# Patient Record
Sex: MALE | Race: WHITE | NOT HISPANIC OR LATINO | Employment: OTHER | URBAN - METROPOLITAN AREA
[De-identification: names, ages, dates, MRNs, and addresses within clinical notes are randomized per-mention and may not be internally consistent; named-entity substitution may affect disease eponyms.]

---

## 2017-02-16 PROBLEM — J45.909 ASTHMA: Status: ACTIVE | Noted: 2017-02-16

## 2017-02-16 PROBLEM — K21.9 GERD (GASTROESOPHAGEAL REFLUX DISEASE): Status: ACTIVE | Noted: 2017-02-16

## 2017-02-16 PROBLEM — E03.9 HYPOTHYROIDISM: Status: ACTIVE | Noted: 2017-02-16

## 2017-02-16 PROBLEM — F41.9 ANXIETY DISORDER: Status: ACTIVE | Noted: 2017-02-16

## 2017-02-16 PROBLEM — M10.9 GOUT: Status: ACTIVE | Noted: 2017-02-16

## 2017-02-16 PROBLEM — C43.9 MALIGNANT MELANOMA (HCC): Status: ACTIVE | Noted: 2017-02-16

## 2017-02-16 PROBLEM — E78.5 DYSLIPIDEMIA: Status: ACTIVE | Noted: 2017-02-16

## 2017-02-16 PROBLEM — G62.9 NEUROPATHY: Status: ACTIVE | Noted: 2017-02-16

## 2017-02-16 PROBLEM — I65.29 CAROTID STENOSIS: Status: ACTIVE | Noted: 2017-02-16

## 2017-02-16 PROBLEM — I10 HYPERTENSION: Status: ACTIVE | Noted: 2017-02-16

## 2017-03-15 ENCOUNTER — HOSPITAL ENCOUNTER (INPATIENT)
Facility: HOSPITAL | Age: 77
LOS: 2 days | Discharge: HOME | End: 2017-03-17
Attending: EMERGENCY MEDICINE | Admitting: INTERNAL MEDICINE

## 2017-03-15 ENCOUNTER — APPOINTMENT (OUTPATIENT)
Dept: CT IMAGING | Facility: HOSPITAL | Age: 77
End: 2017-03-15

## 2017-03-15 DIAGNOSIS — K56.609 SMALL BOWEL OBSTRUCTION (HCC): Primary | ICD-10-CM

## 2017-03-15 PROBLEM — K57.90 DIVERTICULOSIS: Status: ACTIVE | Noted: 2017-03-15

## 2017-03-15 PROBLEM — D72.829 LEUKOCYTOSIS: Status: ACTIVE | Noted: 2017-03-15

## 2017-03-15 LAB
ALBUMIN SERPL-MCNC: 4.2 G/DL (ref 3.2–4.8)
ALBUMIN SERPL-MCNC: 4.6 G/DL (ref 3.2–4.8)
ALBUMIN/GLOB SERPL: 1.5 G/DL (ref 1.5–2.5)
ALBUMIN/GLOB SERPL: 1.6 G/DL (ref 1.5–2.5)
ALP SERPL-CCNC: 113 U/L (ref 25–100)
ALP SERPL-CCNC: 128 U/L (ref 25–100)
ALT SERPL W P-5'-P-CCNC: 21 U/L (ref 7–40)
ALT SERPL W P-5'-P-CCNC: 24 U/L (ref 7–40)
ANION GAP SERPL CALCULATED.3IONS-SCNC: 2 MMOL/L (ref 3–11)
ANION GAP SERPL CALCULATED.3IONS-SCNC: 7 MMOL/L (ref 3–11)
ARTICHOKE IGE QN: 108 MG/DL (ref 0–130)
AST SERPL-CCNC: 20 U/L (ref 0–33)
AST SERPL-CCNC: 21 U/L (ref 0–33)
BACTERIA UR QL AUTO: ABNORMAL /HPF
BASOPHILS # BLD AUTO: 0.02 10*3/MM3 (ref 0–0.2)
BASOPHILS # BLD AUTO: 0.03 10*3/MM3 (ref 0–0.2)
BASOPHILS NFR BLD AUTO: 0.2 % (ref 0–1)
BASOPHILS NFR BLD AUTO: 0.2 % (ref 0–1)
BILIRUB SERPL-MCNC: 0.5 MG/DL (ref 0.3–1.2)
BILIRUB SERPL-MCNC: 0.6 MG/DL (ref 0.3–1.2)
BILIRUB UR QL STRIP: NEGATIVE
BUN BLD-MCNC: 16 MG/DL (ref 9–23)
BUN BLD-MCNC: 17 MG/DL (ref 9–23)
BUN/CREAT SERPL: 17 (ref 7–25)
BUN/CREAT SERPL: 17.8 (ref 7–25)
CALCIUM SPEC-SCNC: 10 MG/DL (ref 8.7–10.4)
CALCIUM SPEC-SCNC: 11.1 MG/DL (ref 8.7–10.4)
CHLORIDE SERPL-SCNC: 104 MMOL/L (ref 99–109)
CHLORIDE SERPL-SCNC: 108 MMOL/L (ref 99–109)
CHOLEST SERPL-MCNC: 179 MG/DL (ref 0–200)
CLARITY UR: ABNORMAL
CO2 SERPL-SCNC: 29 MMOL/L (ref 20–31)
CO2 SERPL-SCNC: 32 MMOL/L (ref 20–31)
COLOR UR: YELLOW
CREAT BLD-MCNC: 0.9 MG/DL (ref 0.6–1.3)
CREAT BLD-MCNC: 1 MG/DL (ref 0.6–1.3)
D-LACTATE SERPL-SCNC: 1.8 MMOL/L (ref 0.5–2)
DEPRECATED RDW RBC AUTO: 46.9 FL (ref 37–54)
DEPRECATED RDW RBC AUTO: 47.3 FL (ref 37–54)
EOSINOPHIL # BLD AUTO: 0.04 10*3/MM3 (ref 0.1–0.3)
EOSINOPHIL # BLD AUTO: 0.06 10*3/MM3 (ref 0.1–0.3)
EOSINOPHIL NFR BLD AUTO: 0.3 % (ref 0–3)
EOSINOPHIL NFR BLD AUTO: 0.4 % (ref 0–3)
ERYTHROCYTE [DISTWIDTH] IN BLOOD BY AUTOMATED COUNT: 14.4 % (ref 11.3–14.5)
ERYTHROCYTE [DISTWIDTH] IN BLOOD BY AUTOMATED COUNT: 14.5 % (ref 11.3–14.5)
GFR SERPL CREATININE-BSD FRML MDRD: 72 ML/MIN/1.73
GFR SERPL CREATININE-BSD FRML MDRD: 82 ML/MIN/1.73
GLOBULIN UR ELPH-MCNC: 2.6 GM/DL
GLOBULIN UR ELPH-MCNC: 3.1 GM/DL
GLUCOSE BLD-MCNC: 122 MG/DL (ref 70–100)
GLUCOSE BLD-MCNC: 159 MG/DL (ref 70–100)
GLUCOSE UR STRIP-MCNC: NEGATIVE MG/DL
HCT VFR BLD AUTO: 45.2 % (ref 38.9–50.9)
HCT VFR BLD AUTO: 47.4 % (ref 38.9–50.9)
HDLC SERPL-MCNC: 43 MG/DL (ref 40–60)
HGB BLD-MCNC: 14.6 G/DL (ref 13.1–17.5)
HGB BLD-MCNC: 15.7 G/DL (ref 13.1–17.5)
HGB UR QL STRIP.AUTO: NEGATIVE
HOLD SPECIMEN: NORMAL
HOLD SPECIMEN: NORMAL
HYALINE CASTS UR QL AUTO: ABNORMAL /LPF
IMM GRANULOCYTES # BLD: 0.04 10*3/MM3 (ref 0–0.03)
IMM GRANULOCYTES # BLD: 0.04 10*3/MM3 (ref 0–0.03)
IMM GRANULOCYTES NFR BLD: 0.3 % (ref 0–0.6)
IMM GRANULOCYTES NFR BLD: 0.3 % (ref 0–0.6)
KETONES UR QL STRIP: NEGATIVE
LEUKOCYTE ESTERASE UR QL STRIP.AUTO: ABNORMAL
LIPASE SERPL-CCNC: 41 U/L (ref 6–51)
LIPASE SERPL-CCNC: 42 U/L (ref 6–51)
LYMPHOCYTES # BLD AUTO: 1.33 10*3/MM3 (ref 0.6–4.8)
LYMPHOCYTES # BLD AUTO: 1.34 10*3/MM3 (ref 0.6–4.8)
LYMPHOCYTES NFR BLD AUTO: 10.7 % (ref 24–44)
LYMPHOCYTES NFR BLD AUTO: 9.5 % (ref 24–44)
MCH RBC QN AUTO: 29.1 PG (ref 27–31)
MCH RBC QN AUTO: 29.7 PG (ref 27–31)
MCHC RBC AUTO-ENTMCNC: 32.3 G/DL (ref 32–36)
MCHC RBC AUTO-ENTMCNC: 33.1 G/DL (ref 32–36)
MCV RBC AUTO: 89.6 FL (ref 80–99)
MCV RBC AUTO: 90.2 FL (ref 80–99)
MONOCYTES # BLD AUTO: 1.01 10*3/MM3 (ref 0–1)
MONOCYTES # BLD AUTO: 1.03 10*3/MM3 (ref 0–1)
MONOCYTES NFR BLD AUTO: 7.2 % (ref 0–12)
MONOCYTES NFR BLD AUTO: 8.2 % (ref 0–12)
NEUTROPHILS # BLD AUTO: 10.08 10*3/MM3 (ref 1.5–8.3)
NEUTROPHILS # BLD AUTO: 11.54 10*3/MM3 (ref 1.5–8.3)
NEUTROPHILS NFR BLD AUTO: 80.3 % (ref 41–71)
NEUTROPHILS NFR BLD AUTO: 82.4 % (ref 41–71)
NITRITE UR QL STRIP: NEGATIVE
PH UR STRIP.AUTO: 7.5 [PH] (ref 5–8)
PLATELET # BLD AUTO: 266 10*3/MM3 (ref 150–450)
PLATELET # BLD AUTO: 284 10*3/MM3 (ref 150–450)
PMV BLD AUTO: 10 FL (ref 6–12)
PMV BLD AUTO: 9.7 FL (ref 6–12)
POTASSIUM BLD-SCNC: 4 MMOL/L (ref 3.5–5.5)
POTASSIUM BLD-SCNC: 5 MMOL/L (ref 3.5–5.5)
PROT SERPL-MCNC: 6.8 G/DL (ref 5.7–8.2)
PROT SERPL-MCNC: 7.7 G/DL (ref 5.7–8.2)
PROT UR QL STRIP: ABNORMAL
RBC # BLD AUTO: 5.01 10*6/MM3 (ref 4.2–5.76)
RBC # BLD AUTO: 5.29 10*6/MM3 (ref 4.2–5.76)
RBC # UR: ABNORMAL /HPF
REF LAB TEST METHOD: ABNORMAL
SODIUM BLD-SCNC: 140 MMOL/L (ref 132–146)
SODIUM BLD-SCNC: 142 MMOL/L (ref 132–146)
SP GR UR STRIP: 1.02 (ref 1–1.03)
SQUAMOUS #/AREA URNS HPF: ABNORMAL /HPF
TRIGL SERPL-MCNC: 251 MG/DL (ref 0–150)
TROPONIN I SERPL-MCNC: 0.01 NG/ML (ref 0–0.07)
TSH SERPL DL<=0.05 MIU/L-ACNC: 0.01 MIU/ML (ref 0.35–5.35)
UROBILINOGEN UR QL STRIP: ABNORMAL
WBC NRBC COR # BLD: 12.55 10*3/MM3 (ref 3.5–10.8)
WBC NRBC COR # BLD: 14.01 10*3/MM3 (ref 3.5–10.8)
WBC UR QL AUTO: ABNORMAL /HPF
WHOLE BLOOD HOLD SPECIMEN: NORMAL
WHOLE BLOOD HOLD SPECIMEN: NORMAL

## 2017-03-15 PROCEDURE — 80053 COMPREHEN METABOLIC PANEL: CPT | Performed by: NURSE PRACTITIONER

## 2017-03-15 PROCEDURE — 84484 ASSAY OF TROPONIN QUANT: CPT

## 2017-03-15 PROCEDURE — 83605 ASSAY OF LACTIC ACID: CPT | Performed by: EMERGENCY MEDICINE

## 2017-03-15 PROCEDURE — 25010000002 ONDANSETRON PER 1 MG: Performed by: NURSE PRACTITIONER

## 2017-03-15 PROCEDURE — 25010000002 HYDROMORPHONE PER 4 MG: Performed by: NURSE PRACTITIONER

## 2017-03-15 PROCEDURE — 25010000002 ENOXAPARIN PER 10 MG: Performed by: NURSE PRACTITIONER

## 2017-03-15 PROCEDURE — 81001 URINALYSIS AUTO W/SCOPE: CPT | Performed by: EMERGENCY MEDICINE

## 2017-03-15 PROCEDURE — 83690 ASSAY OF LIPASE: CPT | Performed by: EMERGENCY MEDICINE

## 2017-03-15 PROCEDURE — 74176 CT ABD & PELVIS W/O CONTRAST: CPT

## 2017-03-15 PROCEDURE — 80053 COMPREHEN METABOLIC PANEL: CPT | Performed by: EMERGENCY MEDICINE

## 2017-03-15 PROCEDURE — 25010000002 HYDROMORPHONE PER 4 MG: Performed by: EMERGENCY MEDICINE

## 2017-03-15 PROCEDURE — 80061 LIPID PANEL: CPT | Performed by: NURSE PRACTITIONER

## 2017-03-15 PROCEDURE — 84443 ASSAY THYROID STIM HORMONE: CPT | Performed by: NURSE PRACTITIONER

## 2017-03-15 PROCEDURE — 93005 ELECTROCARDIOGRAM TRACING: CPT | Performed by: EMERGENCY MEDICINE

## 2017-03-15 PROCEDURE — 85025 COMPLETE CBC W/AUTO DIFF WBC: CPT | Performed by: EMERGENCY MEDICINE

## 2017-03-15 PROCEDURE — 99223 1ST HOSP IP/OBS HIGH 75: CPT | Performed by: INTERNAL MEDICINE

## 2017-03-15 PROCEDURE — 85025 COMPLETE CBC W/AUTO DIFF WBC: CPT | Performed by: NURSE PRACTITIONER

## 2017-03-15 PROCEDURE — 99284 EMERGENCY DEPT VISIT MOD MDM: CPT

## 2017-03-15 PROCEDURE — 25010000002 ONDANSETRON PER 1 MG

## 2017-03-15 RX ORDER — ONDANSETRON 2 MG/ML
4 INJECTION INTRAMUSCULAR; INTRAVENOUS ONCE
Status: COMPLETED | OUTPATIENT
Start: 2017-03-15 | End: 2017-03-15

## 2017-03-15 RX ORDER — PYRIDOSTIGMINE BROMIDE 60 MG/1
30 TABLET ORAL EVERY 12 HOURS SCHEDULED
Status: DISCONTINUED | OUTPATIENT
Start: 2017-03-15 | End: 2017-03-16

## 2017-03-15 RX ORDER — LABETALOL HYDROCHLORIDE 5 MG/ML
10 INJECTION, SOLUTION INTRAVENOUS EVERY 6 HOURS PRN
Status: DISCONTINUED | OUTPATIENT
Start: 2017-03-15 | End: 2017-03-17 | Stop reason: HOSPADM

## 2017-03-15 RX ORDER — ACETAMINOPHEN 325 MG/1
650 TABLET ORAL EVERY 4 HOURS PRN
Status: DISCONTINUED | OUTPATIENT
Start: 2017-03-15 | End: 2017-03-17 | Stop reason: HOSPADM

## 2017-03-15 RX ORDER — HYDROMORPHONE HYDROCHLORIDE 1 MG/ML
0.5 INJECTION, SOLUTION INTRAMUSCULAR; INTRAVENOUS; SUBCUTANEOUS
Status: DISCONTINUED | OUTPATIENT
Start: 2017-03-15 | End: 2017-03-17 | Stop reason: HOSPADM

## 2017-03-15 RX ORDER — SODIUM CHLORIDE 0.9 % (FLUSH) 0.9 %
10 SYRINGE (ML) INJECTION AS NEEDED
Status: DISCONTINUED | OUTPATIENT
Start: 2017-03-15 | End: 2017-03-17 | Stop reason: HOSPADM

## 2017-03-15 RX ORDER — HYDROMORPHONE HYDROCHLORIDE 1 MG/ML
0.5 INJECTION, SOLUTION INTRAMUSCULAR; INTRAVENOUS; SUBCUTANEOUS ONCE
Status: COMPLETED | OUTPATIENT
Start: 2017-03-15 | End: 2017-03-15

## 2017-03-15 RX ORDER — NALOXONE HCL 0.4 MG/ML
0.4 VIAL (ML) INJECTION
Status: DISCONTINUED | OUTPATIENT
Start: 2017-03-15 | End: 2017-03-17 | Stop reason: HOSPADM

## 2017-03-15 RX ORDER — SODIUM CHLORIDE 9 MG/ML
100 INJECTION, SOLUTION INTRAVENOUS CONTINUOUS
Status: DISCONTINUED | OUTPATIENT
Start: 2017-03-15 | End: 2017-03-17 | Stop reason: HOSPADM

## 2017-03-15 RX ORDER — ONDANSETRON 2 MG/ML
4 INJECTION INTRAMUSCULAR; INTRAVENOUS EVERY 6 HOURS PRN
Status: DISCONTINUED | OUTPATIENT
Start: 2017-03-15 | End: 2017-03-17 | Stop reason: HOSPADM

## 2017-03-15 RX ORDER — ONDANSETRON 4 MG/1
4 TABLET, FILM COATED ORAL EVERY 6 HOURS PRN
Status: DISCONTINUED | OUTPATIENT
Start: 2017-03-15 | End: 2017-03-17 | Stop reason: HOSPADM

## 2017-03-15 RX ORDER — ONDANSETRON 2 MG/ML
4 INJECTION INTRAMUSCULAR; INTRAVENOUS ONCE
Status: DISCONTINUED | OUTPATIENT
Start: 2017-03-15 | End: 2017-03-15

## 2017-03-15 RX ORDER — SODIUM CHLORIDE 9 MG/ML
125 INJECTION, SOLUTION INTRAVENOUS CONTINUOUS
Status: DISCONTINUED | OUTPATIENT
Start: 2017-03-15 | End: 2017-03-15

## 2017-03-15 RX ADMIN — PYRIDOSTIGMINE BROMIDE 30 MG: 60 TABLET ORAL at 22:23

## 2017-03-15 RX ADMIN — HYDROMORPHONE HYDROCHLORIDE 0.5 MG: 1 INJECTION, SOLUTION INTRAMUSCULAR; INTRAVENOUS; SUBCUTANEOUS at 05:44

## 2017-03-15 RX ADMIN — HYDROMORPHONE HYDROCHLORIDE 0.5 MG: 1 INJECTION, SOLUTION INTRAMUSCULAR; INTRAVENOUS; SUBCUTANEOUS at 12:20

## 2017-03-15 RX ADMIN — SODIUM CHLORIDE 125 ML/HR: 9 INJECTION, SOLUTION INTRAVENOUS at 02:45

## 2017-03-15 RX ADMIN — ONDANSETRON 4 MG: 2 INJECTION INTRAMUSCULAR; INTRAVENOUS at 05:44

## 2017-03-15 RX ADMIN — SODIUM CHLORIDE 100 ML/HR: 9 INJECTION, SOLUTION INTRAVENOUS at 17:48

## 2017-03-15 RX ADMIN — ONDANSETRON 4 MG: 2 INJECTION INTRAMUSCULAR; INTRAVENOUS at 02:38

## 2017-03-15 RX ADMIN — ENOXAPARIN SODIUM 30 MG: 30 INJECTION SUBCUTANEOUS at 06:23

## 2017-03-15 RX ADMIN — HYDROMORPHONE HYDROCHLORIDE 0.5 MG: 1 INJECTION, SOLUTION INTRAMUSCULAR; INTRAVENOUS; SUBCUTANEOUS at 09:43

## 2017-03-15 RX ADMIN — ONDANSETRON 4 MG: 2 INJECTION INTRAMUSCULAR; INTRAVENOUS at 17:48

## 2017-03-15 RX ADMIN — SODIUM CHLORIDE 1000 ML: 9 INJECTION, SOLUTION INTRAVENOUS at 04:57

## 2017-03-15 RX ADMIN — SODIUM CHLORIDE 100 ML/HR: 9 INJECTION, SOLUTION INTRAVENOUS at 05:44

## 2017-03-15 RX ADMIN — HYDROMORPHONE HYDROCHLORIDE 0.5 MG: 1 INJECTION, SOLUTION INTRAMUSCULAR; INTRAVENOUS; SUBCUTANEOUS at 22:23

## 2017-03-15 RX ADMIN — HYDROMORPHONE HYDROCHLORIDE 0.5 MG: 1 INJECTION, SOLUTION INTRAMUSCULAR; INTRAVENOUS; SUBCUTANEOUS at 17:48

## 2017-03-15 RX ADMIN — HYDROMORPHONE HYDROCHLORIDE 0.5 MG: 1 INJECTION, SOLUTION INTRAMUSCULAR; INTRAVENOUS; SUBCUTANEOUS at 02:44

## 2017-03-15 NOTE — PROGRESS NOTES
Discharge Planning Assessment  [facility]     Patient Name: Domitila Bosch  MRN: [id number]  Today's Date: 3/15/2017    Admit Date: 3/15/2017          Discharge Needs Assessment       03/15/17 1033    Living Environment    Lives With spouse   pt resides in [facility] with wife, Blank    Living Arrangements house    Provides Primary Care For no one    Quality Of Family Relationships supportive;helpful;involved    Able to Return to Prior Living Arrangements yes    Discharge Needs Assessment    Concerns To Be Addressed no discharge needs identified;denies needs/concerns at this time    Readmission Within The Last 30 Days no previous admission in last 30 days    Anticipated Changes Related to Illness none    Equipment Currently Used at Home none    Equipment Needed After Discharge none    Transportation Available car;family or friend will provide    Discharge Contact Information if Applicable [phone] or [phone]            Discharge Plan       03/15/17 1033    Case Management/Social Work Plan    Plan home    Patient/Family In Agreement With Plan yes    Additional Comments Spoke with pt and wife at bedside, pt plans to return home with assistance from wife as needed. Pt denies discharge needs at this time.         Discharge Placement     No information found        Expected Discharge Date and Time     Expected Discharge Date Expected Discharge Time    Mar 17, 2017               Demographic Summary       03/15/17 1031    Referral Information    Referral Source admission list    Contact Information    Permission Granted to Share Information With     Primary Care Physician Information    Name andres Ferguson            Functional Status       03/15/17 1031    Functional Status Current    Current Functional Level Comment see previous charting    Functional Status Prior    Ambulation 0-->independent    Transferring 0-->independent    Toileting 0-->independent    Bathing 0-->independent    Dressing  0-->independent    Eating 0-->independent    Communication 0-->understands/communicates without difficulty    Swallowing 0-->swallows foods/liquids without difficulty    IADL    Medications independent   pt confirms he has [employer] prescription drug coverage and denies issues obtaining or affording current medications    Meal Preparation independent    Housekeeping independent    Laundry independent    Shopping independent    Oral Care independent    Activity Tolerance    Current Activity Limitations none    Usual Activity Tolerance good    Current Activity Tolerance moderate    Employment/Financial    Financial Concerns none   pt confirms he has Medicare and [employer] insurance coverage, pt denies concerns or disruption in coverage            Psychosocial     None            Abuse/Neglect     None            Legal     None            Substance Abuse     None            Patient Forms     None          Susan Monk

## 2017-03-15 NOTE — H&P
"    [facility] Medicine Services  HISTORY AND PHYSICAL    Primary Care Physician: Marcin Ferguson MD    Subjective     Chief Complaint:  Abdominal pain    History of Present Illness:   This is a pleasant 76 year old male who presents to the ED with complaints of abdominal pain, onset yesterday.  Patient states when he got out of the shower yesterday he began to have sharp 7/10,  cramping abdominal pain that radiated across his abdomen along with nausea.  Due to to his severe pain he presented to the ED.  He denies any fevers, chills, cough, shortness of breath, chest pain, diarrhea, vomiting, melena or hematochezia.  His last bowel movement was yesterday in which he states was a \"normal\" BM.  He also states he is unable to pass any gas.  He will be admitted to [facility] under the care of the Hospitalist for further evaluation and treatment.      75 YO MALE W/ PRIOR HX OF OBSTRUCTION AS WELL AS HX OF COLON RESECTION FOR DIVERTICULAR BLEED LAST SUMMER WHO PRESENTS W/ INC ABD PAIN AND NAUSEA BUT NO V.  NO F/C.  NO D/C.  LAST BM WAS YESTERDAY.    Review of Systems   Constitutional: Positive for appetite change. Negative for chills, diaphoresis, fatigue and fever.   HENT: Negative.    Eyes: Negative.    Respiratory: Negative for cough, shortness of breath and wheezing.    Cardiovascular: Negative for chest pain, palpitations and leg swelling.   Gastrointestinal: Positive for nausea. Negative for abdominal distention, abdominal pain, constipation, diarrhea and vomiting.   Endocrine: Negative.    Genitourinary: Negative for difficulty urinating, dysuria, flank pain, frequency and hematuria.   Musculoskeletal: Negative.    Skin: Negative.    Allergic/Immunologic: Negative.    Neurological: Negative for dizziness, facial asymmetry, weakness, light-headedness, numbness and headaches.   Hematological: Negative.    Psychiatric/Behavioral: Negative.       Otherwise complete ROS performed and negative except " "as mentioned in the HPI.    Past Medical History   Diagnosis Date   • Anxiety disorder 2/16/2017   • Asthma 2/16/2017   • Carotid stenosis 2/16/2017   • Diaphoresis    • Dyslipidemia 2/16/2017   • GERD (gastroesophageal reflux disease) 2/16/2017   • Gout 2/16/2017   • Herpes zoster      Herpes zoster, [id number], right lower extremity.    • Hypertension 2/16/2017   • Hypothyroidism 2/16/2017   • Malignant melanoma 2/16/2017   • Nephrolithiasis    • Neuropathy 2/16/2017   • TIA (transient ischemic attack)      TIA, June 2012, with nonobstructive carotid stenosis, dyslipidemia and hypertension       Past Surgical History   Procedure Laterality Date   • Nissen fundoplication  1984   • Heel spur surgery  1999   • Other surgical history  2010     Malignant melanoma, status post resection        History reviewed. No pertinent family history.    Social History     Social History   • Marital status:      Spouse name: N/A   • Number of children: N/A   • Years of education: N/A     Occupational History   • Not on file.     Social History Main Topics   • Smoking status: Former Smoker     Types: Cigars   • Smokeless tobacco: Never Used      Comment: quit many years ago    • Alcohol use No   • Drug use: No   • Sexual activity: Defer     Other Topics Concern   • Not on file     Social History Narrative   • No narrative on file       Medications:    (Not in a hospital admission)    Allergies:  Allergies   Allergen Reactions   • Betadine [Povidone Iodine]    • Cephalexin    • Flomax [Tamsulosin Hcl]    • Iodine    • Lisinopril    • Pseudoephedrine    • Sulfamethoxazole-Trimethoprim          Objective     Physical Exam:  Vital Signs:   Visit Vitals   • /94   • Pulse 87   • Temp 97.8 °F (36.6 °C)   • Resp 18   • Ht 74\" (188 cm)   • Wt 190 lb (86.2 kg)   • SpO2 98%   • BMI 24.39 kg/m2     Physical Exam   Constitutional: He is oriented to person, place, and time. He appears well-developed and well-nourished. No distress. "   Alert & oriented x3 pleasant cooperative.  Appears comfortable and in no acute distress.  Family at bedside.    HENT:   Head: Normocephalic and atraumatic.   Eyes: Conjunctivae and EOM are normal. Pupils are equal, round, and reactive to light. Right eye exhibits no discharge. Left eye exhibits no discharge. No scleral icterus.   Neck: Normal range of motion. Neck supple. No JVD present. No tracheal deviation present. No thyromegaly present.   Cardiovascular: Normal rate, regular rhythm, normal heart sounds and intact distal pulses.  Exam reveals no gallop and no friction rub.    No murmur heard.  Pulmonary/Chest: Effort normal and breath sounds normal. No stridor. No respiratory distress. He has no wheezes. He has no rales. He exhibits no tenderness.   Abdominal: Soft. Bowel sounds are normal. He exhibits distension. He exhibits no mass. There is tenderness. There is no rebound and no guarding. No hernia.   Increased tenderness to RUQ and LLQ   Musculoskeletal: Normal range of motion. He exhibits no edema, tenderness or deformity.   Lymphadenopathy:     He has no cervical adenopathy.   Neurological: He is alert and oriented to person, place, and time. He has normal reflexes.   Skin: Skin is warm and dry. No rash noted. He is not diaphoretic. No erythema. No pallor.   Psychiatric: He has a normal mood and affect. His behavior is normal. Judgment and thought content normal.   Vitals reviewed.    NO CHANGE IN PE      Results Reviewed:    Results from last 7 days  Lab Units 03/15/17  0233   WBC 10*3/mm3 14.01*   HEMOGLOBIN g/dL 15.7   PLATELETS 10*3/mm3 284       Results from last 7 days  Lab Units 03/15/17  0233   SODIUM mmol/L 140   POTASSIUM mmol/L 4.0   TOTAL CO2 mmol/L 29.0   CREATININE mg/dL 1.00   GLUCOSE mg/dL 159*   CALCIUM mg/dL 11.1*     Imaging Results (last 24 hours)     Procedure Component Value Units Date/Time    CT Abdomen Pelvis Without Contrast [[id number]]  (Abnormal) Collected:  03/15/17 0247      Updated:  03/15/17 0345    Narrative:         EXAM:  CT Abdomen and Pelvis Without Intravenous Contrast.    CLINICAL HISTORY:  76 years old, male; Pain; Other: See notes; Prior surgery; Additional info: Abd   pain    TECHNIQUE:  Axial computed tomography images of the abdomen and pelvis without intravenous   contrast.  This CT exam was performed using one or more of the following dose   reduction techniques:  automated exposure control, adjustment of the mA and/or   kV according to patient size, and/or use of iterative reconstruction technique.  Coronal reformatted images were created and reviewed.    COMPARISON:  CT ABD PELVIS WO CONTRAST 9/1/2014 12:07:12 AM    FINDINGS:  Lower thorax:  Bibasilar atelectasis and/or scarring.  Atherosclerotic   calcification of the coronary arteries and distal thoracic aorta.    ABDOMEN:  Liver:  Simple left hepatic lobe cyst.  Gallbladder and bile ducts:  Normal.  Pancreas:  Normal.  Spleen:  Normal.  Adrenals:  Normal.  Kidneys and ureters:  Bilateral simple renal cysts.  Bilateral nonobstructive   nephrolithiasis.  Stomach and bowel:  Multiple loops of mildly dilated proximal and mid small   bowel, with apparent transition to normal caliber small bowel within the left   abdomen.  4.5 cm gas and fluid containing duodenal diverticulum involving the   proximal and distal aspects of the duodenum, without acute complications.    Colonic diverticulosis.  Appendix:  No findings to suggest acute appendicitis.    PELVIS:  Bladder:  Normal.  Reproductive:  Prostate gland is enlarged, measuring approximately 5 cm in   anteroposterior dimension.    ABDOMEN and PELVIS:  Intraperitoneal space:  Normal.  No free air.  No significant fluid collection.  Bones/joints:  Degenerative changes of the hips and sacral iliac joints.    Multilevel thoracolumbar spine degenerative changes.  No acute fracture.  No   dislocation.  Soft tissues:  Small bilateral fat-containing inguinal hernias, without acute    complications.  Vasculature:  Atherosclerotic disease of aorta and iliac arteries.  Phleboliths   within the pelvis.  Lymph nodes:  Normal.      Impression:         1.  Multiple loops of mildly dilated proximal and mid small bowel, with   apparent transition to normal caliber small bowel within the left abdomen.    Findings most compatible with partial or early complete small bowel   obstruction. Recommend followup/further evaluation.    2.  Incidental/non-acute findings are described above.      THIS DOCUMENT HAS BEEN ELECTRONICALLY SIGNED BY HIRAM HECK MD          I have personally reviewed and interpreted available lab data, radiology studies and ECG obtained at time of admission.     Assessment / Plan     Assessment/Problem List:   Principal Problem:    Small bowel obstruction  Active Problems:    Diverticulosis    S/P left hemicolectomy    S/P hernia repair    Hypertension    Dyslipidemia    Hypothyroidism    Leukocytosis      Plan:  1) Partial vs complete small bowel obstruction W/HX OF PRIOR IN PAST; CONSULT CRS IF DOES NOT RESOLVE  -CT scan shows multiple dilated proximal and mid small bowel. Findings most compatible with partial or early vs complete small bowel obstruction.   -NPO  -IVF  -No NG for now  -lipase 42    2) Leukocytosis  -etiology unclear  -UA negative, CXR pending  -continue to monitor    3) Diverticulosis  -history of diverticular bleed, s/p left hemicolectomy  -last colonoscopy was a few months ago    4) Hypertension  -continue home medications    5) Dyslipidemia  -continue lopid    6) Hypothyroidism  -continue synthroid   -check TSH    7) VTE prophylaxis  -teds/scds, lovenox    8) code status:  -full code     Admission Status: Patient will be admitted to  PHILIPPE Mcmahan, YVAN 03/15/17 5:15 AM

## 2017-03-15 NOTE — PLAN OF CARE
Problem: Patient Care Overview (Adult)  Goal: Plan of Care Review  Outcome: Ongoing (interventions implemented as appropriate)    03/15/17 4611   Coping/Psychosocial Response Interventions   Plan Of Care Reviewed With patient   Patient Care Overview   Progress progress toward functional goals as expected   Outcome Evaluation   Outcome Summary/Follow up Plan passing gas, bowel movement 3/14/17

## 2017-03-15 NOTE — ED PROVIDER NOTES
Subjective   HPI Comments: Domitila Bosch is a 76 y.o.male who presents to the ED with c/o abd pain. Yesterday morning he was in the shower when he began having cramping across his entire abdomen to his back and nausea. He has tried taking phenergan with no relief, his family has noted abdominal distention and when he pain continued tonight, he came to the ED for evaluation. In the ED he states that he has been unable to pass gas and he denies any vomiting, diarrhea, hematochezia, fevers or other acute complaints.     Patient is a 76 y.o. male presenting with cramps.   History provided by:  Patient  Abdominal Cramping   Pain location:  Generalized  Pain quality: cramping    Pain severity:  Moderate  Onset quality:  Sudden  Duration: began yesterday morning.  Timing:  Constant  Progression:  Unchanged  Chronicity:  New  Ineffective treatments: phenergan.  Associated symptoms: fatigue and nausea    Associated symptoms: no belching, no cough, no diarrhea, no fever, no hematemesis, no hematochezia, no shortness of breath and no vomiting        Review of Systems   Constitutional: Positive for fatigue. Negative for fever.   Respiratory: Negative for cough and shortness of breath.    Gastrointestinal: Positive for nausea. Negative for diarrhea, hematemesis, hematochezia and vomiting.   All other systems reviewed and are negative.      Past Medical History   Diagnosis Date   • Anxiety disorder 2/16/2017   • Asthma 2/16/2017   • Carotid stenosis 2/16/2017   • Diaphoresis    • Dyslipidemia 2/16/2017   • GERD (gastroesophageal reflux disease) 2/16/2017   • Gout 2/16/2017   • Herpes zoster      Herpes zoster, [id number], right lower extremity.    • Hypertension 2/16/2017   • Hypothyroidism 2/16/2017   • Malignant melanoma 2/16/2017   • Nephrolithiasis    • Neuropathy 2/16/2017   • TIA (transient ischemic attack)      TIA, June 2012, with nonobstructive carotid stenosis, dyslipidemia and hypertension       Allergies   Allergen  Reactions   • Betadine [Povidone Iodine]    • Cephalexin    • Flomax [Tamsulosin Hcl]    • Iodine    • Lisinopril    • Pseudoephedrine    • Sulfamethoxazole-Trimethoprim        Past Surgical History   Procedure Laterality Date   • Nissen fundoplication  1984   • Heel spur surgery  1999   • Other surgical history  2010     Malignant melanoma, status post resection        History reviewed. No pertinent family history.    Social History     Social History   • Marital status:      Spouse name: N/A   • Number of children: N/A   • Years of education: N/A     Social History Main Topics   • Smoking status: Never Smoker   • Smokeless tobacco: None   • Alcohol use No   • Drug use: No   • Sexual activity: Not Asked     Other Topics Concern   • None     Social History Narrative   • None         Objective   Physical Exam   Constitutional: He is oriented to person, place, and time. He appears well-developed and well-nourished. No distress.   HENT:   Head: Normocephalic and atraumatic.   Eyes: Conjunctivae are normal. No scleral icterus.   Neck: Normal range of motion. Neck supple.   Cardiovascular: Normal rate, regular rhythm and normal heart sounds.    Pulmonary/Chest: Effort normal and breath sounds normal. No respiratory distress.   Abdominal: Soft. There is no tenderness.   Musculoskeletal: Normal range of motion. He exhibits no edema.   Lymphadenopathy:     He has no cervical adenopathy.   Neurological: He is alert and oriented to person, place, and time.   Skin: Skin is warm and dry. No rash noted. He is not diaphoretic.   Psychiatric: He has a normal mood and affect. His behavior is normal.   Nursing note and vitals reviewed.      Procedures         ED Course  ED Course                     MDM  Number of Diagnoses or Management Options  Small bowel obstruction: new and requires workup  Diagnosis management comments: Patients pain relieved with pain medication, resting comfortably.     CT concerning for ear partial  to complete small bowel obstruction.     Discussed with Dr. Wilson who will admit.        Amount and/or Complexity of Data Reviewed  Clinical lab tests: ordered and reviewed  Tests in the radiology section of CPT®: ordered and reviewed  Obtain history from someone other than the patient: yes  Review and summarize past medical records: yes  Independent visualization of images, tracings, or specimens: yes    Patient Progress  Patient progress: stable      Final diagnoses:   Small bowel obstruction       Documentation assistance provided by lupis Hidalgo.  Information recorded by the scribe was done at my direction and has been verified and validated by me.  jamal Hidalgo  03/15/17 0331       Yocasta Larson MD  03/15/17 0444

## 2017-03-15 NOTE — PROGRESS NOTES
"      HOSPITALIST DAILY PROGRESS NOTE    Chief Complaint: Abdominal pain    Subjective   SUBJECTIVE/OVERNIGHT EVENTS   Patient 76-year-old who continues to complain of abdominal pain which started yesterday patient is able to pass gas he is unable to have a bowel movement.  Wife and daughter at bedside.  Pain is some improved today   Review of Systems:  Gen-no fevers, no chills.   positive for appetite change   CV-no chest pain, no palpitations  Resp-no cough, no dyspnea  GI-pos N/ no V/D, positive abd pain, positive for abdominal cramping    Objective   OBJECTIVE   I have reviewed the vital signs.  Visit Vitals   • /66   • Pulse 83   • Temp 98.5 °F (36.9 °C) (Temporal Artery )   • Resp 18   • Ht 74\" (188 cm)   • Wt 190 lb (86.2 kg)   • SpO2 96%   • BMI 24.39 kg/m2       Physical Exam:  Constitutional: He is oriented to person, place, and time. He appears well-developed and well-nourished.  Uncomfortable  Family at bedside.    HENT:   Head: Normocephalic and atraumatic.   Eyes: Conjunctivae and EOM are normal. Pupils are equal, round, and reactive to light. Right eye exhibits no discharge. Left eye exhibits no discharge. No scleral icterus.   Neck: Normal range of motion. Neck supple. No JVD present.  Cardiovascular: Normal rate, regular rhythm, normal heart sounds and intact distal pulses. Exam reveals no gallop and no friction rub.   No murmur heard.  Pulmonary/Chest: Effort normal and breath sounds normal. No wheeze. No respiratory distress.   Abdominal: Soft. Bowel sounds are high-pitched. He exhibits  Mild distension. He exhibits no mass. There is  internalized tenderness. There is no rebound and no guarding.    Musculoskeletal: Normal range of motion. He exhibits no edema, tenderness or deformity.   Lymphadenopathy: He has no cervical adenopathy.   Neurological: He is alert and oriented to person, place, and time.   Skin: Skin is warm and dry. No rash noted. He is not diaphoretic. No erythema. No pallor. "   Psychiatric: He has a normal mood and affect. His behavior is normal. Judgment and thought content normal.    Results:  I have reviewed the labs, culture data, radiology results, and diagnostic studies.      Results from last 7 days  Lab Units 03/15/17  0822 03/15/17  0233   WBC 10*3/mm3 12.55* 14.01*   HEMOGLOBIN g/dL 14.6 15.7   HEMATOCRIT % 45.2 47.4   PLATELETS 10*3/mm3 266 284       Results from last 7 days  Lab Units 03/15/17  0822   SODIUM mmol/L 142   POTASSIUM mmol/L 5.0   CHLORIDE mmol/L 108   TOTAL CO2 mmol/L 32.0*   BUN mg/dL 16   CREATININE mg/dL 0.90   GLUCOSE mg/dL 122*   CALCIUM mg/dL 10.0     Lab Results   Component Value Date    TSH 0.014 (L) 03/15/2017       Culture Data:  Cultures:           Radiology Results:  Imaging Results (last 24 hours)     Procedure Component Value Units Date/Time    CT Abdomen Pelvis Without Contrast [[id number]]  (Abnormal) Collected:  03/15/17 0241     Updated:  03/15/17 0345    Narrative:         EXAM:  CT Abdomen and Pelvis Without Intravenous Contrast.    CLINICAL HISTORY:  76 years old, male; Pain; Other: See notes; Prior surgery; Additional info: Abd   pain    TECHNIQUE:  Axial computed tomography images of the abdomen and pelvis without intravenous   contrast.  This CT exam was performed using one or more of the following dose   reduction techniques:  automated exposure control, adjustment of the mA and/or   kV according to patient size, and/or use of iterative reconstruction technique.  Coronal reformatted images were created and reviewed.    COMPARISON:  CT ABD PELVIS WO CONTRAST 9/1/2014 12:07:12 AM    FINDINGS:  Lower thorax:  Bibasilar atelectasis and/or scarring.  Atherosclerotic   calcification of the coronary arteries and distal thoracic aorta.    ABDOMEN:  Liver:  Simple left hepatic lobe cyst.  Gallbladder and bile ducts:  Normal.  Pancreas:  Normal.  Spleen:  Normal.  Adrenals:  Normal.  Kidneys and ureters:  Bilateral simple renal cysts.  Bilateral  nonobstructive   nephrolithiasis.  Stomach and bowel:  Multiple loops of mildly dilated proximal and mid small   bowel, with apparent transition to normal caliber small bowel within the left   abdomen.  4.5 cm gas and fluid containing duodenal diverticulum involving the   proximal and distal aspects of the duodenum, without acute complications.    Colonic diverticulosis.  Appendix:  No findings to suggest acute appendicitis.    PELVIS:  Bladder:  Normal.  Reproductive:  Prostate gland is enlarged, measuring approximately 5 cm in   anteroposterior dimension.    ABDOMEN and PELVIS:  Intraperitoneal space:  Normal.  No free air.  No significant fluid collection.  Bones/joints:  Degenerative changes of the hips and sacral iliac joints.    Multilevel thoracolumbar spine degenerative changes.  No acute fracture.  No   dislocation.  Soft tissues:  Small bilateral fat-containing inguinal hernias, without acute   complications.  Vasculature:  Atherosclerotic disease of aorta and iliac arteries.  Phleboliths   within the pelvis.  Lymph nodes:  Normal.      Impression:         1.  Multiple loops of mildly dilated proximal and mid small bowel, with   apparent transition to normal caliber small bowel within the left abdomen.    Findings most compatible with partial or early complete small bowel   obstruction. Recommend followup/further evaluation.    2.  Incidental/non-acute findings are described above.      THIS DOCUMENT HAS BEEN ELECTRONICALLY SIGNED BY HIRAM HECK MD          I have reviewed the medications.      Assessment/Plan   ASSESSMENT/PLAN    Principal Problem:    Small bowel obstruction  Active Problems:    S/P left hemicolectomy    S/P hernia repair    Hypertension    Dyslipidemia    Hypothyroidism    Leukocytosis    Diverticulosis    Plan:  1) Partial vs complete small bowel obstruction W/HX OF PRIOR IN PAST; we have consult to Dr. Samayoa  -CT scan shows multiple dilated proximal and mid small bowel. Findings  most compatible with partial or early vs complete small bowel obstruction.   -We'll continue nothing by mouth status   -Continue IVF  - Hold NG for now     2) Leukocytosis  -Likely due to above, improved some today   -UA negative, CXR still pending  -continue to monitor     3) Diverticulosis  -history of diverticular bleed, s/p left hemicolectomy  -last colonoscopy was a few months ago     4) Hypertension  -continue home medications when tolerating by mouth, will order IV when necessary until then.     5) Dyslipidemia  -continue lopid     6) Hypothyroidism  -continue synthroid but will drop dose 125mcg and recheck in 6 weeks  -low tsh     7) VTE prophylaxis  -teds/scds, lovenox     8) code status:  -full code      Admission Status: Patient will be admitted to [facility]      I expect patient to be discharged in: 2-3 days     Tesha Lewis DO  03/15/17  4:27 PM

## 2017-03-15 NOTE — PLAN OF CARE
Problem: Patient Care Overview (Adult)  Goal: Plan of Care Review  Outcome: Ongoing (interventions implemented as appropriate)    03/15/17 0618   Coping/Psychosocial Response Interventions   Plan Of Care Reviewed With patient   Patient Care Overview   Progress no change   Outcome Evaluation   Outcome Summary/Follow up Plan new admit to floor @0533. c/o abdominal pain, nausesa.        Goal: Adult Individualization and Mutuality  Outcome: Ongoing (interventions implemented as appropriate)  Goal: Discharge Needs Assessment  Outcome: Ongoing (interventions implemented as appropriate)

## 2017-03-16 LAB
ANION GAP SERPL CALCULATED.3IONS-SCNC: 3 MMOL/L (ref 3–11)
BUN BLD-MCNC: 14 MG/DL (ref 9–23)
BUN/CREAT SERPL: 15.6 (ref 7–25)
CALCIUM SPEC-SCNC: 9.1 MG/DL (ref 8.7–10.4)
CHLORIDE SERPL-SCNC: 110 MMOL/L (ref 99–109)
CO2 SERPL-SCNC: 29 MMOL/L (ref 20–31)
CREAT BLD-MCNC: 0.9 MG/DL (ref 0.6–1.3)
D-LACTATE SERPL-SCNC: 0.8 MMOL/L (ref 0.5–2)
DEPRECATED RDW RBC AUTO: 48.4 FL (ref 37–54)
ERYTHROCYTE [DISTWIDTH] IN BLOOD BY AUTOMATED COUNT: 14.5 % (ref 11.3–14.5)
GFR SERPL CREATININE-BSD FRML MDRD: 82 ML/MIN/1.73
GLUCOSE BLD-MCNC: 88 MG/DL (ref 70–100)
HCT VFR BLD AUTO: 41 % (ref 38.9–50.9)
HGB BLD-MCNC: 13 G/DL (ref 13.1–17.5)
MCH RBC QN AUTO: 29.1 PG (ref 27–31)
MCHC RBC AUTO-ENTMCNC: 31.7 G/DL (ref 32–36)
MCV RBC AUTO: 91.7 FL (ref 80–99)
PLATELET # BLD AUTO: 230 10*3/MM3 (ref 150–450)
PMV BLD AUTO: 10.2 FL (ref 6–12)
POTASSIUM BLD-SCNC: 4 MMOL/L (ref 3.5–5.5)
RBC # BLD AUTO: 4.47 10*6/MM3 (ref 4.2–5.76)
SODIUM BLD-SCNC: 142 MMOL/L (ref 132–146)
WBC NRBC COR # BLD: 10.83 10*3/MM3 (ref 3.5–10.8)

## 2017-03-16 PROCEDURE — 80048 BASIC METABOLIC PNL TOTAL CA: CPT | Performed by: FAMILY MEDICINE

## 2017-03-16 PROCEDURE — 99232 SBSQ HOSP IP/OBS MODERATE 35: CPT | Performed by: INTERNAL MEDICINE

## 2017-03-16 PROCEDURE — 85027 COMPLETE CBC AUTOMATED: CPT | Performed by: FAMILY MEDICINE

## 2017-03-16 PROCEDURE — 83605 ASSAY OF LACTIC ACID: CPT | Performed by: FAMILY MEDICINE

## 2017-03-16 PROCEDURE — 25010000002 ENOXAPARIN PER 10 MG: Performed by: NURSE PRACTITIONER

## 2017-03-16 RX ORDER — PYRIDOSTIGMINE BROMIDE 60 MG/1
60 TABLET ORAL EVERY 8 HOURS SCHEDULED
Status: DISCONTINUED | OUTPATIENT
Start: 2017-03-17 | End: 2017-03-17 | Stop reason: HOSPADM

## 2017-03-16 RX ADMIN — SODIUM CHLORIDE 100 ML/HR: 9 INJECTION, SOLUTION INTRAVENOUS at 03:15

## 2017-03-16 RX ADMIN — PYRIDOSTIGMINE BROMIDE 30 MG: 60 TABLET ORAL at 22:09

## 2017-03-16 RX ADMIN — ENOXAPARIN SODIUM 30 MG: 30 INJECTION SUBCUTANEOUS at 05:35

## 2017-03-16 RX ADMIN — PYRIDOSTIGMINE BROMIDE 30 MG: 60 TABLET ORAL at 08:32

## 2017-03-16 NOTE — PROGRESS NOTES
[facility] Medicine Services  INPATIENT PROGRESS NOTE    Date of Admission: 3/15/2017  Length of Stay: 1  Primary Care Physician: Marcin Ferguson MD    Subjective   CC:abdominal pain  HPI:pt feels better today , denies abdominal pain,Nausea or vomiting, had a regular BM today    Pt denies cp/sob/fever,no events over night        Review of Systems   10 system review was negative      Objective      Temp:  [97.5 °F (36.4 °C)-98.5 °F (36.9 °C)] 98.5 °F (36.9 °C)  Heart Rate:  [71-83] 75  Resp:  [16-18] 18  BP: (112-158)/(65-85) 115/65  Physical Exam     Constitutional: He is oriented to person, place, and time. He appears well-developed and well-nourished.  HENT:  Normocephalic and atraumatic.   Eyes: Conjunctivae and EOM are normal. Pupils are equal, round, and reactive to light. Right eye exhibits no discharge. Left eye exhibits no discharge. No scleral icterus.   Neck: Normal range of motion. Neck supple. No JVD present.  Cardiovascular: Normal rate, regular rhythm, normal heart sounds and intact distal pulses. Exam reveals no gallop and no friction rub. No murmur heard.  Pulmonary/Chest: Effort normal and breath sounds normal. No wheeze. No respiratory distress.   Abdominal: Soft. Bowel sounds are normal There is no rebound and no guarding.   Musculoskeletal: Normal range of motion. He exhibits no edema, tenderness or deformity.   Neurological: He is alert and oriented to person, place, and time.   Skin: Skin is warm and dry. No rash noted. He is not diaphoretic. No erythema. No pallor.   Psychiatric: He has a normal mood and affect. His behavior is normal. Judgment and thought content normal.          Results Review:    I have reviewed the labs, radiology results and diagnostic studies.      Results from last 7 days  Lab Units 03/16/17  0438   WBC 10*3/mm3 10.83*   HEMOGLOBIN g/dL 13.0*   PLATELETS 10*3/mm3 230       Results from last 7 days  Lab Units 03/16/17  0436   SODIUM mmol/L  142   POTASSIUM mmol/L 4.0   CHLORIDE mmol/L 110*   TOTAL CO2 mmol/L 29.0   BUN mg/dL 14   CREATININE mg/dL 0.90   GLUCOSE mg/dL 88   CALCIUM mg/dL 9.1       Culture Data: Cultures:reviewed         Radiology Data: reviewed    I have reviewed the medications.    Assessment/Plan     Problem List  Principal Problem:    Small bowel obstruction  Active Problems:    S/P left hemicolectomy    S/P hernia repair    Hypertension    Dyslipidemia    Hypothyroidism    Leukocytosis    Diverticulosis           Assessment/Plan:    Small bowel obstruction  Active Problems:  S/P left hemicolectomy  S/P hernia repair  Hypertension  Dyslipidemia  Hypothyroidism  Leukocytosis  Diverticulosis     Plan:  1) Partial vs complete small bowel obstruction W/HX OF PRIOR IN PAST;  Dr. Samayoa has been consulted( likely Ileus)  -CT scan shows multiple dilated proximal and mid small bowel. Findings most compatible with partial or early complete small bowel obstruction.   -We'll continue nothing by mouth status   -Continue IVF  -pt had a BM this am , symptomatically improved  -Mestinon was started   -full liquid diet today      2) Leukocytosis  -Likely due to above  -UA negative.  -continue to monitor      3) Diverticulosis  -history of diverticular bleed, s/p left hemicolectomy  -last colonoscopy was a few months ago      4) Hypertension  -continue home medications when tolerating by mouth, will order IV when necessary until then.      5) Dyslipidemia  -continue lopid      6) Hypothyroidism  -continue synthroid but will drop dose 125mcg and recheck in 6 weeks        7) VTE prophylaxis  -teds/scds, lovenox      8) code status:  -full code             DVT prophylaxis:  Discharge Planning: I expect patient to be discharged to home in 1-2 days days.    Jeanmarie Moser MD   03/16/17   2:19 PM    Please note that portions of this note may have been completed with a voice recognition program. Efforts were made to edit the dictations, but occasionally words  are mistranscribed.

## 2017-03-16 NOTE — PLAN OF CARE
Problem: Patient Care Overview (Adult)  Goal: Plan of Care Review  Outcome: Ongoing (interventions implemented as appropriate)    03/15/17 1647 03/15/17 2000 03/16/17 0414   Coping/Psychosocial Response Interventions   Plan Of Care Reviewed With --  patient --    Patient Care Overview   Progress progress toward functional goals as expected --  --    Outcome Evaluation   Outcome Summary/Follow up Plan --  --  passing gas, VSS, pt rested comfortably this shift       Goal: Adult Individualization and Mutuality  Outcome: Ongoing (interventions implemented as appropriate)  Goal: Discharge Needs Assessment  Outcome: Ongoing (interventions implemented as appropriate)    Problem: Bowel Obstruction (Adult)  Goal: Signs and Symptoms of Listed Potential Problems Will be Absent or Manageable (Bowel Obstruction)  Outcome: Ongoing (interventions implemented as appropriate)    Problem: Pain, Acute (Adult)  Goal: Identify Related Risk Factors and Signs and Symptoms  Outcome: Ongoing (interventions implemented as appropriate)  Goal: Acceptable Pain Control/Comfort Level  Outcome: Ongoing (interventions implemented as appropriate)

## 2017-03-16 NOTE — PLAN OF CARE
Problem: Patient Care Overview (Adult)  Goal: Discharge Needs Assessment  Outcome: Ongoing (interventions implemented as appropriate)    03/15/17 1033   Discharge Needs Assessment   Concerns To Be Addressed no discharge needs identified;denies needs/concerns at this time   Readmission Within The Last 30 Days no previous admission in last 30 days   Equipment Needed After Discharge none   Current Health   Anticipated Changes Related to Illness none   Living Environment   Transportation Available car;family or friend will provide   Self-Care   Equipment Currently Used at Home none         Problem: Pain, Acute (Adult)  Goal: Acceptable Pain Control/Comfort Level  Outcome: Ongoing (interventions implemented as appropriate)

## 2017-03-16 NOTE — PROGRESS NOTES
Colon and Rectal [[facility]]    Patient Care Team:  Marcin Ferguson MD as PCP - General  Marcin Ferguson MD as PCP - Family Medicine    Chief complaint  Abdominal pain with nausea    Subjective     HPI: 76-year-old male status post a sigmoid colectomy April 2016 for diverticular disease.  He did well until yesterday morning and awoke with no appetite and nausea.  He has had a Nissen and is unable to vomit.  He did have a bowel movement yesterday and some flatus today.  The emergency room CT scan did show proximal small bowel dilatation but no clear cut off point.  The colon has a fair amount of stool in it but not obstipated.  No evidence of infection.    Review of Systems: Other than GI, no headaches seizures strokes shortness of breath chest pain or dysuria.     History  Past Medical History   Diagnosis Date   • Anxiety disorder 2/16/2017   • Asthma 2/16/2017   • Carotid stenosis 2/16/2017   • Diaphoresis    • Dyslipidemia 2/16/2017   • GERD (gastroesophageal reflux disease) 2/16/2017   • Gout 2/16/2017   • Herpes zoster      Herpes zoster, [id number], right lower extremity.    • Hypertension 2/16/2017   • Hypothyroidism 2/16/2017   • Malignant melanoma 2/16/2017   • Nephrolithiasis    • Neuropathy 2/16/2017   • TIA (transient ischemic attack)      TIA, June 2012, with nonobstructive carotid stenosis, dyslipidemia and hypertension     Past Surgical History   Procedure Laterality Date   • Nissen fundoplication  1984   • Heel spur surgery  1999   • Other surgical history  2010     Malignant melanoma, status post resection    • Appendectomy     • Abdominal surgery     • Colon resection left  2016   • Hernia repair       History reviewed. No pertinent family history.  Social History   Substance Use Topics   • Smoking status: Former Smoker     Types: Cigars   • Smokeless tobacco: Never Used      Comment: quit many years ago    • Alcohol use No     Prescriptions Prior to Admission   Medication Sig Dispense Refill Last  Dose   • allopurinol (ZYLOPRIM) 300 MG tablet Take  by mouth.      • amLODIPine (NORVASC) 2.5 MG tablet Take  by mouth 2 (two) times a day.      • aspirin 81 MG tablet Take 1 tablet by mouth daily.      • B Complex Vitamins (VITAMIN B COMPLEX PO) Take 1 tablet by mouth daily.      • busPIRone (BUSPAR) 10 MG tablet Take 10 mg by mouth 2 (two) times a day.      • clopidogrel (PLAVIX) 75 MG tablet Take  by mouth.      • doxazosin (CARDURA) 4 MG tablet Take 4 mg by mouth daily.      • HYDROmorphone (DILAUDID) 2 MG tablet Take  by mouth.      • levothyroxine (SYNTHROID, LEVOTHROID) 150 MCG tablet Take 150 mcg by mouth daily.      • lovastatin (MEVACOR) 40 MG tablet Take  by mouth.      • oxyCODONE-acetaminophen (PERCOCET)  MG per tablet Take  by mouth.      • triamterene-hydrochlorothiazide (DYAZIDE) 37.5-25 MG per capsule Take  by mouth.      • acyclovir (ZOVIRAX) 400 MG tablet Take 1 tablet by mouth 3 (three) times a day.      • acyclovir (ZOVIRAX) 400 MG tablet Take  by mouth 3 (three) times a day.      • allopurinol (ZYLOPRIM) 300 MG tablet Take 300 mg by mouth daily.   Taking   • amLODIPine (NORVASC) 2.5 MG tablet Take 1 tablet by mouth 2 (two) times a day.      • amoxicillin-clavulanate (AUGMENTIN) 875-125 MG per tablet Take  by mouth.      • aspirin 81 MG tablet Take  by mouth daily.      • dextromethorphan polistirex ER (DELSYM) 30 MG/5ML Suspension Extended Release oral suspension Take  by mouth.      • escitalopram (LEXAPRO) 10 MG tablet Take 10 mg by mouth daily.      • gabapentin (NEURONTIN) 300 MG capsule Take  by mouth.      • gabapentin (NEURONTIN) 300 MG capsule Take  by mouth.      • gemfibrozil (LOPID) 600 MG tablet Take 1 tablet by mouth 2 (two) times a day.      • gemfibrozil (LOPID) 600 MG tablet Take  by mouth 2 (two) times a day.      • HYDROmorphone (DILAUDID) 2 MG tablet Take  by mouth.      • indomethacin (INDOCIN) 50 MG capsule Take 1 capsule by mouth 3 (three) times a day as needed.     "  • indomethacin (INDOCIN) 50 MG capsule Take  by mouth.      • levothyroxine (SYNTHROID, LEVOTHROID) 137 MCG tablet Take  by mouth.   Taking   • levothyroxine (SYNTHROID, LEVOTHROID) 137 MCG tablet Take  by mouth.      • lovastatin (MEVACOR) 20 MG tablet Take 1 tablet by mouth daily.   Taking   • lovastatin (MEVACOR) 20 MG tablet Take  by mouth daily.      • omeprazole (PriLOSEC) 40 MG capsule Take  by mouth.   Taking   • omeprazole (PriLOSEC) 40 MG capsule Take 40 mg by mouth 2 (Two) Times a Day.      • OMEPRAZOLE PO Take  by mouth.      • oxyCODONE-acetaminophen (PERCOCET)  MG per tablet Take  by mouth.      • oxyCODONE-acetaminophen (PERCOCET) 5-325 MG per tablet Take  by mouth.      • oxyCODONE-acetaminophen (PERCOCET) 7.5-325 MG per tablet Take 1 tablet by mouth every 4 (four) hours as needed.      • oxyCODONE-acetaminophen (PERCOCET) 7.5-325 MG per tablet Take  by mouth every 4 (four) hours.      • TRIAMTERENE-HCTZ PO Take 1 tablet by mouth daily.      • triamterene-hydrochlorothiazide (MAXZIDE-25) 37.5-25 MG per tablet Take  by mouth.      • triamterene-hydrochlorothiazide (MAXZIDE-25) 37.5-25 MG per tablet Take  by mouth.        Allergies:  Betadine [povidone iodine]; Cephalexin; Flomax [tamsulosin hcl]; Iodine; Lisinopril; Pseudoephedrine; and Sulfamethoxazole-trimethoprim    Objective     Vital Signs  Blood pressure 112/66, pulse 83, temperature 98.5 °F (36.9 °C), temperature source Temporal Artery , resp. rate 18, height 74\" (188 cm), weight 190 lb (86.2 kg), SpO2 96 %.    Physical Exam: Early alert and oriented.  His memory is suffering and he relies on his family for even yesterday's events.  HEENT normal  Neck supple  Lungs clear  Heart regular  Abdomen mildly distended but very soft.  Mild tympany.  Certainly no peritoneal signs.  Rectal deferred.  Extremities warm with good pulses and no edema  Musculoskeletal neurologic grossly within normal limits     Results Review:  Lab Results (last 24 " hours)     Procedure Component Value Units Date/Time    CBC & Differential [[id number]] Collected:  03/15/17 0233    Specimen:  Blood Updated:  03/15/17 0242    Narrative:       The following orders were created for panel order CBC & Differential.  Procedure                               Abnormality         Status                     ---------                               -----------         ------                     CBC Auto Differential[[id number]]         Abnormal            Final result                 Please view results for these tests on the individual orders.    CBC Auto Differential [[id number]]  (Abnormal) Collected:  03/15/17 0233    Specimen:  Blood Updated:  03/15/17 0242     WBC 14.01 (H) 10*3/mm3      RBC 5.29 10*6/mm3      Hemoglobin 15.7 g/dL      Hematocrit 47.4 %      MCV 89.6 fL      MCH 29.7 pg      MCHC 33.1 g/dL      RDW 14.5 %      RDW-SD 46.9 fl      MPV 10.0 fL      Platelets 284 10*3/mm3      Neutrophil % 82.4 (H) %      Lymphocyte % 9.5 (L) %      Monocyte % 7.2 %      Eosinophil % 0.4 %      Basophil % 0.2 %      Immature Grans % 0.3 %      Neutrophils, Absolute 11.54 (H) 10*3/mm3      Lymphocytes, Absolute 1.33 10*3/mm3      Monocytes, Absolute 1.01 (H) 10*3/mm3      Eosinophils, Absolute 0.06 (L) 10*3/mm3      Basophils, Absolute 0.03 10*3/mm3      Immature Grans, Absolute 0.04 (H) 10*3/mm3     POC Troponin, Rapid [[id number]]  (Normal) Collected:  03/15/17 0251    Specimen:  Blood Updated:  03/15/17 0305     Troponin I 0.01 ng/mL       Serial Number: [id number]    : [id number]       Comprehensive Metabolic Panel [[id number]]  (Abnormal) Collected:  03/15/17 0233    Specimen:  Blood Updated:  03/15/17 0315     Glucose 159 (H) mg/dL      BUN 17 mg/dL      Creatinine 1.00 mg/dL      Sodium 140 mmol/L      Potassium 4.0 mmol/L      Chloride 104 mmol/L      CO2 29.0 mmol/L      Calcium 11.1 (H) mg/dL      Total Protein 7.7 g/dL      Albumin 4.60 g/dL      ALT (SGPT) 24 U/L      AST (SGOT) 21 U/L       Alkaline Phosphatase 128 (H) U/L      Total Bilirubin 0.6 mg/dL      eGFR Non African Amer 72 mL/min/1.73      Globulin 3.1 gm/dL      A/G Ratio 1.5 g/dL      BUN/Creatinine Ratio 17.0      Anion Gap 7.0 mmol/L     Narrative:       [employer] Guidelines    Stage                           Description                             GFR                      1                               Normal or High                          90+  2                               Mild decrease                            60-89  3                               Moderate decrease                   30-59  4                               Severe decrease                       15-29  5                               Kidney failure                             <15    Lipase [[id number]]  (Normal) Collected:  03/15/17 0233    Specimen:  Blood Updated:  03/15/17 0315     Lipase 41 U/L     Lactic Acid, Plasma [[id number]]  (Normal) Collected:  03/15/17 0247    Specimen:  Blood Updated:  03/15/17 0329     Lactate 1.8 mmol/L       Falsely depressed results may occur on samples drawn from patients receiving N-Acetylcysteine (NAC) or Metamizole.       Urinalysis With / Culture If Indicated [[id number]]  (Abnormal) Collected:  03/15/17 0324    Specimen:  Urine from Urine, Clean Catch Updated:  03/15/17 0336     Color, UA Yellow      Appearance, UA Cloudy (A)      pH, UA 7.5      Specific Gravity, UA 1.020      Glucose, UA Negative      Ketones, UA Negative      Bilirubin, UA Negative      Blood, UA Negative      Protein, UA 30 mg/dL (1+) (A)      Leuk Esterase, UA Trace (A)      Nitrite, UA Negative      Urobilinogen, UA 0.2 E.U./dL     Urinalysis, Microscopic Only [[id number]]  (Abnormal) Collected:  03/15/17 0324    Specimen:  Urine from Urine, Clean Catch Updated:  03/15/17 0337     RBC, UA 0-2 /HPF      WBC, UA 0-2 (A) /HPF      Bacteria, UA None Seen /HPF      Squamous Epithelial Cells, UA 0-2 /HPF      Hyaline Casts, UA 0-6 /LPF       Methodology Automated Microscopy     Lipase [[id number]]  (Normal) Collected:  03/15/17 0233    Specimen:  Blood Updated:  03/15/17 0407     Lipase 42 U/L     Standard Draw [[id number]] Collected:  03/15/17 0233    Specimen:  Blood Updated:  03/15/17 0701    Narrative:       The following orders were created for panel order Standard Draw.  Procedure                               Abnormality         Status                     ---------                               -----------         ------                     Light Blue Top[[id number]]                                    Final result               Green Top (Gel)[[id number]]                                   Final result               Lavender Top[[id number]]                                      Final result               Gold Top - SST[[id number]]                                    Final result               Green Top (No Gel)[[id number]]                                                             Please view results for these tests on the individual orders.    Light Blue Top [[id number]] Collected:  03/15/17 0233    Specimen:  Blood Updated:  03/15/17 0701     Extra Tube hold for add-on       Auto resulted       Green Top (Gel) [[id number]] Collected:  03/15/17 0233    Specimen:  Blood Updated:  03/15/17 0701     Extra Tube Hold for add-ons.       Auto resulted.       Lavender Top [[id number]] Collected:  03/15/17 0233    Specimen:  Blood Updated:  03/15/17 0701     Extra Tube hold for add-on       Auto resulted       Gold Top - SST [[id number]] Collected:  03/15/17 0233    Specimen:  Blood Updated:  03/15/17 0701     Extra Tube Hold for add-ons.       Auto resulted.       CBC Auto Differential [[id number]]  (Abnormal) Collected:  03/15/17 0822    Specimen:  Blood Updated:  03/15/17 0835     WBC 12.55 (H) 10*3/mm3      RBC 5.01 10*6/mm3      Hemoglobin 14.6 g/dL      Hematocrit 45.2 %      MCV 90.2 fL      MCH 29.1 pg      MCHC 32.3 g/dL      RDW 14.4 %      RDW-SD 47.3 fl      MPV 9.7 fL       Platelets 266 10*3/mm3      Neutrophil % 80.3 (H) %      Lymphocyte % 10.7 (L) %      Monocyte % 8.2 %      Eosinophil % 0.3 %      Basophil % 0.2 %      Immature Grans % 0.3 %      Neutrophils, Absolute 10.08 (H) 10*3/mm3      Lymphocytes, Absolute 1.34 10*3/mm3      Monocytes, Absolute 1.03 (H) 10*3/mm3      Eosinophils, Absolute 0.04 (L) 10*3/mm3      Basophils, Absolute 0.02 10*3/mm3      Immature Grans, Absolute 0.04 (H) 10*3/mm3     TSH [[id number]]  (Abnormal) Collected:  03/15/17 0822    Specimen:  Blood Updated:  03/15/17 0914     TSH 0.014 (L) mIU/mL     Narrative:       Due to abnormal TSH results, suggest ordering Free T4.    Lipid Panel [[id number]]  (Abnormal) Collected:  03/15/17 0822    Specimen:  Blood Updated:  03/15/17 0914     Total Cholesterol 179 mg/dL      Triglycerides 251 (H) mg/dL      HDL Cholesterol 43 mg/dL      LDL Cholesterol  108 mg/dL     Narrative:       Cholesterol Reference Ranges:   Desirable       < 200 mg/dL   Borderline    200-239 mg/dL   High Risk       > 239 mg/dL    Triglyceride Reference Ranges:   Normal          < 150 mg/dL   Borderline    150-199 mg/dL   High          200-499 mg/dL   Very High       > 499 mg/dL    HDL Reference Ranges:   Low              < 40 mg/dL   High             > 59 mg/dL    LDL Reference Ranges:   Optimal         < 100 mg/dL   Near Optimal  100-129 mg/dL   Borderline    130-159 mg/dL   High          160-189 mg/dL   Very High       > 189 mg/dL    Comprehensive Metabolic Panel [[id number]]  (Abnormal) Collected:  03/15/17 0822    Specimen:  Blood Updated:  03/15/17 0917     Glucose 122 (H) mg/dL      BUN 16 mg/dL      Creatinine 0.90 mg/dL      Sodium 142 mmol/L      Potassium 5.0 mmol/L      Chloride 108 mmol/L      CO2 32.0 (H) mmol/L      Calcium 10.0 mg/dL      Total Protein 6.8 g/dL      Albumin 4.20 g/dL      ALT (SGPT) 21 U/L      AST (SGOT) 20 U/L      Alkaline Phosphatase 113 (H) U/L      Total Bilirubin 0.5 mg/dL      eGFR Non  Amer  82 mL/min/1.73      Globulin 2.6 gm/dL      A/G Ratio 1.6 g/dL      BUN/Creatinine Ratio 17.8      Anion Gap 2.0 (L) mmol/L     Narrative:       National Kidney Foundation Guidelines    Stage                           Description                             GFR                      1                               Normal or High                          90+  2                               Mild decrease                            60-89  3                               Moderate decrease                   30-59  4                               Severe decrease                       15-29  5                               Kidney failure                             <15         Imaging Results (last 24 hours)     Procedure Component Value Units Date/Time    CT Abdomen Pelvis Without Contrast [[id number]]  (Abnormal) Collected:  03/15/17 0241     Updated:  03/15/17 0345    Narrative:         EXAM:  CT Abdomen and Pelvis Without Intravenous Contrast.    CLINICAL HISTORY:  76 years old, male; Pain; Other: See notes; Prior surgery; Additional info: Abd   pain    TECHNIQUE:  Axial computed tomography images of the abdomen and pelvis without intravenous   contrast.  This CT exam was performed using one or more of the following dose   reduction techniques:  automated exposure control, adjustment of the mA and/or   kV according to patient size, and/or use of iterative reconstruction technique.  Coronal reformatted images were created and reviewed.    COMPARISON:  CT ABD PELVIS WO CONTRAST 9/1/2014 12:07:12 AM    FINDINGS:  Lower thorax:  Bibasilar atelectasis and/or scarring.  Atherosclerotic   calcification of the coronary arteries and distal thoracic aorta.    ABDOMEN:  Liver:  Simple left hepatic lobe cyst.  Gallbladder and bile ducts:  Normal.  Pancreas:  Normal.  Spleen:  Normal.  Adrenals:  Normal.  Kidneys and ureters:  Bilateral simple renal cysts.  Bilateral nonobstructive   nephrolithiasis.  Stomach and bowel:  Multiple  loops of mildly dilated proximal and mid small   bowel, with apparent transition to normal caliber small bowel within the left   abdomen.  4.5 cm gas and fluid containing duodenal diverticulum involving the   proximal and distal aspects of the duodenum, without acute complications.    Colonic diverticulosis.  Appendix:  No findings to suggest acute appendicitis.    PELVIS:  Bladder:  Normal.  Reproductive:  Prostate gland is enlarged, measuring approximately 5 cm in   anteroposterior dimension.    ABDOMEN and PELVIS:  Intraperitoneal space:  Normal.  No free air.  No significant fluid collection.  Bones/joints:  Degenerative changes of the hips and sacral iliac joints.    Multilevel thoracolumbar spine degenerative changes.  No acute fracture.  No   dislocation.  Soft tissues:  Small bilateral fat-containing inguinal hernias, without acute   complications.  Vasculature:  Atherosclerotic disease of aorta and iliac arteries.  Phleboliths   within the pelvis.  Lymph nodes:  Normal.      Impression:         1.  Multiple loops of mildly dilated proximal and mid small bowel, with   apparent transition to normal caliber small bowel within the left abdomen.    Findings most compatible with partial or early complete small bowel   obstruction. Recommend followup/further evaluation.    2.  Incidental/non-acute findings are described above.      THIS DOCUMENT HAS BEEN ELECTRONICALLY SIGNED BY HIRAM HECK MD           Assessment/Plan     Principal Problem:    Small bowel obstruction  Active Problems:    S/P left hemicolectomy    S/P hernia repair    Hypertension    Dyslipidemia    Hypothyroidism    Leukocytosis    Diverticulosis   difficult to tell but early passage of gas and decompression suggests an ileus or gastroenteritis.  He does have an appetite and would like some food.  I don't think he surgical.  I'll add a little Mestinon to his program so that his stomach doesn't become distended.      I discussed the patients  findings and my recommendations with patient and family.     Shravan Samayoa MD  03/15/17  9:02 PM

## 2017-03-17 VITALS
SYSTOLIC BLOOD PRESSURE: 167 MMHG | RESPIRATION RATE: 18 BRPM | OXYGEN SATURATION: 97 % | BODY MASS INDEX: 24.38 KG/M2 | TEMPERATURE: 98.6 F | WEIGHT: 190 LBS | DIASTOLIC BLOOD PRESSURE: 85 MMHG | HEIGHT: 74 IN | HEART RATE: 65 BPM

## 2017-03-17 PROBLEM — K56.609 SMALL BOWEL OBSTRUCTION: Status: RESOLVED | Noted: 2017-03-15 | Resolved: 2017-03-17

## 2017-03-17 PROBLEM — D72.829 LEUKOCYTOSIS: Status: RESOLVED | Noted: 2017-03-15 | Resolved: 2017-03-17

## 2017-03-17 PROBLEM — I10 HYPERTENSION: Status: RESOLVED | Noted: 2017-02-16 | Resolved: 2017-03-17

## 2017-03-17 LAB
ANION GAP SERPL CALCULATED.3IONS-SCNC: 2 MMOL/L (ref 3–11)
BUN BLD-MCNC: 11 MG/DL (ref 9–23)
BUN/CREAT SERPL: 13.8 (ref 7–25)
CALCIUM SPEC-SCNC: 8.6 MG/DL (ref 8.7–10.4)
CHLORIDE SERPL-SCNC: 112 MMOL/L (ref 99–109)
CO2 SERPL-SCNC: 27 MMOL/L (ref 20–31)
CREAT BLD-MCNC: 0.8 MG/DL (ref 0.6–1.3)
DEPRECATED RDW RBC AUTO: 48.2 FL (ref 37–54)
ERYTHROCYTE [DISTWIDTH] IN BLOOD BY AUTOMATED COUNT: 14.6 % (ref 11.3–14.5)
GFR SERPL CREATININE-BSD FRML MDRD: 94 ML/MIN/1.73
GLUCOSE BLD-MCNC: 81 MG/DL (ref 70–100)
HCT VFR BLD AUTO: 39.1 % (ref 38.9–50.9)
HGB BLD-MCNC: 12.4 G/DL (ref 13.1–17.5)
MCH RBC QN AUTO: 29 PG (ref 27–31)
MCHC RBC AUTO-ENTMCNC: 31.7 G/DL (ref 32–36)
MCV RBC AUTO: 91.6 FL (ref 80–99)
PLATELET # BLD AUTO: 226 10*3/MM3 (ref 150–450)
PMV BLD AUTO: 9.9 FL (ref 6–12)
POTASSIUM BLD-SCNC: 3.7 MMOL/L (ref 3.5–5.5)
RBC # BLD AUTO: 4.27 10*6/MM3 (ref 4.2–5.76)
SODIUM BLD-SCNC: 141 MMOL/L (ref 132–146)
WBC NRBC COR # BLD: 8.96 10*3/MM3 (ref 3.5–10.8)

## 2017-03-17 PROCEDURE — 80048 BASIC METABOLIC PNL TOTAL CA: CPT | Performed by: INTERNAL MEDICINE

## 2017-03-17 PROCEDURE — 99239 HOSP IP/OBS DSCHRG MGMT >30: CPT | Performed by: INTERNAL MEDICINE

## 2017-03-17 PROCEDURE — 25010000002 ENOXAPARIN PER 10 MG

## 2017-03-17 PROCEDURE — 85027 COMPLETE CBC AUTOMATED: CPT | Performed by: INTERNAL MEDICINE

## 2017-03-17 RX ORDER — PYRIDOSTIGMINE BROMIDE 60 MG/1
60 TABLET ORAL 2 TIMES DAILY
Qty: 28 TABLET | Refills: 0 | Status: SHIPPED | OUTPATIENT
Start: 2017-03-17 | End: 2017-03-31

## 2017-03-17 RX ORDER — OXYCODONE AND ACETAMINOPHEN 7.5; 325 MG/1; MG/1
1 TABLET ORAL EVERY 4 HOURS PRN
Qty: 15 TABLET | Refills: 0 | Status: SHIPPED | OUTPATIENT
Start: 2017-03-17 | End: 2020-07-28

## 2017-03-17 RX ADMIN — ENOXAPARIN SODIUM 40 MG: 40 INJECTION SUBCUTANEOUS at 05:20

## 2017-03-17 RX ADMIN — PYRIDOSTIGMINE BROMIDE 60 MG: 60 TABLET ORAL at 05:49

## 2017-03-17 RX ADMIN — SODIUM CHLORIDE 100 ML/HR: 9 INJECTION, SOLUTION INTRAVENOUS at 00:28

## 2017-03-17 NOTE — NURSING NOTE
8am MD (Dr. Moser) makes rounds and tells pt he will be d/c  11am pt wanting to be d/c. md on floor nurse requests d/c. md d/c pt but does not sign narc script  1150 nurse pages md no answer  1230 nurse pages md no answer  1300 nurse calls Charity (APRN with hospitalist group) and asks her to text md. tells charge nurse (Mary Nguyen) what is going on. Charge nurse calls jess (Jeni).  1330 Charity comes to get script to take to md to sign  1400 Pt demands to see manager, Josseline (6th ) comes to talk to pt, Charity brings script. [facility] notified. pt leaves with transport.

## 2017-03-17 NOTE — DISCHARGE SUMMARY
[facility] Medicine Services  DISCHARGE SUMMARY       Date of Admission: 3/15/2017  Date of Discharge:  3/17/2017  Primary Care Physician: Marcin Ferguson MD    Discharge Diagnoses:  Active Hospital Problems (** Indicates Principal Problem)    Diagnosis Date Noted   • Diverticulosis [K57.90] 03/15/2017   • Hypothyroidism [E03.9] 02/16/2017   • Dyslipidemia [E78.5] 02/16/2017   • S/P hernia repair [Z98.890, Z87.19] 06/10/2016      Resolved Hospital Problems    Diagnosis Date Noted Date Resolved   • **Small bowel obstruction [K56.69] 03/15/2017 03/17/2017   • Leukocytosis [D72.829] 03/15/2017 03/17/2017   • Hypertension [I10] 02/16/2017 03/17/2017   • S/P left hemicolectomy [Z90.49] 06/10/2016 03/17/2017       Presenting Problem/History of Present Illness  Small bowel obstruction [K56.69]       Hospital Course  Patient is a 76 y.o. male presented  to the ED with complaints of abdominal pain, Due to to his severe pain he presented to the ED. He denied any fevers, chills, cough, shortness of breath, chest pain, diarrhea, vomiting, melena or hematochezia, pt was found to have ileus , surgery has been consulted , pt abdominal pain resolved , with Bowel Movements , pt tolerated po intake , he was started on Mestinon , he will be discharged and follow up with Surgery in 2 weeks    Procedures Performed     None    Consults:   Consults     No orders found from 2/14/2017 to 3/16/2017.          Pertinent Test Results:    Results for MARIA TERESA ANDUJAR (MRN [id number]) as of 3/17/2017 17:33   Ref. Range 3/17/2017 04:51   WBC Latest Ref Range: 3.50 - 10.80 10*3/mm3 8.96   RBC Latest Ref Range: 4.20 - 5.76 10*6/mm3 4.27   Hemoglobin Latest Ref Range: 13.1 - 17.5 g/dL 12.4 (L)   Hematocrit Latest Ref Range: 38.9 - 50.9 % 39.1   RDW Latest Ref Range: 11.3 - 14.5 % 14.6 (H)   MCV Latest Ref Range: 80.0 - 99.0 fL 91.6   MCH Latest Ref Range: 27.0 - 31.0 pg 29.0   MCHC Latest Ref Range: 32.0 - 36.0 g/dL 31.7  (L)   MPV Latest Ref Range: 6.0 - 12.0 fL 9.9   Platelets Latest Ref Range: 150 - 450 10*3/mm3 226   RDW-SD Latest Ref Range: 37.0 - 54.0 fl 48.2     Results for MARIA TERESA ANDUJAR (MRN [id number]) as of 3/17/2017 17:33   Ref. Range 3/17/2017 04:51   Glucose Latest Ref Range: 70 - 100 mg/dL 81   Sodium Latest Ref Range: 132 - 146 mmol/L 141   Potassium Latest Ref Range: 3.5 - 5.5 mmol/L 3.7   CO2 Latest Ref Range: 20.0 - 31.0 mmol/L 27.0   Chloride Latest Ref Range: 99 - 109 mmol/L 112 (H)   Anion Gap Latest Ref Range: 3.0 - 11.0 mmol/L 2.0 (L)   Creatinine Latest Ref Range: 0.60 - 1.30 mg/dL 0.80   BUN Latest Ref Range: 9 - 23 mg/dL 11   BUN/Creatinine Ratio Latest Ref Range: 7.0 - 25.0  13.8   Calcium Latest Ref Range: 8.7 - 10.4 mg/dL 8.6 (L)   eGFR Non African Amer Latest Ref Range: >60 mL/min/1.73 94     CLINICAL HISTORY:  76 years old, male; Pain; Other: See notes; Prior surgery; Additional info: Abd   pain     TECHNIQUE:  Axial computed tomography images of the abdomen and pelvis without intravenous   contrast. This CT exam was performed using one or more of the following dose   reduction techniques: automated exposure control, adjustment of the mA and/or   kV according to patient size, and/or use of iterative reconstruction technique.  Coronal reformatted images were created and reviewed.     COMPARISON:  CT ABD PELVIS WO CONTRAST 9/1/2014 12:07:12 AM     FINDINGS:  Lower thorax: Bibasilar atelectasis and/or scarring. Atherosclerotic   calcification of the coronary arteries and distal thoracic aorta.     ABDOMEN:  Liver: Simple left hepatic lobe cyst.  Gallbladder and bile ducts: Normal.  Pancreas: Normal.  Spleen: Normal.  Adrenals: Normal.  Kidneys and ureters: Bilateral simple renal cysts. Bilateral nonobstructive   nephrolithiasis.  Stomach and bowel: Multiple loops of mildly dilated proximal and mid small   bowel, with apparent transition to normal caliber small bowel within the left   abdomen. 4.5 cm gas  "and fluid containing duodenal diverticulum involving the   proximal and distal aspects of the duodenum, without acute complications.   Colonic diverticulosis.  Appendix: No findings to suggest acute appendicitis.     PELVIS:  Bladder: Normal.  Reproductive: Prostate gland is enlarged, measuring approximately 5 cm in   anteroposterior dimension.     ABDOMEN and PELVIS:  Intraperitoneal space: Normal. No free air. No significant fluid collection.  Bones/joints: Degenerative changes of the hips and sacral iliac joints.   Multilevel thoracolumbar spine degenerative changes. No acute fracture. No   dislocation.  Soft tissues: Small bilateral fat-containing inguinal hernias, without acute   complications.  Vasculature: Atherosclerotic disease of aorta and iliac arteries. Phleboliths   within the pelvis.  Lymph nodes: Normal.     IMPRESSION:     1. Multiple loops of mildly dilated proximal and mid small bowel, with   apparent transition to normal caliber small bowel within the left abdomen.   Findings most compatible with partial or early complete small bowel   obstruction. Recommend followup/further evaluation.     2. Incidental/non-acute findings are described above.  Condition on Discharge: stable    Physical Exam on Discharge:  Visit Vitals   • /85  Comment: told nurse   • Pulse 65   • Temp 98.6 °F (37 °C) (Temporal Artery )   • Resp 18   • Ht 74\" (188 cm)   • Wt 190 lb (86.2 kg)   • SpO2 97%   • BMI 24.39 kg/m2     Physical Exam     I have personally seen and examined the patient at time of discharge:    Constitutional: He is oriented to person, place, and time. He appears well-developed and well-nourished.  HENT: Normocephalic and atraumatic.   Neck: Normal range of motion. Neck supple. No JVD present.  Cardiovascular: Normal rate, regular rhythm, normal heart sounds and intact distal pulses. Exam reveals no gallop and no friction rub. No murmur heard.  Pulmonary/Chest: Effort normal and breath sounds normal. " No wheeze. No respiratory distress.   Abdominal: Soft. Bowel sounds are normal There is no rebound and no guarding.   Neurological: He is alert and oriented to person, place, and time.   Skin: Skin is warm and dry. No rash noted. He is not diaphoretic. No erythema. No pallor.   Psychiatric: He has a normal mood and affect. His behavior is normal. Judgment and thought content normal.      Discharge Disposition  Home or Self Care    Discharge Medications   Domitila Bosch   Home Medication Instructions RUY:[id number]    Printed on:03/17/17 9485   Medication Information                      acyclovir (ZOVIRAX) 400 MG tablet  Take 1 tablet by mouth 3 (three) times a day.             acyclovir (ZOVIRAX) 400 MG tablet  Take  by mouth 3 (three) times a day.             allopurinol (ZYLOPRIM) 300 MG tablet  Take 300 mg by mouth daily.             amLODIPine (NORVASC) 2.5 MG tablet  Take 1 tablet by mouth 2 (two) times a day.             aspirin 81 MG tablet  Take 1 tablet by mouth daily.             aspirin 81 MG tablet  Take  by mouth daily.             B Complex Vitamins (VITAMIN B COMPLEX PO)  Take 1 tablet by mouth daily.             busPIRone (BUSPAR) 10 MG tablet  Take 10 mg by mouth 2 (two) times a day.             clopidogrel (PLAVIX) 75 MG tablet  Take  by mouth.             dextromethorphan polistirex ER (DELSYM) 30 MG/5ML Suspension Extended Release oral suspension  Take  by mouth.             doxazosin (CARDURA) 4 MG tablet  Take 4 mg by mouth daily.             escitalopram (LEXAPRO) 10 MG tablet  Take 10 mg by mouth daily.             gabapentin (NEURONTIN) 300 MG capsule  Take  by mouth.             gabapentin (NEURONTIN) 300 MG capsule  Take  by mouth.             gemfibrozil (LOPID) 600 MG tablet  Take 1 tablet by mouth 2 (two) times a day.             gemfibrozil (LOPID) 600 MG tablet  Take  by mouth 2 (two) times a day.             levothyroxine (SYNTHROID, LEVOTHROID) 137 MCG tablet  Take  by mouth.              levothyroxine (SYNTHROID, LEVOTHROID) 137 MCG tablet  Take  by mouth.             lovastatin (MEVACOR) 20 MG tablet  Take 1 tablet by mouth daily.             lovastatin (MEVACOR) 20 MG tablet  Take  by mouth daily.             omeprazole (PriLOSEC) 40 MG capsule  Take  by mouth.             OMEPRAZOLE PO  Take  by mouth.             oxyCODONE-acetaminophen (PERCOCET) 7.5-325 MG per tablet  Take 1 tablet by mouth Every 4 (Four) Hours As Needed for Severe Pain (7-10).             pyridostigmine (MESTINON) 60 MG tablet  Take 1 tablet by mouth 2 (Two) Times a Day for 14 days.                 Discharge Diet:   Diet Instructions     Diet: Regular; Thin Liquids, No Restrictions       Discharge Diet:  Regular   Fluid Consistency:  Thin Liquids, No Restrictions                 Discharge Care Plan / Instructions:    Activity at Discharge:   Activity Instructions     Activity as Tolerated                     Follow-up Appointments  Future Appointments  Date Time Provider Department Center   4/28/2017 2:30 PM Donaldo Lazo MD [facility] None     Additional Instructions for the Follow-ups that You Need to Schedule     Discharge Follow-up with PCP    As directed    Follow Up Details:  one week       Discharge Follow-up with Specialty    As directed    Specialty:  [facility]   Follow Up:  2 Weeks                 Test Results Pending at Discharge       Jeanmarie Moser MD 03/17/17 5:36 PM    Time: Discharge 33 min    Please note that portions of this note may have been completed with a voice recognition program. Efforts were made to edit the dictations, but occasionally words are mistranscribed.

## 2017-03-17 NOTE — PLAN OF CARE
Problem: Patient Care Overview (Adult)  Goal: Plan of Care Review  Outcome: Ongoing (interventions implemented as appropriate)    03/15/17 1647 03/16/17 2000 03/17/17 0459   Coping/Psychosocial Response Interventions   Plan Of Care Reviewed With --  patient;spouse --    Patient Care Overview   Progress progress toward functional goals as expected --  --    Outcome Evaluation   Outcome Summary/Follow up Plan --  --  pt states 2x BM dayshift, rested quietly, no pain complaints       Goal: Adult Individualization and Mutuality  Outcome: Ongoing (interventions implemented as appropriate)  Goal: Discharge Needs Assessment  Outcome: Ongoing (interventions implemented as appropriate)    Problem: Bowel Obstruction (Adult)  Goal: Signs and Symptoms of Listed Potential Problems Will be Absent or Manageable (Bowel Obstruction)  Outcome: Ongoing (interventions implemented as appropriate)    Problem: Pain, Acute (Adult)  Goal: Identify Related Risk Factors and Signs and Symptoms  Outcome: Ongoing (interventions implemented as appropriate)  Goal: Acceptable Pain Control/Comfort Level  Outcome: Ongoing (interventions implemented as appropriate)

## 2017-03-17 NOTE — PROGRESS NOTES
"Colon and Rectal [CSGA]    HD    Visit Vitals   • /72   • Pulse 68   • Temp 98 °F (36.7 °C) (Oral)   • Resp 18   • Ht 74\" (188 cm)   • Wt 190 lb (86.2 kg)   • SpO2 95%   • BMI 24.39 kg/m2       Lab Results (last 24 hours)     Procedure Component Value Units Date/Time    Lactic Acid, Plasma [[id number]]  (Normal) Collected:  03/16/17 0436    Specimen:  Blood Updated:  03/16/17 0536     Lactate 0.8 mmol/L       Falsely depressed results may occur on samples drawn from patients receiving N-Acetylcysteine (NAC) or Metamizole.       CBC (No Diff) [[id number]]  (Abnormal) Collected:  03/16/17 0438    Specimen:  Blood Updated:  03/16/17 0552     WBC 10.83 (H) 10*3/mm3      RBC 4.47 10*6/mm3      Hemoglobin 13.0 (L) g/dL      Hematocrit 41.0 %      MCV 91.7 fL      MCH 29.1 pg      MCHC 31.7 (L) g/dL      RDW 14.5 %      RDW-SD 48.4 fl      MPV 10.2 fL      Platelets 230 10*3/mm3     Basic Metabolic Panel [[id number]]  (Abnormal) Collected:  03/16/17 0436    Specimen:  Blood Updated:  03/16/17 0558     Glucose 88 mg/dL      BUN 14 mg/dL      Creatinine 0.90 mg/dL      Sodium 142 mmol/L      Potassium 4.0 mmol/L      Chloride 110 (H) mmol/L      CO2 29.0 mmol/L      Calcium 9.1 mg/dL      eGFR Non African Amer 82 mL/min/1.73      BUN/Creatinine Ratio 15.6      Anion Gap 3.0 mmol/L     Narrative:       National [employer] Guidelines    Stage                           Description                             GFR                      1                               Normal or High                          90+  2                               Mild decrease                            60-89  3                               Moderate decrease                   30-59  4                               Severe decrease                       15-29  5                               Kidney failure                             <15           Alert and oriented.  No nausea or vomiting.  Good pain control  Good UO. Labs stable  2 bowel " movements but not much flatus.  Still some tympany so I'll increase the Mestinon.  Should be able to go home tomorrow on Mestinon 30-60 mg by mouth twice a day and follow-up with me in 1-2 weeks.      Shravan Samayoa MD  03/16/17  9:13 PM

## 2017-05-22 ENCOUNTER — HOSPITAL ENCOUNTER (EMERGENCY)
Facility: HOSPITAL | Age: 77
Discharge: HOME | End: 2017-05-22
Attending: EMERGENCY MEDICINE | Admitting: EMERGENCY MEDICINE

## 2017-05-22 ENCOUNTER — APPOINTMENT (OUTPATIENT)
Dept: CT IMAGING | Facility: HOSPITAL | Age: 77
End: 2017-05-22

## 2017-05-22 VITALS
RESPIRATION RATE: 18 BRPM | WEIGHT: 182 LBS | HEIGHT: 73 IN | HEART RATE: 97 BPM | BODY MASS INDEX: 24.12 KG/M2 | SYSTOLIC BLOOD PRESSURE: 163 MMHG | TEMPERATURE: 97.9 F | DIASTOLIC BLOOD PRESSURE: 87 MMHG | OXYGEN SATURATION: 94 %

## 2017-05-22 DIAGNOSIS — R31.0 GROSS HEMATURIA: Primary | ICD-10-CM

## 2017-05-22 DIAGNOSIS — R30.0 DYSURIA: ICD-10-CM

## 2017-05-22 LAB
ALBUMIN SERPL-MCNC: 4.7 G/DL (ref 3.2–4.8)
ALBUMIN/GLOB SERPL: 1.9 G/DL (ref 1.5–2.5)
ALP SERPL-CCNC: 118 U/L (ref 25–100)
ALT SERPL W P-5'-P-CCNC: 21 U/L (ref 7–40)
ANION GAP SERPL CALCULATED.3IONS-SCNC: 3 MMOL/L (ref 3–11)
AST SERPL-CCNC: 24 U/L (ref 0–33)
BACTERIA UR QL AUTO: ABNORMAL /HPF
BASOPHILS # BLD AUTO: 0.04 10*3/MM3 (ref 0–0.2)
BASOPHILS NFR BLD AUTO: 0.4 % (ref 0–1)
BILIRUB SERPL-MCNC: 0.3 MG/DL (ref 0.3–1.2)
BILIRUB UR QL STRIP: NEGATIVE
BUN BLD-MCNC: 16 MG/DL (ref 9–23)
BUN/CREAT SERPL: 17.8 (ref 7–25)
CALCIUM SPEC-SCNC: 10 MG/DL (ref 8.7–10.4)
CHLORIDE SERPL-SCNC: 102 MMOL/L (ref 99–109)
CLARITY UR: ABNORMAL
CO2 SERPL-SCNC: 34 MMOL/L (ref 20–31)
COLOR UR: ABNORMAL
CREAT BLD-MCNC: 0.9 MG/DL (ref 0.6–1.3)
DEPRECATED RDW RBC AUTO: 47.3 FL (ref 37–54)
EOSINOPHIL # BLD AUTO: 0.24 10*3/MM3 (ref 0.1–0.3)
EOSINOPHIL NFR BLD AUTO: 2.5 % (ref 0–3)
ERYTHROCYTE [DISTWIDTH] IN BLOOD BY AUTOMATED COUNT: 14.3 % (ref 11.3–14.5)
GFR SERPL CREATININE-BSD FRML MDRD: 82 ML/MIN/1.73
GLOBULIN UR ELPH-MCNC: 2.5 GM/DL
GLUCOSE BLD-MCNC: 106 MG/DL (ref 70–100)
GLUCOSE UR STRIP-MCNC: NEGATIVE MG/DL
HCT VFR BLD AUTO: 44 % (ref 38.9–50.9)
HGB BLD-MCNC: 14.2 G/DL (ref 13.1–17.5)
HGB UR QL STRIP.AUTO: ABNORMAL
HOLD SPECIMEN: NORMAL
HOLD SPECIMEN: NORMAL
HYALINE CASTS UR QL AUTO: ABNORMAL /LPF
IMM GRANULOCYTES # BLD: 0.02 10*3/MM3 (ref 0–0.03)
IMM GRANULOCYTES NFR BLD: 0.2 % (ref 0–0.6)
KETONES UR QL STRIP: NEGATIVE
LEUKOCYTE ESTERASE UR QL STRIP.AUTO: ABNORMAL
LIPASE SERPL-CCNC: 44 U/L (ref 6–51)
LYMPHOCYTES # BLD AUTO: 2.09 10*3/MM3 (ref 0.6–4.8)
LYMPHOCYTES NFR BLD AUTO: 21.6 % (ref 24–44)
MCH RBC QN AUTO: 29.4 PG (ref 27–31)
MCHC RBC AUTO-ENTMCNC: 32.3 G/DL (ref 32–36)
MCV RBC AUTO: 91.1 FL (ref 80–99)
MONOCYTES # BLD AUTO: 0.92 10*3/MM3 (ref 0–1)
MONOCYTES NFR BLD AUTO: 9.5 % (ref 0–12)
NEUTROPHILS # BLD AUTO: 6.38 10*3/MM3 (ref 1.5–8.3)
NEUTROPHILS NFR BLD AUTO: 65.8 % (ref 41–71)
NITRITE UR QL STRIP: NEGATIVE
PH UR STRIP.AUTO: 6.5 [PH] (ref 5–8)
PLATELET # BLD AUTO: 265 10*3/MM3 (ref 150–450)
PMV BLD AUTO: 9.7 FL (ref 6–12)
POTASSIUM BLD-SCNC: 3.5 MMOL/L (ref 3.5–5.5)
PROT SERPL-MCNC: 7.2 G/DL (ref 5.7–8.2)
PROT UR QL STRIP: ABNORMAL
RBC # BLD AUTO: 4.83 10*6/MM3 (ref 4.2–5.76)
RBC # UR: ABNORMAL /HPF
REF LAB TEST METHOD: ABNORMAL
SODIUM BLD-SCNC: 139 MMOL/L (ref 132–146)
SP GR UR STRIP: 1.02 (ref 1–1.03)
SQUAMOUS #/AREA URNS HPF: ABNORMAL /HPF
UROBILINOGEN UR QL STRIP: ABNORMAL
WBC NRBC COR # BLD: 9.69 10*3/MM3 (ref 3.5–10.8)
WBC UR QL AUTO: ABNORMAL /HPF
WHOLE BLOOD HOLD SPECIMEN: NORMAL
WHOLE BLOOD HOLD SPECIMEN: NORMAL

## 2017-05-22 PROCEDURE — 80053 COMPREHEN METABOLIC PANEL: CPT | Performed by: EMERGENCY MEDICINE

## 2017-05-22 PROCEDURE — 74176 CT ABD & PELVIS W/O CONTRAST: CPT

## 2017-05-22 PROCEDURE — 83690 ASSAY OF LIPASE: CPT | Performed by: EMERGENCY MEDICINE

## 2017-05-22 PROCEDURE — 85025 COMPLETE CBC W/AUTO DIFF WBC: CPT | Performed by: EMERGENCY MEDICINE

## 2017-05-22 PROCEDURE — 81001 URINALYSIS AUTO W/SCOPE: CPT | Performed by: EMERGENCY MEDICINE

## 2017-05-22 PROCEDURE — 99283 EMERGENCY DEPT VISIT LOW MDM: CPT

## 2017-05-22 RX ORDER — PHENAZOPYRIDINE HYDROCHLORIDE 200 MG/1
200 TABLET, FILM COATED ORAL 3 TIMES DAILY PRN
Qty: 9 TABLET | Refills: 0 | Status: SHIPPED | OUTPATIENT
Start: 2017-05-22 | End: 2017-09-01

## 2017-05-22 RX ORDER — CIPROFLOXACIN 250 MG/1
250 TABLET, FILM COATED ORAL 2 TIMES DAILY
Qty: 14 TABLET | Refills: 0 | Status: SHIPPED | OUTPATIENT
Start: 2017-05-22 | End: 2017-09-01

## 2017-05-22 RX ORDER — SODIUM CHLORIDE 0.9 % (FLUSH) 0.9 %
10 SYRINGE (ML) INJECTION AS NEEDED
Status: DISCONTINUED | OUTPATIENT
Start: 2017-05-22 | End: 2017-05-22 | Stop reason: HOSPADM

## 2017-09-01 ENCOUNTER — OFFICE VISIT (OUTPATIENT)
Dept: CARDIOLOGY | Facility: CLINIC | Age: 77
End: 2017-09-01

## 2017-09-01 VITALS
WEIGHT: 182.6 LBS | BODY MASS INDEX: 24.2 KG/M2 | HEIGHT: 73 IN | SYSTOLIC BLOOD PRESSURE: 136 MMHG | HEART RATE: 82 BPM | DIASTOLIC BLOOD PRESSURE: 74 MMHG

## 2017-09-01 DIAGNOSIS — I10 ESSENTIAL HYPERTENSION: Primary | ICD-10-CM

## 2017-09-01 DIAGNOSIS — E78.2 MIXED HYPERLIPIDEMIA: ICD-10-CM

## 2017-09-01 DIAGNOSIS — I65.23 BILATERAL CAROTID ARTERY STENOSIS: ICD-10-CM

## 2017-09-01 PROCEDURE — 99214 OFFICE O/P EST MOD 30 MIN: CPT | Performed by: INTERNAL MEDICINE

## 2017-09-01 NOTE — PROGRESS NOTES
[facility] at [facility]  Office Progress Note  Domitila Bosch  1940  [phone]      Visit Date: 09/01/2017    PCP: Marcin Ferguson MD  [address] [address]    IDENTIFICATION: A 77 y.o. male retired  from [employer], formerly from [employer]    Chief Complaint   Patient presents with   • Coronary Artery Disease     follow up       PROBLEM LIST:   1. Probable coronary  artery disease:  a. August 2012, MPS per [facility]  at [facility] with normal perfusion and EF of 68%.    2.  Diaphoresis, no prior evaluation.  3. Hypertension, presumed essential.  4. Dyslipidemia, on statin and fibrate therapy:  February 2015 - total cholesterol 227, triglycerides 124, HDL 60.4, and .  5. Carotid stenosis:  a. June 2012, carotid duplex with moderate bilateral stenosis, no significant change from 2010.  b. MRA,  January 2015, shows mild stenosis of the distal left common carotid.  6.  Gout.  7. GERD, status post GI bleed in 2011, followed by Dr. Coulter.  8. Hypothyroidism.  9. Remote Nissen fundoplication, 1984.  10. Heel spur surgery, 1999.  11. Nephrolithiasis.  12. TIA, June 2012, with nonobstructive carotid stenosis, dyslipidemia and  hypertension.  13. Neuropathy.  14. Malignant melanoma, status post resection in 2010.  15. Asthma.   16. Herpes zoster, [id number], right lower extremity.   17. Anxiety disorder.    Allergies  Allergies   Allergen Reactions   • Betadine [Povidone Iodine]    • Cephalexin    • Flomax [Tamsulosin Hcl]    • Iodine    • Lisinopril    • Pseudoephedrine    • Sulfamethoxazole-Trimethoprim        Current Medications    Current Outpatient Prescriptions:   •  allopurinol (ZYLOPRIM) 300 MG tablet, Take 300 mg by mouth daily., Disp: , Rfl:   •  amLODIPine (NORVASC) 2.5 MG tablet, Take 1 tablet by mouth 2 (two) times a day., Disp: , Rfl:   •  aspirin 81 MG tablet, Take 1 tablet by mouth daily., Disp: , Rfl:   •  aspirin 81 MG tablet,  "Take  by mouth daily., Disp: , Rfl:   •  busPIRone (BUSPAR) 10 MG tablet, Take 10 mg by mouth 2 (two) times a day., Disp: , Rfl:   •  clopidogrel (PLAVIX) 75 MG tablet, Take  by mouth., Disp: , Rfl:   •  doxazosin (CARDURA) 4 MG tablet, Take 4 mg by mouth daily., Disp: , Rfl:   •  levothyroxine (SYNTHROID, LEVOTHROID) 137 MCG tablet, Take  by mouth., Disp: , Rfl:   •  lovastatin (MEVACOR) 20 MG tablet, Take  by mouth daily., Disp: , Rfl:   •  omeprazole (PriLOSEC) 40 MG capsule, Take  by mouth., Disp: , Rfl:   •  oxyCODONE-acetaminophen (PERCOCET) 7.5-325 MG per tablet, Take 1 tablet by mouth Every 4 (Four) Hours As Needed for Severe Pain (7-10)., Disp: 15 tablet, Rfl: 0      History of Present Illness   Pt here for follow up.  Was recently seen in the emergency department in May 2017 with hematuria. He followed up with urology and had a scope, and was told that his BPH was contributing to this, but there was no mass. He is still taking plavix for carotid disease. No history of stents. The hematuria cleared up. He has no other complaints. Endorsessome fatigue but is staying very active doing yard work, work in his garage.  Pt denies any chest pain, dyspnea, dyspnea on exertion, orthopnea, PND, palpitations, lower extremity edema, or claudication.    ROS:  All systems have been reviewed and are negative with the exception of those mentioned in the HPI.    OBJECTIVE:  Vitals:    09/01/17 1558   BP: 136/74   BP Location: Left arm   Patient Position: Sitting   Pulse: 82   Weight: 182 lb 9.6 oz (82.8 kg)   Height: 73\" (185.4 cm)     Physical Exam   Constitutional: He is oriented to person, place, and time. He appears well-developed and well-nourished. No distress.   Cardiovascular: Normal rate, regular rhythm, normal heart sounds and intact distal pulses.    No murmur heard.  Pulses:       Radial pulses are 2+ on the right side, and 2+ on the left side.        Dorsalis pedis pulses are 2+ on the right side, and 2+ on the " left side.        Posterior tibial pulses are 2+ on the right side, and 2+ on the left side.   Pulmonary/Chest: Effort normal and breath sounds normal. He has no wheezes. He has no rales.   Abdominal: Soft. Bowel sounds are normal. There is no tenderness. There is no guarding.   Musculoskeletal: He exhibits no edema or tenderness.   Neurological: He is alert and oriented to person, place, and time.   Skin: Skin is warm and dry. No rash noted.   Multiple ecchymoses   Psychiatric: He has a normal mood and affect.   Nursing note and vitals reviewed.      Diagnostic Data:  Procedures      ASSESSMENT:   Diagnosis Plan   1. Essential hypertension     2. Mixed hyperlipidemia     3. Bilateral carotid artery stenosis  Duplex Carotid Ultrasound CAR       PLAN:  1. Well controlled, continue antihypertensives  2. Continue statin therapy with lovastatin.  Most recent LDL 3/17/1/08.  3. Pt may d/c plavix due to bleeding complications and take ASA and statin alone for carotid disease. We'll repeat carotid ultrasound before next office visit.  4. Pt set to continue following with urology    RTC 1 year, sooner if needed    Marcin Ferguson MD, thank you for referring Mr. Bosch for evaluation.  I have forwarded my electronically generated recommendations to you for review.  Please do not hesitate to call with any questions.    Scribed for Donaldo Lazo MD by Arlette King PA-C. 9/1/2017  4:11 PM  I, Donaldo Lazo MD, personally performed the services described in this documentation as scribed by the above named individual in my presence, and it is both accurate and complete.  9/1/2017  6:08 PM    Donaldo Lazo MD, [facility]

## 2018-06-08 DIAGNOSIS — I65.29 STENOSIS OF CAROTID ARTERY, UNSPECIFIED LATERALITY: Primary | ICD-10-CM

## 2018-09-04 DIAGNOSIS — R09.89 BRUIT: Primary | ICD-10-CM

## 2018-09-07 ENCOUNTER — APPOINTMENT (OUTPATIENT)
Dept: CARDIOLOGY | Facility: HOSPITAL | Age: 78
End: 2018-09-07
Attending: INTERNAL MEDICINE

## 2018-09-07 ENCOUNTER — OFFICE VISIT (OUTPATIENT)
Dept: CARDIOLOGY | Facility: CLINIC | Age: 78
End: 2018-09-07

## 2018-09-07 ENCOUNTER — HOSPITAL ENCOUNTER (OUTPATIENT)
Dept: CARDIOLOGY | Facility: HOSPITAL | Age: 78
Discharge: HOME | End: 2018-09-07
Attending: INTERNAL MEDICINE | Admitting: INTERNAL MEDICINE

## 2018-09-07 VITALS
HEIGHT: 73 IN | WEIGHT: 186.2 LBS | HEART RATE: 69 BPM | BODY MASS INDEX: 24.68 KG/M2 | DIASTOLIC BLOOD PRESSURE: 86 MMHG | OXYGEN SATURATION: 94 % | SYSTOLIC BLOOD PRESSURE: 152 MMHG

## 2018-09-07 VITALS — HEIGHT: 73 IN | BODY MASS INDEX: 24.12 KG/M2 | WEIGHT: 182 LBS

## 2018-09-07 DIAGNOSIS — E78.2 MIXED HYPERLIPIDEMIA: ICD-10-CM

## 2018-09-07 DIAGNOSIS — R09.89 BRUIT: ICD-10-CM

## 2018-09-07 DIAGNOSIS — I10 ESSENTIAL HYPERTENSION: Primary | ICD-10-CM

## 2018-09-07 LAB
BH CV XLRA MEAS LEFT CCA RATIO VEL: 67.3 CM/SEC
BH CV XLRA MEAS LEFT DIST CCA EDV: 14 CM/SEC
BH CV XLRA MEAS LEFT DIST CCA PSV: 62.9 CM/SEC
BH CV XLRA MEAS LEFT DIST ICA EDV: 22.7 CM/SEC
BH CV XLRA MEAS LEFT DIST ICA PSV: 68.1 CM/SEC
BH CV XLRA MEAS LEFT ICA RATIO VEL: 180 CM/SEC
BH CV XLRA MEAS LEFT ICA/CCA RATIO: 2.7
BH CV XLRA MEAS LEFT MID CCA EDV: 13.3 CM/SEC
BH CV XLRA MEAS LEFT MID CCA PSV: 67.8 CM/SEC
BH CV XLRA MEAS LEFT MID ICA EDV: 14.4 CM/SEC
BH CV XLRA MEAS LEFT MID ICA PSV: 48.9 CM/SEC
BH CV XLRA MEAS LEFT PROX CCA EDV: 18.1 CM/SEC
BH CV XLRA MEAS LEFT PROX CCA PSV: 112.4 CM/SEC
BH CV XLRA MEAS LEFT PROX ECA EDV: 14 CM/SEC
BH CV XLRA MEAS LEFT PROX ECA PSV: 228.7 CM/SEC
BH CV XLRA MEAS LEFT PROX ICA EDV: 36.7 CM/SEC
BH CV XLRA MEAS LEFT PROX ICA PSV: 158.9 CM/SEC
BH CV XLRA MEAS LEFT PROX SCLA PSV: 134.7 CM/SEC
BH CV XLRA MEAS LEFT VERTEBRAL A EDV: 31.4 CM/SEC
BH CV XLRA MEAS LEFT VERTEBRAL A PSV: 99.5 CM/SEC
BH CV XLRA MEAS RIGHT CCA RATIO VEL: 88.6 CM/SEC
BH CV XLRA MEAS RIGHT DIST CCA EDV: 16.3 CM/SEC
BH CV XLRA MEAS RIGHT DIST CCA PSV: 89.9 CM/SEC
BH CV XLRA MEAS RIGHT DIST ICA EDV: 21.5 CM/SEC
BH CV XLRA MEAS RIGHT DIST ICA PSV: 81.6 CM/SEC
BH CV XLRA MEAS RIGHT ICA RATIO VEL: 102 CM/SEC
BH CV XLRA MEAS RIGHT ICA/CCA RATIO: 1.2
BH CV XLRA MEAS RIGHT MID CCA EDV: 19.5 CM/SEC
BH CV XLRA MEAS RIGHT MID CCA PSV: 89.3 CM/SEC
BH CV XLRA MEAS RIGHT MID ICA EDV: 19.9 CM/SEC
BH CV XLRA MEAS RIGHT MID ICA PSV: 72.8 CM/SEC
BH CV XLRA MEAS RIGHT PROX CCA EDV: 9.4 CM/SEC
BH CV XLRA MEAS RIGHT PROX CCA PSV: 88 CM/SEC
BH CV XLRA MEAS RIGHT PROX ECA EDV: 12.8 CM/SEC
BH CV XLRA MEAS RIGHT PROX ECA PSV: 145.4 CM/SEC
BH CV XLRA MEAS RIGHT PROX ICA EDV: 15.7 CM/SEC
BH CV XLRA MEAS RIGHT PROX ICA PSV: 73.7 CM/SEC
BH CV XLRA MEAS RIGHT PROX SCLA PSV: 138.5 CM/SEC
BH CV XLRA MEAS RIGHT VERTEBRAL A EDV: 14.8 CM/SEC
BH CV XLRA MEAS RIGHT VERTEBRAL A PSV: 101.3 CM/SEC
LEFT ARM BP: NORMAL MMHG
RIGHT ARM BP: NORMAL MMHG

## 2018-09-07 PROCEDURE — 93880 EXTRACRANIAL BILAT STUDY: CPT | Performed by: INTERNAL MEDICINE

## 2018-09-07 PROCEDURE — 93880 EXTRACRANIAL BILAT STUDY: CPT

## 2018-09-07 PROCEDURE — 99213 OFFICE O/P EST LOW 20 MIN: CPT | Performed by: INTERNAL MEDICINE

## 2018-09-07 RX ORDER — THIAMINE HCL 100 MG
2500 TABLET ORAL DAILY
COMMUNITY

## 2018-09-07 RX ORDER — FINASTERIDE 5 MG/1
5 TABLET, FILM COATED ORAL DAILY
COMMUNITY

## 2018-09-07 RX ORDER — TRIAMTERENE AND HYDROCHLOROTHIAZIDE 37.5; 25 MG/1; MG/1
1 TABLET ORAL DAILY
COMMUNITY
End: 2021-07-05 | Stop reason: HOSPADM

## 2018-09-07 RX ORDER — DIPHENHYDRAMINE HCL 25 MG
25 CAPSULE ORAL AS NEEDED
COMMUNITY
End: 2020-07-28

## 2018-09-07 RX ORDER — LEVOTHYROXINE SODIUM 0.15 MG/1
150 TABLET ORAL DAILY
Status: ON HOLD | COMMUNITY
End: 2021-07-05 | Stop reason: SDUPTHER

## 2018-09-07 RX ORDER — LORATADINE 10 MG/1
1 TABLET ORAL DAILY PRN
COMMUNITY

## 2018-09-07 NOTE — PROGRESS NOTES
[facility] at [facility]  Office Progress Note  Domitila Bosch  1940  [phone]      Visit Date: 9/7/2018      PCP: Marcin Ferguson MD  [address] [address]    IDENTIFICATION: A 78 y.o. male retired  from [employer], formerly from [employer]    Chief Complaint   Patient presents with   • Coronary Artery Disease       PROBLEM LIST:   1. Probable coronary  artery disease:  a. August 2012, MPS per [facility]  at [facility] with normal perfusion and EF of 68%.    2.  Diaphoresis, no prior evaluation.  3. Hypertension, presumed essential.  4. Dyslipidemia, on statin and fibrate therapy:  February 2015 - total cholesterol 227, triglycerides 124, HDL 60.4, and .  5. Carotid stenosis:  a. June 2012, carotid duplex with moderate bilateral stenosis, no significant change from 2010.  b. MRA,  January 2015, shows mild stenosis of the distal left common carotid.  6.  Gout.  7. GERD, status post GI bleed in 2011, followed by Dr. Coulter.  8. Hypothyroidism.  9. Remote Nissen fundoplication, 1984.  10. Heel spur surgery, 1999.  11. Nephrolithiasis.  12. TIA, June 2012, with nonobstructive carotid stenosis, dyslipidemia and  hypertension.  13. Neuropathy.  14. Malignant melanoma, status post resection in 2010.  15. Asthma.   16. Herpes zoster, [id number], right lower extremity.   17. Anxiety disorder.    Allergies  Allergies   Allergen Reactions   • Betadine [Povidone Iodine]    • Cephalexin    • Flomax [Tamsulosin Hcl]    • Iodine    • Lisinopril    • Pseudoephedrine    • Sulfamethoxazole-Trimethoprim        Current Medications    Current Outpatient Prescriptions:   •  allopurinol (ZYLOPRIM) 300 MG tablet, Take 300 mg by mouth daily., Disp: , Rfl:   •  aspirin 81 MG tablet, Take 81 mg by mouth Daily., Disp: , Rfl:   •  busPIRone (BUSPAR) 10 MG tablet, Take 10 mg by mouth 2 (Two) Times a Day. 1/2 tab BID, Disp: , Rfl:   •  diphenhydrAMINE (BENADRYL) 25 mg  "capsule, Take 25 mg by mouth As Needed for Itching., Disp: , Rfl:   •  doxazosin (CARDURA) 4 MG tablet, Take 4 mg by mouth daily., Disp: , Rfl:   •  finasteride (PROSCAR) 5 MG tablet, Take 5 mg by mouth Daily., Disp: , Rfl:   •  levothyroxine (SYNTHROID, LEVOTHROID) 150 MCG tablet, Take 150 mcg by mouth Daily., Disp: , Rfl:   •  Loratadine (CLARITIN) 10 MG capsule, Take 1 tablet by mouth As Needed., Disp: , Rfl:   •  lovastatin (MEVACOR) 20 MG tablet, Take 20 mg by mouth Every Night., Disp: , Rfl:   •  omeprazole (PriLOSEC) 40 MG capsule, Take 40 mg by mouth Daily., Disp: , Rfl:   •  oxyCODONE-acetaminophen (PERCOCET) 7.5-325 MG per tablet, Take 1 tablet by mouth Every 4 (Four) Hours As Needed for Severe Pain (7-10)., Disp: 15 tablet, Rfl: 0  •  psyllium (METAMUCIL) 58.6 % packet, Take 1 packet by mouth Daily., Disp: , Rfl:   •  triamterene-hydrochlorothiazide (MAXZIDE-25) 37.5-25 MG per tablet, Take 1 tablet by mouth Daily., Disp: , Rfl:   •  vitamin B-12 (CYANOCOBALAMIN) 2500 MCG sublingual tablet tablet, Place 2,500 mcg under the tongue Daily., Disp: , Rfl:       History of Present Illness   Pt here for follow up. He has no  complaints. Endorsessome fatigue but is staying very active doing yard work, work in his garage.  Pt denies any chest pain, dyspnea, dyspnea on exertion, orthopnea, PND, palpitations, lower extremity edema, or claudication.    ROS:  All systems have been reviewed and are negative with the exception of those mentioned in the HPI.    OBJECTIVE:  Vitals:    09/07/18 1454   BP: 152/86   BP Location: Left arm   Patient Position: Sitting   Pulse: 69   SpO2: 94%   Weight: 84.5 kg (186 lb 3.2 oz)   Height: 185.4 cm (73\")     Physical Exam   Constitutional: He is oriented to person, place, and time. He appears well-developed and well-nourished. No distress.   Cardiovascular: Normal rate, regular rhythm, normal heart sounds and intact distal pulses.    No murmur heard.  Pulses:       Radial pulses are " 2+ on the right side, and 2+ on the left side.        Dorsalis pedis pulses are 2+ on the right side, and 2+ on the left side.        Posterior tibial pulses are 2+ on the right side, and 2+ on the left side.   Pulmonary/Chest: Effort normal and breath sounds normal. He has no wheezes. He has no rales.   Abdominal: Soft. Bowel sounds are normal. There is no tenderness. There is no guarding.   Musculoskeletal: He exhibits no edema or tenderness.   Neurological: He is alert and oriented to person, place, and time.   Skin: Skin is warm and dry. No rash noted.   Multiple ecchymoses   Psychiatric: He has a normal mood and affect.   Nursing note and vitals reviewed.      Diagnostic Data:  Procedures      ASSESSMENT:   Diagnosis Plan   1. Essential hypertension     2. Mixed hyperlipidemia         PLAN:  1. Well controlled, continue antihypertensives  2. Continue statin therapy with lovastatin.  Most recent LDL 3/17/1/08.    RTC 1 year, sooner if needed    Marcin Ferguson MD, thank you for referring Mr. Bosch for evaluation.  I have forwarded my electronically generated recommendations to you for review.  Please do not hesitate to call with any questions.    Scribed for Donaldo Lazo MD by Arlette King PA-C. 9/7/2018  4:01 PM  I, Donaldo Lazo MD, personally performed the services described in this documentation as scribed by the above named individual in my presence, and it is both accurate and complete.  9/7/2018  4:01 PM    Donaldo Lazo MD, [facility]

## 2019-09-02 ENCOUNTER — HOSPITAL ENCOUNTER (INPATIENT)
Facility: HOSPITAL | Age: 79
LOS: 2 days | Discharge: HOME | End: 2019-09-04
Attending: EMERGENCY MEDICINE | Admitting: INTERNAL MEDICINE

## 2019-09-02 ENCOUNTER — APPOINTMENT (OUTPATIENT)
Dept: CT IMAGING | Facility: HOSPITAL | Age: 79
End: 2019-09-02

## 2019-09-02 ENCOUNTER — APPOINTMENT (OUTPATIENT)
Dept: MRI IMAGING | Facility: HOSPITAL | Age: 79
End: 2019-09-02

## 2019-09-02 DIAGNOSIS — I63.9 ACUTE CVA (CEREBROVASCULAR ACCIDENT) (HCC): Primary | ICD-10-CM

## 2019-09-02 DIAGNOSIS — I45.10 RIGHT BUNDLE BRANCH BLOCK: ICD-10-CM

## 2019-09-02 DIAGNOSIS — R29.898 WEAKNESS OF RIGHT LOWER EXTREMITY: ICD-10-CM

## 2019-09-02 DIAGNOSIS — Z86.79 HISTORY OF HYPERTENSION: ICD-10-CM

## 2019-09-02 DIAGNOSIS — G62.9 NEUROPATHY: ICD-10-CM

## 2019-09-02 DIAGNOSIS — I65.22 MORE THAN 50 PERCENT STENOSIS OF LEFT INTERNAL CAROTID ARTERY: ICD-10-CM

## 2019-09-02 DIAGNOSIS — Z86.73 HISTORY OF TIA (TRANSIENT ISCHEMIC ATTACK): ICD-10-CM

## 2019-09-02 DIAGNOSIS — Z86.39 HISTORY OF HYPOTHYROIDISM: ICD-10-CM

## 2019-09-02 DIAGNOSIS — I16.0 HYPERTENSIVE URGENCY: ICD-10-CM

## 2019-09-02 PROBLEM — M79.10 MUSCLE PAIN: Status: ACTIVE | Noted: 2019-09-02

## 2019-09-02 PROBLEM — M54.16 LUMBAR RADICULOPATHY: Status: ACTIVE | Noted: 2019-09-02

## 2019-09-02 LAB
ALBUMIN SERPL-MCNC: 4.5 G/DL (ref 3.5–5.2)
ALBUMIN/GLOB SERPL: 1.7 G/DL
ALP SERPL-CCNC: 99 U/L (ref 39–117)
ALT SERPL W P-5'-P-CCNC: 14 U/L (ref 1–41)
ANION GAP SERPL CALCULATED.3IONS-SCNC: 12 MMOL/L (ref 5–15)
AST SERPL-CCNC: 17 U/L (ref 1–40)
BASOPHILS # BLD AUTO: 0.08 10*3/MM3 (ref 0–0.2)
BASOPHILS NFR BLD AUTO: 1.2 % (ref 0–1.5)
BILIRUB SERPL-MCNC: 0.4 MG/DL (ref 0.2–1.2)
BUN BLD-MCNC: 12 MG/DL (ref 8–23)
BUN/CREAT SERPL: 12.2 (ref 7–25)
CALCIUM SPEC-SCNC: 9.7 MG/DL (ref 8.6–10.5)
CHLORIDE SERPL-SCNC: 102 MMOL/L (ref 98–107)
CO2 SERPL-SCNC: 27 MMOL/L (ref 22–29)
CREAT BLD-MCNC: 0.98 MG/DL (ref 0.76–1.27)
DEPRECATED RDW RBC AUTO: 47.6 FL (ref 37–54)
EOSINOPHIL # BLD AUTO: 0.2 10*3/MM3 (ref 0–0.4)
EOSINOPHIL NFR BLD AUTO: 2.9 % (ref 0.3–6.2)
ERYTHROCYTE [DISTWIDTH] IN BLOOD BY AUTOMATED COUNT: 13.7 % (ref 12.3–15.4)
GFR SERPL CREATININE-BSD FRML MDRD: 74 ML/MIN/1.73
GLOBULIN UR ELPH-MCNC: 2.7 GM/DL
GLUCOSE BLD-MCNC: 83 MG/DL (ref 65–99)
HCT VFR BLD AUTO: 46.6 % (ref 37.5–51)
HGB BLD-MCNC: 15.6 G/DL (ref 13–17.7)
HOLD SPECIMEN: NORMAL
HOLD SPECIMEN: NORMAL
IMM GRANULOCYTES # BLD AUTO: 0.02 10*3/MM3 (ref 0–0.05)
IMM GRANULOCYTES NFR BLD AUTO: 0.3 % (ref 0–0.5)
LYMPHOCYTES # BLD AUTO: 1.79 10*3/MM3 (ref 0.7–3.1)
LYMPHOCYTES NFR BLD AUTO: 25.8 % (ref 19.6–45.3)
MCH RBC QN AUTO: 31.5 PG (ref 26.6–33)
MCHC RBC AUTO-ENTMCNC: 33.5 G/DL (ref 31.5–35.7)
MCV RBC AUTO: 94 FL (ref 79–97)
MONOCYTES # BLD AUTO: 0.77 10*3/MM3 (ref 0.1–0.9)
MONOCYTES NFR BLD AUTO: 11.1 % (ref 5–12)
NEUTROPHILS # BLD AUTO: 4.09 10*3/MM3 (ref 1.7–7)
NEUTROPHILS NFR BLD AUTO: 58.7 % (ref 42.7–76)
NRBC BLD AUTO-RTO: 0 /100 WBC (ref 0–0.2)
PLATELET # BLD AUTO: 238 10*3/MM3 (ref 140–450)
PMV BLD AUTO: 10.3 FL (ref 6–12)
POTASSIUM BLD-SCNC: 3.6 MMOL/L (ref 3.5–5.2)
PROT SERPL-MCNC: 7.2 G/DL (ref 6–8.5)
RBC # BLD AUTO: 4.96 10*6/MM3 (ref 4.14–5.8)
SODIUM BLD-SCNC: 141 MMOL/L (ref 136–145)
TROPONIN T SERPL-MCNC: <0.01 NG/ML (ref 0–0.03)
TSH SERPL DL<=0.05 MIU/L-ACNC: 0.08 UIU/ML (ref 0.27–4.2)
WBC NRBC COR # BLD: 6.95 10*3/MM3 (ref 3.4–10.8)
WHOLE BLOOD HOLD SPECIMEN: NORMAL
WHOLE BLOOD HOLD SPECIMEN: NORMAL

## 2019-09-02 PROCEDURE — 99285 EMERGENCY DEPT VISIT HI MDM: CPT

## 2019-09-02 PROCEDURE — 84443 ASSAY THYROID STIM HORMONE: CPT | Performed by: PHYSICIAN ASSISTANT

## 2019-09-02 PROCEDURE — 85025 COMPLETE CBC W/AUTO DIFF WBC: CPT | Performed by: PHYSICIAN ASSISTANT

## 2019-09-02 PROCEDURE — 93005 ELECTROCARDIOGRAM TRACING: CPT | Performed by: PHYSICIAN ASSISTANT

## 2019-09-02 PROCEDURE — 84484 ASSAY OF TROPONIN QUANT: CPT | Performed by: PHYSICIAN ASSISTANT

## 2019-09-02 PROCEDURE — 70450 CT HEAD/BRAIN W/O DYE: CPT

## 2019-09-02 PROCEDURE — 80053 COMPREHEN METABOLIC PANEL: CPT | Performed by: PHYSICIAN ASSISTANT

## 2019-09-02 PROCEDURE — 99223 1ST HOSP IP/OBS HIGH 75: CPT | Performed by: FAMILY MEDICINE

## 2019-09-02 PROCEDURE — 70551 MRI BRAIN STEM W/O DYE: CPT

## 2019-09-02 RX ORDER — BUSPIRONE HYDROCHLORIDE 10 MG/1
10 TABLET ORAL EVERY 12 HOURS SCHEDULED
Status: DISCONTINUED | OUTPATIENT
Start: 2019-09-02 | End: 2019-09-04 | Stop reason: HOSPADM

## 2019-09-02 RX ORDER — LEVOTHYROXINE SODIUM 0.15 MG/1
150 TABLET ORAL DAILY
Status: DISCONTINUED | OUTPATIENT
Start: 2019-09-03 | End: 2019-09-04 | Stop reason: HOSPADM

## 2019-09-02 RX ORDER — ATORVASTATIN CALCIUM 20 MG/1
20 TABLET, FILM COATED ORAL DAILY
Status: DISCONTINUED | OUTPATIENT
Start: 2019-09-03 | End: 2019-09-02

## 2019-09-02 RX ORDER — ASPIRIN 81 MG/1
324 TABLET, CHEWABLE ORAL ONCE
Status: COMPLETED | OUTPATIENT
Start: 2019-09-02 | End: 2019-09-02

## 2019-09-02 RX ORDER — CETIRIZINE HYDROCHLORIDE 10 MG/1
10 TABLET ORAL DAILY
Status: DISCONTINUED | OUTPATIENT
Start: 2019-09-03 | End: 2019-09-04 | Stop reason: HOSPADM

## 2019-09-02 RX ORDER — METHYLPREDNISOLONE SODIUM SUCCINATE 40 MG/ML
40 INJECTION, POWDER, LYOPHILIZED, FOR SOLUTION INTRAMUSCULAR; INTRAVENOUS EVERY 4 HOURS
Status: DISCONTINUED | OUTPATIENT
Start: 2019-09-02 | End: 2019-09-03

## 2019-09-02 RX ORDER — LABETALOL HYDROCHLORIDE 5 MG/ML
10 INJECTION, SOLUTION INTRAVENOUS ONCE
Status: COMPLETED | OUTPATIENT
Start: 2019-09-02 | End: 2019-09-02

## 2019-09-02 RX ORDER — TERAZOSIN 1 MG/1
1 CAPSULE ORAL NIGHTLY
Status: DISCONTINUED | OUTPATIENT
Start: 2019-09-02 | End: 2019-09-03

## 2019-09-02 RX ORDER — ALLOPURINOL 300 MG/1
300 TABLET ORAL DAILY
Status: DISCONTINUED | OUTPATIENT
Start: 2019-09-03 | End: 2019-09-04 | Stop reason: HOSPADM

## 2019-09-02 RX ORDER — DIPHENHYDRAMINE HYDROCHLORIDE 50 MG/ML
50 INJECTION INTRAMUSCULAR; INTRAVENOUS ONCE
Status: COMPLETED | OUTPATIENT
Start: 2019-09-02 | End: 2019-09-03

## 2019-09-02 RX ORDER — SODIUM CHLORIDE 0.9 % (FLUSH) 0.9 %
10 SYRINGE (ML) INJECTION AS NEEDED
Status: DISCONTINUED | OUTPATIENT
Start: 2019-09-02 | End: 2019-09-04 | Stop reason: HOSPADM

## 2019-09-02 RX ADMIN — ASPIRIN 81 MG 324 MG: 81 TABLET ORAL at 21:54

## 2019-09-02 RX ADMIN — LABETALOL 20 MG/4 ML (5 MG/ML) INTRAVENOUS SYRINGE 10 MG: at 22:19

## 2019-09-02 NOTE — ED PROVIDER NOTES
Subjective   Domitila Bosch is an 79 y.o.male who presents to the ED with complaints of right lower extremity transient pain and heaviness. The patient reports his pain has resolved since onset earlier this evening at around 1700. He states the pain did not last very long.  In addition to the pain, his leg also became heavy and weak, for it caused him to have difficulty ambulating.  Patient did not fall secondary to weakness.  He and his wife were simply outside in the yard when symptoms started.  He has not taken any medication for his pain. He denies any confusion, right arm symptoms, chest pain, shortness of breath, urinary incontinence, or slurred speech. Additionally, he does have a history of chronic back pain, but he has never experienced pain radiating into his leg or radicular symptoms. He denies any recent falls to cause his current discomfort. Of note, he had a carotid ultrasound in September of 2018. After examination, it was discovered that he had left ICA stenosis at 50-69% and right ICA stenosis left than 50%.  Patient has history of TIA, as well as hypertension and hypothyroidism.  There are no other complaints at this time.         History provided by:  Patient  Illness   Location:  Right leg  Quality:  Pain and heaviness  Severity:  Mild  Onset quality:  Sudden  Duration:  1 day  Timing:  Constant  Progression:  Resolved  Chronicity:  New  Context:  Right leg heaviness and pain earlier today  Relieved by:  None tried  Worsened by:  Nothing  Ineffective treatments:  None tried  Associated symptoms: no chest pain, no fever, no headaches and no shortness of breath        Review of Systems   Constitutional: Negative for appetite change, chills, diaphoresis and fever.   Eyes: Negative for visual disturbance.   Respiratory: Negative for chest tightness and shortness of breath.    Cardiovascular: Negative for chest pain, palpitations and leg swelling.   Gastrointestinal: Negative.    Genitourinary:  Negative for dysuria, flank pain, frequency and urgency.        No urinary incontinence   Musculoskeletal: Positive for back pain (chronic.  No acute back pain at this time.) and gait problem.        Right leg pain and heaviness.    Neurological: Negative for dizziness, syncope, speech difficulty, weakness, light-headedness and headaches.        Right leg heaviness with mild transient pain.  History of TIA.   Psychiatric/Behavioral: Negative for confusion.   All other systems reviewed and are negative.      Past Medical History:   Diagnosis Date   • Anxiety disorder 2/16/2017   • Asthma 2/16/2017   • Basal cell carcinoma in situ of skin 2019    right leg    • Carotid stenosis 2/16/2017   • Diaphoresis    • Diverticulitis    • Dyslipidemia 2/16/2017   • GERD (gastroesophageal reflux disease) 2/16/2017   • Gout 2/16/2017   • Herpes zoster     Herpes zoster, [id number], right lower extremity.    • Hypertension 2/16/2017   • Hypothyroidism 2/16/2017   • Malignant melanoma (CMS/HCC) 2/16/2017   • Melanoma (CMS/HCC)    • Nephrolithiasis    • Post herpetic neuralgia 02/16/2017   • TIA (transient ischemic attack)     TIA, June 2012, with nonobstructive carotid stenosis, dyslipidemia and hypertension       Allergies   Allergen Reactions   • Betadine [Povidone Iodine]    • Cephalexin    • Flomax [Tamsulosin Hcl]    • Iodine    • Lisinopril    • Pseudoephedrine    • Sulfamethoxazole-Trimethoprim        Past Surgical History:   Procedure Laterality Date   • ABDOMINAL SURGERY     • APPENDECTOMY     • COLON RESECTION LEFT  2016   • COLON RESECTION LEFT  2016   • HEEL SPUR SURGERY  1999   • HERNIA REPAIR      hiatal hernia    • NISSEN FUNDOPLICATION  1984   • OTHER SURGICAL HISTORY  2010    Malignant melanoma, status post resection        Family History   Problem Relation Age of Onset   • Hyperlipidemia Mother    • Heart disease Mother    • Stroke Mother    • Heart attack Mother        Social History     Socioeconomic History   •  Marital status:      Spouse name: Not on file   • Number of children: Not on file   • Years of education: Not on file   • Highest education level: Not on file   Tobacco Use   • Smoking status: Former Smoker     Types: Cigars, Pipe     Last attempt to quit:      Years since quittin.6   • Smokeless tobacco: Never Used   Substance and Sexual Activity   • Alcohol use: No   • Drug use: No   • Sexual activity: Defer   Social History Narrative    Retired           Objective   Physical Exam   Constitutional: He is oriented to person, place, and time. He appears well-developed and well-nourished. No distress.   HENT:   Head: Normocephalic and atraumatic.   Right Ear: Tympanic membrane and ear canal normal.   Left Ear: Tympanic membrane and ear canal normal.   Nose: Nose normal. No nasal septal hematoma.   Mouth/Throat: Mucous membranes are not dry. No oral lesions. No trismus in the jaw. No dental abscesses or uvula swelling. No posterior oropharyngeal erythema or tonsillar abscesses. No tonsillar exudate.   Eyes: Conjunctivae and EOM are normal. Pupils are equal, round, and reactive to light. Right conjunctiva is not injected. Left conjunctiva is not injected. No scleral icterus.   No nystagmus.    Neck: Normal range of motion. Neck supple. Carotid bruit is not present.   Cardiovascular: Normal rate, regular rhythm, normal heart sounds and intact distal pulses.   No murmur heard.  Pulmonary/Chest: Effort normal and breath sounds normal. No respiratory distress. He has no wheezes. He has no rales. He exhibits no tenderness.   Abdominal: Soft. Bowel sounds are normal. There is no tenderness.   Musculoskeletal: Normal range of motion. He exhibits no edema or tenderness.   No lumbar or sacral spine tenderness. Full range of motion of back without pain. Negative straight leg raise.    Neurological: He is alert and oriented to person, place, and time. He has normal strength.   Reflex Scores:       Patellar  reflexes are 2+ on the right side and 2+ on the left side.  Smile equal. Tongue midline. Speech is clear and concentrated. No expressive aphasia. Equal  to upper extremities, 5/5. Equal strength to legs, 5/5. Patient has trouble lifting his right foot during ambulation, for he states it feels heavy.    Skin: Skin is warm and dry. No rash noted.   Psychiatric: He has a normal mood and affect. His speech is normal and behavior is normal.   Nursing note and vitals reviewed.      Procedures         ED Course  ED Course as of Sep 02 2345   Mon Sep 02, 2019   1944 EKG showed normal sinus rhythm.  There is evidence of right bundle branch block.  This is new in appearance.  Compared to EKG from June, 2016 and there was no right bundle branch block noted on that EKG.  [FC]   2034 Paged Dr. Tse to discuss the case.  He recommended MRI of the brain without contrast.  There are some types of strokes that just affect right lower extremity.  We will proceed with MRI.  [FC]   2243 MRI of the brain without contrast showed a small focus of recent infarct involving the left posterior  corona radiata up to about 1.2 cm in dimension.  It has the appearance of her recent small vessel insult which is superimposed upon extensive pre-existing small vessel disease and generalized atrophy.  No appreciable mass-effect or evidence for hemorrhagic transformation.  I re-paged neurology to discuss those results.   last blood pressure was 198/100.  I ordered labetalol 10 mg IV.  Discussed the case with Dr. Tse again and MRI findings.  He recommended admission to the hospitalist and stroke work-up, including CT angiogram of the brain and neck with and without contrast and 2D echocardiogram.  Discussed admission with Dr. Conner, hospitalist, and he is agreeable to admission.  [FC]      ED Course User Index  [FC] Julisa Clay, LUIS     Recent Results (from the past 24 hour(s))   Light Blue Top    Collection Time: 09/02/19  6:42 PM   Result  Value Ref Range    Extra Tube hold for add-on    Green Top (Gel)    Collection Time: 09/02/19  6:42 PM   Result Value Ref Range    Extra Tube Hold for add-ons.    Lavender Top    Collection Time: 09/02/19  6:42 PM   Result Value Ref Range    Extra Tube hold for add-on    Gold Top - SST    Collection Time: 09/02/19  6:42 PM   Result Value Ref Range    Extra Tube Hold for add-ons.    Comprehensive Metabolic Panel    Collection Time: 09/02/19  6:42 PM   Result Value Ref Range    Glucose 83 65 - 99 mg/dL    BUN 12 8 - 23 mg/dL    Creatinine 0.98 0.76 - 1.27 mg/dL    Sodium 141 136 - 145 mmol/L    Potassium 3.6 3.5 - 5.2 mmol/L    Chloride 102 98 - 107 mmol/L    CO2 27.0 22.0 - 29.0 mmol/L    Calcium 9.7 8.6 - 10.5 mg/dL    Total Protein 7.2 6.0 - 8.5 g/dL    Albumin 4.50 3.50 - 5.20 g/dL    ALT (SGPT) 14 1 - 41 U/L    AST (SGOT) 17 1 - 40 U/L    Alkaline Phosphatase 99 39 - 117 U/L    Total Bilirubin 0.4 0.2 - 1.2 mg/dL    eGFR Non African Amer 74 >60 mL/min/1.73    Globulin 2.7 gm/dL    A/G Ratio 1.7 g/dL    BUN/Creatinine Ratio 12.2 7.0 - 25.0    Anion Gap 12.0 5.0 - 15.0 mmol/L   Troponin    Collection Time: 09/02/19  6:42 PM   Result Value Ref Range    Troponin T <0.010 0.000 - 0.030 ng/mL   CBC Auto Differential    Collection Time: 09/02/19  6:42 PM   Result Value Ref Range    WBC 6.95 3.40 - 10.80 10*3/mm3    RBC 4.96 4.14 - 5.80 10*6/mm3    Hemoglobin 15.6 13.0 - 17.7 g/dL    Hematocrit 46.6 37.5 - 51.0 %    MCV 94.0 79.0 - 97.0 fL    MCH 31.5 26.6 - 33.0 pg    MCHC 33.5 31.5 - 35.7 g/dL    RDW 13.7 12.3 - 15.4 %    RDW-SD 47.6 37.0 - 54.0 fl    MPV 10.3 6.0 - 12.0 fL    Platelets 238 140 - 450 10*3/mm3    Neutrophil % 58.7 42.7 - 76.0 %    Lymphocyte % 25.8 19.6 - 45.3 %    Monocyte % 11.1 5.0 - 12.0 %    Eosinophil % 2.9 0.3 - 6.2 %    Basophil % 1.2 0.0 - 1.5 %    Immature Grans % 0.3 0.0 - 0.5 %    Neutrophils, Absolute 4.09 1.70 - 7.00 10*3/mm3    Lymphocytes, Absolute 1.79 0.70 - 3.10 10*3/mm3    Monocytes,  Absolute 0.77 0.10 - 0.90 10*3/mm3    Eosinophils, Absolute 0.20 0.00 - 0.40 10*3/mm3    Basophils, Absolute 0.08 0.00 - 0.20 10*3/mm3    Immature Grans, Absolute 0.02 0.00 - 0.05 10*3/mm3    nRBC 0.0 0.0 - 0.2 /100 WBC   TSH    Collection Time: 09/02/19  6:42 PM   Result Value Ref Range    TSH 0.079 (L) 0.270 - 4.200 uIU/mL     Note: In addition to lab results from this visit, the labs listed above may include labs taken at another facility or during a different encounter within the last 24 hours. Please correlate lab times with ED admission and discharge times for further clarification of the services performed during this visit.    MRI Brain Without Contrast   Final Result   #1 there is a small focus of recent infarct involving the left posterior corona radiata up to about 1.2 cm in dimension. It has the appearance of a recent small vessel insult which is superimposed upon extensive pre-existing small vessel disease and   generalized atrophy.. Please correlate for risk factors. There Is no appreciable mass effect or evidence for hemorrhagic transformation.      Signer Name: Shania Tran MD    Signed: 9/2/2019 10:30 PM    [email or web address]     [facility]      CT Head Without Contrast   Final Result   No acute intracranial abnormality. Mild senescent changes.               Signer Name: Avni Gutierrez MD    Signed: 9/2/2019 8:01 PM    Workstation [facility]: [email or web address]     [facility]      CT Angiogram Head With & Without Contrast    (Results Pending)   CT Angiogram Neck With & Without Contrast    (Results Pending)     Vitals:    09/02/19 1930 09/02/19 2100 09/02/19 2219 09/02/19 2230   BP: (!) 188/104 (!) 190/94 (!) 198/100 165/85   BP Location:       Patient Position:       Pulse: 73 74 64 63   Resp: 17 18  17   Temp:       TempSrc:       SpO2: 98% 96%  97%   Weight:       Height:         Medications   sodium chloride 0.9 % flush 10 mL (not administered)    allopurinol (ZYLOPRIM) tablet 300 mg (not administered)   busPIRone (BUSPAR) tablet 10 mg (not administered)   terazosin (HYTRIN) capsule 1 mg (not administered)   levothyroxine (SYNTHROID, LEVOTHROID) tablet 150 mcg (not administered)   atorvastatin (LIPITOR) tablet 20 mg (not administered)   cetirizine (zyrTEC) tablet 10 mg (not administered)   methylPREDNISolone sodium succinate (SOLU-Medrol) injection 40 mg (not administered)   diphenhydrAMINE (BENADRYL) injection 50 mg (not administered)   aspirin chewable tablet 324 mg (324 mg Oral Given 9/2/19 2154)   labetalol (NORMODYNE,TRANDATE) injection 10 mg (10 mg Intravenous Given 9/2/19 2219)     ECG/EMG Results (last 24 hours)     ** No results found for the last 24 hours. **        ECG 12 Lead                             MDM    Final diagnoses:   Acute CVA (cerebrovascular accident) (CMS/HCC)   Hypertensive urgency   Weakness of right lower extremity   More than 50 percent stenosis of left internal carotid artery   History of hypertension   History of TIA (transient ischemic attack)   History of hypothyroidism   Right bundle branch block       Documentation assistance provided by lupis Issa.  Information recorded by the lupis was done at my direction and has been verified and validated by me.     Joao Issa  09/02/19 1933       Julisa Clay, LUIS  09/02/19 0582

## 2019-09-03 ENCOUNTER — APPOINTMENT (OUTPATIENT)
Dept: CT IMAGING | Facility: HOSPITAL | Age: 79
End: 2019-09-03

## 2019-09-03 ENCOUNTER — APPOINTMENT (OUTPATIENT)
Dept: CARDIOLOGY | Facility: HOSPITAL | Age: 79
End: 2019-09-03

## 2019-09-03 PROBLEM — R03.0 ELEVATED BP WITHOUT DIAGNOSIS OF HYPERTENSION: Status: ACTIVE | Noted: 2019-09-03

## 2019-09-03 PROBLEM — Z88.3: Status: ACTIVE | Noted: 2019-09-03

## 2019-09-03 LAB
ALBUMIN SERPL-MCNC: 3.8 G/DL (ref 3.5–5.2)
ALBUMIN/GLOB SERPL: 1.3 G/DL
ALP SERPL-CCNC: 95 U/L (ref 39–117)
ALT SERPL W P-5'-P-CCNC: 14 U/L (ref 1–41)
ANION GAP SERPL CALCULATED.3IONS-SCNC: 12 MMOL/L (ref 5–15)
ASCENDING AORTA: 3.1 CM
AST SERPL-CCNC: 18 U/L (ref 1–40)
BH CV ECHO MEAS - AO MAX PG (FULL): 5.5 MMHG
BH CV ECHO MEAS - AO MAX PG: 12.8 MMHG
BH CV ECHO MEAS - AO MEAN PG (FULL): 2.6 MMHG
BH CV ECHO MEAS - AO MEAN PG: 6.9 MMHG
BH CV ECHO MEAS - AO ROOT AREA (BSA CORRECTED): 1.6
BH CV ECHO MEAS - AO ROOT AREA: 8.8 CM^2
BH CV ECHO MEAS - AO ROOT DIAM: 3.3 CM
BH CV ECHO MEAS - AO V2 MAX: 179.2 CM/SEC
BH CV ECHO MEAS - AO V2 MEAN: 123.8 CM/SEC
BH CV ECHO MEAS - AO V2 VTI: 37.5 CM
BH CV ECHO MEAS - ASC AORTA: 3.1 CM
BH CV ECHO MEAS - AVA(I,A): 2.4 CM^2
BH CV ECHO MEAS - AVA(I,D): 2.4 CM^2
BH CV ECHO MEAS - AVA(V,A): 2.2 CM^2
BH CV ECHO MEAS - AVA(V,D): 2.2 CM^2
BH CV ECHO MEAS - BSA(HAYCOCK): 2.1 M^2
BH CV ECHO MEAS - BSA: 2.1 M^2
BH CV ECHO MEAS - BZI_BMI: 25.3 KILOGRAMS/M^2
BH CV ECHO MEAS - BZI_METRIC_HEIGHT: 185.4 CM
BH CV ECHO MEAS - BZI_METRIC_WEIGHT: 87.1 KG
BH CV ECHO MEAS - EDV(CUBED): 37.8 ML
BH CV ECHO MEAS - EDV(MOD-SP2): 49 ML
BH CV ECHO MEAS - EDV(MOD-SP4): 78 ML
BH CV ECHO MEAS - EDV(TEICH): 46 ML
BH CV ECHO MEAS - EF(CUBED): 79.7 %
BH CV ECHO MEAS - EF(MOD-BP): 71 %
BH CV ECHO MEAS - EF(MOD-SP2): 67.3 %
BH CV ECHO MEAS - EF(MOD-SP4): 76.9 %
BH CV ECHO MEAS - EF(TEICH): 73.3 %
BH CV ECHO MEAS - ESV(CUBED): 7.7 ML
BH CV ECHO MEAS - ESV(MOD-SP2): 16 ML
BH CV ECHO MEAS - ESV(MOD-SP4): 18 ML
BH CV ECHO MEAS - ESV(TEICH): 12.3 ML
BH CV ECHO MEAS - FS: 41.3 %
BH CV ECHO MEAS - IVS/LVPW: 1.1
BH CV ECHO MEAS - IVSD: 0.9 CM
BH CV ECHO MEAS - LA DIMENSION: 4.2 CM
BH CV ECHO MEAS - LA/AO: 1.3
BH CV ECHO MEAS - LAD MAJOR: 6.1 CM
BH CV ECHO MEAS - LAT PEAK E' VEL: 8 CM/SEC
BH CV ECHO MEAS - LATERAL E/E' RATIO: 8.6
BH CV ECHO MEAS - LV DIASTOLIC VOL/BSA (35-75): 36.9 ML/M^2
BH CV ECHO MEAS - LV MASS(C)D: 70.2 GRAMS
BH CV ECHO MEAS - LV MASS(C)DI: 33.2 GRAMS/M^2
BH CV ECHO MEAS - LV MAX PG: 7.3 MMHG
BH CV ECHO MEAS - LV MEAN PG: 4.2 MMHG
BH CV ECHO MEAS - LV SYSTOLIC VOL/BSA (12-30): 8.5 ML/M^2
BH CV ECHO MEAS - LV V1 MAX: 135.2 CM/SEC
BH CV ECHO MEAS - LV V1 MEAN: 97.1 CM/SEC
BH CV ECHO MEAS - LV V1 VTI: 31.4 CM
BH CV ECHO MEAS - LVIDD: 3.4 CM
BH CV ECHO MEAS - LVIDS: 2 CM
BH CV ECHO MEAS - LVLD AP2: 7.2 CM
BH CV ECHO MEAS - LVLD AP4: 7.3 CM
BH CV ECHO MEAS - LVLS AP2: 5.7 CM
BH CV ECHO MEAS - LVLS AP4: 5.1 CM
BH CV ECHO MEAS - LVOT AREA (M): 2.8 CM^2
BH CV ECHO MEAS - LVOT AREA: 2.9 CM^2
BH CV ECHO MEAS - LVOT DIAM: 1.9 CM
BH CV ECHO MEAS - LVPWD: 0.8 CM
BH CV ECHO MEAS - MED PEAK E' VEL: 4.8 CM/SEC
BH CV ECHO MEAS - MEDIAL E/E' RATIO: 14.1
BH CV ECHO MEAS - MV A MAX VEL: 115.5 CM/SEC
BH CV ECHO MEAS - MV DEC TIME: 0.29 SEC
BH CV ECHO MEAS - MV E MAX VEL: 70.1 CM/SEC
BH CV ECHO MEAS - MV E/A: 0.61
BH CV ECHO MEAS - PA ACC SLOPE: 915.3 CM/SEC^2
BH CV ECHO MEAS - PA ACC TIME: 0.12 SEC
BH CV ECHO MEAS - PA PR(ACCEL): 25.1 MMHG
BH CV ECHO MEAS - RAP SYSTOLE: 3 MMHG
BH CV ECHO MEAS - RVSP: 16.2 MMHG
BH CV ECHO MEAS - SI(AO): 155.5 ML/M^2
BH CV ECHO MEAS - SI(CUBED): 14.3 ML/M^2
BH CV ECHO MEAS - SI(LVOT): 42.7 ML/M^2
BH CV ECHO MEAS - SI(MOD-SP2): 15.6 ML/M^2
BH CV ECHO MEAS - SI(MOD-SP4): 28.4 ML/M^2
BH CV ECHO MEAS - SI(TEICH): 15.9 ML/M^2
BH CV ECHO MEAS - SV(AO): 328.9 ML
BH CV ECHO MEAS - SV(CUBED): 30.2 ML
BH CV ECHO MEAS - SV(LVOT): 90.4 ML
BH CV ECHO MEAS - SV(MOD-SP2): 33 ML
BH CV ECHO MEAS - SV(MOD-SP4): 60 ML
BH CV ECHO MEAS - SV(TEICH): 33.7 ML
BH CV ECHO MEAS - TR MAX PG: 13.2 MMHG
BH CV ECHO MEAS - TR MAX VEL: 181.9 CM/SEC
BH CV ECHO MEASUREMENTS AVERAGE E/E' RATIO: 10.95
BH CV VAS BP LEFT ARM: NORMAL MMHG
BH CV XLRA - RV BASE: 3.8 CM
BH CV XLRA - RV LENGTH: 5.5 CM
BH CV XLRA - RV MID: 3 CM
BILIRUB SERPL-MCNC: 0.5 MG/DL (ref 0.2–1.2)
BUN BLD-MCNC: 11 MG/DL (ref 8–23)
BUN/CREAT SERPL: 12.6 (ref 7–25)
CALCIUM SPEC-SCNC: 9.3 MG/DL (ref 8.6–10.5)
CHLORIDE SERPL-SCNC: 103 MMOL/L (ref 98–107)
CHOLEST SERPL-MCNC: 174 MG/DL (ref 0–200)
CO2 SERPL-SCNC: 24 MMOL/L (ref 22–29)
CREAT BLD-MCNC: 0.87 MG/DL (ref 0.76–1.27)
DEPRECATED RDW RBC AUTO: 47.8 FL (ref 37–54)
ERYTHROCYTE [DISTWIDTH] IN BLOOD BY AUTOMATED COUNT: 13.8 % (ref 12.3–15.4)
GFR SERPL CREATININE-BSD FRML MDRD: 84 ML/MIN/1.73
GLOBULIN UR ELPH-MCNC: 2.9 GM/DL
GLUCOSE BLD-MCNC: 141 MG/DL (ref 65–99)
GLUCOSE BLDC GLUCOMTR-MCNC: 115 MG/DL (ref 70–130)
GLUCOSE BLDC GLUCOMTR-MCNC: 155 MG/DL (ref 70–130)
GLUCOSE BLDC GLUCOMTR-MCNC: 183 MG/DL (ref 70–130)
GLUCOSE BLDC GLUCOMTR-MCNC: 215 MG/DL (ref 70–130)
GLUCOSE BLDC GLUCOMTR-MCNC: 93 MG/DL (ref 70–130)
HBA1C MFR BLD: 5.4 % (ref 4.8–5.6)
HCT VFR BLD AUTO: 47.4 % (ref 37.5–51)
HDLC SERPL-MCNC: 43 MG/DL (ref 40–60)
HGB BLD-MCNC: 15.4 G/DL (ref 13–17.7)
LDLC SERPL CALC-MCNC: 86 MG/DL (ref 0–100)
LDLC/HDLC SERPL: 2.01 {RATIO}
LV EF 2D ECHO EST: 72 %
MCH RBC QN AUTO: 30.8 PG (ref 26.6–33)
MCHC RBC AUTO-ENTMCNC: 32.5 G/DL (ref 31.5–35.7)
MCV RBC AUTO: 94.8 FL (ref 79–97)
PLATELET # BLD AUTO: 228 10*3/MM3 (ref 140–450)
PMV BLD AUTO: 10.2 FL (ref 6–12)
POTASSIUM BLD-SCNC: 3.8 MMOL/L (ref 3.5–5.2)
PROT SERPL-MCNC: 6.7 G/DL (ref 6–8.5)
RBC # BLD AUTO: 5 10*6/MM3 (ref 4.14–5.8)
SODIUM BLD-SCNC: 139 MMOL/L (ref 136–145)
TRIGL SERPL-MCNC: 223 MG/DL (ref 0–150)
VLDLC SERPL-MCNC: 44.6 MG/DL
WBC NRBC COR # BLD: 7.84 10*3/MM3 (ref 3.4–10.8)

## 2019-09-03 PROCEDURE — 97116 GAIT TRAINING THERAPY: CPT

## 2019-09-03 PROCEDURE — 93306 TTE W/DOPPLER COMPLETE: CPT

## 2019-09-03 PROCEDURE — 80061 LIPID PANEL: CPT | Performed by: PHYSICIAN ASSISTANT

## 2019-09-03 PROCEDURE — 70496 CT ANGIOGRAPHY HEAD: CPT

## 2019-09-03 PROCEDURE — 93306 TTE W/DOPPLER COMPLETE: CPT | Performed by: INTERNAL MEDICINE

## 2019-09-03 PROCEDURE — 25010000002 METHYLPREDNISOLONE PER 40 MG: Performed by: PHYSICIAN ASSISTANT

## 2019-09-03 PROCEDURE — 82962 GLUCOSE BLOOD TEST: CPT

## 2019-09-03 PROCEDURE — 97165 OT EVAL LOW COMPLEX 30 MIN: CPT

## 2019-09-03 PROCEDURE — 92523 SPEECH SOUND LANG COMPREHEN: CPT

## 2019-09-03 PROCEDURE — 0 IOPAMIDOL PER 1 ML: Performed by: FAMILY MEDICINE

## 2019-09-03 PROCEDURE — 80053 COMPREHEN METABOLIC PANEL: CPT | Performed by: PHYSICIAN ASSISTANT

## 2019-09-03 PROCEDURE — 83036 HEMOGLOBIN GLYCOSYLATED A1C: CPT | Performed by: PHYSICIAN ASSISTANT

## 2019-09-03 PROCEDURE — 99233 SBSQ HOSP IP/OBS HIGH 50: CPT | Performed by: INTERNAL MEDICINE

## 2019-09-03 PROCEDURE — 93880 EXTRACRANIAL BILAT STUDY: CPT

## 2019-09-03 PROCEDURE — 93880 EXTRACRANIAL BILAT STUDY: CPT | Performed by: INTERNAL MEDICINE

## 2019-09-03 PROCEDURE — 85027 COMPLETE CBC AUTOMATED: CPT | Performed by: PHYSICIAN ASSISTANT

## 2019-09-03 PROCEDURE — 99223 1ST HOSP IP/OBS HIGH 75: CPT | Performed by: PSYCHIATRY & NEUROLOGY

## 2019-09-03 PROCEDURE — 70498 CT ANGIOGRAPHY NECK: CPT

## 2019-09-03 PROCEDURE — 25010000002 DIPHENHYDRAMINE PER 50 MG: Performed by: PHYSICIAN ASSISTANT

## 2019-09-03 PROCEDURE — 97161 PT EVAL LOW COMPLEX 20 MIN: CPT

## 2019-09-03 RX ORDER — CLOPIDOGREL BISULFATE 75 MG/1
75 TABLET ORAL DAILY
Status: DISCONTINUED | OUTPATIENT
Start: 2019-09-03 | End: 2019-09-04 | Stop reason: HOSPADM

## 2019-09-03 RX ORDER — ASPIRIN 325 MG
325 TABLET ORAL DAILY
Status: DISCONTINUED | OUTPATIENT
Start: 2019-09-03 | End: 2019-09-04 | Stop reason: HOSPADM

## 2019-09-03 RX ORDER — SODIUM CHLORIDE 0.9 % (FLUSH) 0.9 %
10 SYRINGE (ML) INJECTION AS NEEDED
Status: DISCONTINUED | OUTPATIENT
Start: 2019-09-03 | End: 2019-09-04 | Stop reason: HOSPADM

## 2019-09-03 RX ORDER — ASPIRIN 300 MG/1
300 SUPPOSITORY RECTAL DAILY
Status: DISCONTINUED | OUTPATIENT
Start: 2019-09-03 | End: 2019-09-04 | Stop reason: HOSPADM

## 2019-09-03 RX ORDER — ATORVASTATIN CALCIUM 40 MG/1
80 TABLET, FILM COATED ORAL NIGHTLY
Status: DISCONTINUED | OUTPATIENT
Start: 2019-09-03 | End: 2019-09-04 | Stop reason: HOSPADM

## 2019-09-03 RX ORDER — SODIUM CHLORIDE 0.9 % (FLUSH) 0.9 %
10 SYRINGE (ML) INJECTION EVERY 12 HOURS SCHEDULED
Status: DISCONTINUED | OUTPATIENT
Start: 2019-09-03 | End: 2019-09-04 | Stop reason: HOSPADM

## 2019-09-03 RX ADMIN — ALLOPURINOL 300 MG: 300 TABLET ORAL at 08:45

## 2019-09-03 RX ADMIN — LEVOTHYROXINE SODIUM 150 MCG: 150 TABLET ORAL at 06:50

## 2019-09-03 RX ADMIN — IOPAMIDOL 75 ML: 755 INJECTION, SOLUTION INTRAVENOUS at 01:30

## 2019-09-03 RX ADMIN — BUSPIRONE HYDROCHLORIDE 10 MG: 10 TABLET ORAL at 20:42

## 2019-09-03 RX ADMIN — ASPIRIN 325 MG ORAL TABLET 325 MG: 325 PILL ORAL at 08:46

## 2019-09-03 RX ADMIN — METHYLPREDNISOLONE SODIUM SUCCINATE 40 MG: 40 INJECTION, POWDER, FOR SOLUTION INTRAMUSCULAR; INTRAVENOUS at 00:10

## 2019-09-03 RX ADMIN — BUSPIRONE HYDROCHLORIDE 10 MG: 10 TABLET ORAL at 00:10

## 2019-09-03 RX ADMIN — DIPHENHYDRAMINE HYDROCHLORIDE 50 MG: 50 INJECTION INTRAMUSCULAR; INTRAVENOUS at 00:11

## 2019-09-03 RX ADMIN — SODIUM CHLORIDE, PRESERVATIVE FREE 10 ML: 5 INJECTION INTRAVENOUS at 20:42

## 2019-09-03 RX ADMIN — ATORVASTATIN CALCIUM 80 MG: 40 TABLET, FILM COATED ORAL at 00:11

## 2019-09-03 RX ADMIN — SODIUM CHLORIDE, PRESERVATIVE FREE 10 ML: 5 INJECTION INTRAVENOUS at 08:46

## 2019-09-03 RX ADMIN — ATORVASTATIN CALCIUM 80 MG: 40 TABLET, FILM COATED ORAL at 20:41

## 2019-09-03 RX ADMIN — BUSPIRONE HYDROCHLORIDE 10 MG: 10 TABLET ORAL at 08:45

## 2019-09-03 RX ADMIN — CETIRIZINE HYDROCHLORIDE 10 MG: 10 TABLET, FILM COATED ORAL at 08:45

## 2019-09-03 RX ADMIN — METHYLPREDNISOLONE SODIUM SUCCINATE 40 MG: 40 INJECTION, POWDER, FOR SOLUTION INTRAMUSCULAR; INTRAVENOUS at 03:30

## 2019-09-03 RX ADMIN — METHYLPREDNISOLONE SODIUM SUCCINATE 40 MG: 40 INJECTION, POWDER, FOR SOLUTION INTRAMUSCULAR; INTRAVENOUS at 06:50

## 2019-09-03 RX ADMIN — CLOPIDOGREL BISULFATE 75 MG: 75 TABLET ORAL at 15:26

## 2019-09-03 RX ADMIN — SODIUM CHLORIDE, PRESERVATIVE FREE 10 ML: 5 INJECTION INTRAVENOUS at 00:11

## 2019-09-03 NOTE — CONSULTS
Patient does not meet diabetes education order criteria, therefore patient was not seen for diabetes education at this time.  Please re consult as needed. Current A1C is 5.4% Thank you for this referral.

## 2019-09-03 NOTE — PLAN OF CARE
Problem: Patient Care Overview  Goal: Plan of Care Review  Outcome: Ongoing (interventions implemented as appropriate)   09/03/19 1059   Coping/Psychosocial   Plan of Care Reviewed With patient;spouse       Problem: Stroke (Ischemic) (Adult)  Goal: Signs and Symptoms of Listed Potential Problems Will be Absent, Minimized or Managed (Stroke)  Outcome: Ongoing (interventions implemented as appropriate)   09/03/19 1059   Goal/Outcome Evaluation   Problems Assessed (Stroke (Ischemic)) cognitive impairment;communication impairment   Problems Assessed (Stroke (Ischemic)) none   SLP evaluation completed. Will sign-off as patients speech, language and cognition are at baseline and WFL. Please see note for further details and recommendations.

## 2019-09-03 NOTE — THERAPY EVALUATION
Patient Name: Domitila Bosch  : 1940    MRN: [id number]                              Today's Date: 9/3/2019       Admit Date: 2019    Visit Dx:     ICD-10-CM ICD-9-CM   1. Acute CVA (cerebrovascular accident) (CMS/HCC) I63.9 434.91   2. Hypertensive urgency I16.0 401.9   3. Weakness of right lower extremity R29.898 729.89   4. More than 50 percent stenosis of left internal carotid artery I65.22 433.10   5. History of hypertension Z86.79 V12.59   6. History of TIA (transient ischemic attack) Z86.73 V12.54   7. History of hypothyroidism Z86.39 V12.29   8. Right bundle branch block I45.10 426.4     Patient Active Problem List   Diagnosis   • Diverticulosis of large intestine with hemorrhage   • Umbilical hernia   • S/P hernia repair   • Dyslipidemia   • Carotid stenosis   • Gout   • GERD (gastroesophageal reflux disease)   • Hypothyroidism   • Neuropathy   • Malignant melanoma (CMS/HCC)   • Asthma   • Anxiety disorder   • Diverticulosis   • Muscle pain   • Lumbar radiculopathy   • Kidney stone   • Deviated nasal septum   • Chronic laryngitis   • Acute CVA (cerebrovascular accident) (CMS/HCC)   • Elevated BP without diagnosis of hypertension   • Allergy to Betadine     Past Medical History:   Diagnosis Date   • Anxiety disorder 2017   • Asthma 2017   • Basal cell carcinoma in situ of skin 2019    right leg    • Carotid stenosis 2017   • Diaphoresis    • Diastolic dysfunction    • Diverticulitis    • Dyslipidemia 2017   • GERD (gastroesophageal reflux disease) 2017   • Gout 2017   • Herpes zoster     Herpes zoster, [id number], right lower extremity.    • Hypertension 2017   • Hypothyroidism 2017   • LVH (left ventricular hypertrophy)    • Malignant melanoma (CMS/HCC) 2017   • Melanoma (CMS/HCC)    • Mitral regurgitation    • Nephrolithiasis    • Post herpetic neuralgia 2017   • TIA (transient ischemic attack)     TIA, 2012, with nonobstructive  carotid stenosis, dyslipidemia and hypertension     Past Surgical History:   Procedure Laterality Date   • ABDOMINAL SURGERY     • APPENDECTOMY     • COLON RESECTION LEFT  2016   • COLON RESECTION LEFT  2016   • HEEL SPUR SURGERY  1999   • HERNIA REPAIR      hiatal hernia    • NISSEN FUNDOPLICATION  1984   • OTHER SURGICAL HISTORY  2010    Malignant melanoma, status post resection      General Information     Row Name 09/03/19 1139          PT Evaluation Time/Intention    Document Type  evaluation  -CD     Mode of Treatment  physical therapy  -CD     Row Name 09/03/19 1139          General Information    Patient Profile Reviewed?  yes  -CD     Prior Level of Function  independent:;all household mobility;community mobility;gait;ADL's;driving  -CD     Existing Precautions/Restrictions  fall DECREASED STM AT BASELINE. R LE WEAKNESS, IMPULSIVE.   -CD     Barriers to Rehab  none identified  -CD     Row Name 09/03/19 1139          Relationship/Environment    Lives With  spouse AVAILABLE 24/7.   -CD     Row Name 09/03/19 1139          Resource/Environmental Concerns    Current Living Arrangements  home/apartment/condo TUB SHOWER.   -CD     Row Name 09/03/19 1139          Home Main Entrance    Number of Stairs, Main Entrance  none  -CD     Row Name 09/03/19 1139          Stairs Within Home, Primary    Number of Stairs, Within Home, Primary  none  -CD     Row Name 09/03/19 1139          Cognitive Assessment/Intervention- PT/OT    Orientation Status (Cognition)  oriented x 3  -CD     Row Name 09/03/19 1139          Safety Issues, Functional Mobility    Safety Issues Affecting Function (Mobility)  awareness of need for assistance;insight into deficits/self awareness;impulsivity;safety precaution awareness;safety precautions follow-through/compliance  -CD     Impairments Affecting Function (Mobility)  balance;coordination;postural/trunk control;strength  -CD       User Key  (r) = Recorded By, (t) = Taken By, (c) = Cosigned By     Initials Name Provider Type    Becca Worley, PT Physical Therapist        Mobility     Row Name 09/03/19 1142          Bed Mobility Assessment/Treatment    Bed Mobility Assessment/Treatment  supine-sit  -CD     Supine-Sit [place] (Bed Mobility)  independent  -CD     Assistive Device (Bed Mobility)  bed rails;head of bed elevated  -CD     Row Name 09/03/19 1142          Transfer Assessment/Treatment    Comment (Transfers)  SIGNIFICANT LOB POSTERIOR AND TO THE R UPON STANDING IMPULSIVELY. PT SAT BACK DOWN AND GIVEN CUES FOR SAFETY AWARENESS, HAND PLACEMENT.   -CD     Row Name 09/03/19 1142          Sit-Stand Transfer    Sit-Stand [place] (Transfers)  contact guard GAIT BELT INITIALLY. THEN R UE SUPPORT.   -CD     Row Name 09/03/19 1142          Gait/Stairs Assessment/Training    Gait/Stairs Assessment/Training  gait/ambulation independence  -CD     [place] Level (Gait)  minimum assist (75% patient effort)  -CD     Assistive Device (Gait)  walker, front-wheeled  -CD     Distance in Feet (Gait)  100 FEET WITH R UE SUPPORT/MIN ASSIST AND GAIT BELT , 100 FEET WITH R WALKER AND MIN ASSIST.   -CD     Comment (Gait/Stairs)  MAX CUES FOR SAFETY WITH R WALKER. PICKS R WALKER UP TO TURN AND SPINS, IMPULSIVELY WITH LOB TO THE R.   -CD       User Key  (r) = Recorded By, (t) = Taken By, (c) = Cosigned By    Initials Name Provider Type    Becca Worley PT Physical Therapist        Obj/Interventions     Row Name 09/03/19 1145          General ROM    GENERAL ROM COMMENTS  NO DEFICITS B LE'S   -CD     Row Name 09/03/19 1145          MMT (Manual Muscle Testing)    General MMT Comments  R LE 4/5 DF/KNEE EXT; 3/5 HIP FLEX.   -CD     Row Name 09/03/19 1145          Static Sitting Balance    Level of [place] (Unsupported Sitting, Static Balance)  independent  -CD     Sitting Position (Unsupported Sitting, Static Balance)  sitting on edge of bed  -CD     Row Name 09/03/19 1145          Dynamic Sitting  Balance    Level of [place], Reaches Outside Midline (Sitting, Dynamic Balance)  supervision  -CD     Sitting Position, Reaches Outside Midline (Sitting, Dynamic Balance)  sitting on edge of bed  -CD     Row Name 09/03/19 1145          Static Standing Balance    Level of [place] (Supported Standing, Static Balance)  contact guard assist  -CD     Assistive Device Utilized (Supported Standing, Static Balance)  walker, rolling  -CD     Row Name 09/03/19 1145          Dynamic Standing Balance    Level of [place], Reaches Outside Midline (Standing, Dynamic Balance)  minimal assist, 75% patient effort  -CD     Assistive Device Utilized (Supported Standing, Dynamic Balance)  walker, rolling  -CD     Comment, Reaches Outside Midline (Standing, Dynamic Balance)  MOD ASSIST WITHOUT R WALKER WITH SIGNIFICANT LOB POSTERIOR AND TO THE R.   -CD     Row Name 09/03/19 1145          Sensory Assessment/Intervention    Sensory General Assessment  no sensation deficits identified B LE'S. ABLE TO READ SIGNS IN [place]. IMPAIRED COORDINATION R LE.   -CD       User Key  (r) = Recorded By, (t) = Taken By, (c) = Cosigned By    Initials Name Provider Type    CD Becca Cuevas, PT Physical Therapist        Goals/Plan     Row Name 09/03/19 1152          Bed Mobility Goal 1 (PT)    Activity/Assistive Device (Bed Mobility Goal 1, PT)  bed mobility activities, all  -CD     [place] Level/Cues Needed (Bed Mobility Goal 1, PT)  independent  -CD     Time Frame (Bed Mobility Goal 1, PT)  long term goal (LTG);2 weeks  -CD     Row Name 09/03/19 1152          Transfer Goal 1 (PT)    Activity/Assistive Device (Transfer Goal 1, PT)  sit-to-stand/stand-to-sit;bed-to-chair/chair-to-bed;walker, rolling  -CD     [place] Level/Cues Needed (Transfer Goal 1, PT)  independent  -CD     Time Frame (Transfer Goal 1, PT)  long term goal (LTG);2 weeks  -CD     Row Name 09/03/19 1152          Gait Training Goal 1 (PT)    Activity/Assistive Device  (Gait Training Goal 1, PT)  gait (walking locomotion);walker, rolling  -CD     [place] Level (Gait Training Goal 1, PT)  independent  -CD     Distance (Gait Goal 1, PT)  600  -CD     Time Frame (Gait Training Goal 1, PT)  long term goal (LTG);2 weeks  -CD       User Key  (r) = Recorded By, (t) = Taken By, (c) = Cosigned By    Initials Name Provider Type    CD Becca Cuevas, PT Physical Therapist        Clinical Impression     Row Name 09/03/19 1147          Pain Scale: FACES Pre/Post-Treatment    Pain: FACES Scale, Pretreatment  0-->no hurt  -CD     Pain: FACES Scale, Post-Treatment  0-->no hurt  -CD     Row Name 09/03/19 1147          Plan of Care Review    Plan of Care Reviewed With  patient;spouse  -CD     Row Name 09/03/19 1147          Physical Therapy Clinical Impression    Patient/Family Goals Statement (PT Clinical Impression)  PT DECLINES IRF AT D/C BUT CURRENTLY AGREES TO HOME WITH FAMILY AND HHPT OR OPPT.   -CD     Criteria for Skilled Interventions Met (PT Clinical Impression)  yes  -CD     Rehab Potential (PT Clinical Summary)  good, to achieve stated therapy goals  -CD     Predicted Duration of Therapy (PT)  2 WEEKS.   -CD     Row Name 09/03/19 1147          Vital Signs    Post Systolic BP Rehab  125  -CD     Post Treatment Diastolic BP  72  -CD     Posttreatment Heart Rate (beats/min)  103  -CD     Pre SpO2 (%)  95  -CD     O2 Delivery Pre Treatment  room air  -CD     O2 Delivery Intra Treatment  room air  -CD     Post SpO2 (%)  96  -CD     O2 Delivery Post Treatment  room air  -CD     Pre Patient Position  Supine  -CD     Intra Patient Position  Standing  -CD     Post Patient Position  Sitting  -CD     Row Name 09/03/19 1147          Positioning and Restraints    Pre-Treatment Position  in bed  -CD     Post Treatment Position  chair  -CD     In Chair  reclined;call light within reach;encouraged to call for assist;exit alarm on;with family/caregiver;legs elevated NSG NOTIFIED OF CURRENT MOBILITY  STATUS AND REC'S FOR EXIT ALARM.   -CD       User Key  (r) = Recorded By, (t) = Taken By, (c) = Cosigned By    Initials Name Provider Type    Becca Worley PT Physical Therapist        Outcome Measures     Row Name 09/03/19 1156          How much help from another person do you currently need...    Turning from your back to your side while in flat bed without using bedrails?  4  -CD     Moving from lying on back to sitting on the side of a flat bed without bedrails?  4  -CD     Moving to and from a bed to a chair (including a wheelchair)?  3  -CD     Standing up from a chair using your arms (e.g., wheelchair, bedside chair)?  3  -CD     Climbing 3-5 steps with a railing?  2  -CD     To walk in hospital room?  3  -CD     AM-PAC 6 Clicks Score (PT)  19  -CD     Row Name 09/03/19 1156          Modified [place] Scale    Modified [place] Scale  4 - Moderately severe disability.  Unable to walk without assistance, and unable to attend to own bodily needs without assistance.  -CD     Row Name 09/03/19 1156          Functional Assessment    Outcome Measure Options  AM-PAC 6 Clicks Basic Mobility (PT);Modified [place]  -CD       User Key  (r) = Recorded By, (t) = Taken By, (c) = Cosigned By    Initials Name Provider Type    Becca Worley PT Physical Therapist        Physical Therapy Education     Title: PT OT SLP Therapies (Done)     Topic: Physical Therapy (Done)     Point: Mobility training (Done)     Learning Progress Summary           Patient Acceptance, E, VU,NR by CD at 9/3/2019 11:53 AM    Comment:  BENEFITS OF OOB ACTIVITY, SAFETY WITH MOBILITY, PROGRESSION OF POC, D/C REC'S /PLANNING,                   Point: Home exercise program (Done)     Learning Progress Summary           Patient Acceptance, E, VU,NR by CD at 9/3/2019 11:53 AM    Comment:  BENEFITS OF OOB ACTIVITY, SAFETY WITH MOBILITY, PROGRESSION OF POC, D/C REC'S /PLANNING,                   Point: Body mechanics (Done)     Learning Progress Summary            Patient Acceptance, E, VU,NR by CD at 9/3/2019 11:53 AM    Comment:  BENEFITS OF OOB ACTIVITY, SAFETY WITH MOBILITY, PROGRESSION OF POC, D/C REC'S /PLANNING,                   Point: Precautions (Done)     Learning Progress Summary           Patient Acceptance, E, VU,NR by CD at 9/3/2019 11:53 AM    Comment:  BENEFITS OF OOB ACTIVITY, SAFETY WITH MOBILITY, PROGRESSION OF POC, D/C REC'S /PLANNING,                               User Key     Initials Effective Dates Name Provider Type Discipline    CD 06/19/15 -  Becca Cuevas, PT Physical Therapist PT              PT Recommendation and Plan  Planned Therapy Interventions (PT Eval): balance training, bed mobility training, gait training, strengthening, transfer training, patient/family education, neuromuscular re-education  Outcome Summary/Treatment Plan (PT)  Anticipated Equipment Needs at Discharge (PT): front wheeled walker, tub bench  Anticipated Discharge Disposition (PT): (S) home with 24/7 care, home with home health(OR IRF IF PT WOULD AGREE. )  Plan of Care Reviewed With: patient, spouse, daughter  Outcome Summary: PT PRESENTS WITH EVOLVING SYMPTOMS S/P CVA TO INCLUDE R LE WEAKNESS, IMPAIRED COORDINATION, IMPAIRED BALANCE AND DECLINE IN FUNCTIONAL MOBILITY. PT IS IMPULSIVE WITH POOR SAFETY AWARENESS AND IS AT RISK OF FALLING. AMBULATED 200 FEET WITH MIN ASSIST. RECOMMEND IRF AT D/C PT PT CURRENTLY DECLINING. AGREES TO HOME WITH 24/7 ASSIST AND HHPT OR OPPT AT D/C. PER WIFE,PT HAS POOR STM AT BASELINE.      Time Calculation:   PT Charges     Row Name 09/03/19 1157             Time Calculation    Start Time  1108  -CD      PT Received On  09/03/19  -      PT Goal Re-Cert Due Date  09/13/19  -         Time Calculation- PT    Total Timed Code Minutes- PT  10 minute(s)  -CD         Timed Charges    69185 - Gait Training Minutes   10  -CD        User Key  (r) = Recorded By, (t) = Taken By, (c) = Cosigned By    Initials Name Provider Type    CD Mason  Becca HIRSCH, PT Physical Therapist        Therapy Charges for Today     Code Description Service Date Service Provider Modifiers Qty    [id number]  GAIT TRAINING EA 15 MIN 9/3/2019 Becca Cuevas, PT GP 1    [id number] HC PT EVAL LOW COMPLEXITY 3 9/3/2019 Becca Cuevas, PT GP 1          PT G-Codes  Outcome Measure Options: AM-PAC 6 Clicks Basic Mobility (PT), Modified [place]  AM-PAC 6 Clicks Score (PT): 19  Modified [place] Scale: 4 - Moderately severe disability.  Unable to walk without assistance, and unable to attend to own bodily needs without assistance.    Becca Cuevas, PT  9/3/2019

## 2019-09-03 NOTE — CONSULTS
Referring Provider: Dr. Joshi  Reason for Consultation: Stroke    Patient Care Team:  Marcin Ferguson MD as PCP - General  Marcin Ferguson MD as PCP - Family Medicine  Marcin Ferguson MD as PCP - Claims Attributed    Chief complaint right leg weakness    Subjective .     History of present illness: 79-yo RH man with PMH notable for HTN, HLD, history of carotid stenosis and reported TIA, on aspirin and lovastatin, presented to the ED yesterday from home around 6 PM due to right leg weakness that began about an hour prior to his arrival.  He was in his yard when his right leg and foot suddenly felt heavy and strange and he required assistance getting into the house.  In the ED stat CT was negative and stat MRI showed left posterior corona radiata ischemia (images reviewed and agree).  In the ED his leg weakness had improved and he ambulated independently into the hospital.  He denied any additional symptoms including speech difficulty, arm weakness or any difficulty with thinking.  Family noted he was at his baseline although he has had some baseline short-term memory change over the past few years.  He has known carotid stenosis which per cardiology records is not severe.  Systolic blood pressure on admission was around 200.  He denies headache, vision changes, nausea or vomiting, dizziness or any loss of consciousness or fall.  This morning his leg feels fine, although the physical therapist noted per family that he still had some weakness.  He is followed by Dr. Lazo but denies recent chest pain or palpitations.    Review of Systems  Pertinent items are noted in HPI, all other systems reviewed and negative     History  Past Medical History:   Diagnosis Date   • Anxiety disorder 2/16/2017   • Asthma 2/16/2017   • Basal cell carcinoma in situ of skin 2019    right leg    • Carotid stenosis 2/16/2017   • Diaphoresis    • Diastolic dysfunction 2014   • Diverticulitis    • Dyslipidemia 2/16/2017   •  GERD (gastroesophageal reflux disease) 2017   • Gout 2017   • Herpes zoster     Herpes zoster, [id number], right lower extremity.    • Hypertension 2017   • Hypothyroidism 2017   • LVH (left ventricular hypertrophy)    • Malignant melanoma (CMS/HCC) 2017   • Melanoma (CMS/HCC)    • Mitral regurgitation    • Nephrolithiasis    • Post herpetic neuralgia 2017   • TIA (transient ischemic attack)     TIA, 2012, with nonobstructive carotid stenosis, dyslipidemia and hypertension   ,   Past Surgical History:   Procedure Laterality Date   • ABDOMINAL SURGERY     • APPENDECTOMY     • COLON RESECTION LEFT  2016   • COLON RESECTION LEFT     • HEEL SPUR SURGERY     • HERNIA REPAIR      hiatal hernia    • NISSEN FUNDOPLICATION     • OTHER SURGICAL HISTORY      Malignant melanoma, status post resection    ,   Family History   Problem Relation Age of Onset   • Hyperlipidemia Mother    • Heart disease Mother    • Stroke Mother    • Heart attack Mother    ,   Social History     Tobacco Use   • Smoking status: Former Smoker     Types: Cigars, Pipe     Last attempt to quit:      Years since quittin.6   • Smokeless tobacco: Never Used   Substance Use Topics   • Alcohol use: No   • Drug use: No   ,   Medications Prior to Admission   Medication Sig Dispense Refill Last Dose   • allopurinol (ZYLOPRIM) 300 MG tablet Take 300 mg by mouth daily.   2019 at Unknown time   • aspirin 81 MG tablet Take 81 mg by mouth Daily.   2019 at Unknown time   • busPIRone (BUSPAR) 10 MG tablet Take 10 mg by mouth 2 (Two) Times a Day. 1/2 tab BID   2019 at Unknown time   • diphenhydrAMINE (BENADRYL) 25 mg capsule Take 25 mg by mouth As Needed for Itching.   2019 at Unknown time   • doxazosin (CARDURA) 4 MG tablet Take 4 mg by mouth daily.   2019 at Unknown time   • finasteride (PROSCAR) 5 MG tablet Take 5 mg by mouth Daily.   2019 at Unknown time   • levothyroxine  "(SYNTHROID, LEVOTHROID) 150 MCG tablet Take 150 mcg by mouth Daily.   9/2/2019 at Unknown time   • Loratadine (CLARITIN) 10 MG capsule Take 1 tablet by mouth As Needed.   9/2/2019 at Unknown time   • lovastatin (MEVACOR) 20 MG tablet Take 20 mg by mouth Every Night.   9/2/2019 at Unknown time   • omeprazole (PriLOSEC) 40 MG capsule Take 40 mg by mouth Daily.   9/2/2019 at Unknown time   • oxyCODONE-acetaminophen (PERCOCET) 7.5-325 MG per tablet Take 1 tablet by mouth Every 4 (Four) Hours As Needed for Severe Pain (7-10). 15 tablet 0 9/2/2019 at Unknown time   • psyllium (METAMUCIL) 58.6 % packet Take 1 packet by mouth Daily.   9/2/2019 at Unknown time   • triamterene-hydrochlorothiazide (MAXZIDE-25) 37.5-25 MG per tablet Take 1 tablet by mouth Daily.   9/2/2019 at Unknown time   • vitamin B-12 (CYANOCOBALAMIN) 2500 MCG sublingual tablet tablet Place 2,500 mcg under the tongue Daily.   9/2/2019 at Unknown time   , Scheduled Meds:    allopurinol 300 mg Oral Daily   aspirin 325 mg Oral Daily   Or      aspirin 300 mg Rectal Daily   atorvastatin 80 mg Oral Nightly   busPIRone 10 mg Oral Q12H   cetirizine 10 mg Oral Daily   levothyroxine 150 mcg Oral Daily   sodium chloride 10 mL Intravenous Q12H   , Continuous Infusions:   , PRN Meds:  sodium chloride  •  sodium chloride and Allergies:  Betadine [povidone iodine]; Cephalexin; Flomax [tamsulosin hcl]; Iodine; Lisinopril; Pseudoephedrine; and Sulfamethoxazole-trimethoprim    Objective     Vital Signs   Blood pressure 110/78, pulse 65, temperature 97.6 °F (36.4 °C), temperature source Axillary, resp. rate 18, height 185.4 cm (73\"), weight 87.2 kg (192 lb 4.8 oz), SpO2 100 %.    Physical Exam:   Well-developed elderly white man in no acute distress, alert and appropriate, oriented to hospital, with fluent and non-aphasic speech, mildly impaired attention and decreased short-term memory.  No dysarthria.  Pupils 3 mm and reactive, visual fields full to confrontation, " extraocular movements intact, normal facial sensation and movement, hearing mildly impaired to voice, palate and shoulders elevate symmetrically and tongue protrudes midline  Motor: Upper extremity strength is 5/5 with no pronator drift, no definite asymmetry on manual muscle testing of proximal or distal lower extremities, at least 5- bilaterally  Muscle tone is normal, finger-nose-finger is intact, heel-knee-shin is mildly impaired bilaterally without obvious asymmetry.  Light touch symmetric throughout  Reflexes 2+ in the upper extremities and knees, trace at the ankles, no response to plantar stim  Neck is supple, no definite carotid bruit appreciated, heart RRR no murmurs appreciated  Lungs clear to auscultation bilaterally, normal respiratory effort  Abdomen soft, NT, ND with positive bowel sounds  Extremities without cyanosis or edema  Skin warm and dry, no rashes appreciated  Psych: Normal affect and thought content    Results Review:   I reviewed the patient's new clinical results.  I reviewed the patient's new imaging results and agree with the interpretation.  I reviewed the patient's other test results and agree with the interpretation  Labs reviewed, total cholesterol 174 triglycerides 223 HDL 43 LDL 86  CBC unremarkable with normal platelets  CMP with glucose 141 otherwise normal  A1c 5.4  Troponin less than 0.01  TSH low at 0.079  Brain MRI images reviewed as above, left periventricular/corona radiata infarct  CTA head neck show marked diffuse cerebrovascular disease including ICA stenosis of at least 60% on the right and 70% on the left, with some calcification.  Echo preliminary is unremarkable but report pending    Assessment/Plan       Acute CVA (cerebrovascular accident) (CMS/HCC)    Dyslipidemia    Carotid stenosis    Hypothyroidism    Elevated BP without diagnosis of hypertension    Allergy to Betadine      The deep left hemisphere infarct is consistent with his right lower extremity weakness,  improved since initial evaluation.  He has been started on high-dose statin in addition to his aspirin.  Will add Plavix in the shorter-term given marked diffuse cerebrovascular disease.  Discussed with patient and family I would recommend these treatments for 3 months post event and then return to single antiplatelet agent and statin as indicated by cholesterol levels.  Apparently per family cardiology to see patient and recommendations may change pending that evaluation.  Given calcifications on CTA, will also get carotid duplex.    I discussed the patients findings and my recommendations with patient and family    Latonya Alfaro MD  09/03/19  1:01 PM

## 2019-09-03 NOTE — PLAN OF CARE
Problem: Patient Care Overview  Goal: Plan of Care Review  Outcome: Ongoing (interventions implemented as appropriate)   09/03/19 1135   Coping/Psychosocial   Plan of Care Reviewed With patient;spouse   OTHER   Outcome Summary Pt with current evolving symptoms that have decreased I with ADLs. Pt is CGA to min assist for dynamic standing for ADLs. Would benefit from IRF at discharge.        Problem: Stroke (Ischemic) (Adult)  Goal: Signs and Symptoms of Listed Potential Problems Will be Absent, Minimized or Managed (Stroke)  Outcome: Ongoing (interventions implemented as appropriate)   09/03/19 1135   Goal/Outcome Evaluation   Problems Assessed (Stroke (Ischemic)) motor/sensory impairment;muscle tone abnormal;acute neurologic deterioration;cognitive impairment   Problems Assessed (Stroke (Ischemic)) motor/sensory impairment

## 2019-09-03 NOTE — PROGRESS NOTES
[facility] Medicine Services  PROGRESS NOTE    Patient Name: Domitila Bosch  : 1940  MRN: [id number]    Date of Admission: 2019  Length of Stay: 1  Primary Care Physician: Marcin Ferguson MD    Subjective   Subjective     CC:  CVA    HPI:  Patient states that he is feeling well.  Worked with physical therapy and reported some right leg weakness that brought him in.  Denies any difficulty talking or facial droop.    Review of Systems  General: denies fevers or chills  CV: denies chest pain  Resp: denies shortness of breath  Abd: denies abd pain, nausea      Objective   Objective     Vital Signs:   Temp:  [97.4 °F (36.3 °C)-98.1 °F (36.7 °C)] 97.6 °F (36.4 °C)  Heart Rate:  [63-74] 65  Resp:  [16-18] 18  BP: (110-201)/() 110/78  Total (NIH Stroke Scale): 0     Physical Exam:  Constitutional: Awake, alert, no acute distress, sitting in bedside chair  Eyes: PERRLA, sclerae anicteric, no conjunctival injection  Neck: Supple, no thyromegaly, no lymphadenopathy, trachea midline  Respiratory: Clear to auscultation bilaterally, nonlabored respirations   Cardiovascular: RRR, no murmurs, rubs, or gallops, palpable pedal pulses bilaterally  Gastrointestinal: Positive bowel sounds, soft, nontender, nondistended  Neurologic: Oriented x 3, 4 out of 5 strength in right lower extremity, 5 out of 5 in the rest of extremities, Cranial Nerves grossly intact to confrontation, speech clear, no facial droop no aphasia  Skin: No rashes      Results Reviewed:    Results from last 7 days   Lab Units 19  04519  1842   WBC 10*3/mm3 7.84 6.95   HEMOGLOBIN g/dL 15.4 15.6   HEMATOCRIT % 47.4 46.6   PLATELETS 10*3/mm3 228 238     Results from last 7 days   Lab Units 19  0459 19  1842   SODIUM mmol/L 139 141   POTASSIUM mmol/L 3.8 3.6   CHLORIDE mmol/L 103 102   CO2 mmol/L 24.0 27.0   BUN mg/dL 11 12   CREATININE mg/dL 0.87 0.98   GLUCOSE mg/dL 141* 83   CALCIUM mg/dL 9.3 9.7    ALT (SGPT) U/L 14 14   AST (SGOT) U/L 18 17   TROPONIN T ng/mL  --  <0.010     Estimated Creatinine Clearance: 83.5 mL/min (by C-G formula based on SCr of 0.87 mg/dL).    Microbiology Results Abnormal     None          Imaging Results (last 24 hours)     Procedure Component Value Units Date/Time    CT Angiogram Head With & Without Contrast [[id number]] Collected:  09/03/19 0400     Updated:  09/03/19 0403    Narrative:       INDICATION:   Recent CVA with small vessel insults of the left corona radiata. Left ICA stenosis.    TECHNIQUE:   CT angiogram of the head and neck with contrast. 3-D reformatted images were acquired and reviewed. Evaluation for a significant carotid arterial stenosis is based on the NASCET criteria. Radiation dose reduction techniques included automated exposure  control or exposure modulation based on body size. Radiation audit for number of CT and nuclear cardiology exams performed in the last year:  0. Contrast dose recorded in chart. Patient received nonionic contrast media intravenously.    COMPARISON:   Earlier MR I brain.    FINDINGS:   CTA neck:  The aortic arch branch pattern is normal. There are vascular calcifications and great vessel origins including likely severe stenosis of the origin left subclavian artery. This is best further evaluated with a conventional angiographic patient  is candidate for an intervention. There is also some disease at the origin of the right subclavian artery.    There is disease at the right carotid bifurcation. There is partially calcified plaque present. The noncalcified portion of the plaque appears undermined and is a possible source for thromboembolic disease. There is 60% diameter stenosis by NASCET  criteria. There is also calcified plaque in the right carotid siphon with likely stenosis.    The left carotid system also shows partially calcified plaque at the bifurcation. Portions of the noncalcified plaque are also undermined and could serve as  a source for thromboembolic disease. Allowing for the lack of curved reformatted imaging, the  degree of stenosis by NASCET criteria is probably at least 70%.    There is disease at the origin of the left vertebral artery. There is disease at the origin the right vertebral artery. There is likely hemodynamically significant stenosis at both vertebral artery origins. The right is the dominant vessel. The left  shows multisegment stenosis throughout its course in the neck consistent with long segment disease. The left vertebral artery is either occluded or nearly completely occluded as it passes intracranially.    CTA head:  There is high-grade stenosis at the origin of the left A1 vessel over short segment. There is an anterior communicating artery present. There is fetal origin left posterior cerebral artery distribution via posterior communicator. The left P1  vessel is severely aplastic. Dural venous sinuses are grossly patent. No intracranial aneurysm is suspected. There is irregular appearance of the left posterior communicator and proximal posterior cerebral artery distribution suggesting intracranial  atherosclerotic disease. Less likely differential is vasculitis in the appropriate clinical setting. Considerable degenerative changes are noted in the cervical spine.      Impression:         1. There is severe disease of the bilateral carotid bifurcations with calcified and noncalcified plaque. The noncalcified plaque appears to be undermined bilaterally and could serve as a source for thromboembolic disease. By NASCET criteria there is at  least 60% diameter stenosis on the right and 70% diameter stenosis on the left.  2. The right vertebral artery is dominant though narrowed at its origin. Left vertebral artery is diseased throughout and appears to occlude or nearly completely occluded as it passes intracranially.  3. 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  4. Severe disease at the origin of the  left A1 vessel. There is an anterior communicating artery present.  5. No intracranial vascular cut off.  6. Disease of the bilateral carotid siphons.  7. Likely intracranial atherosclerotic disease involving the fetal origin left posterior communicator/left posterior cerebral artery distribution.    We apologize for the delay in dictation while the images were retransmitted.    Signer Name: Shania Tran MD   Signed: 9/3/2019 4:00 AM   Workstation Name: [id number]    [facility]    CT Angiogram Neck With & Without Contrast [[id number]] Collected:  09/03/19 0400     Updated:  09/03/19 0403    Narrative:       INDICATION:   Recent CVA with small vessel insults of the left corona radiata. Left ICA stenosis.    TECHNIQUE:   CT angiogram of the head and neck with contrast. 3-D reformatted images were acquired and reviewed. Evaluation for a significant carotid arterial stenosis is based on the NASCET criteria. Radiation dose reduction techniques included automated exposure  control or exposure modulation based on body size. Radiation audit for number of CT and nuclear cardiology exams performed in the last year:  0. Contrast dose recorded in chart. Patient received nonionic contrast media intravenously.    COMPARISON:   Earlier MR I brain.    FINDINGS:   CTA neck:  The aortic arch branch pattern is normal. There are vascular calcifications and great vessel origins including likely severe stenosis of the origin left subclavian artery. This is best further evaluated with a conventional angiographic patient  is candidate for an intervention. There is also some disease at the origin of the right subclavian artery.    There is disease at the right carotid bifurcation. There is partially calcified plaque present. The noncalcified portion of the plaque appears undermined and is a possible source for thromboembolic disease. There is 60% diameter stenosis by NASCET  criteria. There is also calcified plaque in  the right carotid siphon with likely stenosis.    The left carotid system also shows partially calcified plaque at the bifurcation. Portions of the noncalcified plaque are also undermined and could serve as a source for thromboembolic disease. Allowing for the lack of curved reformatted imaging, the  degree of stenosis by NASCET criteria is probably at least 70%.    There is disease at the origin of the left vertebral artery. There is disease at the origin the right vertebral artery. There is likely hemodynamically significant stenosis at both vertebral artery origins. The right is the dominant vessel. The left  shows multisegment stenosis throughout its course in the neck consistent with long segment disease. The left vertebral artery is either occluded or nearly completely occluded as it passes intracranially.    CTA head:  There is high-grade stenosis at the origin of the left A1 vessel over short segment. There is an anterior communicating artery present. There is fetal origin left posterior cerebral artery distribution via posterior communicator. The left P1  vessel is severely aplastic. Dural venous sinuses are grossly patent. No intracranial aneurysm is suspected. There is irregular appearance of the left posterior communicator and proximal posterior cerebral artery distribution suggesting intracranial  atherosclerotic disease. Less likely differential is vasculitis in the appropriate clinical setting. Considerable degenerative changes are noted in the cervical spine.      Impression:         1. There is severe disease of the bilateral carotid bifurcations with calcified and noncalcified plaque. The noncalcified plaque appears to be undermined bilaterally and could serve as a source for thromboembolic disease. By NASCET criteria there is at  least 60% diameter stenosis on the right and 70% diameter stenosis on the left.  2. The right vertebral artery is dominant though narrowed at its origin. Left vertebral  artery is diseased throughout and appears to occlude or nearly completely occluded as it passes intracranially.  3. 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  4. Severe disease at the origin of the left A1 vessel. There is an anterior communicating artery present.  5. No intracranial vascular cut off.  6. Disease of the bilateral carotid siphons.  7. Likely intracranial atherosclerotic disease involving the fetal origin left posterior communicator/left posterior cerebral artery distribution.    We apologize for the delay in dictation while the images were retransmitted.    Signer Name: Shania Tran MD   Signed: 9/3/2019 4:00 AM   Workstation Name: DA    [facility]    MRI Brain Without Contrast [[id number]] Collected:  09/02/19 2230     Updated:  09/02/19 2232    Narrative:       INDICATION:    Right leg pain. Transient right leg heaviness and weakness making it difficult to walk.    TECHNIQUE:   MRI of the brain without contrast.    COMPARISON:    Earlier head CT same day is MRI brain 12/23/2014    FINDINGS:  There is a small focus of restricted diffusion in the left posterior corona radiata periventricular white matter that measures about 1.2 cm in largest dimension. It is consistent with a recent infarct. It is not associated with mass effect and there is  nothing to suggest hemorrhagic transformation. It is superimposed on a background of extensive pre-existing small vessel disease. There is generalized atrophy. There are too numerous to count foci of signal abnormality in the white matter as well as  bilateral basal ganglia and thalami. And there are small vessel insults to the brainstem and left cerebellar hemisphere. There is no appreciable mass effect. There is no extra-axial fluid collection. The major arterial intracranial flow voids are  maintained. The mastoid air cells are clear. The visualized paranasal sinuses show mild mucosal thickening. There is no Chiari I  malformation.      Impression:       #1 there is a small focus of recent infarct involving the left posterior corona radiata up to about 1.2 cm in dimension. It has the appearance of a recent small vessel insult which is superimposed upon extensive pre-existing small vessel disease and  generalized atrophy.. Please correlate for risk factors. There Is no appreciable mass effect or evidence for hemorrhagic transformation.    Signer Name: Shania Tran MD   Signed: 2019 10:30 PM   Workstation Name: [facility]    [facility]    CT Head Without Contrast [[id number]] Collected:  19     Updated:  19    Narrative:       CT Head WO    HISTORY:   79-year-old male with altered gait and loss of balance today.    TECHNIQUE:   Routine noncontrast head CT. Radiation dose reduction techniques included automated exposure control or exposure modulation based on body size. Count of known CT and cardiac nuc med studies performed in previous 12 months: 0.     COMPARISON:   None.    FINDINGS:   No hemorrhage, acute infarction, mass lesion, or abnormal extra-axial fluid collection. No midline shift or focal mass effect. Ventricular system is normal in size and configuration. Mild generalized atrophy and chronic small vessel disease throughout  the supratentorial white matter. No acute osseous abnormality. Visualized paranasal sinuses are clear. Visualized mastoid air cells are clear.      Impression:       No acute intracranial abnormality. Mild senescent changes.          Signer Name: Avni Gutierrez MD   Signed: 2019 8:01 PM   Workstation Name: [facility]    [facility]          Results for orders placed during the hospital encounter of 14   CONVERTED (HISTORICAL) ECHO    Narrative Patient:      MARIA TERESA ANDUJAR    Med Rec#:     [id number]               :          1940            Date:         2014            Age:          74y                    Height:       185.42 cm / 73.0 in  Weight:       86.18 kg / 189.9 lbs  Sex:          M                     BSA:          2.11  Room#:        op                        Sonographer:  Muna Marte,RDCS,RVS  Referring:    RANDA  Reading:      Donaldo Lazo MD  Primary:      Marcin Ferguson  ______________________________________________________________________    Transthoracic Echocardiogram    Indication:  TIA  BP:           139/84  HR:           74    Conclusions  1. The estimated ejection fraction is 50-55%.   2. Mild concentric left ventricular hypertrophy is observed.  3. There is an E to A reversal in the mitral valve flow pattern  suggestive of diastolic dysfunction.  4. Mild aortic leaflet calcification is visualized.  5. There is mild mitral regurgitation.   6. There is mild mitral regurgitation.   7. There is mild tricuspid regurgitation.  8. The right ventricular systolic pressure is calculated at 31 mmHg.     Findings       Technical Comments:  The study quality is fair.      Left Ventricle:  The left ventricular chamber size is normal. Mild concentric left  ventricular hypertrophy is observed. Sigmoid septum noted. The estimated  ejection fraction is 50-55%.  There is an E to A reversal in the mitral  valve flow pattern suggestive of diastolic dysfunction.     Left Atrium:  The left atrial chamber size is normal.     Right Ventricle:  The right ventricular cavity size is normal. The right ventricular  global systolic function is normal.     Right Atrium:  The right atrial cavity size is normal. No atrial septal defect is  visualized.     Aortic Valve:  The aortic valve is trileaflet. Mild aortic leaflet calcification is  visualized. There is no evidence of aortic regurgitation. There is no  evidence of aortic stenosis.     Mitral Valve:  The mitral valve leaflets appear normal. There is no evidence of mitral  valve prolapse. There is mild mitral regurgitation.  There is no  evidence of  mitral stenosis.     Tricuspid Valve:  The tricuspid valve leaflets are normal.  There is mild tricuspid  regurgitation. The right ventricular systolic pressure is calculated at  31 mmHg.      Pulmonic Valve:  The pulmonic valve appears normal. There is a trace pulmonic  regurgitation.      Pericardium:  There is no evidence of pericardial effusion.     Aorta:  There is no dilatation of the aortic root.     Pulmonary Artery:  The main pulmonary artery appears normal.     Venous:  The venous system appears normal.     Measurements   Chambers  Name                    Value           RVIDd (AP) 2D           2.69 cm         IVSd (2D)               1.57 cm         LVIDd (2D)              4.58 cm         LVIDs (2D)              3.75 cm         LVPWd (2D)              1.46 cm         Ao root diameter (2D)   3.1 cm          LA dimension (AP) 2D    3.9 cm          LA:Ao ratio (2D)        1.26 ratio        Diastolic/Systolic Function  Name                    Value           MV E-wave Vmax          0.66 m/sec      MV A-wave Vmax          1.04 m/sec      MV E:A ratio            0.6 ratio       LV lateral e' Vmax      0.08 m/sec      LV E:e' lateral ratio   8.6 ratio         Aortic Valve  Name                    Value           AV Vmax                 1.34 m/sec      AV VTI                  31.3 cm         AV peak gradient        7 mmHg          AV mean gradient        5 mmHg          LVOT Vmax               1.03 m/sec      LVOT VTI                22.2 cm         LVOT peak gradient      4 mmHg          LVOT mean gradient      2 mmHg            Tricuspid Valve  Name                    Value           TR Vmax                 2.3 m/sec       TR peak gradient        21 mmHg         RAP                     10 mmHg         RVSP                    31 mmHg           Electronically signed by: Donaldo Lazo MD on 12/23/2014 16:29:23       I have reviewed the medications:  Scheduled Meds:  allopurinol 300 mg Oral Daily   aspirin 325 mg  Oral Daily   Or      aspirin 300 mg Rectal Daily   atorvastatin 80 mg Oral Nightly   busPIRone 10 mg Oral Q12H   cetirizine 10 mg Oral Daily   clopidogrel 75 mg Oral Daily   levothyroxine 150 mcg Oral Daily   sodium chloride 10 mL Intravenous Q12H     Continuous Infusions:   PRN Meds:.sodium chloride  •  sodium chloride      Assessment/Plan   Assessment / Plan     Active Hospital Problems    Diagnosis  POA   • **Acute CVA (cerebrovascular accident) (CMS/[facility]) [I63.9]  Yes   • Elevated BP without diagnosis of hypertension [R03.0]  Unknown   • Allergy to Betadine [Z88.3]  Not Applicable   • Carotid stenosis [I65.29]  Yes   • Dyslipidemia [E78.5]  Yes   • Hypothyroidism [E03.9]  Yes      Resolved Hospital Problems   No resolved problems to display.        Brief Hospital Course to date:    Acute CVA with right leg weakness   - MRI showed left corona radiata infarct  -CTA of head and neck with significant intercranial stenosis  -Neurology recommends medical management with aspirin, Plavix and statin  -Awaiting echo  - permissive hypertension for next 24-48 hours per stroke protocol  - PT/OT evaluation, recommend rehab    Right bundle branch block  -Family reports that patient with new right bundle branch block.  Reviewed old EKGs.  He was noted to have a right bundle branch block back in 2016; however EKGs from then have been normal sinus rhythm.  -Awaiting echo results.  If normal patient can follow-up with Dr. Solis as an outpatient for additional management     Hypertension   -Pt denies past hx of HTN, however past ECHO from 2014 noted mild concentric LVH and mild MR and TR and diastolic dysfunction.   - continue permissive hypertension for now, resume home medication when appropriate     Hyperlipidemia  - check am lipid panel  - begin high dose lipitor 80 mg for risk reduction     Hypothyroidism  - continue synthroid     Anxiety disorder  - continue buspar        GI prophylaxis - PPI     DVT prophylaxis:   scds    Disposition: I expect the patient to be discharged tomorrow    CODE STATUS:   Code Status and Medical Interventions:   Ordered at: 09/02/19 0325     Level Of Support Discussed With:    Patient     Code Status:    CPR     Medical Interventions (Level of Support Prior to Arrest):    Full         Electronically signed by Kerline Joshi MD, 09/03/19, 2:37 PM.

## 2019-09-03 NOTE — H&P
[facility] Medicine Services  HISTORY AND PHYSICAL    Patient Name: Domitila Bosch  : 1940  MRN: [id number]  Primary Care Physician: Marcin Ferguson MD  Date of admission: 2019      Subjective   Subjective     Chief Complaint:  Right leg weakness    HPI:  Domitila Bosch is a 79 y.o. male with a history of carotid stenosis, dyslipidemia, TIA x2, hypertension, who presented to ED from home around 6 pm today for complaints of right leg weakness that began about an hour prior to arrival.  Pt was in his yard when he felt his right leg and foot become heavy and felt strange.  He required assistance ambulating into his house.     Upon arrival in the ED, stat noncontrast CT head was negative.  Stat MRI showed an left posterior corona radiata up to about 1.2 cm in dimension, with no mass effect or hemorrhagic changes. Pt stated his leg weakness had improved somewhat after arrival to the ED, and he was able to ambulate into the hospital independently. Given concern for stroke, neurology was consulted.     Pt denied any additional stroke sequelae including slurred speech, facial droop, weakness in upper extremities.  He is able to recall the month, year, and oriented to person and place.  Family at bedside state patient is at his baseline, although he has some baseline short term memory changes over the past few years.      Pt does have a history of carotid stenosis nonsurgical per his primary care.  His BP was elevated upon admission, with a SBP around 200.  Pt denies headache, change in vision, N/V, dizziness, syncope or fall.    Hospital medicine will admit the patient for acute stroke workup and additional imaging studies at this time.      Review of Systems   HENT: Negative.    Eyes: Negative.    Respiratory: Negative.    Cardiovascular: Negative.    Gastrointestinal: Negative.    Endocrine: Negative.    Genitourinary: Negative.    Musculoskeletal: Positive for gait problem.    Skin: Negative.    Allergic/Immunologic: Negative.    Neurological: Positive for weakness and numbness. Negative for dizziness, tremors, syncope, facial asymmetry, speech difficulty, light-headedness and headaches.   Hematological: Negative.    Psychiatric/Behavioral: Negative.    All other systems reviewed and are negative.         All other systems reviewed and are negative.     Personal History     Past Medical History:   Diagnosis Date   • Anxiety disorder 2/16/2017   • Asthma 2/16/2017   • Basal cell carcinoma in situ of skin 2019    right leg    • Carotid stenosis 2/16/2017   • Diaphoresis    • Diverticulitis    • Dyslipidemia 2/16/2017   • GERD (gastroesophageal reflux disease) 2/16/2017   • Gout 2/16/2017   • Herpes zoster     Herpes zoster, [id number], right lower extremity.    • Hypertension 2/16/2017   • Hypothyroidism 2/16/2017   • Malignant melanoma (CMS/HCC) 2/16/2017   • Melanoma (CMS/HCC)    • Nephrolithiasis    • Post herpetic neuralgia 02/16/2017   • TIA (transient ischemic attack)     TIA, June 2012, with nonobstructive carotid stenosis, dyslipidemia and hypertension       Past Surgical History:   Procedure Laterality Date   • ABDOMINAL SURGERY     • APPENDECTOMY     • COLON RESECTION LEFT  2016   • COLON RESECTION LEFT  2016   • HEEL SPUR SURGERY  1999   • HERNIA REPAIR      hiatal hernia    • NISSEN FUNDOPLICATION  1984   • OTHER SURGICAL HISTORY  2010    Malignant melanoma, status post resection        Family History: family history includes Heart attack in his mother; Heart disease in his mother; Hyperlipidemia in his mother; Stroke in his mother. Otherwise pertinent FHx was reviewed and unremarkable.     Social History:  reports that he quit smoking about 21 years ago. His smoking use included cigars and pipe. He has never used smokeless tobacco. He reports that he does not drink alcohol or use drugs.  Social History     Social History Narrative    Retired          Medications:    Available home medication information reviewed.  Medications Prior to Admission   Medication Sig Dispense Refill Last Dose   • allopurinol (ZYLOPRIM) 300 MG tablet Take 300 mg by mouth daily.   9/2/2019 at Unknown time   • aspirin 81 MG tablet Take 81 mg by mouth Daily.   9/2/2019 at Unknown time   • busPIRone (BUSPAR) 10 MG tablet Take 10 mg by mouth 2 (Two) Times a Day. 1/2 tab BID   9/2/2019 at Unknown time   • diphenhydrAMINE (BENADRYL) 25 mg capsule Take 25 mg by mouth As Needed for Itching.   9/2/2019 at Unknown time   • doxazosin (CARDURA) 4 MG tablet Take 4 mg by mouth daily.   9/2/2019 at Unknown time   • finasteride (PROSCAR) 5 MG tablet Take 5 mg by mouth Daily.   9/2/2019 at Unknown time   • levothyroxine (SYNTHROID, LEVOTHROID) 150 MCG tablet Take 150 mcg by mouth Daily.   9/2/2019 at Unknown time   • Loratadine (CLARITIN) 10 MG capsule Take 1 tablet by mouth As Needed.   9/2/2019 at Unknown time   • lovastatin (MEVACOR) 20 MG tablet Take 20 mg by mouth Every Night.   9/2/2019 at Unknown time   • omeprazole (PriLOSEC) 40 MG capsule Take 40 mg by mouth Daily.   9/2/2019 at Unknown time   • oxyCODONE-acetaminophen (PERCOCET) 7.5-325 MG per tablet Take 1 tablet by mouth Every 4 (Four) Hours As Needed for Severe Pain (7-10). 15 tablet 0 9/2/2019 at Unknown time   • psyllium (METAMUCIL) 58.6 % packet Take 1 packet by mouth Daily.   9/2/2019 at Unknown time   • triamterene-hydrochlorothiazide (MAXZIDE-25) 37.5-25 MG per tablet Take 1 tablet by mouth Daily.   9/2/2019 at Unknown time   • vitamin B-12 (CYANOCOBALAMIN) 2500 MCG sublingual tablet tablet Place 2,500 mcg under the tongue Daily.   9/2/2019 at Unknown time       Allergies   Allergen Reactions   • Betadine [Povidone Iodine]    • Cephalexin    • Flomax [Tamsulosin Hcl]    • Iodine    • Lisinopril    • Pseudoephedrine    • Sulfamethoxazole-Trimethoprim        Objective   Objective     Vital Signs:   Temp:  [98.1 °F (36.7 °C)] 98.1  °F (36.7 °C)  Heart Rate:  [63-74] 63  Resp:  [16-18] 17  BP: (165-201)/() 165/85   Total (NIH Stroke Scale): 0    Physical Exam   Constitutional: Awake, alert  Eyes: PERRLA, sclerae anicteric, no conjunctival injection  HENT: NCAT, mucous membranes moist  Neck: Supple, no thyromegaly, no lymphadenopathy, trachea midline  Respiratory: Clear to auscultation bilaterally, nonlabored respirations   Cardiovascular: RRR, no murmurs, rubs, or gallops, palpable pedal pulses bilaterally  - left carotid bruit  Gastrointestinal: Positive bowel sounds, soft, nontender, nondistended  Musculoskeletal: No bilateral ankle edema, no clubbing or cyanosis to extremities  Psychiatric: Appropriate affect, cooperative  Neurologic: Oriented x 3, strength symmetric in all extremities, Cranial Nerves grossly intact to confrontation, speech clear  Skin: small hyperkeratotic lesion lateral right ankle       Results Reviewed:  I have personally reviewed current lab and radiology data.    Results from last 7 days   Lab Units 09/02/19  1842   WBC 10*3/mm3 6.95   HEMOGLOBIN g/dL 15.6   HEMATOCRIT % 46.6   PLATELETS 10*3/mm3 238     Results from last 7 days   Lab Units 09/02/19  1842   SODIUM mmol/L 141   POTASSIUM mmol/L 3.6   CHLORIDE mmol/L 102   CO2 mmol/L 27.0   BUN mg/dL 12   CREATININE mg/dL 0.98   GLUCOSE mg/dL 83   CALCIUM mg/dL 9.7   ALT (SGPT) U/L 14   AST (SGOT) U/L 17   TROPONIN T ng/mL <0.010     Estimated Creatinine Clearance: 69.4 mL/min (by C-G formula based on SCr of 0.98 mg/dL).  Brief Urine Lab Results     None        Imaging Results (last 24 hours)     Procedure Component Value Units Date/Time    MRI Brain Without Contrast [[id number]] Collected:  09/02/19 2230     Updated:  09/02/19 2232    Narrative:       INDICATION:    Right leg pain. Transient right leg heaviness and weakness making it difficult to walk.    TECHNIQUE:   MRI of the brain without contrast.    COMPARISON:    Earlier head CT same day is MRI brain  12/23/2014    FINDINGS:  There is a small focus of restricted diffusion in the left posterior corona radiata periventricular white matter that measures about 1.2 cm in largest dimension. It is consistent with a recent infarct. It is not associated with mass effect and there is  nothing to suggest hemorrhagic transformation. It is superimposed on a background of extensive pre-existing small vessel disease. There is generalized atrophy. There are too numerous to count foci of signal abnormality in the white matter as well as  bilateral basal ganglia and thalami. And there are small vessel insults to the brainstem and left cerebellar hemisphere. There is no appreciable mass effect. There is no extra-axial fluid collection. The major arterial intracranial flow voids are  maintained. The mastoid air cells are clear. The visualized paranasal sinuses show mild mucosal thickening. There is no Chiari I malformation.      Impression:       #1 there is a small focus of recent infarct involving the left posterior corona radiata up to about 1.2 cm in dimension. It has the appearance of a recent small vessel insult which is superimposed upon extensive pre-existing small vessel disease and  generalized atrophy.. Please correlate for risk factors. There Is no appreciable mass effect or evidence for hemorrhagic transformation.    Signer Name: Shania Tran MD   Signed: 9/2/2019 10:30 PM   Workstation Name: DA    [facility]    CT Head Without Contrast [[id number]] Collected:  09/02/19 2001     Updated:  09/02/19 2004    Narrative:       CT Head WO    HISTORY:   79-year-old male with altered gait and loss of balance today.    TECHNIQUE:   Routine noncontrast head CT. Radiation dose reduction techniques included automated exposure control or exposure modulation based on body size. Count of known CT and cardiac nuc med studies performed in previous 12 months: 0.     COMPARISON:   None.    FINDINGS:   No  hemorrhage, acute infarction, mass lesion, or abnormal extra-axial fluid collection. No midline shift or focal mass effect. Ventricular system is normal in size and configuration. Mild generalized atrophy and chronic small vessel disease throughout  the supratentorial white matter. No acute osseous abnormality. Visualized paranasal sinuses are clear. Visualized mastoid air cells are clear.      Impression:       No acute intracranial abnormality. Mild senescent changes.          Signer Name: Avni Gutierrez MD   Signed: 2019 8:01 PM   [facility]: [email or web address]    [facility]        Results for orders placed during the hospital encounter of 14   CONVERTED (HISTORICAL) ECHO    Narrative Patient:      MARIA TERESA ANDUJAR    Med Rec#:     [id number]               :          1940            Date:         2014            Age:          74y                   Height:       185.42 cm / 73.0 in  Weight:       86.18 kg / 189.9 lbs  Sex:          M                     BSA:          2.11  Room#:        op                        Sonographer:  Muna Marte,RDCS,RVS  Referring:    RANDA  Reading:      Donaldo Lazo MD  Primary:      Marcin Ferguson  ______________________________________________________________________    Transthoracic Echocardiogram    Indication:  TIA  BP:           139/84  HR:           74    Conclusions  1. The estimated ejection fraction is 50-55%.   2. Mild concentric left ventricular hypertrophy is observed.  3. There is an E to A reversal in the mitral valve flow pattern  suggestive of diastolic dysfunction.  4. Mild aortic leaflet calcification is visualized.  5. There is mild mitral regurgitation.   6. There is mild mitral regurgitation.   7. There is mild tricuspid regurgitation.  8. The right ventricular systolic pressure is calculated at 31 mmHg.     Findings       Technical Comments:  The study quality is fair.      Left Ventricle:  The left  ventricular chamber size is normal. Mild concentric left  ventricular hypertrophy is observed. Sigmoid septum noted. The estimated  ejection fraction is 50-55%.  There is an E to A reversal in the mitral  valve flow pattern suggestive of diastolic dysfunction.     Left Atrium:  The left atrial chamber size is normal.     Right Ventricle:  The right ventricular cavity size is normal. The right ventricular  global systolic function is normal.     Right Atrium:  The right atrial cavity size is normal. No atrial septal defect is  visualized.     Aortic Valve:  The aortic valve is trileaflet. Mild aortic leaflet calcification is  visualized. There is no evidence of aortic regurgitation. There is no  evidence of aortic stenosis.     Mitral Valve:  The mitral valve leaflets appear normal. There is no evidence of mitral  valve prolapse. There is mild mitral regurgitation.  There is no  evidence of mitral stenosis.     Tricuspid Valve:  The tricuspid valve leaflets are normal.  There is mild tricuspid  regurgitation. The right ventricular systolic pressure is calculated at  31 mmHg.      Pulmonic Valve:  The pulmonic valve appears normal. There is a trace pulmonic  regurgitation.      Pericardium:  There is no evidence of pericardial effusion.     Aorta:  There is no dilatation of the aortic root.     Pulmonary Artery:  The main pulmonary artery appears normal.     Venous:  The venous system appears normal.     Measurements   Chambers  Name                    Value           RVIDd (AP) 2D           2.69 cm         IVSd (2D)               1.57 cm         LVIDd (2D)              4.58 cm         LVIDs (2D)              3.75 cm         LVPWd (2D)              1.46 cm         Ao root diameter (2D)   3.1 cm          LA dimension (AP) 2D    3.9 cm          LA:Ao ratio (2D)        1.26 ratio        Diastolic/Systolic Function  Name                    Value           MV E-wave Vmax          0.66 m/sec      MV A-wave Vmax           1.04 m/sec      MV E:A ratio            0.6 ratio       LV lateral e' Vmax      0.08 m/sec      LV E:e' lateral ratio   8.6 ratio         Aortic Valve  Name                    Value           AV Vmax                 1.34 m/sec      AV VTI                  31.3 cm         AV peak gradient        7 mmHg          AV mean gradient        5 mmHg          LVOT Vmax               1.03 m/sec      LVOT VTI                22.2 cm         LVOT peak gradient      4 mmHg          LVOT mean gradient      2 mmHg            Tricuspid Valve  Name                    Value           TR Vmax                 2.3 m/sec       TR peak gradient        21 mmHg         RAP                     10 mmHg         RVSP                    31 mmHg           Electronically signed by: Donaldo Lazo MD on 12/23/2014 16:29:23       Assessment/Plan   Assessment / Plan     Active Hospital Problems    Diagnosis POA   • **Acute CVA (cerebrovascular accident) (CMS/[facility]) [I63.9] Yes   • Allergy to Betadine [Z88.3] Not Applicable     Priority: Low   • Elevated BP without diagnosis of hypertension [R03.0] Unknown   • Carotid stenosis [I65.29] Yes     a. June 2012, carotid duplex with moderate bilateral stenosis, no significant change from 2010.  b. MRA, January 2015, shows mild stenosis of the distal left common carotid.       • Dyslipidemia [E78.5] Yes     Dyslipidemia, on statin and fibrate therapy:  February 2015 - total cholesterol 227, triglycerides 124, HDL 60.4, and .     • Hypothyroidism [E03.9] Yes       1. Acute CVA with right leg weakness   - stat MRI showed left corona radiata infarct  - patient has known left ica stenosis 50-69% (2018)  - per neurology will order stat CTA head and neck, states no perfusion study needed at this time  - neuro consult in the AM  - will begin asa 325, appreciate neuro recs  - also check TTE with bubble study  - permissive hypertension for next 24-48 hours per stroke protocol  - PT/OT eval and treat, fall  precautions, bedside swallow,  NPO for now    2. Hypertension   -Pt denies past hx of HTN, however past ECHO from 2014 noted mild concentric LVH and mild MR and TR and diastolic dysfunction.   - continue permissive hypertension for now, resume home medication when appropriate    3. Hyperlipidemia  - check am lipid panel  - begin high dose lipitor 80 mg for risk reduction    4. Hypothyroidism  - continue synthroid    5. Anxiety disorder  - continue buspar    6. Allergy to Betadine/Iodine  - will pretreat for IV contrast due to concerns for cross reactivity.      GI prophylaxis - PPI    DVT prophylaxis:  scds    CODE STATUS:    Code Status and Medical Interventions:   Ordered at: 09/02/19 3496     Level Of Support Discussed With:    Patient     Code Status:    CPR     Medical Interventions (Level of Support Prior to Arrest):    Full       Admission Status:  I believe this patient meets INPATIENT status due to the need for care which can only be reasonably provided in an hospital setting due to acute stroke and further evaluation and testing by neurology.  As such, I feel patient’s risk for adverse outcomes and need for care warrant INPATIENT evaluation and predict the patient’s care encounter to likely last beyond 2 midnights.      Electronically signed by EDER Henry, 09/02/19, 11:47 PM.          Brief Attending Admission Attestation     I have seen and examined the patient, performing an independent face-to-face diagnostic evaluation with plan of care reviewed and developed with the advanced practice clinician (APC).      Brief Summary Statement:   Domitila Bosch is a 79 y.o. male with PMHx of carotid stenosis, hypothyroidism, gout, diverticulosis, GERD, s/p Nissen fundoplication and asthma. With no know hx of HTN according and his wife who is at the bedside. Pt developed sudden onset of left leg discomfort and weakness of the left leg. He was outside around 5 pm with his wife when his symptoms started. He was  having some gait difficulty. Pt was brought to ED and CT of head was negative. however MRI noted small focus of recent infarct involving the left posterior corona radiata up to about 1.2 cm in dimension. NIH was 0 at time of evaluation. Neurologist Dr Tse was contacted by ED and recommended pt ASA 81 mg be increased to 325 mg. Dr Tse agreed with CTA of head and neck and neurology will evaluation in the am.       Remainder of detailed HPI is as noted above and has been reviewed and/or edited by me for completeness.      Attending Physical Exam:  Constitutional: Awake, alert  Eyes: PERRLA, sclerae anicteric, no conjunctival injection  HENT: NCAT, mucous membranes moist  Neck: Supple, no thyromegaly, no lymphadenopathy, trachea midline  Respiratory: Clear to auscultation bilaterally, nonlabored respirations   Cardiovascular: RRR, no murmurs, rubs, or gallops, palpable pedal pulses bilaterally  Gastrointestinal: Positive bowel sounds, soft, nontender, nondistended  Musculoskeletal: No bilateral ankle edema, no clubbing or cyanosis to extremities  Psychiatric: Appropriate affect, cooperative  Neurologic: Oriented x 3, strength symmetric in all extremities, Cranial Nerves grossly intact to confrontation, speech clear  Skin: No rashes          Brief Assessment/Plan :  See above for further detailed assessment and plan developed with APC which I have reviewed and/or edited for completeness.      Electronically signed by Sarah Bonilla DO, 09/03/19, 12:20 AM.

## 2019-09-03 NOTE — PROGRESS NOTES
Discharge Planning Assessment  [facility]     Patient Name: Domitila Bosch  MRN: [id number]  Today's Date: 9/3/2019    Admit Date: 9/2/2019    Discharge Needs Assessment     Row Name 09/03/19 1507       Living Environment    Lives With  spouse    Name(s) of Who Lives With Patient  Blank Bosch (spouse) [phone]    Current Living Arrangements  home/apartment/condo    Primary Care Provided by  self    Provides Primary Care For  no one    Family Caregiver if Needed  spouse    Family Caregiver Names  Blank Bosch (spouse) [phone]    Quality of Family Relationships  helpful;involved;supportive    Able to Return to Prior Arrangements  yes       Resource/Environmental Concerns    Resource/Environmental Concerns  none       Transition Planning    Patient/Family Anticipates Transition to  home with help/services    Patient/Family Anticipated Services at Transition      Transportation Anticipated  family or friend will provide       Discharge Needs Assessment    Readmission Within the Last 30 Days  no previous admission in last 30 days    Concerns to be Addressed  discharge planning    Equipment Currently Used at Home  none    Anticipated Changes Related to Illness  none    Equipment Needed After Discharge  none    Outpatient/Agency/Support Group Needs  homecare agency    Discharge Facility/Level of Care Needs  home with home health    Offered/Gave Vendor List  no    Patient's Choice of Community Agency(s)  [facility]        Discharge Plan     Row Name 09/03/19 1515       Plan    Plan  Home with home health    Patient/Family in Agreement with Plan  yes    Plan Comments  Spoke with patient at bedside.  Patient lives in [place] with wife.  States that he is independent with ADLs.  He states that he does use any DME at current, but states that he has a rolling walker and cane at home if needed.  Patient is not current with home health or services, but would like [facility] at  discharge.  Order placed in [facility] for [facility] for PT and OT.  Patient states that his PCP is Marcin Ferguson MD and insurance provider is Medicare A  and B and [facility].  Patient states that he is able to afford medications.  Patient anticipating discharge home tomorrow with family and home health.  States that family will transport in private vehicle.  Case management will continue to follow patient and will follow up with [facility] at time of discharge.      Final Discharge Disposition Code  06 - home with home health care        Destination      No service coordination in this encounter.      Durable Medical Equipment      No service coordination in this encounter.      Dialysis/Infusion      No service coordination in this encounter.      Home Medical Care      Service Provider Request Status Selected Services Address Phone Number Fax Number    [facility] Pending - No Request Sent N/A [phone] [facility] [id number] [phone]      Therapy      No service coordination in this encounter.      Community Resources      No service coordination in this encounter.          Demographic Summary     Row Name 09/03/19 1509       General Information    Admission Type  inpatient    Arrived From  home    Referral Source  admission list    Reason for Consult  discharge planning    Preferred Language  English     Used During This Interaction  no    General Information Comments  PCP:  Marcin Ferguson M.D. confirmed with patient.          Functional Status     Row Name 09/03/19 1506       Functional Status    Usual Activity Tolerance  good    Current Activity Tolerance  good       Functional Status, IADL    Medications  independent    Meal Preparation  independent    Housekeeping  independent    Laundry  independent    Shopping  independent        Psychosocial    No documentation.       Abuse/Neglect    No documentation.       Legal    No documentation.       Substance  Abuse    No documentation.       Patient Forms    No documentation.           Tila Maguire RN

## 2019-09-03 NOTE — PLAN OF CARE
Problem: Patient Care Overview  Goal: Plan of Care Review  Outcome: Ongoing (interventions implemented as appropriate)   09/03/19 1153   Coping/Psychosocial   Plan of Care Reviewed With patient;spouse;daughter   OTHER   Outcome Summary PT PRESENTS WITH EVOLVING SYMPTOMS S/P CVA TO INCLUDE R LE WEAKNESS, IMPAIRED COORDINATION, IMPAIRED BALANCE AND DECLINE IN FUNCTIONAL MOBILITY. PT IS IMPULSIVE WITH POOR SAFETY AWARENESS AND IS AT RISK OF FALLING. AMBULATED 200 FEET WITH MIN ASSIST. RECOMMEND IRF AT D/C PT PT CURRENTLY DECLINING. AGREES TO HOME WITH 24/7 ASSIST AND HHPT OR OPPT AT D/C. PER WIFE,PT HAS POOR STM AT BASELINE.        Problem: Stroke (Ischemic) (Adult)  Goal: Signs and Symptoms of Listed Potential Problems Will be Absent, Minimized or Managed (Stroke)  Outcome: Ongoing (interventions implemented as appropriate)   09/03/19 1153   Goal/Outcome Evaluation   Problems Assessed (Stroke (Ischemic)) cognitive impairment;communication impairment;motor/sensory impairment  (SAFETY AWARNESS)   Problems Assessed (Stroke (Ischemic)) motor/sensory impairment  (SAFETY AWARENESS. )

## 2019-09-03 NOTE — PLAN OF CARE
Problem: Patient Care Overview  Goal: Plan of Care Review  Outcome: Ongoing (interventions implemented as appropriate)   09/03/19 0672   Coping/Psychosocial   Plan of Care Reviewed With patient   Plan of Care Review   Progress improving   OTHER   Outcome Summary NIH-0 upon arrival to floor. VSS. Pt did wake up confused throughout night. No other neuro changes noted.

## 2019-09-03 NOTE — THERAPY DISCHARGE NOTE
Acute Care - Speech Language Pathology Initial Eval/Discharge  [facility]     Patient Name: Domitila Bosch  : 1940  MRN: [id number]  Today's Date: 9/3/2019               Admit Date: 2019     Visit Dx:    ICD-10-CM ICD-9-CM   1. Acute CVA (cerebrovascular accident) (CMS/HCC) I63.9 434.91   2. Hypertensive urgency I16.0 401.9   3. Weakness of right lower extremity R29.898 729.89   4. More than 50 percent stenosis of left internal carotid artery I65.22 433.10   5. History of hypertension Z86.79 V12.59   6. History of TIA (transient ischemic attack) Z86.73 V12.54   7. History of hypothyroidism Z86.39 V12.29   8. Right bundle branch block I45.10 426.4     Patient Active Problem List   Diagnosis   • Diverticulosis of large intestine with hemorrhage   • Umbilical hernia   • S/P hernia repair   • Dyslipidemia   • Carotid stenosis   • Gout   • GERD (gastroesophageal reflux disease)   • Hypothyroidism   • Neuropathy   • Malignant melanoma (CMS/HCC)   • Asthma   • Anxiety disorder   • Diverticulosis   • Muscle pain   • Lumbar radiculopathy   • Kidney stone   • Deviated nasal septum   • Chronic laryngitis   • Acute CVA (cerebrovascular accident) (CMS/HCC)   • Elevated BP without diagnosis of hypertension   • Allergy to Betadine     Past Medical History:   Diagnosis Date   • Anxiety disorder 2017   • Asthma 2017   • Basal cell carcinoma in situ of skin 2019    right leg    • Carotid stenosis 2017   • Diaphoresis    • Diastolic dysfunction    • Diverticulitis    • Dyslipidemia 2017   • GERD (gastroesophageal reflux disease) 2017   • Gout 2017   • Herpes zoster     Herpes zoster, [id number], right lower extremity.    • Hypertension 2017   • Hypothyroidism 2017   • LVH (left ventricular hypertrophy)    • Malignant melanoma (CMS/HCC) 2017   • Melanoma (CMS/HCC)    • Mitral regurgitation    • Nephrolithiasis    • Post herpetic neuralgia 2017   • TIA (transient  ischemic attack)     TIA, June 2012, with nonobstructive carotid stenosis, dyslipidemia and hypertension     Past Surgical History:   Procedure Laterality Date   • ABDOMINAL SURGERY     • APPENDECTOMY     • COLON RESECTION LEFT  2016   • COLON RESECTION LEFT  2016   • HEEL SPUR SURGERY  1999   • HERNIA REPAIR      hiatal hernia    • NISSEN FUNDOPLICATION  1984   • OTHER SURGICAL HISTORY  2010    Malignant melanoma, status post resection           SLP EVALUATION (last 72 hours)      SLP SLC Evaluation     Row Name 09/03/19 1010                   Communication Assessment/Intervention    Document Type  evaluation  -        Subjective Information  no complaints  -        Patient Observations  alert;cooperative;agree to therapy  -        Patient/Family Observations  Spouse present and reporting that patient has baseline short term memory deficits that have been present for a few years.  -        Patient Effort  good  -           General Information    Patient Profile Reviewed  yes  -        Pertinent History Of Current Problem  79 y.o. male with a history of carotid stenosis, dyslipidemia, TIA x2, hypertension, who presented to ED from home  for complaints of right leg weakness . Patient admitted on stroke pathway. SLC evaluation completed per stroke protocol.   -        Precautions/Limitations, Vision  WFL;for purposes of eval  -CH        Precautions/Limitations, Hearing  WFL;for purposes of eval  -        Prior Level of Function-Communication  cognitive-linguistic impairment;other (see comments) Patient with STM impairment at baseline  -        Plans/Goals Discussed with  patient;spouse/S.O.;agreed upon  -        Barriers to Rehab  none identified  -        Patient's Goals for Discharge  return to home  -           Pain Assessment    Additional Documentation  Pain Scale: FACES Pre/Post-Treatment (Group)  -           Pain Scale: FACES Pre/Post-Treatment    Pain: FACES Scale, Pretreatment   0-->no hurt  -        Pain: FACES Scale, Post-Treatment  0-->no hurt  -CH           Comprehension Assessment/Intervention    Comprehension Assessment/Intervention  Auditory Comprehension;Reading Comprehension  -           Auditory Comprehension Assessment/Intervention    Auditory Comprehension (Communication)  WFL  -        Able to Identify Objects/Pictures (Communication)  WFL  -        Answers Questions (Communication)  yes/no;wh questions;personal;simple;concrete;L  -        Able to Follow Commands (Communication)  1-step;2-step;L  -        Narrative Discourse  conversational level;WFL  -           Reading Comprehension Assessment/Intervention    Reading Comprehension (Communication)  WFL  -        Functional Reading Tasks  WFL  -           Expression Assessment/Intervention    Expression Assessment/Intervention  verbal expression  -           Verbal Expression Assessment/Intervention    Verbal Expression  WNL  -        Automatic Speech (Communication)  WNL  -        Phrase Completion  WF  -        Responsive Naming  WNL  -        Confrontational Naming  WNL  -           Oral Motor Structure and Function    Oral Motor Structure and Function  WNL  -           Oral Musculature and Cranial Nerve Assessment    Oral Motor General Assessment  WFL  -           Motor Speech Assessment/Intervention    Motor Speech Function  WNL  -           Cursory Voice Assessment/Intervention    Quality and Resonance (Voice)  WNL  -           Cognitive Assessment Intervention- SLP    Cognitive Function (Cognition)  mild impairment;other (see comments) at baseline  -        Orientation Status (Cognition)  awareness of basic personal information;person;place;time;situation;WNL  -        Memory (Cognitive)  functional;simple;short-term;immediate;delayed;mild impairment;other (see comments);long-term;new learning;WFL at baseline  -        Attention (Cognitive)   selective;sustained;alternating;attention to detail;[facility]        Thought Organization (Cognitive)  concrete divergent;abstract divergent;concrete convergent;abstract convergent;drawing conclusions;[facility]        Reasoning (Cognitive)  simple;[facility]        Problem Solving (Cognitive)  simple;temporal;[facility]        Functional Math (Cognitive)  word problems;[facility]  -        Executive Function (Cognition)  realistic goal setting;deficit awareness;judgement;[facility]           SLP Clinical Impressions    SLP Diagnosis  Speech, language and cognition are WFL. Mild recall impairment at baseline  -[facility] Criteria for Skilled Therapy Interventions Met  baseline status;no problems identified which require skilled intervention  -        Functional Impact  no impact on function  -           Recommendations    Therapy Frequency (SLP SLC)  evaluation only  -        Anticipated Dischage Disposition  unknown  -           SLP Discharge Summary    Discharge Destination  unknown  -        Discharge Diagnostic Statement  Speech, language and cognition are WFL. Mild recall impairment at baseline  -        Progress Toward Achieving Short/long Term Goals  discharge on same date as initial evaluation  -        Reason for Discharge  all goals and outcomes met, no further needs identified  -          User Key  (r) = Recorded By, (t) = Taken By, (c) = Cosigned By    Initials Name Effective Dates     Mariella Jimenez, MS CCC-SLP 02/14/19 -            EDUCATION  The patient has been educated in the following areas:   Cognitive Impairment Communication Impairment.      SLP Recommendation and Plan  SLP Diagnosis: Speech, language and cognition are WFL. Mild recall impairment at baseline     [facility] Criteria for Skilled Therapy Interventions Met: baseline status, no problems identified which require skilled intervention  Anticipated Dischage Disposition: unknown                         Time Calculation:   Time  Calculation- SLP     Row Name 09/03/19 1058             Time Calculation- SLP    SLP Start Time  1010  -      SLP Received On  09/03/19  -        User Key  (r) = Recorded By, (t) = Taken By, (c) = Cosigned By    Initials Name Provider Type    Mariella Carvalho MS CCC-SLP Speech and Language Pathologist          Therapy Charges for Today     Code Description Service Date Service Provider Modifiers Qty    16524600763 HC ST EVAL SPEECH AND PROD W LANG  3 9/3/2019 Mariella Jimenez MS CCC-SLP GN 1                   SLP Discharge Summary  Anticipated Dischage Disposition: unknown  Reason for Discharge: all goals and outcomes met, no further needs identified  Progress Toward Achieving Short/long Term Goals: discharge on same date as initial evaluation  Discharge Destination: unknown    Mariella Jimenez MS CCC-SLP  9/3/2019

## 2019-09-03 NOTE — THERAPY EVALUATION
Acute Care - Occupational Therapy Initial Evaluation  [facility]     Patient Name: Domitila Bosch  : 1940  MRN: [id number]  Today's Date: 9/3/2019  Onset of Illness/Injury or Date of Surgery: 19  Date of Referral to OT: 19       Admit Date: 2019       ICD-10-CM ICD-9-CM   1. Acute CVA (cerebrovascular accident) (CMS/HCC) I63.9 434.91   2. Hypertensive urgency I16.0 401.9   3. Weakness of right lower extremity R29.898 729.89   4. More than 50 percent stenosis of left internal carotid artery I65.22 433.10   5. History of hypertension Z86.79 V12.59   6. History of TIA (transient ischemic attack) Z86.73 V12.54   7. History of hypothyroidism Z86.39 V12.29   8. Right bundle branch block I45.10 426.4     Patient Active Problem List   Diagnosis   • Diverticulosis of large intestine with hemorrhage   • Umbilical hernia   • S/P hernia repair   • Dyslipidemia   • Carotid stenosis   • Gout   • GERD (gastroesophageal reflux disease)   • Hypothyroidism   • Neuropathy   • Malignant melanoma (CMS/HCC)   • Asthma   • Anxiety disorder   • Diverticulosis   • Muscle pain   • Lumbar radiculopathy   • Kidney stone   • Deviated nasal septum   • Chronic laryngitis   • Acute CVA (cerebrovascular accident) (CMS/HCC)   • Elevated BP without diagnosis of hypertension   • Allergy to Betadine     Past Medical History:   Diagnosis Date   • Anxiety disorder 2017   • Asthma 2017   • Basal cell carcinoma in situ of skin 2019    right leg    • Carotid stenosis 2017   • Diaphoresis    • Diastolic dysfunction    • Diverticulitis    • Dyslipidemia 2017   • GERD (gastroesophageal reflux disease) 2017   • Gout 2017   • Herpes zoster     Herpes zoster, [id number], right lower extremity.    • Hypertension 2017   • Hypothyroidism 2017   • LVH (left ventricular hypertrophy)    • Malignant melanoma (CMS/HCC) 2017   • Melanoma (CMS/HCC)    • Mitral regurgitation    • Nephrolithiasis     • Post herpetic neuralgia 02/16/2017   • TIA (transient ischemic attack)     TIA, June 2012, with nonobstructive carotid stenosis, dyslipidemia and hypertension     Past Surgical History:   Procedure Laterality Date   • ABDOMINAL SURGERY     • APPENDECTOMY     • COLON RESECTION LEFT  2016   • COLON RESECTION LEFT  2016   • HEEL SPUR SURGERY  1999   • HERNIA REPAIR      hiatal hernia    • NISSEN FUNDOPLICATION  1984   • OTHER SURGICAL HISTORY  2010    Malignant melanoma, status post resection           OT ASSESSMENT FLOWSHEET (last 12 hours)      Occupational Therapy Evaluation     Row Name 09/03/19 1135                   OT Evaluation Time/Intention    Subjective Information  no complaints  -AN        Document Type  evaluation  -AN        Mode of Treatment  occupational therapy  -AN        Patient Effort  good  -AN        Symptoms Noted During/After Treatment  none  -AN        Comment  MD arrived during session   -AN           General Information    Patient Profile Reviewed?  yes  -AN        Onset of Illness/Injury or Date of Surgery  09/02/19  -AN        Patient Observations  alert;cooperative;agree to therapy  -AN        Patient/Family Observations  spouse and daughter present  -AN        General Observations of Patient  Pt reclined in chair, chair alarm  -AN        Prior Level of Function  independent:;all household mobility;community mobility;gait;transfer;ADL's;home management;driving;yard work  -AN        Equipment Currently Used at Home  -- owns RW, but does not use  -AN        Pertinent History of Current Functional Problem  Pt to Ed with R LE pain, weakness decreased mobility. MRI shows infarct L corona radiata  -AN        Existing Precautions/Restrictions  fall  -AN        Risks Reviewed  patient:;LOB;dizziness;increased discomfort;change in vital signs  -AN        Benefits Reviewed  patient:;improve function;increase independence;increase strength;increase balance  -AN           Relationship/Environment     Primary Source of Support/Comfort  spouse  -AN        Lives With  spouse  -AN        Family Caregiver if Needed  spouse;child(diana), adult  -AN           Resource/Environmental Concerns    Current Living Arrangements  home/apartment/condo  -AN           Home Main Entrance    Number of Stairs, Main Entrance  none  -AN           Stairs Within Home, Primary    Number of Stairs, Within Home, Primary  none  -AN           Cognitive Assessment/Interventions    Additional Documentation  Cognitive Assessment/Intervention (Group)  -AN           Cognitive Assessment/Intervention- PT/OT    Affect/Mental Status (Cognitive)  WFL  -AN        Orientation Status (Cognition)  oriented x 3  -AN        Follows Commands (Cognition)  WFL  -AN        Personal Safety Interventions  fall prevention program maintained  -AN           Bed Mobility Assessment/Treatment    Comment (Bed Mobility)  pt up in chair  -AN           Functional Mobility    Functional Mobility- Ind. Level  contact guard assist  -AN        Functional Mobility-Distance (Feet)  6  -AN           Transfer Assessment/Treatment    Transfer Assessment/Treatment  sit-stand transfer;stand-sit transfer  -AN           Sit-Stand Transfer    Sit-Stand [place] (Transfers)  contact guard;verbal cues  -AN           Stand-Sit Transfer    Stand-Sit [place] (Transfers)  verbal cues;contact guard  -AN           ADL Assessment/Intervention    BADL Assessment/Intervention  lower body dressing;feeding  -AN           Lower Body Dressing Assessment/Training    Comment (Lower Body Dressing)  simulated min with balance  -AN           Self-Feeding Assessment/Training    [place] Level (Feeding)  feeding skills;independent  -AN           General ROM    GENERAL ROM COMMENTS  Marcelo UE WFL  -AN           MMT (Manual Muscle Testing)    General MMT Comments  marcelo UE WFL  -AN           Motor Assessment/Interventions    Additional Documentation  Balance (Group);Gross Motor Coordination  (Group);Therapeutic Exercise (Group)  -AN           Gross Motor Coordination    Gross Motor Skill, Impairments Detail  WFL finger to nose, opposition, rapid alternating  -AN           Dynamic Sitting Balance    Level of [place], Reaches Outside Midline (Sitting, Dynamic Balance)  supervision  -AN        Sitting Position, Reaches Outside Midline (Sitting, Dynamic Balance)  sitting in chair  -AN           Static Standing Balance    Level of [place] (Supported Standing, Static Balance)  contact guard assist  -AN           Dynamic Standing Balance    Level of [place], Reaches Outside Midline (Standing, Dynamic Balance)  contact guard assist;minimal assist, 75% patient effort  -AN        Comment, Reaches Outside Midline (Standing, Dynamic Balance)  min assist with 5 backward steps  -AN           Sensory Assessment/Intervention    Sensory General Assessment  no sensation deficits identified  -AN        Additional Documentation  Vision Assessment/Intervention (Group)  -AN           Vision Assessment/Intervention    Visual Impairment/Limitations  corrective lenses full time not tested, MD arrived  -AN           Positioning and Restraints    Pre-Treatment Position  sitting in chair/recliner  -AN        Post Treatment Position  chair  -AN        In Chair  reclined;call light within reach;encouraged to call for assist;exit alarm on;with family/caregiver;with other staff  -AN           Pain Scale: FACES Pre/Post-Treatment    Pain: FACES Scale, Pretreatment  0-->no hurt  -AN        Pain: FACES Scale, Post-Treatment  0-->no hurt  -AN           Plan of Care Review    Plan of Care Reviewed With  patient;spouse  -AN           Clinical Impression (OT)    Date of Referral to OT  09/02/19  -AN        OT Diagnosis  Impaired ADL I  -AN        Patient/Family Goals Statement (OT Eval)  agreeable to OT, pt wants to go home  -AN        Criteria for Skilled Therapeutic Interventions Met (OT Eval)  yes;treatment indicated  -AN         Rehab Potential (OT Eval)  good, to achieve stated therapy goals  -AN        Therapy Frequency (OT Eval)  daily  -AN        Anticipated Discharge Disposition (OT)  inpatient rehabilitation facility  -AN           Vital Signs    Pre Systolic BP Rehab  125  -AN        Pre Treatment Diastolic BP  72  -AN        Pretreatment Heart Rate (beats/min)  98  -AN        Posttreatment Heart Rate (beats/min)  90  -AN           OT Goals    Transfer Goal Selection (OT)  transfer, OT goal 1  -AN        Bathing Goal Selection (OT)  bathing, OT goal 1  -AN        Dressing Goal Selection (OT)  dressing, OT goal 1  -AN        Balance Goal Selection (OT)  balance, OT goal 1  -AN        Additional Documentation  Balance Goal Selection (OT) (Row)  -AN           Transfer Goal 1 (OT)    Activity/Assistive Device (Transfer Goal 1, OT)  tub  -AN        [place] Level/Cues Needed (Transfer Goal 1, OT)  supervision required  -AN        Time Frame (Transfer Goal 1, OT)  10 days  -AN        Progress/Outcome (Transfer Goal 1, OT)  goal ongoing  -AN           Bathing Goal 1 (OT)    Activity/Assistive Device (Bathing Goal 1, OT)  bathing skills, all;long-handled sponge  -AN        [place] Level/Cues Needed (Bathing Goal 1, OT)  contact guard assist  -AN        Time Frame (Bathing Goal 1, OT)  10 days  -AN        Progress/Outcomes (Bathing Goal 1, OT)  goal ongoing  -AN           Dressing Goal 1 (OT)    Activity/Assistive Device (Dressing Goal 1, OT)  lower body dressing  -AN        [place]/Cues Needed (Dressing Goal 1, OT)  supervision required;set-up required  -AN        Time Frame (Dressing Goal 1, OT)  10 days  -AN        Progress/Outcome (Dressing Goal 1, OT)  goal ongoing  -AN           Balance Goal 1 (OT)    Activity/Assistive Device (Balance Goal 1, OT)  standing, dynamic;walker, rolling  -AN        [place] Level/Cues Needed (Balance Goal 1, OT)  supervision required;set-up required  -AN        Time Frame (Balance Goal  1, OT)  10 days  -AN        Progress/Outcomes (Balance Goal 1, OT)  goal ongoing  -AN          User Key  (r) = Recorded By, (t) = Taken By, (c) = Cosigned By    Initials Name Effective Dates    AN Batsheva Horton, OT 06/22/15 -          Occupational Therapy Education     Title: PT OT SLP Therapies (In Progress)     Topic: Occupational Therapy (In Progress)     Point: ADL training (In Progress)     Description: Instruct learner(s) on proper safety adaptation and remediation techniques during self care or transfers.   Instruct in proper use of assistive devices.    Learning Progress Summary           Patient Acceptance, E,D, NR by AN at 9/3/2019 11:35 AM    Comment:  Educated on current deficits, OT role and POC.                               User Key     Initials Effective Dates Name Provider Type Discipline    AN 06/22/15 -  Batsheva Horton, OT Occupational Therapist OT                  OT Recommendation and Plan  Outcome Summary/Treatment Plan (OT)  Anticipated Discharge Disposition (OT): inpatient rehabilitation facility  Therapy Frequency (OT Eval): daily  Plan of Care Review  Plan of Care Reviewed With: patient, spouse  Plan of Care Reviewed With: patient, spouse  Outcome Summary: Pt with current evolving symptoms that have decreased I with ADLs.  Pt is CGA to min assist for dynamic standing for ADLs. Would benefit from IRF at discharge.     Outcome Measures     Row Name 09/03/19 8275             How much help from another is currently needed...    Putting on and taking off regular lower body clothing?  3  -AN      Bathing (including washing, rinsing, and drying)  2  -AN      Toileting (which includes using toilet bed pan or urinal)  3  -AN      Putting on and taking off regular upper body clothing  4  -AN      Taking care of personal grooming (such as brushing teeth)  4  -AN      Eating meals  4  -AN      AM-PAC 6 Clicks Score (OT)  20  -AN         Modified [place] Scale    Modified [place] Scale  4 - Moderately  severe disability.  Unable to walk without assistance, and unable to attend to own bodily needs without assistance.  -AN         Functional Assessment    Outcome Measure Options  AM-PAC 6 Clicks Daily Activity (OT);Modified [place]  -AN        User Key  (r) = Recorded By, (t) = Taken By, (c) = Cosigned By    Initials Name Provider Type    Batsheva Hernandez OT Occupational Therapist          Time Calculation:   Time Calculation- OT     Row Name 09/03/19 1425 09/03/19 1157          Time Calculation- OT    OT Start Time  1135  -AN  --     Total Timed Code Minutes- OT  0 minute(s)  -AN  --     OT Received On  09/03/19  -BOB  --     OT Goal Re-Cert Due Date  09/13/19  -AN  --        Timed Charges    35297 - Gait Training Minutes   --  10  -CD       User Key  (r) = Recorded By, (t) = Taken By, (c) = Cosigned By    Initials Name Provider Type    Becca Worley, PT Physical Therapist    Batsheva Hernandez OT Occupational Therapist        Therapy Charges for Today     Code Description Service Date Service Provider Modifiers Qty    [id number]  OT EVAL LOW COMPLEXITY 4 9/3/2019 Batsheva Horton OT GO 1               Batsheva Horton OT  9/3/2019

## 2019-09-04 VITALS
HEART RATE: 66 BPM | OXYGEN SATURATION: 94 % | HEIGHT: 73 IN | TEMPERATURE: 98 F | DIASTOLIC BLOOD PRESSURE: 74 MMHG | SYSTOLIC BLOOD PRESSURE: 126 MMHG | RESPIRATION RATE: 16 BRPM | WEIGHT: 192.3 LBS | BODY MASS INDEX: 25.49 KG/M2

## 2019-09-04 DIAGNOSIS — R00.2 PALPITATIONS: Primary | ICD-10-CM

## 2019-09-04 LAB
BH CV ECHO MEAS - BSA(HAYCOCK): 2.1 M^2
BH CV ECHO MEAS - BSA: 2.1 M^2
BH CV ECHO MEAS - BZI_BMI: 25.3 KILOGRAMS/M^2
BH CV ECHO MEAS - BZI_METRIC_HEIGHT: 185.4 CM
BH CV ECHO MEAS - BZI_METRIC_WEIGHT: 87.1 KG
BH CV XLRA MEAS LEFT DIST CCA EDV: 11.3 CM/SEC
BH CV XLRA MEAS LEFT DIST CCA PSV: 72.2 CM/SEC
BH CV XLRA MEAS LEFT DIST ICA EDV: 15.2 CM/SEC
BH CV XLRA MEAS LEFT DIST ICA PSV: 65.3 CM/SEC
BH CV XLRA MEAS LEFT ICA/CCA RATIO: 3.3
BH CV XLRA MEAS LEFT MID CCA EDV: 8.3 CM/SEC
BH CV XLRA MEAS LEFT MID CCA PSV: 69.7 CM/SEC
BH CV XLRA MEAS LEFT MID ICA EDV: 31.4 CM/SEC
BH CV XLRA MEAS LEFT MID ICA PSV: 139.5 CM/SEC
BH CV XLRA MEAS LEFT PROX CCA EDV: 12.6 CM/SEC
BH CV XLRA MEAS LEFT PROX CCA PSV: 84.5 CM/SEC
BH CV XLRA MEAS LEFT PROX ECA EDV: 0 CM/SEC
BH CV XLRA MEAS LEFT PROX ECA PSV: 321.3 CM/SEC
BH CV XLRA MEAS LEFT PROX ICA EDV: 35.2 CM/SEC
BH CV XLRA MEAS LEFT PROX ICA PSV: 236.3 CM/SEC
BH CV XLRA MEAS LEFT PROX SCLA EDV: 0 CM/SEC
BH CV XLRA MEAS LEFT PROX SCLA PSV: 85.2 CM/SEC
BH CV XLRA MEAS RIGHT DIST CCA EDV: 12.1 CM/SEC
BH CV XLRA MEAS RIGHT DIST CCA PSV: 96.5 CM/SEC
BH CV XLRA MEAS RIGHT DIST ICA EDV: 16.3 CM/SEC
BH CV XLRA MEAS RIGHT DIST ICA PSV: 61.6 CM/SEC
BH CV XLRA MEAS RIGHT ICA/CCA RATIO: 2.2
BH CV XLRA MEAS RIGHT MID CCA EDV: 14.9 CM/SEC
BH CV XLRA MEAS RIGHT MID CCA PSV: 95.9 CM/SEC
BH CV XLRA MEAS RIGHT MID ICA EDV: 26.7 CM/SEC
BH CV XLRA MEAS RIGHT MID ICA PSV: 145.9 CM/SEC
BH CV XLRA MEAS RIGHT PROX CCA EDV: 13.8 CM/SEC
BH CV XLRA MEAS RIGHT PROX CCA PSV: 81.1 CM/SEC
BH CV XLRA MEAS RIGHT PROX ECA EDV: 0 CM/SEC
BH CV XLRA MEAS RIGHT PROX ECA PSV: 226.3 CM/SEC
BH CV XLRA MEAS RIGHT PROX ICA EDV: 0 CM/SEC
BH CV XLRA MEAS RIGHT PROX ICA PSV: 214.7 CM/SEC
BH CV XLRA MEAS RIGHT PROX SCLA EDV: 0 CM/SEC
BH CV XLRA MEAS RIGHT PROX SCLA PSV: 98.2 CM/SEC
BH CV XLRA MEAS RIGHT VERTEBRAL A EDV: 11.2 CM/SEC
BH CV XLRA MEAS RIGHT VERTEBRAL A PSV: 46.8 CM/SEC
GLUCOSE BLDC GLUCOMTR-MCNC: 101 MG/DL (ref 70–130)

## 2019-09-04 PROCEDURE — 99232 SBSQ HOSP IP/OBS MODERATE 35: CPT | Performed by: PSYCHIATRY & NEUROLOGY

## 2019-09-04 PROCEDURE — 82962 GLUCOSE BLOOD TEST: CPT

## 2019-09-04 PROCEDURE — 97116 GAIT TRAINING THERAPY: CPT

## 2019-09-04 PROCEDURE — 99239 HOSP IP/OBS DSCHRG MGMT >30: CPT | Performed by: INTERNAL MEDICINE

## 2019-09-04 PROCEDURE — 97110 THERAPEUTIC EXERCISES: CPT

## 2019-09-04 PROCEDURE — 97530 THERAPEUTIC ACTIVITIES: CPT

## 2019-09-04 RX ORDER — ATORVASTATIN CALCIUM 80 MG/1
80 TABLET, FILM COATED ORAL NIGHTLY
Qty: 30 TABLET | Refills: 0 | Status: SHIPPED | OUTPATIENT
Start: 2019-09-04 | End: 2019-10-09 | Stop reason: SDUPTHER

## 2019-09-04 RX ORDER — CLOPIDOGREL BISULFATE 75 MG/1
75 TABLET ORAL DAILY
Qty: 30 TABLET | Refills: 0 | Status: SHIPPED | OUTPATIENT
Start: 2019-09-05

## 2019-09-04 RX ADMIN — SODIUM CHLORIDE, PRESERVATIVE FREE 10 ML: 5 INJECTION INTRAVENOUS at 09:02

## 2019-09-04 RX ADMIN — CETIRIZINE HYDROCHLORIDE 10 MG: 10 TABLET, FILM COATED ORAL at 09:01

## 2019-09-04 RX ADMIN — CLOPIDOGREL BISULFATE 75 MG: 75 TABLET ORAL at 09:01

## 2019-09-04 RX ADMIN — LEVOTHYROXINE SODIUM 150 MCG: 150 TABLET ORAL at 05:04

## 2019-09-04 RX ADMIN — ALLOPURINOL 300 MG: 300 TABLET ORAL at 09:01

## 2019-09-04 RX ADMIN — BUSPIRONE HYDROCHLORIDE 10 MG: 10 TABLET ORAL at 09:01

## 2019-09-04 RX ADMIN — ASPIRIN 325 MG ORAL TABLET 325 MG: 325 PILL ORAL at 09:01

## 2019-09-04 NOTE — THERAPY TREATMENT NOTE
Acute Care - Occupational Therapy Treatment Note  [facility]     Patient Name: Domitila Bosch  : 1940  MRN: [id number]  Today's Date: 2019  Onset of Illness/Injury or Date of Surgery: 19  Date of Referral to OT: 19       Admit Date: 2019       ICD-10-CM ICD-9-CM   1. Acute CVA (cerebrovascular accident) (CMS/HCC) I63.9 434.91   2. Hypertensive urgency I16.0 401.9   3. Weakness of right lower extremity R29.898 729.89   4. More than 50 percent stenosis of left internal carotid artery I65.22 433.10   5. History of hypertension Z86.79 V12.59   6. History of TIA (transient ischemic attack) Z86.73 V12.54   7. History of hypothyroidism Z86.39 V12.29   8. Right bundle branch block I45.10 426.4   9. Neuropathy G62.9 355.9     Patient Active Problem List   Diagnosis   • Diverticulosis of large intestine with hemorrhage   • Umbilical hernia   • S/P hernia repair   • Dyslipidemia   • Carotid stenosis   • Gout   • GERD (gastroesophageal reflux disease)   • Hypothyroidism   • Neuropathy   • Malignant melanoma (CMS/HCC)   • Asthma   • Anxiety disorder   • Diverticulosis   • Muscle pain   • Lumbar radiculopathy   • Kidney stone   • Deviated nasal septum   • Chronic laryngitis   • Acute CVA (cerebrovascular accident) (CMS/HCC)   • Elevated BP without diagnosis of hypertension   • Allergy to Betadine     Past Medical History:   Diagnosis Date   • Anxiety disorder 2017   • Asthma 2017   • Basal cell carcinoma in situ of skin 2019    right leg    • Carotid stenosis 2017   • Diaphoresis    • Diastolic dysfunction    • Diverticulitis    • Dyslipidemia 2017   • GERD (gastroesophageal reflux disease) 2017   • Gout 2017   • Herpes zoster     Herpes zoster, [id number], right lower extremity.    • Hypertension 2017   • Hypothyroidism 2017   • LVH (left ventricular hypertrophy)    • Malignant melanoma (CMS/HCC) 2017   • Melanoma (CMS/HCC)    • Mitral regurgitation  2014   • Nephrolithiasis    • Post herpetic neuralgia 02/16/2017   • TIA (transient ischemic attack)     TIA, June 2012, with nonobstructive carotid stenosis, dyslipidemia and hypertension     Past Surgical History:   Procedure Laterality Date   • ABDOMINAL SURGERY     • APPENDECTOMY     • COLON RESECTION LEFT  2016   • COLON RESECTION LEFT  2016   • HEEL SPUR SURGERY  1999   • HERNIA REPAIR      hiatal hernia    • NISSEN FUNDOPLICATION  1984   • OTHER SURGICAL HISTORY  2010    Malignant melanoma, status post resection        Therapy Treatment    Rehabilitation Treatment Summary     Row Name 09/04/19 1030             Treatment Time/Intention    Discipline  occupational therapist  -AN      Document Type  therapy note (daily note)  -AN      Subjective Information  no complaints  -AN      Mode of Treatment  occupational therapy  -AN      Patient/Family Observations  spouse present  -AN      Care Plan Review  care plan/treatment goals reviewed;risks/benefits reviewed  -AN      Patient Effort  good  -AN      Existing Precautions/Restrictions  fall  -AN      Equipment Issued to Patient  long handled sponge;reacher  -AN      Recorded by [AN] Batsheva Horton OT 09/04/19 1105      Row Name 09/04/19 1030             Cognitive Assessment/Intervention    Additional Documentation  Cognitive Assessment/Intervention (Group)  -AN      Recorded by [AN] Batsheva Horton OT 09/04/19 1105      Row Name 09/04/19 1030             Cognitive Assessment/Intervention- PT/OT    Affect/Mental Status (Cognitive)  WFL  -AN      Orientation Status (Cognition)  oriented x 3  -AN      Follows Commands (Cognition)  follows one step commands;over 90% accuracy  -AN      Recorded by [AN] Batsheva Horton OT 09/04/19 1105      Row Name 09/04/19 1030             Bed Mobility Assessment/Treatment    Comment (Bed Mobility)  pt up in chair  -AN      Recorded by [AN] Batsheva Horton OT 09/04/19 1105      Row Name 09/04/19 1030             Functional Mobility     Functional Mobility- Ind. Level  contact guard assist  -AN      Functional Mobility- Device  rolling walker  -AN      Functional Mobility-Distance (Feet)  10  -AN      Recorded by [AN] Batsheva Horton, OT 09/04/19 1105      Row Name 09/04/19 1030             Transfer Assessment/Treatment    Transfer Assessment/Treatment  shower transfer  -AN      Recorded by [AN] Batsheva Horton, OT 09/04/19 1105      Row Name 09/04/19 1030             Sit-Stand Transfer    Sit-Stand Egan (Transfers)  supervision  -AN      Recorded by [AN] Batsheva Horton, OT 09/04/19 1105      Row Name 09/04/19 1030             Stand-Sit Transfer    Stand-Sit Egan (Transfers)  verbal cues;supervision  -AN      Recorded by [AN] Batsheva Horton, OT 09/04/19 1105      Row Name 09/04/19 1030             Shower Transfer    Type (Shower Transfer)  lateral  -AN      Egan Level (Shower Transfer)  contact guard simulated, recommended shower chair  -AN      Recorded by [AN] Batsheva Horton, OT 09/04/19 1105      Row Name 09/04/19 1030             ADL Assessment/Intervention    BADL Assessment/Intervention  lower body dressing  -AN      Recorded by [AN] Batsheva Horton, OT 09/04/19 1105      Row Name 09/04/19 1030             Lower Body Dressing Assessment/Training    Lower Body Dressing Egan Level  don;pants/bottoms;supervision;set up  -AN      Recorded by [AN] Batsheva Horton, OT 09/04/19 1105      Row Name 09/04/19 1030             Motor Skills Assessment/Interventions    Additional Documentation  Therapeutic Exercise (Group)  -AN      Recorded by [AN] Batsheva Horton, OT 09/04/19 1109      Row Name 09/04/19 1030             Therapeutic Exercise    48158 - OT Therapeutic Activity Minutes  24  -AN      Recorded by [AN] Batsheva Horton, OT 09/04/19 1109      Row Name 09/04/19 1030             Dynamic Standing Balance    Level of Egan, Reaches Outside Midline (Standing, Dynamic Balance)  contact guard assist  -AN       Assistive Device Utilized (Supported Standing, Dynamic Balance)  walker, rolling;other (see comments) grab bars   -AN      Recorded by [AN] Batsheva Horton, OT 09/04/19 1105      Row Name 09/04/19 1030             Positioning and Restraints    Pre-Treatment Position  sitting in chair/recliner  -AN      Post Treatment Position  chair  -AN      In Chair  reclined;call light within reach;encouraged to call for assist;exit alarm on  -AN      Recorded by [AN] Batsheva Horton OT 09/04/19 1105      Row Name 09/04/19 1030             Pain Scale: FACES Pre/Post-Treatment    Pain: FACES Scale, Pretreatment  0-->no hurt  -AN      Pain: FACES Scale, Post-Treatment  0-->no hurt  -AN      Recorded by [AN] Batsheva Horton, OT 09/04/19 1105      Row Name 09/04/19 1030             Plan of Care Review    Plan of Care Reviewed With  patient  -AN      Recorded by [AN] Batsheva Horton OT 09/04/19 1105      Row Name 09/04/19 1030             Outcome Summary/Treatment Plan (OT)    Daily Summary of Progress (OT)  progress toward functional goals is good  -AN      Anticipated Discharge Disposition (OT)  home with assist;home with OP services  -AN      Recorded by [AN] Batsheva Horton OT 09/04/19 1105      Row Name 09/04/19 1030             Outcome Summary/Treatment Plan (PT)    Anticipated Equipment Needs at Discharge (PT)  shower chair;front wheeled walker  -AN      Anticipated Discharge Disposition (PT)  home with assist;home with home health  -AN      Recorded by [AN] Batsheva Horton, OT 09/04/19 1105        User Key  (r) = Recorded By, (t) = Taken By, (c) = Cosigned By    Initials Name Effective Dates Discipline    AN Batsheva Horton, OT 06/22/15 -  OT             Occupational Therapy Education     Title: PT OT SLP Therapies (In Progress)     Topic: Occupational Therapy (In Progress)     Point: ADL training (Done)     Description: Instruct learner(s) on proper safety adaptation and remediation techniques during self care or transfers.    Instruct in proper use of assistive devices.    Learning Progress Summary           Patient Acceptance, E, VU by AN at 9/4/2019 10:30 AM    Comment:  AE use and DME for safety at home with ADLs. Educated on balance concerns and home safety.    Acceptance, E,D, NR by AN at 9/3/2019 11:35 AM    Comment:  Educated on current deficits, OT role and POC.                   Point: Body mechanics (Done)     Description: Instruct learner(s) on proper positioning and spine alignment during self-care, functional mobility activities and/or exercises.    Learning Progress Summary           Patient Acceptance, E, VU by AN at 9/4/2019 10:30 AM    Comment:  AE use and DME for safety at home with ADLs. Educated on balance concerns and home safety.                               User Key     Initials Effective Dates Name Provider Type Discipline    AN 06/22/15 -  Batsheva Horton, OT Occupational Therapist OT                OT Recommendation and Plan  Outcome Summary/Treatment Plan (OT)  Daily Summary of Progress (OT): progress toward functional goals is good  Anticipated Discharge Disposition (OT): home with assist, home with OP services  Therapy Frequency (OT Eval): daily  Daily Summary of Progress (OT): progress toward functional goals is good  Plan of Care Review  Plan of Care Reviewed With: patient  Plan of Care Reviewed With: patient  Outcome Summary: Pt CGA for txfrs, standing ADLs, mobilty this date. Pt will need HH/outpt services at discharge, home with spouse .Pt would benefit from RW and shower seat at home.  Outcome Measures     Row Name 09/04/19 1030 09/03/19 1135          How much help from another is currently needed...    Putting on and taking off regular lower body clothing?  3  -AN  3  -AN     Bathing (including washing, rinsing, and drying)  3  -AN  2  -AN     Toileting (which includes using toilet bed pan or urinal)  3  -AN  3  -AN     Putting on and taking off regular upper body clothing  4  -AN  4  -AN     Taking  care of personal grooming (such as brushing teeth)  4  -AN  4  -AN     Eating meals  4  -AN  4  -AN     AM-PAC 6 Clicks Score (OT)  21  -AN  20  -AN        Modified [place] Scale    Modified Renata Scale  3 - Moderate disability.  Requiring some help, but able to walk without assistance.  -AN  4 - Moderately severe disability.  Unable to walk without assistance, and unable to attend to own bodily needs without assistance.  -AN        Functional Assessment    Outcome Measure Options  --  AM-PAC 6 Clicks Daily Activity (OT);Modified Renata  -AN       User Key  (r) = Recorded By, (t) = Taken By, (c) = Cosigned By    Initials Name Provider Type    Batsheva Hernandez OT Occupational Therapist           Time Calculation:   Time Calculation- OT     Row Name 09/04/19 1108 09/04/19 1030          Time Calculation- OT    OT Start Time  1030  -AN  --     Total Timed Code Minutes- OT  24 minute(s)  -AN  --     OT Received On  09/04/19  -AN  --     OT Goal Re-Cert Due Date  09/13/19  -AN  --        Timed Charges    09960 - OT Therapeutic Activity Minutes  --  24  -AN       User Key  (r) = Recorded By, (t) = Taken By, (c) = Cosigned By    Initials Name Provider Type    Batsheva Hernandez OT Occupational Therapist        Therapy Charges for Today     Code Description Service Date Service Provider Modifiers Qty    11286454306  OT EVAL LOW COMPLEXITY 4 9/3/2019 Batsheva Horton OT GO 1    [id number]  OT THERAPEUTIC ACT EA 15 MIN 9/4/2019 Batsheva Horton OT GO 2    [id number]  OT THER SUPP EA 15 MIN 9/4/2019 Batsheva Horton OT GO 1               Batsheva ESCOBAR. ZACK Horton  9/4/2019

## 2019-09-04 NOTE — PLAN OF CARE
Problem: Patient Care Overview  Goal: Plan of Care Review  Outcome: Ongoing (interventions implemented as appropriate)   09/04/19 1030   Coping/Psychosocial   Plan of Care Reviewed With patient   Plan of Care Review   Progress improving   OTHER   Outcome Summary Pt CGA for txfrs, standing ADLs, mobilty this date. Pt will need HH/outpt services at discharge, home with spouse .Pt would benefit from RW and shower seat at home.       Problem: Stroke (Ischemic) (Adult)  Goal: Signs and Symptoms of Listed Potential Problems Will be Absent, Minimized or Managed (Stroke)  Outcome: Ongoing (interventions implemented as appropriate)   09/04/19 1030   Goal/Outcome Evaluation   Problems Assessed (Stroke (Ischemic)) cognitive impairment;motor/sensory impairment;muscle tone abnormal;acute neurologic deterioration   Problems Assessed (Stroke (Ischemic)) motor/sensory impairment

## 2019-09-04 NOTE — DISCHARGE SUMMARY
[facility] Medicine Services  DISCHARGE SUMMARY    Patient Name: Domitila Bosch  : 1940  MRN: [id number]    Date of Admission: 2019  Date of Discharge:  2019  Primary Care Physician: Marcin Ferguson MD    Consults     Date and Time Order Name Status Description    9/3/2019 0030 Inpatient Neurology Consult Stroke Completed           Hospital Course     Presenting Problem:   Acute CVA (cerebrovascular accident) (CMS/HCC) [I63.9]    Active Hospital Problems    Diagnosis  POA   • **Acute CVA (cerebrovascular accident) (CMS/HCC) [I63.9]  Yes   • Elevated BP without diagnosis of hypertension [R03.0]  Unknown   • Allergy to Betadine [Z88.3]  Not Applicable   • Carotid stenosis [I65.29]  Yes   • Dyslipidemia [E78.5]  Yes   • Hypothyroidism [E03.9]  Yes      Resolved Hospital Problems   No resolved problems to display.          Hospital Course:  Domitila Bosch is a 79 y.o. male with a history of carotid stenosis, dyslipidemia, TIA x2, hypertension, who presented to ED from home with complaints of right leg weakness that began about an hour prior to arrival. MRI showed left corona radiata infarction. CTA of the head and neck showed significant intercranial stenosis. His carotid duplex showed bilateral carotid artery stenosis 50-69% with some progression of NANO stenosis compared to 2018. Neurology evaluated the patient and recommended DAPT with ASA/plavix and statin. Dr. Brown recommend DAPT treatment for 3 months post event and then return to single antiplatelet agent and statin as indicated by cholesterol levels. Echo showed normal LVEF of 72% and mildly dilated left atrium. Neurology recommended outpatient cardiac event monitor to r/o any arrythmia. ZIO patch placed prior to discharge. Patient should f/u with PCP in one week and Dr. Lazo in 1 month.     Discharge Follow Up Recommendations for labs/diagnostics:   - neurology recommending Zio patch, place prior to  discharge  - 3 months DAPT, then okay to resume ASA alone  - f/u with Dr. Lazo in 1 month  - f/u with PCP in 1 month    Day of Discharge     HPI:   Patient feels well. Very excited to go home. He was up walking with therapy and doing well. Wife at bedside. No complaints. No acute events overnight.    Review of Systems  Gen- No fevers, chills  CV- No chest pain, palpitations  Resp- No cough, dyspnea  GI- No N/V/D, abd pain    All other systems reviewed and negative except any additional pertinent positives and negatives discussed in HPI.       Otherwise ROS is negative except as mentioned in the HPI.    Vital Signs:   Temp:  [97.7 °F (36.5 °C)-98.3 °F (36.8 °C)] 98 °F (36.7 °C)  Heart Rate:  [61-93] 66  Resp:  [16-18] 16  BP: (126-154)/(63-83) 126/74     Physical Exam:  Constitutional: No acute distress, awake, alert  HENT: NCAT, mucous membranes moist  Respiratory: Clear to auscultation bilaterally, respiratory effort normal   Cardiovascular: RRR, no murmurs, rubs, or gallops, palpable pedal pulses bilaterally  Gastrointestinal: Positive bowel sounds, soft, nontender, nondistended  Musculoskeletal: No bilateral ankle edema  Psychiatric: Appropriate affect, cooperative  Neurologic: Oriented x 3 but has some issues with short term memory, mild RLE weakness, Cranial Nerves grossly intact to confrontation, speech clear  Skin: No rashes      Pertinent  and/or Most Recent Results     Results from last 7 days   Lab Units 09/03/19  0459 09/02/19  1842   WBC 10*3/mm3 7.84 6.95   HEMOGLOBIN g/dL 15.4 15.6   HEMATOCRIT % 47.4 46.6   PLATELETS 10*3/mm3 228 238   SODIUM mmol/L 139 141   POTASSIUM mmol/L 3.8 3.6   CHLORIDE mmol/L 103 102   CO2 mmol/L 24.0 27.0   BUN mg/dL 11 12   CREATININE mg/dL 0.87 0.98   GLUCOSE mg/dL 141* 83   CALCIUM mg/dL 9.3 9.7     Results from last 7 days   Lab Units 09/03/19  0459 09/02/19  1842   BILIRUBIN mg/dL 0.5 0.4   ALK PHOS U/L 95 99   ALT (SGPT) U/L 14 14   AST (SGOT) U/L 18 17     Results  from last 7 days   Lab Units 09/03/19  0459   CHOLESTEROL mg/dL 174   TRIGLYCERIDES mg/dL 223*   HDL CHOL mg/dL 43     Results from last 7 days   Lab Units 09/03/19  0458 09/02/19  1842   TSH uIU/mL  --  0.079*   HEMOGLOBIN A1C % 5.40  --    TROPONIN T ng/mL  --  <0.010       Brief Urine Lab Results     None          Microbiology Results Abnormal     None          Imaging Results (all)     Procedure Component Value Units Date/Time    CT Angiogram Head With & Without Contrast [[id number]] Collected:  09/03/19 0400     Updated:  09/03/19 0403    Narrative:       INDICATION:   Recent CVA with small vessel insults of the left corona radiata. Left ICA stenosis.    TECHNIQUE:   CT angiogram of the head and neck with contrast. 3-D reformatted images were acquired and reviewed. Evaluation for a significant carotid arterial stenosis is based on the NASCET criteria. Radiation dose reduction techniques included automated exposure  control or exposure modulation based on body size. Radiation audit for number of CT and nuclear cardiology exams performed in the last year:  0. Contrast dose recorded in chart. Patient received nonionic contrast media intravenously.    COMPARISON:   Earlier MR I brain.    FINDINGS:   CTA neck:  The aortic arch branch pattern is normal. There are vascular calcifications and great vessel origins including likely severe stenosis of the origin left subclavian artery. This is best further evaluated with a conventional angiographic patient  is candidate for an intervention. There is also some disease at the origin of the right subclavian artery.    There is disease at the right carotid bifurcation. There is partially calcified plaque present. The noncalcified portion of the plaque appears undermined and is a possible source for thromboembolic disease. There is 60% diameter stenosis by NASCET  criteria. There is also calcified plaque in the right carotid siphon with likely stenosis.    The left carotid  system also shows partially calcified plaque at the bifurcation. Portions of the noncalcified plaque are also undermined and could serve as a source for thromboembolic disease. Allowing for the lack of curved reformatted imaging, the  degree of stenosis by NASCET criteria is probably at least 70%.    There is disease at the origin of the left vertebral artery. There is disease at the origin the right vertebral artery. There is likely hemodynamically significant stenosis at both vertebral artery origins. The right is the dominant vessel. The left  shows multisegment stenosis throughout its course in the neck consistent with long segment disease. The left vertebral artery is either occluded or nearly completely occluded as it passes intracranially.    CTA head:  There is high-grade stenosis at the origin of the left A1 vessel over short segment. There is an anterior communicating artery present. There is fetal origin left posterior cerebral artery distribution via posterior communicator. The left P1  vessel is severely aplastic. Dural venous sinuses are grossly patent. No intracranial aneurysm is suspected. There is irregular appearance of the left posterior communicator and proximal posterior cerebral artery distribution suggesting intracranial  atherosclerotic disease. Less likely differential is vasculitis in the appropriate clinical setting. Considerable degenerative changes are noted in the cervical spine.      Impression:         1. There is severe disease of the bilateral carotid bifurcations with calcified and noncalcified plaque. The noncalcified plaque appears to be undermined bilaterally and could serve as a source for thromboembolic disease. By NASCET criteria there is at  least 60% diameter stenosis on the right and 70% diameter stenosis on the left.  2. The right vertebral artery is dominant though narrowed at its origin. Left vertebral artery is diseased throughout and appears to occlude or nearly  completely occluded as it passes intracranially.  3. 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  4. Severe disease at the origin of the left A1 vessel. There is an anterior communicating artery present.  5. No intracranial vascular cut off.  6. Disease of the bilateral carotid siphons.  7. Likely intracranial atherosclerotic disease involving the fetal origin left posterior communicator/left posterior cerebral artery distribution.    We apologize for the delay in dictation while the images were retransmitted.    Signer Name: Shania Tran MD   Signed: 9/3/2019 4:00 AM   Workstation Name: [id number]    [facility]    CT Angiogram Neck With & Without Contrast [[id number]] Collected:  09/03/19 0400     Updated:  09/03/19 0403    Narrative:       INDICATION:   Recent CVA with small vessel insults of the left corona radiata. Left ICA stenosis.    TECHNIQUE:   CT angiogram of the head and neck with contrast. 3-D reformatted images were acquired and reviewed. Evaluation for a significant carotid arterial stenosis is based on the NASCET criteria. Radiation dose reduction techniques included automated exposure  control or exposure modulation based on body size. Radiation audit for number of CT and nuclear cardiology exams performed in the last year:  0. Contrast dose recorded in chart. Patient received nonionic contrast media intravenously.    COMPARISON:   Earlier MR I brain.    FINDINGS:   CTA neck:  The aortic arch branch pattern is normal. There are vascular calcifications and great vessel origins including likely severe stenosis of the origin left subclavian artery. This is best further evaluated with a conventional angiographic patient  is candidate for an intervention. There is also some disease at the origin of the right subclavian artery.    There is disease at the right carotid bifurcation. There is partially calcified plaque present. The noncalcified portion of the plaque appears  undermined and is a possible source for thromboembolic disease. There is 60% diameter stenosis by NASCET  criteria. There is also calcified plaque in the right carotid siphon with likely stenosis.    The left carotid system also shows partially calcified plaque at the bifurcation. Portions of the noncalcified plaque are also undermined and could serve as a source for thromboembolic disease. Allowing for the lack of curved reformatted imaging, the  degree of stenosis by NASCET criteria is probably at least 70%.    There is disease at the origin of the left vertebral artery. There is disease at the origin the right vertebral artery. There is likely hemodynamically significant stenosis at both vertebral artery origins. The right is the dominant vessel. The left  shows multisegment stenosis throughout its course in the neck consistent with long segment disease. The left vertebral artery is either occluded or nearly completely occluded as it passes intracranially.    CTA head:  There is high-grade stenosis at the origin of the left A1 vessel over short segment. There is an anterior communicating artery present. There is fetal origin left posterior cerebral artery distribution via posterior communicator. The left P1  vessel is severely aplastic. Dural venous sinuses are grossly patent. No intracranial aneurysm is suspected. There is irregular appearance of the left posterior communicator and proximal posterior cerebral artery distribution suggesting intracranial  atherosclerotic disease. Less likely differential is vasculitis in the appropriate clinical setting. Considerable degenerative changes are noted in the cervical spine.      Impression:         1. There is severe disease of the bilateral carotid bifurcations with calcified and noncalcified plaque. The noncalcified plaque appears to be undermined bilaterally and could serve as a source for thromboembolic disease. By NASCET criteria there is at  least 60% diameter  stenosis on the right and 70% diameter stenosis on the left.  2. The right vertebral artery is dominant though narrowed at its origin. Left vertebral artery is diseased throughout and appears to occlude or nearly completely occluded as it passes intracranially.  3. 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  4. Severe disease at the origin of the left A1 vessel. There is an anterior communicating artery present.  5. No intracranial vascular cut off.  6. Disease of the bilateral carotid siphons.  7. Likely intracranial atherosclerotic disease involving the fetal origin left posterior communicator/left posterior cerebral artery distribution.    We apologize for the delay in dictation while the images were retransmitted.    Signer Name: Shania Tran MD   Signed: 9/3/2019 4:00 AM   Workstation Name: [id number]    [facility]    MRI Brain Without Contrast [[id number]] Collected:  09/02/19 2230     Updated:  09/02/19 2232    Narrative:       INDICATION:    Right leg pain. Transient right leg heaviness and weakness making it difficult to walk.    TECHNIQUE:   MRI of the brain without contrast.    COMPARISON:    Earlier head CT same day is MRI brain 12/23/2014    FINDINGS:  There is a small focus of restricted diffusion in the left posterior corona radiata periventricular white matter that measures about 1.2 cm in largest dimension. It is consistent with a recent infarct. It is not associated with mass effect and there is  nothing to suggest hemorrhagic transformation. It is superimposed on a background of extensive pre-existing small vessel disease. There is generalized atrophy. There are too numerous to count foci of signal abnormality in the white matter as well as  bilateral basal ganglia and thalami. And there are small vessel insults to the brainstem and left cerebellar hemisphere. There is no appreciable mass effect. There is no extra-axial fluid collection. The major arterial  intracranial flow voids are  maintained. The mastoid air cells are clear. The visualized paranasal sinuses show mild mucosal thickening. There is no Chiari I malformation.      Impression:       #1 there is a small focus of recent infarct involving the left posterior corona radiata up to about 1.2 cm in dimension. It has the appearance of a recent small vessel insult which is superimposed upon extensive pre-existing small vessel disease and  generalized atrophy.. Please correlate for risk factors. There Is no appreciable mass effect or evidence for hemorrhagic transformation.    Signer Name: Shania Tran MD   Signed: 9/2/2019 10:30 PM   [facility]: [email or web address]    [facility]    CT Head Without Contrast [[id number]] Collected:  09/02/19 2001     Updated:  09/02/19 2004    Narrative:       CT Head WO    HISTORY:   79-year-old male with altered gait and loss of balance today.    TECHNIQUE:   Routine noncontrast head CT. Radiation dose reduction techniques included automated exposure control or exposure modulation based on body size. Count of known CT and cardiac nuc med studies performed in previous 12 months: 0.     COMPARISON:   None.    FINDINGS:   No hemorrhage, acute infarction, mass lesion, or abnormal extra-axial fluid collection. No midline shift or focal mass effect. Ventricular system is normal in size and configuration. Mild generalized atrophy and chronic small vessel disease throughout  the supratentorial white matter. No acute osseous abnormality. Visualized paranasal sinuses are clear. Visualized mastoid air cells are clear.      Impression:       No acute intracranial abnormality. Mild senescent changes.          Signer Name: Avni Gutierrez MD   Signed: 9/2/2019 8:01 PM   Workstation [facility]: [email or web address]    [facility]          Results for orders placed during the hospital encounter of 09/02/19   Duplex Carotid Ultrasound CAR    Narrative · Right internal  carotid artery stenosis of 50-69%.  · Left internal carotid artery stenosis of 50-69%.  · Progression of NANO stenosis compared with 2018 study.          Results for orders placed during the hospital encounter of 09/02/19   Duplex Carotid Ultrasound CAR    Narrative · Right internal carotid artery stenosis of 50-69%.  · Left internal carotid artery stenosis of 50-69%.  · Progression of NANO stenosis compared with 2018 study.          Results for orders placed during the hospital encounter of 09/02/19   Adult Transthoracic Echo Complete W/ Cont if Necessary Per Protocol (With Agitated Saline)    Narrative · Estimated EF = 72%.  · Left ventricular systolic function is hyperdynamic (EF > 70).  · Left ventricular diastolic dysfunction (grade I) consistent with   impaired relaxation.  · Left atrial cavity size is mildly dilated.  · Normal right ventricular cavity size, wall thickness, systolic function   and septal motion noted.  · Saline test results are negative.  · The aortic valve exhibits moderate sclerosis.  · No evidence of pulmonary hypertension is present.  · There is no evidence of pericardial effusion.            Discharge Details        Discharge Medications      New Medications      Instructions Start Date   atorvastatin 80 MG tablet  Commonly known as:  LIPITOR   80 mg, Oral, Nightly      clopidogrel 75 MG tablet  Commonly known as:  PLAVIX   75 mg, Oral, Daily   Start Date:  9/5/2019        Continue These Medications      Instructions Start Date   allopurinol 300 MG tablet  Commonly known as:  ZYLOPRIM   300 mg, Oral, Daily      aspirin 81 MG tablet   81 mg, Oral, Daily      BENADRYL 25 mg capsule  Generic drug:  diphenhydrAMINE   25 mg, Oral, As Needed      busPIRone 10 MG tablet  Commonly known as:  BUSPAR   10 mg, Oral, 2 Times Daily, 1/2 tab BID      CLARITIN 10 MG capsule  Generic drug:  Loratadine   1 tablet, Oral, As Needed      doxazosin 4 MG tablet  Commonly known as:  CARDURA   4 mg, Oral,  Daily      finasteride 5 MG tablet  Commonly known as:  PROSCAR   5 mg, Oral, Daily      levothyroxine 150 MCG tablet  Commonly known as:  SYNTHROID, LEVOTHROID   150 mcg, Oral, Daily      omeprazole 40 MG capsule  Commonly known as:  priLOSEC   40 mg, Oral, Daily      oxyCODONE-acetaminophen 7.5-325 MG per tablet  Commonly known as:  PERCOCET   1 tablet, Oral, Every 4 Hours PRN      psyllium 58.6 % packet  Commonly known as:  METAMUCIL   1 packet, Oral, Daily      triamterene-hydrochlorothiazide 37.5-25 MG per tablet  Commonly known as:  MAXZIDE-25   1 tablet, Oral, Daily      vitamin B-12 2500 MCG sublingual tablet tablet  Commonly known as:  CYANOCOBALAMIN   2,500 mcg, Sublingual, Daily         Stop These Medications    lovastatin 20 MG tablet  Commonly known as:  MEVACOR            Allergies   Allergen Reactions   • Betadine [Povidone Iodine]    • Cephalexin    • Flomax [Tamsulosin Hcl]    • Iodine    • Lisinopril    • Pseudoephedrine    • Sulfamethoxazole-Trimethoprim          Discharge Disposition:  Home or Self Care    Discharge Diet:  Diet Order   Procedures   • Diet Regular         Discharge Activity:   - as tolerated    CODE STATUS:    Code Status and Medical Interventions:   Ordered at: 09/02/19 2057     Level Of Support Discussed With:    Patient     Code Status:    CPR     Medical Interventions (Level of Support Prior to Arrest):    Full         Future Appointments   Date Time Provider Department Center   9/20/2019 10:30 AM Donaldo Laoz MD [facility] None       Additional Instructions for the Follow-ups that You Need to Schedule     Ambulatory Referral to Home Health   As directed      Face to Face Visit Date:  9/3/2019    Follow-up provider for Plan of Care?:  I treated the patient in an acute care facility and will not continue treatment after discharge.    Follow-up provider:  LIANA JACOBS [[id number]]    Reason/Clinical Findings:  Acute CVA    Describe mobility limitations that make leaving  home difficult:  Impaired functional mobility, balance and gait.  Decreased strength and generalized weakness    Nursing/Therapeutic Services Requested:  Physical Therapy Occupational Therapy    PT orders:  Therapeutic exercise Gait Training Transfer training Strengthening Home safety assessment    Weight Bearing Status:  As Tolerated    Occupational orders:  Activities of daily living Energy conservation Strengthening Cognition Fine motor Home safety assessment    Frequency:  1 Week 1               Time Spent on Discharge:  45 minutes    Electronically signed by Teresa Pollard DO, 09/04/19, 1:45 PM.

## 2019-09-04 NOTE — THERAPY TREATMENT NOTE
Patient Name: Domitila Bosch  : 1940    MRN: [id number]                              Today's Date: 2019       Admit Date: 2019    Visit Dx:     ICD-10-CM ICD-9-CM   1. Acute CVA (cerebrovascular accident) (CMS/HCC) I63.9 434.91   2. Hypertensive urgency I16.0 401.9   3. Weakness of right lower extremity R29.898 729.89   4. More than 50 percent stenosis of left internal carotid artery I65.22 433.10   5. History of hypertension Z86.79 V12.59   6. History of TIA (transient ischemic attack) Z86.73 V12.54   7. History of hypothyroidism Z86.39 V12.29   8. Right bundle branch block I45.10 426.4   9. Neuropathy G62.9 355.9     Patient Active Problem List   Diagnosis   • Diverticulosis of large intestine with hemorrhage   • Umbilical hernia   • S/P hernia repair   • Dyslipidemia   • Carotid stenosis   • Gout   • GERD (gastroesophageal reflux disease)   • Hypothyroidism   • Neuropathy   • Malignant melanoma (CMS/HCC)   • Asthma   • Anxiety disorder   • Diverticulosis   • Muscle pain   • Lumbar radiculopathy   • Kidney stone   • Deviated nasal septum   • Chronic laryngitis   • Acute CVA (cerebrovascular accident) (CMS/HCC)   • Elevated BP without diagnosis of hypertension   • Allergy to Betadine     Past Medical History:   Diagnosis Date   • Anxiety disorder 2017   • Asthma 2017   • Basal cell carcinoma in situ of skin 2019    right leg    • Carotid stenosis 2017   • Diaphoresis    • Diastolic dysfunction    • Diverticulitis    • Dyslipidemia 2017   • GERD (gastroesophageal reflux disease) 2017   • Gout 2017   • Herpes zoster     Herpes zoster, [id number], right lower extremity.    • Hypertension 2017   • Hypothyroidism 2017   • LVH (left ventricular hypertrophy)    • Malignant melanoma (CMS/HCC) 2017   • Melanoma (CMS/HCC)    • Mitral regurgitation    • Nephrolithiasis    • Post herpetic neuralgia 2017   • TIA (transient ischemic attack)     TIA,   2012, with nonobstructive carotid stenosis, dyslipidemia and hypertension     Past Surgical History:   Procedure Laterality Date   • ABDOMINAL SURGERY     • APPENDECTOMY     • COLON RESECTION LEFT  2016   • COLON RESECTION LEFT  2016   • HEEL SPUR SURGERY  1999   • HERNIA REPAIR      hiatal hernia    • NISSEN FUNDOPLICATION  1984   • OTHER SURGICAL HISTORY  2010    Malignant melanoma, status post resection      General Information     Row Name 09/04/19 1125          PT Evaluation Time/Intention    Document Type  therapy note (daily note)  -AA     Mode of Treatment  physical therapy  -AA     Row Name 09/04/19 1125          General Information    Patient Profile Reviewed?  yes  -AA     Existing Precautions/Restrictions  fall  -AA     Row Name 09/04/19 1125          Cognitive Assessment/Intervention- PT/OT    Orientation Status (Cognition)  oriented x 3  -AA     Personal Safety Interventions  gait belt;fall prevention program maintained;nonskid shoes/slippers when out of bed;muscle strengthening facilitated  -AA     Row Name 09/04/19 1125          Safety Issues, Functional Mobility    Safety Issues Affecting Function (Mobility)  awareness of need for assistance;impulsivity;insight into deficits/self awareness;sequencing abilities;safety precautions follow-through/compliance  -AA     Impairments Affecting Function (Mobility)  balance;coordination;postural/trunk control;strength  -AA       User Key  (r) = Recorded By, (t) = Taken By, (c) = Cosigned By    Initials Name Provider Type    AA Judie Chan L, PT Physical Therapist        Mobility     Row Name 09/04/19 1126          Bed Mobility Assessment/Treatment    Comment (Bed Mobility)  pt UIC  -AA     Row Name 09/04/19 1126          Transfer Assessment/Treatment    Comment (Transfers)  VC for standing slowly due to pt lose balance backward.  STS x5 for education  -AA     Row Name 09/04/19 1126          Sit-Stand Transfer    Sit-Stand [place] (Transfers)  stand by  assist  -AA     Assistive Device (Sit-Stand Transfers)  walker, front-wheeled  -AA     Row Name 09/04/19 1126          Gait/Stairs Assessment/Training    Gait/Stairs Assessment/Training  gait/ambulation independence  -AA     [place] Level (Gait)  contact guard;minimum assist (75% patient effort)  -AA     Assistive Device (Gait)  walker, front-wheeled  -AA     Distance in Feet (Gait)  275 feet   -AA     Pattern (Gait)  swing-through  -AA     Deviations/Abnormal Patterns (Gait)  bilateral deviations  -AA     Comment (Gait/Stairs)  275 feet with RW and CGA to min A with pt require increased cues to stay inside walker during turns, decrease rody, and for safety.  Pt impulsive during gait and deny deficits that are demonstrated.  Pt with balance deficits during gait requiring intermittent min A and increased cues  -AA       User Key  (r) = Recorded By, (t) = Taken By, (c) = Cosigned By    Initials Name Provider Type    AA Judie Chan, PT Physical Therapist        Obj/Interventions     Row Name 09/04/19 1130          Therapeutic Exercise    Lower Extremity (Therapeutic Exercise)  marching while standing  -AA     Lower Extremity Range of Motion (Therapeutic Exercise)  hip flexion/extension, bilateral;hip abduction/adduction, bilateral;other (see comments) heel raises  -AA     Exercise Type (Therapeutic Exercise)  AROM (active range of motion)  -AA     Position (Therapeutic Exercise)  standing  -AA     Sets/Reps (Therapeutic Exercise)  15  -AA     Comment (Therapeutic Exercise)  balance deficits noted during standingt ther-ex at RW with CGA to min A, pt deny deficits with wife state she can see the deficits  -AA     Row Name 09/04/19 1130          Static Sitting Balance    Level of [place] (Unsupported Sitting, Static Balance)  independent  -AA     Sitting Position (Unsupported Sitting, Static Balance)  sitting in chair  -AA     Time Able to Maintain Position (Unsupported Sitting, Static Balance)  more than  5 minutes  -AA     [facility] Name 09/04/19 1130          Static Standing Balance    Level of [place] (Supported Standing, Static Balance)  standby assist  -AA     Time Able to Maintain Position (Supported Standing, Static Balance)  more than 5 minutes  -AA     Assistive Device Utilized (Supported Standing, Static Balance)  walker, rolling  -AA     [facility] Name 09/04/19 1130          Dynamic Standing Balance    Level of [place], Reaches Outside Midline (Standing, Dynamic Balance)  contact guard assist;minimal assist, 75% patient effort  -AA     Time Able to Maintain Position, Reaches Outside Midline (Standing, Dynamic Balance)  more than 5 minutes  -AA     Assistive Device Utilized (Supported Standing, Dynamic Balance)  walker, rolling  -       User Key  (r) = Recorded By, (t) = Taken By, (c) = Cosigned By    Initials Name Provider Type    Judie Gill PT Physical Therapist        Goals/Plan    No documentation.       Clinical Impression     [facility] Name 09/04/19 1132          Pain Assessment    Additional Documentation  Pain Scale: Numbers Pre/Post-Treatment (Group)  -AA     Row Name 09/04/19 1132          Pain Scale: Numbers Pre/Post-Treatment    Pain Scale: Numbers, Pretreatment  0/10 - no pain  -AA     Pain Scale: Numbers, Post-Treatment  0/10 - no pain  -AA     [facility] Name 09/04/19 1132          Plan of Care Review    Plan of Care Reviewed With  patient;spouse  -AA     Row Name 09/04/19 1132          Positioning and Restraints    Pre-Treatment Position  in bed  -AA     Post Treatment Position  chair  -AA     In Chair  notified nsg;reclined;with family/caregiver;call light within reach;encouraged to call for assist;exit alarm on  -AA       User Key  (r) = Recorded By, (t) = Taken By, (c) = Cosigned By    Initials Name Provider Type    Judie Gill PT Physical Therapist        Outcome Measures     Row Name 09/04/19 1136          How much help from another person do you currently need...    Turning from your  back to your side while in flat bed without using bedrails?  4  -AA     Moving from lying on back to sitting on the side of a flat bed without bedrails?  4  -AA     Moving to and from a bed to a chair (including a wheelchair)?  3  -AA     Standing up from a chair using your arms (e.g., wheelchair, bedside chair)?  3  -AA     Climbing 3-5 steps with a railing?  3  -AA     To walk in hospital room?  3  -AA     AM-PAC 6 Clicks Score (PT)  20  -     Row Name 09/04/19 1136          Modified [place] Scale    Pre-Stroke Modified [place] Scale  0 - No Symptoms at all.  -AA     Modified [place] Scale  3 - Moderate disability.  Requiring some help, but able to walk without assistance.  -     Row Name 09/04/19 1136          Functional Assessment    Outcome Measure Options  AM-PAC 6 Clicks Basic Mobility (PT);Modified [place]  -AA       User Key  (r) = Recorded By, (t) = Taken By, (c) = Cosigned By    Initials Name Provider Type    Judie Gill, PT Physical Therapist        Physical Therapy Education     Title: PT OT SLP Therapies (In Progress)     Topic: Physical Therapy (In Progress)     Point: Mobility training (In Progress)     Learning Progress Summary           Patient Lilianaer, COY,D, NR by AA at 9/4/2019 11:33 AM    AcceptanceCOY VU,NR by CD at 9/3/2019 11:53 AM    Comment:  BENEFITS OF OOB ACTIVITY, SAFETY WITH MOBILITY, PROGRESSION OF POC, D/C REC'S /PLANNING,   Family COY AdameD, NR by AA at 9/4/2019 11:33 AM                   Point: Home exercise program (In Progress)     Learning Progress Summary           Patient Lilianaer, E,D, NR by AA at 9/4/2019 11:33 AM    Acceptance, COY VU,NR by CD at 9/3/2019 11:53 AM    Comment:  BENEFITS OF OOB ACTIVITY, SAFETY WITH MOBILITY, PROGRESSION OF POC, D/C REC'S /PLANNING,   Family COY Adame,D, NR by AA at 9/4/2019 11:33 AM                   Point: Body mechanics (In Progress)     Learning Progress Summary           Patient Lilianaer, E,D, NR by ISABELLE at 9/4/2019 11:33 AM    Mirella, COY  VU,NR by CD at 9/3/2019 11:53 AM    Comment:  BENEFITS OF OOB ACTIVITY, SAFETY WITH MOBILITY, PROGRESSION OF POC, D/C REC'S /PLANNING,   Family Eager, E,D, NR by AA at 9/4/2019 11:33 AM                   Point: Precautions (In Progress)     Learning Progress Summary           Patient Eager, E,D, NR by AA at 9/4/2019 11:33 AM    Acceptance, E, VU,NR by CD at 9/3/2019 11:53 AM    Comment:  BENEFITS OF OOB ACTIVITY, SAFETY WITH MOBILITY, PROGRESSION OF POC, D/C REC'S /PLANNING,   Family Eager, E,D, NR by AA at 9/4/2019 11:33 AM                               User Key     Initials Effective Dates Name Provider Type Discipline     06/19/15 -  Becca Cuevas, PT Physical Therapist PT    ISABELLE 04/02/18 -  Judie Chan PT Physical Therapist PT              PT Recommendation and Plan     Outcome Summary/Treatment Plan (PT)  Anticipated Equipment Needs at Discharge (PT): shower chair, front wheeled walker  Anticipated Discharge Disposition (PT): home with assist, home with home health  Plan of Care Reviewed With: patient, spouse  Progress: improving  Outcome Summary: Pt ambulated 275 feet with RW and CGA to min A with balance deficits noted and pt deny deficits.  Pt CGA for transfers.  Educated pt on home health then transitioning to outpatient PT for balance training, gait, and strengthening.       Time Calculation:   PT Charges     Row Name 09/04/19 1136             Time Calculation    Start Time  0939  -AA      PT Received On  09/04/19  -      PT Goal Re-Cert Due Date  09/13/19  -         Time Calculation- PT    Total Timed Code Minutes- PT  24 minute(s)  -         Timed Charges    45264 - PT Therapeutic Exercise Minutes  9  -AA      49819 - Gait Training Minutes   15  -AA        User Key  (r) = Recorded By, (t) = Taken By, (c) = Cosigned By    Initials Name Provider Type    Judie Gill, PT Physical Therapist        Therapy Charges for Today     Code Description Service Date Service Provider Modifiers Qty     [id number]  PT THER PROC EA 15 MIN 9/4/2019 Judie Chan, PT GP 1    [id number]  GAIT TRAINING EA 15 MIN 9/4/2019 Judie Chan, PT GP 1          PT G-Codes  Outcome Measure Options: AM-PAC 6 Clicks Basic Mobility (PT), Modified [place]  AM-PAC 6 Clicks Score (PT): 20  AM-PAC 6 Clicks Score (OT): 21  Modified [place] Scale: 3 - Moderate disability.  Requiring some help, but able to walk without assistance.    Judie Chan, JANNETH  9/4/2019

## 2019-09-04 NOTE — PLAN OF CARE
Problem: Patient Care Overview  Goal: Plan of Care Review  Outcome: Ongoing (interventions implemented as appropriate)   09/04/19 1133   Coping/Psychosocial   Plan of Care Reviewed With patient;spouse   Plan of Care Review   Progress improving   OTHER   Outcome Summary Pt ambulated 275 feet with RW and CGA to min A with balance deficits noted and pt deny deficits. Pt CGA for transfers. Educated pt on home health then transitioning to outpatient PT for balance training, gait, and strengthening.

## 2019-09-04 NOTE — PLAN OF CARE
Problem: Stroke (Ischemic) (Adult)  Goal: Signs and Symptoms of Listed Potential Problems Will be Absent, Minimized or Managed (Stroke)  Outcome: Ongoing (interventions implemented as appropriate)   09/04/19 3247   Goal/Outcome Evaluation   Problems Assessed (Stroke (Ischemic)) cognitive impairment, could not verbalize where he was, needing oxygen throughout night via nasal canula   Problems Assessed (Stroke (Ischemic)) communication impairment

## 2019-09-04 NOTE — PROGRESS NOTES
"Neurology       Patient Care Team:  Marcin Ferguson MD as PCP - General  Marcin Ferguson MD as PCP - Family Medicine  Marcin Ferguson MD as PCP - Claims Attributed    Chief complaint stroke with right lower extremity weakness      Subjective .     History: Right leg feeling much better.  Per PT notes, walking better as well.  No palpitations     ROS: No fever or chest pain    Objective     Vital Signs   Blood pressure 126/74, pulse 66, temperature 98 °F (36.7 °C), temperature source Oral, resp. rate 16, height 185.4 cm (73\"), weight 87.2 kg (192 lb 4.8 oz), SpO2 94 %.    Physical Exam:              Neuro: Well-developed elderly white man in no acute distress, alert, fluent and appropriate.  No dysarthria  EOMI face symmetric  Moves all extremities well and no asymmetry appreciated on MMT in lower extremities    Results Review:              Carotids show 50 to 69% stenosis bilaterally, with progression on the right since 2018.  Echo with mildly dilated left atrium, negative saline study, hyperdynamic left ventricular systolic function    Assessment/Plan     Left centrum semiovale/periventricular ischemic stroke with resolving right lower extremity weakness- in patient with severe multifocal cerebrovascular disease, event most likely due to local vessel thrombosis, but discussed with Dr. Pollard having monitor placed to rule out intermittent arrhythmia.  For now continue dual antiplatelet therapy and high-dose statin.  Okay for discharge home from neurology perspective once this is arranged.    I discussed the patients findings and my recommendations with patient, family and primary care team    Latonya Alfaro MD  09/04/19  11:55 AM      "

## 2019-09-04 NOTE — PROGRESS NOTES
Case Management Discharge Note    Final Note: Spoke with patient and wife at bedside.  Patient discharging home today with home health.  Patient has requested [facility] for dishcarge services.  Spoke with Bravo at [facility].  She has received order in Epic for PT and OT, and has accepted referral for home health.  [facility] will contact patient after discharge to schedule initial home visit for patient.  Patient will discharge home today in private vehicle with wife and has no further needs at this time.      Destination      No service has been selected for the patient.      Durable Medical Equipment      No service has been selected for the patient.      Dialysis/Infusion      No service has been selected for the patient.      Home Medical Care - Selection Complete      Service Provider Request Status Selected Services Address Phone Number Fax Number    [facility] [address] [facility] [address] [phone]      Therapy      No service has been selected for the patient.      Community Resources      No service has been selected for the patient.             Final Discharge Disposition Code: 06 - home with home health care

## 2019-09-05 ENCOUNTER — READMISSION MANAGEMENT (OUTPATIENT)
Dept: CALL CENTER | Facility: HOSPITAL | Age: 79
End: 2019-09-05

## 2019-09-05 NOTE — OUTREACH NOTE
Prep Survey      Responses   Facility patient discharged from?  [facility]   Is patient eligible?  Yes   Discharge diagnosis  Acute CVA    Does the patient have one of the following disease processes/diagnoses(primary or secondary)?  Stroke (TIA)   Does the patient have Home health ordered?  Yes   What is the Home health agency?    [facility]   Is there a DME ordered?  No   Prep survey completed?  Yes          Cynthia Dodge RN

## 2019-09-06 ENCOUNTER — READMISSION MANAGEMENT (OUTPATIENT)
Dept: CALL CENTER | Facility: HOSPITAL | Age: 79
End: 2019-09-06

## 2019-09-06 NOTE — OUTREACH NOTE
Stroke Week 1 Survey      Responses   Facility patient discharged from?  [facility]   Does the patient have one of the following disease processes/diagnoses(primary or secondary)?  Stroke (TIA)   Is there a successful TCM telephone encounter documented?  No   Week 1 attempt successful?  Yes   Call start time  1124   Call end time  1135   Discharge diagnosis  Acute CVA    Is patient permission given to speak with other caregiver?  Yes   Person spoke with today (if not patient) and relationship  Blank   Meds reviewed with patient/caregiver?  Yes   Is the patient having any side effects they believe may be caused by any medication additions or changes?  No   Does the patient have all medications ordered at discharge?  Yes   Is the patient taking all medications as directed (includes completed medication regime)?  Yes   Does the patient have a primary care provider?   Yes   Does the patient have an appointment with their PCP within 7 days of discharge?  Yes   Has the patient kept scheduled appointments due by today?  N/A   What is the Home health agency?    [facility]   Has home health visited the patient within 72 hours of discharge?  Call prior to 72 hours   Psychosocial issues?  No   Does the patient require any assistance with activities of daily living such as eating, bathing, dressing, walking, etc.?  No   Does the patient have any residual symptoms from stroke/TIA?  Yes   Residual symptoms comments  Right leg is weak   Does the patient understand the diet ordered at discharge?  Yes   Comments  He has a hereditary issue with cholesterol.   Did the patient receive a copy of their discharge instructions?  Yes   Nursing interventions  Reviewed instructions with patient   What is the patient's perception of their health status since discharge?  Improving   Nursing interventions  Nurse provided patient education   Is the patient able to teach back FAST for Stroke?  Yes   Is the patient/caregiver able to teach back  the risk factors for a stroke?  High blood pressure-goal below 120/80, Physical inactivity and obesity, High Cholesterol, Carotid or other artery disease, Sleep apnea [She says he is adamently against the CPAP mask.  Encouraged investigating the new types.]   Is the patient/caregiver able to teach back signs and symptoms related to disease process for when to call PCP?  Yes   Is the patient/caregiver able to teach back signs and symptoms related to disease process for when to call 911?  Yes   Is the patient/caregiver able to teach back the hierarchy of who to call/visit for symptoms/problems? PCP, Specialist, Home health nurse, Urgent Care, ED, 911  Yes   Additional teach back comments  BBB has been diagnosed in the past, he is doing fairly well she says. He is eating well and sleeping well.   Week 1 call completed?  Yes          Daniela Varma RN

## 2019-09-13 ENCOUNTER — READMISSION MANAGEMENT (OUTPATIENT)
Dept: CALL CENTER | Facility: HOSPITAL | Age: 79
End: 2019-09-13

## 2019-09-13 NOTE — OUTREACH NOTE
Stroke Week 2 Survey      Responses   Facility patient discharged from?  [facility]   Does the patient have one of the following disease processes/diagnoses(primary or secondary)?  Stroke (TIA)   Week 2 attempt successful?  No   Unsuccessful attempts  Attempt 1          Shashi Varela RN

## 2019-09-16 ENCOUNTER — READMISSION MANAGEMENT (OUTPATIENT)
Dept: CALL CENTER | Facility: HOSPITAL | Age: 79
End: 2019-09-16

## 2019-09-16 NOTE — OUTREACH NOTE
Stroke Week 2 Survey      Responses   Facility patient discharged from?  [place]   Does the patient have one of the following disease processes/diagnoses(primary or secondary)?  Stroke (TIA)   Week 2 attempt successful?  No   Unsuccessful attempts  Attempt 2          Hodan Carpenter RN

## 2019-09-20 ENCOUNTER — READMISSION MANAGEMENT (OUTPATIENT)
Dept: CALL CENTER | Facility: HOSPITAL | Age: 79
End: 2019-09-20

## 2019-09-20 NOTE — OUTREACH NOTE
Stroke Week 3 Survey      Responses   Facility patient discharged from?  [facility]   Does the patient have one of the following disease processes/diagnoses(primary or secondary)?  Stroke (TIA)   Week 3 attempt successful?  No   Unsuccessful attempts  Attempt 1          Ne Milligan RN

## 2019-09-24 ENCOUNTER — READMISSION MANAGEMENT (OUTPATIENT)
Dept: CALL CENTER | Facility: HOSPITAL | Age: 79
End: 2019-09-24

## 2019-09-24 NOTE — OUTREACH NOTE
Stroke Week 3 Survey      Responses   Facility patient discharged from?  [facility]   Does the patient have one of the following disease processes/diagnoses(primary or secondary)?  Stroke (TIA)   Week 3 attempt successful?  Yes   Call start time  1257   Call end time  1303   Discharge diagnosis  Acute CVA    Is patient permission given to speak with other caregiver?  Yes   List who call center can speak with  [place], spouse   Person spoke with today (if not patient) and relationship  Blank, spouse   Meds reviewed with patient/caregiver?  Yes   Is the patient having any side effects they believe may be caused by any medication additions or changes?  No   Does the patient have all medications ordered at discharge?  Yes   Is the patient taking all medications as directed (includes completed medication regime)?  Yes   Does the patient have a primary care provider?   Yes   Comments regarding PCP  Marcin Ferguson MD    Has the patient kept scheduled appointments due by today?  Yes   Home health comments  Patient not receiving HH at this time.    Psychosocial issues?  No   Does the patient have any residual symptoms from stroke/TIA?  No   Does the patient understand the diet ordered at discharge?  Yes   Did the patient receive a copy of their discharge instructions?  Yes   Nursing interventions  Reviewed instructions with patient   What is the patient's perception of their health status since discharge?  Improving   Nursing interventions  Nurse provided patient education   Is the patient able to teach back FAST for Stroke?  Yes   Is the patient/caregiver able to teach back the risk factors for a stroke?  High Cholesterol, High blood pressure-goal below 120/80, History of TIAs, Carotid or other artery disease, Sleep apnea   Is the patient/caregiver able to teach back signs and symptoms related to disease process for when to call PCP?  Yes   Is the patient/caregiver able to teach back signs and symptoms related to  disease process for when to call 911?  Yes   Is the patient/caregiver able to teach back the hierarchy of who to call/visit for symptoms/problems? PCP, Specialist, Home health nurse, Urgent Care, ED, 911  Yes   Week 3 call completed?  Yes   Revoked  No further contact(revokes)-requires comment   Graduated/Revoked comments  Wife states that patient doing very well,  almost back to normal. Denies any new questions today. No further calls.           Cynthia Castillo RN

## 2019-09-26 ENCOUNTER — OUTSIDE FACILITY SERVICE (OUTPATIENT)
Dept: HOSPITALIST | Facility: HOSPITAL | Age: 79
End: 2019-09-26

## 2019-09-26 PROCEDURE — G0180 MD CERTIFICATION HHA PATIENT: HCPCS | Performed by: INTERNAL MEDICINE

## 2019-10-09 ENCOUNTER — OFFICE VISIT (OUTPATIENT)
Dept: CARDIOLOGY | Facility: CLINIC | Age: 79
End: 2019-10-09

## 2019-10-09 VITALS
WEIGHT: 176 LBS | OXYGEN SATURATION: 94 % | SYSTOLIC BLOOD PRESSURE: 112 MMHG | HEART RATE: 80 BPM | HEIGHT: 73 IN | BODY MASS INDEX: 23.33 KG/M2 | DIASTOLIC BLOOD PRESSURE: 60 MMHG

## 2019-10-09 DIAGNOSIS — E78.5 HYPERLIPIDEMIA LDL GOAL <70: Primary | ICD-10-CM

## 2019-10-09 DIAGNOSIS — I65.23 BILATERAL CAROTID ARTERY STENOSIS: ICD-10-CM

## 2019-10-09 DIAGNOSIS — I10 ESSENTIAL HYPERTENSION: ICD-10-CM

## 2019-10-09 PROCEDURE — 99214 OFFICE O/P EST MOD 30 MIN: CPT | Performed by: INTERNAL MEDICINE

## 2019-10-09 RX ORDER — ATORVASTATIN CALCIUM 80 MG/1
80 TABLET, FILM COATED ORAL NIGHTLY
Qty: 90 TABLET | Refills: 3 | Status: SHIPPED | OUTPATIENT
Start: 2019-10-09 | End: 2020-08-17

## 2019-10-09 RX ORDER — ERGOCALCIFEROL (VITAMIN D2) 10 MCG
5000 TABLET ORAL DAILY
COMMUNITY
End: 2020-07-28

## 2019-10-09 RX ORDER — MEMANTINE HYDROCHLORIDE 5 MG/1
5 TABLET ORAL DAILY
COMMUNITY

## 2019-10-09 NOTE — PROGRESS NOTES
[facility] at [facility]  Office Progress Note  Domitila Bosch  1940      Visit Date: 10/09/19    PCP: Marcin Ferguson MD  [address] [address]    IDENTIFICATION: A 79 y.o. male retired  from [employer], formerly from [employer]      PROBLEM LIST:   1. Probable coronary  artery disease:  a. August 2012, MPS per [facility]  at [facility] with normal perfusion and EF of 68%.    2.  Diaphoresis, no prior evaluation.  3. Hypertension, presumed essential.  4. Dyslipidemia, on statin and fibrate therapy:  February 2015 - total cholesterol 227, triglycerides 124, HDL 60.4, and .  5. CVA  a. 9/2019 MRI left corona radiata infarction  b. 9/2019 CTA head and neck significant intracranial stenosis  c. 9/2019 CUS bilateral 50 to 69% stenosis  d. 9/4/2019 -10/3/2019 event monitor: Sinus rhythm  6. Carotid stenosis:  a. June 2012, carotid duplex with moderate bilateral stenosis, no significant change from 2010.  b. MRA,  January 2015, shows mild stenosis of the distal left common carotid.  7. Subclavian stenosis R>L  a. 9/2019  8.  Gout.  9. GERD, status post GI bleed in 2011, followed by Dr. Coulter.  10. Hypothyroidism.  11. Remote Nissen fundoplication, 1984.  12. Heel spur surgery, 1999.  13. Nephrolithiasis.  14. TIA, June 2012, with nonobstructive carotid stenosis, dyslipidemia and  hypertension.  15. Neuropathy.  16. Malignant melanoma, status post resection in 2010.  17. Asthma.   18. Herpes zoster, [id number], right lower extremity.   19. Anxiety disorder.      Chief Complaint   Patient presents with   • Cerebrovascular Accident       Allergies  Allergies   Allergen Reactions   • Betadine [Povidone Iodine]    • Cephalexin    • Flomax [Tamsulosin Hcl]    • Iodine    • Lisinopril    • Pseudoephedrine    • Sulfamethoxazole-Trimethoprim        Current Medications    Current Outpatient Medications:   •  allopurinol (ZYLOPRIM) 300 MG tablet, Take 300 mg by  mouth daily., Disp: , Rfl:   •  aspirin 81 MG tablet, Take 81 mg by mouth Daily., Disp: , Rfl:   •  atorvastatin (LIPITOR) 80 MG tablet, Take 1 tablet by mouth Every Night., Disp: 90 tablet, Rfl: 3  •  busPIRone (BUSPAR) 10 MG tablet, Take 10 mg by mouth 2 (Two) Times a Day. 1/2 tab BID, Disp: , Rfl:   •  clopidogrel (PLAVIX) 75 MG tablet, Take 1 tablet by mouth Daily., Disp: 30 tablet, Rfl: 0  •  diphenhydrAMINE (BENADRYL) 25 mg capsule, Take 25 mg by mouth As Needed for Itching., Disp: , Rfl:   •  doxazosin (CARDURA) 4 MG tablet, Take 4 mg by mouth daily., Disp: , Rfl:   •  finasteride (PROSCAR) 5 MG tablet, Take 5 mg by mouth Daily., Disp: , Rfl:   •  levothyroxine (SYNTHROID, LEVOTHROID) 150 MCG tablet, Take 150 mcg by mouth Daily., Disp: , Rfl:   •  Loratadine (CLARITIN) 10 MG capsule, Take 1 tablet by mouth As Needed., Disp: , Rfl:   •  memantine (NAMENDA) 5 MG tablet, Take 5 mg by mouth Daily., Disp: , Rfl:   •  omeprazole (PriLOSEC) 40 MG capsule, Take 40 mg by mouth Daily., Disp: , Rfl:   •  oxyCODONE-acetaminophen (PERCOCET) 7.5-325 MG per tablet, Take 1 tablet by mouth Every 4 (Four) Hours As Needed for Severe Pain (7-10)., Disp: 15 tablet, Rfl: 0  •  psyllium (METAMUCIL) 58.6 % packet, Take 1 packet by mouth Daily., Disp: , Rfl:   •  sertraline (ZOLOFT) 50 MG tablet, Take 50 mg by mouth Daily., Disp: , Rfl:   •  triamterene-hydrochlorothiazide (MAXZIDE-25) 37.5-25 MG per tablet, Take 1 tablet by mouth Daily., Disp: , Rfl:   •  vitamin B-12 (CYANOCOBALAMIN) 2500 MCG sublingual tablet tablet, Place 2,500 mcg under the tongue Daily., Disp: , Rfl:   •  Vitamin D, Cholecalciferol, (CHOLECALCIFEROL) 400 units tablet, Take 5,000 Units by mouth Daily., Disp: , Rfl:       History of Present Illness   Domitila Bosch is a 79 y.o. year old male here for follow up.  He was hospitalized in early September 2019 for an acute left corona radiata CVA.CTA of the head and neck showed significant intercranial stenosis. His  "carotid duplex showed bilateral carotid artery stenosis 50-69% with some progression of NANO stenosis compared to 2018. Neurology evaluated the patient and recommended DAPT with ASA/plavix and increasing his statin.  It was recommended for him to wear a cardiac event monitor following discharge.  His event monitor was worn for 1 month and revealed normal sinus rhythm.  Echo during his hospitalization was WNL.    He is currently on atorvastatin 80, Plavix, and ASA.He is down 10 pounds from his last visit.  BP is well controlled. He is doing well on the new medications.     Pt denies any chest pain, dyspnea, dyspnea on exertion, orthopnea, PND, palpitations, lower extremity edema, or claudication.    ROS:  All other systems are reviewed and negative except what is detailed in the HPI      OBJECTIVE:  Vitals:    10/09/19 1121   BP: 112/60   BP Location: Left arm   Patient Position: Sitting   Pulse: 80   SpO2: 94%   Weight: 79.8 kg (176 lb)   Height: 185.4 cm (73\")     Physical Exam   Constitutional: He is oriented to person, place, and time. He appears well-developed and well-nourished. No distress.   Cardiovascular: Normal rate, regular rhythm, normal heart sounds and intact distal pulses.   No murmur heard.  Pulses:       Carotid pulses are on the right side with bruit, and on the left side with bruit.       Radial pulses are 2+ on the right side, and 2+ on the left side.        Dorsalis pedis pulses are 2+ on the right side, and 2+ on the left side.        Posterior tibial pulses are 2+ on the right side, and 2+ on the left side.   Pulmonary/Chest: Effort normal and breath sounds normal. He has no wheezes. He has no rales.   Abdominal: Soft. Bowel sounds are normal. There is no tenderness. There is no guarding.   Musculoskeletal: He exhibits no edema or tenderness.   Neurological: He is alert and oriented to person, place, and time.   Skin: Skin is warm and dry. No rash noted.   Psychiatric: He has a normal mood and " affect.   Nursing note and vitals reviewed.      Diagnostic Data:  Procedures      ASSESSMENT:   Diagnosis Plan   1. Hyperlipidemia LDL goal <70  atorvastatin (LIPITOR) 80 MG tablet   2. Bilateral carotid artery stenosis     3. Essential hypertension         PLAN:  1. Continue high intensity statin with recent CVA  2. CUS with slight worsening on R. With recent CVA continue plavix and asa  3. Patient has acceptable BP. Continue current medical management. Counseled to regularly check BP at home with goal averaging <130/80.     Marcin Ferguson MD, thank you for referring Mr. Bosch for evaluation.  I have forwarded my electronically generated recommendations to you for review.  Please do not hesitate to call with any questions.    Scribed for Donaldo Lazo MD by Arlette King PA-C. 10/9/2019  12:02 PM   Donaldo Lazo MD, [facility]  I, Donaldo Lazo MD, personally performed the services described in this documentation as scribed by the above named individual in my presence, and it is both accurate and complete.  10/9/2019  12:41 PM

## 2020-02-26 ENCOUNTER — TRANSCRIBE ORDERS (OUTPATIENT)
Dept: ADMINISTRATIVE | Facility: HOSPITAL | Age: 80
End: 2020-02-26

## 2020-02-26 ENCOUNTER — HOSPITAL ENCOUNTER (OUTPATIENT)
Dept: GENERAL RADIOLOGY | Facility: HOSPITAL | Age: 80
Discharge: HOME | End: 2020-02-26
Admitting: FAMILY MEDICINE

## 2020-02-26 DIAGNOSIS — S70.01XA CONTUSION, HIP AND THIGH, RIGHT, INITIAL ENCOUNTER: Primary | ICD-10-CM

## 2020-02-26 DIAGNOSIS — S70.01XA CONTUSION, HIP AND THIGH, RIGHT, INITIAL ENCOUNTER: ICD-10-CM

## 2020-02-26 DIAGNOSIS — S70.11XA CONTUSION, HIP AND THIGH, RIGHT, INITIAL ENCOUNTER: ICD-10-CM

## 2020-02-26 DIAGNOSIS — S70.11XA CONTUSION, HIP AND THIGH, RIGHT, INITIAL ENCOUNTER: Primary | ICD-10-CM

## 2020-02-26 PROCEDURE — 73521 X-RAY EXAM HIPS BI 2 VIEWS: CPT

## 2020-06-16 ENCOUNTER — HOSPITAL ENCOUNTER (OUTPATIENT)
Age: 80
End: 2020-06-16
Payer: MEDICARE

## 2020-06-16 DIAGNOSIS — N40.0: Primary | ICD-10-CM

## 2020-06-16 LAB — PROSTATE SPECIFIC AG, DIAGNOST: 0.54 NG/ML (ref 0–4)

## 2020-06-16 PROCEDURE — 84153 ASSAY OF PSA TOTAL: CPT

## 2020-06-16 PROCEDURE — 36415 COLL VENOUS BLD VENIPUNCTURE: CPT

## 2020-07-28 ENCOUNTER — APPOINTMENT (OUTPATIENT)
Dept: CT IMAGING | Facility: HOSPITAL | Age: 80
End: 2020-07-28

## 2020-07-28 ENCOUNTER — APPOINTMENT (OUTPATIENT)
Dept: GENERAL RADIOLOGY | Facility: HOSPITAL | Age: 80
End: 2020-07-28

## 2020-07-28 ENCOUNTER — HOSPITAL ENCOUNTER (INPATIENT)
Facility: HOSPITAL | Age: 80
LOS: 15 days | Discharge: HOME | End: 2020-08-12
Attending: EMERGENCY MEDICINE | Admitting: INTERNAL MEDICINE

## 2020-07-28 ENCOUNTER — APPOINTMENT (OUTPATIENT)
Dept: ULTRASOUND IMAGING | Facility: HOSPITAL | Age: 80
End: 2020-07-28

## 2020-07-28 DIAGNOSIS — Z86.73 HISTORY OF CVA (CEREBROVASCULAR ACCIDENT): ICD-10-CM

## 2020-07-28 DIAGNOSIS — R41.82 ALTERED MENTAL STATUS, UNSPECIFIED ALTERED MENTAL STATUS TYPE: ICD-10-CM

## 2020-07-28 DIAGNOSIS — R53.1 WEAKNESS: ICD-10-CM

## 2020-07-28 DIAGNOSIS — R44.1 VISUAL HALLUCINATIONS: ICD-10-CM

## 2020-07-28 DIAGNOSIS — N39.0 ACUTE UTI: Primary | ICD-10-CM

## 2020-07-28 PROBLEM — N17.9 AKI (ACUTE KIDNEY INJURY): Status: ACTIVE | Noted: 2020-07-28

## 2020-07-28 PROBLEM — G93.40 ENCEPHALOPATHY ACUTE: Status: ACTIVE | Noted: 2020-07-28

## 2020-07-28 LAB
ALBUMIN SERPL-MCNC: 4 G/DL (ref 3.5–5.2)
ALBUMIN/GLOB SERPL: 1.2 G/DL
ALP SERPL-CCNC: 94 U/L (ref 39–117)
ALT SERPL W P-5'-P-CCNC: 17 U/L (ref 1–41)
ANION GAP SERPL CALCULATED.3IONS-SCNC: 13 MMOL/L (ref 5–15)
AST SERPL-CCNC: 26 U/L (ref 1–40)
BACTERIA UR QL AUTO: ABNORMAL /HPF
BASOPHILS # BLD AUTO: 0.04 10*3/MM3 (ref 0–0.2)
BASOPHILS NFR BLD AUTO: 0.4 % (ref 0–1.5)
BILIRUB SERPL-MCNC: 1.3 MG/DL (ref 0–1.2)
BILIRUB UR QL STRIP: NEGATIVE
BUN SERPL-MCNC: 20 MG/DL (ref 8–23)
BUN/CREAT SERPL: 10.8 (ref 7–25)
CALCIUM SPEC-SCNC: 9.4 MG/DL (ref 8.6–10.5)
CHLORIDE SERPL-SCNC: 100 MMOL/L (ref 98–107)
CLARITY UR: ABNORMAL
CO2 SERPL-SCNC: 27 MMOL/L (ref 22–29)
COLOR UR: YELLOW
CREAT SERPL-MCNC: 1.86 MG/DL (ref 0.76–1.27)
D-LACTATE SERPL-SCNC: 1.2 MMOL/L (ref 0.5–2)
DEPRECATED RDW RBC AUTO: 52.7 FL (ref 37–54)
EOSINOPHIL # BLD AUTO: 0.03 10*3/MM3 (ref 0–0.4)
EOSINOPHIL NFR BLD AUTO: 0.3 % (ref 0.3–6.2)
ERYTHROCYTE [DISTWIDTH] IN BLOOD BY AUTOMATED COUNT: 14.9 % (ref 12.3–15.4)
GFR SERPL CREATININE-BSD FRML MDRD: 35 ML/MIN/1.73
GLOBULIN UR ELPH-MCNC: 3.3 GM/DL
GLUCOSE SERPL-MCNC: 91 MG/DL (ref 65–99)
GLUCOSE UR STRIP-MCNC: NEGATIVE MG/DL
HCT VFR BLD AUTO: 45 % (ref 37.5–51)
HGB BLD-MCNC: 14.7 G/DL (ref 13–17.7)
HGB UR QL STRIP.AUTO: ABNORMAL
HOLD SPECIMEN: NORMAL
HOLD SPECIMEN: NORMAL
HYALINE CASTS UR QL AUTO: ABNORMAL /LPF
IMM GRANULOCYTES # BLD AUTO: 0.03 10*3/MM3 (ref 0–0.05)
IMM GRANULOCYTES NFR BLD AUTO: 0.3 % (ref 0–0.5)
KETONES UR QL STRIP: ABNORMAL
LEUKOCYTE ESTERASE UR QL STRIP.AUTO: ABNORMAL
LYMPHOCYTES # BLD AUTO: 0.6 10*3/MM3 (ref 0.7–3.1)
LYMPHOCYTES NFR BLD AUTO: 6.7 % (ref 19.6–45.3)
MAGNESIUM SERPL-MCNC: 2 MG/DL (ref 1.6–2.4)
MCH RBC QN AUTO: 31.5 PG (ref 26.6–33)
MCHC RBC AUTO-ENTMCNC: 32.7 G/DL (ref 31.5–35.7)
MCV RBC AUTO: 96.6 FL (ref 79–97)
MONOCYTES # BLD AUTO: 0.81 10*3/MM3 (ref 0.1–0.9)
MONOCYTES NFR BLD AUTO: 9.1 % (ref 5–12)
NEUTROPHILS NFR BLD AUTO: 7.38 10*3/MM3 (ref 1.7–7)
NEUTROPHILS NFR BLD AUTO: 83.2 % (ref 42.7–76)
NITRITE UR QL STRIP: NEGATIVE
NRBC BLD AUTO-RTO: 0 /100 WBC (ref 0–0.2)
PH UR STRIP.AUTO: 5.5 [PH] (ref 5–8)
PLATELET # BLD AUTO: 169 10*3/MM3 (ref 140–450)
PMV BLD AUTO: 10.3 FL (ref 6–12)
POTASSIUM SERPL-SCNC: 3.6 MMOL/L (ref 3.5–5.2)
PROCALCITONIN SERPL-MCNC: 0.18 NG/ML (ref 0–0.25)
PROT SERPL-MCNC: 7.3 G/DL (ref 6–8.5)
PROT UR QL STRIP: ABNORMAL
RBC # BLD AUTO: 4.66 10*6/MM3 (ref 4.14–5.8)
RBC # UR: ABNORMAL /HPF
REF LAB TEST METHOD: ABNORMAL
SODIUM SERPL-SCNC: 140 MMOL/L (ref 136–145)
SP GR UR STRIP: 1.01 (ref 1–1.03)
SQUAMOUS #/AREA URNS HPF: ABNORMAL /HPF
TROPONIN T SERPL-MCNC: <0.01 NG/ML (ref 0–0.03)
UROBILINOGEN UR QL STRIP: ABNORMAL
WBC # BLD AUTO: 8.89 10*3/MM3 (ref 3.4–10.8)
WBC UR QL AUTO: ABNORMAL /HPF
WHOLE BLOOD HOLD SPECIMEN: NORMAL
WHOLE BLOOD HOLD SPECIMEN: NORMAL

## 2020-07-28 PROCEDURE — 36415 COLL VENOUS BLD VENIPUNCTURE: CPT

## 2020-07-28 PROCEDURE — 99223 1ST HOSP IP/OBS HIGH 75: CPT | Performed by: INTERNAL MEDICINE

## 2020-07-28 PROCEDURE — 83605 ASSAY OF LACTIC ACID: CPT | Performed by: PHYSICIAN ASSISTANT

## 2020-07-28 PROCEDURE — 70450 CT HEAD/BRAIN W/O DYE: CPT

## 2020-07-28 PROCEDURE — 93005 ELECTROCARDIOGRAM TRACING: CPT

## 2020-07-28 PROCEDURE — 85025 COMPLETE CBC W/AUTO DIFF WBC: CPT | Performed by: EMERGENCY MEDICINE

## 2020-07-28 PROCEDURE — 87186 SC STD MICRODIL/AGAR DIL: CPT | Performed by: PHYSICIAN ASSISTANT

## 2020-07-28 PROCEDURE — 99285 EMERGENCY DEPT VISIT HI MDM: CPT

## 2020-07-28 PROCEDURE — 84484 ASSAY OF TROPONIN QUANT: CPT | Performed by: EMERGENCY MEDICINE

## 2020-07-28 PROCEDURE — 87077 CULTURE AEROBIC IDENTIFY: CPT | Performed by: PHYSICIAN ASSISTANT

## 2020-07-28 PROCEDURE — 93005 ELECTROCARDIOGRAM TRACING: CPT | Performed by: EMERGENCY MEDICINE

## 2020-07-28 PROCEDURE — 71045 X-RAY EXAM CHEST 1 VIEW: CPT

## 2020-07-28 PROCEDURE — 76775 US EXAM ABDO BACK WALL LIM: CPT

## 2020-07-28 PROCEDURE — 84145 PROCALCITONIN (PCT): CPT | Performed by: PHYSICIAN ASSISTANT

## 2020-07-28 PROCEDURE — 83735 ASSAY OF MAGNESIUM: CPT | Performed by: EMERGENCY MEDICINE

## 2020-07-28 PROCEDURE — 25010000002 HEPARIN (PORCINE) PER 1000 UNITS: Performed by: INTERNAL MEDICINE

## 2020-07-28 PROCEDURE — 80053 COMPREHEN METABOLIC PANEL: CPT | Performed by: EMERGENCY MEDICINE

## 2020-07-28 PROCEDURE — 87186 SC STD MICRODIL/AGAR DIL: CPT | Performed by: EMERGENCY MEDICINE

## 2020-07-28 PROCEDURE — 81001 URINALYSIS AUTO W/SCOPE: CPT | Performed by: EMERGENCY MEDICINE

## 2020-07-28 PROCEDURE — 25010000002 VANCOMYCIN 10 G RECONSTITUTED SOLUTION: Performed by: INTERNAL MEDICINE

## 2020-07-28 PROCEDURE — 87150 DNA/RNA AMPLIFIED PROBE: CPT | Performed by: PHYSICIAN ASSISTANT

## 2020-07-28 PROCEDURE — 87086 URINE CULTURE/COLONY COUNT: CPT | Performed by: EMERGENCY MEDICINE

## 2020-07-28 PROCEDURE — 87040 BLOOD CULTURE FOR BACTERIA: CPT | Performed by: PHYSICIAN ASSISTANT

## 2020-07-28 RX ORDER — SODIUM CHLORIDE 9 MG/ML
100 INJECTION, SOLUTION INTRAVENOUS CONTINUOUS
Status: DISCONTINUED | OUTPATIENT
Start: 2020-07-28 | End: 2020-07-31

## 2020-07-28 RX ORDER — TERAZOSIN 5 MG/1
5 CAPSULE ORAL NIGHTLY
Status: DISCONTINUED | OUTPATIENT
Start: 2020-07-28 | End: 2020-08-12 | Stop reason: HOSPADM

## 2020-07-28 RX ORDER — BUSPIRONE HYDROCHLORIDE 5 MG/1
5 TABLET ORAL EVERY 12 HOURS SCHEDULED
Status: DISCONTINUED | OUTPATIENT
Start: 2020-07-28 | End: 2020-08-04

## 2020-07-28 RX ORDER — SODIUM CHLORIDE 0.9 % (FLUSH) 0.9 %
10 SYRINGE (ML) INJECTION AS NEEDED
Status: DISCONTINUED | OUTPATIENT
Start: 2020-07-28 | End: 2020-08-12 | Stop reason: HOSPADM

## 2020-07-28 RX ORDER — HEPARIN SODIUM 5000 [USP'U]/ML
5000 INJECTION, SOLUTION INTRAVENOUS; SUBCUTANEOUS EVERY 8 HOURS SCHEDULED
Status: DISCONTINUED | OUTPATIENT
Start: 2020-07-28 | End: 2020-08-12 | Stop reason: HOSPADM

## 2020-07-28 RX ORDER — ONDANSETRON 2 MG/ML
4 INJECTION INTRAMUSCULAR; INTRAVENOUS EVERY 6 HOURS PRN
Status: DISCONTINUED | OUTPATIENT
Start: 2020-07-28 | End: 2020-08-12 | Stop reason: HOSPADM

## 2020-07-28 RX ORDER — ACETAMINOPHEN 325 MG/1
650 TABLET ORAL EVERY 4 HOURS PRN
Status: DISCONTINUED | OUTPATIENT
Start: 2020-07-28 | End: 2020-08-12 | Stop reason: HOSPADM

## 2020-07-28 RX ORDER — CLOPIDOGREL BISULFATE 75 MG/1
75 TABLET ORAL DAILY
Status: DISCONTINUED | OUTPATIENT
Start: 2020-07-29 | End: 2020-08-12 | Stop reason: HOSPADM

## 2020-07-28 RX ORDER — PANTOPRAZOLE SODIUM 40 MG/1
40 TABLET, DELAYED RELEASE ORAL EVERY MORNING
Status: DISCONTINUED | OUTPATIENT
Start: 2020-07-29 | End: 2020-08-12 | Stop reason: HOSPADM

## 2020-07-28 RX ORDER — SODIUM CHLORIDE 0.9 % (FLUSH) 0.9 %
10 SYRINGE (ML) INJECTION EVERY 12 HOURS SCHEDULED
Status: DISCONTINUED | OUTPATIENT
Start: 2020-07-28 | End: 2020-08-07 | Stop reason: SDUPTHER

## 2020-07-28 RX ORDER — MEMANTINE HYDROCHLORIDE 10 MG/1
5 TABLET ORAL DAILY
Status: DISCONTINUED | OUTPATIENT
Start: 2020-07-29 | End: 2020-08-12 | Stop reason: HOSPADM

## 2020-07-28 RX ORDER — ALLOPURINOL 300 MG/1
300 TABLET ORAL DAILY
Status: DISCONTINUED | OUTPATIENT
Start: 2020-07-29 | End: 2020-08-12 | Stop reason: HOSPADM

## 2020-07-28 RX ORDER — SODIUM CHLORIDE 0.9 % (FLUSH) 0.9 %
10 SYRINGE (ML) INJECTION AS NEEDED
Status: DISCONTINUED | OUTPATIENT
Start: 2020-07-28 | End: 2020-08-07 | Stop reason: SDUPTHER

## 2020-07-28 RX ORDER — LEVOTHYROXINE SODIUM 0.15 MG/1
150 TABLET ORAL
Status: DISCONTINUED | OUTPATIENT
Start: 2020-07-29 | End: 2020-08-12 | Stop reason: HOSPADM

## 2020-07-28 RX ORDER — FINASTERIDE 5 MG/1
5 TABLET, FILM COATED ORAL DAILY
Status: DISCONTINUED | OUTPATIENT
Start: 2020-07-29 | End: 2020-08-12 | Stop reason: HOSPADM

## 2020-07-28 RX ORDER — ASPIRIN 81 MG/1
81 TABLET ORAL DAILY
Status: DISCONTINUED | OUTPATIENT
Start: 2020-07-29 | End: 2020-08-12 | Stop reason: HOSPADM

## 2020-07-28 RX ORDER — ATORVASTATIN CALCIUM 40 MG/1
80 TABLET, FILM COATED ORAL NIGHTLY
Status: DISCONTINUED | OUTPATIENT
Start: 2020-07-28 | End: 2020-08-12 | Stop reason: HOSPADM

## 2020-07-28 RX ADMIN — HEPARIN SODIUM 5000 UNITS: 5000 INJECTION INTRAVENOUS; SUBCUTANEOUS at 21:12

## 2020-07-28 RX ADMIN — AZTREONAM 1 G: 1 INJECTION, POWDER, LYOPHILIZED, FOR SOLUTION INTRAMUSCULAR; INTRAVENOUS at 20:03

## 2020-07-28 RX ADMIN — ACETAMINOPHEN 650 MG: 325 TABLET, FILM COATED ORAL at 20:18

## 2020-07-28 RX ADMIN — AZTREONAM 2 G: 2 INJECTION, POWDER, LYOPHILIZED, FOR SOLUTION INTRAMUSCULAR; INTRAVENOUS at 12:01

## 2020-07-28 RX ADMIN — VANCOMYCIN HYDROCHLORIDE 1750 MG: 10 INJECTION, POWDER, LYOPHILIZED, FOR SOLUTION INTRAVENOUS at 16:26

## 2020-07-28 RX ADMIN — ATORVASTATIN CALCIUM 80 MG: 40 TABLET, FILM COATED ORAL at 20:02

## 2020-07-28 RX ADMIN — SODIUM CHLORIDE 100 ML/HR: 9 INJECTION, SOLUTION INTRAVENOUS at 14:29

## 2020-07-28 RX ADMIN — BUSPIRONE HYDROCHLORIDE 5 MG: 5 TABLET ORAL at 20:02

## 2020-07-28 RX ADMIN — TERAZOSIN HYDROCHLORIDE 5 MG: 5 CAPSULE ORAL at 20:02

## 2020-07-29 PROBLEM — R78.81 BACTEREMIA: Status: ACTIVE | Noted: 2020-07-29

## 2020-07-29 LAB
ANION GAP SERPL CALCULATED.3IONS-SCNC: 12 MMOL/L (ref 5–15)
BACTERIA BLD CULT: ABNORMAL
BASOPHILS # BLD AUTO: 0.04 10*3/MM3 (ref 0–0.2)
BASOPHILS NFR BLD AUTO: 0.3 % (ref 0–1.5)
BUN SERPL-MCNC: 22 MG/DL (ref 8–23)
BUN/CREAT SERPL: 15.6 (ref 7–25)
CALCIUM SPEC-SCNC: 8.7 MG/DL (ref 8.6–10.5)
CHLORIDE SERPL-SCNC: 101 MMOL/L (ref 98–107)
CO2 SERPL-SCNC: 27 MMOL/L (ref 22–29)
CREAT SERPL-MCNC: 1.41 MG/DL (ref 0.76–1.27)
DEPRECATED RDW RBC AUTO: 53.9 FL (ref 37–54)
EOSINOPHIL # BLD AUTO: 0.03 10*3/MM3 (ref 0–0.4)
EOSINOPHIL NFR BLD AUTO: 0.2 % (ref 0.3–6.2)
ERYTHROCYTE [DISTWIDTH] IN BLOOD BY AUTOMATED COUNT: 14.9 % (ref 12.3–15.4)
GFR SERPL CREATININE-BSD FRML MDRD: 48 ML/MIN/1.73
GLUCOSE SERPL-MCNC: 112 MG/DL (ref 65–99)
HCT VFR BLD AUTO: 43.7 % (ref 37.5–51)
HGB BLD-MCNC: 14 G/DL (ref 13–17.7)
IMM GRANULOCYTES # BLD AUTO: 0.08 10*3/MM3 (ref 0–0.05)
IMM GRANULOCYTES NFR BLD AUTO: 0.6 % (ref 0–0.5)
LYMPHOCYTES # BLD AUTO: 0.84 10*3/MM3 (ref 0.7–3.1)
LYMPHOCYTES NFR BLD AUTO: 6.3 % (ref 19.6–45.3)
MCH RBC QN AUTO: 31.5 PG (ref 26.6–33)
MCHC RBC AUTO-ENTMCNC: 32 G/DL (ref 31.5–35.7)
MCV RBC AUTO: 98.2 FL (ref 79–97)
MONOCYTES # BLD AUTO: 1.35 10*3/MM3 (ref 0.1–0.9)
MONOCYTES NFR BLD AUTO: 10.1 % (ref 5–12)
NEUTROPHILS NFR BLD AUTO: 11.06 10*3/MM3 (ref 1.7–7)
NEUTROPHILS NFR BLD AUTO: 82.5 % (ref 42.7–76)
NRBC BLD AUTO-RTO: 0 /100 WBC (ref 0–0.2)
PLATELET # BLD AUTO: 156 10*3/MM3 (ref 140–450)
PMV BLD AUTO: 10.7 FL (ref 6–12)
POTASSIUM SERPL-SCNC: 4 MMOL/L (ref 3.5–5.2)
RBC # BLD AUTO: 4.45 10*6/MM3 (ref 4.14–5.8)
SODIUM SERPL-SCNC: 140 MMOL/L (ref 136–145)
WBC # BLD AUTO: 13.4 10*3/MM3 (ref 3.4–10.8)

## 2020-07-29 PROCEDURE — 80048 BASIC METABOLIC PNL TOTAL CA: CPT | Performed by: INTERNAL MEDICINE

## 2020-07-29 PROCEDURE — 25010000002 VANCOMYCIN 10 G RECONSTITUTED SOLUTION

## 2020-07-29 PROCEDURE — 99232 SBSQ HOSP IP/OBS MODERATE 35: CPT | Performed by: INTERNAL MEDICINE

## 2020-07-29 PROCEDURE — 97162 PT EVAL MOD COMPLEX 30 MIN: CPT | Performed by: PHYSICAL THERAPIST

## 2020-07-29 PROCEDURE — 85025 COMPLETE CBC W/AUTO DIFF WBC: CPT | Performed by: INTERNAL MEDICINE

## 2020-07-29 PROCEDURE — 25010000002 HEPARIN (PORCINE) PER 1000 UNITS: Performed by: INTERNAL MEDICINE

## 2020-07-29 RX ADMIN — AZTREONAM 1 G: 1 INJECTION, POWDER, LYOPHILIZED, FOR SOLUTION INTRAMUSCULAR; INTRAVENOUS at 20:30

## 2020-07-29 RX ADMIN — BUSPIRONE HYDROCHLORIDE 5 MG: 5 TABLET ORAL at 09:04

## 2020-07-29 RX ADMIN — CLOPIDOGREL BISULFATE 75 MG: 75 TABLET ORAL at 09:04

## 2020-07-29 RX ADMIN — HEPARIN SODIUM 5000 UNITS: 5000 INJECTION INTRAVENOUS; SUBCUTANEOUS at 15:01

## 2020-07-29 RX ADMIN — HEPARIN SODIUM 5000 UNITS: 5000 INJECTION INTRAVENOUS; SUBCUTANEOUS at 20:30

## 2020-07-29 RX ADMIN — PANTOPRAZOLE SODIUM 40 MG: 40 TABLET, DELAYED RELEASE ORAL at 09:04

## 2020-07-29 RX ADMIN — ATORVASTATIN CALCIUM 80 MG: 40 TABLET, FILM COATED ORAL at 20:30

## 2020-07-29 RX ADMIN — VANCOMYCIN HYDROCHLORIDE 1250 MG: 10 INJECTION, POWDER, LYOPHILIZED, FOR SOLUTION INTRAVENOUS at 17:03

## 2020-07-29 RX ADMIN — LEVOTHYROXINE SODIUM 150 MCG: 150 TABLET ORAL at 05:05

## 2020-07-29 RX ADMIN — ALLOPURINOL 300 MG: 300 TABLET ORAL at 09:06

## 2020-07-29 RX ADMIN — AZTREONAM 1 G: 1 INJECTION, POWDER, LYOPHILIZED, FOR SOLUTION INTRAMUSCULAR; INTRAVENOUS at 11:54

## 2020-07-29 RX ADMIN — ASPIRIN 81 MG: 81 TABLET, COATED ORAL at 09:04

## 2020-07-29 RX ADMIN — HEPARIN SODIUM 5000 UNITS: 5000 INJECTION INTRAVENOUS; SUBCUTANEOUS at 05:05

## 2020-07-29 RX ADMIN — TERAZOSIN HYDROCHLORIDE 5 MG: 5 CAPSULE ORAL at 20:29

## 2020-07-29 RX ADMIN — AZTREONAM 1 G: 1 INJECTION, POWDER, LYOPHILIZED, FOR SOLUTION INTRAMUSCULAR; INTRAVENOUS at 03:39

## 2020-07-29 RX ADMIN — BUSPIRONE HYDROCHLORIDE 5 MG: 5 TABLET ORAL at 20:30

## 2020-07-29 RX ADMIN — FINASTERIDE 5 MG: 5 TABLET, FILM COATED ORAL at 09:04

## 2020-07-29 RX ADMIN — ACETAMINOPHEN 650 MG: 325 TABLET, FILM COATED ORAL at 15:01

## 2020-07-29 RX ADMIN — MEMANTINE 5 MG: 10 TABLET ORAL at 09:04

## 2020-07-30 ENCOUNTER — APPOINTMENT (OUTPATIENT)
Dept: CARDIOLOGY | Facility: HOSPITAL | Age: 80
End: 2020-07-30

## 2020-07-30 LAB
ANION GAP SERPL CALCULATED.3IONS-SCNC: 9 MMOL/L (ref 5–15)
BACTERIA SPEC AEROBE CULT: ABNORMAL
BASOPHILS # BLD AUTO: 0.05 10*3/MM3 (ref 0–0.2)
BASOPHILS NFR BLD AUTO: 0.4 % (ref 0–1.5)
BH CV ECHO MEAS - AO MAX PG (FULL): 5.1 MMHG
BH CV ECHO MEAS - AO MAX PG: 8.4 MMHG
BH CV ECHO MEAS - AO MEAN PG (FULL): 3.1 MMHG
BH CV ECHO MEAS - AO MEAN PG: 4.9 MMHG
BH CV ECHO MEAS - AO ROOT AREA (BSA CORRECTED): 1.4
BH CV ECHO MEAS - AO ROOT AREA: 6.4 CM^2
BH CV ECHO MEAS - AO ROOT DIAM: 2.9 CM
BH CV ECHO MEAS - AO V2 MAX: 145.1 CM/SEC
BH CV ECHO MEAS - AO V2 MEAN: 104.5 CM/SEC
BH CV ECHO MEAS - AO V2 VTI: 32.5 CM
BH CV ECHO MEAS - AVA(I,A): 2 CM^2
BH CV ECHO MEAS - AVA(I,D): 2 CM^2
BH CV ECHO MEAS - AVA(V,A): 2 CM^2
BH CV ECHO MEAS - AVA(V,D): 2 CM^2
BH CV ECHO MEAS - BSA(HAYCOCK): 2 M^2
BH CV ECHO MEAS - BSA: 2.1 M^2
BH CV ECHO MEAS - BZI_BMI: 23.6 KILOGRAMS/M^2
BH CV ECHO MEAS - BZI_METRIC_HEIGHT: 185.4 CM
BH CV ECHO MEAS - BZI_METRIC_WEIGHT: 81.2 KG
BH CV ECHO MEAS - EDV(CUBED): 63.9 ML
BH CV ECHO MEAS - EDV(MOD-SP2): 70 ML
BH CV ECHO MEAS - EDV(MOD-SP4): 105 ML
BH CV ECHO MEAS - EDV(TEICH): 69.9 ML
BH CV ECHO MEAS - EF(CUBED): 78.3 %
BH CV ECHO MEAS - EF(MOD-SP2): 54.3 %
BH CV ECHO MEAS - EF(MOD-SP4): 70.5 %
BH CV ECHO MEAS - EF(TEICH): 71.1 %
BH CV ECHO MEAS - ESV(CUBED): 13.9 ML
BH CV ECHO MEAS - ESV(MOD-SP2): 32 ML
BH CV ECHO MEAS - ESV(MOD-SP4): 31 ML
BH CV ECHO MEAS - ESV(TEICH): 20.2 ML
BH CV ECHO MEAS - FS: 39.9 %
BH CV ECHO MEAS - IVS/LVPW: 1
BH CV ECHO MEAS - IVSD: 1.3 CM
BH CV ECHO MEAS - LA DIMENSION: 4 CM
BH CV ECHO MEAS - LA/AO: 1.4
BH CV ECHO MEAS - LAD MAJOR: 5.5 CM
BH CV ECHO MEAS - LAT PEAK E' VEL: 8.4 CM/SEC
BH CV ECHO MEAS - LATERAL E/E' RATIO: 9.1
BH CV ECHO MEAS - LV DIASTOLIC VOL/BSA (35-75): 51.2 ML/M^2
BH CV ECHO MEAS - LV MASS(C)D: 175.2 GRAMS
BH CV ECHO MEAS - LV MASS(C)DI: 85.4 GRAMS/M^2
BH CV ECHO MEAS - LV MAX PG: 3.3 MMHG
BH CV ECHO MEAS - LV MEAN PG: 1.8 MMHG
BH CV ECHO MEAS - LV SYSTOLIC VOL/BSA (12-30): 15.1 ML/M^2
BH CV ECHO MEAS - LV V1 MAX: 90.8 CM/SEC
BH CV ECHO MEAS - LV V1 MEAN: 64.4 CM/SEC
BH CV ECHO MEAS - LV V1 VTI: 20.4 CM
BH CV ECHO MEAS - LVIDD: 4 CM
BH CV ECHO MEAS - LVIDS: 2.4 CM
BH CV ECHO MEAS - LVLD AP2: 7.1 CM
BH CV ECHO MEAS - LVLD AP4: 7.3 CM
BH CV ECHO MEAS - LVLS AP2: 5 CM
BH CV ECHO MEAS - LVLS AP4: 6.4 CM
BH CV ECHO MEAS - LVOT AREA (M): 3.1 CM^2
BH CV ECHO MEAS - LVOT AREA: 3.2 CM^2
BH CV ECHO MEAS - LVOT DIAM: 2 CM
BH CV ECHO MEAS - LVPWD: 1.2 CM
BH CV ECHO MEAS - MED PEAK E' VEL: 6 CM/SEC
BH CV ECHO MEAS - MEDIAL E/E' RATIO: 12.7
BH CV ECHO MEAS - MV A MAX VEL: 81.4 CM/SEC
BH CV ECHO MEAS - MV DEC SLOPE: 238.6 CM/SEC^2
BH CV ECHO MEAS - MV DEC TIME: 0.28 SEC
BH CV ECHO MEAS - MV E MAX VEL: 78.5 CM/SEC
BH CV ECHO MEAS - MV E/A: 0.96
BH CV ECHO MEAS - MV MAX PG: 3.8 MMHG
BH CV ECHO MEAS - MV MEAN PG: 1.8 MMHG
BH CV ECHO MEAS - MV P1/2T MAX VEL: 88.2 CM/SEC
BH CV ECHO MEAS - MV P1/2T: 108.3 MSEC
BH CV ECHO MEAS - MV V2 MAX: 97.7 CM/SEC
BH CV ECHO MEAS - MV V2 MEAN: 63.7 CM/SEC
BH CV ECHO MEAS - MV V2 VTI: 34.6 CM
BH CV ECHO MEAS - MVA P1/2T LCG: 2.5 CM^2
BH CV ECHO MEAS - MVA(P1/2T): 2 CM^2
BH CV ECHO MEAS - MVA(VTI): 1.9 CM^2
BH CV ECHO MEAS - PA ACC SLOPE: 743.4 CM/SEC^2
BH CV ECHO MEAS - PA ACC TIME: 0.12 SEC
BH CV ECHO MEAS - PA PR(ACCEL): 26.7 MMHG
BH CV ECHO MEAS - RAP SYSTOLE: 3 MMHG
BH CV ECHO MEAS - RV MAX PG: 1.1 MMHG
BH CV ECHO MEAS - RV V1 MAX: 52.3 CM/SEC
BH CV ECHO MEAS - RVSP: 15 MMHG
BH CV ECHO MEAS - SI(AO): 101.8 ML/M^2
BH CV ECHO MEAS - SI(CUBED): 24.4 ML/M^2
BH CV ECHO MEAS - SI(LVOT): 31.9 ML/M^2
BH CV ECHO MEAS - SI(MOD-SP2): 18.5 ML/M^2
BH CV ECHO MEAS - SI(MOD-SP4): 36 ML/M^2
BH CV ECHO MEAS - SI(TEICH): 24.2 ML/M^2
BH CV ECHO MEAS - SV(AO): 209 ML
BH CV ECHO MEAS - SV(CUBED): 50 ML
BH CV ECHO MEAS - SV(LVOT): 65.5 ML
BH CV ECHO MEAS - SV(MOD-SP2): 38 ML
BH CV ECHO MEAS - SV(MOD-SP4): 74 ML
BH CV ECHO MEAS - SV(TEICH): 49.7 ML
BH CV ECHO MEAS - TAPSE (>1.6): 2.4 CM2
BH CV ECHO MEAS - TR MAX PG: 12 MMHG
BH CV ECHO MEAS - TR MAX VEL: 169.7 CM/SEC
BH CV ECHO MEASUREMENTS AVERAGE E/E' RATIO: 10.9
BH CV XLRA - RV BASE: 3.7 CM
BH CV XLRA - RV LENGTH: 2.8 CM
BH CV XLRA - RV MID: 2.8 CM
BH CV XLRA - TDI S': 14.3 CM/SEC
BUN SERPL-MCNC: 18 MG/DL (ref 8–23)
BUN/CREAT SERPL: 16.7 (ref 7–25)
CALCIUM SPEC-SCNC: 7.8 MG/DL (ref 8.6–10.5)
CHLORIDE SERPL-SCNC: 103 MMOL/L (ref 98–107)
CO2 SERPL-SCNC: 25 MMOL/L (ref 22–29)
CREAT SERPL-MCNC: 1.08 MG/DL (ref 0.76–1.27)
DEPRECATED RDW RBC AUTO: 53.3 FL (ref 37–54)
EOSINOPHIL # BLD AUTO: 0.04 10*3/MM3 (ref 0–0.4)
EOSINOPHIL NFR BLD AUTO: 0.4 % (ref 0.3–6.2)
ERYTHROCYTE [DISTWIDTH] IN BLOOD BY AUTOMATED COUNT: 15.1 % (ref 12.3–15.4)
GFR SERPL CREATININE-BSD FRML MDRD: 66 ML/MIN/1.73
GLUCOSE SERPL-MCNC: 93 MG/DL (ref 65–99)
GRAM STN SPEC: ABNORMAL
GRAM STN SPEC: ABNORMAL
HCT VFR BLD AUTO: 38.2 % (ref 37.5–51)
HGB BLD-MCNC: 12.7 G/DL (ref 13–17.7)
IMM GRANULOCYTES # BLD AUTO: 0.06 10*3/MM3 (ref 0–0.05)
IMM GRANULOCYTES NFR BLD AUTO: 0.5 % (ref 0–0.5)
ISOLATED FROM: ABNORMAL
LEFT ATRIUM VOLUME INDEX: 21.9 ML/M^2
LEFT ATRIUM VOLUME: 45 ML
LV EF 2D ECHO EST: 60 %
LYMPHOCYTES # BLD AUTO: 0.58 10*3/MM3 (ref 0.7–3.1)
LYMPHOCYTES NFR BLD AUTO: 5.1 % (ref 19.6–45.3)
MCH RBC QN AUTO: 32 PG (ref 26.6–33)
MCHC RBC AUTO-ENTMCNC: 33.2 G/DL (ref 31.5–35.7)
MCV RBC AUTO: 96.2 FL (ref 79–97)
MONOCYTES # BLD AUTO: 1.2 10*3/MM3 (ref 0.1–0.9)
MONOCYTES NFR BLD AUTO: 10.6 % (ref 5–12)
NEUTROPHILS NFR BLD AUTO: 83 % (ref 42.7–76)
NEUTROPHILS NFR BLD AUTO: 9.44 10*3/MM3 (ref 1.7–7)
NRBC BLD AUTO-RTO: 0 /100 WBC (ref 0–0.2)
PLATELET # BLD AUTO: 155 10*3/MM3 (ref 140–450)
PMV BLD AUTO: 10.6 FL (ref 6–12)
POTASSIUM SERPL-SCNC: 3 MMOL/L (ref 3.5–5.2)
RBC # BLD AUTO: 3.97 10*6/MM3 (ref 4.14–5.8)
SODIUM SERPL-SCNC: 137 MMOL/L (ref 136–145)
VANCOMYCIN TROUGH SERPL-MCNC: 6.1 MCG/ML (ref 5–20)
WBC # BLD AUTO: 11.37 10*3/MM3 (ref 3.4–10.8)

## 2020-07-30 PROCEDURE — 97110 THERAPEUTIC EXERCISES: CPT | Performed by: PHYSICAL THERAPIST

## 2020-07-30 PROCEDURE — 87040 BLOOD CULTURE FOR BACTERIA: CPT | Performed by: INTERNAL MEDICINE

## 2020-07-30 PROCEDURE — 25010000002 ONDANSETRON PER 1 MG: Performed by: INTERNAL MEDICINE

## 2020-07-30 PROCEDURE — 97166 OT EVAL MOD COMPLEX 45 MIN: CPT

## 2020-07-30 PROCEDURE — 80202 ASSAY OF VANCOMYCIN: CPT

## 2020-07-30 PROCEDURE — 97116 GAIT TRAINING THERAPY: CPT | Performed by: PHYSICAL THERAPIST

## 2020-07-30 PROCEDURE — 93306 TTE W/DOPPLER COMPLETE: CPT | Performed by: INTERNAL MEDICINE

## 2020-07-30 PROCEDURE — 80048 BASIC METABOLIC PNL TOTAL CA: CPT | Performed by: INTERNAL MEDICINE

## 2020-07-30 PROCEDURE — 85025 COMPLETE CBC W/AUTO DIFF WBC: CPT | Performed by: INTERNAL MEDICINE

## 2020-07-30 PROCEDURE — 25010000002 HEPARIN (PORCINE) PER 1000 UNITS: Performed by: INTERNAL MEDICINE

## 2020-07-30 PROCEDURE — 93306 TTE W/DOPPLER COMPLETE: CPT

## 2020-07-30 PROCEDURE — 99232 SBSQ HOSP IP/OBS MODERATE 35: CPT | Performed by: INTERNAL MEDICINE

## 2020-07-30 PROCEDURE — 25010000002 VANCOMYCIN 10 G RECONSTITUTED SOLUTION

## 2020-07-30 RX ORDER — POTASSIUM CHLORIDE 750 MG/1
40 CAPSULE, EXTENDED RELEASE ORAL AS NEEDED
Status: DISCONTINUED | OUTPATIENT
Start: 2020-07-30 | End: 2020-08-12 | Stop reason: HOSPADM

## 2020-07-30 RX ORDER — POTASSIUM CHLORIDE 1.5 G/1.77G
40 POWDER, FOR SOLUTION ORAL AS NEEDED
Status: DISCONTINUED | OUTPATIENT
Start: 2020-07-30 | End: 2020-08-12 | Stop reason: HOSPADM

## 2020-07-30 RX ADMIN — FINASTERIDE 5 MG: 5 TABLET, FILM COATED ORAL at 09:09

## 2020-07-30 RX ADMIN — HEPARIN SODIUM 5000 UNITS: 5000 INJECTION INTRAVENOUS; SUBCUTANEOUS at 05:00

## 2020-07-30 RX ADMIN — LEVOTHYROXINE SODIUM 150 MCG: 150 TABLET ORAL at 05:00

## 2020-07-30 RX ADMIN — ATORVASTATIN CALCIUM 80 MG: 40 TABLET, FILM COATED ORAL at 21:25

## 2020-07-30 RX ADMIN — BUSPIRONE HYDROCHLORIDE 5 MG: 5 TABLET ORAL at 21:25

## 2020-07-30 RX ADMIN — ALLOPURINOL 300 MG: 300 TABLET ORAL at 09:09

## 2020-07-30 RX ADMIN — MEMANTINE 5 MG: 10 TABLET ORAL at 09:09

## 2020-07-30 RX ADMIN — CLOPIDOGREL BISULFATE 75 MG: 75 TABLET ORAL at 09:09

## 2020-07-30 RX ADMIN — HEPARIN SODIUM 5000 UNITS: 5000 INJECTION INTRAVENOUS; SUBCUTANEOUS at 21:25

## 2020-07-30 RX ADMIN — POTASSIUM CHLORIDE 40 MEQ: 10 CAPSULE, COATED, EXTENDED RELEASE ORAL at 11:18

## 2020-07-30 RX ADMIN — VANCOMYCIN HYDROCHLORIDE 1250 MG: 10 INJECTION, POWDER, LYOPHILIZED, FOR SOLUTION INTRAVENOUS at 21:29

## 2020-07-30 RX ADMIN — BUSPIRONE HYDROCHLORIDE 5 MG: 5 TABLET ORAL at 09:09

## 2020-07-30 RX ADMIN — TERAZOSIN HYDROCHLORIDE 5 MG: 5 CAPSULE ORAL at 21:25

## 2020-07-30 RX ADMIN — ONDANSETRON 4 MG: 2 INJECTION INTRAMUSCULAR; INTRAVENOUS at 11:18

## 2020-07-30 RX ADMIN — AZTREONAM 1 G: 1 INJECTION, POWDER, LYOPHILIZED, FOR SOLUTION INTRAMUSCULAR; INTRAVENOUS at 05:00

## 2020-07-30 RX ADMIN — PANTOPRAZOLE SODIUM 40 MG: 40 TABLET, DELAYED RELEASE ORAL at 09:09

## 2020-07-30 RX ADMIN — ASPIRIN 81 MG: 81 TABLET, COATED ORAL at 09:09

## 2020-07-31 ENCOUNTER — APPOINTMENT (OUTPATIENT)
Dept: GENERAL RADIOLOGY | Facility: HOSPITAL | Age: 80
End: 2020-07-31

## 2020-07-31 PROBLEM — J96.01 ACUTE RESPIRATORY FAILURE WITH HYPOXIA: Status: ACTIVE | Noted: 2020-07-31

## 2020-07-31 PROBLEM — I50.31 ACUTE DIASTOLIC HEART FAILURE: Status: ACTIVE | Noted: 2020-07-31

## 2020-07-31 LAB
ALBUMIN SERPL-MCNC: 3.1 G/DL (ref 3.5–5.2)
ALBUMIN/GLOB SERPL: 1.3 G/DL
ALP SERPL-CCNC: 76 U/L (ref 39–117)
ALT SERPL W P-5'-P-CCNC: 16 U/L (ref 1–41)
ANION GAP SERPL CALCULATED.3IONS-SCNC: 8 MMOL/L (ref 5–15)
ANION GAP SERPL CALCULATED.3IONS-SCNC: 9 MMOL/L (ref 5–15)
ARTERIAL PATENCY WRIST A: ABNORMAL
AST SERPL-CCNC: 33 U/L (ref 1–40)
ATMOSPHERIC PRESS: ABNORMAL MM[HG]
BACTERIA SPEC AEROBE CULT: ABNORMAL
BACTERIA SPEC AEROBE CULT: ABNORMAL
BASE EXCESS BLDA CALC-SCNC: 0 MMOL/L (ref 0–2)
BASOPHILS # BLD AUTO: 0.04 10*3/MM3 (ref 0–0.2)
BASOPHILS # BLD AUTO: 0.05 10*3/MM3 (ref 0–0.2)
BASOPHILS NFR BLD AUTO: 0.4 % (ref 0–1.5)
BASOPHILS NFR BLD AUTO: 0.6 % (ref 0–1.5)
BDY SITE: ABNORMAL
BILIRUB SERPL-MCNC: 0.8 MG/DL (ref 0–1.2)
BODY TEMPERATURE: 37 C
BUN SERPL-MCNC: 14 MG/DL (ref 8–23)
BUN SERPL-MCNC: 14 MG/DL (ref 8–23)
BUN/CREAT SERPL: 14.4 (ref 7–25)
BUN/CREAT SERPL: 14.4 (ref 7–25)
CALCIUM SPEC-SCNC: 7.5 MG/DL (ref 8.6–10.5)
CALCIUM SPEC-SCNC: 7.5 MG/DL (ref 8.6–10.5)
CHLORIDE SERPL-SCNC: 107 MMOL/L (ref 98–107)
CHLORIDE SERPL-SCNC: 107 MMOL/L (ref 98–107)
CO2 BLDA-SCNC: 26 MMOL/L (ref 22–33)
CO2 SERPL-SCNC: 24 MMOL/L (ref 22–29)
CO2 SERPL-SCNC: 24 MMOL/L (ref 22–29)
COHGB MFR BLD: 1.1 % (ref 0–2)
CREAT SERPL-MCNC: 0.97 MG/DL (ref 0.76–1.27)
CREAT SERPL-MCNC: 0.97 MG/DL (ref 0.76–1.27)
DEPRECATED RDW RBC AUTO: 54.4 FL (ref 37–54)
DEPRECATED RDW RBC AUTO: 55.1 FL (ref 37–54)
EOSINOPHIL # BLD AUTO: 0.25 10*3/MM3 (ref 0–0.4)
EOSINOPHIL # BLD AUTO: 0.26 10*3/MM3 (ref 0–0.4)
EOSINOPHIL NFR BLD AUTO: 2.8 % (ref 0.3–6.2)
EOSINOPHIL NFR BLD AUTO: 3.1 % (ref 0.3–6.2)
EPAP: 0
ERYTHROCYTE [DISTWIDTH] IN BLOOD BY AUTOMATED COUNT: 15.1 % (ref 12.3–15.4)
ERYTHROCYTE [DISTWIDTH] IN BLOOD BY AUTOMATED COUNT: 15.2 % (ref 12.3–15.4)
GFR SERPL CREATININE-BSD FRML MDRD: 74 ML/MIN/1.73
GFR SERPL CREATININE-BSD FRML MDRD: 74 ML/MIN/1.73
GLOBULIN UR ELPH-MCNC: 2.4 GM/DL
GLUCOSE SERPL-MCNC: 91 MG/DL (ref 65–99)
GLUCOSE SERPL-MCNC: 91 MG/DL (ref 65–99)
GRAM STN SPEC: ABNORMAL
GRAM STN SPEC: ABNORMAL
HCO3 BLDA-SCNC: 24.8 MMOL/L (ref 20–26)
HCT VFR BLD AUTO: 36.9 % (ref 37.5–51)
HCT VFR BLD AUTO: 39.7 % (ref 37.5–51)
HCT VFR BLD CALC: 38.5 %
HGB BLD-MCNC: 11.8 G/DL (ref 13–17.7)
HGB BLD-MCNC: 12.6 G/DL (ref 13–17.7)
HGB BLDA-MCNC: 12.6 G/DL (ref 13.5–17.5)
IMM GRANULOCYTES # BLD AUTO: 0.03 10*3/MM3 (ref 0–0.05)
IMM GRANULOCYTES # BLD AUTO: 0.05 10*3/MM3 (ref 0–0.05)
IMM GRANULOCYTES NFR BLD AUTO: 0.4 % (ref 0–0.5)
IMM GRANULOCYTES NFR BLD AUTO: 0.6 % (ref 0–0.5)
INHALED O2 CONCENTRATION: 32 %
IPAP: 0
ISOLATED FROM: ABNORMAL
LYMPHOCYTES # BLD AUTO: 0.91 10*3/MM3 (ref 0.7–3.1)
LYMPHOCYTES # BLD AUTO: 0.97 10*3/MM3 (ref 0.7–3.1)
LYMPHOCYTES NFR BLD AUTO: 10.7 % (ref 19.6–45.3)
LYMPHOCYTES NFR BLD AUTO: 10.9 % (ref 19.6–45.3)
MCH RBC QN AUTO: 31.2 PG (ref 26.6–33)
MCH RBC QN AUTO: 31.7 PG (ref 26.6–33)
MCHC RBC AUTO-ENTMCNC: 31.7 G/DL (ref 31.5–35.7)
MCHC RBC AUTO-ENTMCNC: 32 G/DL (ref 31.5–35.7)
MCV RBC AUTO: 98.3 FL (ref 79–97)
MCV RBC AUTO: 99.2 FL (ref 79–97)
METHGB BLD QL: 0.6 % (ref 0–1.5)
MODALITY: ABNORMAL
MONOCYTES # BLD AUTO: 1.02 10*3/MM3 (ref 0.1–0.9)
MONOCYTES # BLD AUTO: 1.15 10*3/MM3 (ref 0.1–0.9)
MONOCYTES NFR BLD AUTO: 12.2 % (ref 5–12)
MONOCYTES NFR BLD AUTO: 12.7 % (ref 5–12)
NEUTROPHILS NFR BLD AUTO: 6.07 10*3/MM3 (ref 1.7–7)
NEUTROPHILS NFR BLD AUTO: 6.61 10*3/MM3 (ref 1.7–7)
NEUTROPHILS NFR BLD AUTO: 72.8 % (ref 42.7–76)
NEUTROPHILS NFR BLD AUTO: 72.8 % (ref 42.7–76)
NOTE: ABNORMAL
NRBC BLD AUTO-RTO: 0 /100 WBC (ref 0–0.2)
NRBC BLD AUTO-RTO: 0 /100 WBC (ref 0–0.2)
NT-PROBNP SERPL-MCNC: 696 PG/ML (ref 0–1800)
OXYHGB MFR BLDV: 95 % (ref 94–99)
PAW @ PEAK INSP FLOW SETTING VENT: 0 CMH2O
PCO2 BLDA: 40.1 MM HG (ref 35–45)
PCO2 TEMP ADJ BLD: 40.1 MM HG (ref 35–48)
PH BLDA: 7.4 PH UNITS (ref 7.35–7.45)
PH, TEMP CORRECTED: 7.4 PH UNITS
PLATELET # BLD AUTO: 145 10*3/MM3 (ref 140–450)
PLATELET # BLD AUTO: 148 10*3/MM3 (ref 140–450)
PMV BLD AUTO: 10.4 FL (ref 6–12)
PMV BLD AUTO: 10.7 FL (ref 6–12)
PO2 BLDA: 82.2 MM HG (ref 83–108)
PO2 TEMP ADJ BLD: 82.2 MM HG (ref 83–108)
POTASSIUM SERPL-SCNC: 3.3 MMOL/L (ref 3.5–5.2)
POTASSIUM SERPL-SCNC: 3.4 MMOL/L (ref 3.5–5.2)
POTASSIUM SERPL-SCNC: 3.4 MMOL/L (ref 3.5–5.2)
POTASSIUM SERPL-SCNC: 3.9 MMOL/L (ref 3.5–5.2)
PROT SERPL-MCNC: 5.5 G/DL (ref 6–8.5)
RBC # BLD AUTO: 3.72 10*6/MM3 (ref 4.14–5.8)
RBC # BLD AUTO: 4.04 10*6/MM3 (ref 4.14–5.8)
SODIUM SERPL-SCNC: 139 MMOL/L (ref 136–145)
SODIUM SERPL-SCNC: 140 MMOL/L (ref 136–145)
TOTAL RATE: 0 BREATHS/MINUTE
WBC # BLD AUTO: 8.34 10*3/MM3 (ref 3.4–10.8)
WBC # BLD AUTO: 9.07 10*3/MM3 (ref 3.4–10.8)

## 2020-07-31 PROCEDURE — 25010000002 HEPARIN (PORCINE) PER 1000 UNITS: Performed by: INTERNAL MEDICINE

## 2020-07-31 PROCEDURE — 97530 THERAPEUTIC ACTIVITIES: CPT

## 2020-07-31 PROCEDURE — 99233 SBSQ HOSP IP/OBS HIGH 50: CPT | Performed by: INTERNAL MEDICINE

## 2020-07-31 PROCEDURE — 85025 COMPLETE CBC W/AUTO DIFF WBC: CPT | Performed by: INTERNAL MEDICINE

## 2020-07-31 PROCEDURE — 25010000002 VANCOMYCIN PER 500 MG

## 2020-07-31 PROCEDURE — 80053 COMPREHEN METABOLIC PANEL: CPT | Performed by: INTERNAL MEDICINE

## 2020-07-31 PROCEDURE — 83880 ASSAY OF NATRIURETIC PEPTIDE: CPT | Performed by: NURSE PRACTITIONER

## 2020-07-31 PROCEDURE — 84132 ASSAY OF SERUM POTASSIUM: CPT | Performed by: INTERNAL MEDICINE

## 2020-07-31 PROCEDURE — 97110 THERAPEUTIC EXERCISES: CPT

## 2020-07-31 PROCEDURE — 71045 X-RAY EXAM CHEST 1 VIEW: CPT

## 2020-07-31 PROCEDURE — 25010000002 FUROSEMIDE PER 20 MG: Performed by: NURSE PRACTITIONER

## 2020-07-31 PROCEDURE — 97535 SELF CARE MNGMENT TRAINING: CPT

## 2020-07-31 PROCEDURE — 82805 BLOOD GASES W/O2 SATURATION: CPT

## 2020-07-31 PROCEDURE — 36600 WITHDRAWAL OF ARTERIAL BLOOD: CPT

## 2020-07-31 PROCEDURE — 85025 COMPLETE CBC W/AUTO DIFF WBC: CPT | Performed by: NURSE PRACTITIONER

## 2020-07-31 RX ORDER — VANCOMYCIN HYDROCHLORIDE 1 G/200ML
1000 INJECTION, SOLUTION INTRAVENOUS EVERY 12 HOURS SCHEDULED
Status: DISCONTINUED | OUTPATIENT
Start: 2020-07-31 | End: 2020-08-03

## 2020-07-31 RX ORDER — FUROSEMIDE 10 MG/ML
20 INJECTION INTRAMUSCULAR; INTRAVENOUS ONCE
Status: COMPLETED | OUTPATIENT
Start: 2020-07-31 | End: 2020-07-31

## 2020-07-31 RX ORDER — IPRATROPIUM BROMIDE AND ALBUTEROL SULFATE 2.5; .5 MG/3ML; MG/3ML
3 SOLUTION RESPIRATORY (INHALATION) EVERY 4 HOURS PRN
Status: DISCONTINUED | OUTPATIENT
Start: 2020-07-31 | End: 2020-08-12 | Stop reason: HOSPADM

## 2020-07-31 RX ADMIN — FINASTERIDE 5 MG: 5 TABLET, FILM COATED ORAL at 09:32

## 2020-07-31 RX ADMIN — HEPARIN SODIUM 5000 UNITS: 5000 INJECTION INTRAVENOUS; SUBCUTANEOUS at 21:31

## 2020-07-31 RX ADMIN — POTASSIUM CHLORIDE 40 MEQ: 1.5 POWDER, FOR SOLUTION ORAL at 09:32

## 2020-07-31 RX ADMIN — ALLOPURINOL 300 MG: 300 TABLET ORAL at 09:31

## 2020-07-31 RX ADMIN — SODIUM CHLORIDE, PRESERVATIVE FREE 10 ML: 5 INJECTION INTRAVENOUS at 21:37

## 2020-07-31 RX ADMIN — LEVOTHYROXINE SODIUM 150 MCG: 150 TABLET ORAL at 06:25

## 2020-07-31 RX ADMIN — MEMANTINE 5 MG: 10 TABLET ORAL at 09:31

## 2020-07-31 RX ADMIN — TERAZOSIN HYDROCHLORIDE 5 MG: 5 CAPSULE ORAL at 21:33

## 2020-07-31 RX ADMIN — FUROSEMIDE 20 MG: 10 INJECTION, SOLUTION INTRAMUSCULAR; INTRAVENOUS at 03:25

## 2020-07-31 RX ADMIN — HEPARIN SODIUM 5000 UNITS: 5000 INJECTION INTRAVENOUS; SUBCUTANEOUS at 15:02

## 2020-07-31 RX ADMIN — BUSPIRONE HYDROCHLORIDE 5 MG: 5 TABLET ORAL at 09:31

## 2020-07-31 RX ADMIN — HEPARIN SODIUM 5000 UNITS: 5000 INJECTION INTRAVENOUS; SUBCUTANEOUS at 06:25

## 2020-07-31 RX ADMIN — CLOPIDOGREL BISULFATE 75 MG: 75 TABLET ORAL at 09:31

## 2020-07-31 RX ADMIN — ATORVASTATIN CALCIUM 80 MG: 40 TABLET, FILM COATED ORAL at 21:31

## 2020-07-31 RX ADMIN — POTASSIUM CHLORIDE 40 MEQ: 1.5 POWDER, FOR SOLUTION ORAL at 04:08

## 2020-07-31 RX ADMIN — SODIUM CHLORIDE, PRESERVATIVE FREE 10 ML: 5 INJECTION INTRAVENOUS at 09:38

## 2020-07-31 RX ADMIN — VANCOMYCIN HYDROCHLORIDE 1000 MG: 1 INJECTION, SOLUTION INTRAVENOUS at 10:42

## 2020-07-31 RX ADMIN — PANTOPRAZOLE SODIUM 40 MG: 40 TABLET, DELAYED RELEASE ORAL at 09:31

## 2020-07-31 RX ADMIN — BUSPIRONE HYDROCHLORIDE 5 MG: 5 TABLET ORAL at 21:32

## 2020-07-31 RX ADMIN — ASPIRIN 81 MG: 81 TABLET, COATED ORAL at 09:31

## 2020-07-31 RX ADMIN — VANCOMYCIN HYDROCHLORIDE 1000 MG: 1 INJECTION, SOLUTION INTRAVENOUS at 21:43

## 2020-08-01 LAB
ANION GAP SERPL CALCULATED.3IONS-SCNC: 7 MMOL/L (ref 5–15)
BASOPHILS # BLD AUTO: 0.05 10*3/MM3 (ref 0–0.2)
BASOPHILS NFR BLD AUTO: 0.6 % (ref 0–1.5)
BUN SERPL-MCNC: 12 MG/DL (ref 8–23)
BUN/CREAT SERPL: 10.5 (ref 7–25)
CALCIUM SPEC-SCNC: 8.1 MG/DL (ref 8.6–10.5)
CHLORIDE SERPL-SCNC: 106 MMOL/L (ref 98–107)
CO2 SERPL-SCNC: 28 MMOL/L (ref 22–29)
CREAT SERPL-MCNC: 1.14 MG/DL (ref 0.76–1.27)
DEPRECATED RDW RBC AUTO: 55.3 FL (ref 37–54)
EOSINOPHIL # BLD AUTO: 0.47 10*3/MM3 (ref 0–0.4)
EOSINOPHIL NFR BLD AUTO: 5.8 % (ref 0.3–6.2)
ERYTHROCYTE [DISTWIDTH] IN BLOOD BY AUTOMATED COUNT: 15.2 % (ref 12.3–15.4)
GFR SERPL CREATININE-BSD FRML MDRD: 62 ML/MIN/1.73
GLUCOSE SERPL-MCNC: 79 MG/DL (ref 65–99)
HCT VFR BLD AUTO: 37.4 % (ref 37.5–51)
HGB BLD-MCNC: 11.9 G/DL (ref 13–17.7)
IMM GRANULOCYTES # BLD AUTO: 0.05 10*3/MM3 (ref 0–0.05)
IMM GRANULOCYTES NFR BLD AUTO: 0.6 % (ref 0–0.5)
LYMPHOCYTES # BLD AUTO: 1.48 10*3/MM3 (ref 0.7–3.1)
LYMPHOCYTES NFR BLD AUTO: 18.3 % (ref 19.6–45.3)
MCH RBC QN AUTO: 31.6 PG (ref 26.6–33)
MCHC RBC AUTO-ENTMCNC: 31.8 G/DL (ref 31.5–35.7)
MCV RBC AUTO: 99.2 FL (ref 79–97)
MONOCYTES # BLD AUTO: 0.79 10*3/MM3 (ref 0.1–0.9)
MONOCYTES NFR BLD AUTO: 9.8 % (ref 5–12)
NEUTROPHILS NFR BLD AUTO: 5.24 10*3/MM3 (ref 1.7–7)
NEUTROPHILS NFR BLD AUTO: 64.9 % (ref 42.7–76)
NRBC BLD AUTO-RTO: 0 /100 WBC (ref 0–0.2)
PLATELET # BLD AUTO: 171 10*3/MM3 (ref 140–450)
PMV BLD AUTO: 10.8 FL (ref 6–12)
POTASSIUM SERPL-SCNC: 3.6 MMOL/L (ref 3.5–5.2)
RBC # BLD AUTO: 3.77 10*6/MM3 (ref 4.14–5.8)
SODIUM SERPL-SCNC: 141 MMOL/L (ref 136–145)
WBC # BLD AUTO: 8.08 10*3/MM3 (ref 3.4–10.8)

## 2020-08-01 PROCEDURE — 25010000002 HEPARIN (PORCINE) PER 1000 UNITS: Performed by: INTERNAL MEDICINE

## 2020-08-01 PROCEDURE — 25010000002 VANCOMYCIN PER 500 MG

## 2020-08-01 PROCEDURE — 80048 BASIC METABOLIC PNL TOTAL CA: CPT | Performed by: INTERNAL MEDICINE

## 2020-08-01 PROCEDURE — 99232 SBSQ HOSP IP/OBS MODERATE 35: CPT | Performed by: INTERNAL MEDICINE

## 2020-08-01 PROCEDURE — 85025 COMPLETE CBC W/AUTO DIFF WBC: CPT | Performed by: INTERNAL MEDICINE

## 2020-08-01 RX ORDER — TRIAMTERENE AND HYDROCHLOROTHIAZIDE 37.5; 25 MG/1; MG/1
1 TABLET ORAL DAILY
Status: DISCONTINUED | OUTPATIENT
Start: 2020-08-01 | End: 2020-08-04

## 2020-08-01 RX ADMIN — LEVOTHYROXINE SODIUM 150 MCG: 150 TABLET ORAL at 05:14

## 2020-08-01 RX ADMIN — CLOPIDOGREL BISULFATE 75 MG: 75 TABLET ORAL at 09:22

## 2020-08-01 RX ADMIN — ALLOPURINOL 300 MG: 300 TABLET ORAL at 09:22

## 2020-08-01 RX ADMIN — BUSPIRONE HYDROCHLORIDE 5 MG: 5 TABLET ORAL at 21:53

## 2020-08-01 RX ADMIN — FINASTERIDE 5 MG: 5 TABLET, FILM COATED ORAL at 09:22

## 2020-08-01 RX ADMIN — TERAZOSIN HYDROCHLORIDE 5 MG: 5 CAPSULE ORAL at 21:52

## 2020-08-01 RX ADMIN — HEPARIN SODIUM 5000 UNITS: 5000 INJECTION INTRAVENOUS; SUBCUTANEOUS at 21:53

## 2020-08-01 RX ADMIN — POTASSIUM CHLORIDE 40 MEQ: 1.5 POWDER, FOR SOLUTION ORAL at 06:23

## 2020-08-01 RX ADMIN — TRIAMTERENE AND HYDROCHLOROTHIAZIDE 1 TABLET: 37.5; 25 TABLET ORAL at 15:03

## 2020-08-01 RX ADMIN — HEPARIN SODIUM 5000 UNITS: 5000 INJECTION INTRAVENOUS; SUBCUTANEOUS at 05:14

## 2020-08-01 RX ADMIN — ASPIRIN 81 MG: 81 TABLET, COATED ORAL at 09:22

## 2020-08-01 RX ADMIN — HEPARIN SODIUM 5000 UNITS: 5000 INJECTION INTRAVENOUS; SUBCUTANEOUS at 15:04

## 2020-08-01 RX ADMIN — MEMANTINE 5 MG: 10 TABLET ORAL at 09:22

## 2020-08-01 RX ADMIN — SODIUM CHLORIDE, PRESERVATIVE FREE 10 ML: 5 INJECTION INTRAVENOUS at 21:53

## 2020-08-01 RX ADMIN — ATORVASTATIN CALCIUM 80 MG: 40 TABLET, FILM COATED ORAL at 21:53

## 2020-08-01 RX ADMIN — VANCOMYCIN HYDROCHLORIDE 1000 MG: 1 INJECTION, SOLUTION INTRAVENOUS at 21:58

## 2020-08-01 RX ADMIN — VANCOMYCIN HYDROCHLORIDE 1000 MG: 1 INJECTION, SOLUTION INTRAVENOUS at 10:12

## 2020-08-01 RX ADMIN — PANTOPRAZOLE SODIUM 40 MG: 40 TABLET, DELAYED RELEASE ORAL at 09:22

## 2020-08-01 RX ADMIN — BUSPIRONE HYDROCHLORIDE 5 MG: 5 TABLET ORAL at 09:22

## 2020-08-01 NOTE — PROGRESS NOTES
[facility]  PROGRESS NOTE    Patient Name: Domitila Bosch  : 1940  MRN: [id number]    Date of Admission: 2020  Primary Care Physician: Marcin Ferguson MD    Subjective   Subjective     CC:  AMS, bacteremia     HPI:  No acute events overnight. This AM appears better than yesterday. More alert. Denies pain. Oriented x 2. Wife present at bedside.     Review of Systems  Gen- No fevers, chills  CV- No chest pain, palpitations  Resp- No cough, dyspnea  GI- No N/V/D, abd pain     Objective   Objective     Vital Signs:   Temp:  [97.6 °F (36.4 °C)-98.2 °F (36.8 °C)] 97.6 °F (36.4 °C)  Heart Rate:  [58-65] 65  Resp:  [16-20] 16  BP: (121-166)/(77-89) 166/89  Total (NIH Stroke Scale): 1     Physical Exam:  Constitutional: No acute distress, awake, alert  HENT: NCAT, mucous membranes moist  Respiratory: Clear to auscultation bilaterally, respiratory effort normal   Cardiovascular: RRR, no murmurs, rubs, or gallops, palpable pedal pulses bilaterally  Gastrointestinal: Positive bowel sounds, soft, nontender, nondistended  Musculoskeletal: No bilateral ankle edema  Psychiatric: Appropriate affect, cooperative  Neurologic: Oriented x 2, strength symmetric in all extremities, Cranial Nerves grossly intact to confrontation, speech clear  Skin: No rashes    Results Reviewed:  Results from last 7 days   Lab Units 20  0340 20  0442 20  0211  20  1007   WBC 10*3/mm3 8.08 8.34 9.07   < > 8.89   HEMOGLOBIN g/dL 11.9* 12.6* 11.8*   < > 14.7   HEMATOCRIT % 37.4* 39.7 36.9*   < > 45.0   PLATELETS 10*3/mm3 171 148 145   < > 169   PROCALCITONIN ng/mL  --   --   --   --  0.18    < > = values in this interval not displayed.     Results from last 7 days   Lab Units 20  0340 20  1212 20  0211 20  0329  20  1007   SODIUM mmol/L 141  --  140  139 137   < > 140   POTASSIUM mmol/L 3.6 3.9 3.3*  3.4*  3.4* 3.0*   < > 3.6   CHLORIDE  mmol/L 106  --  107  107 103   < > 100   CO2 mmol/L 28.0  --  24.0  24.0 25.0   < > 27.0   BUN mg/dL 12  --  14  14 18   < > 20   CREATININE mg/dL 1.14  --  0.97  0.97 1.08   < > 1.86*   GLUCOSE mg/dL 79  --  91  91 93   < > 91   CALCIUM mg/dL 8.1*  --  7.5*  7.5* 7.8*   < > 9.4   ALT (SGPT) U/L  --   --  16  --   --  17   AST (SGOT) U/L  --   --  33  --   --  26   TROPONIN T ng/mL  --   --   --   --   --  <0.010   PROBNP pg/mL  --   --  696.0  --   --   --     < > = values in this interval not displayed.     Estimated Creatinine Clearance: 59.4 mL/min (by C-G formula based on SCr of 1.14 mg/dL).    Microbiology Results Abnormal     Procedure Component Value - Date/Time    Blood Culture - Blood, Chest, Right [[id number]] Collected:  07/30/20 0834    Lab Status:  Preliminary result Specimen:  Blood from Chest, Right Updated:  08/01/20 0915     Blood Culture No growth at 2 days    Blood Culture - Blood, Arm, Left [[id number]] Collected:  07/30/20 0834    Lab Status:  Preliminary result Specimen:  Blood from Arm, Left Updated:  08/01/20 0915     Blood Culture No growth at 2 days    Urine Culture - Urine, Urine, Clean Catch [[id number]]  (Abnormal)  (Susceptibility) Collected:  07/28/20 1021    Lab Status:  Edited Result - FINAL Specimen:  Urine, Clean Catch Updated:  07/31/20 1057     Urine Culture >100,000 CFU/mL Enterococcus faecalis    Narrative:        requested Daptomycin 7/30    Susceptibility      Enterococcus faecalis     GURPREET     Ampicillin Susceptible     Daptomycin Susceptible     Levofloxacin Susceptible     Nitrofurantoin Susceptible     Tetracycline Susceptible     Vancomycin Susceptible                    Blood Culture - Blood, Arm, Left [[id number]]  (Abnormal)  (Susceptibility) Collected:  07/28/20 1133    Lab Status:  Edited Result - FINAL Specimen:  Blood from Arm, Left Updated:  07/31/20 0723     Blood Culture Enterococcus faecalis     Comment:   Infectious disease consultation is highly  recommended.        Isolated from Aerobic and Anaerobic Bottles     Gram Stain Aerobic Bottle Gram positive cocci in chains      Anaerobic Bottle Gram positive cocci in chains    Narrative:       Dr. Sandhu requested Daptomycin     Susceptibility      Enterococcus faecalis     GURPREET     Ampicillin Susceptible     Daptomycin Intermediate     Gentamicin High Level Synergy Susceptible     Vancomycin Susceptible                    Blood Culture - Blood, Arm, Right [[id number]]  (Abnormal) Collected:  07/28/20 1125    Lab Status:  Final result Specimen:  Blood from Arm, Right Updated:  07/30/20 0656     Blood Culture Enterococcus faecalis     Comment:   Infectious disease consultation is highly recommended.        Isolated from Aerobic and Anaerobic Bottles     Gram Stain Aerobic Bottle Gram positive cocci in chains      Anaerobic Bottle Gram positive cocci in chains    Narrative:       Refer to previous blood culture collected on 7/28/2020 1133 for MICs    Blood Culture ID, PCR - Blood, Arm, Left [[id number]]  (Abnormal) Collected:  07/28/20 1133    Lab Status:  Final result Specimen:  Blood from Arm, Left Updated:  07/29/20 0555     BCID, PCR Enterococcus spp. Identification by BCID PCR.          Imaging Results (Last 24 Hours)     Procedure Component Value Units Date/Time    XR Chest 1 View [[id number]] Collected:  07/31/20 1347     Updated:  07/31/20 2255    Narrative:       EXAMINATION: XR CHEST 1 VW-07/31/2020:     INDICATION: Hypoxia; N39.0-Urinary tract infection, site not specified;  R41.82-Altered mental status, unspecified; Z86.73-Personal history of  transient ischemic attack (TIA), and cerebral infarction without  residual deficits; R53.1-Weakness; R44.1-Visual hallucinations.     COMPARISON: 07/31/2020 2:09 AM.     FINDINGS: Radiograph obtained at 12:26 PM shows the lung volumes  remaining relatively low. The heart and vasculature appear upper limits  of normal size. Mild diffuse pulmonary interstitial changes  appear  stable overall compared to the prior exam. No densely consolidated lung  effusion or pneumothorax is seen.       Impression:       Low lung volumes and mild diffuse pulmonary interstitial  disease, similar to earlier 2:09 AM exam.     D:  07/31/2020  E:  07/31/2020            This report was finalized on 7/31/2020 10:52 PM by Dr. Félix Chu MD.             Results for orders placed during the hospital encounter of 07/28/20   Adult Transthoracic Echo Complete W/ Cont if Necessary Per Protocol    Narrative · Estimated EF = 60%.  · Mild mitral valve regurgitation is present  · Left ventricular systolic function is normal.  · Left ventricular diastolic dysfunction (grade I) consistent with   impaired relaxation.  · There is calcification of the aortic valve.  · Calculated right ventricular systolic pressure from tricuspid   regurgitation is 15.0 mmHg.          I have reviewed the medications:  Scheduled Meds:  allopurinol 300 mg Oral Daily   aspirin 81 mg Oral Daily   atorvastatin 80 mg Oral Nightly   busPIRone 5 mg Oral Q12H   clopidogrel 75 mg Oral Daily   finasteride 5 mg Oral Daily   heparin (porcine) 5,000 Units Subcutaneous Q8H   levothyroxine 150 mcg Oral Q AM   memantine 5 mg Oral Daily   pantoprazole 40 mg Oral QAM   sodium chloride 10 mL Intravenous Q12H   terazosin 5 mg Oral Nightly   vancomycin 1,000 mg Intravenous Q12H     Continuous Infusions:  Pharmacy to dose vancomycin      PRN Meds:.•  acetaminophen  •  ipratropium-albuterol  •  ondansetron  •  Pharmacy to dose vancomycin  •  potassium chloride  •  potassium chloride  •  sodium chloride  •  sodium chloride    Assessment/Plan   Assessment & Plan     Active Hospital Problems    Diagnosis  POA   • **Acute UTI [N39.0]  Yes   • Acute diastolic heart failure (CMS/HCC) [I50.31]  Unknown   • Acute respiratory failure with hypoxia (CMS/HCC) [J96.01]  Unknown   • Bacteremia [R78.81]  Unknown   • Encephalopathy acute [G93.40]  Yes   • DANIS (acute kidney  injury) (CMS/HCC) [N17.9]  Yes   • Carotid stenosis [I65.29]  Yes   • Dyslipidemia [E78.5]  Yes   • Hypothyroidism [E03.9]  Yes   • GERD (gastroesophageal reflux disease) [K21.9]  Yes   • Anxiety disorder [F41.9]  Yes      Resolved Hospital Problems   No resolved problems to display.        Brief Hospital Course to date:  Domitila Bosch is a 79 y.o. male with PMHx significant for carotid stenosis, dyslipidemia, TIA/CVA with residual right sided weakness, hypertension, hypothyroidism, anxiety who presents to [facility] with complaints of increasing confusion over the last 4 days.     Metabolic Encephalopathy related to sepsis/bacteremia - some improvement   - likely multifactorial and secondary to infection and DANIS/dehydration  - CT head with no acute findings   - continue namenda  - treatment per below      Sepsis - POA   UTI - enterococcus   Bacteremia - enterococcus   - allergy to Cephalosporins and penicillins   - BCx x 2 with enterococcus; UCx enterococcus; repeat blood cultures NGTD    - DC azactam (7/28-7/29) and continue vanc (7/29)   - ID consulted  - Urology consulted - no intervention   - ECHO with no comment on valvular veg -- will likely need SUMMER      Acute hypoxic resp failure  Acute diastolic heart failure   - Likely related to volume overload; ABG reviewed; CXR reviewed   - ECHO with normal EF; diastolic heart failure  - s/p lasix 20 mg IV 7/31   - Wean O2 as tolerated   - resume home maxide      DANIS - improved   - baseline around 0.8, now 1.9 on admission   - renal US with R non-obs stone; enlargement of prostate; bladder debris   - Cr stable and diuresed for volume overload; will resume home maxide and monitor      BPH  Urinary retention  - continue hytrin and proscar      Falls:  - PT/OT to see, currently lives at home with his wife  - rehab currently recommended      HLD  Hx of CVA w/residual right sided weakness  Carotid Stenosis  - continue ASA, plavix, statin     Hypothyroidism:  -  levothyroxine     Hypertension:  - BP trending up; will resume home maxide      DVT prophylaxis:  Heparin    CODE STATUS:   Code Status and Medical Interventions:   Ordered at: 07/28/20 1249     Level Of Support Discussed With:    Patient     Code Status:    CPR     Medical Interventions (Level of Support Prior to Arrest):    Full         Electronically signed by Phylicia Kang DO, 08/01/20, 14:07.

## 2020-08-01 NOTE — PLAN OF CARE
Patient more alert today and participating in care. Ate well. Up in the chair this afternoon. Awake for most of the day.

## 2020-08-01 NOTE — PLAN OF CARE
Pt continues to be confused at times. Pt not oriented to time or situation and very forgetful. Pt had good UOP through the shift. Potassium replaced per protocol. No complaints of pain. Pt tolerating 2.5L NC. Will continue to monitor.   Problem: Patient Care Overview  Goal: Plan of Care Review  Outcome: Ongoing (interventions implemented as appropriate)     Problem: Patient Care Overview  Goal: Individualization and Mutuality  Outcome: Ongoing (interventions implemented as appropriate)     Problem: Patient Care Overview  Goal: Discharge Needs Assessment  Outcome: Ongoing (interventions implemented as appropriate)     Problem: Patient Care Overview  Goal: Interprofessional Rounds/Family Conf  Outcome: Ongoing (interventions implemented as appropriate)     Problem: Fall Risk (Adult)  Goal: Identify Related Risk Factors and Signs and Symptoms  Outcome: Ongoing (interventions implemented as appropriate)     Problem: Fall Risk (Adult)  Goal: Absence of Fall  Outcome: Ongoing (interventions implemented as appropriate)     Problem: Skin Injury Risk (Adult)  Goal: Identify Related Risk Factors and Signs and Symptoms  Outcome: Ongoing (interventions implemented as appropriate)     Problem: Skin Injury Risk (Adult)  Goal: Skin Health and Integrity  Outcome: Ongoing (interventions implemented as appropriate)     Problem: Pain, Chronic (Adult)  Goal: Acceptable Pain/Comfort Level and Functional Ability  Outcome: Ongoing (interventions implemented as appropriate)

## 2020-08-01 NOTE — PROGRESS NOTES
[facility] Progress Note        Antibiotics:  Anti-Infectives (From admission, onward)    Ordered     Dose/Rate Route Frequency Start Stop    07/31/20 0729  vancomycin (VANCOCIN) in iso-osmotic dextrose IVPB 1 g (premix) 200 mL  Review   Ordering Provider:  Joel Terrazas RPH    1,000 mg  over 60 Minutes Intravenous Every 12 Hours Scheduled 07/31/20 0900 08/06/20 0859    07/29/20 0556  Pharmacy to dose vancomycin     Joel Terrazas RPH reviewed the order on 07/31/20 0753.   Ordering Provider:  Celi Perez APRN     Does not apply Continuous PRN 07/29/20 0555 08/05/20 0554    07/28/20 1111  aztreonam (AZACTAM) 2 g/100 mL 0.9% NS (mbp)     Ordering Provider:  Norberto Son PA    2 g  over 30 Minutes Intravenous Once 07/28/20 1113 07/28/20 1231    07/28/20 1111  vancomycin 1750 mg/500 mL 0.9% NS IVPB ([facility])     Ordering Provider:  Teresa Pollard DO    20 mg/kg × 86.2 kg Intravenous Once 07/28/20 1113 07/28/20 1626          CC: fatigue    HPI:    Patient is a 79 y.o.  Yr old male with history of TIA/CVA with chronic/residual right-sided weakness and generally poor memory but does not carry a formal diagnosis of dementia.  He was admitted July 28, 2020 with 3 to 4 days worsening confusion from baseline; patient reports dysuria/urinary incontinence/urinary frequency although he could not attach a specific timeline to this and in general his ability to give detailed history is limited with poor insight.  Nursing reported fever at admission associated with acute kidney injury and evidence for UTI with concern for sepsis urinary source.  Subsequently with blood culture showing gram-positive cocci and preliminary PCR data with enterococcus, vancomycin resistance not detected by initial PCR information; he was placed on empiric vancomycin/Azactam.     He reports some improvement since admission although his insight remains poor.     Wife reports Hx enlarged prostate with chronic urinary retention and has  "followed with Dr Bazan in urology; allergy to PCN and keflex with rash to both     8/1/20 seen early and sleepy;  NC O2 this am; Dysuria better and continent of urine today.  Denies hematuria or pyuria and no flank pain at this time.     No headache photophobia or neck stiffness.  No nausea vomiting diarrhea or abdominal pain.  No shortness of breath or cough.  No rash.        ROS:      8/1/20 No f/c/s. No n/v/d. No rash. No new ADR to Abx.     Constitutional-- No chills or sweats.  Appetite diminished with generalized fatigue  Heent-- No new vision, hearing or throat complaints.  No epistaxis or oral sores.  Denies odynophagia or dysphagia.  No flashers, floaters or eye pain. No odynophagia or dysphagia. No headache, photophobia or neck stiffness.  CV-- No chest pain, palpitation or syncope  Resp-- No SOB/cough/Hemoptysis  GI- No nausea, vomiting, or diarrhea.  No hematochezia, melena, or hematemesis. Denies jaundice or chronic liver disease.  --as above  Lymph- no swollen lymph nodes in neck/axilla or groin.   Heme- No active bruising or bleeding; no Hx of DVT or PE.  MS-- no swelling or pain in the bones or joints of arms/legs.  No new back pain.  Neuro-- No acute focal weakness or numbness in the arms or legs.  No seizures.  Chronic residual right-sided weakness     Full 12 point review of systems reviewed and negative otherwise for acute complaints, except for above      PE:   /81   Pulse 65   Temp 98.2 °F (36.8 °C) (Oral)   Resp 16   Ht 185.4 cm (73\")   Wt 81.2 kg (179 lb)   SpO2 95%   BMI 23.62 kg/m²     GENERAL: sleepy, in no acute distress.   HEENT: Normocephalic, atraumatic.  PERRL. EOMI. No conjunctival injection. No icterus. Oropharynx clear without evidence of thrush or exudate. No evidence of peridontal disease.    NECK: Supple without nuchal rigidity. No mass.  LYMPH: No cervical, axillary or inguinal lymphadenopathy.  HEART: RRR; No murmur, rubs, gallops.   LUNGS: Clear to auscultation " bilaterally without wheezing, rales, rhonchi. Normal respiratory effort. Nonlabored. No dullness.  ABDOMEN: Soft, nontender, nondistended. Positive bowel sounds. No rebound or guarding. NO mass or HSM.  EXT:  No cyanosis, clubbing or edema. No cord.  : Genitalia generally unremarkable.  Without Akins catheter.  MSK: FROM without joint effusions noted arms/legs.    SKIN: Warm and dry without cutaneous eruptions on Inspection/palpation.    NEURO: sleepy     No CVA tenderness     No peripheral stigmata/phenomena of endocarditis       Laboratory Data    Results from last 7 days   Lab Units 08/01/20  0340 07/31/20  0442 07/31/20  0211   WBC 10*3/mm3 8.08 8.34 9.07   HEMOGLOBIN g/dL 11.9* 12.6* 11.8*   HEMATOCRIT % 37.4* 39.7 36.9*   PLATELETS 10*3/mm3 171 148 145     Results from last 7 days   Lab Units 08/01/20  0340   SODIUM mmol/L 141   POTASSIUM mmol/L 3.6   CHLORIDE mmol/L 106   CO2 mmol/L 28.0   BUN mg/dL 12   CREATININE mg/dL 1.14   GLUCOSE mg/dL 79   CALCIUM mg/dL 8.1*     Results from last 7 days   Lab Units 07/31/20  0211   ALK PHOS U/L 76   BILIRUBIN mg/dL 0.8   ALT (SGPT) U/L 16   AST (SGOT) U/L 33               Estimated Creatinine Clearance: 59.4 mL/min (by C-G formula based on SCr of 1.14 mg/dL).      Microbiology:      Radiology:  Imaging Results (Last 72 Hours)     Procedure Component Value Units Date/Time    XR Chest 1 View [[id number]] Collected:  07/31/20 1347     Updated:  07/31/20 2255    Narrative:       EXAMINATION: XR CHEST 1 VW-07/31/2020:     INDICATION: Hypoxia; N39.0-Urinary tract infection, site not specified;  R41.82-Altered mental status, unspecified; Z86.73-Personal history of  transient ischemic attack (TIA), and cerebral infarction without  residual deficits; R53.1-Weakness; R44.1-Visual hallucinations.     COMPARISON: 07/31/2020 2:09 AM.     FINDINGS: Radiograph obtained at 12:26 PM shows the lung volumes  remaining relatively low. The heart and vasculature appear upper limits  of  normal size. Mild diffuse pulmonary interstitial changes appear  stable overall compared to the prior exam. No densely consolidated lung  effusion or pneumothorax is seen.       Impression:       Low lung volumes and mild diffuse pulmonary interstitial  disease, similar to earlier 2:09 AM exam.     D:  07/31/2020  E:  07/31/2020            This report was finalized on 7/31/2020 10:52 PM by Dr. Félix Chu MD.       XR Chest 1 View [[id number]] Collected:  07/31/20 0237     Updated:  07/31/20 0239    Narrative:       CR Chest 1 Vw    INDICATION:   Dyspnea today.     COMPARISON:    7/28/2020    FINDINGS:  Single portable AP view(s) of the chest.    Lung volumes are lower today. The heart is enlarged. There is vascular congestion. There is some atelectasis in the lung bases. No pneumothorax. There is elevation of the right hemidiaphragm.       Impression:       Persistent cardiomegaly with vascular congestion. Lower lung volumes today with bibasilar atelectasis.    Signer Name: Ammon Thayer MD   Signed: 7/31/2020 2:37 AM   Workstation Name: [facility]    [facility]    CT Head Without Contrast [[id number]] Collected:  07/28/20 1602     Updated:  07/29/20 1659    Narrative:       EXAMINATION: CT HEAD WO CONTRAST-07/28/2020:      INDICATION: Encephalopathy; N39.0-Urinary tract infection, site not  specified; R41.82-Altered mental status, unspecified; Z86.73-Personal  history of transient ischemic attack (TIA), and cerebral infarction  without residual deficits; R53.1-Weakness; R44.1-Visual hallucinations.      TECHNIQUE: CT head without intravenous contrast.     The radiation dose reduction device was turned on for each scan per the  ALARA (As Low as Reasonably Achievable) protocol.     COMPARISON: NONE.     FINDINGS: The midline structures are symmetric without evidence of mass,  mass effect or midline shift. Ventricles and sulci within normal limits.  At least moderate low-attenuation signal in  the periventricular and deep  white matter suggesting moderately advanced chronic small vessel  ischemic disease without intra-axial hemorrhage or extra-axial fluid  collection. Globes and orbits are unremarkable. The visualized paranasal  sinuses and mastoid air cells are grossly clear and well pneumatized.  Calvarium intact.       Impression:       No acute intracranial abnormality with moderately advanced  chronic small vessel ischemic disease findings noted in the  periventricular and deep white matter.     D:  07/28/2020  E:  07/28/2020           This report was finalized on 7/29/2020 4:56 PM by Dr. Uriel Childers.        Renal Limited [[id number]] Collected:  07/29/20 0822     Updated:  07/29/20 1653    Narrative:       EXAMINATION: US RENAL LIMITED-     INDICATION: History of kidney stones, DANIS, flank pain; N39.0-Urinary  tract infection, site not specified; R41.82-Altered mental status,  unspecified; Z86.73-Personal history of transient ischemic attack (TIA),  and cerebral infarction without residual deficits; R53.1-Weakness;  R44.1-Visual hallucinations; renal insufficiency.     TECHNIQUE: Sonographic imaging was obtained of the kidneys in both the  sagittal and transverse planes.     COMPARISON: None.     FINDINGS: The right kidney measures in length from pole to pole 12.5 cm.  There is a renal cortical cyst identified measuring 7.2 cm. Smaller cyst  measuring 3.8 cm. No solid mass is identified. There are several  nonobstructing stones the largest measuring 5 mm. No hydronephrosis.     The left kidney measures in length from pole to pole 10.9 cm. Several  cystic structures identified the largest 1.1 cm.     Imaging of the bladder reveals echogenic debris suggesting sludge. There  is what appears to be posterior impression on the base of the bladder  suggesting an enlarged prostate.       Impression:       Nonobstructing stone in the right kidney. Enlargement of the  prostate with posterior impression on  the base of the bladder. Debris  identified in the bladder. No definite wall thickening. Bilateral renal  cortical cysts.      D:  07/29/2020  E:  07/29/2020     This report was finalized on 7/29/2020 4:50 PM by Dr. Cari Osborne MD.               Impression:     --Acute sepsis.  Fever/leukocytosis and acute kidney injury with enterococcal bacteremia/septicemia.  Concern for urinary source at present.  Abdominal exam otherwise benign with no nausea/vomiting/diarrhea.  If enterococcus did not grow in the urine or if new abdominal symptoms arise consideration could be given to further GI work-up such as CT scan or GI consultation/endoscopy etc.  Otherwise, chest clear, no cough or breathing difficulty, no obvious skin or soft tissue changes.  Monitor for other potential foci/pathogen     -- Acute  enterococcus bacteremia/septicemia.  Currently no stigmata of endocarditis but certainly could evolve.  High risk for further serious morbidity and other serious sequela.  Need further clarification from family regarding beta-lactam allergy     --Acute enterococcal complicated UTI.  Associated with septicemia/bacteremia as above.  Urology following and any  Further functional/anatomic assessment at their discretion     --Acute kidney injury.  Creatinine trended down with resuscitation.  Probably prerenal/ATN associated with sepsis.  Monitor to help guide further adjustments in antimicrobials    --acute pulm edema?; vascular congestion per radiology on CXR     --Acute encephalopathy.  Baseline not entirely clear with prior history of stroke/TIA and nursing reports baseline with forgetfulness with possible cognitive difficulties at baseline.  Need further clarification from family.  Currently no meningismus.  Further neurologic work-up or neurology consultation at discretion of internal medicine     --History of TIA/CVA with reports of residual right-sided weakness.          PLAN:     --IV vancomycin      --Check/review  labs cultures and scans     --Partial history per nursing staff     --Discussed with microbiology     --Discussed with Dr. Kang     --Highly complex set of issues with high risk for further serious morbidity and other serious sequela    --urology following    --I have asked micro for further GURPREET data on enterococcus to daptomycin to be evaluated    --repeat blood cultures; TTE no mention of vegetation per cardiologyl;  Likely to need SUMMER         Sabino Sandhu MD  8/1/2020

## 2020-08-02 LAB
ANION GAP SERPL CALCULATED.3IONS-SCNC: 10 MMOL/L (ref 5–15)
B PARAPERT DNA SPEC QL NAA+PROBE: NOT DETECTED
B PERT DNA SPEC QL NAA+PROBE: NOT DETECTED
BASOPHILS # BLD AUTO: 0.07 10*3/MM3 (ref 0–0.2)
BASOPHILS NFR BLD AUTO: 0.9 % (ref 0–1.5)
BUN SERPL-MCNC: 11 MG/DL (ref 8–23)
BUN/CREAT SERPL: 10.2 (ref 7–25)
C PNEUM DNA NPH QL NAA+NON-PROBE: NOT DETECTED
CALCIUM SPEC-SCNC: 8.8 MG/DL (ref 8.6–10.5)
CHLORIDE SERPL-SCNC: 102 MMOL/L (ref 98–107)
CO2 SERPL-SCNC: 28 MMOL/L (ref 22–29)
CREAT SERPL-MCNC: 1.08 MG/DL (ref 0.76–1.27)
DEPRECATED RDW RBC AUTO: 53.3 FL (ref 37–54)
EOSINOPHIL # BLD AUTO: 0.52 10*3/MM3 (ref 0–0.4)
EOSINOPHIL NFR BLD AUTO: 6.4 % (ref 0.3–6.2)
ERYTHROCYTE [DISTWIDTH] IN BLOOD BY AUTOMATED COUNT: 15 % (ref 12.3–15.4)
FLUAV H1 2009 PAND RNA NPH QL NAA+PROBE: NOT DETECTED
FLUAV H1 HA GENE NPH QL NAA+PROBE: NOT DETECTED
FLUAV H3 RNA NPH QL NAA+PROBE: NOT DETECTED
FLUAV SUBTYP SPEC NAA+PROBE: NOT DETECTED
FLUBV RNA ISLT QL NAA+PROBE: NOT DETECTED
GFR SERPL CREATININE-BSD FRML MDRD: 66 ML/MIN/1.73
GLUCOSE SERPL-MCNC: 92 MG/DL (ref 65–99)
HADV DNA SPEC NAA+PROBE: NOT DETECTED
HCOV 229E RNA SPEC QL NAA+PROBE: NOT DETECTED
HCOV HKU1 RNA SPEC QL NAA+PROBE: NOT DETECTED
HCOV NL63 RNA SPEC QL NAA+PROBE: NOT DETECTED
HCOV OC43 RNA SPEC QL NAA+PROBE: NOT DETECTED
HCT VFR BLD AUTO: 40.6 % (ref 37.5–51)
HGB BLD-MCNC: 13.1 G/DL (ref 13–17.7)
HMPV RNA NPH QL NAA+NON-PROBE: NOT DETECTED
HPIV1 RNA SPEC QL NAA+PROBE: NOT DETECTED
HPIV2 RNA SPEC QL NAA+PROBE: NOT DETECTED
HPIV3 RNA NPH QL NAA+PROBE: NOT DETECTED
HPIV4 P GENE NPH QL NAA+PROBE: NOT DETECTED
IMM GRANULOCYTES # BLD AUTO: 0.1 10*3/MM3 (ref 0–0.05)
IMM GRANULOCYTES NFR BLD AUTO: 1.2 % (ref 0–0.5)
LYMPHOCYTES # BLD AUTO: 1.29 10*3/MM3 (ref 0.7–3.1)
LYMPHOCYTES NFR BLD AUTO: 15.9 % (ref 19.6–45.3)
M PNEUMO IGG SER IA-ACNC: NOT DETECTED
MCH RBC QN AUTO: 31.1 PG (ref 26.6–33)
MCHC RBC AUTO-ENTMCNC: 32.3 G/DL (ref 31.5–35.7)
MCV RBC AUTO: 96.4 FL (ref 79–97)
MONOCYTES # BLD AUTO: 0.64 10*3/MM3 (ref 0.1–0.9)
MONOCYTES NFR BLD AUTO: 7.9 % (ref 5–12)
NEUTROPHILS NFR BLD AUTO: 5.48 10*3/MM3 (ref 1.7–7)
NEUTROPHILS NFR BLD AUTO: 67.7 % (ref 42.7–76)
NRBC BLD AUTO-RTO: 0 /100 WBC (ref 0–0.2)
PLATELET # BLD AUTO: 195 10*3/MM3 (ref 140–450)
PMV BLD AUTO: 11.1 FL (ref 6–12)
POTASSIUM SERPL-SCNC: 3.7 MMOL/L (ref 3.5–5.2)
RBC # BLD AUTO: 4.21 10*6/MM3 (ref 4.14–5.8)
RHINOVIRUS RNA SPEC NAA+PROBE: NOT DETECTED
RSV RNA NPH QL NAA+NON-PROBE: NOT DETECTED
SARS-COV-2 RNA NPH QL NAA+NON-PROBE: NOT DETECTED
SODIUM SERPL-SCNC: 140 MMOL/L (ref 136–145)
WBC # BLD AUTO: 8.1 10*3/MM3 (ref 3.4–10.8)

## 2020-08-02 PROCEDURE — 0202U NFCT DS 22 TRGT SARS-COV-2: CPT | Performed by: INTERNAL MEDICINE

## 2020-08-02 PROCEDURE — 80048 BASIC METABOLIC PNL TOTAL CA: CPT | Performed by: INTERNAL MEDICINE

## 2020-08-02 PROCEDURE — 25010000002 VANCOMYCIN PER 500 MG

## 2020-08-02 PROCEDURE — 99232 SBSQ HOSP IP/OBS MODERATE 35: CPT | Performed by: INTERNAL MEDICINE

## 2020-08-02 PROCEDURE — 85025 COMPLETE CBC W/AUTO DIFF WBC: CPT | Performed by: INTERNAL MEDICINE

## 2020-08-02 PROCEDURE — 25010000002 HEPARIN (PORCINE) PER 1000 UNITS: Performed by: INTERNAL MEDICINE

## 2020-08-02 RX ADMIN — ASPIRIN 81 MG: 81 TABLET, COATED ORAL at 08:56

## 2020-08-02 RX ADMIN — LEVOTHYROXINE SODIUM 150 MCG: 150 TABLET ORAL at 05:14

## 2020-08-02 RX ADMIN — HEPARIN SODIUM 5000 UNITS: 5000 INJECTION INTRAVENOUS; SUBCUTANEOUS at 13:26

## 2020-08-02 RX ADMIN — CLOPIDOGREL BISULFATE 75 MG: 75 TABLET ORAL at 08:56

## 2020-08-02 RX ADMIN — ATORVASTATIN CALCIUM 80 MG: 40 TABLET, FILM COATED ORAL at 20:03

## 2020-08-02 RX ADMIN — VANCOMYCIN HYDROCHLORIDE 1000 MG: 1 INJECTION, SOLUTION INTRAVENOUS at 09:50

## 2020-08-02 RX ADMIN — ALLOPURINOL 300 MG: 300 TABLET ORAL at 08:56

## 2020-08-02 RX ADMIN — PANTOPRAZOLE SODIUM 40 MG: 40 TABLET, DELAYED RELEASE ORAL at 08:57

## 2020-08-02 RX ADMIN — BUSPIRONE HYDROCHLORIDE 5 MG: 5 TABLET ORAL at 08:57

## 2020-08-02 RX ADMIN — VANCOMYCIN HYDROCHLORIDE 1000 MG: 1 INJECTION, SOLUTION INTRAVENOUS at 20:36

## 2020-08-02 RX ADMIN — HEPARIN SODIUM 5000 UNITS: 5000 INJECTION INTRAVENOUS; SUBCUTANEOUS at 05:14

## 2020-08-02 RX ADMIN — TERAZOSIN HYDROCHLORIDE 5 MG: 5 CAPSULE ORAL at 20:03

## 2020-08-02 RX ADMIN — BUSPIRONE HYDROCHLORIDE 5 MG: 5 TABLET ORAL at 20:03

## 2020-08-02 RX ADMIN — HEPARIN SODIUM 5000 UNITS: 5000 INJECTION INTRAVENOUS; SUBCUTANEOUS at 20:03

## 2020-08-02 RX ADMIN — FINASTERIDE 5 MG: 5 TABLET, FILM COATED ORAL at 08:57

## 2020-08-02 RX ADMIN — SODIUM CHLORIDE, PRESERVATIVE FREE 10 ML: 5 INJECTION INTRAVENOUS at 08:57

## 2020-08-02 RX ADMIN — MEMANTINE 5 MG: 10 TABLET ORAL at 08:57

## 2020-08-02 RX ADMIN — TRIAMTERENE AND HYDROCHLOROTHIAZIDE 1 TABLET: 37.5; 25 TABLET ORAL at 08:57

## 2020-08-02 NOTE — PROGRESS NOTES
"Pharmacy Consult - Vancomycin Dosing    Domitila Bosch is an 79 y.o. male receiving vancomycin therapy.     Indication: E faecalis bacteremia  Consulting Provider: Hospitalist  ID Consult: yes    Goal Trough: 15-20 mcg/mL    Current Antimicrobial Therapy  Vancomycin 7/28-now    Allergies  Allergies as of 07/28/2020 - Reviewed 07/28/2020   Allergen Reaction Noted    Betadine [povidone iodine]  06/09/2016    Cephalexin  04/20/2016    Flomax [tamsulosin hcl]  02/16/2017    Iodine  06/09/2016    Lisinopril  04/20/2016    Pseudoephedrine  04/20/2016    Sulfamethoxazole-trimethoprim  04/20/2016     Labs  Results from last 7 days   Lab Units 08/02/20  0358 08/01/20  0340 07/31/20  0211   BUN mg/dL 11 12 14  14   CREATININE mg/dL 1.08 1.14 0.97  0.97     Results from last 7 days   Lab Units 08/02/20  0358 08/01/20  0340 07/31/20  0442   WBC 10*3/mm3 8.10 8.08 8.34     Evaluation of Dosing    Is Patient on Dialysis or Renal Replacement: no    Ht - 185.4 cm (73\")  Wt - 81.2 kg (179 lb)    Estimated Creatinine Clearance: 62.7 mL/min (by C-G formula based on SCr of 1.08 mg/dL).    Intake & Output (last 3 days)         07/30 0701 - 07/31 0700 07/31 0701 - 08/01 0700 08/01 0701 - 08/02 0700 08/02 0701 - 08/03 0700    P.O.   640     I.V. (mL/kg)        IV Piggyback   200     Total Intake(mL/kg)   840 (10.3)     Urine (mL/kg/hr) 1950 (1) 750 (0.4) 3600 (1.8)     Stool 0 0      Total Output 7935 315 7457     Net -1950 -750 -2760             Urine Unmeasured Occurrence 1 x 1 x      Stool Unmeasured Occurrence 2 x 1 x            Microbiology and Radiology  Microbiology Results (last 10 days)       Procedure Component Value - Date/Time    Blood Culture - Blood, Chest, Right [[id number]] Collected:  07/30/20 0834    Lab Status:  Preliminary result Specimen:  Blood from Chest, Right Updated:  08/02/20 0915     Blood Culture No growth at 3 days    Blood Culture - Blood, Arm, Left [[id number]] Collected:  07/30/20 0834    Lab Status:  " Preliminary result Specimen:  Blood from Arm, Left Updated:  08/02/20 0915     Blood Culture No growth at 3 days    Blood Culture - Blood, Arm, Left [[id number]]  (Abnormal)  (Susceptibility) Collected:  07/28/20 1133    Lab Status:  Edited Result - FINAL Specimen:  Blood from Arm, Left Updated:  07/31/20 0723     Blood Culture Enterococcus faecalis     Comment:   Infectious disease consultation is highly recommended.        Isolated from Aerobic and Anaerobic Bottles     Gram Stain Aerobic Bottle Gram positive cocci in chains      Anaerobic Bottle Gram positive cocci in chains    Narrative:       Dr. Sandhu requested Daptomycin     Susceptibility        Enterococcus faecalis     GURPREET     Ampicillin Susceptible     Daptomycin Intermediate     Gentamicin High Level Synergy Susceptible     Vancomycin Susceptible                      Blood Culture ID, PCR - Blood, Arm, Left [[id number]]  (Abnormal) Collected:  07/28/20 1133    Lab Status:  Final result Specimen:  Blood from Arm, Left Updated:  07/29/20 0555     BCID, PCR Enterococcus spp. Identification by BCID PCR.    Blood Culture - Blood, Arm, Right [[id number]]  (Abnormal) Collected:  07/28/20 1125    Lab Status:  Final result Specimen:  Blood from Arm, Right Updated:  07/30/20 0656     Blood Culture Enterococcus faecalis     Comment:   Infectious disease consultation is highly recommended.        Isolated from Aerobic and Anaerobic Bottles     Gram Stain Aerobic Bottle Gram positive cocci in chains      Anaerobic Bottle Gram positive cocci in chains    Narrative:       Refer to previous blood culture collected on 7/28/2020 1133 for MICs    Urine Culture - Urine, Urine, Clean Catch [[id number]]  (Abnormal)  (Susceptibility) Collected:  07/28/20 1021    Lab Status:  Edited Result - FINAL Specimen:  Urine, Clean Catch Updated:  07/31/20 1057     Urine Culture >100,000 CFU/mL Enterococcus faecalis    Narrative:        requested Daptomycin 7/30    Susceptibility         Enterococcus faecalis     GURPREET     Ampicillin Susceptible     Daptomycin Susceptible     Levofloxacin Susceptible     Nitrofurantoin Susceptible     Tetracycline Susceptible     Vancomycin Susceptible                            Evaluation of Level          Results from last 7 days   Lab Units 07/30/20  1843   VANCOMYCIN TR mcg/mL 6.10      Assessment/Plan:  1. Pharmacy to dose vancomycin for E faecalis bacteremia, goal trough 15-20.    2. SCr improved from yesterday, now 1.08. Patient continues to have good UOP.     3. Patient has been receiving vancomycin 1000mg IV Q12H. No new level to assess today. Given stable renal function, will continue vancomycin 1000mg IV Q12H and schedule a vancomycin trough for 8/3 @0830.    4. Pharmacy will continue to monitor and adjust vancomycin dose as necessary based on renal function, cultures, labs, and clinical status.     Alvaro Vega, PharmD  Pharmacy Resident  8/2/2020  09:23

## 2020-08-02 NOTE — PROGRESS NOTES
[facility] Medicine Services  PROGRESS NOTE    Patient Name: Domitila Bosch  : 1940  MRN: [id number]    Date of Admission: 2020  Primary Care Physician: Marcin Ferguson MD    Subjective   Subjective     CC:  AMS, bacteremia     HPI:  No acute events. Remains confused but much more awake and alert. Reviewed plan with patient and wife at bedside.     Review of Systems  Gen- No fevers, chills  CV- No chest pain, palpitations  Resp- No cough, dyspnea  GI- No N/V/D, abd pain     Objective   Objective     Vital Signs:   Temp:  [97.6 °F (36.4 °C)-98.3 °F (36.8 °C)] 97.6 °F (36.4 °C)  Heart Rate:  [57-77] 77  Resp:  [16-18] 18  BP: (138-153)/(83-98) 138/98  Total (NIH Stroke Scale): 1     Physical Exam:  Constitutional: No acute distress, awake, alert  HENT: NCAT, mucous membranes moist  Respiratory: Clear to auscultation bilaterally, respiratory effort normal   Cardiovascular: RRR, no murmurs, rubs, or gallops, palpable pedal pulses bilaterally  Gastrointestinal: Positive bowel sounds, soft, nontender, nondistended  Musculoskeletal: No bilateral ankle edema  Psychiatric: Appropriate affect, cooperative  Neurologic: Oriented x 1, strength symmetric in all extremities, Cranial Nerves grossly intact to confrontation, speech clear  Skin: No rashes    Results Reviewed:  Results from last 7 days   Lab Units 20  0358 20  0340 20  0442  20  1007   WBC 10*3/mm3 8.10 8.08 8.34   < > 8.89   HEMOGLOBIN g/dL 13.1 11.9* 12.6*   < > 14.7   HEMATOCRIT % 40.6 37.4* 39.7   < > 45.0   PLATELETS 10*3/mm3 195 171 148   < > 169   PROCALCITONIN ng/mL  --   --   --   --  0.18    < > = values in this interval not displayed.     Results from last 7 days   Lab Units 20  0358 20  0340 20  1212 20  0211  20  1007   SODIUM mmol/L 140 141  --  140  139   < > 140   POTASSIUM mmol/L 3.7 3.6 3.9 3.3*  3.4*  3.4*   < > 3.6   CHLORIDE mmol/L 102 106  --  107   107   < > 100   CO2 mmol/L 28.0 28.0  --  24.0  24.0   < > 27.0   BUN mg/dL 11 12  --  14  14   < > 20   CREATININE mg/dL 1.08 1.14  --  0.97  0.97   < > 1.86*   GLUCOSE mg/dL 92 79  --  91  91   < > 91   CALCIUM mg/dL 8.8 8.1*  --  7.5*  7.5*   < > 9.4   ALT (SGPT) U/L  --   --   --  16  --  17   AST (SGOT) U/L  --   --   --  33  --  26   TROPONIN T ng/mL  --   --   --   --   --  <0.010   PROBNP pg/mL  --   --   --  696.0  --   --     < > = values in this interval not displayed.     Estimated Creatinine Clearance: 62.7 mL/min (by C-G formula based on SCr of 1.08 mg/dL).    Microbiology Results Abnormal     Procedure Component Value - Date/Time    Blood Culture - Blood, Chest, Right [[id number]] Collected:  07/30/20 0834    Lab Status:  Preliminary result Specimen:  Blood from Chest, Right Updated:  08/02/20 0915     Blood Culture No growth at 3 days    Blood Culture - Blood, Arm, Left [[id number]] Collected:  07/30/20 0834    Lab Status:  Preliminary result Specimen:  Blood from Arm, Left Updated:  08/02/20 0915     Blood Culture No growth at 3 days    Urine Culture - Urine, Urine, Clean Catch [[id number]]  (Abnormal)  (Susceptibility) Collected:  07/28/20 1021    Lab Status:  Edited Result - FINAL Specimen:  Urine, Clean Catch Updated:  07/31/20 1057     Urine Culture >100,000 CFU/mL Enterococcus faecalis    Narrative:        requested Daptomycin 7/30    Susceptibility      Enterococcus faecalis     GURPREET     Ampicillin Susceptible     Daptomycin Susceptible     Levofloxacin Susceptible     Nitrofurantoin Susceptible     Tetracycline Susceptible     Vancomycin Susceptible                    Blood Culture - Blood, Arm, Left [[id number]]  (Abnormal)  (Susceptibility) Collected:  07/28/20 1133    Lab Status:  Edited Result - FINAL Specimen:  Blood from Arm, Left Updated:  07/31/20 0723     Blood Culture Enterococcus faecalis     Comment:   Infectious disease consultation is highly recommended.         Isolated from Aerobic and Anaerobic Bottles     Gram Stain Aerobic Bottle Gram positive cocci in chains      Anaerobic Bottle Gram positive cocci in chains    Narrative:       Dr. Sandhu requested Daptomycin     Susceptibility      Enterococcus faecalis     GURPREET     Ampicillin Susceptible     Daptomycin Intermediate     Gentamicin High Level Synergy Susceptible     Vancomycin Susceptible                    Blood Culture - Blood, Arm, Right [[id number]]  (Abnormal) Collected:  07/28/20 1125    Lab Status:  Final result Specimen:  Blood from Arm, Right Updated:  07/30/20 0656     Blood Culture Enterococcus faecalis     Comment:   Infectious disease consultation is highly recommended.        Isolated from Aerobic and Anaerobic Bottles     Gram Stain Aerobic Bottle Gram positive cocci in chains      Anaerobic Bottle Gram positive cocci in chains    Narrative:       Refer to previous blood culture collected on 7/28/2020 1133 for MICs    Blood Culture ID, PCR - Blood, Arm, Left [[id number]]  (Abnormal) Collected:  07/28/20 1133    Lab Status:  Final result Specimen:  Blood from Arm, Left Updated:  07/29/20 0555     BCID, PCR Enterococcus spp. Identification by BCID PCR.          Imaging Results (Last 24 Hours)     ** No results found for the last 24 hours. **          Results for orders placed during the hospital encounter of 07/28/20   Adult Transthoracic Echo Complete W/ Cont if Necessary Per Protocol    Narrative · Estimated EF = 60%.  · Mild mitral valve regurgitation is present  · Left ventricular systolic function is normal.  · Left ventricular diastolic dysfunction (grade I) consistent with   impaired relaxation.  · There is calcification of the aortic valve.  · Calculated right ventricular systolic pressure from tricuspid   regurgitation is 15.0 mmHg.          I have reviewed the medications:  Scheduled Meds:  [START ON 8/3/2020] Pharmacy Consult  Does not apply Once   allopurinol 300 mg Oral Daily   aspirin 81 mg  Oral Daily   atorvastatin 80 mg Oral Nightly   busPIRone 5 mg Oral Q12H   clopidogrel 75 mg Oral Daily   finasteride 5 mg Oral Daily   heparin (porcine) 5,000 Units Subcutaneous Q8H   levothyroxine 150 mcg Oral Q AM   memantine 5 mg Oral Daily   pantoprazole 40 mg Oral QAM   sodium chloride 10 mL Intravenous Q12H   terazosin 5 mg Oral Nightly   triamterene-hydrochlorothiazide 1 tablet Oral Daily   vancomycin 1,000 mg Intravenous Q12H     Continuous Infusions:  Pharmacy to dose vancomycin      PRN Meds:.acetaminophen  •  ipratropium-albuterol  •  ondansetron  •  Pharmacy to dose vancomycin  •  potassium chloride  •  potassium chloride  •  sodium chloride  •  sodium chloride    Assessment/Plan   Assessment & Plan     Active Hospital Problems    Diagnosis  POA   • **Acute UTI [N39.0]  Yes   • Acute diastolic heart failure (CMS/HCC) [I50.31]  Unknown   • Acute respiratory failure with hypoxia (CMS/HCC) [J96.01]  Unknown   • Bacteremia [R78.81]  Unknown   • Encephalopathy acute [G93.40]  Yes   • DANIS (acute kidney injury) (CMS/HCC) [N17.9]  Yes   • Carotid stenosis [I65.29]  Yes   • Dyslipidemia [E78.5]  Yes   • Hypothyroidism [E03.9]  Yes   • GERD (gastroesophageal reflux disease) [K21.9]  Yes   • Anxiety disorder [F41.9]  Yes      Resolved Hospital Problems   No resolved problems to display.        Brief Hospital Course to date:  Domitila Bosch is a 79 y.o. male with PMHx significant for carotid stenosis, dyslipidemia, TIA/CVA with residual right sided weakness, hypertension, hypothyroidism, anxiety who presents to [facility] with complaints of increasing confusion over the last 4 days.     Metabolic Encephalopathy related to sepsis/bacteremia - some improvement   - likely multifactorial and secondary to infection and DANIS/dehydration  - CT head with no acute findings   - continue namenda  - treatment per below      Sepsis - POA   UTI - enterococcus   Bacteremia - enterococcus   - allergy to Cephalosporins and penicillins   -  BCx x 2 with enterococcus; UCx enterococcus; repeat blood cultures NGTD    - DC azactam (7/28-7/29) and continue vanc (7/29)   - ID following   - Urology consulted - no intervention   - ECHO with no comment on valvular veg -- SUMMER ordered for 8/3; NPO after midnight      Acute hypoxic resp failure - improved   Acute diastolic heart failure - improved   - Likely related to volume overload; ABG reviewed; CXR reviewed   - ECHO with normal EF; diastolic heart failure  - s/p lasix 20 mg IV 7/31   - Wean O2 as tolerated   - resume home maxide      DANIS - improved   - baseline around 0.8,  1.9 on admission   - renal US with R non-obs stone; enlargement of prostate; bladder debris      BPH  Urinary retention  - continue hytrin and proscar      Falls:  - PT/OT to see, currently lives at home with his wife  - rehab currently recommended      HLD  Hx of CVA w/residual right sided weakness  Carotid Stenosis  - continue ASA, plavix, statin     Hypothyroidism:  - levothyroxine     Hypertension:  - BP trending up; resumed home maxide      DVT prophylaxis:  Heparin    CODE STATUS:   Code Status and Medical Interventions:   Ordered at: 07/28/20 1249     Level Of Support Discussed With:    Patient     Code Status:    CPR     Medical Interventions (Level of Support Prior to Arrest):    Full         Electronically signed by Phylicia Kang DO, 08/02/20, 12:33.

## 2020-08-02 NOTE — PLAN OF CARE
Problem: Patient Care Overview  Goal: Plan of Care Review  Outcome: Ongoing (interventions implemented as appropriate)  Flowsheets (Taken 8/2/2020 8790)  Progress: improving  Outcome Summary: VSS. Good UOP. Remains confused. Oriented to person. Will continue to monitor.

## 2020-08-02 NOTE — PROGRESS NOTES
[facility] Progress Note        Antibiotics:  Anti-Infectives (From admission, onward)    Ordered     Dose/Rate Route Frequency Start Stop    07/31/20 0729  vancomycin (VANCOCIN) in iso-osmotic dextrose IVPB 1 g (premix) 200 mL     Alvaro Vega RPH reviewed the order on 08/02/20 0932.   Ordering Provider:  Joel Terrazas RPH    1,000 mg  over 60 Minutes Intravenous Every 12 Hours Scheduled 07/31/20 0900 08/06/20 0859    07/29/20 0556  Pharmacy to dose vancomycin     Joel Terrazas RPH reviewed the order on 07/31/20 0753.   Ordering Provider:  Celi Perez APRN     Does not apply Continuous PRN 07/29/20 0555 08/05/20 0554    07/28/20 1111  aztreonam (AZACTAM) 2 g/100 mL 0.9% NS (mbp)     Ordering Provider:  Norberto Son PA    2 g  over 30 Minutes Intravenous Once 07/28/20 1113 07/28/20 1231    07/28/20 1111  vancomycin 1750 mg/500 mL 0.9% NS IVPB ([facility])     Ordering Provider:  Teresa Pollard DO    20 mg/kg × 86.2 kg Intravenous Once 07/28/20 1113 07/28/20 1626          CC: fatigue    HPI:    Patient is a 79 y.o.  Yr old male with history of TIA/CVA with chronic/residual right-sided weakness and generally poor memory but does not carry a formal diagnosis of dementia.  He was admitted July 28, 2020 with 3 to 4 days worsening confusion from baseline; patient reports dysuria/urinary incontinence/urinary frequency although he could not attach a specific timeline to this and in general his ability to give detailed history is limited with poor insight.  Nursing reported fever at admission associated with acute kidney injury and evidence for UTI with concern for sepsis urinary source.  Subsequently with blood culture showing gram-positive cocci and preliminary PCR data with enterococcus, vancomycin resistance not detected by initial PCR information; he was placed on empiric vancomycin/Azactam.     He reports some improvement since admission although his insight remains poor.     Wife reports Hx enlarged  "prostate with chronic urinary retention and has followed with Dr Bazan in urology; allergy to PCN and keflex with rash to both     8/2/20 seen early and sleepy;  No new focal pain; Dysuria better and continent of urine today.  Denies hematuria or pyuria and no flank pain at this time.     No headache photophobia or neck stiffness.  No nausea vomiting diarrhea or abdominal pain.  No shortness of breath or cough.  No rash.        ROS:      8/2/20 No f/c/s. No n/v/d. No rash. No new ADR to Abx.     Constitutional-- No chills or sweats.  Appetite diminished with generalized fatigue  Heent-- No new vision, hearing or throat complaints.  No epistaxis or oral sores.  Denies odynophagia or dysphagia.  No flashers, floaters or eye pain. No odynophagia or dysphagia. No headache, photophobia or neck stiffness.  CV-- No chest pain, palpitation or syncope  Resp-- No SOB/cough/Hemoptysis  GI- No nausea, vomiting, or diarrhea.  No hematochezia, melena, or hematemesis. Denies jaundice or chronic liver disease.  --as above  Lymph- no swollen lymph nodes in neck/axilla or groin.   Heme- No active bruising or bleeding; no Hx of DVT or PE.  MS-- no swelling or pain in the bones or joints of arms/legs.  No new back pain.  Neuro-- No acute focal weakness or numbness in the arms or legs.  No seizures.  Chronic residual right-sided weakness     Full 12 point review of systems reviewed and negative otherwise for acute complaints, except for above      PE:   /98 (BP Location: Right arm, Patient Position: Lying)   Pulse 77   Temp 97.6 °F (36.4 °C) (Oral)   Resp 18   Ht 185.4 cm (73\")   Wt 81.2 kg (179 lb)   SpO2 94%   BMI 23.62 kg/m²     GENERAL: sleepy, in no acute distress.   HEENT: Normocephalic, atraumatic.  PERRL. EOMI. No conjunctival injection. No icterus. Oropharynx clear without evidence of thrush or exudate. No evidence of peridontal disease.    NECK: Supple without nuchal rigidity. No mass.  LYMPH: No cervical, " axillary or inguinal lymphadenopathy.  HEART: RRR; No murmur, rubs, gallops.   LUNGS: diminished at bases to auscultation bilaterally without wheezing, rales, rhonchi. Normal respiratory effort. Nonlabored. No dullness.  ABDOMEN: Soft, nontender, nondistended. Positive bowel sounds. No rebound or guarding. NO mass or HSM.  EXT:  No cyanosis, clubbing or edema. No cord.  : Genitalia generally unremarkable.  Without Akins catheter.  MSK: FROM without joint effusions noted arms/legs.    SKIN: Warm and dry without cutaneous eruptions on Inspection/palpation.    NEURO: sleepy     No CVA tenderness     No peripheral stigmata/phenomena of endocarditis       Laboratory Data    Results from last 7 days   Lab Units 08/02/20  0358 08/01/20  0340 07/31/20  0442   WBC 10*3/mm3 8.10 8.08 8.34   HEMOGLOBIN g/dL 13.1 11.9* 12.6*   HEMATOCRIT % 40.6 37.4* 39.7   PLATELETS 10*3/mm3 195 171 148     Results from last 7 days   Lab Units 08/02/20  0358   SODIUM mmol/L 140   POTASSIUM mmol/L 3.7   CHLORIDE mmol/L 102   CO2 mmol/L 28.0   BUN mg/dL 11   CREATININE mg/dL 1.08   GLUCOSE mg/dL 92   CALCIUM mg/dL 8.8     Results from last 7 days   Lab Units 07/31/20  0211   ALK PHOS U/L 76   BILIRUBIN mg/dL 0.8   ALT (SGPT) U/L 16   AST (SGOT) U/L 33               Estimated Creatinine Clearance: 62.7 mL/min (by C-G formula based on SCr of 1.08 mg/dL).      Microbiology:      Radiology:  Imaging Results (Last 72 Hours)     Procedure Component Value Units Date/Time    XR Chest 1 View [[id number]] Collected:  07/31/20 1347     Updated:  07/31/20 2255    Narrative:       EXAMINATION: XR CHEST 1 VW-07/31/2020:     INDICATION: Hypoxia; N39.0-Urinary tract infection, site not specified;  R41.82-Altered mental status, unspecified; Z86.73-Personal history of  transient ischemic attack (TIA), and cerebral infarction without  residual deficits; R53.1-Weakness; R44.1-Visual hallucinations.     COMPARISON: 07/31/2020 2:09 AM.     FINDINGS: Radiograph  obtained at 12:26 PM shows the lung volumes  remaining relatively low. The heart and vasculature appear upper limits  of normal size. Mild diffuse pulmonary interstitial changes appear  stable overall compared to the prior exam. No densely consolidated lung  effusion or pneumothorax is seen.       Impression:       Low lung volumes and mild diffuse pulmonary interstitial  disease, similar to earlier 2:09 AM exam.     D:  07/31/2020  E:  07/31/2020            This report was finalized on 7/31/2020 10:52 PM by Dr. Félix Chu MD.       XR Chest 1 View [[id number]] Collected:  07/31/20 0237     Updated:  07/31/20 0239    Narrative:       CR Chest 1 Vw    INDICATION:   Dyspnea today.     COMPARISON:    7/28/2020    FINDINGS:  Single portable AP view(s) of the chest.    Lung volumes are lower today. The heart is enlarged. There is vascular congestion. There is some atelectasis in the lung bases. No pneumothorax. There is elevation of the right hemidiaphragm.       Impression:       Persistent cardiomegaly with vascular congestion. Lower lung volumes today with bibasilar atelectasis.    Signer Name: Ammon Thayer MD   Signed: 7/31/2020 2:37 AM   Workstation Name: [facility]    [facility]            Impression:     --Acute sepsis.  Fever/leukocytosis and acute kidney injury with enterococcal bacteremia/septicemia.  Concern for urinary source at present.  Abdominal exam otherwise benign with no nausea/vomiting/diarrhea.  If enterococcus did not grow in the urine or if new abdominal symptoms arise consideration could be given to further GI work-up such as CT scan or GI consultation/endoscopy etc.  Otherwise, chest clear, no cough or breathing difficulty, no obvious skin or soft tissue changes.  Monitor for other potential foci/pathogen     -- Acute  enterococcus bacteremia/septicemia.  Currently no stigmata of endocarditis but certainly could evolve.  High risk for further serious morbidity and other  serious sequela.  Need further clarification from family regarding beta-lactam allergy     --Acute enterococcal complicated UTI.  Associated with septicemia/bacteremia as above.  Urology following and any  Further functional/anatomic assessment at their discretion     --Acute kidney injury.  Creatinine trended down with resuscitation.  Probably prerenal/ATN associated with sepsis.  Monitor to help guide further adjustments in antimicrobials    --acute pulm edema?; vascular congestion per radiology on CXR     --Acute encephalopathy.  Baseline not entirely clear with prior history of stroke/TIA and nursing reports baseline with forgetfulness with possible cognitive difficulties at baseline.  Need further clarification from family.  Currently no meningismus.  Further neurologic work-up or neurology consultation at discretion of internal medicine     --History of TIA/CVA with reports of residual right-sided weakness.          PLAN:     --IV vancomycin      --Check/review labs cultures and scans     --Partial history per nursing staff     --Discussed with microbiology     --Discussed with Dr. Kang     --Highly complex set of issues with high risk for further serious morbidity and other serious sequela    --urology following    --I have asked micro for further GURPREET data on enterococcus to daptomycin to be evaluated    --repeat blood cultures; TTE no mention of vegetation per cardiology;  Likely to need SUMMER         Sabino Sandhu MD  8/2/2020

## 2020-08-03 LAB
ANION GAP SERPL CALCULATED.3IONS-SCNC: 11 MMOL/L (ref 5–15)
BUN SERPL-MCNC: 12 MG/DL (ref 8–23)
BUN/CREAT SERPL: 9.8 (ref 7–25)
CALCIUM SPEC-SCNC: 9.4 MG/DL (ref 8.6–10.5)
CHLORIDE SERPL-SCNC: 101 MMOL/L (ref 98–107)
CO2 SERPL-SCNC: 26 MMOL/L (ref 22–29)
CREAT SERPL-MCNC: 1.22 MG/DL (ref 0.76–1.27)
DEPRECATED RDW RBC AUTO: 51 FL (ref 37–54)
ERYTHROCYTE [DISTWIDTH] IN BLOOD BY AUTOMATED COUNT: 14.7 % (ref 12.3–15.4)
GFR SERPL CREATININE-BSD FRML MDRD: 57 ML/MIN/1.73
GLUCOSE SERPL-MCNC: 93 MG/DL (ref 65–99)
HCT VFR BLD AUTO: 40.8 % (ref 37.5–51)
HGB BLD-MCNC: 13.4 G/DL (ref 13–17.7)
MCH RBC QN AUTO: 31.5 PG (ref 26.6–33)
MCHC RBC AUTO-ENTMCNC: 32.8 G/DL (ref 31.5–35.7)
MCV RBC AUTO: 95.8 FL (ref 79–97)
PLATELET # BLD AUTO: 218 10*3/MM3 (ref 140–450)
PMV BLD AUTO: 10.2 FL (ref 6–12)
POTASSIUM SERPL-SCNC: 3.4 MMOL/L (ref 3.5–5.2)
RBC # BLD AUTO: 4.26 10*6/MM3 (ref 4.14–5.8)
SODIUM SERPL-SCNC: 138 MMOL/L (ref 136–145)
VANCOMYCIN TROUGH SERPL-MCNC: 22.3 MCG/ML (ref 5–20)
WBC # BLD AUTO: 8.09 10*3/MM3 (ref 3.4–10.8)

## 2020-08-03 PROCEDURE — 80048 BASIC METABOLIC PNL TOTAL CA: CPT | Performed by: INTERNAL MEDICINE

## 2020-08-03 PROCEDURE — 25010000002 VANCOMYCIN 10 G RECONSTITUTED SOLUTION

## 2020-08-03 PROCEDURE — 97116 GAIT TRAINING THERAPY: CPT | Performed by: PHYSICAL THERAPIST

## 2020-08-03 PROCEDURE — 25010000002 HEPARIN (PORCINE) PER 1000 UNITS: Performed by: INTERNAL MEDICINE

## 2020-08-03 PROCEDURE — 99232 SBSQ HOSP IP/OBS MODERATE 35: CPT | Performed by: INTERNAL MEDICINE

## 2020-08-03 PROCEDURE — 97110 THERAPEUTIC EXERCISES: CPT | Performed by: PHYSICAL THERAPIST

## 2020-08-03 PROCEDURE — 80202 ASSAY OF VANCOMYCIN: CPT | Performed by: INTERNAL MEDICINE

## 2020-08-03 PROCEDURE — 85027 COMPLETE CBC AUTOMATED: CPT | Performed by: INTERNAL MEDICINE

## 2020-08-03 RX ORDER — POTASSIUM CHLORIDE 750 MG/1
40 CAPSULE, EXTENDED RELEASE ORAL ONCE
Status: COMPLETED | OUTPATIENT
Start: 2020-08-03 | End: 2020-08-03

## 2020-08-03 RX ADMIN — ASPIRIN 81 MG: 81 TABLET, COATED ORAL at 08:42

## 2020-08-03 RX ADMIN — BUSPIRONE HYDROCHLORIDE 5 MG: 5 TABLET ORAL at 20:55

## 2020-08-03 RX ADMIN — TERAZOSIN HYDROCHLORIDE 5 MG: 5 CAPSULE ORAL at 20:55

## 2020-08-03 RX ADMIN — FINASTERIDE 5 MG: 5 TABLET, FILM COATED ORAL at 08:43

## 2020-08-03 RX ADMIN — PANTOPRAZOLE SODIUM 40 MG: 40 TABLET, DELAYED RELEASE ORAL at 08:43

## 2020-08-03 RX ADMIN — POTASSIUM CHLORIDE 40 MEQ: 10 CAPSULE, COATED, EXTENDED RELEASE ORAL at 08:51

## 2020-08-03 RX ADMIN — SODIUM CHLORIDE, PRESERVATIVE FREE 10 ML: 5 INJECTION INTRAVENOUS at 20:56

## 2020-08-03 RX ADMIN — HEPARIN SODIUM 5000 UNITS: 5000 INJECTION INTRAVENOUS; SUBCUTANEOUS at 21:06

## 2020-08-03 RX ADMIN — VANCOMYCIN HYDROCHLORIDE 1250 MG: 10 INJECTION, POWDER, LYOPHILIZED, FOR SOLUTION INTRAVENOUS at 17:34

## 2020-08-03 RX ADMIN — BUSPIRONE HYDROCHLORIDE 5 MG: 5 TABLET ORAL at 08:44

## 2020-08-03 RX ADMIN — CLOPIDOGREL BISULFATE 75 MG: 75 TABLET ORAL at 08:43

## 2020-08-03 RX ADMIN — TRIAMTERENE AND HYDROCHLOROTHIAZIDE 1 TABLET: 37.5; 25 TABLET ORAL at 08:43

## 2020-08-03 RX ADMIN — ALLOPURINOL 300 MG: 300 TABLET ORAL at 08:43

## 2020-08-03 RX ADMIN — HEPARIN SODIUM 5000 UNITS: 5000 INJECTION INTRAVENOUS; SUBCUTANEOUS at 06:32

## 2020-08-03 RX ADMIN — HEPARIN SODIUM 5000 UNITS: 5000 INJECTION INTRAVENOUS; SUBCUTANEOUS at 13:52

## 2020-08-03 RX ADMIN — MEMANTINE 5 MG: 10 TABLET ORAL at 08:44

## 2020-08-03 RX ADMIN — ATORVASTATIN CALCIUM 80 MG: 40 TABLET, FILM COATED ORAL at 20:55

## 2020-08-03 NOTE — PLAN OF CARE
Problem: Patient Care Overview  Goal: Plan of Care Review  Outcome: Ongoing (interventions implemented as appropriate)  Flowsheets (Taken 8/3/2020 1122)  Outcome Summary: Pt progressing well towards skilled PT goals.  Pt stood with CGAx1 and ambulated 200 feet using rw with CGAx1.  Pt still slightly unsteady but no LOB noted.  Pt completed BLE ther ex well without complaint.  Continue with skilled PT to improve mobility and safety.

## 2020-08-03 NOTE — PLAN OF CARE
Problem: Patient Care Overview  Goal: Plan of Care Review  Outcome: Ongoing (interventions implemented as appropriate)  Goal: Individualization and Mutuality  Outcome: Ongoing (interventions implemented as appropriate)  Goal: Discharge Needs Assessment  Outcome: Ongoing (interventions implemented as appropriate)  Goal: Interprofessional Rounds/Family Conf  Outcome: Ongoing (interventions implemented as appropriate)     Problem: Fall Risk (Adult)  Goal: Identify Related Risk Factors and Signs and Symptoms  Outcome: Ongoing (interventions implemented as appropriate)  Goal: Absence of Fall  Outcome: Ongoing (interventions implemented as appropriate)     Problem: Skin Injury Risk (Adult)  Goal: Identify Related Risk Factors and Signs and Symptoms  Outcome: Ongoing (interventions implemented as appropriate)  Goal: Skin Health and Integrity  Outcome: Ongoing (interventions implemented as appropriate)     Problem: Pain, Chronic (Adult)  Goal: Identify Related Risk Factors and Signs and Symptoms  Outcome: Ongoing (interventions implemented as appropriate)  Goal: Acceptable Pain/Comfort Level and Functional Ability  Outcome: Ongoing (interventions implemented as appropriate)     Problem: Urinary Tract Infection (Adult)  Goal: Signs and Symptoms of Listed Potential Problems Will be Absent, Minimized or Managed (Urinary Tract Infection)  Outcome: Ongoing (interventions implemented as appropriate)

## 2020-08-03 NOTE — PROGRESS NOTES
"Pharmacy Consult - Vancomycin Dosing    Domitila Bosch is an 79 y.o. male receiving vancomycin therapy.     Indication: E faecalis bacteremia  Consulting Provider: Hospitalist  ID Consult: yes    Goal Trough: 15-20 mcg/mL    Current Antimicrobial Therapy  Vancomycin 7/28-now    Allergies  Allergies as of 07/28/2020 - Reviewed 07/28/2020   Allergen Reaction Noted    Betadine [povidone iodine]  06/09/2016    Cephalexin  04/20/2016    Flomax [tamsulosin hcl]  02/16/2017    Iodine  06/09/2016    Lisinopril  04/20/2016    Pseudoephedrine  04/20/2016    Sulfamethoxazole-trimethoprim  04/20/2016     Labs  Results from last 7 days   Lab Units 08/03/20  0412 08/02/20  0358 08/01/20  0340   BUN mg/dL 12 11 12   CREATININE mg/dL 1.22 1.08 1.14     Results from last 7 days   Lab Units 08/03/20  0412 08/02/20  0358 08/01/20  0340   WBC 10*3/mm3 8.09 8.10 8.08     Evaluation of Dosing    Is Patient on Dialysis or Renal Replacement: no    Ht - 185.4 cm (73\")  Wt - 81.5 kg (179 lb 11.2 oz)    Estimated Creatinine Clearance: 55.7 mL/min (by C-G formula based on SCr of 1.22 mg/dL).    Intake & Output (last 3 days)         07/31 0701 - 08/01 0700 08/01 0701 - 08/02 0700 08/02 0701 - 08/03 0700 08/03 0701 - 08/04 0700    P.O.  640 370     IV Piggyback  200      Total Intake(mL/kg)  840 (10.3) 370 (4.5)     Urine (mL/kg/hr) 750 (0.4) 3600 (1.8) 1620 (0.8)     Stool 0       Total Output 750 3600 1620     Net -750 -2760 -1250             Urine Unmeasured Occurrence 1 x       Stool Unmeasured Occurrence 1 x             Microbiology and Radiology  Microbiology Results (last 10 days)       Procedure Component Value - Date/Time    COVID PRE-OP / PRE-PROCEDURE SCREENING ORDER (NO ISOLATION) - Swab, Nasopharynx [[id number]] Collected:  08/02/20 1602    Lab Status:  Final result Specimen:  Swab from Nasopharynx Updated:  08/02/20 0670    Narrative:       The following orders were created for panel order COVID PRE-OP / PRE-PROCEDURE SCREENING ORDER " (NO ISOLATION) - Swab, Nasopharynx.  Procedure                               Abnormality         Status                     ---------                               -----------         ------                     Respiratory Panel PCR w/...[[id number]]  Normal              Final result                 Please view results for these tests on the individual orders.    Respiratory Panel PCR w/COVID-19(SARS-CoV-2) PATRICIA/GREG/AMELIA In-House, NP Swab in [facility], 8-12 Hr TAT - Swab, Nasopharynx [[id number]]  (Normal) Collected:  08/02/20 1602    Lab Status:  Final result Specimen:  Swab from Nasopharynx Updated:  08/02/20 1713     ADENOVIRUS, PCR Not Detected     Coronavirus 229E Not Detected     Coronavirus HKU1 Not Detected     Coronavirus NL63 Not Detected     Coronavirus OC43 Not Detected     COVID19 Not Detected     Human Metapneumovirus Not Detected     Human Rhinovirus/Enterovirus Not Detected     Influenza A PCR Not Detected     Influenza A H1 Not Detected     Influenza A H1 2009 PCR Not Detected     Influenza A H3 Not Detected     Influenza B PCR Not Detected     Parainfluenza Virus 1 Not Detected     Parainfluenza Virus 2 Not Detected     Parainfluenza Virus 3 Not Detected     Parainfluenza Virus 4 Not Detected     RSV, PCR Not Detected     Bordetella pertussis pcr Not Detected     Bordetella parapertussis PCR Not Detected     Chlamydophila pneumoniae PCR Not Detected     Mycoplasma pneumo by PCR Not Detected    Narrative:       Fact sheet for providers: https://docs.[employer]/wp-content/uploads/TJZ0624-8506-CX1.1-EUA-Provider-Fact-Sheet-3.pdf    Fact sheet for patients: https://docs.[employer]/wp-content/uploads/SER8458-8293-VX3.1-EUA-Patient-Fact-Sheet-1.pdf    Blood Culture - Blood, Chest, Right [[id number]] Collected:  07/30/20 0834    Lab Status:  Preliminary result Specimen:  Blood from Chest, Right Updated:  08/03/20 0915     Blood Culture No growth at 4 days    Blood Culture - Blood, Arm, Left [[id number]]  Collected:  07/30/20 0834    Lab Status:  Preliminary result Specimen:  Blood from Arm, Left Updated:  08/03/20 0915     Blood Culture No growth at 4 days    Blood Culture - Blood, Arm, Left [[id number]]  (Abnormal)  (Susceptibility) Collected:  07/28/20 1133    Lab Status:  Edited Result - FINAL Specimen:  Blood from Arm, Left Updated:  07/31/20 0723     Blood Culture Enterococcus faecalis     Comment:   Infectious disease consultation is highly recommended.        Isolated from Aerobic and Anaerobic Bottles     Gram Stain Aerobic Bottle Gram positive cocci in chains      Anaerobic Bottle Gram positive cocci in chains    Narrative:       Dr. Sandhu requested Daptomycin     Susceptibility        Enterococcus faecalis     GURPREET     Ampicillin Susceptible     Daptomycin Intermediate     Gentamicin High Level Synergy Susceptible     Vancomycin Susceptible                      Blood Culture ID, PCR - Blood, Arm, Left [[id number]]  (Abnormal) Collected:  07/28/20 1133    Lab Status:  Final result Specimen:  Blood from Arm, Left Updated:  07/29/20 0555     BCID, PCR Enterococcus spp. Identification by BCID PCR.    Blood Culture - Blood, Arm, Right [[id number]]  (Abnormal) Collected:  07/28/20 1125    Lab Status:  Final result Specimen:  Blood from Arm, Right Updated:  07/30/20 0656     Blood Culture Enterococcus faecalis     Comment:   Infectious disease consultation is highly recommended.        Isolated from Aerobic and Anaerobic Bottles     Gram Stain Aerobic Bottle Gram positive cocci in chains      Anaerobic Bottle Gram positive cocci in chains    Narrative:       Refer to previous blood culture collected on 7/28/2020 1133 for MICs    Urine Culture - Urine, Urine, Clean Catch [[id number]]  (Abnormal)  (Susceptibility) Collected:  07/28/20 1021    Lab Status:  Edited Result - FINAL Specimen:  Urine, Clean Catch Updated:  07/31/20 1057     Urine Culture >100,000 CFU/mL Enterococcus faecalis    Narrative:         requested Daptomycin 7/30    Susceptibility        Enterococcus faecalis     GURPREET     Ampicillin Susceptible     Daptomycin Susceptible     Levofloxacin Susceptible     Nitrofurantoin Susceptible     Tetracycline Susceptible     Vancomycin Susceptible                            Evaluation of Level          Results from last 7 days   Lab Units 08/03/20  0829 07/30/20  1843   VANCOMYCIN TR mcg/mL 22.30* 6.10      Assessment/Plan:  1. Pharmacy dosing vancomycin for E faecalis bacteremia, goal trough 15-20.  2. Patient currently on a maintenance regimen of vancomycin 1000mg IV q12h after subtherapeutic trough on 7/30. Repeat vancomycin trough this morning returned supratherapeutic at 22.3 mcg/mL (drawn appropriately). Serum creatinine starting to trend back up. Will reduce dose back to vancomycin 1250mg IV q24h starting tonight at 1800. Will consider repeat trough in 5-7 days pending clinical course.  3. Pharmacy will continue to monitor and adjust vancomycin dose as necessary based on renal function, cultures, labs, and clinical status.     Thank you,  Joel Terrazas, PharmD, BCPS  8/3/2020  12:24

## 2020-08-03 NOTE — PLAN OF CARE
Problem: Patient Care Overview  Goal: Plan of Care Review  Flowsheets (Taken 8/3/2020 0525)  Progress: improving  Plan of Care Reviewed With: patient  Outcome Summary: VSS, pt remains confused, good UOP, denies pain or discomfort. will continue to monitor.     Problem: Fall Risk (Adult)  Goal: Absence of Fall  Flowsheets (Taken 8/3/2020 0525)  Absence of Fall: achieves outcome

## 2020-08-03 NOTE — PLAN OF CARE
Patient pleasant with no short-term memory or very limited. Requires constant reorientation. Calling wife so patient can speak to her--pt requesting this frequently. Alert and oriented only to person occasionally deducts that he's at a hospital   VS WNL for pt and tele shows a SR with BBB. Receiving Vancomycin--AM dose held after trough result. NPO after midnight for SUMMER then possible d/c home if pt medically stable.

## 2020-08-03 NOTE — PROGRESS NOTES
[facility] Progress Note        Antibiotics:  Anti-Infectives (From admission, onward)    Ordered     Dose/Rate Route Frequency Start Stop    07/31/20 0729  vancomycin (VANCOCIN) in iso-osmotic dextrose IVPB 1 g (premix) 200 mL     Alvaro Vega RPH reviewed the order on 08/02/20 0932.   Ordering Provider:  Joel Terrazas RPH    1,000 mg  over 60 Minutes Intravenous Every 12 Hours Scheduled 07/31/20 0900 08/06/20 0859    07/29/20 0556  Pharmacy to dose vancomycin     Joel Terrazas RPH reviewed the order on 07/31/20 0753.   Ordering Provider:  Celi Perez APRN     Does not apply Continuous PRN 07/29/20 0555 08/05/20 0554    07/28/20 1111  aztreonam (AZACTAM) 2 g/100 mL 0.9% NS (mbp)     Ordering Provider:  Norberto Son PA    2 g  over 30 Minutes Intravenous Once 07/28/20 1113 07/28/20 1231    07/28/20 1111  vancomycin 1750 mg/500 mL 0.9% NS IVPB ([facility])     Ordering Provider:  Teresa Pollard DO    20 mg/kg × 86.2 kg Intravenous Once 07/28/20 1113 07/28/20 1626          CC: fatigue    HPI:    Patient is a 79 y.o.  Yr old male with history of TIA/CVA with chronic/residual right-sided weakness and generally poor memory but does not carry a formal diagnosis of dementia.  He was admitted July 28, 2020 with 3 to 4 days worsening confusion from baseline; patient reports dysuria/urinary incontinence/urinary frequency although he could not attach a specific timeline to this and in general his ability to give detailed history is limited with poor insight.  Nursing reported fever at admission associated with acute kidney injury and evidence for UTI with concern for sepsis urinary source.  Subsequently with blood culture showing gram-positive cocci and preliminary PCR data with enterococcus, vancomycin resistance not detected by initial PCR information; he was placed on empiric vancomycin/Azactam.     He reports some improvement since admission although his insight remains poor.     Wife reports Hx enlarged  "prostate with chronic urinary retention and has followed with Dr Bazan in urology; allergy to PCN and keflex with rash to both     8/3/20 seen early and sleepy;  No new focal pain; Dysuria better and continent of urine today.  Denies hematuria or pyuria and no flank pain at this time.     No headache photophobia or neck stiffness.  No nausea vomiting diarrhea or abdominal pain.  No shortness of breath or cough.  No rash.        ROS:      8/3/20 No f/c/s. No n/v/d. No rash. No new ADR to Abx.     Constitutional-- No chills or sweats.  Appetite diminished with generalized fatigue  Heent-- No new vision, hearing or throat complaints.  No epistaxis or oral sores.  Denies odynophagia or dysphagia.  No flashers, floaters or eye pain. No odynophagia or dysphagia. No headache, photophobia or neck stiffness.  CV-- No chest pain, palpitation or syncope  Resp-- No SOB/cough/Hemoptysis  GI- No nausea, vomiting, or diarrhea.  No hematochezia, melena, or hematemesis. Denies jaundice or chronic liver disease.  --as above  Lymph- no swollen lymph nodes in neck/axilla or groin.   Heme- No active bruising or bleeding; no Hx of DVT or PE.  MS-- no swelling or pain in the bones or joints of arms/legs.  No new back pain.  Neuro-- No acute focal weakness or numbness in the arms or legs.  No seizures.  Chronic residual right-sided weakness     Full 12 point review of systems reviewed and negative otherwise for acute complaints, except for above      PE:   /94   Pulse 66   Temp 97.6 °F (36.4 °C) (Oral)   Resp 16   Ht 185.4 cm (73\")   Wt 81.5 kg (179 lb 11.2 oz)   SpO2 94%   BMI 23.71 kg/m²     GENERAL: sleepy, in no acute distress.   HEENT: Normocephalic, atraumatic.  PERRL. EOMI. No conjunctival injection. No icterus. Oropharynx clear without evidence of thrush or exudate. No evidence of peridontal disease.    NECK: Supple without nuchal rigidity. No mass.  LYMPH: No cervical, axillary or inguinal lymphadenopathy.  HEART: " RRR; No murmur, rubs, gallops.   LUNGS: diminished at bases to auscultation bilaterally without wheezing, rales, rhonchi. Normal respiratory effort. Nonlabored. No dullness.  ABDOMEN: Soft, nontender, nondistended. Positive bowel sounds. No rebound or guarding. NO mass or HSM.  EXT:  No cyanosis, clubbing or edema. No cord.  : Genitalia generally unremarkable.  Without Akins catheter.  MSK: FROM without joint effusions noted arms/legs.    SKIN: Warm and dry without cutaneous eruptions on Inspection/palpation.    NEURO: sleepy     No CVA tenderness     No peripheral stigmata/phenomena of endocarditis       Laboratory Data    Results from last 7 days   Lab Units 08/03/20  0412 08/02/20  0358 08/01/20  0340   WBC 10*3/mm3 8.09 8.10 8.08   HEMOGLOBIN g/dL 13.4 13.1 11.9*   HEMATOCRIT % 40.8 40.6 37.4*   PLATELETS 10*3/mm3 218 195 171     Results from last 7 days   Lab Units 08/03/20  0412   SODIUM mmol/L 138   POTASSIUM mmol/L 3.4*   CHLORIDE mmol/L 101   CO2 mmol/L 26.0   BUN mg/dL 12   CREATININE mg/dL 1.22   GLUCOSE mg/dL 93   CALCIUM mg/dL 9.4     Results from last 7 days   Lab Units 07/31/20  0211   ALK PHOS U/L 76   BILIRUBIN mg/dL 0.8   ALT (SGPT) U/L 16   AST (SGOT) U/L 33               Estimated Creatinine Clearance: 55.7 mL/min (by C-G formula based on SCr of 1.22 mg/dL).      Microbiology:      Radiology:  Imaging Results (Last 72 Hours)     Procedure Component Value Units Date/Time    XR Chest 1 View [[id number]] Collected:  07/31/20 1347     Updated:  07/31/20 2454    Narrative:       EXAMINATION: XR CHEST 1 VW-07/31/2020:     INDICATION: Hypoxia; N39.0-Urinary tract infection, site not specified;  R41.82-Altered mental status, unspecified; Z86.73-Personal history of  transient ischemic attack (TIA), and cerebral infarction without  residual deficits; R53.1-Weakness; R44.1-Visual hallucinations.     COMPARISON: 07/31/2020 2:09 AM.     FINDINGS: Radiograph obtained at 12:26 PM shows the lung  volumes  remaining relatively low. The heart and vasculature appear upper limits  of normal size. Mild diffuse pulmonary interstitial changes appear  stable overall compared to the prior exam. No densely consolidated lung  effusion or pneumothorax is seen.       Impression:       Low lung volumes and mild diffuse pulmonary interstitial  disease, similar to earlier 2:09 AM exam.     D:  07/31/2020  E:  07/31/2020            This report was finalized on 7/31/2020 10:52 PM by Dr. Félix Chu MD.               Impression:     --Acute sepsis.  Fever/leukocytosis and acute kidney injury with enterococcal bacteremia/septicemia.  Concern for urinary source at present.  Abdominal exam otherwise benign with no nausea/vomiting/diarrhea.  If enterococcus did not grow in the urine or if new abdominal symptoms arise consideration could be given to further GI work-up such as CT scan or GI consultation/endoscopy etc.  Otherwise, chest clear, no cough or breathing difficulty, no obvious skin or soft tissue changes.  Monitor for other potential foci/pathogen     -- Acute  enterococcus bacteremia/septicemia.  Currently no stigmata of endocarditis but certainly could evolve.  High risk for further serious morbidity and other serious sequela.  Need further clarification from family regarding beta-lactam allergy     --Acute enterococcal complicated UTI.  Associated with septicemia/bacteremia as above.  Urology following and any  Further functional/anatomic assessment at their discretion     --Acute kidney injury.  Creatinine trended down with resuscitation.  Probably prerenal/ATN associated with sepsis.  Monitor to help guide further adjustments in antimicrobials    --acute pulm edema?; vascular congestion per radiology on CXR     --Acute encephalopathy.  Baseline not entirely clear with prior history of stroke/TIA and nursing reports baseline with forgetfulness with possible cognitive difficulties at baseline.  Need further clarification  from family.  Currently no meningismus.  Further neurologic work-up or neurology consultation at discretion of internal medicine     --History of TIA/CVA with reports of residual right-sided weakness.          PLAN:     --IV vancomycin      --Check/review labs cultures and scans     --Partial history per nursing staff     --Discussed with microbiology     --Discussed with Dr. Kang     --Highly complex set of issues with high risk for further serious morbidity and other serious sequela    --urology following    --I have asked micro for further GURPREET data on enterococcus to daptomycin to be evaluated    --repeat blood cultures; TTE no mention of vegetation per cardiology;    SUMMER  pending        Sabino Sandhu MD  8/3/2020

## 2020-08-03 NOTE — PROGRESS NOTES
[facility] Medicine Services  PROGRESS NOTE    Patient Name: Domitila Bosch  : 1940  MRN: [id number]    Date of Admission: 2020  Primary Care Physician: Marcin Ferguson MD    Subjective   Subjective     CC:  Ams, bacteremia    HPI:  Wife at bedside today, He has been refusing rehab but discussed that if he needs long term antibiotics then he will need placement or home health at least    Review of Systems  Gen- No fevers, chills  CV- No chest pain, palpitations  Resp- No cough, dyspnea  GI- No N/V/D, abd pain       Objective   Objective     Vital Signs:   Temp:  [97.6 °F (36.4 °C)-97.7 °F (36.5 °C)] 97.7 °F (36.5 °C)  Heart Rate:  [59-76] 66  Resp:  [16-18] 16  BP: (103-125)/(70-94) 103/70  Total (NIH Stroke Scale): 1     Physical Exam:  Constitutional: No acute distress, awake, alert  HENT: NCAT, mucous membranes moist  Respiratory: Clear to auscultation bilaterally, respiratory effort normal   Cardiovascular: RRR, no murmurs  Gastrointestinal: Positive bowel sounds, soft, nontender, nondistended  Musculoskeletal: No bilateral ankle edema  Psychiatric: Appropriate affect, cooperative  Neurologic: Oriented to self with intermittent confusion, speech clear  Skin: No rashes    Results Reviewed:  Results from last 7 days   Lab Units 20  04120  0358 20  0340  20  1007   WBC 10*3/mm3 8.09 8.10 8.08   < > 8.89   HEMOGLOBIN g/dL 13.4 13.1 11.9*   < > 14.7   HEMATOCRIT % 40.8 40.6 37.4*   < > 45.0   PLATELETS 10*3/mm3 218 195 171   < > 169   PROCALCITONIN ng/mL  --   --   --   --  0.18    < > = values in this interval not displayed.     Results from last 7 days   Lab Units 20  0412 20  0358 20  0340  20  0211  20  1007   SODIUM mmol/L 138 140 141  --  140  139   < > 140   POTASSIUM mmol/L 3.4* 3.7 3.6   < > 3.3*  3.4*  3.4*   < > 3.6   CHLORIDE mmol/L 101 102 106  --  107  107   < > 100   CO2 mmol/L 26.0 28.0 28.0  --   24.0  24.0   < > 27.0   BUN mg/dL 12 11 12  --  14  14   < > 20   CREATININE mg/dL 1.22 1.08 1.14  --  0.97  0.97   < > 1.86*   GLUCOSE mg/dL 93 92 79  --  91  91   < > 91   CALCIUM mg/dL 9.4 8.8 8.1*  --  7.5*  7.5*   < > 9.4   ALT (SGPT) U/L  --   --   --   --  16  --  17   AST (SGOT) U/L  --   --   --   --  33  --  26   TROPONIN T ng/mL  --   --   --   --   --   --  <0.010   PROBNP pg/mL  --   --   --   --  696.0  --   --     < > = values in this interval not displayed.     Estimated Creatinine Clearance: 55.7 mL/min (by C-G formula based on SCr of 1.22 mg/dL).    Microbiology Results Abnormal     Procedure Component Value - Date/Time    Blood Culture - Blood, Chest, Right [[id number]] Collected:  07/30/20 0834    Lab Status:  Preliminary result Specimen:  Blood from Chest, Right Updated:  08/03/20 0915     Blood Culture No growth at 4 days    Blood Culture - Blood, Arm, Left [[id number]] Collected:  07/30/20 0834    Lab Status:  Preliminary result Specimen:  Blood from Arm, Left Updated:  08/03/20 0915     Blood Culture No growth at 4 days    COVID PRE-OP / PRE-PROCEDURE SCREENING ORDER (NO ISOLATION) - Swab, Nasopharynx [[id number]] Collected:  08/02/20 1602    Lab Status:  Final result Specimen:  Swab from Nasopharynx Updated:  08/02/20 1713    Narrative:       The following orders were created for panel order COVID PRE-OP / PRE-PROCEDURE SCREENING ORDER (NO ISOLATION) - Swab, Nasopharynx.  Procedure                               Abnormality         Status                     ---------                               -----------         ------                     Respiratory Panel PCR w/...[[id number]]  Normal              Final result                 Please view results for these tests on the individual orders.    Respiratory Panel PCR w/COVID-19(SARS-CoV-2) PATRICIA/GREG/AMELIA In-House, NP Swab in [facility], 8-12 Hr TAT - Swab, Nasopharynx [[id number]]  (Normal) Collected:  08/02/20 1602    Lab Status:  Final result  Specimen:  Swab from Nasopharynx Updated:  08/02/20 1713     ADENOVIRUS, PCR Not Detected     Coronavirus 229E Not Detected     Coronavirus HKU1 Not Detected     Coronavirus NL63 Not Detected     Coronavirus OC43 Not Detected     COVID19 Not Detected     Human Metapneumovirus Not Detected     Human Rhinovirus/Enterovirus Not Detected     Influenza A PCR Not Detected     Influenza A H1 Not Detected     Influenza A H1 2009 PCR Not Detected     Influenza A H3 Not Detected     Influenza B PCR Not Detected     Parainfluenza Virus 1 Not Detected     Parainfluenza Virus 2 Not Detected     Parainfluenza Virus 3 Not Detected     Parainfluenza Virus 4 Not Detected     RSV, PCR Not Detected     Bordetella pertussis pcr Not Detected     Bordetella parapertussis PCR Not Detected     Chlamydophila pneumoniae PCR Not Detected     Mycoplasma pneumo by PCR Not Detected    Narrative:       Fact sheet for providers: https://docs.Pump!/wp-content/uploads/RII7800-9902-EF7.1-EUA-Provider-Fact-Sheet-3.pdf    Fact sheet for patients: https://docs.Pump!/wp-content/uploads/QBP1856-0457-RU0.1-EUA-Patient-Fact-Sheet-1.pdf    Urine Culture - Urine, Urine, Clean Catch [[id number]]  (Abnormal)  (Susceptibility) Collected:  07/28/20 1021    Lab Status:  Edited Result - FINAL Specimen:  Urine, Clean Catch Updated:  07/31/20 1057     Urine Culture >100,000 CFU/mL Enterococcus faecalis    Narrative:        requested Daptomycin 7/30    Susceptibility      Enterococcus faecalis     GURPREET     Ampicillin Susceptible     Daptomycin Susceptible     Levofloxacin Susceptible     Nitrofurantoin Susceptible     Tetracycline Susceptible     Vancomycin Susceptible                    Blood Culture - Blood, Arm, Left [[id number]]  (Abnormal)  (Susceptibility) Collected:  07/28/20 1133    Lab Status:  Edited Result - FINAL Specimen:  Blood from Arm, Left Updated:  07/31/20 0723     Blood Culture Enterococcus faecalis     Comment:   Infectious  disease consultation is highly recommended.        Isolated from Aerobic and Anaerobic Bottles     Gram Stain Aerobic Bottle Gram positive cocci in chains      Anaerobic Bottle Gram positive cocci in chains    Narrative:       Dr. Sandhu requested Daptomycin     Susceptibility      Enterococcus faecalis     GURPREET     Ampicillin Susceptible     Daptomycin Intermediate     Gentamicin High Level Synergy Susceptible     Vancomycin Susceptible                    Blood Culture - Blood, Arm, Right [[id number]]  (Abnormal) Collected:  07/28/20 1125    Lab Status:  Final result Specimen:  Blood from Arm, Right Updated:  07/30/20 0656     Blood Culture Enterococcus faecalis     Comment:   Infectious disease consultation is highly recommended.        Isolated from Aerobic and Anaerobic Bottles     Gram Stain Aerobic Bottle Gram positive cocci in chains      Anaerobic Bottle Gram positive cocci in chains    Narrative:       Refer to previous blood culture collected on 7/28/2020 1133 for MICs    Blood Culture ID, PCR - Blood, Arm, Left [[id number]]  (Abnormal) Collected:  07/28/20 1133    Lab Status:  Final result Specimen:  Blood from Arm, Left Updated:  07/29/20 0555     BCID, PCR Enterococcus spp. Identification by BCID PCR.          Imaging Results (Last 24 Hours)     ** No results found for the last 24 hours. **          Results for orders placed during the hospital encounter of 07/28/20   Adult Transthoracic Echo Complete W/ Cont if Necessary Per Protocol    Narrative · Estimated EF = 60%.  · Mild mitral valve regurgitation is present  · Left ventricular systolic function is normal.  · Left ventricular diastolic dysfunction (grade I) consistent with   impaired relaxation.  · There is calcification of the aortic valve.  · Calculated right ventricular systolic pressure from tricuspid   regurgitation is 15.0 mmHg.          I have reviewed the medications:  Scheduled Meds:  allopurinol 300 mg Oral Daily   aspirin 81 mg Oral Daily    atorvastatin 80 mg Oral Nightly   busPIRone 5 mg Oral Q12H   clopidogrel 75 mg Oral Daily   finasteride 5 mg Oral Daily   heparin (porcine) 5,000 Units Subcutaneous Q8H   levothyroxine 150 mcg Oral Q AM   memantine 5 mg Oral Daily   pantoprazole 40 mg Oral QAM   sodium chloride 10 mL Intravenous Q12H   terazosin 5 mg Oral Nightly   triamterene-hydrochlorothiazide 1 tablet Oral Daily   vancomycin 1,250 mg Intravenous Q24H     Continuous Infusions:  Pharmacy to dose vancomycin      PRN Meds:.•  acetaminophen  •  ipratropium-albuterol  •  ondansetron  •  Pharmacy to dose vancomycin  •  potassium chloride  •  potassium chloride  •  sodium chloride  •  sodium chloride    Assessment/Plan   Assessment & Plan     Active Hospital Problems    Diagnosis  POA   • **Acute UTI [N39.0]  Yes   • Acute diastolic heart failure (CMS/HCC) [I50.31]  Unknown   • Acute respiratory failure with hypoxia (CMS/HCC) [J96.01]  Unknown   • Bacteremia [R78.81]  Unknown   • Encephalopathy acute [G93.40]  Yes   • DANIS (acute kidney injury) (CMS/HCC) [N17.9]  Yes   • Carotid stenosis [I65.29]  Yes   • Dyslipidemia [E78.5]  Yes   • Hypothyroidism [E03.9]  Yes   • GERD (gastroesophageal reflux disease) [K21.9]  Yes   • Anxiety disorder [F41.9]  Yes      Resolved Hospital Problems   No resolved problems to display.        Brief Hospital Course to date:  Domitila Bosch is a 79 y.o. male with PMHx significant for carotid stenosis, dyslipidemia, TIA/CVA with residual right sided weakness, hypertension, hypothyroidism, anxiety who presents to [facility] with complaints of increasing confusion over the last 4 days.     Metabolic Encephalopathy related to sepsis/bacteremia - some improvement   - likely multifactorial and secondary to infection and DANIS/dehydration  - CT head with no acute findings, previous MRI shows history of stroke in the past  - continue namenda  - treatment per below      Sepsis - POA   UTI - enterococcus   Bacteremia - enterococcus   - allergy  to Cephalosporins and penicillins   - BCx x 2 with enterococcus; UCx enterococcus; repeat blood cultures NGTD    - DC azactam (7/28-7/29) and continue vanc (7/29)   - ID following   - Urology consulted - no intervention   - ECHO with no comment on valvular veg -- SUMMER ordered for 8/4; NPO after midnight      Acute hypoxic resp failure - improved   Acute diastolic heart failure - improved   - Likely related to volume overload; ABG reviewed; CXR reviewed   - ECHO with normal EF; diastolic heart failure  - s/p lasix 20 mg IV 7/31   - Wean O2 as tolerated   - resume home maxide      DANIS - improved   - baseline around 0.8, 1.9 on admission   - renal US with R non-obs stone; enlargement of prostate; bladder debris      BPH  Urinary retention  - continue hytrin and proscar      Falls:  - PT/OT to see, currently lives at home with his wife  - rehab currently recommended      HLD  Hx of CVA w/residual right sided weakness  Carotid Stenosis  - continue ASA, plavix, statin     Hypothyroidism:  - levothyroxine     Hypertension:  - BP trending up; resumed home maxide     DVT Prophylaxis:  Asa, plavix      Disposition: I expect the patient to be discharged TBD, pending SUMMER    CODE STATUS:   Code Status and Medical Interventions:   Ordered at: 07/28/20 1249     Level Of Support Discussed With:    Patient     Code Status:    CPR     Medical Interventions (Level of Support Prior to Arrest):    Full         Electronically signed by Janette Marino DO, 08/03/20, 14:23.

## 2020-08-03 NOTE — THERAPY TREATMENT NOTE
Patient Name: Domitila Bosch  : 1940    MRN: [id number]                              Today's Date: 8/3/2020       Admit Date: 2020    Visit Dx:     ICD-10-CM ICD-9-CM   1. Acute UTI N39.0 599.0   2. Altered mental status, unspecified altered mental status type R41.82 780.97   3. History of CVA (cerebrovascular accident) Z86.73 V12.54   4. Weakness R53.1 780.79   5. Visual hallucinations R44.1 368.16     Patient Active Problem List   Diagnosis   • Diverticulosis of large intestine with hemorrhage   • Umbilical hernia   • S/P hernia repair   • Dyslipidemia   • Carotid stenosis   • Gout   • GERD (gastroesophageal reflux disease)   • Hypothyroidism   • Neuropathy   • Malignant melanoma (CMS/HCC)   • Asthma   • Anxiety disorder   • Diverticulosis   • Muscle pain   • Lumbar radiculopathy   • Kidney stone   • Deviated nasal septum   • Chronic laryngitis   • Acute CVA (cerebrovascular accident) (CMS/HCC)   • Elevated BP without diagnosis of hypertension   • Allergy to Betadine   • Acute UTI   • Encephalopathy acute   • DANIS (acute kidney injury) (CMS/HCC)   • Bacteremia   • Acute diastolic heart failure (CMS/HCC)   • Acute respiratory failure with hypoxia (CMS/HCC)     Past Medical History:   Diagnosis Date   • Anxiety disorder 2017   • Asthma 2017   • Basal cell carcinoma in situ of skin 2019    right leg    • Carotid stenosis 2017   • Diaphoresis    • Diastolic dysfunction    • Diverticulitis    • Dyslipidemia 2017   • GERD (gastroesophageal reflux disease) 2017   • Gout 2017   • Herpes zoster     Herpes zoster, [id number], right lower extremity.    • Hypertension 2017   • Hypothyroidism 2017   • LVH (left ventricular hypertrophy)    • Malignant melanoma (CMS/HCC) 2017   • Melanoma (CMS/HCC)    • Mitral regurgitation    • Nephrolithiasis    • Post herpetic neuralgia 2017   • TIA (transient ischemic attack)     TIA, 2012, with nonobstructive carotid  stenosis, dyslipidemia and hypertension     Past Surgical History:   Procedure Laterality Date   • ABDOMINAL SURGERY     • APPENDECTOMY     • COLON RESECTION LEFT  2016   • COLON RESECTION LEFT  2016   • HEEL SPUR SURGERY  1999   • HERNIA REPAIR      hiatal hernia    • NISSEN FUNDOPLICATION  1984   • OTHER SURGICAL HISTORY  2010    Malignant melanoma, status post resection      General Information     Row Name 08/03/20 1122          PT Evaluation Time/Intention    Document Type  therapy note (daily note)  -LM     Mode of Treatment  individual therapy;physical therapy  -     Row Name 08/03/20 1122          General Information    Patient Profile Reviewed?  yes  -LM     Existing Precautions/Restrictions  fall  -LM     Row Name 08/03/20 1122          Cognitive Assessment/Intervention- PT/OT    Orientation Status (Cognition)  oriented to;person;place  -     Row Name 08/03/20 1122          Safety Issues, Functional Mobility    Safety Issues Affecting Function (Mobility)  awareness of need for assistance;insight into deficits/self awareness;safety precaution awareness;safety precautions follow-through/compliance  -LM     Impairments Affecting Function (Mobility)  balance;cognition;endurance/activity tolerance;strength  -LM       User Key  (r) = Recorded By, (t) = Taken By, (c) = Cosigned By    Initials Name Provider Type    LM Kely Pathak, PT Physical Therapist        Mobility     Row Name 08/03/20 1122          Bed Mobility Assessment/Treatment    Comment (Bed Mobility)  Pt sitting up in chair at initial and end of tx.  -LM     Row Name 08/03/20 1122          Transfer Assessment/Treatment    Comment (Transfers)  Vc's for hand placement.  -     Row Name 08/03/20 1122          Sit-Stand Transfer    Sit-Stand [place] (Transfers)  contact guard;1 person assist  -     Assistive Device (Sit-Stand Transfers)  walker, front-wheeled  -     Row Name 08/03/20 1122          Gait/Stairs Assessment/Training     Gait/Stairs Assessment/Training  gait/ambulation independence;gait/ambulation assistive device  -LM     [place] Level (Gait)  contact guard;1 person assist  -LM     Assistive Device (Gait)  walker, 4-wheeled  -LM     Distance in Feet (Gait)  200 feet  -LM     Deviations/Abnormal Patterns (Gait)  base of support, narrow;rody decreased;festinating/shuffling;stride length decreased  -LM     Bilateral Gait Deviations  forward flexed posture;heel strike decreased  -LM     Comment (Gait/Stairs)  Vc's for upright posture and to stay inside walker.  Pt still slightly unsteady but no LOB today.  -LM       User Key  (r) = Recorded By, (t) = Taken By, (c) = Cosigned By    Initials Name Provider Type    Kely Tovar PT Physical Therapist        Obj/Interventions     Row Name 08/03/20 1122          Therapeutic Exercise    Lower Extremity (Therapeutic Exercise)  heel slides, bilateral;LAQ (long arc quad), bilateral;marching while seated;SLR (straight leg raise), bilateral  -LM     Lower Extremity Range of Motion (Therapeutic Exercise)  hip abduction/adduction, bilateral;ankle dorsiflexion/plantar flexion, bilateral  -LM     Exercise Type (Therapeutic Exercise)  AROM (active range of motion)  -LM     Position (Therapeutic Exercise)  seated;other (see comments) Reclined in chair  -LM     Sets/Reps (Therapeutic Exercise)  x20 each  -LM     Expected Outcome (Therapeutic Exercise)  improve functional stability;improve performance, gait skills;improve performance, transfer skills  -LM       User Key  (r) = Recorded By, (t) = Taken By, (c) = Cosigned By    Initials Name Provider Type    Kely Tovar PT Physical Therapist        Goals/Plan    No documentation.       Clinical Impression     Row Name 08/03/20 1122          Pain Assessment    Additional Documentation  Pain Scale: Numbers Pre/Post-Treatment (Group)  -LM     Row Name 08/03/20 1122          Pain Scale: Numbers Pre/Post-Treatment    Pain Scale: Numbers,  Pretreatment  0/10 - no pain  -LM     Pain Scale: Numbers, Post-Treatment  0/10 - no pain  -LM     Row Name 08/03/20 1122          Plan of Care Review    Plan of Care Reviewed With  patient  -LM     Progress  improving  -LM     Row Name 08/03/20 1122          Vital Signs    Pre Systolic BP Rehab  89  -LM     Pre Treatment Diastolic BP  73  -LM     Intra Systolic BP Rehab  95  -LM     Intra Treatment Diastolic BP  60  -LM     Post Systolic BP Rehab  103  -LM     Post Treatment Diastolic BP  70  -LM     Pretreatment Heart Rate (beats/min)  75  -LM     Posttreatment Heart Rate (beats/min)  79  -LM     Pre Patient Position  Sitting  -LM     Intra Patient Position  Standing  -LM     Post Patient Position  Sitting  -LM     Row Name 08/03/20 1122          Positioning and Restraints    Pre-Treatment Position  sitting in chair/recliner  -LM     Post Treatment Position  chair  -LM     In Chair  reclined;call light within reach;encouraged to call for assist;exit alarm on;with family/caregiver;waffle cushion;notified nsg  -LM       User Key  (r) = Recorded By, (t) = Taken By, (c) = Cosigned By    Initials Name Provider Type    LM Kely Pathak, PT Physical Therapist        Outcome Measures     Row Name 08/03/20 1122          How much help from another person do you currently need...    Turning from your back to your side while in flat bed without using bedrails?  3  -LM     Moving from lying on back to sitting on the side of a flat bed without bedrails?  2  -LM     Moving to and from a bed to a chair (including a wheelchair)?  3  -LM     Standing up from a chair using your arms (e.g., wheelchair, bedside chair)?  3  -LM     Climbing 3-5 steps with a railing?  2  -LM     To walk in hospital room?  3  -LM     AM-PAC 6 Clicks Score (PT)  16  -LM     Row Name 08/03/20 1122          Functional Assessment    Outcome Measure Options  AM-PAC 6 Clicks Basic Mobility (PT)  -LM       User Key  (r) = Recorded By, (t) = Taken By, (c) =  Cosigned By    Initials Name Provider Type    Kely Tovar, PT Physical Therapist        Physical Therapy Education                 Title: PT OT SLP Therapies (Done)     Topic: Physical Therapy (Done)     Point: Mobility training (Done)     Description:   Instruct learner(s) on safety and technique for assisting patient out of bed, chair or wheelchair.  Instruct in the proper use of assistive devices, such as walker, crutches, cane or brace.              Patient Friendly Description:   It's important to get you on your feet again, but we need to do so in a way that is safe for you. Falling has serious consequences, and your personal safety is the most important thing of all.        When it's time to get out of bed, one of us or a family member will sit next to you on the bed to give you support.     If your doctor or nurse tells you to use a walker, crutches, a cane, or a brace, be sure you use it every time you get out of bed, even if you think you don't need it.    Learning Progress Summary           Patient Acceptance, E,TB, VU by  at 7/31/2020 0505    Acceptance, E,TB, VU by  at 7/30/2020 0635    Acceptance, E, NR by  at 7/29/2020 1323    Acceptance, E,TB, VU by PC at 7/28/2020 1546   Significant Other Acceptance, E,TB, VU by PC at 7/28/2020 1546                   Point: Home exercise program (Done)     Description:   Instruct learner(s) on appropriate technique for monitoring, assisting and/or progressing patient with therapeutic exercises and activities.              Learning Progress Summary           Patient Acceptance, E,TB, VU by  at 7/31/2020 0505    Acceptance, E,TB, VU by  at 7/30/2020 0635    Acceptance, E,TB, VU by PC at 7/28/2020 1546   Significant Other Acceptance, E,TB, VU by PC at 7/28/2020 1546                   Point: Body mechanics (Done)     Description:   Instruct learner(s) on proper positioning and spine alignment for patient and/or caregiver during mobility tasks and/or  exercises.              Learning Progress Summary           Patient Acceptance, E,TB, VU by  at 7/31/2020 0505    Acceptance, E,TB, VU by  at 7/30/2020 0635    Acceptance, E,TB, VU by  at 7/28/2020 1546   Significant Other Acceptance, E,TB, VU by PC at 7/28/2020 1546                   Point: Precautions (Done)     Description:   Instruct learner(s) on prescribed precautions during mobility and gait tasks              Learning Progress Summary           Patient Acceptance, E,TB, VU by  at 7/31/2020 0505    Acceptance, E,TB, VU by  at 7/30/2020 0635    Acceptance, E, NR by  at 7/29/2020 1323    Acceptance, E,TB, VU by  at 7/28/2020 1546   Significant Other Acceptance, E,TB, VU by  at 7/28/2020 1546                               User Key     Initials Effective Dates Name Provider Type Discipline     07/24/19 -  Kely Pathak, JANNETH Physical Therapist PT     01/30/20 -  Magda Humphrey, RN Registered Nurse Nurse     07/07/20 -  Deja Levy, RN Registered Nurse Nurse              PT Recommendation and Plan  Planned Therapy Interventions (PT Eval): balance training, bed mobility training, gait training, home exercise program, motor coordination training, neuromuscular re-education, patient/family education, postural re-education, ROM (range of motion), strengthening, stretching, transfer training  Outcome Summary/Treatment Plan (PT)  Anticipated Discharge Disposition (PT): inpatient rehabilitation facility  Plan of Care Reviewed With: patient  Progress: improving  Outcome Summary: Pt progressing well towards skilled PT goals.  Pt stood with CGAx1 and ambulated 200 feet using rw with CGAx1.  Pt still slightly unsteady but no LOB noted.  Pt completed BLE ther ex well without complaint.  Continue with skilled PT to improve mobility and safety.     Time Calculation:   PT Charges     Row Name 08/03/20 1122             Time Calculation    Start Time  1122  -LM      PT Received On  08/03/20  -LM      PT Goal  Re-Cert Due Date  08/08/20  -LM         Timed Charges    49475 - PT Therapeutic Exercise Minutes  10  -LM      44472 - Gait Training Minutes   15  -LM        User Key  (r) = Recorded By, (t) = Taken By, (c) = Cosigned By    Initials Name Provider Type    Kely Tovar, PT Physical Therapist        Therapy Charges for Today     Code Description Service Date Service Provider Modifiers Qty    90533428642 HC GAIT TRAINING EA 15 MIN 8/3/2020 Kely Pathak, PT GP 1    [id number]  PT THER PROC EA 15 MIN 8/3/2020 Kely Pathak, PT GP 1          PT G-Codes  Outcome Measure Options: AM-PAC 6 Clicks Basic Mobility (PT)  AM-PAC 6 Clicks Score (PT): 16  AM-PAC 6 Clicks Score (OT): 18    Kely Pathak PT  8/3/2020

## 2020-08-03 NOTE — PROGRESS NOTES
Continued Stay Note  [facility]     Patient Name: Domitila Bosch  MRN: [id number]  Today's Date: 8/3/2020    Admit Date: 7/28/2020    Discharge Plan     Row Name 08/03/20 1120       Plan    Plan  Possible rehab    Plan Comments  SW met with patient and wife at bedside to discuss discharge planning.  Discussed options for Rehab. Patient declined. She report if they need rehab they are only willing to go to [facility] if patient agrees. Wife explains she will not move forward rehab if patient is not agreeable (no POA on file).  KATHRYN spoke with Socorro Dick and provided details for possible referral.           Discharge Codes    No documentation.             JACK Rodriguez (Kay)

## 2020-08-04 ENCOUNTER — APPOINTMENT (OUTPATIENT)
Dept: CARDIOLOGY | Facility: HOSPITAL | Age: 80
End: 2020-08-04

## 2020-08-04 LAB
ALBUMIN SERPL-MCNC: 3.7 G/DL (ref 3.5–5.2)
ALBUMIN/GLOB SERPL: 1.3 G/DL
ALP SERPL-CCNC: 113 U/L (ref 39–117)
ALT SERPL W P-5'-P-CCNC: 31 U/L (ref 1–41)
ANION GAP SERPL CALCULATED.3IONS-SCNC: 10 MMOL/L (ref 5–15)
ASCENDING AORTA: 2.8 CM
AST SERPL-CCNC: 43 U/L (ref 1–40)
BACTERIA SPEC AEROBE CULT: NORMAL
BACTERIA SPEC AEROBE CULT: NORMAL
BASOPHILS # BLD AUTO: 0.1 10*3/MM3 (ref 0–0.2)
BASOPHILS NFR BLD AUTO: 1 % (ref 0–1.5)
BH CV ECHO MEAS - BSA(HAYCOCK): 2 M^2
BH CV ECHO MEAS - BSA: 2 M^2
BH CV ECHO MEAS - BZI_BMI: 23.7 KILOGRAMS/M^2
BH CV ECHO MEAS - BZI_METRIC_HEIGHT: 185 CM
BH CV ECHO MEAS - BZI_METRIC_WEIGHT: 81.2 KG
BH CV VAS BP RIGHT ARM: NORMAL MMHG
BILIRUB SERPL-MCNC: 0.9 MG/DL (ref 0–1.2)
BUN SERPL-MCNC: 11 MG/DL (ref 8–23)
BUN/CREAT SERPL: 7.7 (ref 7–25)
CALCIUM SPEC-SCNC: 9.2 MG/DL (ref 8.6–10.5)
CHLORIDE SERPL-SCNC: 102 MMOL/L (ref 98–107)
CO2 SERPL-SCNC: 26 MMOL/L (ref 22–29)
CREAT SERPL-MCNC: 1.43 MG/DL (ref 0.76–1.27)
DEPRECATED RDW RBC AUTO: 51.1 FL (ref 37–54)
EOSINOPHIL # BLD AUTO: 0.56 10*3/MM3 (ref 0–0.4)
EOSINOPHIL NFR BLD AUTO: 5.8 % (ref 0.3–6.2)
ERYTHROCYTE [DISTWIDTH] IN BLOOD BY AUTOMATED COUNT: 14.7 % (ref 12.3–15.4)
GFR SERPL CREATININE-BSD FRML MDRD: 48 ML/MIN/1.73
GLOBULIN UR ELPH-MCNC: 2.9 GM/DL
GLUCOSE SERPL-MCNC: 93 MG/DL (ref 65–99)
HCT VFR BLD AUTO: 41.4 % (ref 37.5–51)
HGB BLD-MCNC: 13.5 G/DL (ref 13–17.7)
IMM GRANULOCYTES # BLD AUTO: 0.37 10*3/MM3 (ref 0–0.05)
IMM GRANULOCYTES NFR BLD AUTO: 3.8 % (ref 0–0.5)
LV EF 2D ECHO EST: 65 %
LYMPHOCYTES # BLD AUTO: 1.79 10*3/MM3 (ref 0.7–3.1)
LYMPHOCYTES NFR BLD AUTO: 18.4 % (ref 19.6–45.3)
MCH RBC QN AUTO: 31.3 PG (ref 26.6–33)
MCHC RBC AUTO-ENTMCNC: 32.6 G/DL (ref 31.5–35.7)
MCV RBC AUTO: 95.8 FL (ref 79–97)
MONOCYTES # BLD AUTO: 0.83 10*3/MM3 (ref 0.1–0.9)
MONOCYTES NFR BLD AUTO: 8.5 % (ref 5–12)
NEUTROPHILS NFR BLD AUTO: 6.06 10*3/MM3 (ref 1.7–7)
NEUTROPHILS NFR BLD AUTO: 62.5 % (ref 42.7–76)
NRBC BLD AUTO-RTO: 0 /100 WBC (ref 0–0.2)
PHOSPHATE SERPL-MCNC: 2.4 MG/DL (ref 2.5–4.5)
PLATELET # BLD AUTO: 235 10*3/MM3 (ref 140–450)
PMV BLD AUTO: 10.6 FL (ref 6–12)
POTASSIUM SERPL-SCNC: 3.8 MMOL/L (ref 3.5–5.2)
PROT SERPL-MCNC: 6.6 G/DL (ref 6–8.5)
RBC # BLD AUTO: 4.32 10*6/MM3 (ref 4.14–5.8)
SINUS: 3.2 CM
SODIUM SERPL-SCNC: 138 MMOL/L (ref 136–145)
STJ: 2.3 CM
WBC # BLD AUTO: 9.71 10*3/MM3 (ref 3.4–10.8)

## 2020-08-04 PROCEDURE — 84100 ASSAY OF PHOSPHORUS: CPT | Performed by: INTERNAL MEDICINE

## 2020-08-04 PROCEDURE — 25010000002 MIDAZOLAM PER 1 MG: Performed by: INTERNAL MEDICINE

## 2020-08-04 PROCEDURE — 93312 ECHO TRANSESOPHAGEAL: CPT | Performed by: INTERNAL MEDICINE

## 2020-08-04 PROCEDURE — 99152 MOD SED SAME PHYS/QHP 5/>YRS: CPT

## 2020-08-04 PROCEDURE — 93312 ECHO TRANSESOPHAGEAL: CPT

## 2020-08-04 PROCEDURE — 93321 DOPPLER ECHO F-UP/LMTD STD: CPT

## 2020-08-04 PROCEDURE — 80053 COMPREHEN METABOLIC PANEL: CPT | Performed by: INTERNAL MEDICINE

## 2020-08-04 PROCEDURE — 25010000002 FENTANYL CITRATE (PF) 100 MCG/2ML SOLUTION: Performed by: INTERNAL MEDICINE

## 2020-08-04 PROCEDURE — 93325 DOPPLER ECHO COLOR FLOW MAPG: CPT | Performed by: INTERNAL MEDICINE

## 2020-08-04 PROCEDURE — 93321 DOPPLER ECHO F-UP/LMTD STD: CPT | Performed by: INTERNAL MEDICINE

## 2020-08-04 PROCEDURE — 93325 DOPPLER ECHO COLOR FLOW MAPG: CPT

## 2020-08-04 PROCEDURE — 99232 SBSQ HOSP IP/OBS MODERATE 35: CPT | Performed by: INTERNAL MEDICINE

## 2020-08-04 PROCEDURE — 25010000002 HEPARIN (PORCINE) PER 1000 UNITS: Performed by: INTERNAL MEDICINE

## 2020-08-04 PROCEDURE — 85025 COMPLETE CBC W/AUTO DIFF WBC: CPT | Performed by: INTERNAL MEDICINE

## 2020-08-04 RX ORDER — FENTANYL CITRATE 50 UG/ML
INJECTION, SOLUTION INTRAMUSCULAR; INTRAVENOUS
Status: COMPLETED | OUTPATIENT
Start: 2020-08-04 | End: 2020-08-04

## 2020-08-04 RX ORDER — MIDAZOLAM HYDROCHLORIDE 1 MG/ML
INJECTION INTRAMUSCULAR; INTRAVENOUS
Status: COMPLETED | OUTPATIENT
Start: 2020-08-04 | End: 2020-08-04

## 2020-08-04 RX ORDER — SODIUM CHLORIDE 9 MG/ML
100 INJECTION, SOLUTION INTRAVENOUS CONTINUOUS
Status: ACTIVE | OUTPATIENT
Start: 2020-08-04 | End: 2020-08-04

## 2020-08-04 RX ORDER — BUSPIRONE HYDROCHLORIDE 10 MG/1
10 TABLET ORAL EVERY 12 HOURS SCHEDULED
Status: DISCONTINUED | OUTPATIENT
Start: 2020-08-04 | End: 2020-08-12 | Stop reason: HOSPADM

## 2020-08-04 RX ADMIN — MEMANTINE 5 MG: 10 TABLET ORAL at 10:30

## 2020-08-04 RX ADMIN — HEPARIN SODIUM 5000 UNITS: 5000 INJECTION INTRAVENOUS; SUBCUTANEOUS at 15:05

## 2020-08-04 RX ADMIN — BUSPIRONE HYDROCHLORIDE 5 MG: 5 TABLET ORAL at 10:30

## 2020-08-04 RX ADMIN — SODIUM CHLORIDE, PRESERVATIVE FREE 10 ML: 5 INJECTION INTRAVENOUS at 10:31

## 2020-08-04 RX ADMIN — SERTRALINE HYDROCHLORIDE 50 MG: 50 TABLET, FILM COATED ORAL at 15:05

## 2020-08-04 RX ADMIN — PANTOPRAZOLE SODIUM 40 MG: 40 TABLET, DELAYED RELEASE ORAL at 10:31

## 2020-08-04 RX ADMIN — MIDAZOLAM 2 MG: 1 INJECTION INTRAMUSCULAR; INTRAVENOUS at 13:21

## 2020-08-04 RX ADMIN — LEVOTHYROXINE SODIUM 150 MCG: 150 TABLET ORAL at 05:06

## 2020-08-04 RX ADMIN — TERAZOSIN HYDROCHLORIDE 5 MG: 5 CAPSULE ORAL at 20:49

## 2020-08-04 RX ADMIN — ASPIRIN 81 MG: 81 TABLET, COATED ORAL at 10:31

## 2020-08-04 RX ADMIN — FENTANYL CITRATE 25 MCG: 50 INJECTION, SOLUTION INTRAMUSCULAR; INTRAVENOUS at 13:22

## 2020-08-04 RX ADMIN — BUSPIRONE HYDROCHLORIDE 10 MG: 10 TABLET ORAL at 20:49

## 2020-08-04 RX ADMIN — SODIUM CHLORIDE 100 ML/HR: 9 INJECTION, SOLUTION INTRAVENOUS at 10:35

## 2020-08-04 RX ADMIN — FENTANYL CITRATE 25 MCG: 50 INJECTION, SOLUTION INTRAMUSCULAR; INTRAVENOUS at 13:24

## 2020-08-04 RX ADMIN — FINASTERIDE 5 MG: 5 TABLET, FILM COATED ORAL at 10:31

## 2020-08-04 RX ADMIN — HEPARIN SODIUM 5000 UNITS: 5000 INJECTION INTRAVENOUS; SUBCUTANEOUS at 05:06

## 2020-08-04 RX ADMIN — ATORVASTATIN CALCIUM 80 MG: 40 TABLET, FILM COATED ORAL at 20:49

## 2020-08-04 RX ADMIN — SODIUM CHLORIDE 100 ML/HR: 9 INJECTION, SOLUTION INTRAVENOUS at 15:04

## 2020-08-04 RX ADMIN — HEPARIN SODIUM 5000 UNITS: 5000 INJECTION INTRAVENOUS; SUBCUTANEOUS at 20:49

## 2020-08-04 RX ADMIN — ALLOPURINOL 300 MG: 300 TABLET ORAL at 10:30

## 2020-08-04 RX ADMIN — FENTANYL CITRATE 50 MCG: 50 INJECTION, SOLUTION INTRAMUSCULAR; INTRAVENOUS at 13:21

## 2020-08-04 RX ADMIN — CLOPIDOGREL BISULFATE 75 MG: 75 TABLET ORAL at 10:30

## 2020-08-04 NOTE — PLAN OF CARE
Pt very confused and anxious needing constant orientation to time, place, and situation during the first few hours of the shift. Facilitated a call to his wife on pt's request. Slept well for the remainder of the night, experienced some mild apnea relieved by Oxygen therapy.      Problem: Fall Risk (Adult)  Goal: Identify Related Risk Factors and Signs and Symptoms  Outcome: Ongoing (interventions implemented as appropriate)  Flowsheets (Taken 7/29/2020 0414 by Liz Vyas RN)  Related Risk Factors (Fall Risk): age-related changes;confusion/agitation;gait/mobility problems;environment unfamiliar     Problem: Patient Care Overview  Goal: Plan of Care Review  Outcome: Ongoing (interventions implemented as appropriate)

## 2020-08-04 NOTE — PROGRESS NOTES
Adult Nutrition  Assessment/PES    Patient Name:  Domitila Bosch  YOB: 1940  MRN: [id number]  Admit Date:  7/28/2020    Assessment Date:  8/4/2020      Reason for Assessment     Row Name 08/04/20 1134          Reason for Assessment    Reason For Assessment  follow-up protocol   10 mins     Diagnosis  -- per notes this adm.           Nutrition Prescription Ordered     Row Name 08/04/20 1136          Nutrition Prescription PO    Common Modifiers  Cardiac         Evaluation of Received Nutrient/Fluid Intake     Row Name 08/04/20 1136          PO Evaluation    Number of Meals  3;  at 3 meals yesterday.     % PO Intake  75         Problem/Interventions:  Problem 1     Row Name 08/04/20 1138          Nutrition Diagnoses Problem 1    Problem 1  No Nutrition Diagnosis at this Time         Intervention Goal     Row Name 08/04/20 1138          Intervention Goal    PO  Maintain intake;Continue positive trend         Nutrition Intervention     Row Name 08/04/20 1138          Nutrition Intervention    RD/Tech Action  Follow Tx progress           Education/Evaluation     Row Name 08/04/20 1138          Monitor/Evaluation    Monitor  Per protocol           Electronically signed by:  Tila Godoy MS,RD,LD  08/04/20 11:42

## 2020-08-04 NOTE — PROGRESS NOTES
[facility] Progress Note        Antibiotics:  Anti-Infectives (From admission, onward)    Ordered     Dose/Rate Route Frequency Start Stop    08/03/20 0956  vancomycin 1250 mg/250 mL 0.9% NS IVPB ([facility])  Review   Ordering Provider:  Joel Terrazas, RP    1,250 mg  over 90 Minutes Intravenous Every 24 Hours 08/03/20 1800 08/13/20 1759    07/29/20 0556  Pharmacy to dose vancomycin     Sabino Sandhu MD reviewed the order on 08/03/20 0855.   Ordering Provider:  Sabino Sandhu MD     Does not apply Continuous PRN 07/29/20 0555 08/12/20 0554    07/28/20 1111  aztreonam (AZACTAM) 2 g/100 mL 0.9% NS ([facility])     Ordering Provider:  Norberto Son PA    2 g  over 30 Minutes Intravenous Once 07/28/20 1113 07/28/20 1231    07/28/20 1111  vancomycin 1750 mg/500 mL 0.9% NS IVPB ([facility])     Ordering Provider:  Teresa Pollard,     20 mg/kg × 86.2 kg Intravenous Once 07/28/20 1113 07/28/20 1626          CC: fatigue    HPI:    Patient is a 79 y.o.  Yr old male with history of TIA/CVA with chronic/residual right-sided weakness and generally poor memory but does not carry a formal diagnosis of dementia.  He was admitted July 28, 2020 with 3 to 4 days worsening confusion from baseline; patient reports dysuria/urinary incontinence/urinary frequency although he could not attach a specific timeline to this and in general his ability to give detailed history is limited with poor insight.  Nursing reported fever at admission associated with acute kidney injury and evidence for UTI with concern for sepsis urinary source.  Subsequently with blood culture showing gram-positive cocci and preliminary PCR data with enterococcus, vancomycin resistance not detected by initial PCR information; he was placed on empiric vancomycin/Azactam.     He reports some improvement since admission although his insight remains poor.     Wife reports Hx enlarged prostate with chronic urinary retention and has followed with Dr Bazan in urology; allergy  "to PCN and keflex with rash to both     8/4/20 SUMMER pending;  seen early and sleepy;  No new focal pain; Dysuria better and continent of urine today.  Denies hematuria or pyuria and no flank pain at this time.     No headache photophobia or neck stiffness.  No nausea vomiting diarrhea or abdominal pain.  No shortness of breath or cough.  No rash.        ROS:      8/4/20 No f/c/s. No n/v/d. No rash. No new ADR to Abx.     Constitutional-- No chills or sweats.  Appetite diminished with generalized fatigue  Heent-- No new vision, hearing or throat complaints.  No epistaxis or oral sores.  Denies odynophagia or dysphagia.  No flashers, floaters or eye pain. No odynophagia or dysphagia. No headache, photophobia or neck stiffness.  CV-- No chest pain, palpitation or syncope  Resp-- No SOB/cough/Hemoptysis  GI- No nausea, vomiting, or diarrhea.  No hematochezia, melena, or hematemesis. Denies jaundice or chronic liver disease.  --as above  Lymph- no swollen lymph nodes in neck/axilla or groin.   Heme- No active bruising or bleeding; no Hx of DVT or PE.  MS-- no swelling or pain in the bones or joints of arms/legs.  No new back pain.  Neuro-- No acute focal weakness or numbness in the arms or legs.  No seizures.  Chronic residual right-sided weakness     Full 12 point review of systems reviewed and negative otherwise for acute complaints, except for above      PE:   /78 (BP Location: Right arm, Patient Position: Lying)   Pulse 63   Temp 98 °F (36.7 °C) (Oral)   Resp 18   Ht 185.4 cm (73\")   Wt 81.5 kg (179 lb 11.2 oz)   SpO2 96%   BMI 23.71 kg/m²     GENERAL: sleepy, in no acute distress.   HEENT: Normocephalic, atraumatic.  PERRL. EOMI. No conjunctival injection. No icterus. Oropharynx clear without evidence of thrush or exudate. No evidence of peridontal disease.    NECK: Supple without nuchal rigidity. No mass.  LYMPH: No cervical, axillary or inguinal lymphadenopathy.  HEART: RRR; No murmur, rubs, gallops. "   LUNGS: diminished at bases to auscultation bilaterally without wheezing, rales, rhonchi. Normal respiratory effort. Nonlabored. No dullness.  ABDOMEN: Soft, nontender, nondistended. Positive bowel sounds. No rebound or guarding. NO mass or HSM.  EXT:  No cyanosis, clubbing or edema. No cord.  : Genitalia generally unremarkable.  Without Akins catheter.  MSK: FROM without joint effusions noted arms/legs.    SKIN: Warm and dry without cutaneous eruptions on Inspection/palpation.    NEURO: sleepy     No CVA tenderness     No peripheral stigmata/phenomena of endocarditis       Laboratory Data    Results from last 7 days   Lab Units 08/04/20  0402 08/03/20  0412 08/02/20  0358   WBC 10*3/mm3 9.71 8.09 8.10   HEMOGLOBIN g/dL 13.5 13.4 13.1   HEMATOCRIT % 41.4 40.8 40.6   PLATELETS 10*3/mm3 235 218 195     Results from last 7 days   Lab Units 08/03/20  0412   SODIUM mmol/L 138   POTASSIUM mmol/L 3.4*   CHLORIDE mmol/L 101   CO2 mmol/L 26.0   BUN mg/dL 12   CREATININE mg/dL 1.22   GLUCOSE mg/dL 93   CALCIUM mg/dL 9.4     Results from last 7 days   Lab Units 07/31/20  0211   ALK PHOS U/L 76   BILIRUBIN mg/dL 0.8   ALT (SGPT) U/L 16   AST (SGOT) U/L 33               Estimated Creatinine Clearance: 55.7 mL/min (by C-G formula based on SCr of 1.22 mg/dL).      Microbiology:      Radiology:  Imaging Results (Last 72 Hours)     ** No results found for the last 72 hours. **            Impression:     --Acute sepsis.  Fever/leukocytosis and acute kidney injury with enterococcal bacteremia/septicemia.  Concern for urinary source at present.  Abdominal exam otherwise benign with no nausea/vomiting/diarrhea.  If enterococcus did not grow in the urine or if new abdominal symptoms arise consideration could be given to further GI work-up such as CT scan or GI consultation/endoscopy etc.  Otherwise, chest clear, no cough or breathing difficulty, no obvious skin or soft tissue changes.  Monitor for other potential foci/pathogen     --  Acute  enterococcus bacteremia/septicemia.  Currently no stigmata of endocarditis but certainly could evolve.  High risk for further serious morbidity and other serious sequela.  Need further clarification from family regarding beta-lactam allergy     --Acute enterococcal complicated UTI.  Associated with septicemia/bacteremia as above.  Urology following and any  Further functional/anatomic assessment at their discretion     --Acute kidney injury.  Creat trended up a bit 8/3;  Repeat pending 8/4, vanco adjusted by pharmacy; monitor to help guide further adjustments    --acute pulm edema?; vascular congestion per radiology on CXR     --Acute encephalopathy.  Baseline not entirely clear with prior history of stroke/TIA and nursing reports baseline with forgetfulness with possible cognitive difficulties at baseline.  Need further clarification from family.  Currently no meningismus.  Further neurologic work-up or neurology consultation at discretion of internal medicine     --History of TIA/CVA with reports of residual right-sided weakness.          PLAN:     --IV vancomycin      --Check/review labs cultures and scans     --Partial history per nursing staff     --Discussed with microbiology     --Discussed with Dr. Kang     --Highly complex set of issues with high risk for further serious morbidity and other serious sequela    --urology following    --I have asked micro for further GURPREET data on enterococcus to daptomycin to be evaluated    --repeat blood cultures; TTE no mention of vegetation per cardiology;    SUMMER  pending        Sabino Sandhu MD  8/4/2020

## 2020-08-04 NOTE — PLAN OF CARE
Pt very confused through out shift. Repeatative questions, and constantly trying to get out of bed/chair. He seemed better when wife was at bedside. Once wife left he became agitated, and wanted to constantly wanted to call her on the phone. When IV was beeping he was messing with the tubing and machine trying to get it to stop. VSS were stable until return from SUMMER. BP was better after wife left. Urine output wnl. Will continue to  monitor.

## 2020-08-04 NOTE — PROGRESS NOTES
[facility] Medicine Services  PROGRESS NOTE    Patient Name: Domitila Bosch  : 1940  MRN: [id number]    Date of Admission: 2020  Primary Care Physician: Marcin Ferguson MD    Subjective   Subjective     CC:  AMS, bacteremia    HPI:  Wife at bedside, plan for SUMMER this morning, done but results pending. Questions answered    Review of Systems  Gen- No fevers, chills  CV- No chest pain, palpitations  Resp- No cough, dyspnea  GI- No N/V/D, abd pain    Objective   Objective     Vital Signs:   Temp:  [97.9 °F (36.6 °C)-98 °F (36.7 °C)] 97.9 °F (36.6 °C)  Heart Rate:  [57-82] 66  Resp:  [16-18] 16  BP: (107-175)/(75-97) 141/86  Total (NIH Stroke Scale): 1     Physical Exam:  Constitutional: No acute distress, awake, alert  HENT: NCAT, mucous membranes moist  Respiratory: Clear to auscultation bilaterally, respiratory effort normal   Cardiovascular: RRR, no murmurs  Gastrointestinal: Positive bowel sounds, soft, nontender, nondistended  Musculoskeletal: No bilateral ankle edema  Psychiatric: Appropriate affect, cooperative  Neurologic: Oriented to self, intermittent confusion, speech clear  Skin: No rashes    Results Reviewed:  Results from last 7 days   Lab Units 20  0402 20  0412 20  0358   WBC 10*3/mm3 9.71 8.09 8.10   HEMOGLOBIN g/dL 13.5 13.4 13.1   HEMATOCRIT % 41.4 40.8 40.6   PLATELETS 10*3/mm3 235 218 195     Results from last 7 days   Lab Units 20  0402 20  0412 20  0358  20  0211   SODIUM mmol/L 138 138 140   < > 140  139   POTASSIUM mmol/L 3.8 3.4* 3.7   < > 3.3*  3.4*  3.4*   CHLORIDE mmol/L 102 101 102   < > 107  107   CO2 mmol/L 26.0 26.0 28.0   < > 24.0  24.0   BUN mg/dL 11 12 11   < > 14  14   CREATININE mg/dL 1.43* 1.22 1.08   < > 0.97  0.97   GLUCOSE mg/dL 93 93 92   < > 91  91   CALCIUM mg/dL 9.2 9.4 8.8   < > 7.5*  7.5*   ALT (SGPT) U/L 31  --   --   --  16   AST (SGOT) U/L 43*  --   --   --  33   PROBNP pg/mL   --   --   --   --  696.0    < > = values in this interval not displayed.     Estimated Creatinine Clearance: 47.5 mL/min (A) (by C-G formula based on SCr of 1.43 mg/dL (H)).    Microbiology Results Abnormal     Procedure Component Value - Date/Time    Blood Culture - Blood, Chest, Right [[id number]] Collected:  07/30/20 0834    Lab Status:  Final result Specimen:  Blood from Chest, Right Updated:  08/04/20 0915     Blood Culture No growth at 5 days    Blood Culture - Blood, Arm, Left [[id number]] Collected:  07/30/20 0834    Lab Status:  Final result Specimen:  Blood from Arm, Left Updated:  08/04/20 0915     Blood Culture No growth at 5 days    COVID PRE-OP / PRE-PROCEDURE SCREENING ORDER (NO ISOLATION) - Swab, Nasopharynx [[id number]] Collected:  08/02/20 1602    Lab Status:  Final result Specimen:  Swab from Nasopharynx Updated:  08/02/20 1713    Narrative:       The following orders were created for panel order COVID PRE-OP / PRE-PROCEDURE SCREENING ORDER (NO ISOLATION) - Swab, Nasopharynx.  Procedure                               Abnormality         Status                     ---------                               -----------         ------                     Respiratory Panel PCR w/...[[id number]]  Normal              Final result                 Please view results for these tests on the individual orders.    Respiratory Panel PCR w/COVID-19(SARS-CoV-2) PATRICIA/GREG/AMELIA In-House, NP Swab in [facility], 8-12 Hr TAT - Swab, Nasopharynx [[id number]]  (Normal) Collected:  08/02/20 1602    Lab Status:  Final result Specimen:  Swab from Nasopharynx Updated:  08/02/20 1713     ADENOVIRUS, PCR Not Detected     Coronavirus 229E Not Detected     Coronavirus HKU1 Not Detected     Coronavirus NL63 Not Detected     Coronavirus OC43 Not Detected     COVID19 Not Detected     Human Metapneumovirus Not Detected     Human Rhinovirus/Enterovirus Not Detected     Influenza A PCR Not Detected     Influenza A H1 Not Detected     Influenza A  H1 2009 PCR Not Detected     Influenza A H3 Not Detected     Influenza B PCR Not Detected     Parainfluenza Virus 1 Not Detected     Parainfluenza Virus 2 Not Detected     Parainfluenza Virus 3 Not Detected     Parainfluenza Virus 4 Not Detected     RSV, PCR Not Detected     Bordetella pertussis pcr Not Detected     Bordetella parapertussis PCR Not Detected     Chlamydophila pneumoniae PCR Not Detected     Mycoplasma pneumo by PCR Not Detected    Narrative:       Fact sheet for providers: https://docs.[employer]/wp-content/uploads/RXO5206-9952-LQ6.1-EUA-Provider-Fact-Sheet-3.pdf    Fact sheet for patients: https://docs.[employer]/wp-content/uploads/YYX7898-7709-DQ9.1-EUA-Patient-Fact-Sheet-1.pdf    Urine Culture - Urine, Urine, Clean Catch [[id number]]  (Abnormal)  (Susceptibility) Collected:  07/28/20 1021    Lab Status:  Edited Result - FINAL Specimen:  Urine, Clean Catch Updated:  07/31/20 1057     Urine Culture >100,000 CFU/mL Enterococcus faecalis    Narrative:        requested Daptomycin 7/30    Susceptibility      Enterococcus faecalis     GURPREET     Ampicillin Susceptible     Daptomycin Susceptible     Levofloxacin Susceptible     Nitrofurantoin Susceptible     Tetracycline Susceptible     Vancomycin Susceptible                    Blood Culture - Blood, Arm, Left [[id number]]  (Abnormal)  (Susceptibility) Collected:  07/28/20 1133    Lab Status:  Edited Result - FINAL Specimen:  Blood from Arm, Left Updated:  07/31/20 0723     Blood Culture Enterococcus faecalis     Comment:   Infectious disease consultation is highly recommended.        Isolated from Aerobic and Anaerobic Bottles     Gram Stain Aerobic Bottle Gram positive cocci in chains      Anaerobic Bottle Gram positive cocci in chains    Narrative:       Dr. Sandhu requested Daptomycin     Susceptibility      Enterococcus faecalis     GURPREET     Ampicillin Susceptible     Daptomycin Intermediate     Gentamicin High Level Synergy Susceptible      Vancomycin Susceptible                    Blood Culture - Blood, Arm, Right [[id number]]  (Abnormal) Collected:  07/28/20 1125    Lab Status:  Final result Specimen:  Blood from Arm, Right Updated:  07/30/20 0656     Blood Culture Enterococcus faecalis     Comment:   Infectious disease consultation is highly recommended.        Isolated from Aerobic and Anaerobic Bottles     Gram Stain Aerobic Bottle Gram positive cocci in chains      Anaerobic Bottle Gram positive cocci in chains    Narrative:       Refer to previous blood culture collected on 7/28/2020 1133 for MICs    Blood Culture ID, PCR - Blood, Arm, Left [[id number]]  (Abnormal) Collected:  07/28/20 1133    Lab Status:  Final result Specimen:  Blood from Arm, Left Updated:  07/29/20 0555     BCID, PCR Enterococcus spp. Identification by BCID PCR.          Imaging Results (Last 24 Hours)     ** No results found for the last 24 hours. **          Results for orders placed during the hospital encounter of 07/28/20   Adult Transthoracic Echo Complete W/ Cont if Necessary Per Protocol    Narrative · Estimated EF = 60%.  · Mild mitral valve regurgitation is present  · Left ventricular systolic function is normal.  · Left ventricular diastolic dysfunction (grade I) consistent with   impaired relaxation.  · There is calcification of the aortic valve.  · Calculated right ventricular systolic pressure from tricuspid   regurgitation is 15.0 mmHg.          I have reviewed the medications:  Scheduled Meds:  allopurinol 300 mg Oral Daily   aspirin 81 mg Oral Daily   atorvastatin 80 mg Oral Nightly   busPIRone 10 mg Oral Q12H   clopidogrel 75 mg Oral Daily   finasteride 5 mg Oral Daily   heparin (porcine) 5,000 Units Subcutaneous Q8H   levothyroxine 150 mcg Oral Q AM   memantine 5 mg Oral Daily   pantoprazole 40 mg Oral QAM   sertraline 50 mg Oral Daily   sodium chloride 10 mL Intravenous Q12H   terazosin 5 mg Oral Nightly   [START ON 8/5/2020] vancomycin (dosing per levels)   Does not apply Daily     Continuous Infusions:  Pharmacy to dose vancomycin     sodium chloride 100 mL/hr Last Rate: 100 mL/hr (08/04/20 1035)     PRN Meds:.•  acetaminophen  •  fentaNYL citrate (PF)  •  ipratropium-albuterol  •  midazolam  •  ondansetron  •  Pharmacy to dose vancomycin  •  potassium chloride  •  potassium chloride  •  sodium chloride  •  sodium chloride    Assessment/Plan   Assessment & Plan     Active Hospital Problems    Diagnosis  POA   • **Acute UTI [N39.0]  Yes   • Acute diastolic heart failure (CMS/[facility]) [I50.31]  Unknown   • Acute respiratory failure with hypoxia (CMS/[facility]) [J96.01]  Unknown   • Bacteremia [R78.81]  Unknown   • Encephalopathy acute [G93.40]  Yes   • DANIS (acute kidney injury) (CMS/[facility]) [N17.9]  Yes   • Carotid stenosis [I65.29]  Yes   • Dyslipidemia [E78.5]  Yes   • Hypothyroidism [E03.9]  Yes   • GERD (gastroesophageal reflux disease) [K21.9]  Yes   • Anxiety disorder [F41.9]  Yes      Resolved Hospital Problems   No resolved problems to display.        Brief Hospital Course to date:  Domitila Bosch is a 79 y.o. male with PMHx significant for carotid stenosis, dyslipidemia, TIA/CVA with residual right sided weakness, hypertension, hypothyroidism, anxiety who presents to [facility] with complaints of increasing confusion over the last 4 days.     Metabolic Encephalopathy related to sepsis/bacteremia - some improvement   - likely multifactorial and secondary to infection and DANIS/dehydration  - CT head with no acute findings, previous MRI shows history of stroke in the past  - continue namenda  - treatment per below      Sepsis - POA   UTI - enterococcus   Bacteremia - enterococcus   - allergy to Cephalosporins and penicillins   - BCx x 2 with enterococcus; UCx enterococcus; repeat blood cultures NGTD    - DC azactam (7/28-7/29) and continue vanc (7/29)   - ID following   - Urology consulted - no intervention   - ECHO with no comment on valvular veg -- SUMMER ordered for 8/4; NPO after  midnight      Acute hypoxic resp failure - improved   Acute diastolic heart failure - improved   - Likely related to volume overload; ABG reviewed; CXR reviewed   - ECHO with normal EF; diastolic heart failure  - s/p lasix 20 mg IV 7/31   - Wean O2 as tolerated   - resume home maxide      DANIS - improved   - baseline around 0.8, 1.9 on admission   - renal US with R non-obs stone; enlargement of prostate; bladder debris  - cr up today, with elevated vanc trough, give 1 liter of NS, hold hctz     BPH  Urinary retention  - continue hytrin and proscar      Falls:  - PT/OT to see, currently lives at home with his wife  - rehab currently recommended      HLD  Hx of CVA w/residual right sided weakness  Carotid Stenosis  - continue ASA, plavix, statin     Hypothyroidism:  - levothyroxine     Hypertension:  - hold hctz today, renal function worsening    DVT Prophylaxis:  heparin      Disposition: I expect the patient to be discharged pending results of SUMMER, and when ok with ID    CODE STATUS:   Code Status and Medical Interventions:   Ordered at: 07/28/20 1249     Level Of Support Discussed With:    Patient     Code Status:    CPR     Medical Interventions (Level of Support Prior to Arrest):    Full         Electronically signed by Janette Marino DO, 08/04/20, 14:12.

## 2020-08-05 LAB
ALBUMIN SERPL-MCNC: 3.8 G/DL (ref 3.5–5.2)
ANION GAP SERPL CALCULATED.3IONS-SCNC: 10 MMOL/L (ref 5–15)
BUN SERPL-MCNC: 15 MG/DL (ref 8–23)
BUN/CREAT SERPL: 12 (ref 7–25)
CALCIUM SPEC-SCNC: 8.7 MG/DL (ref 8.6–10.5)
CHLORIDE SERPL-SCNC: 102 MMOL/L (ref 98–107)
CO2 SERPL-SCNC: 26 MMOL/L (ref 22–29)
CREAT SERPL-MCNC: 1.25 MG/DL (ref 0.76–1.27)
GFR SERPL CREATININE-BSD FRML MDRD: 56 ML/MIN/1.73
GLUCOSE SERPL-MCNC: 83 MG/DL (ref 65–99)
PHOSPHATE SERPL-MCNC: 2.7 MG/DL (ref 2.5–4.5)
POTASSIUM SERPL-SCNC: 3.7 MMOL/L (ref 3.5–5.2)
SODIUM SERPL-SCNC: 138 MMOL/L (ref 136–145)
VANCOMYCIN SERPL-MCNC: 12.4 MCG/ML (ref 5–40)

## 2020-08-05 PROCEDURE — C1894 INTRO/SHEATH, NON-LASER: HCPCS

## 2020-08-05 PROCEDURE — 99232 SBSQ HOSP IP/OBS MODERATE 35: CPT | Performed by: INTERNAL MEDICINE

## 2020-08-05 PROCEDURE — 80069 RENAL FUNCTION PANEL: CPT | Performed by: INTERNAL MEDICINE

## 2020-08-05 PROCEDURE — 97530 THERAPEUTIC ACTIVITIES: CPT

## 2020-08-05 PROCEDURE — 25010000002 VANCOMYCIN PER 500 MG

## 2020-08-05 PROCEDURE — 25010000002 HEPARIN (PORCINE) PER 1000 UNITS: Performed by: INTERNAL MEDICINE

## 2020-08-05 PROCEDURE — 80202 ASSAY OF VANCOMYCIN: CPT

## 2020-08-05 PROCEDURE — C1751 CATH, INF, PER/CENT/MIDLINE: HCPCS

## 2020-08-05 PROCEDURE — 05HY33Z INSERTION OF INFUSION DEVICE INTO UPPER VEIN, PERCUTANEOUS APPROACH: ICD-10-PCS | Performed by: INTERNAL MEDICINE

## 2020-08-05 PROCEDURE — 97535 SELF CARE MNGMENT TRAINING: CPT

## 2020-08-05 RX ORDER — CHOLECALCIFEROL (VITAMIN D3) 125 MCG
10 CAPSULE ORAL NIGHTLY
Status: DISCONTINUED | OUTPATIENT
Start: 2020-08-05 | End: 2020-08-12 | Stop reason: HOSPADM

## 2020-08-05 RX ORDER — HYDROXYZINE HYDROCHLORIDE 25 MG/1
25 TABLET, FILM COATED ORAL 3 TIMES DAILY PRN
Status: DISCONTINUED | OUTPATIENT
Start: 2020-08-05 | End: 2020-08-12 | Stop reason: HOSPADM

## 2020-08-05 RX ORDER — SODIUM CHLORIDE 0.9 % (FLUSH) 0.9 %
20 SYRINGE (ML) INJECTION AS NEEDED
Status: DISCONTINUED | OUTPATIENT
Start: 2020-08-05 | End: 2020-08-12 | Stop reason: HOSPADM

## 2020-08-05 RX ORDER — SODIUM CHLORIDE 0.9 % (FLUSH) 0.9 %
10 SYRINGE (ML) INJECTION AS NEEDED
Status: DISCONTINUED | OUTPATIENT
Start: 2020-08-05 | End: 2020-08-12 | Stop reason: HOSPADM

## 2020-08-05 RX ORDER — VANCOMYCIN HYDROCHLORIDE 1 G/200ML
1000 INJECTION, SOLUTION INTRAVENOUS ONCE
Status: COMPLETED | OUTPATIENT
Start: 2020-08-05 | End: 2020-08-05

## 2020-08-05 RX ORDER — SODIUM CHLORIDE 0.9 % (FLUSH) 0.9 %
10 SYRINGE (ML) INJECTION EVERY 12 HOURS SCHEDULED
Status: DISCONTINUED | OUTPATIENT
Start: 2020-08-05 | End: 2020-08-12 | Stop reason: HOSPADM

## 2020-08-05 RX ADMIN — ASPIRIN 81 MG: 81 TABLET, COATED ORAL at 08:47

## 2020-08-05 RX ADMIN — TERAZOSIN HYDROCHLORIDE 5 MG: 5 CAPSULE ORAL at 20:35

## 2020-08-05 RX ADMIN — HEPARIN SODIUM 5000 UNITS: 5000 INJECTION INTRAVENOUS; SUBCUTANEOUS at 14:32

## 2020-08-05 RX ADMIN — PANTOPRAZOLE SODIUM 40 MG: 40 TABLET, DELAYED RELEASE ORAL at 08:47

## 2020-08-05 RX ADMIN — SODIUM CHLORIDE, PRESERVATIVE FREE 10 ML: 5 INJECTION INTRAVENOUS at 20:36

## 2020-08-05 RX ADMIN — BUSPIRONE HYDROCHLORIDE 10 MG: 10 TABLET ORAL at 08:47

## 2020-08-05 RX ADMIN — FINASTERIDE 5 MG: 5 TABLET, FILM COATED ORAL at 08:47

## 2020-08-05 RX ADMIN — MEMANTINE 5 MG: 10 TABLET ORAL at 08:47

## 2020-08-05 RX ADMIN — SERTRALINE HYDROCHLORIDE 50 MG: 50 TABLET, FILM COATED ORAL at 08:47

## 2020-08-05 RX ADMIN — HEPARIN SODIUM 5000 UNITS: 5000 INJECTION INTRAVENOUS; SUBCUTANEOUS at 20:35

## 2020-08-05 RX ADMIN — CLOPIDOGREL BISULFATE 75 MG: 75 TABLET ORAL at 08:47

## 2020-08-05 RX ADMIN — SODIUM CHLORIDE, PRESERVATIVE FREE 10 ML: 5 INJECTION INTRAVENOUS at 08:45

## 2020-08-05 RX ADMIN — HYDROXYZINE HYDROCHLORIDE 25 MG: 25 TABLET, FILM COATED ORAL at 20:35

## 2020-08-05 RX ADMIN — HEPARIN SODIUM 5000 UNITS: 5000 INJECTION INTRAVENOUS; SUBCUTANEOUS at 05:32

## 2020-08-05 RX ADMIN — ATORVASTATIN CALCIUM 80 MG: 40 TABLET, FILM COATED ORAL at 20:35

## 2020-08-05 RX ADMIN — BUSPIRONE HYDROCHLORIDE 10 MG: 10 TABLET ORAL at 20:35

## 2020-08-05 RX ADMIN — SODIUM CHLORIDE, PRESERVATIVE FREE 10 ML: 5 INJECTION INTRAVENOUS at 20:35

## 2020-08-05 RX ADMIN — VANCOMYCIN HYDROCHLORIDE 1000 MG: 1 INJECTION, SOLUTION INTRAVENOUS at 08:45

## 2020-08-05 RX ADMIN — LEVOTHYROXINE SODIUM 150 MCG: 150 TABLET ORAL at 05:32

## 2020-08-05 RX ADMIN — MELATONIN TAB 5 MG 10 MG: 5 TAB at 20:35

## 2020-08-05 RX ADMIN — ALLOPURINOL 300 MG: 300 TABLET ORAL at 08:47

## 2020-08-05 NOTE — PROGRESS NOTES
[facility] Progress Note        Antibiotics:  Anti-Infectives (From admission, onward)    Ordered     Dose/Rate Route Frequency Start Stop    08/04/20 0834  vancomycin (dosing per levels)     Ordering Provider:  Joel Terrazas RPH     Does not apply Daily 08/05/20 0900 08/13/20 0859    08/05/20 0754  vancomycin (VANCOCIN) in iso-osmotic dextrose IVPB 1 g (premix) 200 mL     Ordering Provider:  Norberto Beckham RPH    1,000 mg  over 60 Minutes Intravenous Once 08/05/20 0845      07/29/20 0556  Pharmacy to dose vancomycin     Sabino Sandhu MD reviewed the order on 08/03/20 0855.   Ordering Provider:  Sabino Sandhu MD     Does not apply Continuous PRN 07/29/20 0555 08/12/20 0554    07/28/20 1111  aztreonam (AZACTAM) 2 g/100 mL 0.9% NS (mbp)     Ordering Provider:  Norberto Son PA    2 g  over 30 Minutes Intravenous Once 07/28/20 1113 07/28/20 1231    07/28/20 1111  vancomycin 1750 mg/500 mL 0.9% NS IVPB ([facility])     Ordering Provider:  Teresa Pollard DO    20 mg/kg × 86.2 kg Intravenous Once 07/28/20 1113 07/28/20 1626          CC: fatigue    HPI:    Patient is a 79 y.o.  Yr old male with history of TIA/CVA with chronic/residual right-sided weakness and generally poor memory but does not carry a formal diagnosis of dementia.  He was admitted July 28, 2020 with 3 to 4 days worsening confusion from baseline; patient reports dysuria/urinary incontinence/urinary frequency although he could not attach a specific timeline to this and in general his ability to give detailed history is limited with poor insight.  Nursing reported fever at admission associated with acute kidney injury and evidence for UTI with concern for sepsis urinary source.  Subsequently with blood culture showing gram-positive cocci and preliminary PCR data with enterococcus, vancomycin resistance not detected by initial PCR information; he was placed on empiric vancomycin/Azactam.     He reports some improvement since admission although his  "insight remains poor.     Wife reports Hx enlarged prostate with chronic urinary retention and has followed with Dr Bazan in urology; allergy to PCN and keflex with rash to both     8/4/20 SUMMER no valvular vegetation;  Descending aorta with protruding plaque    8/5/20 creat trended down some after fluids;   seen early and sleepy;  No new focal pain; Dysuria better and continent of urine today.  Denies hematuria or pyuria and no flank pain at this time.     No headache photophobia or neck stiffness.  No nausea vomiting diarrhea or abdominal pain.  No shortness of breath or cough.  No rash.        ROS:      8/5/20 No f/c/s. No n/v/d. No rash. No new ADR to Abx.     Constitutional-- No chills or sweats.  Appetite diminished with generalized fatigue  Heent-- No new vision, hearing or throat complaints.  No epistaxis or oral sores.  Denies odynophagia or dysphagia.  No flashers, floaters or eye pain. No odynophagia or dysphagia. No headache, photophobia or neck stiffness.  CV-- No chest pain, palpitation or syncope  Resp-- No SOB/cough/Hemoptysis  GI- No nausea, vomiting, or diarrhea.  No hematochezia, melena, or hematemesis. Denies jaundice or chronic liver disease.  --as above  Lymph- no swollen lymph nodes in neck/axilla or groin.   Heme- No active bruising or bleeding; no Hx of DVT or PE.  MS-- no swelling or pain in the bones or joints of arms/legs.  No new back pain.  Neuro-- No acute focal weakness or numbness in the arms or legs.  No seizures.  Chronic residual right-sided weakness     Full 12 point review of systems reviewed and negative otherwise for acute complaints, except for above      PE:   /93 (BP Location: Left arm, Patient Position: Lying)   Pulse 72   Temp 98 °F (36.7 °C) (Oral)   Resp 18   Ht 185.4 cm (72.99\")   Wt 81.2 kg (179 lb)   SpO2 97%   BMI 23.62 kg/m²     GENERAL: sleepy, in no acute distress.   HEENT: Normocephalic, atraumatic.  PERRL. EOMI. No conjunctival injection. No " icterus. Oropharynx clear without evidence of thrush or exudate. No evidence of peridontal disease.    NECK: Supple without nuchal rigidity. No mass.  LYMPH: No cervical, axillary or inguinal lymphadenopathy.  HEART: RRR; No murmur, rubs, gallops.   LUNGS: diminished at bases to auscultation bilaterally without wheezing, rales, rhonchi. Normal respiratory effort. Nonlabored. No dullness.  ABDOMEN: Soft, nontender, nondistended. Positive bowel sounds. No rebound or guarding. NO mass or HSM.  EXT:  No cyanosis, clubbing or edema. No cord.  : Genitalia generally unremarkable.  Without Akins catheter.  MSK: FROM without joint effusions noted arms/legs.    SKIN: Warm and dry without cutaneous eruptions on Inspection/palpation.    NEURO: sleepy     No CVA tenderness     No peripheral stigmata/phenomena of endocarditis       Laboratory Data    Results from last 7 days   Lab Units 08/04/20  0402 08/03/20  0412 08/02/20  0358   WBC 10*3/mm3 9.71 8.09 8.10   HEMOGLOBIN g/dL 13.5 13.4 13.1   HEMATOCRIT % 41.4 40.8 40.6   PLATELETS 10*3/mm3 235 218 195     Results from last 7 days   Lab Units 08/05/20  0431   SODIUM mmol/L 138   POTASSIUM mmol/L 3.7   CHLORIDE mmol/L 102   CO2 mmol/L 26.0   BUN mg/dL 15   CREATININE mg/dL 1.25   GLUCOSE mg/dL 83   CALCIUM mg/dL 8.7     Results from last 7 days   Lab Units 08/04/20  0402   ALK PHOS U/L 113   BILIRUBIN mg/dL 0.9   ALT (SGPT) U/L 31   AST (SGOT) U/L 43*               Estimated Creatinine Clearance: 54.1 mL/min (by C-G formula based on SCr of 1.25 mg/dL).      Microbiology:      Radiology:  Imaging Results (Last 72 Hours)     ** No results found for the last 72 hours. **            Impression:     --Acute sepsis.  Fever/leukocytosis and acute kidney injury with enterococcal bacteremia/septicemia.  Concern for urinary source at present.  Abdominal exam otherwise benign with no nausea/vomiting/diarrhea.  If enterococcus did not grow in the urine or if new abdominal symptoms arise  consideration could be given to further GI work-up such as CT scan or GI consultation/endoscopy etc.  Otherwise, chest clear, no cough or breathing difficulty, no obvious skin or soft tissue changes.  Monitor for other potential foci/pathogen     -- Acute  enterococcus bacteremia/septicemia.  Currently no stigmata of endocarditis but certainly could evolve.  High risk for further serious morbidity and other serious sequela.  SUMMER no valvular vegetation but has protruding plaque in descending aorta, ?significance     --Acute enterococcal complicated UTI.  Associated with septicemia/bacteremia as above.  Urology following and any  Further functional/anatomic assessment at their discretion     --Acute kidney injury.  Creat trended up a bit 8/4; vanco adjusted by pharmacy; monitor to help guide further adjustments; creat better 8/5 after fluids    --acute pulm edema?; vascular congestion per radiology on CXR     --Acute encephalopathy.  Baseline not entirely clear with prior history of stroke/TIA and nursing reports baseline with forgetfulness with possible cognitive difficulties at baseline.  Need further clarification from family.  Currently no meningismus.  Further neurologic work-up or neurology consultation at discretion of internal medicine     --History of TIA/CVA with reports of residual right-sided weakness.          PLAN:     --IV vancomycin      --Check/review labs cultures and scans     --Partial history per nursing staff     --Discussed with microbiology     --Discussed with Dr. Marino     --Highly complex set of issues with high risk for further serious morbidity and other serious sequela    --urology following    --repeated blood cultures and negative so far; TTE no mention of vegetation per cardiology;    SUMMER  noted        Sabino Sandhu MD  8/5/2020

## 2020-08-05 NOTE — PROGRESS NOTES
"Pharmacy Consult - Vancomycin Dosing    Domitila Bosch is an 79 y.o. male receiving vancomycin therapy.     Indication: E faecalis bacteremia  Consulting Provider: Hospitalist  ID Consult: Yes    Goal Trough: 15-20 mcg/mL    Current Antimicrobial Therapy  Vancomycin 7/28-now    Allergies  Allergies as of 07/28/2020 - Reviewed 07/28/2020   Allergen Reaction Noted    Betadine [povidone iodine]  06/09/2016    Cephalexin  04/20/2016    Flomax [tamsulosin hcl]  02/16/2017    Iodine  06/09/2016    Lisinopril  04/20/2016    Pseudoephedrine  04/20/2016    Sulfamethoxazole-trimethoprim  04/20/2016     Labs  Results from last 7 days   Lab Units 08/05/20  0431 08/04/20  0402 08/03/20  0412   BUN mg/dL 15 11 12   CREATININE mg/dL 1.25 1.43* 1.22     Results from last 7 days   Lab Units 08/04/20  0402 08/03/20  0412 08/02/20  0358   WBC 10*3/mm3 9.71 8.09 8.10     Evaluation of Dosing    Is Patient on Dialysis or Renal Replacement: no    Ht - 185.4 cm (72.99\")  Wt - 81.2 kg (179 lb)    Estimated Creatinine Clearance: 54.1 mL/min (by C-G formula based on SCr of 1.25 mg/dL).    Intake & Output (last 3 days)         08/02 0701 - 08/03 0700 08/03 0701 - 08/04 0700 08/04 0701 - 08/05 0700 08/05 0701 - 08/06 0700    P.O. 370 600 120     I.V. (mL/kg)   1176.3 (14.5)     IV Piggyback        Total Intake(mL/kg) 370 (4.5) 600 (7.4) 1296.3 (16)     Urine (mL/kg/hr) 1620 (0.8) 1700 (0.9) 1400 (0.7)     Total Output 1620 1700 1400     Net -1250 -1100 -103.7             Urine Unmeasured Occurrence  1 x            Microbiology and Radiology  Microbiology Results (last 10 days)       Procedure Component Value - Date/Time    COVID PRE-OP / PRE-PROCEDURE SCREENING ORDER (NO ISOLATION) - Swab, Nasopharynx [[id number]] Collected:  08/02/20 1602    Lab Status:  Final result Specimen:  Swab from Nasopharynx Updated:  08/02/20 5644    Narrative:       The following orders were created for panel order COVID PRE-OP / PRE-PROCEDURE SCREENING ORDER (NO " ISOLATION) - Swab, Nasopharynx.  Procedure                               Abnormality         Status                     ---------                               -----------         ------                     Respiratory Panel PCR w/...[[id number]]  Normal              Final result                 Please view results for these tests on the individual orders.    Respiratory Panel PCR w/COVID-19(SARS-CoV-2) PATRICIA/GREG/AMELIA In-House, NP Swab in [facility], 8-12 Hr TAT - Swab, Nasopharynx [[id number]]  (Normal) Collected:  08/02/20 1602    Lab Status:  Final result Specimen:  Swab from Nasopharynx Updated:  08/02/20 1713     ADENOVIRUS, PCR Not Detected     Coronavirus 229E Not Detected     Coronavirus HKU1 Not Detected     Coronavirus NL63 Not Detected     Coronavirus OC43 Not Detected     COVID19 Not Detected     Human Metapneumovirus Not Detected     Human Rhinovirus/Enterovirus Not Detected     Influenza A PCR Not Detected     Influenza A H1 Not Detected     Influenza A H1 2009 PCR Not Detected     Influenza A H3 Not Detected     Influenza B PCR Not Detected     Parainfluenza Virus 1 Not Detected     Parainfluenza Virus 2 Not Detected     Parainfluenza Virus 3 Not Detected     Parainfluenza Virus 4 Not Detected     RSV, PCR Not Detected     Bordetella pertussis pcr Not Detected     Bordetella parapertussis PCR Not Detected     Chlamydophila pneumoniae PCR Not Detected     Mycoplasma pneumo by PCR Not Detected    Narrative:       Fact sheet for providers: https://[employer]On[employer]ep/wp-content/uploads/IHL8290-7945-NE0.1-EUA-Provider-Fact-Sheet-3.pdf    Fact sheet for patients: https://docs.OnBeep/wp-content/uploads/WCB0408-4507-TM1.1-EUA-Patient-Fact-Sheet-1.pdf    Blood Culture - Blood, Chest, Right [[id number]] Collected:  07/30/20 0834    Lab Status:  Final result Specimen:  Blood from Chest, Right Updated:  08/04/20 0915     Blood Culture No growth at 5 days    Blood Culture - Blood, Arm, Left [[id number]] Collected:   07/30/20 0834    Lab Status:  Final result Specimen:  Blood from Arm, Left Updated:  08/04/20 0915     Blood Culture No growth at 5 days    Blood Culture - Blood, Arm, Left [[id number]]  (Abnormal)  (Susceptibility) Collected:  07/28/20 1133    Lab Status:  Edited Result - FINAL Specimen:  Blood from Arm, Left Updated:  07/31/20 0723     Blood Culture Enterococcus faecalis     Comment:   Infectious disease consultation is highly recommended.        Isolated from Aerobic and Anaerobic Bottles     Gram Stain Aerobic Bottle Gram positive cocci in chains      Anaerobic Bottle Gram positive cocci in chains    Narrative:       Dr. Sandhu requested Daptomycin     Susceptibility        Enterococcus faecalis     GURPREET     Ampicillin Susceptible     Daptomycin Intermediate     Gentamicin High Level Synergy Susceptible     Vancomycin Susceptible                      Blood Culture ID, PCR - Blood, Arm, Left [[id number]]  (Abnormal) Collected:  07/28/20 1133    Lab Status:  Final result Specimen:  Blood from Arm, Left Updated:  07/29/20 0555     BCID, PCR Enterococcus spp. Identification by BCID PCR.    Blood Culture - Blood, Arm, Right [[id number]]  (Abnormal) Collected:  07/28/20 1125    Lab Status:  Final result Specimen:  Blood from Arm, Right Updated:  07/30/20 0656     Blood Culture Enterococcus faecalis     Comment:   Infectious disease consultation is highly recommended.        Isolated from Aerobic and Anaerobic Bottles     Gram Stain Aerobic Bottle Gram positive cocci in chains      Anaerobic Bottle Gram positive cocci in chains    Narrative:       Refer to previous blood culture collected on 7/28/2020 1133 for MICs    Urine Culture - Urine, Urine, Clean Catch [[id number]]  (Abnormal)  (Susceptibility) Collected:  07/28/20 1021    Lab Status:  Edited Result - FINAL Specimen:  Urine, Clean Catch Updated:  07/31/20 1057     Urine Culture >100,000 CFU/mL Enterococcus faecalis    Narrative:        requested Daptomycin  7/30    Susceptibility        Enterococcus faecalis     GURPREET     Ampicillin Susceptible     Daptomycin Susceptible     Levofloxacin Susceptible     Nitrofurantoin Susceptible     Tetracycline Susceptible     Vancomycin Susceptible                            Evaluation of Level  Results from last 7 days   Lab Units 08/05/20  0431   VANCOMYCIN RM mcg/mL 12.40   Previous dose given at 1730 on 8/3   Random drawn at 0431 (~35 hrs post last dose)    Assessment/Plan:  1. Pharmacy dosing vancomycin for E faecalis bacteremia, goal trough 15-20.  2. Patient currently on a maintenance regimen of vancomycin 1250mg IV q24h after supratherapeutic trough on 8/3. Dose was held on 8/4 with am random returning at 12.4mcg/ml ~35 hrs post last dose. Will give one time dose of 1000mg (12.5mg/kg) and reassess with am random.   3. Vancomycin random scheduled for 8/6.   4. Renal function trending to baseline after holding dose on 8/4. SCr at 1.25 today. Adequate UOP overnight. Will follow renal function closely.    5. Pharmacy will continue to monitor and adjust vancomycin dose as necessary based on renal function, cultures, labs, and clinical status.     Norberto Beckham, PharmD, BCPS  8/5/2020  07:54

## 2020-08-05 NOTE — PLAN OF CARE
Problem: Patient Care Overview  Goal: Plan of Care Review  Flowsheets (Taken 8/5/2020 1040)  Plan of Care Reviewed With: patient  Outcome Summary: Pt required CGA to don socks, min A to don back gown, setup/ supervision to complete sink side grooming.  Pt CGA STS with RW, CGA to ambulate in hallway with RW.  Pt is progressing, but limited by confusion, weakness and decreased balance.   Recommend rehab upon d/c.

## 2020-08-05 NOTE — PROGRESS NOTES
[facility] Medicine Services  PROGRESS NOTE    Patient Name: Domitila Bosch  : 1940  MRN: [id number]    Date of Admission: 2020  Primary Care Physician: Marcin Ferguson MD    Subjective   Subjective     CC:  bacteremia    HPI:  Confused and upset that wife is gone this morning.  Still refusing rehab. Asks same question multiple times    Review of Systems  Gen- No fevers, chills  CV- No chest pain, palpitations  Resp- No cough, dyspnea  GI- No N/V/D, abd pain      Objective   Objective     Vital Signs:   Temp:  [98 °F (36.7 °C)-98.2 °F (36.8 °C)] 98 °F (36.7 °C)  Heart Rate:  [54-91] 91  Resp:  [16-18] 18  BP: (107-174)/() 151/93  Total (NIH Stroke Scale): 1     Physical Exam:  Constitutional: No acute distress,confused but pleasant  HENT: NCAT, mucous membranes moist  Respiratory: Clear to auscultation bilaterally, respiratory effort normal   Cardiovascular: RRR, no murmurs  Gastrointestinal: Positive bowel sounds, soft, nontender, nondistended  Musculoskeletal: No edema  Psychiatric: Appropriate affect, cooperative  Neurologic: Oriented to self with some baseline confusion and dementia, speech clear  Skin: No rashes    Results Reviewed:  Results from last 7 days   Lab Units 20  0402 20  0412 20  0358   WBC 10*3/mm3 9.71 8.09 8.10   HEMOGLOBIN g/dL 13.5 13.4 13.1   HEMATOCRIT % 41.4 40.8 40.6   PLATELETS 10*3/mm3 235 218 195     Results from last 7 days   Lab Units 20  0431 20  0402 20  0412  20  0211   SODIUM mmol/L 138 138 138   < > 140  139   POTASSIUM mmol/L 3.7 3.8 3.4*   < > 3.3*  3.4*  3.4*   CHLORIDE mmol/L 102 102 101   < > 107  107   CO2 mmol/L 26.0 26.0 26.0   < > 24.0  24.0   BUN mg/dL 15 11 12   < > 14  14   CREATININE mg/dL 1.25 1.43* 1.22   < > 0.97  0.97   GLUCOSE mg/dL 83 93 93   < > 91  91   CALCIUM mg/dL 8.7 9.2 9.4   < > 7.5*  7.5*   ALT (SGPT) U/L  --  31  --   --  16   AST (SGOT) U/L  --  43*  --    --  33   PROBNP pg/mL  --   --   --   --  696.0    < > = values in this interval not displayed.     Estimated Creatinine Clearance: 54.1 mL/min (by C-G formula based on SCr of 1.25 mg/dL).    Microbiology Results Abnormal     Procedure Component Value - Date/Time    Blood Culture - Blood, Chest, Right [[id number]] Collected:  07/30/20 0834    Lab Status:  Final result Specimen:  Blood from Chest, Right Updated:  08/04/20 0915     Blood Culture No growth at 5 days    Blood Culture - Blood, Arm, Left [[id number]] Collected:  07/30/20 0834    Lab Status:  Final result Specimen:  Blood from Arm, Left Updated:  08/04/20 0915     Blood Culture No growth at 5 days    COVID PRE-OP / PRE-PROCEDURE SCREENING ORDER (NO ISOLATION) - Swab, Nasopharynx [[id number]] Collected:  08/02/20 1602    Lab Status:  Final result Specimen:  Swab from Nasopharynx Updated:  08/02/20 1713    Narrative:       The following orders were created for panel order COVID PRE-OP / PRE-PROCEDURE SCREENING ORDER (NO ISOLATION) - Swab, Nasopharynx.  Procedure                               Abnormality         Status                     ---------                               -----------         ------                     Respiratory Panel PCR w/...[[id number]]  Normal              Final result                 Please view results for these tests on the individual orders.    Respiratory Panel PCR w/COVID-19(SARS-CoV-2) PATRICIA/GREG/AMELIA In-House, NP Swab in [facility], 8-12 Hr TAT - Swab, Nasopharynx [[id number]]  (Normal) Collected:  08/02/20 1602    Lab Status:  Final result Specimen:  Swab from Nasopharynx Updated:  08/02/20 1713     ADENOVIRUS, PCR Not Detected     Coronavirus 229E Not Detected     Coronavirus HKU1 Not Detected     Coronavirus NL63 Not Detected     Coronavirus OC43 Not Detected     COVID19 Not Detected     Human Metapneumovirus Not Detected     Human Rhinovirus/Enterovirus Not Detected     Influenza A PCR Not Detected     Influenza A H1 Not Detected      Influenza A H1 2009 PCR Not Detected     Influenza A H3 Not Detected     Influenza B PCR Not Detected     Parainfluenza Virus 1 Not Detected     Parainfluenza Virus 2 Not Detected     Parainfluenza Virus 3 Not Detected     Parainfluenza Virus 4 Not Detected     RSV, PCR Not Detected     Bordetella pertussis pcr Not Detected     Bordetella parapertussis PCR Not Detected     Chlamydophila pneumoniae PCR Not Detected     Mycoplasma pneumo by PCR Not Detected    Narrative:       Fact sheet for providers: https://docs.[employer]/wp-content/uploads/BGJ4563-2489-UO3.1-EUA-Provider-Fact-Sheet-3.pdf    Fact sheet for patients: https://docs.[employer]/wp-content/uploads/NXZ5089-1454-HA7.1-EUA-Patient-Fact-Sheet-1.pdf    Urine Culture - Urine, Urine, Clean Catch [[id number]]  (Abnormal)  (Susceptibility) Collected:  07/28/20 1021    Lab Status:  Edited Result - FINAL Specimen:  Urine, Clean Catch Updated:  07/31/20 1057     Urine Culture >100,000 CFU/mL Enterococcus faecalis    Narrative:        requested Daptomycin 7/30    Susceptibility      Enterococcus faecalis     GURPREET     Ampicillin Susceptible     Daptomycin Susceptible     Levofloxacin Susceptible     Nitrofurantoin Susceptible     Tetracycline Susceptible     Vancomycin Susceptible                    Blood Culture - Blood, Arm, Left [[id number]]  (Abnormal)  (Susceptibility) Collected:  07/28/20 1133    Lab Status:  Edited Result - FINAL Specimen:  Blood from Arm, Left Updated:  07/31/20 0723     Blood Culture Enterococcus faecalis     Comment:   Infectious disease consultation is highly recommended.        Isolated from Aerobic and Anaerobic Bottles     Gram Stain Aerobic Bottle Gram positive cocci in chains      Anaerobic Bottle Gram positive cocci in chains    Narrative:       Dr. Sandhu requested Daptomycin     Susceptibility      Enterococcus faecalis     GURPREET     Ampicillin Susceptible     Daptomycin Intermediate     Gentamicin High Level  Synergy Susceptible     Vancomycin Susceptible                    Blood Culture - Blood, Arm, Right [[id number]]  (Abnormal) Collected:  07/28/20 1125    Lab Status:  Final result Specimen:  Blood from Arm, Right Updated:  07/30/20 0656     Blood Culture Enterococcus faecalis     Comment:   Infectious disease consultation is highly recommended.        Isolated from Aerobic and Anaerobic Bottles     Gram Stain Aerobic Bottle Gram positive cocci in chains      Anaerobic Bottle Gram positive cocci in chains    Narrative:       Refer to previous blood culture collected on 7/28/2020 1133 for MICs    Blood Culture ID, PCR - Blood, Arm, Left [[id number]]  (Abnormal) Collected:  07/28/20 1133    Lab Status:  Final result Specimen:  Blood from Arm, Left Updated:  07/29/20 0555     BCID, PCR Enterococcus spp. Identification by BCID PCR.          Imaging Results (Last 24 Hours)     ** No results found for the last 24 hours. **          Results for orders placed during the hospital encounter of 07/28/20   Adult Transesophageal Echo ([facility]) W/ Cont if Necessary Per Protocol    Narrative · Estimated EF = 65%.  · Left ventricular systolic function is normal.  · Left atrial cavity size is mild-to-moderately dilated.  · There is mild calcification of the aortic valve mainly affecting the non   and right coronary cusp(s).  · Saline test results are negative.  · The aortic valve exhibits moderate sclerosis.  · There is no evidence of pericardial effusion.  · There is moderate (grade 2) protruding plaque in the descending aorta   present.  · No valvular vegetations demonstrated.          I have reviewed the medications:  Scheduled Meds:  allopurinol 300 mg Oral Daily   aspirin 81 mg Oral Daily   atorvastatin 80 mg Oral Nightly   busPIRone 10 mg Oral Q12H   clopidogrel 75 mg Oral Daily   finasteride 5 mg Oral Daily   heparin (porcine) 5,000 Units Subcutaneous Q8H   levothyroxine 150 mcg Oral Q AM   memantine 5 mg Oral Daily   pantoprazole  40 mg Oral QAM   sertraline 50 mg Oral Daily   sodium chloride 10 mL Intravenous Q12H   terazosin 5 mg Oral Nightly   vancomycin (dosing per levels)  Does not apply Daily     Continuous Infusions:  Pharmacy to dose vancomycin      PRN Meds:.•  acetaminophen  •  ipratropium-albuterol  •  ondansetron  •  Pharmacy to dose vancomycin  •  potassium chloride  •  potassium chloride  •  sodium chloride  •  sodium chloride    Assessment/Plan   Assessment & Plan     Active Hospital Problems    Diagnosis  POA   • **Acute UTI [N39.0]  Yes   • Acute diastolic heart failure (CMS/HCC) [I50.31]  Unknown   • Acute respiratory failure with hypoxia (CMS/[facility]) [J96.01]  Unknown   • Bacteremia [R78.81]  Unknown   • Encephalopathy acute [G93.40]  Yes   • DANIS (acute kidney injury) (CMS/[facility]) [N17.9]  Yes   • Carotid stenosis [I65.29]  Yes   • Dyslipidemia [E78.5]  Yes   • Hypothyroidism [E03.9]  Yes   • GERD (gastroesophageal reflux disease) [K21.9]  Yes   • Anxiety disorder [F41.9]  Yes      Resolved Hospital Problems   No resolved problems to display.        Brief Hospital Course to date:  Domitila Bsoch is a 79 y.o. male with PMHx significant for carotid stenosis, dyslipidemia, TIA/CVA with residual right sided weakness, hypertension, hypothyroidism, anxiety who presents to [facility] with complaints of increasing confusion. Confusion has been stable but suspect some underlying dementia     Metabolic Encephalopathy related to sepsis/bacteremia - some improvement   - likely multifactorial and secondary to infection and DANIS/dehydration  - CT head with no acute findings, previous MRI shows history of stroke in the past  - continue namenda     Sepsis - POA   UTI - enterococcus   Bacteremia - enterococcus   - allergy to Cephalosporins and penicillins   - BCx x 2 with enterococcus; UCx enterococcus; repeat blood cultures NGTD    - DC azactam (7/28-7/29) and continue vanc (7/29)   - ID following   - Urology consulted - no intervention   - ECHO with no  comment on valvular veg -- [facility]MER megative  - PICC line ordered 8/5, plan to go home with home health infusion and follow up with ID     Acute hypoxic resp failure - improved   Acute diastolic heart failure - improved   - Likely related to volume overload; ABG reviewed; CXR reviewed   - ECHO with normal EF; diastolic heart failure  - Wean O2 as tolerated   - maxide on hold secondary to labile renal function     DANIS - improved   - baseline around 0.8, 1.9 on admission   - renal US with R non-obs stone; enlargement of prostate; bladder debris  - cr stable     BPH  Urinary retention  - continue hytrin and proscar      Falls:  - PT/OT to see, currently lives at home with his wife  - rehab currently recommended      HLD  Hx of CVA w/residual right sided weakness  Carotid Stenosis  - continue ASA, plavix, statin     Hypothyroidism:  - levothyroxine     Hypertension:  - continue hytrin, hold hctz    DVT Prophylaxis:  Aspirin plavix      Disposition: I expect the patient to be discharged hopefully next few days once abx and picc placed    CODE STATUS:   Code Status and Medical Interventions:   Ordered at: 07/28/20 1249     Level Of Support Discussed With:    Patient     Code Status:    CPR     Medical Interventions (Level of Support Prior to Arrest):    Full         Electronically signed by Janette Marino DO, 08/05/20, 13:02.

## 2020-08-05 NOTE — PROGRESS NOTES
Continued Stay Note  [facility]     Patient Name: Domitila Bosch  MRN: [id number]  Today's Date: 8/5/2020    Admit Date: 7/28/2020    Discharge Plan     Row Name 08/05/20 1222       Plan    Plan  Home with [facility]    Plan Comments  SW met with patient and wife at bedside to discuss discharge planning. Patient was upbeat and lighthearted today. Discussed discharge plans [facility]. Patient has order for PICC placement. Spoke with Peggy who is the rep with [facility] [phone] who will service patient at discharge. Denies any other discharge needs at this time.        Discharge Codes    No documentation.             JACK Rodriguez (Kay)

## 2020-08-05 NOTE — PLAN OF CARE
"Continues with short term memory deficit. Pleasant and cooperative but requires constant redirection. Wife at bedside earlier today and assisting with pt care. Wife concerned about pt getting agitated at bedtime and him requiring multiple phone calls to her to settle down. Melatonin to be added tonight. Pt up ambulating with 1 person and walks about 200feet with gait belt. Voiding per urinal. Wife reports pt having an incontinent episode on Monday of bowels. PICC placed today in plan for IV Vancomycin therapy of 2 weeks at d/c. Both verbalize understanding of POC.    ./93 (BP Location: Left arm, Patient Position: Lying)   Pulse 71   Temp 98 °F (36.7 °C) (Oral)   Resp 18   Ht 185.4 cm (72.99\")   Wt 81.2 kg (179 lb)   SpO2 95%   BMI 23.62 kg/m²      Tele shows a SR--denies pain.   Problem: Patient Care Overview  Goal: Plan of Care Review  Outcome: Ongoing (interventions implemented as appropriate)  Goal: Individualization and Mutuality  Outcome: Ongoing (interventions implemented as appropriate)  Goal: Discharge Needs Assessment  Outcome: Ongoing (interventions implemented as appropriate)  Goal: Interprofessional Rounds/Family Conf  Outcome: Ongoing (interventions implemented as appropriate)     Problem: Fall Risk (Adult)  Goal: Identify Related Risk Factors and Signs and Symptoms  Outcome: Ongoing (interventions implemented as appropriate)     Problem: Skin Injury Risk (Adult)  Goal: Identify Related Risk Factors and Signs and Symptoms  Outcome: Ongoing (interventions implemented as appropriate)  Goal: Skin Health and Integrity  Outcome: Ongoing (interventions implemented as appropriate)     Problem: Pain, Chronic (Adult)  Goal: Identify Related Risk Factors and Signs and Symptoms  Outcome: Ongoing (interventions implemented as appropriate)  Goal: Acceptable Pain/Comfort Level and Functional Ability  Outcome: Ongoing (interventions implemented as appropriate)     Problem: Urinary Tract Infection " (Adult)  Goal: Signs and Symptoms of Listed Potential Problems Will be Absent, Minimized or Managed (Urinary Tract Infection)  Outcome: Ongoing (interventions implemented as appropriate)   Anticipate d/c home tomorrow or Friday.

## 2020-08-05 NOTE — THERAPY TREATMENT NOTE
Acute Care - Occupational Therapy Treatment Note  [facility]     Patient Name: Domitila Bosch  : 1940  MRN: [id number]  Today's Date: 2020     Date of Referral to OT: 20  Referring Physician: MD aLtrice    Admit Date: 2020       ICD-10-CM ICD-9-CM   1. Acute UTI N39.0 599.0   2. Altered mental status, unspecified altered mental status type R41.82 780.97   3. History of CVA (cerebrovascular accident) Z86.73 V12.54   4. Weakness R53.1 780.79   5. Visual hallucinations R44.1 368.16     Patient Active Problem List   Diagnosis   • Diverticulosis of large intestine with hemorrhage   • Umbilical hernia   • S/P hernia repair   • Dyslipidemia   • Carotid stenosis   • Gout   • GERD (gastroesophageal reflux disease)   • Hypothyroidism   • Neuropathy   • Malignant melanoma (CMS/HCC)   • Asthma   • Anxiety disorder   • Diverticulosis   • Muscle pain   • Lumbar radiculopathy   • Kidney stone   • Deviated nasal septum   • Chronic laryngitis   • Acute CVA (cerebrovascular accident) (CMS/HCC)   • Elevated BP without diagnosis of hypertension   • Allergy to Betadine   • Acute UTI   • Encephalopathy acute   • DANIS (acute kidney injury) (CMS/HCC)   • Bacteremia   • Acute diastolic heart failure (CMS/HCC)   • Acute respiratory failure with hypoxia (CMS/HCC)     Past Medical History:   Diagnosis Date   • Anxiety disorder 2017   • Asthma 2017   • Basal cell carcinoma in situ of skin 2019    right leg    • Carotid stenosis 2017   • Diaphoresis    • Diastolic dysfunction    • Diverticulitis    • Dyslipidemia 2017   • GERD (gastroesophageal reflux disease) 2017   • Gout 2017   • Herpes zoster     Herpes zoster, [id number], right lower extremity.    • Hypertension 2017   • Hypothyroidism 2017   • LVH (left ventricular hypertrophy)    • Malignant melanoma (CMS/HCC) 2017   • Melanoma (CMS/HCC)    • Mitral regurgitation    • Nephrolithiasis    • Post herpetic neuralgia  02/16/2017   • TIA (transient ischemic attack)     TIA, June 2012, with nonobstructive carotid stenosis, dyslipidemia and hypertension     Past Surgical History:   Procedure Laterality Date   • ABDOMINAL SURGERY     • APPENDECTOMY     • COLON RESECTION LEFT  2016   • COLON RESECTION LEFT  2016   • HEEL SPUR SURGERY  1999   • HERNIA REPAIR      hiatal hernia    • NISSEN FUNDOPLICATION  1984   • OTHER SURGICAL HISTORY  2010    Malignant melanoma, status post resection        Therapy Treatment    Rehabilitation Treatment Summary     Row Name 08/05/20 1040             Treatment Time/Intention    Discipline  occupational therapist  -AC      Document Type  therapy note (daily note)  -AC      Patient Effort  good  -AC      Existing Precautions/Restrictions  fall  -AC      Recorded by [AC] Fidelina Goyal, OT 08/05/20 1119      Row Name 08/05/20 1040             Vital Signs    Pre Systolic BP Rehab  151  -AC      Pre Treatment Diastolic BP  93  -AC      Pretreatment Heart Rate (beats/min)  77  -AC      Intratreatment Heart Rate (beats/min)  90  -AC      Posttreatment Heart Rate (beats/min)  87  -AC      Pre SpO2 (%)  94  -AC      O2 Delivery Pre Treatment  room air  -AC      Post SpO2 (%)  96  -AC      O2 Delivery Post Treatment  room air  -AC      Pre Patient Position  Supine  -AC      Intra Patient Position  Standing  -AC      Post Patient Position  Sitting  -AC      Recorded by [AC] Fidelina Goyal, OT 08/05/20 1119      Row Name 08/05/20 1040             Cognitive Assessment/Intervention    Additional Documentation  Cognitive Assessment/Intervention (Group)  -AC      Recorded by [AC] Fidelina Goyal, OT 08/05/20 1119      Row Name 08/05/20 1040             Cognitive Assessment/Intervention- PT/OT    Affect/Mental Status (Cognitive)  confused  -AC      Orientation Status (Cognition)  oriented to;person;place May for month,  2002 for yr  -AC      Follows Commands (Cognition)  follows one step commands;over 90%  accuracy;repetition of directions required  -AC      Cognitive Assessment/Intervention Comment  perseverating on getting a closer shave  -AC      Recorded by [AC] Fidelina Goyal, OT 08/05/20 1119      Row Name 08/05/20 1040             Safety Issues, Functional Mobility    Safety Issues Affecting Function (Mobility)  insight into deficits/self awareness;judgment;safety precaution awareness;safety precautions follow-through/compliance  -AC      Impairments Affecting Function (Mobility)  balance;cognition;endurance/activity tolerance;strength  -AC      Recorded by [AC] Fidelina Goyal, OT 08/05/20 1119      Row Name 08/05/20 1040             Bed Mobility Assessment/Treatment    Bed Mobility Assessment/Treatment  supine-sit  -AC      Supine-Sit [place] (Bed Mobility)  supervision  -AC      Assistive Device (Bed Mobility)  head of bed elevated;bed rails  -AC      Recorded by [AC] Fidelina Goyal, OT 08/05/20 1119      Row Name 08/05/20 1040             Functional Mobility    Functional Mobility- Ind. Level  contact guard assist  -AC      Functional Mobility- Device  rolling walker  -AC      Functional Mobility-Distance (Feet)  -- in hallway  -AC      Recorded by [AC] Fidelina Goyal, OT 08/05/20 1119      Row Name 08/05/20 1040             Transfer Assessment/Treatment    Transfer Assessment/Treatment  sit-stand transfer;stand-sit transfer  -AC      Recorded by [AC] Fidelina Goyal, OT 08/05/20 1119      Row Name 08/05/20 1040             Sit-Stand Transfer    Sit-Stand [place] (Transfers)  verbal cues;contact guard  -AC      Assistive Device (Sit-Stand Transfers)  walker, front-wheeled  -AC      Recorded by [AC] Fidelina Goyal, OT 08/05/20 1119      Row Name 08/05/20 1040             Stand-Sit Transfer    Stand-Sit [place] (Transfers)  verbal cues;contact guard  -AC      Assistive Device (Stand-Sit Transfers)  walker, front-wheeled  -AC      Recorded by [AC] Fidelina Goyal, OT 08/05/20 1119      Row Name  08/05/20 1040             ADL Assessment/Intervention    21854 - OT Self Care/Mgmt Minutes  15  -AC      BADL Assessment/Intervention  upper body dressing;lower body dressing;grooming  -AC      Recorded by [AC] Fidelina Goyal, OT 08/05/20 1119      Row Name 08/05/20 1040             Upper Body Dressing Assessment/Training    Upper Body Dressing [place] Level  don;minimum assist (75% patient effort) back gown  -AC      Upper Body Dressing Position  unsupported sitting  -AC      Recorded by [AC] Fidelina Goyal, OT 08/05/20 1119      Row Name 08/05/20 1040             Lower Body Dressing Assessment/Training    Lower Body Dressing [place] Level  don;socks;verbal cues;contact guard assist  -AC      Lower Body Dressing Position  unsupported sitting  -AC      Recorded by [AC] Fidelina Goyal, OT 08/05/20 1119      Row Name 08/05/20 1040             Grooming Assessment/Training    [place] Level (Grooming)  oral care regimen;wash face, hands;shave face;set up;verbal cues  -AC      Grooming Position  supported standing  -AC      Comment (Grooming)  pt upset because his electric razor will not give him  a close shave and perseverating on task   -AC      Recorded by [AC] Fidelina Goyal, OT 08/05/20 1119      Row Name 08/05/20 1040             BADL Safety/Performance    Skilled BADL Treatment/Intervention  BADL process/adaptation training  -AC      Recorded by [AC] Fidelina Goyal, OT 08/05/20 1119      Row Name 08/05/20 1040             Therapeutic Exercise    36933 - OT Therapeutic Activity Minutes  10  -AC      Recorded by [AC] Fidelina Goyal, OT 08/05/20 1119      Row Name 08/05/20 1040             Positioning and Restraints    Pre-Treatment Position  in bed  -AC      Post Treatment Position  chair  -AC      In Chair  reclined;call light within reach;encouraged to call for assist;exit alarm on;with family/caregiver  -AC      Recorded by [AC] Fidelina Goyal, OT 08/05/20 1119      Row Name 08/05/20 1040              Pain Assessment    Additional Documentation  Pain Scale: Numbers Pre/Post-Treatment (Group)  -AC      Recorded by [AC] Fidelina Goyal, OT 08/05/20 1119      Row Name 08/05/20 1040             Pain Scale: Numbers Pre/Post-Treatment    Pain Scale: Numbers, Pretreatment  0/10 - no pain  -AC      Pain Scale: Numbers, Post-Treatment  4/10  -AC      Pain Location  back  -AC      Pain Intervention(s)  Repositioned  -AC      Recorded by [AC] Fidelina Goyal, OT 08/05/20 1119      Row Name 08/05/20 1040             Plan of Care Review    Plan of Care Reviewed With  patient  -AC      Progress  improving  -AC      Outcome Summary  Pt required CGA to don socks, min A to don back gown, setup/ supervision to complete sink side grooming.  Pt CGA STS with RW, CGA to ambulate in hallway with RW.  Pt is progressing, but limited by confusion, weakness and decreased balance.   Recommend rehab upon d/c.   -AC      Recorded by [AC] Fidelina Goyal, OT 08/05/20 1119      Row Name 08/05/20 1040             Outcome Summary/Treatment Plan (OT)    Daily Summary of Progress (OT)  progress toward functional goals as expected  -AC      Anticipated Discharge Disposition (OT)  inpatient rehabilitation facility  -AC      Recorded by [AC] Fidelina Goyal, OT 08/05/20 1119        User Key  (r) = Recorded By, (t) = Taken By, (c) = Cosigned By    Initials Name Effective Dates Discipline    AC Fidelina Goyal, OT 06/23/15 -  OT             Occupational Therapy Education                 Title: PT OT SLP Therapies (Done)     Topic: Occupational Therapy (Done)     Point: ADL training (Done)     Description:   Instruct learner(s) on proper safety adaptation and remediation techniques during self care or transfers.   Instruct in proper use of assistive devices.              Learning Progress Summary           Patient Acceptance, E, VU by  at 8/5/2020 1040    Acceptance, E,TB, VU by  at 8/3/2020 1351    Acceptance, E, VU by  at 7/31/2020 1030     Acceptance, E,TB, VU by TH at 7/31/2020 0505    Acceptance, E, VU by AC at 7/30/2020 0820    Comment:  benefits of activity, role of OT    Acceptance, E,TB, VU by TH at 7/30/2020 0635    Acceptance, E,TB, VU by PC at 7/28/2020 1546   Significant Other Acceptance, E,TB, VU by PC at 7/28/2020 1546                   Point: Home exercise program (Done)     Description:   Instruct learner(s) on appropriate technique for monitoring, assisting and/or progressing therapeutic exercises/activities.              Learning Progress Summary           Patient Acceptance, E,TB, VU by  at 8/3/2020 1351    Acceptance, E,TB, VU by TH at 7/31/2020 0505    Acceptance, E,TB, VU by TH at 7/30/2020 0635    Acceptance, E,TB, VU by PC at 7/28/2020 1546   Significant Other Acceptance, E,TB, VU by PC at 7/28/2020 1546                   Point: Precautions (Done)     Description:   Instruct learner(s) on prescribed precautions during self-care and functional transfers.              Learning Progress Summary           Patient Acceptance, E,TB, VU by  at 8/3/2020 1351    Acceptance, E,TB, VU by TH at 7/31/2020 0505    Acceptance, E,TB, VU by TH at 7/30/2020 0635    Acceptance, E,TB, VU by PC at 7/28/2020 1546   Significant Other Acceptance, E,TB, VU by PC at 7/28/2020 1546                   Point: Body mechanics (Done)     Description:   Instruct learner(s) on proper positioning and spine alignment during self-care, functional mobility activities and/or exercises.              Learning Progress Summary           Patient Acceptance, E,TB, VU by  at 8/3/2020 1351    Acceptance, E,TB, VU by TH at 7/31/2020 0505    Acceptance, E,TB, VU by TH at 7/30/2020 0635    Acceptance, E,TB, VU by PC at 7/28/2020 1546   Significant Other Acceptance, E,TB, VU by PC at 7/28/2020 1546                               User Key     Initials Effective Dates Name Provider Type Discipline    AC 06/23/15 -  Fidelina Goyal, OT Occupational Therapist OT     06/16/16 -   Niya Dupont, RN Registered Nurse Nurse     01/30/20 -  Magda Humphrey, DOROTHY Registered Nurse Nurse     07/07/20 -  Deja Levy, DOROTHY Registered Nurse Nurse                OT Recommendation and Plan  Outcome Summary/Treatment Plan (OT)  Daily Summary of Progress (OT): progress toward functional goals as expected  Anticipated Discharge Disposition (OT): inpatient rehabilitation facility  Planned Therapy Interventions (OT Eval): activity tolerance training, BADL retraining, functional balance retraining, occupation/activity based interventions, strengthening exercise, transfer/mobility retraining  Therapy Frequency (OT Eval): daily  Daily Summary of Progress (OT): progress toward functional goals as expected  Plan of Care Review  Plan of Care Reviewed With: patient  Plan of Care Reviewed With: patient  Outcome Summary: Pt required CGA to don socks, min A to don back gown, setup/ supervision to complete sink side grooming.  Pt CGA STS with RW, CGA to ambulate in hallway with RW.  Pt is progressing, but limited by confusion, weakness and decreased balance.   Recommend rehab upon d/c.   Outcome Measures     Row Name 08/05/20 1041             How much help from another is currently needed...    Putting on and taking off regular lower body clothing?  3  -AC      Bathing (including washing, rinsing, and drying)  2  -AC      Toileting (which includes using toilet bed pan or urinal)  3  -AC      Putting on and taking off regular upper body clothing  3  -AC      Taking care of personal grooming (such as brushing teeth)  3  -AC      Eating meals  4  -AC      AM-PAC 6 Clicks Score (OT)  18  -AC         Functional Assessment    Outcome Measure Options  AM-PAC 6 Clicks Daily Activity (OT)  -AC        User Key  (r) = Recorded By, (t) = Taken By, (c) = Cosigned By    Initials Name Provider Type    AC Fidelina Goyal, OT Occupational Therapist           Time Calculation:   Time Calculation- OT     Row Name 08/05/20 7268              Time Calculation- OT    OT Start Time  1040  -      OT Received On  08/05/20  -      OT Goal Re-Cert Due Date  08/09/20  -         Timed Charges    36582 - OT Therapeutic Activity Minutes  10  -AC      61459 - OT Self Care/Mgmt Minutes  15  -        User Key  (r) = Recorded By, (t) = Taken By, (c) = Cosigned By    Initials Name Provider Type     Fidelina Goyal, OT Occupational Therapist        Therapy Charges for Today     Code Description Service Date Service Provider Modifiers Qty    23025675902 HC OT THERAPEUTIC ACT EA 15 MIN 8/5/2020 Fidelina Goyal, OT GO 1    99229037834 HC OT SELF CARE/MGMT/TRAIN EA 15 MIN 8/5/2020 Fidelina Goyal OT GO 1               Fidelina Goyal OT  8/5/2020

## 2020-08-06 LAB
ALBUMIN SERPL-MCNC: 3.7 G/DL (ref 3.5–5.2)
ANION GAP SERPL CALCULATED.3IONS-SCNC: 9 MMOL/L (ref 5–15)
BASOPHILS # BLD AUTO: 0.12 10*3/MM3 (ref 0–0.2)
BASOPHILS NFR BLD AUTO: 1.2 % (ref 0–1.5)
BUN SERPL-MCNC: 15 MG/DL (ref 8–23)
BUN/CREAT SERPL: 11.4 (ref 7–25)
CALCIUM SPEC-SCNC: 9.1 MG/DL (ref 8.6–10.5)
CHLORIDE SERPL-SCNC: 101 MMOL/L (ref 98–107)
CO2 SERPL-SCNC: 27 MMOL/L (ref 22–29)
CREAT SERPL-MCNC: 1.32 MG/DL (ref 0.76–1.27)
DEPRECATED RDW RBC AUTO: 52.6 FL (ref 37–54)
EOSINOPHIL # BLD AUTO: 0.44 10*3/MM3 (ref 0–0.4)
EOSINOPHIL NFR BLD AUTO: 4.4 % (ref 0.3–6.2)
ERYTHROCYTE [DISTWIDTH] IN BLOOD BY AUTOMATED COUNT: 14.8 % (ref 12.3–15.4)
GFR SERPL CREATININE-BSD FRML MDRD: 52 ML/MIN/1.73
GLUCOSE SERPL-MCNC: 97 MG/DL (ref 65–99)
HCT VFR BLD AUTO: 39.3 % (ref 37.5–51)
HGB BLD-MCNC: 12.9 G/DL (ref 13–17.7)
IMM GRANULOCYTES # BLD AUTO: 0.64 10*3/MM3 (ref 0–0.05)
IMM GRANULOCYTES NFR BLD AUTO: 6.4 % (ref 0–0.5)
LYMPHOCYTES # BLD AUTO: 1.82 10*3/MM3 (ref 0.7–3.1)
LYMPHOCYTES NFR BLD AUTO: 18.3 % (ref 19.6–45.3)
MCH RBC QN AUTO: 31.8 PG (ref 26.6–33)
MCHC RBC AUTO-ENTMCNC: 32.8 G/DL (ref 31.5–35.7)
MCV RBC AUTO: 96.8 FL (ref 79–97)
MONOCYTES # BLD AUTO: 0.93 10*3/MM3 (ref 0.1–0.9)
MONOCYTES NFR BLD AUTO: 9.3 % (ref 5–12)
NEUTROPHILS NFR BLD AUTO: 6 10*3/MM3 (ref 1.7–7)
NEUTROPHILS NFR BLD AUTO: 60.4 % (ref 42.7–76)
NRBC BLD AUTO-RTO: 0 /100 WBC (ref 0–0.2)
PHOSPHATE SERPL-MCNC: 3.2 MG/DL (ref 2.5–4.5)
PLAT MORPH BLD: NORMAL
PLATELET # BLD AUTO: 280 10*3/MM3 (ref 140–450)
PMV BLD AUTO: 10.5 FL (ref 6–12)
POTASSIUM SERPL-SCNC: 3.5 MMOL/L (ref 3.5–5.2)
RBC # BLD AUTO: 4.06 10*6/MM3 (ref 4.14–5.8)
RBC MORPH BLD: NORMAL
SODIUM SERPL-SCNC: 137 MMOL/L (ref 136–145)
VANCOMYCIN SERPL-MCNC: 12.6 MCG/ML (ref 5–40)
WBC # BLD AUTO: 9.95 10*3/MM3 (ref 3.4–10.8)
WBC MORPH BLD: NORMAL

## 2020-08-06 PROCEDURE — 85025 COMPLETE CBC W/AUTO DIFF WBC: CPT | Performed by: INTERNAL MEDICINE

## 2020-08-06 PROCEDURE — 25010000002 HEPARIN (PORCINE) PER 1000 UNITS: Performed by: INTERNAL MEDICINE

## 2020-08-06 PROCEDURE — 85007 BL SMEAR W/DIFF WBC COUNT: CPT | Performed by: INTERNAL MEDICINE

## 2020-08-06 PROCEDURE — 80069 RENAL FUNCTION PANEL: CPT | Performed by: INTERNAL MEDICINE

## 2020-08-06 PROCEDURE — 80202 ASSAY OF VANCOMYCIN: CPT

## 2020-08-06 PROCEDURE — 97116 GAIT TRAINING THERAPY: CPT

## 2020-08-06 PROCEDURE — 99232 SBSQ HOSP IP/OBS MODERATE 35: CPT | Performed by: INTERNAL MEDICINE

## 2020-08-06 PROCEDURE — 97110 THERAPEUTIC EXERCISES: CPT

## 2020-08-06 PROCEDURE — 25010000002 VANCOMYCIN PER 500 MG

## 2020-08-06 RX ORDER — SODIUM CHLORIDE 9 MG/ML
100 INJECTION, SOLUTION INTRAVENOUS CONTINUOUS
Status: DISCONTINUED | OUTPATIENT
Start: 2020-08-06 | End: 2020-08-07

## 2020-08-06 RX ORDER — VANCOMYCIN HYDROCHLORIDE 1 G/200ML
1000 INJECTION, SOLUTION INTRAVENOUS EVERY 24 HOURS
Status: DISCONTINUED | OUTPATIENT
Start: 2020-08-06 | End: 2020-08-07 | Stop reason: DRUGHIGH

## 2020-08-06 RX ADMIN — SODIUM CHLORIDE, PRESERVATIVE FREE 10 ML: 5 INJECTION INTRAVENOUS at 09:22

## 2020-08-06 RX ADMIN — ATORVASTATIN CALCIUM 80 MG: 40 TABLET, FILM COATED ORAL at 20:34

## 2020-08-06 RX ADMIN — MEMANTINE 5 MG: 10 TABLET ORAL at 09:21

## 2020-08-06 RX ADMIN — TERAZOSIN HYDROCHLORIDE 5 MG: 5 CAPSULE ORAL at 20:34

## 2020-08-06 RX ADMIN — VANCOMYCIN HYDROCHLORIDE 1000 MG: 1 INJECTION, SOLUTION INTRAVENOUS at 09:36

## 2020-08-06 RX ADMIN — ALLOPURINOL 300 MG: 300 TABLET ORAL at 09:21

## 2020-08-06 RX ADMIN — PANTOPRAZOLE SODIUM 40 MG: 40 TABLET, DELAYED RELEASE ORAL at 09:21

## 2020-08-06 RX ADMIN — FINASTERIDE 5 MG: 5 TABLET, FILM COATED ORAL at 09:21

## 2020-08-06 RX ADMIN — MELATONIN TAB 5 MG 10 MG: 5 TAB at 20:33

## 2020-08-06 RX ADMIN — SERTRALINE HYDROCHLORIDE 50 MG: 50 TABLET, FILM COATED ORAL at 09:21

## 2020-08-06 RX ADMIN — HYDROXYZINE HYDROCHLORIDE 25 MG: 25 TABLET, FILM COATED ORAL at 20:34

## 2020-08-06 RX ADMIN — LEVOTHYROXINE SODIUM 150 MCG: 150 TABLET ORAL at 05:47

## 2020-08-06 RX ADMIN — ASPIRIN 81 MG: 81 TABLET, COATED ORAL at 09:21

## 2020-08-06 RX ADMIN — SODIUM CHLORIDE 75 ML/HR: 9 INJECTION, SOLUTION INTRAVENOUS at 09:36

## 2020-08-06 RX ADMIN — HEPARIN SODIUM 5000 UNITS: 5000 INJECTION INTRAVENOUS; SUBCUTANEOUS at 12:29

## 2020-08-06 RX ADMIN — BUSPIRONE HYDROCHLORIDE 10 MG: 10 TABLET ORAL at 20:34

## 2020-08-06 RX ADMIN — SODIUM CHLORIDE, PRESERVATIVE FREE 10 ML: 5 INJECTION INTRAVENOUS at 20:35

## 2020-08-06 RX ADMIN — CLOPIDOGREL BISULFATE 75 MG: 75 TABLET ORAL at 09:21

## 2020-08-06 RX ADMIN — SODIUM CHLORIDE, PRESERVATIVE FREE 10 ML: 5 INJECTION INTRAVENOUS at 20:33

## 2020-08-06 RX ADMIN — HEPARIN SODIUM 5000 UNITS: 5000 INJECTION INTRAVENOUS; SUBCUTANEOUS at 20:34

## 2020-08-06 RX ADMIN — BUSPIRONE HYDROCHLORIDE 10 MG: 10 TABLET ORAL at 09:21

## 2020-08-06 NOTE — PROGRESS NOTES
[facility] Medicine Services  PROGRESS NOTE    Patient Name: Domitila Bosch  : 1940  MRN: [id number]    Date of Admission: 2020  Primary Care Physician: Marcin Ferguson MD    Subjective   Subjective     CC:  bacteremia    HPI:  Resting in bed in no acute distress he was a sleep but arousable and wife present at the bedside.  Feels quite comfortable and has no specific complaints.    Review of Systems  Gen- No fevers, chills  CV- No chest pain, palpitations  Resp- No cough, dyspnea  GI- No N/V/D, abd pain      Objective   Objective     Vital Signs:   Temp:  [97.6 °F (36.4 °C)-98.7 °F (37.1 °C)] 98.7 °F (37.1 °C)  Heart Rate:  [63-71] 69  Resp:  [16] 16  BP: (121-158)/(78-95) 121/78  Total (NIH Stroke Scale): 1     Physical Exam:  Constitutional: No acute distress, looks comfortable  HENT: NCAT, mucous membranes moist  Respiratory: Clear to auscultation bilaterally, respiratory effort normal   Cardiovascular: RRR, no murmurs, gallops or rubs.  Gastrointestinal: Positive bowel sounds, soft, nontender, nondistended  Musculoskeletal: No edema  Psychiatric: Appropriate affect, cooperative  Neurologic: Awake, alert, follows commands, no focality is present.    Skin: No rashes    Results Reviewed:  Results from last 7 days   Lab Units 20  0347 20  0402 20  0412   WBC 10*3/mm3 9.95 9.71 8.09   HEMOGLOBIN g/dL 12.9* 13.5 13.4   HEMATOCRIT % 39.3 41.4 40.8   PLATELETS 10*3/mm3 280 235 218     Results from last 7 days   Lab Units 20  0347 20  0431 20  0402  20  0211   SODIUM mmol/L 137 138 138   < > 140  139   POTASSIUM mmol/L 3.5 3.7 3.8   < > 3.3*  3.4*  3.4*   CHLORIDE mmol/L 101 102 102   < > 107  107   CO2 mmol/L 27.0 26.0 26.0   < > 24.0  24.0   BUN mg/dL 15 15 11   < > 14  14   CREATININE mg/dL 1.32* 1.25 1.43*   < > 0.97  0.97   GLUCOSE mg/dL 97 83 93   < > 91  91   CALCIUM mg/dL 9.1 8.7 9.2   < > 7.5*  7.5*   ALT (SGPT) U/L   --   --  31  --  16   AST (SGOT) U/L  --   --  43*  --  33   PROBNP pg/mL  --   --   --   --  696.0    < > = values in this interval not displayed.     Estimated Creatinine Clearance: 49.4 mL/min (A) (by C-G formula based on SCr of 1.32 mg/dL (H)).    Microbiology Results Abnormal     Procedure Component Value - Date/Time    Blood Culture - Blood, Chest, Right [[id number]] Collected:  07/30/20 0834    Lab Status:  Final result Specimen:  Blood from Chest, Right Updated:  08/04/20 0915     Blood Culture No growth at 5 days    Blood Culture - Blood, Arm, Left [[id number]] Collected:  07/30/20 0834    Lab Status:  Final result Specimen:  Blood from Arm, Left Updated:  08/04/20 0915     Blood Culture No growth at 5 days    COVID PRE-OP / PRE-PROCEDURE SCREENING ORDER (NO ISOLATION) - Swab, Nasopharynx [[id number]] Collected:  08/02/20 1602    Lab Status:  Final result Specimen:  Swab from Nasopharynx Updated:  08/02/20 1713    Narrative:       The following orders were created for panel order COVID PRE-OP / PRE-PROCEDURE SCREENING ORDER (NO ISOLATION) - Swab, Nasopharynx.  Procedure                               Abnormality         Status                     ---------                               -----------         ------                     Respiratory Panel PCR w/...[[id number]]  Normal              Final result                 Please view results for these tests on the individual orders.    Respiratory Panel PCR w/COVID-19(SARS-CoV-2) PATRICIA/GREG/AMELIA In-House, NP Swab in [facility], 8-12 Hr TAT - Swab, Nasopharynx [[id number]]  (Normal) Collected:  08/02/20 1602    Lab Status:  Final result Specimen:  Swab from Nasopharynx Updated:  08/02/20 1713     ADENOVIRUS, PCR Not Detected     Coronavirus 229E Not Detected     Coronavirus HKU1 Not Detected     Coronavirus NL63 Not Detected     Coronavirus OC43 Not Detected     COVID19 Not Detected     Human Metapneumovirus Not Detected     Human Rhinovirus/Enterovirus Not Detected      Influenza A PCR Not Detected     Influenza A H1 Not Detected     Influenza A H1 2009 PCR Not Detected     Influenza A H3 Not Detected     Influenza B PCR Not Detected     Parainfluenza Virus 1 Not Detected     Parainfluenza Virus 2 Not Detected     Parainfluenza Virus 3 Not Detected     Parainfluenza Virus 4 Not Detected     RSV, PCR Not Detected     Bordetella pertussis pcr Not Detected     Bordetella parapertussis PCR Not Detected     Chlamydophila pneumoniae PCR Not Detected     Mycoplasma pneumo by PCR Not Detected    Narrative:       Fact sheet for providers: https://docs.[employer]/wp-content/uploads/XPV8547-2942-VL6.1-EUA-Provider-Fact-Sheet-3.pdf    Fact sheet for patients: https://docs.[employer]/wp-content/uploads/FDQ5505-8373-LP6.1-EUA-Patient-Fact-Sheet-1.pdf    Urine Culture - Urine, Urine, Clean Catch [[id number]]  (Abnormal)  (Susceptibility) Collected:  07/28/20 1021    Lab Status:  Edited Result - FINAL Specimen:  Urine, Clean Catch Updated:  07/31/20 1057     Urine Culture >100,000 CFU/mL Enterococcus faecalis    Narrative:        requested Daptomycin 7/30    Susceptibility      Enterococcus faecalis     GURPREET     Ampicillin Susceptible     Daptomycin Susceptible     Levofloxacin Susceptible     Nitrofurantoin Susceptible     Tetracycline Susceptible     Vancomycin Susceptible                    Blood Culture - Blood, Arm, Left [[id number]]  (Abnormal)  (Susceptibility) Collected:  07/28/20 1133    Lab Status:  Edited Result - FINAL Specimen:  Blood from Arm, Left Updated:  07/31/20 0723     Blood Culture Enterococcus faecalis     Comment:   Infectious disease consultation is highly recommended.        Isolated from Aerobic and Anaerobic Bottles     Gram Stain Aerobic Bottle Gram positive cocci in chains      Anaerobic Bottle Gram positive cocci in chains    Narrative:       Dr. Sandhu requested Daptomycin     Susceptibility      Enterococcus faecalis     GURPREET     Ampicillin Susceptible      Daptomycin Intermediate     Gentamicin High Level Synergy Susceptible     Vancomycin Susceptible                    Blood Culture - Blood, Arm, Right [[id number]]  (Abnormal) Collected:  07/28/20 1125    Lab Status:  Final result Specimen:  Blood from Arm, Right Updated:  07/30/20 0656     Blood Culture Enterococcus faecalis     Comment:   Infectious disease consultation is highly recommended.        Isolated from Aerobic and Anaerobic Bottles     Gram Stain Aerobic Bottle Gram positive cocci in chains      Anaerobic Bottle Gram positive cocci in chains    Narrative:       Refer to previous blood culture collected on 7/28/2020 1133 for MICs    Blood Culture ID, PCR - Blood, Arm, Left [[id number]]  (Abnormal) Collected:  07/28/20 1133    Lab Status:  Final result Specimen:  Blood from Arm, Left Updated:  07/29/20 0555     BCID, PCR Enterococcus spp. Identification by BCID PCR.          Imaging Results (Last 24 Hours)     ** No results found for the last 24 hours. **          Results for orders placed during the hospital encounter of 07/28/20   Adult Transesophageal Echo ([facility]) W/ Cont if Necessary Per Protocol    Narrative · Estimated EF = 65%.  · Left ventricular systolic function is normal.  · Left atrial cavity size is mild-to-moderately dilated.  · There is mild calcification of the aortic valve mainly affecting the non   and right coronary cusp(s).  · Saline test results are negative.  · The aortic valve exhibits moderate sclerosis.  · There is no evidence of pericardial effusion.  · There is moderate (grade 2) protruding plaque in the descending aorta   present.  · No valvular vegetations demonstrated.          I have reviewed the medications:  Scheduled Meds:    allopurinol 300 mg Oral Daily   aspirin 81 mg Oral Daily   atorvastatin 80 mg Oral Nightly   busPIRone 10 mg Oral Q12H   clopidogrel 75 mg Oral Daily   finasteride 5 mg Oral Daily   heparin (porcine) 5,000 Units Subcutaneous Q8H   levothyroxine 150  mcg Oral Q AM   melatonin 10 mg Oral Nightly   memantine 5 mg Oral Daily   pantoprazole 40 mg Oral QAM   sertraline 50 mg Oral Daily   sodium chloride 10 mL Intravenous Q12H   sodium chloride 10 mL Intravenous Q12H   terazosin 5 mg Oral Nightly   vancomycin 1,000 mg Intravenous Q24H     Continuous Infusions:    Pharmacy to dose vancomycin     sodium chloride 75 mL/hr Last Rate: 75 mL/hr (08/06/20 0936)     PRN Meds:.•  acetaminophen  •  hydrOXYzine  •  ipratropium-albuterol  •  ondansetron  •  Pharmacy to dose vancomycin  •  potassium chloride  •  potassium chloride  •  sodium chloride  •  sodium chloride  •  sodium chloride  •  sodium chloride    Assessment/Plan   Assessment & Plan     Active Hospital Problems    Diagnosis  POA   • **Acute UTI [N39.0]  Yes   • Acute diastolic heart failure (CMS/HCC) [I50.31]  Unknown   • Acute respiratory failure with hypoxia (CMS/HCC) [J96.01]  Unknown   • Bacteremia [R78.81]  Unknown   • Encephalopathy acute [G93.40]  Yes   • DANIS (acute kidney injury) (CMS/HCC) [N17.9]  Yes   • Carotid stenosis [I65.29]  Yes   • Dyslipidemia [E78.5]  Yes   • Hypothyroidism [E03.9]  Yes   • GERD (gastroesophageal reflux disease) [K21.9]  Yes   • Anxiety disorder [F41.9]  Yes      Resolved Hospital Problems   No resolved problems to display.        Brief Hospital Course to date:  Domitila Bosch is a 79 y.o. male with PMHx significant for carotid stenosis, dyslipidemia, TIA/CVA with residual right sided weakness, hypertension, hypothyroidism, anxiety who presents to [facility] with complaints of increasing confusion. Confusion has been stable but suspect some underlying dementia     Metabolic Encephalopathy related to sepsis/bacteremia - some improvement   - likely multifactorial and secondary to infection and DANIS/dehydration  - CT head with no acute findings, previous MRI shows history of stroke in the past  - continue namenda     Sepsis - POA   UTI - enterococcus   Bacteremia - enterococcus   - allergy to  Cephalosporins and penicillins   - BCx x 2 with enterococcus; UCx enterococcus; repeat blood cultures NGTD    - DC azactam (7/28-7/29) and continue vanc (7/29)   - ID following   - Urology consulted - no intervention   - ECHO with no comment on valvular veg -- SUMMER megative  - PICC line ordered 8/5, plan to go home with home health infusion and follow up with ID     Acute hypoxic resp failure - improved   Acute diastolic heart failure - improved   - Likely related to volume overload; ABG reviewed; CXR reviewed   - ECHO with normal EF; diastolic heart failure  - Wean O2 as tolerated   - maxide on hold secondary to labile renal function     DANIS - improved   - baseline around 0.8, 1.9 on admission   - renal US with R non-obs stone; enlargement of prostate; bladder debris  -Creatinine starting to rise again.  Since patient is on vancomycin we need to be cautious about damaging the kidneys.  Hydration was restarted today and we will check renal function tomorrow morning.     BPH  Urinary retention  - continue hytrin and proscar      Falls:  - PT/OT to see, currently lives at home with his wife  - rehab currently recommended      HLD  Hx of CVA w/residual right sided weakness  Carotid Stenosis  - continue ASA, plavix, statin     Hypothyroidism:  - levothyroxine     Hypertension:  - continue hytrin, hold hctz    DVT Prophylaxis:  Aspirin plavix      Disposition: I expect the patient to be discharged hopefully next few days once abx and picc placed    CODE STATUS:   Code Status and Medical Interventions:   Ordered at: 07/28/20 1249     Level Of Support Discussed With:    Patient     Code Status:    CPR     Medical Interventions (Level of Support Prior to Arrest):    Full         Electronically signed by Angelo Flowers MD, 08/06/20, 17:59.

## 2020-08-06 NOTE — PLAN OF CARE
Problem: Patient Care Overview  Goal: Plan of Care Review  Outcome: Ongoing (interventions implemented as appropriate)  Flowsheets  Taken 8/6/2020 5035  Progress: improving  Outcome Summary: Pt rested throughout shift. No complaints of pain or n/v. Pt remains confused to place, time and situation. Short term memory loss. VSS. Will continue to monitor.  Taken 8/5/2020 2000  Plan of Care Reviewed With: patient

## 2020-08-06 NOTE — PROGRESS NOTES
[facility] Progress Note        Antibiotics:  Anti-Infectives (From admission, onward)    Ordered     Dose/Rate Route Frequency Start Stop    08/06/20 0825  vancomycin (VANCOCIN) in iso-osmotic dextrose IVPB 1 g (premix) 200 mL     Ordering Provider:  Joel Terrazas RPH    1,000 mg  over 60 Minutes Intravenous Every 24 Hours 08/06/20 1000 08/20/20 0959    08/05/20 0754  vancomycin (VANCOCIN) in iso-osmotic dextrose IVPB 1 g (premix) 200 mL     Ordering Provider:  Norberto Beckham RPH    1,000 mg  over 60 Minutes Intravenous Once 08/05/20 0845 08/05/20 0945    07/29/20 0556  Pharmacy to dose vancomycin     Sabino Sandhu MD reviewed the order on 08/03/20 0855.   Ordering Provider:  Sabino Sandhu MD     Does not apply Continuous PRN 07/29/20 0555 08/12/20 0554    07/28/20 1111  aztreonam (AZACTAM) 2 g/100 mL 0.9% NS (mbp)     Ordering Provider:  Norberto Son PA    2 g  over 30 Minutes Intravenous Once 07/28/20 1113 07/28/20 1231    07/28/20 1111  vancomycin 1750 mg/500 mL 0.9% NS IVPB ([facility])     Ordering Provider:  Teresa Pollard DO    20 mg/kg × 86.2 kg Intravenous Once 07/28/20 1113 07/28/20 1626          CC: fatigue    HPI:    Patient is a 79 y.o.  Yr old male with history of TIA/CVA with chronic/residual right-sided weakness and generally poor memory but does not carry a formal diagnosis of dementia.  He was admitted July 28, 2020 with 3 to 4 days worsening confusion from baseline; patient reports dysuria/urinary incontinence/urinary frequency although he could not attach a specific timeline to this and in general his ability to give detailed history is limited with poor insight.  Nursing reported fever at admission associated with acute kidney injury and evidence for UTI with concern for sepsis urinary source.  Subsequently with blood culture showing gram-positive cocci and preliminary PCR data with enterococcus, vancomycin resistance not detected by initial PCR information; he was placed on  "empiric vancomycin/Azactam.     He reports some improvement since admission although his insight remains poor.     Wife reports Hx enlarged prostate with chronic urinary retention and has followed with Dr Bazan in urology; allergy to PCN and keflex with rash to both     8/4/20 [facility] no valvular vegetation;  Descending aorta with protruding plaque    8/5/20 creat trended down some after fluids    8/6/20 creat tranded back up some after stopping IV fluids per d/w Dr Flowers;   No new focal pain; Dysuria better and continent of urine today.  Denies hematuria or pyuria and no flank pain at this time.     No headache photophobia or neck stiffness.  No nausea vomiting diarrhea or abdominal pain.  No shortness of breath or cough.  No rash.        ROS:      8/6/20 No f/c/s. No n/v/d. No rash. No new ADR to Abx.     Constitutional-- No chills or sweats.  Appetite diminished with generalized fatigue  Heent-- No new vision, hearing or throat complaints.  No epistaxis or oral sores.  Denies odynophagia or dysphagia.  No flashers, floaters or eye pain. No odynophagia or dysphagia. No headache, photophobia or neck stiffness.  CV-- No chest pain, palpitation or syncope  Resp-- No SOB/cough/Hemoptysis  GI- No nausea, vomiting, or diarrhea.  No hematochezia, melena, or hematemesis. Denies jaundice or chronic liver disease.  --as above  Lymph- no swollen lymph nodes in neck/axilla or groin.   Heme- No active bruising or bleeding; no Hx of DVT or PE.  MS-- no swelling or pain in the bones or joints of arms/legs.  No new back pain.  Neuro-- No acute focal weakness or numbness in the arms or legs.  No seizures.  Chronic residual right-sided weakness     Full 12 point review of systems reviewed and negative otherwise for acute complaints, except for above      PE:   /95 (BP Location: Left arm, Patient Position: Lying)   Pulse 63   Temp 98.6 °F (37 °C) (Oral)   Resp 16   Ht 185.4 cm (72.99\")   Wt 78.2 kg (172 lb 6.4 oz)   " SpO2 90%   BMI 22.75 kg/m²     GENERAL: sleepy, in no acute distress.   HEENT: Normocephalic, atraumatic.  PERRL. EOMI. No conjunctival injection. No icterus. Oropharynx clear without evidence of thrush or exudate. No evidence of peridontal disease.    NECK: Supple without nuchal rigidity. No mass.  LYMPH: No cervical, axillary or inguinal lymphadenopathy.  HEART: RRR; No murmur, rubs, gallops.   LUNGS: diminished at bases to auscultation bilaterally without wheezing, rales, rhonchi. Normal respiratory effort. Nonlabored. No dullness.  ABDOMEN: Soft, nontender, nondistended. Positive bowel sounds. No rebound or guarding. NO mass or HSM.  EXT:  No cyanosis, clubbing or edema. No cord.  : Genitalia generally unremarkable.  Without Akins catheter.  MSK: FROM without joint effusions noted arms/legs.    SKIN: Warm and dry without cutaneous eruptions on Inspection/palpation.    NEURO: sleepy     No CVA tenderness     No peripheral stigmata/phenomena of endocarditis       Laboratory Data    Results from last 7 days   Lab Units 08/06/20  0347 08/04/20  0402 08/03/20  0412   WBC 10*3/mm3 9.95 9.71 8.09   HEMOGLOBIN g/dL 12.9* 13.5 13.4   HEMATOCRIT % 39.3 41.4 40.8   PLATELETS 10*3/mm3 280 235 218     Results from last 7 days   Lab Units 08/06/20  0347   SODIUM mmol/L 137   POTASSIUM mmol/L 3.5   CHLORIDE mmol/L 101   CO2 mmol/L 27.0   BUN mg/dL 15   CREATININE mg/dL 1.32*   GLUCOSE mg/dL 97   CALCIUM mg/dL 9.1     Results from last 7 days   Lab Units 08/04/20  0402   ALK PHOS U/L 113   BILIRUBIN mg/dL 0.9   ALT (SGPT) U/L 31   AST (SGOT) U/L 43*               Estimated Creatinine Clearance: 49.4 mL/min (A) (by C-G formula based on SCr of 1.32 mg/dL (H)).      Microbiology:      Radiology:  Imaging Results (Last 72 Hours)     ** No results found for the last 72 hours. **            Impression:     --Acute sepsis.  Fever/leukocytosis and acute kidney injury with enterococcal bacteremia/septicemia.  Concern for urinary  source at present.  Abdominal exam otherwise benign with no nausea/vomiting/diarrhea.  If enterococcus did not grow in the urine or if new abdominal symptoms arise consideration could be given to further GI work-up such as CT scan or GI consultation/endoscopy etc.  Otherwise, chest clear, no cough or breathing difficulty, no obvious skin or soft tissue changes.  Monitor for other potential foci/pathogen     -- Acute  enterococcus bacteremia/septicemia.  Currently no stigmata of endocarditis but certainly could evolve.  High risk for further serious morbidity and other serious sequela.  [facility]MER no valvular vegetation but has protruding plaque in descending aorta, ?significance     --Acute enterococcal complicated UTI.  Associated with septicemia/bacteremia as above.  Urology following and any  Further functional/anatomic assessment at their discretion     --Acute kidney injury.  Creat trended up a bit 8/4; vanco adjusted by pharmacy; monitor to help guide further adjustments; creat better 8/5 after fluids; creat back up a bit 8/6 after stopping IV fluids    --acute pulm edema?; vascular congestion per radiology on CXR     --Acute encephalopathy.  Baseline not entirely clear with prior history of stroke/TIA and nursing reports baseline with forgetfulness with possible cognitive difficulties at baseline.  Need further clarification from family.  Currently no meningismus.  Further neurologic work-up or neurology consultation at discretion of internal medicine     --History of TIA/CVA with reports of residual right-sided weakness.          PLAN:     --IV vancomycin      --Check/review labs cultures and scans     --Partial history per nursing staff     --Discussed with microbiology     --Discussed with Dr. Flowers;  He is managing hydration/IV fluids and monitor renal function closely     --Highly complex set of issues with high risk for further serious morbidity and other serious sequela    --urology following    --repeated  blood cultures and negative so far; TTE no mention of vegetation per cardiology;    [facility]MER  noted        Sabino Sandhu MD  8/6/2020

## 2020-08-06 NOTE — PROGRESS NOTES
"Pharmacy Consult - Vancomycin Dosing    Domitila Bosch is an 79 y.o. male receiving vancomycin therapy.     Indication: E faecalis bacteremia  Consulting Provider: Hospitalist  ID Consult: Yes    Goal Trough: 15-20 mcg/mL    Current Antimicrobial Therapy  Vancomycin 7/28-now    Allergies  Allergies as of 07/28/2020 - Reviewed 07/28/2020   Allergen Reaction Noted    Betadine [povidone iodine]  06/09/2016    Cephalexin  04/20/2016    Flomax [tamsulosin hcl]  02/16/2017    Iodine  06/09/2016    Lisinopril  04/20/2016    Pseudoephedrine  04/20/2016    Sulfamethoxazole-trimethoprim  04/20/2016     Labs  Results from last 7 days   Lab Units 08/06/20  0347 08/05/20  0431 08/04/20  0402   BUN mg/dL 15 15 11   CREATININE mg/dL 1.32* 1.25 1.43*     Results from last 7 days   Lab Units 08/06/20  0347 08/04/20  0402 08/03/20  0412   WBC 10*3/mm3 9.95 9.71 8.09     Evaluation of Dosing    Is Patient on Dialysis or Renal Replacement: no    Ht - 185.4 cm (72.99\")  Wt - 78.2 kg (172 lb 6.4 oz)    Estimated Creatinine Clearance: 49.4 mL/min (A) (by C-G formula based on SCr of 1.32 mg/dL (H)).    Intake & Output (last 3 days)         08/03 0701 - 08/04 0700 08/04 0701 - 08/05 0700 08/05 0701 - 08/06 0700 08/06 0701 - 08/07 0700    P.O. 600 120 720 240    I.V. (mL/kg)  1176.3 (14.5)      IV Piggyback    200    Total Intake(mL/kg) 600 (7.4) 1296.3 (16) 720 (9.2) 440 (5.6)    Urine (mL/kg/hr) 1700 (0.9) 1400 (0.7) 1300 (0.7)     Total Output 1700 1400 1300     Net -1100 -103.7 -580 +440            Urine Unmeasured Occurrence 1 x   1 x          Microbiology and Radiology  Microbiology Results (last 10 days)       Procedure Component Value - Date/Time    COVID PRE-OP / PRE-PROCEDURE SCREENING ORDER (NO ISOLATION) - Swab, Nasopharynx [[id number]] Collected:  08/02/20 1602    Lab Status:  Final result Specimen:  Swab from Nasopharynx Updated:  08/02/20 6126    Narrative:       The following orders were created for panel order COVID PRE-OP / " PRE-PROCEDURE SCREENING ORDER (NO ISOLATION) - Swab, Nasopharynx.  Procedure                               Abnormality         Status                     ---------                               -----------         ------                     Respiratory Panel PCR w/...[[id number]]  Normal              Final result                 Please view results for these tests on the individual orders.    Respiratory Panel PCR w/COVID-19(SARS-CoV-2) PATRICIA/GREG/AMELIA In-House, NP Swab in [facility], 8-12 Hr TAT - Swab, Nasopharynx [[id number]]  (Normal) Collected:  08/02/20 1602    Lab Status:  Final result Specimen:  Swab from Nasopharynx Updated:  08/02/20 1713     ADENOVIRUS, PCR Not Detected     Coronavirus 229E Not Detected     Coronavirus HKU1 Not Detected     Coronavirus NL63 Not Detected     Coronavirus OC43 Not Detected     COVID19 Not Detected     Human Metapneumovirus Not Detected     Human Rhinovirus/Enterovirus Not Detected     Influenza A PCR Not Detected     Influenza A H1 Not Detected     Influenza A H1 2009 PCR Not Detected     Influenza A H3 Not Detected     Influenza B PCR Not Detected     Parainfluenza Virus 1 Not Detected     Parainfluenza Virus 2 Not Detected     Parainfluenza Virus 3 Not Detected     Parainfluenza Virus 4 Not Detected     RSV, PCR Not Detected     Bordetella pertussis pcr Not Detected     Bordetella parapertussis PCR Not Detected     Chlamydophila pneumoniae PCR Not Detected     Mycoplasma pneumo by PCR Not Detected    Narrative:       Fact sheet for providers: https://docs.[employer]/wp-content/uploads/ABZ0064-9255-GW5.1-EUA-Provider-Fact-Sheet-3.pdf    Fact sheet for patients: https://docs.[employer]/wp-content/uploads/KPC2272-9267-JN6.1-EUA-Patient-Fact-Sheet-1.pdf    Blood Culture - Blood, Chest, Right [[id number]] Collected:  07/30/20 0834    Lab Status:  Final result Specimen:  Blood from Chest, Right Updated:  08/04/20 0915     Blood Culture No growth at 5 days    Blood Culture - Blood,  Arm, Left [[id number]] Collected:  07/30/20 0834    Lab Status:  Final result Specimen:  Blood from Arm, Left Updated:  08/04/20 0915     Blood Culture No growth at 5 days    Blood Culture - Blood, Arm, Left [[id number]]  (Abnormal)  (Susceptibility) Collected:  07/28/20 1133    Lab Status:  Edited Result - FINAL Specimen:  Blood from Arm, Left Updated:  07/31/20 0723     Blood Culture Enterococcus faecalis     Comment:   Infectious disease consultation is highly recommended.        Isolated from Aerobic and Anaerobic Bottles     Gram Stain Aerobic Bottle Gram positive cocci in chains      Anaerobic Bottle Gram positive cocci in chains    Narrative:       Dr. Sandhu requested Daptomycin     Susceptibility        Enterococcus faecalis     GURPREET     Ampicillin Susceptible     Daptomycin Intermediate     Gentamicin High Level Synergy Susceptible     Vancomycin Susceptible                      Blood Culture ID, PCR - Blood, Arm, Left [[id number]]  (Abnormal) Collected:  07/28/20 1133    Lab Status:  Final result Specimen:  Blood from Arm, Left Updated:  07/29/20 0555     BCID, PCR Enterococcus spp. Identification by BCID PCR.    Blood Culture - Blood, Arm, Right [[id number]]  (Abnormal) Collected:  07/28/20 1125    Lab Status:  Final result Specimen:  Blood from Arm, Right Updated:  07/30/20 0656     Blood Culture Enterococcus faecalis     Comment:   Infectious disease consultation is highly recommended.        Isolated from Aerobic and Anaerobic Bottles     Gram Stain Aerobic Bottle Gram positive cocci in chains      Anaerobic Bottle Gram positive cocci in chains    Narrative:       Refer to previous blood culture collected on 7/28/2020 1133 for MICs    Urine Culture - Urine, Urine, Clean Catch [[id number]]  (Abnormal)  (Susceptibility) Collected:  07/28/20 1021    Lab Status:  Edited Result - FINAL Specimen:  Urine, Clean Catch Updated:  07/31/20 1057     Urine Culture >100,000 CFU/mL Enterococcus faecalis    Narrative:          requested Daptomycin 7/30    Susceptibility        Enterococcus faecalis     GURPREET     Ampicillin Susceptible     Daptomycin Susceptible     Levofloxacin Susceptible     Nitrofurantoin Susceptible     Tetracycline Susceptible     Vancomycin Susceptible                            Evaluation of Level  Results from last 7 days   Lab Units 08/06/20  0347 08/05/20  0431   VANCOMYCIN RM mcg/mL 12.60 12.40   7/30 vancomycin trough = 6.1 mcg/mL (drawn late, ~25.5h level)  8/3 vancomycin trough = 22.3 mcg/mL (~12h level)  8/5 vancomycin random = 12.4 mcg/mL (~35h level)  8/6 vancomycin random = 12.6 mcg/mL (~19h level)    Assessment/Plan:  1. Pharmacy dosing vancomycin for E faecalis bacteremia, goal trough 15-20.  2. Vancomycin random assessed again this morning was 12.6 mcg/mL. This was ~19 hours after he received vancomycin 1000mg IV x1 dose. Will start vancomycin 1000mg IV q24h today. Slight increase in serum creatinine today so dosing conservatively. Will continue to follow renal function closely for further adjustments and levels.  3. Pharmacy will continue to monitor and adjust vancomycin dose as necessary based on renal function, cultures, labs, and clinical status.    Thank you,  Joel Terrazas, PharmD, BCPS  8/6/2020  13:18

## 2020-08-06 NOTE — PLAN OF CARE
Problem: Patient Care Overview  Goal: Plan of Care Review  Outcome: Ongoing (interventions implemented as appropriate)  Flowsheets (Taken 8/6/2020 1034)  Progress: improving  Plan of Care Reviewed With: patient  Outcome Summary: patient ambulated 150 feet with CGA and rolling walker for support, cues to stay close to walker and to improve posture. No LOB noticed.

## 2020-08-06 NOTE — THERAPY TREATMENT NOTE
Patient Name: Domitila Bosch  : 1940    MRN: [id number]                              Today's Date: 2020       Admit Date: 2020    Visit Dx:     ICD-10-CM ICD-9-CM   1. Acute UTI N39.0 599.0   2. Altered mental status, unspecified altered mental status type R41.82 780.97   3. History of CVA (cerebrovascular accident) Z86.73 V12.54   4. Weakness R53.1 780.79   5. Visual hallucinations R44.1 368.16     Patient Active Problem List   Diagnosis   • Diverticulosis of large intestine with hemorrhage   • Umbilical hernia   • S/P hernia repair   • Dyslipidemia   • Carotid stenosis   • Gout   • GERD (gastroesophageal reflux disease)   • Hypothyroidism   • Neuropathy   • Malignant melanoma (CMS/HCC)   • Asthma   • Anxiety disorder   • Diverticulosis   • Muscle pain   • Lumbar radiculopathy   • Kidney stone   • Deviated nasal septum   • Chronic laryngitis   • Acute CVA (cerebrovascular accident) (CMS/HCC)   • Elevated BP without diagnosis of hypertension   • Allergy to Betadine   • Acute UTI   • Encephalopathy acute   • DANIS (acute kidney injury) (CMS/HCC)   • Bacteremia   • Acute diastolic heart failure (CMS/HCC)   • Acute respiratory failure with hypoxia (CMS/HCC)     Past Medical History:   Diagnosis Date   • Anxiety disorder 2017   • Asthma 2017   • Basal cell carcinoma in situ of skin 2019    right leg    • Carotid stenosis 2017   • Diaphoresis    • Diastolic dysfunction    • Diverticulitis    • Dyslipidemia 2017   • GERD (gastroesophageal reflux disease) 2017   • Gout 2017   • Herpes zoster     Herpes zoster, [id number], right lower extremity.    • Hypertension 2017   • Hypothyroidism 2017   • LVH (left ventricular hypertrophy)    • Malignant melanoma (CMS/HCC) 2017   • Melanoma (CMS/HCC)    • Mitral regurgitation    • Nephrolithiasis    • Post herpetic neuralgia 2017   • TIA (transient ischemic attack)     TIA, 2012, with nonobstructive carotid  stenosis, dyslipidemia and hypertension     Past Surgical History:   Procedure Laterality Date   • ABDOMINAL SURGERY     • APPENDECTOMY     • COLON RESECTION LEFT  2016   • COLON RESECTION LEFT  2016   • HEEL SPUR SURGERY  1999   • HERNIA REPAIR      hiatal hernia    • NISSEN FUNDOPLICATION  1984   • OTHER SURGICAL HISTORY  2010    Malignant melanoma, status post resection      General Information     Row Name 08/06/20 1028          PT Evaluation Time/Intention    Document Type  therapy note (daily note)  -AS     Mode of Treatment  physical therapy  -AS     Row Name 08/06/20 1028          General Information    Patient Profile Reviewed?  yes  -AS     Existing Precautions/Restrictions  fall  -AS     Barriers to Rehab  cognitive status  -AS     Row Name 08/06/20 1028          Cognitive Assessment/Intervention- PT/OT    Orientation Status (Cognition)  oriented to;person;place  -AS     Cognitive Assessment/Intervention Comment  alert and following commands  -AS     [facility] Name 08/06/20 1028          Safety Issues, Functional Mobility    Safety Issues Affecting Function (Mobility)  insight into deficits/self awareness;judgment;safety precaution awareness;safety precautions follow-through/compliance  -AS     Impairments Affecting Function (Mobility)  balance;cognition;endurance/activity tolerance;strength  -AS     Comment, Safety Issues/Impairments (Mobility)  alert and following commands  -AS       User Key  (r) = Recorded By, (t) = Taken By, (c) = Cosigned By    Initials Name Provider Type    AS Alvina Amezcua PTA Physical Therapy Assistant        Mobility     Row Name 08/06/20 1030          Bed Mobility Assessment/Treatment    Supine-Sit [place] (Bed Mobility)  supervision  -AS     Sit-Supine [place] (Bed Mobility)  not tested  -AS     Assistive Device (Bed Mobility)  bed rails;head of bed elevated  -AS     Comment (Bed Mobility)  patient demonstrated safe technique  -AS     [facility] Name 08/06/20 1030           Transfer Assessment/Treatment    Comment (Transfers)  verbal cues for hand placement  -AS     Row Name 08/06/20 1030          Bed-Chair Transfer    Bed-Chair [place] (Transfers)  verbal cues;contact guard  -AS     Assistive Device (Bed-Chair Transfers)  walker, front-wheeled  -AS     Row Name 08/06/20 1030          Sit-Stand Transfer    Sit-Stand [place] (Transfers)  verbal cues;contact guard  -AS     Assistive Device (Sit-Stand Transfers)  walker, front-wheeled  -AS     Row Name 08/06/20 1030          Gait/Stairs Assessment/Training    Gait/Stairs Assessment/Training  gait/ambulation assistive device  -AS     [place] Level (Gait)  verbal cues;contact guard  -AS     Assistive Device (Gait)  walker, front-wheeled  -AS     Distance in Feet (Gait)  150  -AS     Pattern (Gait)  step-through  -AS     Deviations/Abnormal Patterns (Gait)  bilateral deviations;rody decreased;gait speed decreased  -AS     Bilateral Gait Deviations  forward flexed posture;heel strike decreased  -AS     Comment (Gait/Stairs)  patient ambulated 150 feet with CGA and rolling walker for support, cues to stay close to walker and to improve posture. No LOB noticed.  -AS       User Key  (r) = Recorded By, (t) = Taken By, (c) = Cosigned By    Initials Name Provider Type    AS Alvina Amezcua, NACHO Physical Therapy Assistant        Obj/Interventions     Row Name 08/06/20 1032          Therapeutic Exercise    Upper Extremity Range of Motion (Therapeutic Exercise)  shoulder flexion/extension, bilateral;shoulder abduction/adduction, bilateral;elbow flexion/extension, bilateral  -AS     Lower Extremity (Therapeutic Exercise)  marching while seated;LAQ (long arc quad), bilateral  -AS     Lower Extremity Range of Motion (Therapeutic Exercise)  ankle dorsiflexion/plantar flexion, bilateral  -AS     Exercise Type (Therapeutic Exercise)  AROM (active range of motion)  -AS     Position (Therapeutic Exercise)  seated  -AS     Sets/Reps  (Therapeutic Exercise)  1/10  -AS       User Key  (r) = Recorded By, (t) = Taken By, (c) = Cosigned By    Initials Name Provider Type    AS Alvina Amezcua PTA Physical Therapy Assistant        Goals/Plan    No documentation.       Clinical Impression     [facility] Name 08/06/20 1033          Pain Assessment    Additional Documentation  Pain Scale: Numbers Pre/Post-Treatment (Group)  -AS     Row Name 08/06/20 1033          Pain Scale: Numbers Pre/Post-Treatment    Pain Scale: Numbers, Pretreatment  0/10 - no pain  -AS     Pain Scale: Numbers, Post-Treatment  0/10 - no pain  -AS       User Key  (r) = Recorded By, (t) = Taken By, (c) = Cosigned By    Initials Name Provider Type    AS Alvina Amezcua PTA Physical Therapy Assistant        Outcome Measures     [facility] Name 08/06/20 1033          How much help from another person do you currently need...    Turning from your back to your side while in flat bed without using bedrails?  4  -AS     Moving from lying on back to sitting on the side of a flat bed without bedrails?  3  -AS     Moving to and from a bed to a chair (including a wheelchair)?  3  -AS     Standing up from a chair using your arms (e.g., wheelchair, bedside chair)?  3  -AS     Climbing 3-5 steps with a railing?  3  -AS     To walk in hospital room?  3  -AS     AM-PAC 6 Clicks Score (PT)  19  -AS     Row Name 08/06/20 1033          Functional Assessment    Outcome Measure Options  AM-PAC 6 Clicks Basic Mobility (PT)  -AS       User Key  (r) = Recorded By, (t) = Taken By, (c) = Cosigned By    Initials Name Provider Type    AS Alvina Amezcua PTA Physical Therapy Assistant        Physical Therapy Education                 Title: PT OT SLP Therapies (In Progress)     Topic: Physical Therapy (In Progress)     Point: Mobility training (In Progress)     Description:   Instruct learner(s) on safety and technique for assisting patient out of bed, chair or wheelchair.  Instruct in the proper use of assistive  devices, such as walker, crutches, cane or brace.              Patient Friendly Description:   It's important to get you on your feet again, but we need to do so in a way that is safe for you. Falling has serious consequences, and your personal safety is the most important thing of all.        When it's time to get out of bed, one of us or a family member will sit next to you on the bed to give you support.     If your doctor or nurse tells you to use a walker, crutches, a cane, or a brace, be sure you use it every time you get out of bed, even if you think you don't need it.    Learning Progress Summary           Patient Acceptance, E, NR by AS at 8/6/2020 1033    Acceptance, E,TB, VU by  at 8/3/2020 1351    Acceptance, E,TB, VU by TH at 7/31/2020 0505    Acceptance, E,TB, VU by TH at 7/30/2020 0635    Acceptance, E, NR by  at 7/29/2020 1323    Acceptance, E,TB, VU by PC at 7/28/2020 1546   Significant Other Acceptance, E,TB, VU by PC at 7/28/2020 1546                   Point: Home exercise program (In Progress)     Description:   Instruct learner(s) on appropriate technique for monitoring, assisting and/or progressing patient with therapeutic exercises and activities.              Learning Progress Summary           Patient Acceptance, E, NR by AS at 8/6/2020 1033    Acceptance, E,TB, VU by  at 8/3/2020 1351    Acceptance, E,TB, VU by TH at 7/31/2020 0505    Acceptance, E,TB, VU by  at 7/30/2020 0635    Acceptance, E,TB, VU by PC at 7/28/2020 1546   Significant Other Acceptance, E,TB, VU by PC at 7/28/2020 1546                   Point: Body mechanics (In Progress)     Description:   Instruct learner(s) on proper positioning and spine alignment for patient and/or caregiver during mobility tasks and/or exercises.              Learning Progress Summary           Patient Acceptance, E, NR by AS at 8/6/2020 1033    Acceptance, E,TB, VU by  at 8/3/2020 1351    Acceptance, E,TB, VU by  at 7/31/2020 0505     Acceptance, E,TB, VU by TH at 7/30/2020 0635    Acceptance, E,TB, VU by PC at 7/28/2020 1546   Significant Other Acceptance, E,TB, VU by PC at 7/28/2020 1546                   Point: Precautions (In Progress)     Description:   Instruct learner(s) on prescribed precautions during mobility and gait tasks              Learning Progress Summary           Patient Acceptance, E, NR by AS at 8/6/2020 1033    Acceptance, E,TB, VU by  at 8/3/2020 1351    Acceptance, E,TB, VU by TH at 7/31/2020 0505    Acceptance, E,TB, VU by  at 7/30/2020 0635    Acceptance, E, NR by  at 7/29/2020 1323    Acceptance, E,TB, VU by PC at 7/28/2020 1546   Significant Other Acceptance, E,TB, VU by PC at 7/28/2020 1546                               User Key     Initials Effective Dates Name Provider Type Discipline    AS 06/22/15 -  Alvina Amezcua PTA Physical Therapy Assistant PT     07/24/19 -  Kely Pathak, PT Physical Therapist PT     06/16/16 -  Niya Dupont RN Registered Nurse Nurse    TH 01/30/20 -  Magda Humphrey, RN Registered Nurse Nurse    PC 07/07/20 -  Deja Levy, RN Registered Nurse Nurse              PT Recommendation and Plan     Plan of Care Reviewed With: patient  Progress: improving  Outcome Summary: patient ambulated 150 feet with CGA and rolling walker for support, cues to stay close to walker and to improve posture. No LOB noticed.     Time Calculation:   PT Charges     Row Name 08/06/20 1034             Time Calculation    Start Time  0940  -AS      PT Received On  08/06/20  -AS      PT Goal Re-Cert Due Date  08/08/20  -AS         Timed Charges    16995 - PT Therapeutic Exercise Minutes  10  -AS      16888 - Gait Training Minutes   15  -AS        User Key  (r) = Recorded By, (t) = Taken By, (c) = Cosigned By    Initials Name Provider Type    AS Alvina Amezcua PTA Physical Therapy Assistant        Therapy Charges for Today     Code Description Service Date Service Provider Modifiers Qty     [id number]  PT THER PROC EA 15 MIN 8/6/2020 Alvina Amezcua, PTA GP 1    [id number]  GAIT TRAINING EA 15 MIN 8/6/2020 Alvina Amezcua, NACHO GP 1          PT G-Codes  Outcome Measure Options: AM-PAC 6 Clicks Basic Mobility (PT)  AM-PAC 6 Clicks Score (PT): 19  AM-PAC 6 Clicks Score (OT): 18    Alvina Amezcua PTA  8/6/2020

## 2020-08-07 LAB
ALBUMIN SERPL-MCNC: 3.4 G/DL (ref 3.5–5.2)
ALBUMIN/GLOB SERPL: 1.3 G/DL
ALP SERPL-CCNC: 131 U/L (ref 39–117)
ALT SERPL W P-5'-P-CCNC: 27 U/L (ref 1–41)
ANION GAP SERPL CALCULATED.3IONS-SCNC: 8 MMOL/L (ref 5–15)
AST SERPL-CCNC: 27 U/L (ref 1–40)
BILIRUB SERPL-MCNC: 0.4 MG/DL (ref 0–1.2)
BUN SERPL-MCNC: 15 MG/DL (ref 8–23)
BUN/CREAT SERPL: 10.2 (ref 7–25)
CALCIUM SPEC-SCNC: 8.9 MG/DL (ref 8.6–10.5)
CHLORIDE SERPL-SCNC: 105 MMOL/L (ref 98–107)
CO2 SERPL-SCNC: 27 MMOL/L (ref 22–29)
CREAT SERPL-MCNC: 1.47 MG/DL (ref 0.76–1.27)
DEPRECATED RDW RBC AUTO: 54.1 FL (ref 37–54)
ERYTHROCYTE [DISTWIDTH] IN BLOOD BY AUTOMATED COUNT: 15 % (ref 12.3–15.4)
GFR SERPL CREATININE-BSD FRML MDRD: 46 ML/MIN/1.73
GLOBULIN UR ELPH-MCNC: 2.6 GM/DL
GLUCOSE SERPL-MCNC: 99 MG/DL (ref 65–99)
HCT VFR BLD AUTO: 37.6 % (ref 37.5–51)
HGB BLD-MCNC: 12.2 G/DL (ref 13–17.7)
MCH RBC QN AUTO: 32.1 PG (ref 26.6–33)
MCHC RBC AUTO-ENTMCNC: 32.4 G/DL (ref 31.5–35.7)
MCV RBC AUTO: 98.9 FL (ref 79–97)
PLATELET # BLD AUTO: 256 10*3/MM3 (ref 140–450)
PMV BLD AUTO: 10.7 FL (ref 6–12)
POTASSIUM SERPL-SCNC: 3.9 MMOL/L (ref 3.5–5.2)
PROT SERPL-MCNC: 6 G/DL (ref 6–8.5)
RBC # BLD AUTO: 3.8 10*6/MM3 (ref 4.14–5.8)
SODIUM SERPL-SCNC: 140 MMOL/L (ref 136–145)
VANCOMYCIN SERPL-MCNC: 10.9 MCG/ML (ref 5–40)
WBC # BLD AUTO: 9.86 10*3/MM3 (ref 3.4–10.8)

## 2020-08-07 PROCEDURE — 85027 COMPLETE CBC AUTOMATED: CPT | Performed by: INTERNAL MEDICINE

## 2020-08-07 PROCEDURE — 97535 SELF CARE MNGMENT TRAINING: CPT

## 2020-08-07 PROCEDURE — 25010000002 VANCOMYCIN PER 500 MG

## 2020-08-07 PROCEDURE — 80202 ASSAY OF VANCOMYCIN: CPT

## 2020-08-07 PROCEDURE — 97530 THERAPEUTIC ACTIVITIES: CPT

## 2020-08-07 PROCEDURE — 97110 THERAPEUTIC EXERCISES: CPT | Performed by: PHYSICAL THERAPIST

## 2020-08-07 PROCEDURE — 97116 GAIT TRAINING THERAPY: CPT | Performed by: PHYSICAL THERAPIST

## 2020-08-07 PROCEDURE — 99232 SBSQ HOSP IP/OBS MODERATE 35: CPT | Performed by: NURSE PRACTITIONER

## 2020-08-07 PROCEDURE — 25010000002 HEPARIN (PORCINE) PER 1000 UNITS: Performed by: INTERNAL MEDICINE

## 2020-08-07 PROCEDURE — 80053 COMPREHEN METABOLIC PANEL: CPT | Performed by: INTERNAL MEDICINE

## 2020-08-07 RX ORDER — DOCUSATE SODIUM 100 MG/1
100 CAPSULE, LIQUID FILLED ORAL 2 TIMES DAILY
Status: DISCONTINUED | OUTPATIENT
Start: 2020-08-07 | End: 2020-08-12 | Stop reason: HOSPADM

## 2020-08-07 RX ORDER — SODIUM CHLORIDE 9 MG/ML
100 INJECTION, SOLUTION INTRAVENOUS CONTINUOUS
Status: DISCONTINUED | OUTPATIENT
Start: 2020-08-07 | End: 2020-08-08

## 2020-08-07 RX ORDER — VANCOMYCIN HYDROCHLORIDE 1 G/200ML
1000 INJECTION, SOLUTION INTRAVENOUS ONCE
Status: COMPLETED | OUTPATIENT
Start: 2020-08-07 | End: 2020-08-07

## 2020-08-07 RX ADMIN — SODIUM CHLORIDE 100 ML/HR: 9 INJECTION, SOLUTION INTRAVENOUS at 09:24

## 2020-08-07 RX ADMIN — ATORVASTATIN CALCIUM 80 MG: 40 TABLET, FILM COATED ORAL at 21:01

## 2020-08-07 RX ADMIN — MELATONIN TAB 5 MG 10 MG: 5 TAB at 21:01

## 2020-08-07 RX ADMIN — SERTRALINE HYDROCHLORIDE 50 MG: 50 TABLET, FILM COATED ORAL at 09:22

## 2020-08-07 RX ADMIN — TERAZOSIN HYDROCHLORIDE 5 MG: 5 CAPSULE ORAL at 21:02

## 2020-08-07 RX ADMIN — BUSPIRONE HYDROCHLORIDE 10 MG: 10 TABLET ORAL at 21:01

## 2020-08-07 RX ADMIN — HEPARIN SODIUM 5000 UNITS: 5000 INJECTION INTRAVENOUS; SUBCUTANEOUS at 16:36

## 2020-08-07 RX ADMIN — ALLOPURINOL 300 MG: 300 TABLET ORAL at 09:22

## 2020-08-07 RX ADMIN — HEPARIN SODIUM 5000 UNITS: 5000 INJECTION INTRAVENOUS; SUBCUTANEOUS at 21:02

## 2020-08-07 RX ADMIN — SODIUM CHLORIDE, PRESERVATIVE FREE 10 ML: 5 INJECTION INTRAVENOUS at 21:03

## 2020-08-07 RX ADMIN — SODIUM CHLORIDE, PRESERVATIVE FREE 10 ML: 5 INJECTION INTRAVENOUS at 09:23

## 2020-08-07 RX ADMIN — BUSPIRONE HYDROCHLORIDE 10 MG: 10 TABLET ORAL at 09:22

## 2020-08-07 RX ADMIN — HEPARIN SODIUM 5000 UNITS: 5000 INJECTION INTRAVENOUS; SUBCUTANEOUS at 06:02

## 2020-08-07 RX ADMIN — MEMANTINE 5 MG: 10 TABLET ORAL at 09:22

## 2020-08-07 RX ADMIN — CLOPIDOGREL BISULFATE 75 MG: 75 TABLET ORAL at 09:22

## 2020-08-07 RX ADMIN — LEVOTHYROXINE SODIUM 150 MCG: 150 TABLET ORAL at 06:02

## 2020-08-07 RX ADMIN — FINASTERIDE 5 MG: 5 TABLET, FILM COATED ORAL at 09:22

## 2020-08-07 RX ADMIN — PANTOPRAZOLE SODIUM 40 MG: 40 TABLET, DELAYED RELEASE ORAL at 09:22

## 2020-08-07 RX ADMIN — POLYETHYLENE GLYCOL 3350 17 G: 17 POWDER, FOR SOLUTION ORAL at 21:12

## 2020-08-07 RX ADMIN — ASPIRIN 81 MG: 81 TABLET, COATED ORAL at 09:22

## 2020-08-07 RX ADMIN — DOCUSATE SODIUM 100 MG: 100 CAPSULE, LIQUID FILLED ORAL at 21:02

## 2020-08-07 RX ADMIN — VANCOMYCIN HYDROCHLORIDE 1000 MG: 1 INJECTION, SOLUTION INTRAVENOUS at 16:36

## 2020-08-07 NOTE — THERAPY TREATMENT NOTE
Acute Care - Occupational Therapy Treatment Note  [facility]     Patient Name: Domitila Bosch  : 1940  MRN: [id number]  Today's Date: 2020     Date of Referral to OT: 20  Referring Physician: MD Latrice    Admit Date: 2020       ICD-10-CM ICD-9-CM   1. Acute UTI N39.0 599.0   2. Altered mental status, unspecified altered mental status type R41.82 780.97   3. History of CVA (cerebrovascular accident) Z86.73 V12.54   4. Weakness R53.1 780.79   5. Visual hallucinations R44.1 368.16     Patient Active Problem List   Diagnosis   • Diverticulosis of large intestine with hemorrhage   • Umbilical hernia   • S/P hernia repair   • Dyslipidemia   • Carotid stenosis   • Gout   • GERD (gastroesophageal reflux disease)   • Hypothyroidism   • Neuropathy   • Malignant melanoma (CMS/HCC)   • Asthma   • Anxiety disorder   • Diverticulosis   • Muscle pain   • Lumbar radiculopathy   • Kidney stone   • Deviated nasal septum   • Chronic laryngitis   • Acute CVA (cerebrovascular accident) (CMS/HCC)   • Elevated BP without diagnosis of hypertension   • Allergy to Betadine   • Acute UTI   • Encephalopathy acute   • DANIS (acute kidney injury) (CMS/HCC)   • Bacteremia   • Acute diastolic heart failure (CMS/HCC)   • Acute respiratory failure with hypoxia (CMS/HCC)     Past Medical History:   Diagnosis Date   • Anxiety disorder 2017   • Asthma 2017   • Basal cell carcinoma in situ of skin 2019    right leg    • Carotid stenosis 2017   • Diaphoresis    • Diastolic dysfunction    • Diverticulitis    • Dyslipidemia 2017   • GERD (gastroesophageal reflux disease) 2017   • Gout 2017   • Herpes zoster     Herpes zoster, [id number], right lower extremity.    • Hypertension 2017   • Hypothyroidism 2017   • LVH (left ventricular hypertrophy)    • Malignant melanoma (CMS/HCC) 2017   • Melanoma (CMS/HCC)    • Mitral regurgitation    • Nephrolithiasis    • Post herpetic neuralgia  02/16/2017   • TIA (transient ischemic attack)     TIA, June 2012, with nonobstructive carotid stenosis, dyslipidemia and hypertension     Past Surgical History:   Procedure Laterality Date   • ABDOMINAL SURGERY     • APPENDECTOMY     • COLON RESECTION LEFT  2016   • COLON RESECTION LEFT  2016   • HEEL SPUR SURGERY  1999   • HERNIA REPAIR      hiatal hernia    • NISSEN FUNDOPLICATION  1984   • OTHER SURGICAL HISTORY  2010    Malignant melanoma, status post resection        Therapy Treatment    Rehabilitation Treatment Summary     Row Name 08/07/20 0955             Treatment Time/Intention    Discipline  occupational therapist  -ROSE      Document Type  therapy note (daily note)  -ROSE      Subjective Information  no complaints;pain  -ROSE      Mode of Treatment  occupational therapy  -ROSE      Patient/Family Observations  spouse present in room  -ROSE      Care Plan Review  care plan/treatment goals reviewed;patient/other agree to care plan  -ROSE      Care Plan Review, Other Participant(s)  spouse  -ROSE      Patient Effort  good  -ROSE      Existing Precautions/Restrictions  fall  -ROSE      Recorded by [ROSE] Paulette Asif OT 08/07/20 1045      Row Name 08/07/20 0955             Vital Signs    O2 Delivery Pre Treatment  room air  -ROSE      O2 Delivery Intra Treatment  room air  -ROSE      O2 Delivery Post Treatment  room air  -ROSE      Pre Patient Position  Sitting  -ROSE      Intra Patient Position  Standing  -ROSE      Post Patient Position  Sitting  -ROSE      Recovery Time  VSS throughout  -ROSE      Recorded by [ROSE] Paulette Asif OT 08/07/20 1045      Row Name 08/07/20 0955             Cognitive Assessment/Intervention- PT/OT    Orientation Status (Cognition)  oriented to;person;place;disoriented to;situation;time  -ROSE      Follows Commands (Cognition)  over 90% accuracy;repetition of directions required  -ROSE      Recorded by [ROSE] Paulette Asif OT 08/07/20 1045      Row Name 08/07/20 0955             Safety Issues, Functional Mobility     Safety Issues Affecting Function (Mobility)  awareness of need for assistance;insight into deficits/self awareness;judgment;problem solving;positioning of assistive device;safety precaution awareness;safety precautions follow-through/compliance  -ROSE      Impairments Affecting Function (Mobility)  balance;endurance/activity tolerance;strength  -ROSE      Recorded by [ROSE] Paulette Asif, ZACK 08/07/20 1045      Row Name 08/07/20 0955             Functional Mobility    Functional Mobility- Ind. Level  contact guard assist  -ROSE      Functional Mobility- Device  rolling walker  -ROSE      Functional Mobility-Distance (Feet)  200  -ROSE      Functional Mobility- Comment  pt ambulated 200ft, per spouse's request; vc's to keep walker close to body  -ROSE      Recorded by [ROSE] Paulette Asif, OT 08/07/20 1045      Row Name 08/07/20 0955             Transfer Assessment/Treatment    Transfer Assessment/Treatment  sit-stand transfer;stand-sit transfer  -ROSE      Comment (Transfers)  vc's for hand placement  -ROSE      Recorded by [ROSE] Paulette Asif OT 08/07/20 1045      Row Name 08/07/20 0955             Sit-Stand Transfer    Sit-Stand [place] (Transfers)  verbal cues;contact guard  -ROSE      Assistive Device (Sit-Stand Transfers)  walker, front-wheeled  -ROSE      Recorded by [ROSE] Paulette Asif, OT 08/07/20 1045      Row Name 08/07/20 0955             Stand-Sit Transfer    Stand-Sit [place] (Transfers)  verbal cues;contact guard  -ROSE      Assistive Device (Stand-Sit Transfers)  walker, front-wheeled  -ROSE      Recorded by [ROSE] Paulette Asif OT 08/07/20 1045      Row Name 08/07/20 0955             ADL Assessment/Intervention    59003 - OT Self Care/Mgmt Minutes  7  -ROSE      BADL Assessment/Intervention  grooming  -ROSE      Recorded by [ROSE] Paulette Asif OT 08/07/20 1045      Row Name 08/07/20 0955             Grooming Assessment/Training    [place] Level (Grooming)  verbal cues;wash face, hands;oral care regimen  -ROSE       Grooming Position  unsupported standing  -ROSE      Comment (Grooming)  pt's gown changed while in standing  -ROSE      Recorded by [ROSE] Paulette Asif OT 08/07/20 1045      Row Name 08/07/20 0955             BADL Safety/Performance    Skilled BADL Treatment/Intervention  BADL process/adaptation training;cognitive/safety deficit modifications  -ROSE      Progress in BADL Status  cueing required  -ROSE      Recorded by [ROSE] Paulette Asif OT 08/07/20 1045      Row Name 08/07/20 0955             Therapeutic Exercise    59412 - OT Therapeutic Activity Minutes  17  -ROSE      Recorded by [ROSE] Paulette Asif OT 08/07/20 1045      Row Name 08/07/20 0955             Static Standing Balance    Level of [place] (Supported Standing, Static Balance)  supervision  -ROSE      Time Able to Maintain Position (Supported Standing, Static Balance)  more than 5 minutes  -ROSE      Assistive Device Utilized (Supported Standing, Static Balance)  walker, rolling  -ROSE      Recorded by [ROSE] Paulette Asif OT 08/07/20 1045      Row Name 08/07/20 0955             Positioning and Restraints    Pre-Treatment Position  sitting in chair/recliner  -ROSE      Post Treatment Position  chair  -ROSE      In Chair  reclined;call light within reach;encouraged to call for assist;exit alarm on;with family/caregiver;waffle cushion;legs elevated  -ROSE      Recorded by [ROSE] Paulette Asif OT 08/07/20 1045      Row Name 08/07/20 0955             Pain Scale: Numbers Pre/Post-Treatment    Pain Scale: Numbers, Pretreatment  0/10 - no pain  -ROSE      Pain Scale: Numbers, Post-Treatment  0/10 - no pain  -ROSE      Recorded by [ROSE] Paulette Asif OT 08/07/20 1045      Row Name 08/07/20 0955             Coping    Observed Emotional State  anxious;cooperative  -ROSE      Verbalized Emotional State  other (see comments) perseverating on going home  -ROSE      Recorded by [ROSE] Paulette Asif OT 08/07/20 1045      Row Name 08/07/20 0955             Plan of Care Review    Plan of Care  Reviewed With  patient;spouse  -ROSE      Recorded by [ROSE] Paulette Asif, OT 08/07/20 1045      Row Name 08/07/20 0955             Outcome Summary/Treatment Plan (OT)    Daily Summary of Progress (OT)  progress toward functional goals as expected  -ROSE      Anticipated Discharge Disposition (OT)  home with assist;home with home health  -ROSE      Recorded by [ROSE] Paulette Asif, OT 08/07/20 1045        User Key  (r) = Recorded By, (t) = Taken By, (c) = Cosigned By    Initials Name Effective Dates Discipline    Paulette Fuchs, OT 02/14/20 -  OT             Occupational Therapy Education                 Title: PT OT SLP Therapies (In Progress)     Topic: Occupational Therapy (Done)     Point: ADL training (Done)     Description:   Instruct learner(s) on proper safety adaptation and remediation techniques during self care or transfers.   Instruct in proper use of assistive devices.              Learning Progress Summary           Patient Acceptance, E, VU by ROSE at 8/7/2020 0955    Comment:  Benefits of activity; body mechanics for transfers    Acceptance, E, VU by  at 8/5/2020 1040    Acceptance, E,TB, VU by  at 8/3/2020 1351    Acceptance, E, VU by  at 7/31/2020 1030    Acceptance, E,TB, VU by  at 7/31/2020 0505    Acceptance, E, VU by  at 7/30/2020 0820    Comment:  benefits of activity, role of OT    Acceptance, E,TB, VU by  at 7/30/2020 0635    Acceptance, E,TB, VU by  at 7/28/2020 1546   Significant Other Acceptance, E, VU by ROSE at 8/7/2020 0955    Comment:  Benefits of activity; body mechanics for transfers    Acceptance, E,TB, VU by  at 7/28/2020 1546                   Point: Home exercise program (Done)     Description:   Instruct learner(s) on appropriate technique for monitoring, assisting and/or progressing therapeutic exercises/activities.              Learning Progress Summary           Patient Acceptance, E,TB, VU by  at 8/3/2020 1351    Acceptance, E,TB, VU by  at 7/31/2020 0505     Acceptance, E,TB, VU by TH at 7/30/2020 0635    Acceptance, E,TB, VU by  at 7/28/2020 1546   Significant Other Acceptance, E,TB, VU by PC at 7/28/2020 1546                   Point: Precautions (Done)     Description:   Instruct learner(s) on prescribed precautions during self-care and functional transfers.              Learning Progress Summary           Patient Acceptance, E, VU by ROSE at 8/7/2020 0955    Comment:  Benefits of activity; body mechanics for transfers    Acceptance, E,TB, VU by  at 8/3/2020 1351    Acceptance, E,TB, VU by TH at 7/31/2020 0505    Acceptance, E,TB, VU by TH at 7/30/2020 0635    Acceptance, E,TB, VU by PC at 7/28/2020 1546   Significant Other Acceptance, E, VU by ROSE at 8/7/2020 0955    Comment:  Benefits of activity; body mechanics for transfers    Acceptance, E,TB, VU by PC at 7/28/2020 1546                   Point: Body mechanics (Done)     Description:   Instruct learner(s) on proper positioning and spine alignment during self-care, functional mobility activities and/or exercises.              Learning Progress Summary           Patient Acceptance, E, VU by ROSE at 8/7/2020 0955    Comment:  Benefits of activity; body mechanics for transfers    Acceptance, E,TB, VU by  at 8/3/2020 1351    Acceptance, E,TB, VU by  at 7/31/2020 0505    Acceptance, E,TB, VU by  at 7/30/2020 0635    Acceptance, E,TB, VU by  at 7/28/2020 1546   Significant Other Acceptance, E, VU by ROSE at 8/7/2020 0955    Comment:  Benefits of activity; body mechanics for transfers    Acceptance, E,TB, VU by  at 7/28/2020 1546                               User Key     Initials Effective Dates Name Provider Type Discipline    AC 06/23/15 -  Fidelina Goyal, OT Occupational Therapist OT    LC 06/16/16 -  Niya Dupont, RN Registered Nurse Nurse     01/30/20 -  Magda Humphrey, RN Registered Nurse Nurse    ROSE 02/14/20 -  Paulette Asif OT Occupational Therapist OT    PC 07/07/20 -  Deja Levy, DOROTHY Registered Nurse  Nurse                OT Recommendation and Plan  Outcome Summary/Treatment Plan (OT)  Daily Summary of Progress (OT): progress toward functional goals as expected  Anticipated Discharge Disposition (OT): home with assist, home with home health  Daily Summary of Progress (OT): progress toward functional goals as expected  Plan of Care Review  Plan of Care Reviewed With: patient, spouse  Plan of Care Reviewed With: patient, spouse  Outcome Summary: Pt ambulated 200ft using RW with CGA, per spouse request, vc's for keeping wx close. Pt performed face/hand washing, oral care sinkside with prompting, demonstrating increased standing tolerance. Continue OT POC to progress pt's self-care and safety..  Outcome Measures     Row Name 08/07/20 0955 08/05/20 1040          How much help from another is currently needed...    Putting on and taking off regular lower body clothing?  3  -ROSE  3  -AC     Bathing (including washing, rinsing, and drying)  3  -ROSE  2  -AC     Toileting (which includes using toilet bed pan or urinal)  3  -ROSE  3  -AC     Putting on and taking off regular upper body clothing  3  -ROSE  3  -AC     Taking care of personal grooming (such as brushing teeth)  3  -ROSE  3  -AC     Eating meals  4  -RSOE  4  -AC     AM-PAC 6 Clicks Score (OT)  19  -ROSE  18  -AC        Functional Assessment    Outcome Measure Options  AM-PAC 6 Clicks Daily Activity (OT)  -ROSE  AM-PAC 6 Clicks Daily Activity (OT)  -AC       User Key  (r) = Recorded By, (t) = Taken By, (c) = Cosigned By    Initials Name Provider Type    AC Fidelina Goyal, OT Occupational Therapist    Paulette Fuchs OT Occupational Therapist           Time Calculation:   Time Calculation- OT     Row Name 08/07/20 0955             Time Calculation- OT    OT Start Time  0955  -ROSE      OT Received On  08/07/20  -ROSE         Timed Charges    55670 - OT Therapeutic Activity Minutes  17  -ROSE      67654 - OT Self Care/Mgmt Minutes  7  -ROSE        User Key  (r) = Recorded By, (t) =  Taken By, (c) = Cosigned By    Initials Name Provider Type    ROSE Paulette Asif OT Occupational Therapist        Therapy Charges for Today     Code Description Service Date Service Provider Modifiers Qty    [id number]  OT THERAPEUTIC ACT EA 15 MIN 8/7/2020 Paulette Asif OT GO 1    [id number]  OT SELF CARE/MGMT/TRAIN EA 15 MIN 8/7/2020 Paulette Asif OT GO 1               Paulette Asif OT  8/7/2020

## 2020-08-07 NOTE — PROGRESS NOTES
Continued Stay Note  [facility]     Patient Name: Domitila Bosch  MRN: [id number]  Today's Date: 8/7/2020    Admit Date: 7/28/2020    Discharge Plan     Row Name 08/07/20 1132       Plan    Plan  Home with [facility]    Plan Comments  SW met with patient and wife at bedside to discuss discharge planning.  Plan is home with [facility] ([facility] [phone] with family transporting;spoke with [facility] rep [place] to provide update. Denies any discharge needs at this time.        Discharge Codes    No documentation.             Lyly Santo) JACK Jarvis

## 2020-08-07 NOTE — THERAPY TREATMENT NOTE
Patient Name: Domitila Bosch  : 1940    MRN: [id number]                              Today's Date: 2020       Admit Date: 2020    Visit Dx:     ICD-10-CM ICD-9-CM   1. Acute UTI N39.0 599.0   2. Altered mental status, unspecified altered mental status type R41.82 780.97   3. History of CVA (cerebrovascular accident) Z86.73 V12.54   4. Weakness R53.1 780.79   5. Visual hallucinations R44.1 368.16     Patient Active Problem List   Diagnosis   • Diverticulosis of large intestine with hemorrhage   • Umbilical hernia   • S/P hernia repair   • Dyslipidemia   • Carotid stenosis   • Gout   • GERD (gastroesophageal reflux disease)   • Hypothyroidism   • Neuropathy   • Malignant melanoma (CMS/HCC)   • Asthma   • Anxiety disorder   • Diverticulosis   • Muscle pain   • Lumbar radiculopathy   • Kidney stone   • Deviated nasal septum   • Chronic laryngitis   • Acute CVA (cerebrovascular accident) (CMS/HCC)   • Elevated BP without diagnosis of hypertension   • Allergy to Betadine   • Acute UTI   • Encephalopathy acute   • DANIS (acute kidney injury) (CMS/HCC)   • Bacteremia   • Acute diastolic heart failure (CMS/HCC)   • Acute respiratory failure with hypoxia (CMS/HCC)     Past Medical History:   Diagnosis Date   • Anxiety disorder 2017   • Asthma 2017   • Basal cell carcinoma in situ of skin 2019    right leg    • Carotid stenosis 2017   • Diaphoresis    • Diastolic dysfunction    • Diverticulitis    • Dyslipidemia 2017   • GERD (gastroesophageal reflux disease) 2017   • Gout 2017   • Herpes zoster     Herpes zoster, [id number], right lower extremity.    • Hypertension 2017   • Hypothyroidism 2017   • LVH (left ventricular hypertrophy)    • Malignant melanoma (CMS/HCC) 2017   • Melanoma (CMS/HCC)    • Mitral regurgitation    • Nephrolithiasis    • Post herpetic neuralgia 2017   • TIA (transient ischemic attack)     TIA, 2012, with nonobstructive carotid  stenosis, dyslipidemia and hypertension     Past Surgical History:   Procedure Laterality Date   • ABDOMINAL SURGERY     • APPENDECTOMY     • COLON RESECTION LEFT  2016   • COLON RESECTION LEFT  2016   • HEEL SPUR SURGERY  1999   • HERNIA REPAIR      hiatal hernia    • NISSEN FUNDOPLICATION  1984   • OTHER SURGICAL HISTORY  2010    Malignant melanoma, status post resection      General Information     Row Name 08/07/20 1539          PT Evaluation Time/Intention    Document Type  therapy note (daily note)  -     Mode of Treatment  individual therapy;physical therapy  -     Row Name 08/07/20 1539          General Information    Patient Profile Reviewed?  yes  -LM     Existing Precautions/Restrictions  fall  -     Row Name 08/07/20 1539          Cognitive Assessment/Intervention- PT/OT    Orientation Status (Cognition)  oriented to;person;place;disoriented to;situation;time  -     Row Name 08/07/20 1539          Safety Issues, Functional Mobility    Safety Issues Affecting Function (Mobility)  awareness of need for assistance;insight into deficits/self awareness;judgment;problem solving;safety precaution awareness;safety precautions follow-through/compliance  -     Impairments Affecting Function (Mobility)  balance;endurance/activity tolerance;strength  -       User Key  (r) = Recorded By, (t) = Taken By, (c) = Cosigned By    Initials Name Provider Type     Kely Pathak, PT Physical Therapist        Mobility     Row Name 08/07/20 1539          Bed Mobility Assessment/Treatment    Comment (Bed Mobility)  Pt sitting up in chair at initial and end of tx.  -     Row Name 08/07/20 1539          Transfer Assessment/Treatment    Comment (Transfers)  Stood x 2 - pt used urinal on first stand.  Pt ambulated on 2nd stand.  Good hand placement noted when standing.  -     Row Name 08/07/20 1539          Sit-Stand Transfer    Sit-Stand Lane (Transfers)  contact guard;stand by assist;verbal cues CGA first  stand; SBA 2nd stand  -LM     Assistive Device (Sit-Stand Transfers)  walker, front-wheeled  -LM     Row Name 08/07/20 153          Gait/Stairs Assessment/Training    Gait/Stairs Assessment/Training  gait/ambulation independence;gait/ambulation assistive device  -LM     [place] Level (Gait)  contact guard;1 person assist  -LM     Assistive Device (Gait)  walker, front-wheeled  -LM     Distance in Feet (Gait)  400 feet  -LM     Pattern (Gait)  step-through  -LM     Deviations/Abnormal Patterns (Gait)  bilateral deviations;rody decreased;stride length decreased  -LM     Bilateral Gait Deviations  forward flexed posture;heel strike decreased  -LM     Comment (Gait/Stairs)  Vc's for upright posture and to stay inside walker.  No unsteadiness or LOB noted.  Pt did progress to SBA at times, but required CGA on turns.  -LM       User Key  (r) = Recorded By, (t) = Taken By, (c) = Cosigned By    Initials Name Provider Type    LM Kely Pathak PT Physical Therapist        Obj/Interventions     Row Name 08/07/20 6042          Therapeutic Exercise    Lower Extremity (Therapeutic Exercise)  LAQ (long arc quad), bilateral;marching while seated;SLR (straight leg raise), bilateral  -LM     Lower Extremity Range of Motion (Therapeutic Exercise)  hip abduction/adduction, bilateral;ankle dorsiflexion/plantar flexion, bilateral  -LM     Exercise Type (Therapeutic Exercise)  AROM (active range of motion)  -LM     Position (Therapeutic Exercise)  seated;other (see comments) Reclined  -LM     Sets/Reps (Therapeutic Exercise)  x20 reps  -LM     Expected Outcome (Therapeutic Exercise)  improve functional stability;improve performance, gait skills;improve performance, transfer skills  -LM       User Key  (r) = Recorded By, (t) = Taken By, (c) = Cosigned By    Initials Name Provider Type    Kely Tovar PT Physical Therapist        Goals/Plan    No documentation.       Clinical Impression     Row Name 08/07/20 6137           Pain Assessment    Additional Documentation  Pain Scale: Numbers Pre/Post-Treatment (Group)  -LM     Row Name 08/07/20 1539          Pain Scale: Numbers Pre/Post-Treatment    Pain Scale: Numbers, Pretreatment  0/10 - no pain  -LM     Pain Scale: Numbers, Post-Treatment  0/10 - no pain  -LM     Row Name 08/07/20 1539          Plan of Care Review    Plan of Care Reviewed With  patient;spouse  -LM     Progress  improving  -LM     Row Name 08/07/20 1539          Positioning and Restraints    Pre-Treatment Position  sitting in chair/recliner  -LM     Post Treatment Position  chair  -LM     In Chair  reclined;call light within reach;encouraged to call for assist;exit alarm on;with family/caregiver;waffle cushion;notified nsg  -LM       User Key  (r) = Recorded By, (t) = Taken By, (c) = Cosigned By    Initials Name Provider Type    Kely Tovar PT Physical Therapist        Outcome Measures     Row Name 08/07/20 1539          How much help from another person do you currently need...    Turning from your back to your side while in flat bed without using bedrails?  4  -LM     Moving from lying on back to sitting on the side of a flat bed without bedrails?  3  -LM     Moving to and from a bed to a chair (including a wheelchair)?  3  -LM     Standing up from a chair using your arms (e.g., wheelchair, bedside chair)?  3  -LM     Climbing 3-5 steps with a railing?  3  -LM     To walk in hospital room?  3  -LM     AM-PAC 6 Clicks Score (PT)  19  -LM     Row Name 08/07/20 1539          Functional Assessment    Outcome Measure Options  AM-PAC 6 Clicks Basic Mobility (PT)  -LM       User Key  (r) = Recorded By, (t) = Taken By, (c) = Cosigned By    Initials Name Provider Type    Kely Tovar PT Physical Therapist        Physical Therapy Education                 Title: PT OT SLP Therapies (In Progress)     Topic: Physical Therapy (In Progress)     Point: Mobility training (In Progress)     Description:   Instruct learner(s)  on safety and technique for assisting patient out of bed, chair or wheelchair.  Instruct in the proper use of assistive devices, such as walker, crutches, cane or brace.              Patient Friendly Description:   It's important to get you on your feet again, but we need to do so in a way that is safe for you. Falling has serious consequences, and your personal safety is the most important thing of all.        When it's time to get out of bed, one of us or a family member will sit next to you on the bed to give you support.     If your doctor or nurse tells you to use a walker, crutches, a cane, or a brace, be sure you use it every time you get out of bed, even if you think you don't need it.    Learning Progress Summary           Patient Acceptance, E, NR by AS at 8/6/2020 1033    Acceptance, E,TB, VU by  at 8/3/2020 1351    Acceptance, E,TB, VU by TH at 7/31/2020 0505    Acceptance, E,TB, VU by TH at 7/30/2020 0635    Acceptance, E, NR by  at 7/29/2020 1323    Acceptance, E,TB, VU by PC at 7/28/2020 1546   Significant Other Acceptance, E,TB, VU by PC at 7/28/2020 1546                   Point: Home exercise program (In Progress)     Description:   Instruct learner(s) on appropriate technique for monitoring, assisting and/or progressing patient with therapeutic exercises and activities.              Learning Progress Summary           Patient Acceptance, E, NR by AS at 8/6/2020 1033    Acceptance, E,TB, VU by  at 8/3/2020 1351    Acceptance, E,TB, VU by TH at 7/31/2020 0505    Acceptance, E,TB, VU by TH at 7/30/2020 0635    Acceptance, E,TB, VU by PC at 7/28/2020 1546   Significant Other Acceptance, E,TB, VU by PC at 7/28/2020 1546                   Point: Body mechanics (In Progress)     Description:   Instruct learner(s) on proper positioning and spine alignment for patient and/or caregiver during mobility tasks and/or exercises.              Learning Progress Summary           Patient Acceptance, E, NR by  AS at 8/6/2020 1033    Acceptance, E,TB, VU by  at 8/3/2020 1351    Acceptance, E,TB, VU by  at 7/31/2020 0505    Acceptance, E,TB, VU by  at 7/30/2020 0635    Acceptance, E,TB, VU by PC at 7/28/2020 1546   Significant Other Acceptance, E,TB, VU by PC at 7/28/2020 1546                   Point: Precautions (In Progress)     Description:   Instruct learner(s) on prescribed precautions during mobility and gait tasks              Learning Progress Summary           Patient Acceptance, E, NR by AS at 8/6/2020 1033    Acceptance, E,TB, VU by  at 8/3/2020 1351    Acceptance, E,TB, VU by  at 7/31/2020 0505    Acceptance, E,TB, VU by  at 7/30/2020 0635    Acceptance, E, NR by  at 7/29/2020 1323    Acceptance, E,TB, VU by PC at 7/28/2020 1546   Significant Other Acceptance, E,TB, VU by PC at 7/28/2020 1546                               User Key     Initials Effective Dates Name Provider Type Discipline    AS 06/22/15 -  Alvina Amezcua, PTA Physical Therapy Assistant PT     07/24/19 -  Kely Pathak PT Physical Therapist PT     06/16/16 -  Niya Dupont, RN Registered Nurse Nurse     01/30/20 -  Magda Humphrey, RN Registered Nurse Nurse    PC 07/07/20 -  Deja Levy, RN Registered Nurse Nurse              PT Recommendation and Plan  Planned Therapy Interventions (PT Eval): balance training, bed mobility training, gait training, home exercise program, motor coordination training, neuromuscular re-education, patient/family education, postural re-education, ROM (range of motion), strengthening, stretching, transfer training  Outcome Summary/Treatment Plan (PT)  Anticipated Discharge Disposition (PT): inpatient rehabilitation facility  Plan of Care Reviewed With: patient, spouse  Progress: improving  Outcome Summary: Pt progressing well towards skilled PT goals.  Pt stood x 2 - first with CGA, second with SBAx1, and ambulated 400 feet using rw with CGAx1.  No unsteadiness or LOB noted.  Pt completed BLE  ther ex without complaint.  Continue with skilled PT to improve mobility and safety.     Time Calculation:   PT Charges     Row Name 08/07/20 1539             Time Calculation    Start Time  1539  -LM      PT Received On  08/07/20  -LM      PT Goal Re-Cert Due Date  08/08/20  -LM         Timed Charges    77434 - PT Therapeutic Exercise Minutes  8  -LM      12798 - Gait Training Minutes   15  -LM        User Key  (r) = Recorded By, (t) = Taken By, (c) = Cosigned By    Initials Name Provider Type     Kely Pathak, PT Physical Therapist        Therapy Charges for Today     Code Description Service Date Service Provider Modifiers Qty    93382610376 HC GAIT TRAINING EA 15 MIN 8/7/2020 Kely Pathak, PT GP 1    01798168768 HC PT THER PROC EA 15 MIN 8/7/2020 Kely Pathak, PT GP 1          PT G-Codes  Outcome Measure Options: AM-PAC 6 Clicks Basic Mobility (PT)  AM-PAC 6 Clicks Score (PT): 19  AM-PAC 6 Clicks Score (OT): 19    Kely Pathak PT  8/7/2020

## 2020-08-07 NOTE — PLAN OF CARE
Problem: Patient Care Overview  Goal: Plan of Care Review  Outcome: Ongoing (interventions implemented as appropriate)  Flowsheets  Taken 8/7/2020 9494  Progress: improving  Outcome Summary: Pt rested throughout shift. Pt remains confused and disoriented to place and time. No complaints of pain or n/v. VSS. Will continue to monitor.  Taken 8/6/2020 2000  Plan of Care Reviewed With: patient

## 2020-08-07 NOTE — PLAN OF CARE
Problem: Patient Care Overview  Goal: Plan of Care Review  Outcome: Ongoing (interventions implemented as appropriate)  Flowsheets (Taken 8/7/2020 7077)  Outcome Summary: Pt progressing well towards skilled PT goals.  Pt stood x 2 - first with CGA, second with SBAx1, and ambulated 400 feet using rw with CGAx1.  No unsteadiness or LOB noted.  Pt completed BLE ther ex without complaint.  Continue with skilled PT to improve mobility and safety.

## 2020-08-07 NOTE — PLAN OF CARE
Problem: Patient Care Overview  Goal: Plan of Care Review  Flowsheets (Taken 8/7/2020 7031)  Progress: --  Outcome Summary: Pt ambulated 200ft using RW with CGA, per spouse request, vc's for keeping wx close. Pt performed face/hand washing, oral care sinkside with prompting, demonstrating increased standing tolerance. Continue OT POC to progress pt's self-care and safety.

## 2020-08-07 NOTE — PROGRESS NOTES
[facility] Medicine Services  PROGRESS NOTE    Patient Name: Domitila Bosch  : 1940  MRN: [id number]    Date of Admission: 2020  Primary Care Physician: Marcin Ferguson MD    Subjective   Subjective     CC:  Follow up bacteremia     HPI:  Patient seen resting in bed in no apparent distress. Wife at bedside. No acute events overnight per nursing. He slept well last night, and has no complaints at this time.     Review of Systems  Gen- No fevers, chills  CV- No chest pain, palpitations  Resp- No cough, dyspnea  GI- No N/V/D, abd pain    Objective   Objective     Vital Signs:   Temp:  [97.6 °F (36.4 °C)-98.5 °F (36.9 °C)] 98.1 °F (36.7 °C)  Heart Rate:  [60-66] 60  Resp:  [16-17] 16  BP: (121-140)/(75-97) 135/75  Total (NIH Stroke Scale): 1     Physical Exam:  Constitutional: No acute distress, awake, alert  HENT: NCAT, mucous membranes moist  Respiratory: Clear to auscultation bilaterally, respiratory effort normal   Cardiovascular: RRR, no murmurs, palpable pedal pulses bilaterally, cap refill brisk   Gastrointestinal: Positive bowel sounds, soft, nontender, nondistended  Musculoskeletal: No BLE edema   Psychiatric: Appropriate affect, cooperative  Neurologic: Moves all extremities, speech clear  Skin: warm, dry, no visible rash     Results Reviewed:  Results from last 7 days   Lab Units 20  0416 20  0347 20  0402   WBC 10*3/mm3 9.86 9.95 9.71   HEMOGLOBIN g/dL 12.2* 12.9* 13.5   HEMATOCRIT % 37.6 39.3 41.4   PLATELETS 10*3/mm3 256 280 235     Results from last 7 days   Lab Units 20  0416 20  0347 20  0431 20  0402   SODIUM mmol/L 140 137 138 138   POTASSIUM mmol/L 3.9 3.5 3.7 3.8   CHLORIDE mmol/L 105 101 102 102   CO2 mmol/L 27.0 27.0 26.0 26.0   BUN mg/dL 15 15 15 11   CREATININE mg/dL 1.47* 1.32* 1.25 1.43*   GLUCOSE mg/dL 99 97 83 93   CALCIUM mg/dL 8.9 9.1 8.7 9.2   ALT (SGPT) U/L 27  --   --  31   AST (SGOT) U/L 27  --   --   43*     Estimated Creatinine Clearance: 44.4 mL/min (A) (by C-G formula based on SCr of 1.47 mg/dL (H)).    Microbiology Results Abnormal     Procedure Component Value - Date/Time    Blood Culture - Blood, Chest, Right [[id number]] Collected:  07/30/20 0834    Lab Status:  Final result Specimen:  Blood from Chest, Right Updated:  08/04/20 0915     Blood Culture No growth at 5 days    Blood Culture - Blood, Arm, Left [[id number]] Collected:  07/30/20 0834    Lab Status:  Final result Specimen:  Blood from Arm, Left Updated:  08/04/20 0915     Blood Culture No growth at 5 days    COVID PRE-OP / PRE-PROCEDURE SCREENING ORDER (NO ISOLATION) - Swab, Nasopharynx [[id number]] Collected:  08/02/20 1602    Lab Status:  Final result Specimen:  Swab from Nasopharynx Updated:  08/02/20 1713    Narrative:       The following orders were created for panel order COVID PRE-OP / PRE-PROCEDURE SCREENING ORDER (NO ISOLATION) - Swab, Nasopharynx.  Procedure                               Abnormality         Status                     ---------                               -----------         ------                     Respiratory Panel PCR w/...[[id number]]  Normal              Final result                 Please view results for these tests on the individual orders.    Respiratory Panel PCR w/COVID-19(SARS-CoV-2) PATRICIA/GREG/AMELIA In-House, NP Swab in [facility], 8-12 Hr TAT - Swab, Nasopharynx [[id number]]  (Normal) Collected:  08/02/20 1602    Lab Status:  Final result Specimen:  Swab from Nasopharynx Updated:  08/02/20 1713     ADENOVIRUS, PCR Not Detected     Coronavirus 229E Not Detected     Coronavirus HKU1 Not Detected     Coronavirus NL63 Not Detected     Coronavirus OC43 Not Detected     COVID19 Not Detected     Human Metapneumovirus Not Detected     Human Rhinovirus/Enterovirus Not Detected     Influenza A PCR Not Detected     Influenza A H1 Not Detected     Influenza A H1 2009 PCR Not Detected     Influenza A H3 Not Detected      Influenza B PCR Not Detected     Parainfluenza Virus 1 Not Detected     Parainfluenza Virus 2 Not Detected     Parainfluenza Virus 3 Not Detected     Parainfluenza Virus 4 Not Detected     RSV, PCR Not Detected     Bordetella pertussis pcr Not Detected     Bordetella parapertussis PCR Not Detected     Chlamydophila pneumoniae PCR Not Detected     Mycoplasma pneumo by PCR Not Detected    Narrative:       Fact sheet for providers: https://docs.ticckle/wp-content/uploads/ONM4051-6666-ZR2.1-EUA-Provider-Fact-Sheet-3.pdf    Fact sheet for patients: https://docs.ticckle/wp-content/uploads/BHY4196-4288-EC3.1-EUA-Patient-Fact-Sheet-1.pdf    Urine Culture - Urine, Urine, Clean Catch [[id number]]  (Abnormal)  (Susceptibility) Collected:  07/28/20 1021    Lab Status:  Edited Result - FINAL Specimen:  Urine, Clean Catch Updated:  07/31/20 1057     Urine Culture >100,000 CFU/mL Enterococcus faecalis    Narrative:        requested Daptomycin 7/30    Susceptibility      Enterococcus faecalis     GURPREET     Ampicillin Susceptible     Daptomycin Susceptible     Levofloxacin Susceptible     Nitrofurantoin Susceptible     Tetracycline Susceptible     Vancomycin Susceptible                    Blood Culture - Blood, Arm, Left [[id number]]  (Abnormal)  (Susceptibility) Collected:  07/28/20 1133    Lab Status:  Edited Result - FINAL Specimen:  Blood from Arm, Left Updated:  07/31/20 0723     Blood Culture Enterococcus faecalis     Comment:   Infectious disease consultation is highly recommended.        Isolated from Aerobic and Anaerobic Bottles     Gram Stain Aerobic Bottle Gram positive cocci in chains      Anaerobic Bottle Gram positive cocci in chains    Narrative:       Dr. Sandhu requested Daptomycin     Susceptibility      Enterococcus faecalis     GURPREET     Ampicillin Susceptible     Daptomycin Intermediate     Gentamicin High Level Synergy Susceptible     Vancomycin Susceptible                    Blood Culture -  Blood, Arm, Right [[id number]]  (Abnormal) Collected:  07/28/20 1125    Lab Status:  Final result Specimen:  Blood from Arm, Right Updated:  07/30/20 0656     Blood Culture Enterococcus faecalis     Comment:   Infectious disease consultation is highly recommended.        Isolated from Aerobic and Anaerobic Bottles     Gram Stain Aerobic Bottle Gram positive cocci in chains      Anaerobic Bottle Gram positive cocci in chains    Narrative:       Refer to previous blood culture collected on 7/28/2020 1133 for MICs    Blood Culture ID, PCR - Blood, Arm, Left [[id number]]  (Abnormal) Collected:  07/28/20 1133    Lab Status:  Final result Specimen:  Blood from Arm, Left Updated:  07/29/20 0555     BCID, PCR Enterococcus spp. Identification by BCID PCR.          Imaging Results (Last 24 Hours)     ** No results found for the last 24 hours. **          Results for orders placed during the hospital encounter of 07/28/20   Adult Transesophageal Echo ([facility]) W/ Cont if Necessary Per Protocol    Narrative · Estimated EF = 65%.  · Left ventricular systolic function is normal.  · Left atrial cavity size is mild-to-moderately dilated.  · There is mild calcification of the aortic valve mainly affecting the non   and right coronary cusp(s).  · Saline test results are negative.  · The aortic valve exhibits moderate sclerosis.  · There is no evidence of pericardial effusion.  · There is moderate (grade 2) protruding plaque in the descending aorta   present.  · No valvular vegetations demonstrated.          I have reviewed the medications:  Scheduled Meds:  allopurinol 300 mg Oral Daily   aspirin 81 mg Oral Daily   atorvastatin 80 mg Oral Nightly   busPIRone 10 mg Oral Q12H   clopidogrel 75 mg Oral Daily   finasteride 5 mg Oral Daily   heparin (porcine) 5,000 Units Subcutaneous Q8H   levothyroxine 150 mcg Oral Q AM   melatonin 10 mg Oral Nightly   memantine 5 mg Oral Daily   pantoprazole 40 mg Oral QAM   sertraline 50 mg Oral Daily      sodium chloride 10 mL Intravenous Q12H   terazosin 5 mg Oral Nightly   vancomycin (dosing per levels)  Does not apply Daily     Continuous Infusions:  Pharmacy to dose vancomycin     sodium chloride 100 mL/hr Last Rate: 100 mL/hr (08/07/20 0924)     PRN Meds:.•  acetaminophen  •  hydrOXYzine  •  ipratropium-albuterol  •  ondansetron  •  Pharmacy to dose vancomycin  •  potassium chloride  •  potassium chloride  •  sodium chloride  •  sodium chloride  •  sodium chloride    Assessment/Plan   Assessment & Plan     Active Hospital Problems    Diagnosis  POA   • **Acute UTI [N39.0]  Yes   • Acute diastolic heart failure (CMS/HCC) [I50.31]  Unknown   • Acute respiratory failure with hypoxia (CMS/[facility]) [J96.01]  Unknown   • Bacteremia [R78.81]  Unknown   • Encephalopathy acute [G93.40]  Yes   • DANIS (acute kidney injury) (CMS/[facility]) [N17.9]  Yes   • Carotid stenosis [I65.29]  Yes   • Dyslipidemia [E78.5]  Yes   • Hypothyroidism [E03.9]  Yes   • GERD (gastroesophageal reflux disease) [K21.9]  Yes   • Anxiety disorder [F41.9]  Yes      Resolved Hospital Problems   No resolved problems to display.        Brief Hospital Course to date:  Domitila Bosch is a 79 y.o. male with PMHx significant for carotid stenosis, dyslipidemia, TIA/CVA with residual right sided weakness, hypertension, hypothyroidism, anxiety who presents to [facility] with complaints of increasing confusion. Confusion has been stable but suspect some underlying dementia     This patient's problems and plans were partially entered by my partner and updated as appropriate by me 08/07/20.    Metabolic Encephalopathy related to sepsis/bacteremia - some improvement   - likely multifactorial and secondary to infection and DANIS/dehydration  - CT head with no acute findings, previous MRI shows history of stroke in the past  - continue namenda     Sepsis - POA   UTI - enterococcus   Bacteremia - enterococcus   - allergy to Cephalosporins and penicillins   - BCx x 2 with  enterococcus; UCx enterococcus; repeat blood cultures NGTD    - DC azactam (7/28-7/29) and continue vanc (7/29)   - ID following   - Urology consulted - no intervention   - ECHO with no comment on valvular veg -- SUMMER megative  - PICC line ordered 8/5, plan to go home with home health infusion and follow up with ID     Acute hypoxic resp failure - improved   Acute diastolic heart failure - improved   - Likely related to volume overload; ABG reviewed; CXR reviewed   - ECHO with normal EF; diastolic heart failure  - Wean O2 as tolerated   - maxide on hold secondary to labile renal function     DANIS - improved   - baseline around 0.8, 1.9 on admission   - renal US with R non-obs stone; enlargement of prostate; bladder debris  -Since patient is on vancomycin we need to be cautious about damaging the kidneys.    -Maxide on hold  -Creatinine 1.47 today  -NS at 100 ml/hr   -CMP in AM     BPH  Urinary retention  - continue hytrin and proscar      Falls:  - PT/OT to see, currently lives at home with his wife  - Plans to discharge home with [facility]  Hx of CVA w/residual right sided weakness  Carotid Stenosis  - continue ASA, plavix, statin     Hypothyroidism:  - levothyroxine     Hypertension:  - continue hytrin, hold hctz     DVT Prophylaxis:  Aspirin plavix        Disposition: I expect the patient to be discharged home with home health hopefully in the next few days once medically ready and pending ID ABX plan.       CODE STATUS:   Code Status and Medical Interventions:   Ordered at: 07/28/20 1249     Level Of Support Discussed With:    Patient     Code Status:    CPR     Medical Interventions (Level of Support Prior to Arrest):    Full         Electronically signed by YVAN Mena, 08/07/20, 13:05.

## 2020-08-07 NOTE — PROGRESS NOTES
[facility] Progress Note        Antibiotics:  Anti-Infectives (From admission, onward)    Ordered     Dose/Rate Route Frequency Start Stop    08/07/20 0710  vancomycin (dosing per levels)  Review   Ordering Provider:  Joel Terrazas RPH     Does not apply Daily 08/07/20 1000 08/15/20 0959    08/05/20 0754  vancomycin (VANCOCIN) in iso-osmotic dextrose IVPB 1 g (premix) 200 mL     Ordering Provider:  Norberto Beckham RPH    1,000 mg  over 60 Minutes Intravenous Once 08/05/20 0845 08/05/20 0945    07/29/20 0556  Pharmacy to dose vancomycin     Sabino Sandhu MD reviewed the order on 08/03/20 0855.   Ordering Provider:  Sabino Sandhu MD     Does not apply Continuous PRN 07/29/20 0555 08/12/20 0554    07/28/20 1111  aztreonam (AZACTAM) 2 g/100 mL 0.9% NS (mbp)     Ordering Provider:  Norberto Son PA    2 g  over 30 Minutes Intravenous Once 07/28/20 1113 07/28/20 1231    07/28/20 1111  vancomycin 1750 mg/500 mL 0.9% NS IVPB ([facility])     Ordering Provider:  Teresa Pollard DO    20 mg/kg × 86.2 kg Intravenous Once 07/28/20 1113 07/28/20 1626          CC: fatigue    HPI:    Patient is a 79 y.o.  Yr old male with history of TIA/CVA with chronic/residual right-sided weakness and generally poor memory but does not carry a formal diagnosis of dementia.  He was admitted July 28, 2020 with 3 to 4 days worsening confusion from baseline; patient reports dysuria/urinary incontinence/urinary frequency although he could not attach a specific timeline to this and in general his ability to give detailed history is limited with poor insight.  Nursing reported fever at admission associated with acute kidney injury and evidence for UTI with concern for sepsis urinary source.  Subsequently with blood culture showing gram-positive cocci and preliminary PCR data with enterococcus, vancomycin resistance not detected by initial PCR information; he was placed on empiric vancomycin/Azactam.     He reports some improvement since  "admission although his insight remains poor.     Wife reports Hx enlarged prostate with chronic urinary retention and has followed with Dr Bazan in urology; allergy to PCN and keflex with rash to both     8/4/20 [facility] no valvular vegetation;  Descending aorta with protruding plaque    8/5/20 creat trended down some after fluids    8/7/20 creat trending  up some and IV fluids per d/w Dr Flowers;   No new focal pain; Dysuria better and continent of urine today.  Denies hematuria or pyuria and no flank pain at this time.     No headache photophobia or neck stiffness.  No nausea vomiting diarrhea or abdominal pain.  No shortness of breath or cough.  No rash.        ROS:      8/7/20 No f/c/s. No n/v/d. No rash. No new ADR to Abx.     Constitutional-- No chills or sweats.  Appetite diminished with generalized fatigue  Heent-- No new vision, hearing or throat complaints.  No epistaxis or oral sores.  Denies odynophagia or dysphagia.  No flashers, floaters or eye pain. No odynophagia or dysphagia. No headache, photophobia or neck stiffness.  CV-- No chest pain, palpitation or syncope  Resp-- No SOB/cough/Hemoptysis  GI- No nausea, vomiting, or diarrhea.  No hematochezia, melena, or hematemesis. Denies jaundice or chronic liver disease.  --as above  Lymph- no swollen lymph nodes in neck/axilla or groin.   Heme- No active bruising or bleeding; no Hx of DVT or PE.  MS-- no swelling or pain in the bones or joints of arms/legs.  No new back pain.  Neuro-- No acute focal weakness or numbness in the arms or legs.  No seizures.  Chronic residual right-sided weakness     Full 12 point review of systems reviewed and negative otherwise for acute complaints, except for above      PE:   /75 (BP Location: Left arm, Patient Position: Lying)   Pulse 60   Temp 98.1 °F (36.7 °C) (Oral)   Resp 16   Ht 185.4 cm (72.99\")   Wt 78.3 kg (172 lb 9.6 oz)   SpO2 95%   BMI 22.78 kg/m²     GENERAL: sleepy, in no acute distress.   HEENT: " Normocephalic, atraumatic.  PERRL. EOMI. No conjunctival injection. No icterus. Oropharynx clear without evidence of thrush or exudate. No evidence of peridontal disease.    NECK: Supple without nuchal rigidity. No mass.  LYMPH: No cervical, axillary or inguinal lymphadenopathy.  HEART: RRR; No murmur, rubs, gallops.   LUNGS: diminished at bases to auscultation bilaterally without wheezing, rales, rhonchi. Normal respiratory effort. Nonlabored. No dullness.  ABDOMEN: Soft, nontender, nondistended. Positive bowel sounds. No rebound or guarding. NO mass or HSM.  EXT:  No cyanosis, clubbing or edema. No cord.  MSK: FROM without joint effusions noted arms/legs.    SKIN: Warm and dry without cutaneous eruptions on Inspection/palpation.    NEURO: sleepy     No CVA tenderness     No peripheral stigmata/phenomena of endocarditis       Laboratory Data    Results from last 7 days   Lab Units 08/07/20  0416 08/06/20  0347 08/04/20  0402   WBC 10*3/mm3 9.86 9.95 9.71   HEMOGLOBIN g/dL 12.2* 12.9* 13.5   HEMATOCRIT % 37.6 39.3 41.4   PLATELETS 10*3/mm3 256 280 235     Results from last 7 days   Lab Units 08/07/20  0416   SODIUM mmol/L 140   POTASSIUM mmol/L 3.9   CHLORIDE mmol/L 105   CO2 mmol/L 27.0   BUN mg/dL 15   CREATININE mg/dL 1.47*   GLUCOSE mg/dL 99   CALCIUM mg/dL 8.9     Results from last 7 days   Lab Units 08/07/20  0416   ALK PHOS U/L 131*   BILIRUBIN mg/dL 0.4   ALT (SGPT) U/L 27   AST (SGOT) U/L 27               Estimated Creatinine Clearance: 44.4 mL/min (A) (by C-G formula based on SCr of 1.47 mg/dL (H)).      Microbiology:      Radiology:  Imaging Results (Last 72 Hours)     ** No results found for the last 72 hours. **            Impression:     --Acute sepsis.  Fever/leukocytosis and acute kidney injury with enterococcal bacteremia/septicemia.  Concern for urinary source at present.  Abdominal exam otherwise benign with no nausea/vomiting/diarrhea.  If enterococcus did not grow in the urine or if new  abdominal symptoms arise consideration could be given to further GI work-up such as CT scan or GI consultation/endoscopy etc.  Otherwise, chest clear, no cough or breathing difficulty, no obvious skin or soft tissue changes.  Monitor for other potential foci/pathogen     -- Acute  enterococcus bacteremia/septicemia.  Currently no stigmata of endocarditis but certainly could evolve.  High risk for further serious morbidity and other serious sequela.  [facility]MER no valvular vegetation but has protruding plaque in descending aorta, ?significance     --Acute enterococcal complicated UTI.  Associated with septicemia/bacteremia as above.  Urology following and any  Further functional/anatomic assessment at their discretion     --Acute kidney injury.  Creat trended up a bit 8/4; vanco adjusted by pharmacy; monitor to help guide further adjustments; creat better 8/5 after fluids; creat back up a bit 8/6 after stopping IV fluids    --acute pulm edema?; vascular congestion per radiology on CXR     --Acute encephalopathy.  Baseline not entirely clear with prior history of stroke/TIA and nursing reports baseline with forgetfulness with possible cognitive difficulties at baseline.  Need further clarification from family.  Currently no meningismus.  Further neurologic work-up or neurology consultation at discretion of internal medicine     --History of TIA/CVA with reports of residual right-sided weakness.          PLAN:     --IV vancomycin      --Check/review labs cultures and scans     --Partial history per nursing staff     --Discussed with microbiology     --Discussed with Dr. Flowers;  He is managing hydration/IV fluids and monitor renal function closely     --Highly complex set of issues with high risk for further serious morbidity and other serious sequela    --urology following    --repeated blood cultures and negative so far; TTE no mention of vegetation per cardiology;    [facility]MER  noted     --d/w pharmacy       Sabino SHERMAN  MD [place]  8/7/2020

## 2020-08-08 LAB
ALBUMIN SERPL-MCNC: 3.5 G/DL (ref 3.5–5.2)
ALBUMIN/GLOB SERPL: 1.5 G/DL
ALP SERPL-CCNC: 119 U/L (ref 39–117)
ALT SERPL W P-5'-P-CCNC: 24 U/L (ref 1–41)
ANION GAP SERPL CALCULATED.3IONS-SCNC: 9 MMOL/L (ref 5–15)
AST SERPL-CCNC: 22 U/L (ref 1–40)
BILIRUB SERPL-MCNC: 0.5 MG/DL (ref 0–1.2)
BUN SERPL-MCNC: 13 MG/DL (ref 8–23)
BUN/CREAT SERPL: 11.2 (ref 7–25)
CALCIUM SPEC-SCNC: 8.8 MG/DL (ref 8.6–10.5)
CHLORIDE SERPL-SCNC: 108 MMOL/L (ref 98–107)
CO2 SERPL-SCNC: 25 MMOL/L (ref 22–29)
CREAT SERPL-MCNC: 1.16 MG/DL (ref 0.76–1.27)
DEPRECATED RDW RBC AUTO: 53.9 FL (ref 37–54)
ERYTHROCYTE [DISTWIDTH] IN BLOOD BY AUTOMATED COUNT: 15.4 % (ref 12.3–15.4)
GFR SERPL CREATININE-BSD FRML MDRD: 61 ML/MIN/1.73
GLOBULIN UR ELPH-MCNC: 2.3 GM/DL
GLUCOSE SERPL-MCNC: 97 MG/DL (ref 65–99)
HCT VFR BLD AUTO: 37.7 % (ref 37.5–51)
HGB BLD-MCNC: 12.3 G/DL (ref 13–17.7)
MCH RBC QN AUTO: 31.8 PG (ref 26.6–33)
MCHC RBC AUTO-ENTMCNC: 32.6 G/DL (ref 31.5–35.7)
MCV RBC AUTO: 97.4 FL (ref 79–97)
PLATELET # BLD AUTO: 257 10*3/MM3 (ref 140–450)
PMV BLD AUTO: 10.3 FL (ref 6–12)
POTASSIUM SERPL-SCNC: 3.5 MMOL/L (ref 3.5–5.2)
PROT SERPL-MCNC: 5.8 G/DL (ref 6–8.5)
RBC # BLD AUTO: 3.87 10*6/MM3 (ref 4.14–5.8)
SODIUM SERPL-SCNC: 142 MMOL/L (ref 136–145)
VANCOMYCIN SERPL-MCNC: 14.3 MCG/ML (ref 5–40)
WBC # BLD AUTO: 8.88 10*3/MM3 (ref 3.4–10.8)

## 2020-08-08 PROCEDURE — 25010000002 VANCOMYCIN PER 500 MG

## 2020-08-08 PROCEDURE — 80202 ASSAY OF VANCOMYCIN: CPT

## 2020-08-08 PROCEDURE — 85027 COMPLETE CBC AUTOMATED: CPT | Performed by: NURSE PRACTITIONER

## 2020-08-08 PROCEDURE — 80053 COMPREHEN METABOLIC PANEL: CPT | Performed by: NURSE PRACTITIONER

## 2020-08-08 PROCEDURE — 99232 SBSQ HOSP IP/OBS MODERATE 35: CPT | Performed by: NURSE PRACTITIONER

## 2020-08-08 PROCEDURE — 25010000002 HEPARIN (PORCINE) PER 1000 UNITS: Performed by: INTERNAL MEDICINE

## 2020-08-08 RX ORDER — VANCOMYCIN HYDROCHLORIDE 1 G/200ML
1000 INJECTION, SOLUTION INTRAVENOUS ONCE
Status: COMPLETED | OUTPATIENT
Start: 2020-08-08 | End: 2020-08-08

## 2020-08-08 RX ADMIN — SODIUM CHLORIDE, PRESERVATIVE FREE 10 ML: 5 INJECTION INTRAVENOUS at 08:56

## 2020-08-08 RX ADMIN — DOCUSATE SODIUM 100 MG: 100 CAPSULE, LIQUID FILLED ORAL at 08:55

## 2020-08-08 RX ADMIN — SERTRALINE HYDROCHLORIDE 50 MG: 50 TABLET, FILM COATED ORAL at 09:46

## 2020-08-08 RX ADMIN — CLOPIDOGREL BISULFATE 75 MG: 75 TABLET ORAL at 08:55

## 2020-08-08 RX ADMIN — HEPARIN SODIUM 5000 UNITS: 5000 INJECTION INTRAVENOUS; SUBCUTANEOUS at 06:02

## 2020-08-08 RX ADMIN — ALLOPURINOL 300 MG: 300 TABLET ORAL at 08:56

## 2020-08-08 RX ADMIN — DOCUSATE SODIUM 100 MG: 100 CAPSULE, LIQUID FILLED ORAL at 21:40

## 2020-08-08 RX ADMIN — PANTOPRAZOLE SODIUM 40 MG: 40 TABLET, DELAYED RELEASE ORAL at 09:45

## 2020-08-08 RX ADMIN — SODIUM CHLORIDE, PRESERVATIVE FREE 10 ML: 5 INJECTION INTRAVENOUS at 21:40

## 2020-08-08 RX ADMIN — HEPARIN SODIUM 5000 UNITS: 5000 INJECTION INTRAVENOUS; SUBCUTANEOUS at 21:39

## 2020-08-08 RX ADMIN — LEVOTHYROXINE SODIUM 150 MCG: 150 TABLET ORAL at 06:02

## 2020-08-08 RX ADMIN — MEMANTINE 5 MG: 10 TABLET ORAL at 08:55

## 2020-08-08 RX ADMIN — BUSPIRONE HYDROCHLORIDE 10 MG: 10 TABLET ORAL at 08:55

## 2020-08-08 RX ADMIN — HEPARIN SODIUM 5000 UNITS: 5000 INJECTION INTRAVENOUS; SUBCUTANEOUS at 15:42

## 2020-08-08 RX ADMIN — MELATONIN TAB 5 MG 10 MG: 5 TAB at 21:39

## 2020-08-08 RX ADMIN — ASPIRIN 81 MG: 81 TABLET, COATED ORAL at 08:55

## 2020-08-08 RX ADMIN — BUSPIRONE HYDROCHLORIDE 10 MG: 10 TABLET ORAL at 21:39

## 2020-08-08 RX ADMIN — ATORVASTATIN CALCIUM 80 MG: 40 TABLET, FILM COATED ORAL at 21:39

## 2020-08-08 RX ADMIN — VANCOMYCIN HYDROCHLORIDE 1000 MG: 1 INJECTION, SOLUTION INTRAVENOUS at 06:12

## 2020-08-08 RX ADMIN — TERAZOSIN HYDROCHLORIDE 5 MG: 5 CAPSULE ORAL at 21:40

## 2020-08-08 RX ADMIN — POLYETHYLENE GLYCOL 3350 17 G: 17 POWDER, FOR SOLUTION ORAL at 09:46

## 2020-08-08 RX ADMIN — FINASTERIDE 5 MG: 5 TABLET, FILM COATED ORAL at 08:56

## 2020-08-08 NOTE — PROGRESS NOTES
[facility] Progress Note        Antibiotics:  Anti-Infectives (From admission, onward)    Ordered     Dose/Rate Route Frequency Start Stop    08/08/20 0531  vancomycin (VANCOCIN) in iso-osmotic dextrose IVPB 1 g (premix) 200 mL     Ordering Provider:  William Welch IV, RPH    1,000 mg  over 60 Minutes Intravenous Once 08/08/20 0630 08/08/20 0712    08/07/20 1338  vancomycin (VANCOCIN) in iso-osmotic dextrose IVPB 1 g (premix) 200 mL     Ordering Provider:  Joel Terrazas RPH    1,000 mg  over 60 Minutes Intravenous Once 08/07/20 1430 08/07/20 1736    08/07/20 0710  vancomycin (dosing per levels)  Review   Ordering Provider:  Joel Terrazas RPH     Does not apply Daily 08/07/20 1000 08/15/20 0959    08/05/20 0754  vancomycin (VANCOCIN) in iso-osmotic dextrose IVPB 1 g (premix) 200 mL     Ordering Provider:  Norberto Beckham RPH    1,000 mg  over 60 Minutes Intravenous Once 08/05/20 0845 08/05/20 0945    07/29/20 0556  Pharmacy to dose vancomycin     Sabino Sandhu MD reviewed the order on 08/03/20 0855.   Ordering Provider:  Sabino Sandhu MD     Does not apply Continuous PRN 07/29/20 0555 08/12/20 0554    07/28/20 1111  aztreonam (AZACTAM) 2 g/100 mL 0.9% NS (mbp)     Ordering Provider:  Norberto Son PA    2 g  over 30 Minutes Intravenous Once 07/28/20 1113 07/28/20 1231    07/28/20 1111  vancomycin 1750 mg/500 mL 0.9% NS IVPB ([facility])     Ordering Provider:  Tersea Pollard DO    20 mg/kg × 86.2 kg Intravenous Once 07/28/20 1113 07/28/20 1626          CC: fatigue    HPI:    Patient is a 79 y.o.  Yr old male with history of TIA/CVA with chronic/residual right-sided weakness and generally poor memory but does not carry a formal diagnosis of dementia.  He was admitted July 28, 2020 with 3 to 4 days worsening confusion from baseline; patient reports dysuria/urinary incontinence/urinary frequency although he could not attach a specific timeline to this and in general his ability to give detailed  history is limited with poor insight.  Nursing reported fever at admission associated with acute kidney injury and evidence for UTI with concern for sepsis urinary source.  Subsequently with blood culture showing gram-positive cocci and preliminary PCR data with enterococcus, vancomycin resistance not detected by initial PCR information; he was placed on empiric vancomycin/Azactam.     He reports some improvement since admission although his insight remains poor.     Wife reports Hx enlarged prostate with chronic urinary retention and has followed with Dr Bazan in urology; allergy to PCN and keflex with rash to both     8/4/20 SUMMER no valvular vegetation;  Descending aorta with protruding plaque    8/5/20 creat trended down some after fluids    8/8/20 seen early and sleepy ; creat trending  down some and IV fluids per d/w Dr Flowers;   No new focal pain; Dysuria better;  Denies hematuria or pyuria and no flank pain at this time.     No headache photophobia or neck stiffness.  No nausea vomiting diarrhea or abdominal pain.  No shortness of breath or cough.  No rash.        ROS:      8/8/20 No f/c/s. No n/v/d. No rash. No new ADR to Abx.     Constitutional-- No chills or sweats.  Appetite diminished with generalized fatigue  Heent-- No new vision, hearing or throat complaints.  No epistaxis or oral sores.  Denies odynophagia or dysphagia.  No flashers, floaters or eye pain. No odynophagia or dysphagia. No headache, photophobia or neck stiffness.  CV-- No chest pain, palpitation or syncope  Resp-- No SOB/cough/Hemoptysis  GI- No nausea, vomiting, or diarrhea.  No hematochezia, melena, or hematemesis. Denies jaundice or chronic liver disease.  --as above  Lymph- no swollen lymph nodes in neck/axilla or groin.   Heme- No active bruising or bleeding; no Hx of DVT or PE.  MS-- no swelling or pain in the bones or joints of arms/legs.  No new back pain.  Neuro-- No acute focal weakness or numbness in the arms or legs.  No  "seizures.  Chronic residual right-sided weakness     Full 12 point review of systems reviewed and negative otherwise for acute complaints, except for above      PE:   /86 (BP Location: Left arm)   Pulse 51   Temp 98 °F (36.7 °C) (Oral)   Resp 16   Ht 185.4 cm (72.99\")   Wt 81.6 kg (179 lb 12.8 oz)   SpO2 95%   BMI 23.73 kg/m²     GENERAL: sleepy, in no acute distress.   HEENT: Normocephalic, atraumatic.  PERRL. EOMI. No conjunctival injection. No icterus. Oropharynx clear without evidence of thrush or exudate. No evidence of peridontal disease.    NECK: Supple without nuchal rigidity. No mass.  LYMPH: No cervical, axillary or inguinal lymphadenopathy.  HEART: RRR; No murmur, rubs, gallops.   LUNGS: diminished at bases to auscultation bilaterally without wheezing, rales, rhonchi. Normal respiratory effort. Nonlabored. No dullness.  ABDOMEN: Soft, nontender, nondistended. Positive bowel sounds. No rebound or guarding. NO mass or HSM.  EXT:  No cyanosis, clubbing or edema. No cord.  MSK: FROM without joint effusions noted arms/legs.    SKIN: Warm and dry without cutaneous eruptions on Inspection/palpation.    NEURO: sleepy     No CVA tenderness     No peripheral stigmata/phenomena of endocarditis       Laboratory Data    Results from last 7 days   Lab Units 08/08/20  0432 08/07/20  0416 08/06/20  0347   WBC 10*3/mm3 8.88 9.86 9.95   HEMOGLOBIN g/dL 12.3* 12.2* 12.9*   HEMATOCRIT % 37.7 37.6 39.3   PLATELETS 10*3/mm3 257 256 280     Results from last 7 days   Lab Units 08/08/20  0432   SODIUM mmol/L 142   POTASSIUM mmol/L 3.5   CHLORIDE mmol/L 108*   CO2 mmol/L 25.0   BUN mg/dL 13   CREATININE mg/dL 1.16   GLUCOSE mg/dL 97   CALCIUM mg/dL 8.8     Results from last 7 days   Lab Units 08/08/20  0432   ALK PHOS U/L 119*   BILIRUBIN mg/dL 0.5   ALT (SGPT) U/L 24   AST (SGOT) U/L 22               Estimated Creatinine Clearance: 58.6 mL/min (by JUANCHO-G formula based on SCr of 1.16 " mg/dL).      Microbiology:      Radiology:  Imaging Results (Last 72 Hours)     ** No results found for the last 72 hours. **            Impression:     --Acute sepsis.  Fever/leukocytosis and acute kidney injury with enterococcal bacteremia/septicemia.  Concern for urinary source at present.  Abdominal exam otherwise benign with no nausea/vomiting/diarrhea.  If enterococcus did not grow in the urine or if new abdominal symptoms arise consideration could be given to further GI work-up such as CT scan or GI consultation/endoscopy etc.  Otherwise, chest clear, no cough or breathing difficulty, no obvious skin or soft tissue changes.  Monitor for other potential foci/pathogen     -- Acute  enterococcus bacteremia/septicemia.  Currently no stigmata of endocarditis but certainly could evolve.  High risk for further serious morbidity and other serious sequela.  SUMMER no valvular vegetation but has protruding plaque in descending aorta, ?significance     --Acute enterococcal complicated UTI.  Associated with septicemia/bacteremia as above.  Urology following and any  Further functional/anatomic assessment at their discretion     --Acute kidney injury.  Creat trended up a bit 8/4; vanco adjusted by pharmacy; monitor to help guide further adjustments; creat better 8/5 after fluids; creat back up a bit 8/6 after stopping IV fluids and better again by 8/8    --acute pulm edema?; vascular congestion per radiology on CXR     --Acute encephalopathy.  Baseline not entirely clear with prior history of stroke/TIA and nursing reports baseline with forgetfulness with possible cognitive difficulties at baseline.  Need further clarification from family.  Currently no meningismus.  Further neurologic work-up or neurology consultation at discretion of internal medicine     --History of TIA/CVA with reports of residual right-sided weakness.          PLAN:     --IV vancomycin      --Check/review labs cultures and scans     --Partial history  per nursing staff     --Discussed with microbiology     --Discussed with Dr. Flowers;  He is managing hydration/IV fluids and monitor renal function closely     --Highly complex set of issues with high risk for further serious morbidity and other serious sequela    --urology following    --repeated blood cultures and negative so far; TTE no mention of vegetation per cardiology;    SUMMER  noted     --d/w pharmacy       Sabino Sandhu MD  8/8/2020

## 2020-08-08 NOTE — PROGRESS NOTES
[facility] Medicine Services  PROGRESS NOTE    Patient Name: Domitila Bosch  : 1940  MRN: [id number]    Date of Admission: 2020  Primary Care Physician: Marcin Ferguson MD    Subjective   Subjective     CC:  Follow up Bacteremia     HPI:  Patient seen resting in bed in no apparent distress. No acute events overnight per nursing. He rested well last night and feels well today. Hopeful to be discharged home soon. No new complaints at this time.     Review of Systems  Gen- No fevers, chills  CV- No chest pain, palpitations  Resp- No cough, dyspnea  GI- No N/V/D, abd pain    Objective   Objective     Vital Signs:   Temp:  [97.7 °F (36.5 °C)-98.1 °F (36.7 °C)] 98 °F (36.7 °C)  Heart Rate:  [51-69] 51  Resp:  [16-18] 16  BP: (125-176)/(75-97) 171/86  Total (NIH Stroke Scale): 1     Physical Exam:  Constitutional: No acute distress, awake, alert  HENT: NCAT, mucous membranes moist  Respiratory: Clear to auscultation bilaterally, respiratory effort normal   Cardiovascular: RRR, no murmurs, rubs, or gallops, palpable pedal pulses bilaterally  Gastrointestinal: Positive bowel sounds, soft, nontender, nondistended  Musculoskeletal: No BLE edema  Psychiatric: Appropriate affect, cooperative  Neurologic: confused at times, moves all extremities, speech clear  Skin: warm, dry, no visible rash    Results Reviewed:  Results from last 7 days   Lab Units 20  034   WBC 10*3/mm3 8.88 9.86 9.95   HEMOGLOBIN g/dL 12.3* 12.2* 12.9*   HEMATOCRIT % 37.7 37.6 39.3   PLATELETS 10*3/mm3 257 256 280     Results from last 7 days   Lab Units 20  0432 20  0416 207  20  0402   SODIUM mmol/L 142 140 137   < > 138   POTASSIUM mmol/L 3.5 3.9 3.5   < > 3.8   CHLORIDE mmol/L 108* 105 101   < > 102   CO2 mmol/L 25.0 27.0 27.0   < > 26.0   BUN mg/dL 13 15 15   < > 11   CREATININE mg/dL 1.16 1.47* 1.32*   < > 1.43*   GLUCOSE mg/dL 97 99 97   < > 93     CALCIUM mg/dL 8.8 8.9 9.1   < > 9.2   ALT (SGPT) U/L 24 27  --   --  31   AST (SGOT) U/L 22 27  --   --  43*    < > = values in this interval not displayed.     Estimated Creatinine Clearance: 58.6 mL/min (by C-G formula based on SCr of 1.16 mg/dL).    Microbiology Results Abnormal     Procedure Component Value - Date/Time    Blood Culture - Blood, Chest, Right [[id number]] Collected:  07/30/20 0834    Lab Status:  Final result Specimen:  Blood from Chest, Right Updated:  08/04/20 0915     Blood Culture No growth at 5 days    Blood Culture - Blood, Arm, Left [[id number]] Collected:  07/30/20 0834    Lab Status:  Final result Specimen:  Blood from Arm, Left Updated:  08/04/20 0915     Blood Culture No growth at 5 days    COVID PRE-OP / PRE-PROCEDURE SCREENING ORDER (NO ISOLATION) - Swab, Nasopharynx [[id number]] Collected:  08/02/20 1602    Lab Status:  Final result Specimen:  Swab from Nasopharynx Updated:  08/02/20 1713    Narrative:       The following orders were created for panel order COVID PRE-OP / PRE-PROCEDURE SCREENING ORDER (NO ISOLATION) - Swab, Nasopharynx.  Procedure                               Abnormality         Status                     ---------                               -----------         ------                     Respiratory Panel PCR w/...[[id number]]  Normal              Final result                 Please view results for these tests on the individual orders.    Respiratory Panel PCR w/COVID-19(SARS-CoV-2) PATRICIA/GREG/AMELIA In-House, NP Swab in [facility], 8-12 Hr TAT - Swab, Nasopharynx [[id number]]  (Normal) Collected:  08/02/20 1602    Lab Status:  Final result Specimen:  Swab from Nasopharynx Updated:  08/02/20 1713     ADENOVIRUS, PCR Not Detected     Coronavirus 229E Not Detected     Coronavirus HKU1 Not Detected     Coronavirus NL63 Not Detected     Coronavirus OC43 Not Detected     COVID19 Not Detected     Human Metapneumovirus Not Detected     Human Rhinovirus/Enterovirus Not Detected      Influenza A PCR Not Detected     Influenza A H1 Not Detected     Influenza A H1 2009 PCR Not Detected     Influenza A H3 Not Detected     Influenza B PCR Not Detected     Parainfluenza Virus 1 Not Detected     Parainfluenza Virus 2 Not Detected     Parainfluenza Virus 3 Not Detected     Parainfluenza Virus 4 Not Detected     RSV, PCR Not Detected     Bordetella pertussis pcr Not Detected     Bordetella parapertussis PCR Not Detected     Chlamydophila pneumoniae PCR Not Detected     Mycoplasma pneumo by PCR Not Detected    Narrative:       Fact sheet for providers: https://docs.[employer]oon[employer]/wp-content/uploads/PDV5654-6795-XQ6.1-EUA-Provider-Fact-Sheet-3.pdf    Fact sheet for patients: https://docs.[employer]/wp-content/uploads/ONF4732-9066-PZ7.1-EUA-Patient-Fact-Sheet-1.pdf    Urine Culture - Urine, Urine, Clean Catch [[id number]]  (Abnormal)  (Susceptibility) Collected:  07/28/20 1021    Lab Status:  Edited Result - FINAL Specimen:  Urine, Clean Catch Updated:  07/31/20 1057     Urine Culture >100,000 CFU/mL Enterococcus faecalis    Narrative:        requested Daptomycin 7/30    Susceptibility      Enterococcus faecalis     GURPREET     Ampicillin Susceptible     Daptomycin Susceptible     Levofloxacin Susceptible     Nitrofurantoin Susceptible     Tetracycline Susceptible     Vancomycin Susceptible                    Blood Culture - Blood, Arm, Left [[id number]]  (Abnormal)  (Susceptibility) Collected:  07/28/20 1133    Lab Status:  Edited Result - FINAL Specimen:  Blood from Arm, Left Updated:  07/31/20 0723     Blood Culture Enterococcus faecalis     Comment:   Infectious disease consultation is highly recommended.        Isolated from Aerobic and Anaerobic Bottles     Gram Stain Aerobic Bottle Gram positive cocci in chains      Anaerobic Bottle Gram positive cocci in chains    Narrative:       Dr. Sandhu requested Daptomycin     Susceptibility      Enterococcus faecalis     GURPREET     Ampicillin Susceptible      Daptomycin Intermediate     Gentamicin High Level Synergy Susceptible     Vancomycin Susceptible                    Blood Culture - Blood, Arm, Right [[id number]]  (Abnormal) Collected:  07/28/20 1125    Lab Status:  Final result Specimen:  Blood from Arm, Right Updated:  07/30/20 0656     Blood Culture Enterococcus faecalis     Comment:   Infectious disease consultation is highly recommended.        Isolated from Aerobic and Anaerobic Bottles     Gram Stain Aerobic Bottle Gram positive cocci in chains      Anaerobic Bottle Gram positive cocci in chains    Narrative:       Refer to previous blood culture collected on 7/28/2020 1133 for MICs    Blood Culture ID, PCR - Blood, Arm, Left [[id number]]  (Abnormal) Collected:  07/28/20 1133    Lab Status:  Final result Specimen:  Blood from Arm, Left Updated:  07/29/20 0555     BCID, PCR Enterococcus spp. Identification by BCID PCR.          Imaging Results (Last 24 Hours)     ** No results found for the last 24 hours. **          Results for orders placed during the hospital encounter of 07/28/20   Adult Transesophageal Echo (SUMMER) W/ Cont if Necessary Per Protocol    Narrative · Estimated EF = 65%.  · Left ventricular systolic function is normal.  · Left atrial cavity size is mild-to-moderately dilated.  · There is mild calcification of the aortic valve mainly affecting the non   and right coronary cusp(s).  · Saline test results are negative.  · The aortic valve exhibits moderate sclerosis.  · There is no evidence of pericardial effusion.  · There is moderate (grade 2) protruding plaque in the descending aorta   present.  · No valvular vegetations demonstrated.          I have reviewed the medications:  Scheduled Meds:  allopurinol 300 mg Oral Daily   aspirin 81 mg Oral Daily   atorvastatin 80 mg Oral Nightly   busPIRone 10 mg Oral Q12H   clopidogrel 75 mg Oral Daily   docusate sodium 100 mg Oral BID   finasteride 5 mg Oral Daily   heparin (porcine) 5,000 Units  Subcutaneous Q8H   levothyroxine 150 mcg Oral Q AM   melatonin 10 mg Oral Nightly   memantine 5 mg Oral Daily   pantoprazole 40 mg Oral QAM   polyethylene glycol 17 g Oral Daily   sertraline 50 mg Oral Daily   sodium chloride 10 mL Intravenous Q12H   terazosin 5 mg Oral Nightly   vancomycin (dosing per levels)  Does not apply Daily     Continuous Infusions:  Pharmacy to dose vancomycin      PRN Meds:.•  acetaminophen  •  hydrOXYzine  •  ipratropium-albuterol  •  ondansetron  •  Pharmacy to dose vancomycin  •  potassium chloride  •  potassium chloride  •  sodium chloride  •  sodium chloride  •  sodium chloride    Assessment/Plan   Assessment & Plan     Active Hospital Problems    Diagnosis  POA   • **Acute UTI [N39.0]  Yes   • Acute diastolic heart failure (CMS/HCC) [I50.31]  Unknown   • Acute respiratory failure with hypoxia (CMS/HCC) [J96.01]  Unknown   • Bacteremia [R78.81]  Unknown   • Encephalopathy acute [G93.40]  Yes   • DANIS (acute kidney injury) (CMS/HCC) [N17.9]  Yes   • Carotid stenosis [I65.29]  Yes   • Dyslipidemia [E78.5]  Yes   • Hypothyroidism [E03.9]  Yes   • GERD (gastroesophageal reflux disease) [K21.9]  Yes   • Anxiety disorder [F41.9]  Yes      Resolved Hospital Problems   No resolved problems to display.        Brief Hospital Course to date:  Domitila Bosch is a 79 y.o. male with PMHx significant for carotid stenosis, dyslipidemia, TIA/CVA with residual right sided weakness, hypertension, hypothyroidism, anxiety who presents to [facility] with complaints of increasing confusion. Confusion has been stable but suspect some underlying dementia     This patient's problems and plans were partially entered by my partner and updated as appropriate by me 08/08/20.     Metabolic Encephalopathy related to sepsis/bacteremia - some improvement   - likely multifactorial and secondary to infection and DANIS/dehydration  - CT head with no acute findings, previous MRI shows history of stroke in the past  - continue  namenda     Sepsis - POA   UTI - enterococcus   Bacteremia - enterococcus   - allergy to Cephalosporins and penicillins   - BCx x 2 with enterococcus; UCx enterococcus; repeat blood cultures NGTD    - DC azactam (7/28-7/29) and continue vanc (7/29)   - ID following   - Urology consulted - no intervention   - ECHO with no comment on valvular veg -- SUMMER negative  - PICC line ordered 8/5, plan to go home with home health infusion and follow up with ID as outpt once creatinine stable      Acute hypoxic resp failure - improved   Acute diastolic heart failure - improved   - Likely related to volume overload; ABG reviewed; CXR reviewed   - ECHO with normal EF; diastolic heart failure  - Wean O2 as tolerated   - maxide on hold secondary to labile renal function     DANIS - improved   - baseline around 0.8, 1.9 on admission   - renal US with R non-obs stone; enlargement of prostate; bladder debris  -Since patient is on vancomycin we need to be cautious about damaging the kidneys.    -Maxide on hold  -Creatinine improved to 1.16 today  -IV fluids stopped 8/8  -BMP in AM      BPH  Urinary retention  - continue hytrin and proscar      Falls:  - PT/OT to see, currently lives at home with his wife  - Plans to discharge home with [facility]  Hx of CVA w/residual right sided weakness  Carotid Stenosis  - continue ASA, plavix, statin     Hypothyroidism:  - levothyroxine     Hypertension:  - continue hytrin, hold hctz     DVT Prophylaxis:  Aspirin plavix     Disposition: I expect the patient to be discharged home with home health hopefully in the next few days once medically ready. I discussed the patient with Dr. Martínez today. Will need to ensure creatinine is stable for a few days off of IV fluids prior to discharge due to Vancomycin therapy.     CODE STATUS:   Code Status and Medical Interventions:   Ordered at: 07/28/20 1249     Level Of Support Discussed With:    Patient     Code Status:    CPR     Medical  Interventions (Level of Support Prior to Arrest):    Full         Electronically signed by YVAN Mena, 08/08/20, 12:02.

## 2020-08-08 NOTE — PLAN OF CARE
Problem: Patient Care Overview  Goal: Plan of Care Review  Outcome: Ongoing (interventions implemented as appropriate)  Flowsheets (Taken 8/8/2020 0410)  Progress: improving  Plan of Care Reviewed With: patient  Outcome Summary: Patient has no complaints of pain. Patient slept throughout the shift. VSS, room air, normal sinus rhythm. no concerns at this time. will continue to monitor.  Goal: Individualization and Mutuality  Outcome: Ongoing (interventions implemented as appropriate)  Goal: Discharge Needs Assessment  Outcome: Ongoing (interventions implemented as appropriate)  Goal: Interprofessional Rounds/Family Conf  Outcome: Ongoing (interventions implemented as appropriate)     Problem: Fall Risk (Adult)  Goal: Identify Related Risk Factors and Signs and Symptoms  Outcome: Ongoing (interventions implemented as appropriate)  Flowsheets (Taken 8/8/2020 0410)  Related Risk Factors (Fall Risk): age-related changes; fatigue/slow reaction; gait/mobility problems  Signs and Symptoms (Fall Risk): presence of risk factors     Problem: Skin Injury Risk (Adult)  Goal: Identify Related Risk Factors and Signs and Symptoms  Outcome: Ongoing (interventions implemented as appropriate)  Flowsheets (Taken 8/8/2020 0410)  Related Risk Factors (Skin Injury Risk): advanced age; cognitive impairment; mobility impaired  Goal: Skin Health and Integrity  Outcome: Ongoing (interventions implemented as appropriate)  Flowsheets (Taken 8/8/2020 0410)  Skin Health and Integrity: making progress toward outcome     Problem: Pain, Chronic (Adult)  Goal: Identify Related Risk Factors and Signs and Symptoms  Outcome: Ongoing (interventions implemented as appropriate)  Goal: Acceptable Pain/Comfort Level and Functional Ability  Outcome: Ongoing (interventions implemented as appropriate)  Flowsheets (Taken 8/8/2020 0410)  Acceptable Pain/Comfort Level and Functional Ability: making progress toward outcome     Problem: Urinary Tract Infection  (Adult)  Goal: Signs and Symptoms of Listed Potential Problems Will be Absent, Minimized or Managed (Urinary Tract Infection)  Outcome: Ongoing (interventions implemented as appropriate)

## 2020-08-08 NOTE — CONSULTS
"Pharmacy Consult - Vancomycin Dosing    Domitila Bosch is an 79 y.o. male receiving vancomycin therapy.     Indication: E faecalis bacteremia  Consulting Provider: Hospitalist  ID Consult: Yes    Goal Trough: 15-20 mcg/mL    Current Antimicrobial Therapy  Vancomycin 7/28-now    Allergies  Allergies as of 07/28/2020 - Reviewed 07/28/2020   Allergen Reaction Noted    Betadine [povidone iodine]  06/09/2016    Cephalexin  04/20/2016    Flomax [tamsulosin hcl]  02/16/2017    Iodine  06/09/2016    Lisinopril  04/20/2016    Pseudoephedrine  04/20/2016    Sulfamethoxazole-trimethoprim  04/20/2016     Labs  Results from last 7 days   Lab Units 08/08/20  0432 08/07/20  0416 08/06/20  0347   BUN mg/dL 13 15 15   CREATININE mg/dL 1.16 1.47* 1.32*     Results from last 7 days   Lab Units 08/08/20  0432 08/07/20  0416 08/06/20  0347   WBC 10*3/mm3 8.88 9.86 9.95     Evaluation of Dosing    Is Patient on Dialysis or Renal Replacement: no    Ht - 185.4 cm (72.99\")  Wt - 78.3 kg (172 lb 9.6 oz)    Estimated Creatinine Clearance: 56.3 mL/min (by C-G formula based on SCr of 1.16 mg/dL).    Intake & Output (last 3 days)         08/05 0701 - 08/06 0700 08/06 0701 - 08/07 0700 08/07 0701 - 08/08 0700    P.O. 720 240 125    IV Piggyback  200     Total Intake(mL/kg) 720 (9.2) 440 (5.6) 125 (1.6)    Urine (mL/kg/hr) 1300 (0.7) 825 (0.4) 2250 (1.2)    Total Output 4505 859 9887    Net -[phone]           Urine Unmeasured Occurrence  1 x           Microbiology and Radiology  Microbiology Results (last 10 days)       Procedure Component Value - Date/Time    COVID PRE-OP / PRE-PROCEDURE SCREENING ORDER (NO ISOLATION) - Swab, Nasopharynx [[id number]] Collected:  08/02/20 1602    Lab Status:  Final result Specimen:  Swab from Nasopharynx Updated:  08/02/20 5879    Narrative:       The following orders were created for panel order COVID PRE-OP / PRE-PROCEDURE SCREENING ORDER (NO ISOLATION) - Swab, Nasopharynx.  Procedure                        "        Abnormality         Status                     ---------                               -----------         ------                     Respiratory Panel PCR w/...[[id number]]  Normal              Final result                 Please view results for these tests on the individual orders.    Respiratory Panel PCR w/COVID-19(SARS-CoV-2) [facility] In-House, NP Swab in UTM/VTP, 8-12 Hr TAT - Swab, Nasopharynx [[id number]]  (Normal) Collected:  08/02/20 1602    Lab Status:  Final result Specimen:  Swab from Nasopharynx Updated:  08/02/20 1713     ADENOVIRUS, PCR Not Detected     Coronavirus 229E Not Detected     Coronavirus HKU1 Not Detected     Coronavirus NL63 Not Detected     Coronavirus OC43 Not Detected     COVID19 Not Detected     Human Metapneumovirus Not Detected     Human Rhinovirus/Enterovirus Not Detected     Influenza A PCR Not Detected     Influenza A H1 Not Detected     Influenza A H1 2009 PCR Not Detected     Influenza A H3 Not Detected     Influenza B PCR Not Detected     Parainfluenza Virus 1 Not Detected     Parainfluenza Virus 2 Not Detected     Parainfluenza Virus 3 Not Detected     Parainfluenza Virus 4 Not Detected     RSV, PCR Not Detected     Bordetella pertussis pcr Not Detected     Bordetella parapertussis PCR Not Detected     Chlamydophila pneumoniae PCR Not Detected     Mycoplasma pneumo by PCR Not Detected    Narrative:       Fact sheet for providers: https://docs.[employer]/wp-content/uploads/FGQ8545-6081-RT6.1-EUA-Provider-Fact-Sheet-3.pdf    Fact sheet for patients: https://docs.[employer]/wp-content/uploads/WHP0843-0151-ZA7.1-EUA-Patient-Fact-Sheet-1.pdf    Blood Culture - Blood, Chest, Right [[id number]] Collected:  07/30/20 0834    Lab Status:  Final result Specimen:  Blood from Chest, Right Updated:  08/04/20 0915     Blood Culture No growth at 5 days    Blood Culture - Blood, Arm, Left [[id number]] Collected:  07/30/20 0834    Lab Status:  Final result Specimen:  Blood from  Arm, Left Updated:  08/04/20 0915     Blood Culture No growth at 5 days          Evaluation of Level  Results from last 7 days   Lab Units 08/08/20  0432 08/07/20  0936 08/06/20  0347 08/05/20  0431   VANCOMYCIN RM mcg/mL 14.30 10.90 12.60 12.40   7/30 vancomycin trough = 6.1 mcg/mL (drawn late, ~25.5h level)  8/3 vancomycin trough = 22.3 mcg/mL (~12h level)  8/5 vancomycin random = 12.4 mcg/mL (~35h level)  8/6 vancomycin random = 12.6 mcg/mL (~19h level)  8/7 vancomycin random = 10.9 mcg/mL (24h level)  8/8 vancomycin random = 14.3 mcg/mL (~12 hour level)    Assessment/Plan:  1. Pharmacy dosing vancomycin for E faecalis bacteremia, goal trough 15-20.  2. Creatinine improved today ~20%  3. Vancomycin random assessed again this morning was 14.3 mcg/mL (12 hour level). Further clearance anticipated today based on recent PK trend. Repeat vancomycin 1000mg IV x1 again today. Due to fluctuating serum creatinine, will continue to dose vancomycin per levels over the weekend until renal function has stabilized again.  4. Pharmacy will continue to monitor and adjust vancomycin dose as necessary based on renal function, cultures, labs, and clinical status.    Thank you for this consult.  William Welch IV, PharmD, BCPS  8/8/2020  05:33

## 2020-08-08 NOTE — PLAN OF CARE
Pt alert most of the day, watching TV with his wife. Changed position independently. No c/o pain. Possible DC beginning of the week. Monitoring kidney function while finishing vancomycin therapy.

## 2020-08-09 LAB
ANION GAP SERPL CALCULATED.3IONS-SCNC: 8 MMOL/L (ref 5–15)
BUN SERPL-MCNC: 13 MG/DL (ref 8–23)
BUN/CREAT SERPL: 11.3 (ref 7–25)
CALCIUM SPEC-SCNC: 8.9 MG/DL (ref 8.6–10.5)
CHLORIDE SERPL-SCNC: 105 MMOL/L (ref 98–107)
CO2 SERPL-SCNC: 26 MMOL/L (ref 22–29)
CREAT SERPL-MCNC: 1.15 MG/DL (ref 0.76–1.27)
DEPRECATED RDW RBC AUTO: 55.1 FL (ref 37–54)
ERYTHROCYTE [DISTWIDTH] IN BLOOD BY AUTOMATED COUNT: 15.5 % (ref 12.3–15.4)
GFR SERPL CREATININE-BSD FRML MDRD: 61 ML/MIN/1.73
GLUCOSE SERPL-MCNC: 105 MG/DL (ref 65–99)
HCT VFR BLD AUTO: 39.8 % (ref 37.5–51)
HGB BLD-MCNC: 12.9 G/DL (ref 13–17.7)
MCH RBC QN AUTO: 31.8 PG (ref 26.6–33)
MCHC RBC AUTO-ENTMCNC: 32.4 G/DL (ref 31.5–35.7)
MCV RBC AUTO: 98 FL (ref 79–97)
PLATELET # BLD AUTO: 276 10*3/MM3 (ref 140–450)
PMV BLD AUTO: 10.3 FL (ref 6–12)
POTASSIUM SERPL-SCNC: 3.7 MMOL/L (ref 3.5–5.2)
RBC # BLD AUTO: 4.06 10*6/MM3 (ref 4.14–5.8)
SODIUM SERPL-SCNC: 139 MMOL/L (ref 136–145)
VANCOMYCIN SERPL-MCNC: 11.7 MCG/ML (ref 5–40)
WBC # BLD AUTO: 9.54 10*3/MM3 (ref 3.4–10.8)

## 2020-08-09 PROCEDURE — 80048 BASIC METABOLIC PNL TOTAL CA: CPT | Performed by: INTERNAL MEDICINE

## 2020-08-09 PROCEDURE — 25010000002 HEPARIN (PORCINE) PER 1000 UNITS: Performed by: INTERNAL MEDICINE

## 2020-08-09 PROCEDURE — 97116 GAIT TRAINING THERAPY: CPT | Performed by: PHYSICAL THERAPIST

## 2020-08-09 PROCEDURE — 25010000002 VANCOMYCIN 10 G RECONSTITUTED SOLUTION

## 2020-08-09 PROCEDURE — 85027 COMPLETE CBC AUTOMATED: CPT | Performed by: NURSE PRACTITIONER

## 2020-08-09 PROCEDURE — 97110 THERAPEUTIC EXERCISES: CPT | Performed by: PHYSICAL THERAPIST

## 2020-08-09 PROCEDURE — 99231 SBSQ HOSP IP/OBS SF/LOW 25: CPT | Performed by: NURSE PRACTITIONER

## 2020-08-09 PROCEDURE — 80202 ASSAY OF VANCOMYCIN: CPT

## 2020-08-09 RX ADMIN — VANCOMYCIN HYDROCHLORIDE 1250 MG: 10 INJECTION, POWDER, LYOPHILIZED, FOR SOLUTION INTRAVENOUS at 09:23

## 2020-08-09 RX ADMIN — SERTRALINE HYDROCHLORIDE 50 MG: 50 TABLET, FILM COATED ORAL at 09:22

## 2020-08-09 RX ADMIN — POLYETHYLENE GLYCOL 3350 17 G: 17 POWDER, FOR SOLUTION ORAL at 09:21

## 2020-08-09 RX ADMIN — HEPARIN SODIUM 5000 UNITS: 5000 INJECTION INTRAVENOUS; SUBCUTANEOUS at 16:25

## 2020-08-09 RX ADMIN — MELATONIN TAB 5 MG 10 MG: 5 TAB at 20:41

## 2020-08-09 RX ADMIN — SODIUM CHLORIDE, PRESERVATIVE FREE 20 ML: 5 INJECTION INTRAVENOUS at 20:42

## 2020-08-09 RX ADMIN — LEVOTHYROXINE SODIUM 150 MCG: 150 TABLET ORAL at 05:33

## 2020-08-09 RX ADMIN — DOCUSATE SODIUM 100 MG: 100 CAPSULE, LIQUID FILLED ORAL at 20:41

## 2020-08-09 RX ADMIN — BUSPIRONE HYDROCHLORIDE 10 MG: 10 TABLET ORAL at 20:41

## 2020-08-09 RX ADMIN — ALLOPURINOL 300 MG: 300 TABLET ORAL at 09:21

## 2020-08-09 RX ADMIN — ATORVASTATIN CALCIUM 80 MG: 40 TABLET, FILM COATED ORAL at 20:41

## 2020-08-09 RX ADMIN — PANTOPRAZOLE SODIUM 40 MG: 40 TABLET, DELAYED RELEASE ORAL at 09:22

## 2020-08-09 RX ADMIN — HEPARIN SODIUM 5000 UNITS: 5000 INJECTION INTRAVENOUS; SUBCUTANEOUS at 05:32

## 2020-08-09 RX ADMIN — CLOPIDOGREL BISULFATE 75 MG: 75 TABLET ORAL at 09:22

## 2020-08-09 RX ADMIN — BUSPIRONE HYDROCHLORIDE 10 MG: 10 TABLET ORAL at 09:22

## 2020-08-09 RX ADMIN — MEMANTINE 5 MG: 10 TABLET ORAL at 09:22

## 2020-08-09 RX ADMIN — FINASTERIDE 5 MG: 5 TABLET, FILM COATED ORAL at 09:22

## 2020-08-09 RX ADMIN — ACETAMINOPHEN 650 MG: 325 TABLET, FILM COATED ORAL at 20:41

## 2020-08-09 RX ADMIN — TERAZOSIN HYDROCHLORIDE 5 MG: 5 CAPSULE ORAL at 20:40

## 2020-08-09 RX ADMIN — ASPIRIN 81 MG: 81 TABLET, COATED ORAL at 09:21

## 2020-08-09 RX ADMIN — HEPARIN SODIUM 5000 UNITS: 5000 INJECTION INTRAVENOUS; SUBCUTANEOUS at 20:41

## 2020-08-09 RX ADMIN — DOCUSATE SODIUM 100 MG: 100 CAPSULE, LIQUID FILLED ORAL at 09:21

## 2020-08-09 NOTE — PROGRESS NOTES
"Pharmacy Consult - Vancomycin Dosing    Domitila Bosch is an 79 y.o. male receiving vancomycin therapy.     Indication: E faecalis bacteremia  Consulting Provider: Hospitalist  ID Consult: Yes    Goal Trough: 15-20 mcg/mL    Current Antimicrobial Therapy  Vancomycin 7/28-now    Allergies  Allergies as of 07/28/2020 - Reviewed 07/28/2020   Allergen Reaction Noted    Betadine [povidone iodine]  06/09/2016    Cephalexin  04/20/2016    Flomax [tamsulosin hcl]  02/16/2017    Iodine  06/09/2016    Lisinopril  04/20/2016    Pseudoephedrine  04/20/2016    Sulfamethoxazole-trimethoprim  04/20/2016     Labs  Results from last 7 days   Lab Units 08/09/20  0532 08/08/20  0432 08/07/20  0416   BUN mg/dL 13 13 15   CREATININE mg/dL 1.15 1.16 1.47*     Results from last 7 days   Lab Units 08/09/20  0532 08/08/20  0432 08/07/20  0416   WBC 10*3/mm3 9.54 8.88 9.86     Evaluation of Dosing    Is Patient on Dialysis or Renal Replacement: no    Ht - 185.4 cm (72.99\")  Wt - 81.6 kg (179 lb 12.8 oz)    Estimated Creatinine Clearance: 59.1 mL/min (by C-G formula based on SCr of 1.15 mg/dL).    Intake & Output (last 3 days)         08/06 0701 - 08/07 0700 08/07 0701 - 08/08 0700 08/08 0701 - 08/09 0700 08/09 0701 - 08/10 0700    P.O. 240 125 480     IV Piggyback 200       Total Intake(mL/kg) 440 (5.6) 125 (1.5) 480 (5.9)     Urine (mL/kg/hr) 825 (0.4) 3100 (1.6) 2450 (1.3)     Total Output 825 3100 2450     Net -[phone]             Urine Unmeasured Occurrence 1 x             Microbiology and Radiology  Microbiology Results (last 10 days)       Procedure Component Value - Date/Time    COVID PRE-OP / PRE-PROCEDURE SCREENING ORDER (NO ISOLATION) - Swab, Nasopharynx [[id number]] Collected:  08/02/20 1602    Lab Status:  Final result Specimen:  Swab from Nasopharynx Updated:  08/02/20 5108    Narrative:       The following orders were created for panel order COVID PRE-OP / PRE-PROCEDURE SCREENING ORDER (NO ISOLATION) - Swab, " Nasopharynx.  Procedure                               Abnormality         Status                     ---------                               -----------         ------                     Respiratory Panel PCR w/...[[id number]]  Normal              Final result                 Please view results for these tests on the individual orders.    Respiratory Panel PCR w/COVID-19(SARS-CoV-2) [facility] In-House, NP Swab in UT/VTP, 8-12 Hr TAT - Swab, Nasopharynx [[id number]]  (Normal) Collected:  08/02/20 1602    Lab Status:  Final result Specimen:  Swab from Nasopharynx Updated:  08/02/20 1713     ADENOVIRUS, PCR Not Detected     Coronavirus 229E Not Detected     Coronavirus HKU1 Not Detected     Coronavirus NL63 Not Detected     Coronavirus OC43 Not Detected     COVID19 Not Detected     Human Metapneumovirus Not Detected     Human Rhinovirus/Enterovirus Not Detected     Influenza A PCR Not Detected     Influenza A H1 Not Detected     Influenza A H1 2009 PCR Not Detected     Influenza A H3 Not Detected     Influenza B PCR Not Detected     Parainfluenza Virus 1 Not Detected     Parainfluenza Virus 2 Not Detected     Parainfluenza Virus 3 Not Detected     Parainfluenza Virus 4 Not Detected     RSV, PCR Not Detected     Bordetella pertussis pcr Not Detected     Bordetella parapertussis PCR Not Detected     Chlamydophila pneumoniae PCR Not Detected     Mycoplasma pneumo by PCR Not Detected    Narrative:       Fact sheet for providers: https://docs.[employer]/wp-content/uploads/QXP1520-9767-ZO9.1-EUA-Provider-Fact-Sheet-3.pdf    Fact sheet for patients: https://docs.[employer]/wp-content/uploads/QMG5060-3133-NF2.1-EUA-Patient-Fact-Sheet-1.pdf    Blood Culture - Blood, Chest, Right [[id number]] Collected:  07/30/20 0834    Lab Status:  Final result Specimen:  Blood from Chest, Right Updated:  08/04/20 0915     Blood Culture No growth at 5 days    Blood Culture - Blood, Arm, Left [[id number]] Collected:  07/30/20 0834    Lab  Status:  Final result Specimen:  Blood from Arm, Left Updated:  08/04/20 0915     Blood Culture No growth at 5 days          Evaluation of Level  Results from last 7 days   Lab Units 08/09/20  0532 08/08/20  0432 08/07/20  0936 08/06/20  0347 08/05/20  0431   VANCOMYCIN RM mcg/mL 11.70 14.30 10.90 12.60 12.40   7/30 vancomycin trough = 6.1 mcg/mL (drawn late, ~25.5h level)  8/3 vancomycin trough = 22.3 mcg/mL (~12h level)  8/5 vancomycin random = 12.4 mcg/mL (~35h level)  8/6 vancomycin random = 12.6 mcg/mL (~19h level)  8/7 vancomycin random = 10.9 mcg/mL (24h level)  8/8 vancomycin random = 14.3 mcg/mL (~12 hour level)  8/9 vancomycin random = 11.7 mcg/mL (~23.5 hour level)    Assessment/Plan:  1. Pharmacy dosing vancomycin for E faecalis bacteremia, goal trough 15-20.  2. Creatinine stable from yesterday at 1.16 to  1.15 today.   3. Vancomycin random assessed again this morning was 11.7 mcg/mL (True trough level at 23.5 hours). Resume scheduled dosing at Vancomycin 1250mg IV Q24H.   4. Trough scheduled for 8/11 at 8:30 am.  4. Pharmacy will continue to monitor and adjust vancomycin dose as necessary based on renal function, cultures, labs, and clinical status.    Thank you,  Kala Hammer, PharmD.  Pharmacy Resident  8/9/2020 7:17 AM

## 2020-08-09 NOTE — THERAPY PROGRESS REPORT/RE-CERT
Patient Name: Domitila Bosch  : 1940    MRN: [id number]                              Today's Date: 2020       Admit Date: 2020    Visit Dx:     ICD-10-CM ICD-9-CM   1. Acute UTI N39.0 599.0   2. Altered mental status, unspecified altered mental status type R41.82 780.97   3. History of CVA (cerebrovascular accident) Z86.73 V12.54   4. Weakness R53.1 780.79   5. Visual hallucinations R44.1 368.16     Patient Active Problem List   Diagnosis   • Diverticulosis of large intestine with hemorrhage   • Umbilical hernia   • S/P hernia repair   • Dyslipidemia   • Carotid stenosis   • Gout   • GERD (gastroesophageal reflux disease)   • Hypothyroidism   • Neuropathy   • Malignant melanoma (CMS/HCC)   • Asthma   • Anxiety disorder   • Diverticulosis   • Muscle pain   • Lumbar radiculopathy   • Kidney stone   • Deviated nasal septum   • Chronic laryngitis   • Acute CVA (cerebrovascular accident) (CMS/HCC)   • Elevated BP without diagnosis of hypertension   • Allergy to Betadine   • Acute UTI   • Encephalopathy acute   • DANIS (acute kidney injury) (CMS/HCC)   • Bacteremia   • Acute diastolic heart failure (CMS/HCC)   • Acute respiratory failure with hypoxia (CMS/HCC)     Past Medical History:   Diagnosis Date   • Anxiety disorder 2017   • Asthma 2017   • Basal cell carcinoma in situ of skin 2019    right leg    • Carotid stenosis 2017   • Diaphoresis    • Diastolic dysfunction    • Diverticulitis    • Dyslipidemia 2017   • GERD (gastroesophageal reflux disease) 2017   • Gout 2017   • Herpes zoster     Herpes zoster, [id number], right lower extremity.    • Hypertension 2017   • Hypothyroidism 2017   • LVH (left ventricular hypertrophy)    • Malignant melanoma (CMS/HCC) 2017   • Melanoma (CMS/HCC)    • Mitral regurgitation    • Nephrolithiasis    • Post herpetic neuralgia 2017   • TIA (transient ischemic attack)     TIA, 2012, with nonobstructive carotid  stenosis, dyslipidemia and hypertension     Past Surgical History:   Procedure Laterality Date   • ABDOMINAL SURGERY     • APPENDECTOMY     • COLON RESECTION LEFT  2016   • COLON RESECTION LEFT  2016   • HEEL SPUR SURGERY  1999   • HERNIA REPAIR      hiatal hernia    • NISSEN FUNDOPLICATION  1984   • OTHER SURGICAL HISTORY  2010    Malignant melanoma, status post resection      General Information     Row Name 08/09/20 1457          PT Evaluation Time/Intention    Document Type  progress note/recertification  -LM     Mode of Treatment  individual therapy;physical therapy  -LM     Row Name 08/09/20 1457          General Information    Patient Profile Reviewed?  yes  -LM     Existing Precautions/Restrictions  fall  -LM     Row Name 08/09/20 1457          Cognitive Assessment/Intervention- PT/OT    Orientation Status (Cognition)  oriented to;person;place;disoriented to;situation;time  -LM     Row Name 08/09/20 1457          Safety Issues, Functional Mobility    Safety Issues Affecting Function (Mobility)  safety precaution awareness;safety precautions follow-through/compliance;judgment  -LM     Impairments Affecting Function (Mobility)  balance;strength  -LM       User Key  (r) = Recorded By, (t) = Taken By, (c) = Cosigned By    Initials Name Provider Type    LM Kely Pathak, PT Physical Therapist        Mobility     Row Name 08/09/20 1457          Bed Mobility Assessment/Treatment    Bed Mobility Assessment/Treatment  supine-sit;sit-supine  -LM     Supine-Sit [place] (Bed Mobility)  independent  -LM     Sit-Supine [place] (Bed Mobility)  independent  -LM     Row Name 08/09/20 1457          Sit-Stand Transfer    Sit-Stand [place] (Transfers)  stand by assist  -LM     Assistive Device (Sit-Stand Transfers)  walker, front-wheeled  -LM     Row Name 08/09/20 1457          Gait/Stairs Assessment/Training    Gait/Stairs Assessment/Training  gait/ambulation independence;gait/ambulation assistive device  -LM      [place] Level (Gait)  contact guard  -LM     Assistive Device (Gait)  walker, front-wheeled  -LM     Distance in Feet (Gait)  400 feet  -LM     Deviations/Abnormal Patterns (Gait)  bilateral deviations;rody decreased;stride length decreased  -LM     Bilateral Gait Deviations  forward flexed posture;heel strike decreased  -LM     Right Sided Gait Deviations  weight shift ability decreased  -LM     Comment (Gait/Stairs)  Vc's for upright posture and to stay inside walker.  No unsteadiness or LOB noted.  Pt did progress to SBA at times, but required CGA on turns.  -LM       User Key  (r) = Recorded By, (t) = Taken By, (c) = Cosigned By    Initials Name Provider Type    LM Kely Pathak PT Physical Therapist        Obj/Interventions     Row Name 08/09/20 8157          Therapeutic Exercise    Lower Extremity (Therapeutic Exercise)  heel slides, bilateral;SLR (straight leg raise), bilateral  -LM     Lower Extremity Range of Motion (Therapeutic Exercise)  hip abduction/adduction, bilateral;ankle dorsiflexion/plantar flexion, bilateral  -LM     Core Strength (Therapeutic Exercise)  bridging, bilateral lower extremities  -LM     Exercise Type (Therapeutic Exercise)  AROM (active range of motion)  -LM     Position (Therapeutic Exercise)  supine  -LM     Sets/Reps (Therapeutic Exercise)  x20 each  -LM     Expected Outcome (Therapeutic Exercise)  improve functional stability;improve performance, gait skills;improve performance, transfer skills  -LM       User Key  (r) = Recorded By, (t) = Taken By, (c) = Cosigned By    Initials Name Provider Type    Kely Tovar PT Physical Therapist        Goals/Plan     Row Name 08/09/20 1457          Bed Mobility Goal 1 (PT)    Activity/Assistive Device (Bed Mobility Goal 1, PT)  sit to supine/supine to sit  -LM     [place] Level/Cues Needed (Bed Mobility Goal 1, PT)  independent  -LM     Time Frame (Bed Mobility Goal 1, PT)  long term goal (LTG);2 weeks  -LM      Progress/Outcomes (Bed Mobility Goal 1, PT)  (S) goal met  -LM     Row Name 08/09/20 1457          Transfer Goal 1 (PT)    Activity/Assistive Device (Transfer Goal 1, PT)  bed-to-chair/chair-to-bed  -LM     [place] Level/Cues Needed (Transfer Goal 1, PT)  conditional independence  -LM     Time Frame (Transfer Goal 1, PT)  long term goal (LTG);2 weeks  -LM     Progress/Outcome (Transfer Goal 1, PT)  goal ongoing  -LM     Row Name 08/09/20 1457          Gait Training Goal 1 (PT)    Activity/Assistive Device (Gait Training Goal 1, PT)  gait (walking locomotion);assistive device use  -LM     [place] Level (Gait Training Goal 1, PT)  conditional independence  -LM     Distance (Gait Goal 1, PT)  150 feet  -LM     Time Frame (Gait Training Goal 1, PT)  long term goal (LTG);2 weeks  -LM     Progress/Outcome (Gait Training Goal 1, PT)  goal ongoing  -LM       User Key  (r) = Recorded By, (t) = Taken By, (c) = Cosigned By    Initials Name Provider Type    Kely Tovar, JANNETH Physical Therapist        Clinical Impression     Row Name 08/09/20 1457          Pain Assessment    Additional Documentation  Pain Scale: Numbers Pre/Post-Treatment (Group)  -LM     Row Name 08/09/20 1457          Pain Scale: Numbers Pre/Post-Treatment    Pain Scale: Numbers, Pretreatment  0/10 - no pain  -LM     Pain Scale: Numbers, Post-Treatment  0/10 - no pain  -LM     Row Name 08/09/20 1457          Plan of Care Review    Plan of Care Reviewed With  patient;spouse  -LM     Progress  improving  -LM     Row Name 08/09/20 1457          Positioning and Restraints    Pre-Treatment Position  in bed  -LM     Post Treatment Position  bed  -LM     In Bed  supine;call light within reach;encouraged to call for assist;exit alarm on;with family/caregiver;notified nsg  -LM       User Key  (r) = Recorded By, (t) = Taken By, (c) = Cosigned By    Initials Name Provider Type    Kely Tovar, PT Physical Therapist        Outcome Measures     Row Name  08/09/20 1457          How much help from another person do you currently need...    Turning from your back to your side while in flat bed without using bedrails?  4  -LM     Moving from lying on back to sitting on the side of a flat bed without bedrails?  4  -LM     Moving to and from a bed to a chair (including a wheelchair)?  3  -LM     Standing up from a chair using your arms (e.g., wheelchair, bedside chair)?  3  -LM     Climbing 3-5 steps with a railing?  3  -LM     To walk in hospital room?  3  -LM     AM-PAC 6 Clicks Score (PT)  20  -LM     Row Name 08/09/20 1457          Functional Assessment    Outcome Measure Options  AM-PAC 6 Clicks Basic Mobility (PT)  -LM       User Key  (r) = Recorded By, (t) = Taken By, (c) = Cosigned By    Initials Name Provider Type    Kely Tovar, PT Physical Therapist        Physical Therapy Education                 Title: PT OT SLP Therapies (In Progress)     Topic: Physical Therapy (In Progress)     Point: Mobility training (In Progress)     Description:   Instruct learner(s) on safety and technique for assisting patient out of bed, chair or wheelchair.  Instruct in the proper use of assistive devices, such as walker, crutches, cane or brace.              Patient Friendly Description:   It's important to get you on your feet again, but we need to do so in a way that is safe for you. Falling has serious consequences, and your personal safety is the most important thing of all.        When it's time to get out of bed, one of us or a family member will sit next to you on the bed to give you support.     If your doctor or nurse tells you to use a walker, crutches, a cane, or a brace, be sure you use it every time you get out of bed, even if you think you don't need it.    Learning Progress Summary           Patient Acceptance, E, NR by AS at 8/6/2020 1033    Acceptance, E,TB, VU by ALEX at 8/3/2020 1351    Acceptance, E,TB, VU by TONYA at 7/31/2020 0505    Acceptance, E,TB, VU by  TH at 7/30/2020 0635    Acceptance, E, NR by LM at 7/29/2020 1323    Acceptance, E,TB, VU by PC at 7/28/2020 1546   Significant Other Acceptance, E,TB, VU by PC at 7/28/2020 1546                   Point: Home exercise program (In Progress)     Description:   Instruct learner(s) on appropriate technique for monitoring, assisting and/or progressing patient with therapeutic exercises and activities.              Learning Progress Summary           Patient Acceptance, E, NR by AS at 8/6/2020 1033    Acceptance, E,TB, VU by LC at 8/3/2020 1351    Acceptance, E,TB, VU by TH at 7/31/2020 0505    Acceptance, E,TB, VU by TH at 7/30/2020 0635    Acceptance, E,TB, VU by PC at 7/28/2020 1546   Significant Other Acceptance, E,TB, VU by PC at 7/28/2020 1546                   Point: Body mechanics (In Progress)     Description:   Instruct learner(s) on proper positioning and spine alignment for patient and/or caregiver during mobility tasks and/or exercises.              Learning Progress Summary           Patient Acceptance, E, NR by AS at 8/6/2020 1033    Acceptance, E,TB, VU by LC at 8/3/2020 1351    Acceptance, E,TB, VU by TH at 7/31/2020 0505    Acceptance, E,TB, VU by TH at 7/30/2020 0635    Acceptance, E,TB, VU by PC at 7/28/2020 1546   Significant Other Acceptance, E,TB, VU by PC at 7/28/2020 1546                   Point: Precautions (In Progress)     Description:   Instruct learner(s) on prescribed precautions during mobility and gait tasks              Learning Progress Summary           Patient Acceptance, E, NR by AS at 8/6/2020 1033    Acceptance, E,TB, VU by LC at 8/3/2020 1351    Acceptance, E,TB, VU by TH at 7/31/2020 0505    Acceptance, E,TB, VU by TH at 7/30/2020 0635    Acceptance, E, NR by LM at 7/29/2020 1323    Acceptance, E,TB, VU by PC at 7/28/2020 1546   Significant Other Acceptance, E,TB, VU by PC at 7/28/2020 1546                               User Key     Initials Effective Dates Name Provider Type  Discipline    AS 06/22/15 -  Alvina Amezcua, PTA Physical Therapy Assistant PT    LM 07/24/19 -  Kely Pathak PT Physical Therapist PT    LC 06/16/16 -  Niya Dupont RN Registered Nurse Nurse    TH 01/30/20 -  Magda Humphrey, RN Registered Nurse Nurse    PC 07/07/20 -  Deja Levy, DOROTHY Registered Nurse Nurse              PT Recommendation and Plan  Planned Therapy Interventions (PT Eval): balance training, bed mobility training, gait training, home exercise program, motor coordination training, neuromuscular re-education, patient/family education, postural re-education, ROM (range of motion), strengthening, stretching, transfer training  Outcome Summary/Treatment Plan (PT)  Anticipated Discharge Disposition (PT): home with assist, home with OP services  Plan of Care Reviewed With: patient, spouse  Progress: improving  Outcome Summary: PT re-evaluation completed on this date.  Pt has met 1/3 goals and is progressing well towards remaining goals.  Pt transferred supine<-->sit independently, stood with SBA, and ambulated 400 feet using rw (mainly with SBA, but required CGA on turns).  Pt completed BLE ther ex without complaint.  Continue with skilled PT to improve mobility and safety.  Recommend home with assist and outpt PT.     Time Calculation:   PT Charges     Row Name 08/09/20 1457             Time Calculation    Start Time  1457  -LM      PT Received On  08/09/20  -LM      PT Goal Re-Cert Due Date  08/19/20  -LM         Timed Charges    86974 - PT Therapeutic Exercise Minutes  10  -LM      79452 - Gait Training Minutes   18  -LM        User Key  (r) = Recorded By, (t) = Taken By, (c) = Cosigned By    Initials Name Provider Type    LM Kely Pathak PT Physical Therapist        Therapy Charges for Today     Code Description Service Date Service Provider Modifiers Qty    69932226476 HC GAIT TRAINING EA 15 MIN 8/9/2020 Kely Pathak, PT GP 1    29305145902 HC PT THER PROC EA 15 MIN 8/9/2020 Kely Pathak PT  GP 1          PT G-Codes  Outcome Measure Options: AM-PAC 6 Clicks Basic Mobility (PT)  AM-PAC 6 Clicks Score (PT): 20  AM-PAC 6 Clicks Score (OT): 19    Kely Begum, PT  8/9/2020

## 2020-08-09 NOTE — PROGRESS NOTES
[facility] Medicine Services  PROGRESS NOTE    Patient Name: Domitila Bosch  : 1940  MRN: [id number]    Date of Admission: 2020  Primary Care Physician: Marcin Ferguson MD    Subjective   Subjective     CC:  Follow up bacteremia     HPI:  Patient resting in bed in no apparent distress. No acute events overnight per nursing. He rested well overnight. No new complaints at this time.     Review of Systems  Gen- No fevers, chills  CV- No chest pain, palpitations  Resp- No cough, dyspnea  GI- No N/V/D, abd pain    Objective   Objective     Vital Signs:   Temp:  [98.3 °F (36.8 °C)] 98.3 °F (36.8 °C)  Heart Rate:  [67-71] 67  Resp:  [16-17] 16  BP: (121-151)/(68-91) 121/68  Total (NIH Stroke Scale): 1     Physical Exam:  Constitutional: No acute distress, awake, alert  HENT: NCAT, mucous membranes moist  Respiratory: Clear to auscultation bilaterally, respiratory effort normal   Cardiovascular: RRR, no murmurs, rubs, or gallops, palpable pedal pulses bilaterally  Gastrointestinal: Positive bowel sounds, soft, nontender, nondistended  Musculoskeletal: No bilateral ankle edema  Psychiatric: Appropriate affect, cooperative  Neurologic: confused at times, forgetful, moves all extremities, speech clear  Skin: warm, dry, no visible rash    Results Reviewed:  Results from last 7 days   Lab Units 20  0532 202 20  0416   WBC 10*3/mm3 9.54 8.88 9.86   HEMOGLOBIN g/dL 12.9* 12.3* 12.2*   HEMATOCRIT % 39.8 37.7 37.6   PLATELETS 10*3/mm3 276 257 256     Results from last 7 days   Lab Units 20  0532 20  0432 206  20  0402   SODIUM mmol/L 139 142 140   < > 138   POTASSIUM mmol/L 3.7 3.5 3.9   < > 3.8   CHLORIDE mmol/L 105 108* 105   < > 102   CO2 mmol/L 26.0 25.0 27.0   < > 26.0   BUN mg/dL 13 13 15   < > 11   CREATININE mg/dL 1.15 1.16 1.47*   < > 1.43*   GLUCOSE mg/dL 105* 97 99   < > 93   CALCIUM mg/dL 8.9 8.8 8.9   < > 9.2   ALT (SGPT) U/L   --  24 27  --  31   AST (SGOT) U/L  --  22 27  --  43*    < > = values in this interval not displayed.     Estimated Creatinine Clearance: 59.1 mL/min (by C-G formula based on SCr of 1.15 mg/dL).    Microbiology Results Abnormal     Procedure Component Value - Date/Time    Blood Culture - Blood, Chest, Right [[id number]] Collected:  07/30/20 0834    Lab Status:  Final result Specimen:  Blood from Chest, Right Updated:  08/04/20 0915     Blood Culture No growth at 5 days    Blood Culture - Blood, Arm, Left [[id number]] Collected:  07/30/20 0834    Lab Status:  Final result Specimen:  Blood from Arm, Left Updated:  08/04/20 0915     Blood Culture No growth at 5 days    COVID PRE-OP / PRE-PROCEDURE SCREENING ORDER (NO ISOLATION) - Swab, Nasopharynx [[id number]] Collected:  08/02/20 1602    Lab Status:  Final result Specimen:  Swab from Nasopharynx Updated:  08/02/20 1713    Narrative:       The following orders were created for panel order COVID PRE-OP / PRE-PROCEDURE SCREENING ORDER (NO ISOLATION) - Swab, Nasopharynx.  Procedure                               Abnormality         Status                     ---------                               -----------         ------                     Respiratory Panel PCR w/...[[id number]]  Normal              Final result                 Please view results for these tests on the individual orders.    Respiratory Panel PCR w/COVID-19(SARS-CoV-2) PATRICIA/GREG/AMELIA In-House, NP Swab in [facility], 8-12 Hr TAT - Swab, Nasopharynx [[id number]]  (Normal) Collected:  08/02/20 1602    Lab Status:  Final result Specimen:  Swab from Nasopharynx Updated:  08/02/20 1713     ADENOVIRUS, PCR Not Detected     Coronavirus 229E Not Detected     Coronavirus HKU1 Not Detected     Coronavirus NL63 Not Detected     Coronavirus OC43 Not Detected     COVID19 Not Detected     Human Metapneumovirus Not Detected     Human Rhinovirus/Enterovirus Not Detected     Influenza A PCR Not Detected     Influenza A H1 Not  Detected     Influenza A H1 2009 PCR Not Detected     Influenza A H3 Not Detected     Influenza B PCR Not Detected     Parainfluenza Virus 1 Not Detected     Parainfluenza Virus 2 Not Detected     Parainfluenza Virus 3 Not Detected     Parainfluenza Virus 4 Not Detected     RSV, PCR Not Detected     Bordetella pertussis pcr Not Detected     Bordetella parapertussis PCR Not Detected     Chlamydophila pneumoniae PCR Not Detected     Mycoplasma pneumo by PCR Not Detected    Narrative:       Fact sheet for providers: https://docs.[employer]/wp-content/uploads/GTT0448-7223-LC2.1-EUA-Provider-Fact-Sheet-3.pdf    Fact sheet for patients: https://docs.[employer]/wp-content/uploads/LED7959-7650-SE3.1-EUA-Patient-Fact-Sheet-1.pdf    Urine Culture - Urine, Urine, Clean Catch [[id number]]  (Abnormal)  (Susceptibility) Collected:  07/28/20 1021    Lab Status:  Edited Result - FINAL Specimen:  Urine, Clean Catch Updated:  07/31/20 1057     Urine Culture >100,000 CFU/mL Enterococcus faecalis    Narrative:        requested Daptomycin 7/30    Susceptibility      Enterococcus faecalis     GURPREET     Ampicillin Susceptible     Daptomycin Susceptible     Levofloxacin Susceptible     Nitrofurantoin Susceptible     Tetracycline Susceptible     Vancomycin Susceptible                    Blood Culture - Blood, Arm, Left [[id number]]  (Abnormal)  (Susceptibility) Collected:  07/28/20 1133    Lab Status:  Edited Result - FINAL Specimen:  Blood from Arm, Left Updated:  07/31/20 0723     Blood Culture Enterococcus faecalis     Comment:   Infectious disease consultation is highly recommended.        Isolated from Aerobic and Anaerobic Bottles     Gram Stain Aerobic Bottle Gram positive cocci in chains      Anaerobic Bottle Gram positive cocci in chains    Narrative:       Dr. Sandhu requested Daptomycin     Susceptibility      Enterococcus faecalis     GURPREET     Ampicillin Susceptible     Daptomycin Intermediate     Gentamicin High  Level Synergy Susceptible     Vancomycin Susceptible                    Blood Culture - Blood, Arm, Right [[id number]]  (Abnormal) Collected:  07/28/20 1125    Lab Status:  Final result Specimen:  Blood from Arm, Right Updated:  07/30/20 0656     Blood Culture Enterococcus faecalis     Comment:   Infectious disease consultation is highly recommended.        Isolated from Aerobic and Anaerobic Bottles     Gram Stain Aerobic Bottle Gram positive cocci in chains      Anaerobic Bottle Gram positive cocci in chains    Narrative:       Refer to previous blood culture collected on 7/28/2020 1133 for MICs    Blood Culture ID, PCR - Blood, Arm, Left [[id number]]  (Abnormal) Collected:  07/28/20 1133    Lab Status:  Final result Specimen:  Blood from Arm, Left Updated:  07/29/20 0555     BCID, PCR Enterococcus spp. Identification by BCID PCR.          Imaging Results (Last 24 Hours)     ** No results found for the last 24 hours. **          Results for orders placed during the hospital encounter of 07/28/20   Adult Transesophageal Echo (SUMMER) W/ Cont if Necessary Per Protocol    Narrative · Estimated EF = 65%.  · Left ventricular systolic function is normal.  · Left atrial cavity size is mild-to-moderately dilated.  · There is mild calcification of the aortic valve mainly affecting the non   and right coronary cusp(s).  · Saline test results are negative.  · The aortic valve exhibits moderate sclerosis.  · There is no evidence of pericardial effusion.  · There is moderate (grade 2) protruding plaque in the descending aorta   present.  · No valvular vegetations demonstrated.          I have reviewed the medications:  Scheduled Meds:  [START ON 8/11/2020] Pharmacy Consult  Does not apply Once   allopurinol 300 mg Oral Daily   aspirin 81 mg Oral Daily   atorvastatin 80 mg Oral Nightly   busPIRone 10 mg Oral Q12H   clopidogrel 75 mg Oral Daily   docusate sodium 100 mg Oral BID   finasteride 5 mg Oral Daily   heparin (porcine) 5,000  Units Subcutaneous Q8H   levothyroxine 150 mcg Oral Q AM   melatonin 10 mg Oral Nightly   memantine 5 mg Oral Daily   pantoprazole 40 mg Oral QAM   polyethylene glycol 17 g Oral Daily   sertraline 50 mg Oral Daily   sodium chloride 10 mL Intravenous Q12H   terazosin 5 mg Oral Nightly   vancomycin 1,250 mg Intravenous Q24H     Continuous Infusions:  Pharmacy to dose vancomycin      PRN Meds:.acetaminophen  •  hydrOXYzine  •  ipratropium-albuterol  •  ondansetron  •  Pharmacy to dose vancomycin  •  potassium chloride  •  potassium chloride  •  sodium chloride  •  sodium chloride  •  sodium chloride    Assessment/Plan   Assessment & Plan     Active Hospital Problems    Diagnosis  POA   • **Acute UTI [N39.0]  Yes   • Acute diastolic heart failure (CMS/HCC) [I50.31]  Unknown   • Acute respiratory failure with hypoxia (CMS/HCC) [J96.01]  Unknown   • Bacteremia [R78.81]  Unknown   • Encephalopathy acute [G93.40]  Yes   • DANIS (acute kidney injury) (CMS/HCC) [N17.9]  Yes   • Carotid stenosis [I65.29]  Yes   • Dyslipidemia [E78.5]  Yes   • Hypothyroidism [E03.9]  Yes   • GERD (gastroesophageal reflux disease) [K21.9]  Yes   • Anxiety disorder [F41.9]  Yes      Resolved Hospital Problems   No resolved problems to display.        Brief Hospital Course to date:  Domitila Bosch is a 79 y.o. male PMHx significant for carotid stenosis, dyslipidemia, TIA/CVA with residual right sided weakness, hypertension, hypothyroidism, anxiety who presents to [facility] with complaints of increasing confusion. Confusion has been stable but suspect some underlying dementia     This patient's problems and plans were partially entered by my partner and updated as appropriate by me 08/09/20.     Metabolic Encephalopathy related to sepsis/bacteremia - some improvement   - likely multifactorial and secondary to infection and DANIS/dehydration  - CT head with no acute findings, previous MRI shows history of stroke in the past  - continue namenda     Sepsis -  POA   UTI - enterococcus   Bacteremia - enterococcus   - allergy to Cephalosporins and penicillins   - BCx x 2 with enterococcus; UCx enterococcus; repeat blood cultures NGTD    - DC azactam (7/28-7/29) and continue vanc (7/29)   - ID following   - Urology consulted - no intervention   - ECHO with no comment on valvular veg -- SUMMER negative  - PICC line ordered 8/5, plan to go home with home health infusion and follow up with ID as outpt once creatinine stable      Acute hypoxic resp failure - improved   Acute diastolic heart failure - improved   - Likely related to volume overload; ABG reviewed; CXR reviewed   - ECHO with normal EF; diastolic heart failure  - Wean O2 as tolerated   - maxide on hold secondary to labile renal function     DANIS - improved   - baseline around 0.8, 1.9 on admission   - renal US with R non-obs stone; enlargement of prostate; bladder debris  -Since patient is on vancomycin we need to be cautious about damaging the kidneys.    -Maxide on hold  -Creatinine improved to 1.15 today  -IV fluids stopped 8/8  -BMP in AM      BPH  Urinary retention  - continue hytrin and proscar      Falls:  - PT/OT to see, currently lives at home with his wife  - Plans to discharge home with [facility]  Hx of CVA w/residual right sided weakness  Carotid Stenosis  - continue ASA, plavix, statin     Hypothyroidism:  - levothyroxine     Hypertension:  - continue hytrin, hold hctz     DVT Prophylaxis:  Aspirin plavix     Disposition: I expect the patient to be discharged home with home health hopefully in the next 1-2 days once medically ready. I discussed the patient with Dr. Martínez, we will need to ensure creatinine is stable for a few days off of IV fluids prior to discharge due to Vancomycin therapy.     CODE STATUS:   Code Status and Medical Interventions:   Ordered at: 07/28/20 1249     Level Of Support Discussed With:    Patient     Code Status:    CPR     Medical Interventions (Level of Support  Prior to Arrest):    Full         Electronically signed by YVAN Mean, 08/09/20, 13:16.

## 2020-08-09 NOTE — PLAN OF CARE
Problem: Patient Care Overview  Goal: Plan of Care Review  Outcome: Ongoing (interventions implemented as appropriate)  Flowsheets (Taken 8/9/2020 0329)  Progress: improving  Plan of Care Reviewed With: patient  Outcome Summary: Patient has no complaints of pain. patient slept throughout the shidt. VSS. no concerns at this time. Will continue to monitor.  Goal: Individualization and Mutuality  Outcome: Ongoing (interventions implemented as appropriate)  Goal: Discharge Needs Assessment  Outcome: Ongoing (interventions implemented as appropriate)  Goal: Interprofessional Rounds/Family Conf  Outcome: Ongoing (interventions implemented as appropriate)     Problem: Fall Risk (Adult)  Goal: Identify Related Risk Factors and Signs and Symptoms  Outcome: Ongoing (interventions implemented as appropriate)  Flowsheets (Taken 8/8/2020 0410)  Related Risk Factors (Fall Risk): age-related changes;fatigue/slow reaction;gait/mobility problems  Signs and Symptoms (Fall Risk): presence of risk factors     Problem: Skin Injury Risk (Adult)  Goal: Identify Related Risk Factors and Signs and Symptoms  Outcome: Ongoing (interventions implemented as appropriate)  Goal: Skin Health and Integrity  Outcome: Ongoing (interventions implemented as appropriate)  Flowsheets (Taken 8/9/2020 0329)  Skin Health and Integrity: making progress toward outcome     Problem: Pain, Chronic (Adult)  Goal: Identify Related Risk Factors and Signs and Symptoms  Outcome: Ongoing (interventions implemented as appropriate)  Goal: Acceptable Pain/Comfort Level and Functional Ability  Outcome: Ongoing (interventions implemented as appropriate)  Flowsheets (Taken 8/9/2020 0329)  Acceptable Pain/Comfort Level and Functional Ability: making progress toward outcome     Problem: Urinary Tract Infection (Adult)  Goal: Signs and Symptoms of Listed Potential Problems Will be Absent, Minimized or Managed (Urinary Tract Infection)  Outcome: Ongoing (interventions  implemented as appropriate)

## 2020-08-09 NOTE — PROGRESS NOTES
[facility] Progress Note        Antibiotics:  Anti-Infectives (From admission, onward)    Ordered     Dose/Rate Route Frequency Start Stop    08/09/20 0733  vancomycin 1250 mg/250 mL 0.9% NS IVPB ([facility])  Review   Ordering Provider:  Kala Hammer    1,250 mg  over 90 Minutes Intravenous Every 24 Hours 08/09/20 0900 08/15/20 0859    08/08/20 0531  vancomycin (VANCOCIN) in iso-osmotic dextrose IVPB 1 g (premix) 200 mL     Ordering Provider:  William Welch IV, RPH    1,000 mg  over 60 Minutes Intravenous Once 08/08/20 0630 08/08/20 0712    08/07/20 1338  vancomycin (VANCOCIN) in iso-osmotic dextrose IVPB 1 g (premix) 200 mL     Ordering Provider:  Joel Terrazas, RPH    1,000 mg  over 60 Minutes Intravenous Once 08/07/20 1430 08/07/20 1736    08/05/20 0754  vancomycin (VANCOCIN) in iso-osmotic dextrose IVPB 1 g (premix) 200 mL     Ordering Provider:  Norberto Beckham RPH    1,000 mg  over 60 Minutes Intravenous Once 08/05/20 0845 08/05/20 0945    07/29/20 0556  Pharmacy to dose vancomycin     Sabino Sandhu MD reviewed the order on 08/03/20 0855.   Ordering Provider:  Sabino Sandhu MD     Does not apply Continuous PRN 07/29/20 0555 08/12/20 0554    07/28/20 1111  aztreonam (AZACTAM) 2 g/100 mL 0.9% NS (mbp)     Ordering Provider:  Norberto Son PA    2 g  over 30 Minutes Intravenous Once 07/28/20 1113 07/28/20 1231    07/28/20 1111  vancomycin 1750 mg/500 mL 0.9% NS IVPB ([facility])     Ordering Provider:  Teresa Pollard DO    20 mg/kg × 86.2 kg Intravenous Once 07/28/20 1113 07/28/20 1626          CC: fatigue    HPI:    Patient is a 79 y.o.  Yr old male with history of TIA/CVA with chronic/residual right-sided weakness and generally poor memory but does not carry a formal diagnosis of dementia.  He was admitted July 28, 2020 with 3 to 4 days worsening confusion from baseline; patient reports dysuria/urinary incontinence/urinary frequency although he could not attach a specific timeline to this and in  general his ability to give detailed history is limited with poor insight.  Nursing reported fever at admission associated with acute kidney injury and evidence for UTI with concern for sepsis urinary source.  Subsequently with blood culture showing gram-positive cocci and preliminary PCR data with enterococcus, vancomycin resistance not detected by initial PCR information; he was placed on empiric vancomycin/Azactam.     He reports some improvement since admission although his insight remains poor.     Wife reports Hx enlarged prostate with chronic urinary retention and has followed with Dr Bazan in urology; allergy to PCN and keflex with rash to both     8/4/20 SUMMER no valvular vegetation;  Descending aorta with protruding plaque    8/5/20 creat trended down some after fluids    8/9/20 seen early and sleepy ; creat trending  down some and IV fluids per d/w Dr Flowers;   No new focal pain; Dysuria better;  Denies hematuria or pyuria and no flank pain at this time.     No headache photophobia or neck stiffness.  No nausea vomiting diarrhea or abdominal pain.  No shortness of breath or cough.  No rash.        ROS:      8/9/20 No f/c/s. No n/v/d. No rash. No new ADR to Abx.     Constitutional-- No chills or sweats.  Appetite diminished with generalized fatigue  Heent-- No new vision, hearing or throat complaints.  No epistaxis or oral sores.  Denies odynophagia or dysphagia.  No flashers, floaters or eye pain. No odynophagia or dysphagia. No headache, photophobia or neck stiffness.  CV-- No chest pain, palpitation or syncope  Resp-- No SOB/cough/Hemoptysis  GI- No nausea, vomiting, or diarrhea.  No hematochezia, melena, or hematemesis. Denies jaundice or chronic liver disease.  --as above  Lymph- no swollen lymph nodes in neck/axilla or groin.   Heme- No active bruising or bleeding; no Hx of DVT or PE.  MS-- no swelling or pain in the bones or joints of arms/legs.  No new back pain.  Neuro-- No acute focal weakness  "or numbness in the arms or legs.  No seizures.  Chronic residual right-sided weakness     Full 12 point review of systems reviewed and negative otherwise for acute complaints, except for above      PE:   /68 (BP Location: Left arm, Patient Position: Sitting)   Pulse 67   Temp 98.3 °F (36.8 °C) (Oral)   Resp 16   Ht 185.4 cm (72.99\")   Wt 81.6 kg (179 lb 12.8 oz)   SpO2 95%   BMI 23.73 kg/m²     GENERAL: sleepy, in no acute distress.   HEENT: Normocephalic, atraumatic.  PERRL. EOMI. No conjunctival injection. No icterus. Oropharynx clear without evidence of thrush or exudate. No evidence of peridontal disease.    NECK: Supple without nuchal rigidity. No mass.  LYMPH: No cervical, axillary or inguinal lymphadenopathy.  HEART: RRR; No murmur, rubs, gallops.   LUNGS: diminished at bases to auscultation bilaterally without wheezing, rales, rhonchi. Normal respiratory effort. Nonlabored. No dullness.  ABDOMEN: Soft, nontender, nondistended. Positive bowel sounds. No rebound or guarding. NO mass or HSM.  EXT:  No cyanosis, clubbing or edema. No cord.  MSK: FROM without joint effusions noted arms/legs.    SKIN: Warm and dry without cutaneous eruptions on Inspection/palpation.    NEURO: sleepy     No CVA tenderness     No peripheral stigmata/phenomena of endocarditis       Laboratory Data    Results from last 7 days   Lab Units 08/09/20  0532 08/08/20  0432 08/07/20  0416   WBC 10*3/mm3 9.54 8.88 9.86   HEMOGLOBIN g/dL 12.9* 12.3* 12.2*   HEMATOCRIT % 39.8 37.7 37.6   PLATELETS 10*3/mm3 276 257 256     Results from last 7 days   Lab Units 08/09/20  0532   SODIUM mmol/L 139   POTASSIUM mmol/L 3.7   CHLORIDE mmol/L 105   CO2 mmol/L 26.0   BUN mg/dL 13   CREATININE mg/dL 1.15   GLUCOSE mg/dL 105*   CALCIUM mg/dL 8.9     Results from last 7 days   Lab Units 08/08/20  0432   ALK PHOS U/L 119*   BILIRUBIN mg/dL 0.5   ALT (SGPT) U/L 24   AST (SGOT) U/L 22               Estimated Creatinine Clearance: 59.1 mL/min (by " C-G formula based on SCr of 1.15 mg/dL).      Microbiology:      Radiology:  Imaging Results (Last 72 Hours)     ** No results found for the last 72 hours. **            Impression:     --Acute sepsis.  Fever/leukocytosis and acute kidney injury with enterococcal bacteremia/septicemia.  Concern for urinary source at present.  Abdominal exam otherwise benign with no nausea/vomiting/diarrhea.  If enterococcus did not grow in the urine or if new abdominal symptoms arise consideration could be given to further GI work-up such as CT scan or GI consultation/endoscopy etc.  Otherwise, chest clear, no cough or breathing difficulty, no obvious skin or soft tissue changes.  Monitor for other potential foci/pathogen     -- Acute  enterococcus bacteremia/septicemia.  Currently no stigmata of endocarditis but certainly could evolve.  High risk for further serious morbidity and other serious sequela.  SUMMER no valvular vegetation but has protruding plaque in descending aorta, ?significance     --Acute enterococcal complicated UTI.  Associated with septicemia/bacteremia as above.  Urology following and any  Further functional/anatomic assessment at their discretion     --Acute kidney injury.  Creat trended up a bit 8/4; vanco adjusted by pharmacy; monitor to help guide further adjustments; creat better 8/5 after fluids; creat back up a bit 8/6 after stopping IV fluids and better again by 8/8    --acute pulm edema?; vascular congestion per radiology on CXR     --Acute encephalopathy.  Baseline not entirely clear with prior history of stroke/TIA and nursing reports baseline with forgetfulness with possible cognitive difficulties at baseline.  Need further clarification from family.  Currently no meningismus.  Further neurologic work-up or neurology consultation at discretion of internal medicine     --History of TIA/CVA with reports of residual right-sided weakness.          PLAN:     --IV vancomycin      --Check/review labs cultures  and scans     --Partial history per nursing staff     --Discussed with microbiology     --Discussed with Dr. Flowers;  He is managing hydration/IV fluids and monitor renal function closely     --Highly complex set of issues with high risk for further serious morbidity and other serious sequela    --urology following    --repeated blood cultures and negative so far; TTE no mention of vegetation per cardiology;    SUMMER  noted     --d/w pharmacy       Sabino Sandhu MD  8/9/2020

## 2020-08-09 NOTE — PLAN OF CARE
Problem: Patient Care Overview  Goal: Plan of Care Review  Outcome: Ongoing (interventions implemented as appropriate)  Flowsheets (Taken 8/9/2020 9680)  Outcome Summary: PT progress note completed on this date.  Pt has met 1/3 goals and is progressing well towards remaining goals.  Pt transferred supine<-->sit independently, stood with SBA, and ambulated 400 feet using rw (mainly with SBA, but required CGA on turns).  Pt completed BLE ther ex without complaint.  Continue with skilled PT to improve mobility and safety.  Recommend home with assist and outpt PT.

## 2020-08-09 NOTE — PLAN OF CARE
Patient looking forward to going home. Back to baseline-possible DC tomorrow. Labs improved. Urinating okay. Large BM today. Ambulated with assist x1 to bathroom and back.

## 2020-08-10 LAB
ANION GAP SERPL CALCULATED.3IONS-SCNC: 7 MMOL/L (ref 5–15)
ANION GAP SERPL CALCULATED.3IONS-SCNC: 7 MMOL/L (ref 5–15)
BUN SERPL-MCNC: 12 MG/DL (ref 8–23)
BUN SERPL-MCNC: 12 MG/DL (ref 8–23)
BUN/CREAT SERPL: 9.3 (ref 7–25)
BUN/CREAT SERPL: 9.4 (ref 7–25)
CALCIUM SPEC-SCNC: 9.2 MG/DL (ref 8.6–10.5)
CALCIUM SPEC-SCNC: 9.5 MG/DL (ref 8.6–10.5)
CHLORIDE SERPL-SCNC: 103 MMOL/L (ref 98–107)
CHLORIDE SERPL-SCNC: 104 MMOL/L (ref 98–107)
CO2 SERPL-SCNC: 29 MMOL/L (ref 22–29)
CO2 SERPL-SCNC: 29 MMOL/L (ref 22–29)
CREAT SERPL-MCNC: 1.28 MG/DL (ref 0.76–1.27)
CREAT SERPL-MCNC: 1.29 MG/DL (ref 0.76–1.27)
GFR SERPL CREATININE-BSD FRML MDRD: 54 ML/MIN/1.73
GFR SERPL CREATININE-BSD FRML MDRD: 54 ML/MIN/1.73
GLUCOSE SERPL-MCNC: 102 MG/DL (ref 65–99)
GLUCOSE SERPL-MCNC: 103 MG/DL (ref 65–99)
POTASSIUM SERPL-SCNC: 3.6 MMOL/L (ref 3.5–5.2)
POTASSIUM SERPL-SCNC: 3.7 MMOL/L (ref 3.5–5.2)
SODIUM SERPL-SCNC: 139 MMOL/L (ref 136–145)
SODIUM SERPL-SCNC: 140 MMOL/L (ref 136–145)

## 2020-08-10 PROCEDURE — 97116 GAIT TRAINING THERAPY: CPT

## 2020-08-10 PROCEDURE — 97535 SELF CARE MNGMENT TRAINING: CPT | Performed by: OCCUPATIONAL THERAPIST

## 2020-08-10 PROCEDURE — 25010000002 VANCOMYCIN 10 G RECONSTITUTED SOLUTION

## 2020-08-10 PROCEDURE — 25010000002 HEPARIN (PORCINE) PER 1000 UNITS: Performed by: INTERNAL MEDICINE

## 2020-08-10 PROCEDURE — 80048 BASIC METABOLIC PNL TOTAL CA: CPT

## 2020-08-10 PROCEDURE — 97530 THERAPEUTIC ACTIVITIES: CPT | Performed by: OCCUPATIONAL THERAPIST

## 2020-08-10 PROCEDURE — 99232 SBSQ HOSP IP/OBS MODERATE 35: CPT | Performed by: NURSE PRACTITIONER

## 2020-08-10 PROCEDURE — 80048 BASIC METABOLIC PNL TOTAL CA: CPT | Performed by: INTERNAL MEDICINE

## 2020-08-10 RX ORDER — SODIUM CHLORIDE 9 MG/ML
100 INJECTION, SOLUTION INTRAVENOUS CONTINUOUS
Status: ACTIVE | OUTPATIENT
Start: 2020-08-10 | End: 2020-08-10

## 2020-08-10 RX ADMIN — BUSPIRONE HYDROCHLORIDE 10 MG: 10 TABLET ORAL at 20:07

## 2020-08-10 RX ADMIN — BUSPIRONE HYDROCHLORIDE 10 MG: 10 TABLET ORAL at 09:00

## 2020-08-10 RX ADMIN — DOCUSATE SODIUM 100 MG: 100 CAPSULE, LIQUID FILLED ORAL at 09:00

## 2020-08-10 RX ADMIN — SODIUM CHLORIDE, PRESERVATIVE FREE 10 ML: 5 INJECTION INTRAVENOUS at 20:10

## 2020-08-10 RX ADMIN — HEPARIN SODIUM 5000 UNITS: 5000 INJECTION INTRAVENOUS; SUBCUTANEOUS at 20:07

## 2020-08-10 RX ADMIN — DOCUSATE SODIUM 100 MG: 100 CAPSULE, LIQUID FILLED ORAL at 20:07

## 2020-08-10 RX ADMIN — SERTRALINE HYDROCHLORIDE 50 MG: 50 TABLET, FILM COATED ORAL at 09:00

## 2020-08-10 RX ADMIN — TERAZOSIN HYDROCHLORIDE 5 MG: 5 CAPSULE ORAL at 20:07

## 2020-08-10 RX ADMIN — ATORVASTATIN CALCIUM 80 MG: 40 TABLET, FILM COATED ORAL at 20:07

## 2020-08-10 RX ADMIN — SODIUM CHLORIDE 100 ML/HR: 9 INJECTION, SOLUTION INTRAVENOUS at 12:33

## 2020-08-10 RX ADMIN — CLOPIDOGREL BISULFATE 75 MG: 75 TABLET ORAL at 09:00

## 2020-08-10 RX ADMIN — MEMANTINE 5 MG: 10 TABLET ORAL at 09:00

## 2020-08-10 RX ADMIN — HEPARIN SODIUM 5000 UNITS: 5000 INJECTION INTRAVENOUS; SUBCUTANEOUS at 06:13

## 2020-08-10 RX ADMIN — VANCOMYCIN HYDROCHLORIDE 1250 MG: 10 INJECTION, POWDER, LYOPHILIZED, FOR SOLUTION INTRAVENOUS at 08:58

## 2020-08-10 RX ADMIN — HEPARIN SODIUM 5000 UNITS: 5000 INJECTION INTRAVENOUS; SUBCUTANEOUS at 12:32

## 2020-08-10 RX ADMIN — FINASTERIDE 5 MG: 5 TABLET, FILM COATED ORAL at 09:00

## 2020-08-10 RX ADMIN — PANTOPRAZOLE SODIUM 40 MG: 40 TABLET, DELAYED RELEASE ORAL at 09:00

## 2020-08-10 RX ADMIN — POLYETHYLENE GLYCOL 3350 17 G: 17 POWDER, FOR SOLUTION ORAL at 08:59

## 2020-08-10 RX ADMIN — ALLOPURINOL 300 MG: 300 TABLET ORAL at 09:00

## 2020-08-10 RX ADMIN — LEVOTHYROXINE SODIUM 150 MCG: 150 TABLET ORAL at 06:13

## 2020-08-10 RX ADMIN — ASPIRIN 81 MG: 81 TABLET, COATED ORAL at 09:00

## 2020-08-10 RX ADMIN — MELATONIN TAB 5 MG 10 MG: 5 TAB at 20:07

## 2020-08-10 NOTE — PROGRESS NOTES
[facility] Medicine Services  PROGRESS NOTE    Patient Name: Domitila Bosch  : 1940  MRN: [id number]    Date of Admission: 2020  Length of Stay: 13  Primary Care Physician: Marcin Ferguson MD    Subjective   Subjective     CC:  Follow-up bacteremia    HPI:  Patient walking in the helm with occupational therapy today.  Arrives back to the room he is alert and oriented x2, pleasant, talkative.  Patient has moderate dementia.  Able to identify person place.  Reoriented to year and month.  Creatinine up to 1.28 today.  Encouraged the patient to increase fluid intake.  His wife states that often he forgets to drink.  Wife states that prior to hospitalization, he had poor fluid intake.    Review of Systems  Gen- No fevers, chills  CV- No chest pain, palpitations  Resp- No cough, dyspnea  GI- No N/V/D, abd pain  All 14 systems reviewed and are neg, except those mentioned in HPI.    Objective   Objective     Vital Signs:   Temp:  [98.6 °F (37 °C)] 98.6 °F (37 °C)  Heart Rate:  [68] 68  Resp:  [18] 18  BP: (151)/(85) 151/85  Total (NIH Stroke Scale): 1     Physical Exam:  Constitutional: Awake, alert  Eyes: PERRLA, sclerae anicteric, no conjunctival injection  HENT: NCAT, mucous membranes moist  Neck: Supple, no thyromegaly, no lymphadenopathy, trachea midline  Respiratory: Clear to auscultation bilaterally, nonlabored respirations   Cardiovascular: RRR, no murmurs, rubs, or gallops, palpable pedal pulses bilaterally  Gastrointestinal: Positive bowel sounds, soft, nontender, nondistended  Musculoskeletal: No bilateral ankle edema, no clubbing or cyanosis to extremities  Psychiatric: Appropriate affect, cooperative  Neurologic: Oriented x 2, forgetful and repeats information frequently, strength symmetric in all extremities, Cranial Nerves grossly intact to confrontation, speech clear  Skin: No rashes    Results Reviewed:    Results from last 7 days   Lab Units 20  4320  08/08/20  0432 08/07/20  0416   WBC 10*3/mm3 9.54 8.88 9.86   HEMOGLOBIN g/dL 12.9* 12.3* 12.2*   HEMATOCRIT % 39.8 37.7 37.6   PLATELETS 10*3/mm3 276 257 256     Results from last 7 days   Lab Units 08/10/20  0742 08/10/20  0417 08/09/20  0532 08/08/20  0432 08/07/20  0416  08/04/20  0402   SODIUM mmol/L 139 140 139 142 140   < > 138   POTASSIUM mmol/L 3.6 3.7 3.7 3.5 3.9   < > 3.8   CHLORIDE mmol/L 103 104 105 108* 105   < > 102   CO2 mmol/L 29.0 29.0 26.0 25.0 27.0   < > 26.0   BUN mg/dL 12 12 13 13 15   < > 11   CREATININE mg/dL 1.28* 1.29* 1.15 1.16 1.47*   < > 1.43*   GLUCOSE mg/dL 103* 102* 105* 97 99   < > 93   CALCIUM mg/dL 9.5 9.2 8.9 8.8 8.9   < > 9.2   ALT (SGPT) U/L  --   --   --  24 27  --  31   AST (SGOT) U/L  --   --   --  22 27  --  43*    < > = values in this interval not displayed.     Estimated Creatinine Clearance: 52.3 mL/min (A) (by C-G formula based on SCr of 1.28 mg/dL (H)).    Microbiology Results Abnormal     Procedure Component Value - Date/Time    Blood Culture - Blood, Chest, Right [[id number]] Collected:  07/30/20 0834    Lab Status:  Final result Specimen:  Blood from Chest, Right Updated:  08/04/20 0915     Blood Culture No growth at 5 days    Blood Culture - Blood, Arm, Left [[id number]] Collected:  07/30/20 0834    Lab Status:  Final result Specimen:  Blood from Arm, Left Updated:  08/04/20 0915     Blood Culture No growth at 5 days    COVID PRE-OP / PRE-PROCEDURE SCREENING ORDER (NO ISOLATION) - Swab, Nasopharynx [[id number]] Collected:  08/02/20 1602    Lab Status:  Final result Specimen:  Swab from Nasopharynx Updated:  08/02/20 7544    Narrative:       The following orders were created for panel order COVID PRE-OP / PRE-PROCEDURE SCREENING ORDER (NO ISOLATION) - Swab, Nasopharynx.  Procedure                               Abnormality         Status                     ---------                               -----------         ------                     Respiratory Panel PCR  w/...[[id number]]  Normal              Final result                 Please view results for these tests on the individual orders.    Respiratory Panel PCR w/COVID-19(SARS-CoV-2) PATRICIA/GREG/AMELIA In-House, NP Swab in [place]/[facility], 8-12 Hr TAT - Swab, Nasopharynx [[id number]]  (Normal) Collected:  08/02/20 1602    Lab Status:  Final result Specimen:  Swab from Nasopharynx Updated:  08/02/20 1713     ADENOVIRUS, PCR Not Detected     Coronavirus 229E Not Detected     Coronavirus HKU1 Not Detected     Coronavirus NL63 Not Detected     Coronavirus OC43 Not Detected     COVID19 Not Detected     Human Metapneumovirus Not Detected     Human Rhinovirus/Enterovirus Not Detected     Influenza A PCR Not Detected     Influenza A H1 Not Detected     Influenza A H1 2009 PCR Not Detected     Influenza A H3 Not Detected     Influenza B PCR Not Detected     Parainfluenza Virus 1 Not Detected     Parainfluenza Virus 2 Not Detected     Parainfluenza Virus 3 Not Detected     Parainfluenza Virus 4 Not Detected     RSV, PCR Not Detected     Bordetella pertussis pcr Not Detected     Bordetella parapertussis PCR Not Detected     Chlamydophila pneumoniae PCR Not Detected     Mycoplasma pneumo by PCR Not Detected    Narrative:       Fact sheet for providers: https://docs.[employer]/wp-content/uploads/LWQ5215-6718-OW6.1-EUA-Provider-Fact-Sheet-3.pdf    Fact sheet for patients: https://docs.[employer]/wp-content/uploads/STC1726-2006-GY7.1-EUA-Patient-Fact-Sheet-1.pdf    Urine Culture - Urine, Urine, Clean Catch [[id number]]  (Abnormal)  (Susceptibility) Collected:  07/28/20 1021    Lab Status:  Edited Result - FINAL Specimen:  Urine, Clean Catch Updated:  07/31/20 1057     Urine Culture >100,000 CFU/mL Enterococcus faecalis    Narrative:        requested Daptomycin 7/30    Susceptibility      Enterococcus faecalis     GURPREET     Ampicillin Susceptible     Daptomycin Susceptible     Levofloxacin Susceptible     Nitrofurantoin Susceptible      Tetracycline Susceptible     Vancomycin Susceptible                    Blood Culture - Blood, Arm, Left [[id number]]  (Abnormal)  (Susceptibility) Collected:  07/28/20 1133    Lab Status:  Edited Result - FINAL Specimen:  Blood from Arm, Left Updated:  07/31/20 0723     Blood Culture Enterococcus faecalis     Comment:   Infectious disease consultation is highly recommended.        Isolated from Aerobic and Anaerobic Bottles     Gram Stain Aerobic Bottle Gram positive cocci in chains      Anaerobic Bottle Gram positive cocci in chains    Narrative:       Dr. Sandhu requested Daptomycin     Susceptibility      Enterococcus faecalis     GURPREET     Ampicillin Susceptible     Daptomycin Intermediate     Gentamicin High Level Synergy Susceptible     Vancomycin Susceptible                    Blood Culture - Blood, Arm, Right [[id number]]  (Abnormal) Collected:  07/28/20 1125    Lab Status:  Final result Specimen:  Blood from Arm, Right Updated:  07/30/20 0656     Blood Culture Enterococcus faecalis     Comment:   Infectious disease consultation is highly recommended.        Isolated from Aerobic and Anaerobic Bottles     Gram Stain Aerobic Bottle Gram positive cocci in chains      Anaerobic Bottle Gram positive cocci in chains    Narrative:       Refer to previous blood culture collected on 7/28/2020 1133 for MICs    Blood Culture ID, PCR - Blood, Arm, Left [[id number]]  (Abnormal) Collected:  07/28/20 1133    Lab Status:  Final result Specimen:  Blood from Arm, Left Updated:  07/29/20 0555     BCID, PCR Enterococcus spp. Identification by BCID PCR.          Imaging Results (Last 24 Hours)     ** No results found for the last 24 hours. **          Results for orders placed during the hospital encounter of 07/28/20   Adult Transesophageal Echo (SUMMER) W/ Cont if Necessary Per Protocol    Narrative · Estimated EF = 65%.  · Left ventricular systolic function is normal.  · Left atrial cavity size is mild-to-moderately dilated.  ·  There is mild calcification of the aortic valve mainly affecting the non   and right coronary cusp(s).  · Saline test results are negative.  · The aortic valve exhibits moderate sclerosis.  · There is no evidence of pericardial effusion.  · There is moderate (grade 2) protruding plaque in the descending aorta   present.  · No valvular vegetations demonstrated.          I have reviewed the medications:  Scheduled Meds:  [START ON 8/11/2020] Pharmacy Consult  Does not apply Once   allopurinol 300 mg Oral Daily   aspirin 81 mg Oral Daily   atorvastatin 80 mg Oral Nightly   busPIRone 10 mg Oral Q12H   clopidogrel 75 mg Oral Daily   docusate sodium 100 mg Oral BID   finasteride 5 mg Oral Daily   heparin (porcine) 5,000 Units Subcutaneous Q8H   levothyroxine 150 mcg Oral Q AM   melatonin 10 mg Oral Nightly   memantine 5 mg Oral Daily   pantoprazole 40 mg Oral QAM   polyethylene glycol 17 g Oral Daily   sertraline 50 mg Oral Daily   sodium chloride 10 mL Intravenous Q12H   terazosin 5 mg Oral Nightly   vancomycin 1,250 mg Intravenous Q24H     Continuous Infusions:  Pharmacy to dose vancomycin    sodium chloride 100 mL/hr     PRN Meds:.acetaminophen  •  hydrOXYzine  •  ipratropium-albuterol  •  ondansetron  •  Pharmacy to dose vancomycin  •  potassium chloride  •  potassium chloride  •  sodium chloride  •  sodium chloride  •  sodium chloride      Assessment/Plan   Assessment / Plan     Active Hospital Problems    Diagnosis  POA   • **Acute UTI [N39.0]  Yes   • Acute diastolic heart failure (CMS/HCC) [I50.31]  Unknown   • Acute respiratory failure with hypoxia (CMS/HCC) [J96.01]  Unknown   • Bacteremia [R78.81]  Unknown   • Encephalopathy acute [G93.40]  Yes   • DANIS (acute kidney injury) (CMS/HCC) [N17.9]  Yes   • Carotid stenosis [I65.29]  Yes   • Dyslipidemia [E78.5]  Yes   • Hypothyroidism [E03.9]  Yes   • GERD (gastroesophageal reflux disease) [K21.9]  Yes   • Anxiety disorder [F41.9]  Yes      Resolved Hospital Problems    No resolved problems to display.      Brief Hospital Course to date:  Domitila Bosch is a 79 y.o. male PMHx significant for carotid stenosis, dyslipidemia, TIA/CVA with residual right sided weakness, hypertension, hypothyroidism, anxiety who presents to [facility] with complaints of increasing confusion with dementia at baseline.  Patient found to be septic/bacteremic due to UTI, DANIS/dehydration.  Patient receiving antibiotic therapy.  Monitoring kidney function closely as patient is on vancomycin.    This patient's problems and plans were partially entered by my partner and updated as appropriate by me 08/10/20.     Metabolic Encephalopathy related to sepsis/bacteremia - some improvement   - likely multifactorial and secondary to infection and DANIS/dehydration  - CT head with no acute findings, previous MRI shows history of stroke in the past  - continue namenda     Sepsis - POA   UTI - enterococcus   Bacteremia - enterococcus   - allergy to Cephalosporins and penicillins   - BCx x 2 with enterococcus; UCx enterococcus; repeat blood cultures NGTD    - DC azactam (7/28-7/29) and continue vanc (7/29) (closely monitoring kidney function)  - ID following   - Urology consulted - no intervention   - ECHO with no comment on valvular veg -- SUMMER negative  - PICC line ordered 8/5, plan to go home with home health infusion and follow up with ID as outpt once creatinine stable      Acute hypoxic resp failure - improved   Acute diastolic heart failure - improved   - Likely related to volume overload; ABG reviewed; CXR reviewed   - ECHO with normal EF; diastolic heart failure  - Wean O2 as tolerated   - maxide on hold secondary to labile renal function  -Continue to monitor for fluid volume overload     DANIS - improved   - baseline around 0.8, 1.9 on admission   - renal US with R non-obs stone; enlargement of prostate; bladder debris  -Since patient is on vancomycin we need to be cautious about damaging the kidneys.    -Maxide on  hold  -Creatinine slightly worse today at 1.28  -IV fluids 500 mill over 5 hours  -Encourage fluid intake  -BMP in AM      BPH  Urinary retention  - continue hytrin and proscar      Falls:  - PT/OT to see, currently lives at home with his wife  - [facility]     HLD  Hx of CVA w/residual right sided weakness  Carotid Stenosis  - continue ASA, plavix, statin     Hypothyroidism:  - levothyroxine     Hypertension:  - continue hytrin, hold hctz    DVT Prophylaxis: SQH    Disposition: I expect the patient to be discharged home with home health hopefully in the next 1-2 days once medically ready. Dr. Martínez advising the need for creatinine stability off IV fluids due to vancomycin therapy.    CODE STATUS:   Code Status and Medical Interventions:   Ordered at: 07/28/20 1249     Level Of Support Discussed With:    Patient     Code Status:    CPR     Medical Interventions (Level of Support Prior to Arrest):    Full     Electronically signed by YVNA Pike, 08/10/20, 12:10.

## 2020-08-10 NOTE — PLAN OF CARE
Problem: Patient Care Overview  Goal: Plan of Care Review  8/10/2020 1443 by Latonya Brennan, PT  Flowsheets (Taken 8/10/2020 1438)  Progress: improving  Plan of Care Reviewed With: patient;spouse  Outcome Summary: Pt dem increased independence with sit <> stand t/f's, req SBA. Pt ambulated laps in hallway with RW with CGA to SBA, with 1 LOB while turning. VSS upon returning to room. Continue POC.

## 2020-08-10 NOTE — PROGRESS NOTES
Continued Stay Note  [facility]     Patient Name: Domitila Bosch  MRN: [id number]  Today's Date: 8/10/2020    Admit Date: 7/28/2020    Discharge Plan     Row Name 08/10/20 1144       Plan    Plan  Home with [facility]    Plan Comments  SW met with patient and wife at bedside to discuss discharge planning. Patient inquired about ABX plan (outpatient visits) and when he was being discahrged. Denied any other needs at this time.        Discharge Codes    No documentation.             JACK Rodriguez (Kay)

## 2020-08-10 NOTE — THERAPY TREATMENT NOTE
Patient Name: Domitila Bosch  : 1940    MRN: [id number]                              Today's Date: 8/10/2020       Admit Date: 2020    Visit Dx:     ICD-10-CM ICD-9-CM   1. Acute UTI N39.0 599.0   2. Altered mental status, unspecified altered mental status type R41.82 780.97   3. History of CVA (cerebrovascular accident) Z86.73 V12.54   4. Weakness R53.1 780.79   5. Visual hallucinations R44.1 368.16     Patient Active Problem List   Diagnosis   • Diverticulosis of large intestine with hemorrhage   • Umbilical hernia   • S/P hernia repair   • Dyslipidemia   • Carotid stenosis   • Gout   • GERD (gastroesophageal reflux disease)   • Hypothyroidism   • Neuropathy   • Malignant melanoma (CMS/HCC)   • Asthma   • Anxiety disorder   • Diverticulosis   • Muscle pain   • Lumbar radiculopathy   • Kidney stone   • Deviated nasal septum   • Chronic laryngitis   • Acute CVA (cerebrovascular accident) (CMS/HCC)   • Elevated BP without diagnosis of hypertension   • Allergy to Betadine   • Acute UTI   • Encephalopathy acute   • DANIS (acute kidney injury) (CMS/HCC)   • Bacteremia   • Acute diastolic heart failure (CMS/HCC)   • Acute respiratory failure with hypoxia (CMS/HCC)     Past Medical History:   Diagnosis Date   • Anxiety disorder 2017   • Asthma 2017   • Basal cell carcinoma in situ of skin 2019    right leg    • Carotid stenosis 2017   • Diaphoresis    • Diastolic dysfunction    • Diverticulitis    • Dyslipidemia 2017   • GERD (gastroesophageal reflux disease) 2017   • Gout 2017   • Herpes zoster     Herpes zoster, [id number], right lower extremity.    • Hypertension 2017   • Hypothyroidism 2017   • LVH (left ventricular hypertrophy)    • Malignant melanoma (CMS/HCC) 2017   • Melanoma (CMS/HCC)    • Mitral regurgitation    • Nephrolithiasis    • Post herpetic neuralgia 2017   • TIA (transient ischemic attack)     TIA, 2012, with nonobstructive carotid  stenosis, dyslipidemia and hypertension     Past Surgical History:   Procedure Laterality Date   • ABDOMINAL SURGERY     • APPENDECTOMY     • COLON RESECTION LEFT  2016   • COLON RESECTION LEFT  2016   • HEEL SPUR SURGERY  1999   • HERNIA REPAIR      hiatal hernia    • NISSEN FUNDOPLICATION  1984   • OTHER SURGICAL HISTORY  2010    Malignant melanoma, status post resection      General Information     [facility] Name 08/10/20 1402          PT Evaluation Time/Intention    Document Type  therapy note (daily note)  -     Mode of Treatment  physical therapy;individual therapy  -[facility] Name 08/10/20 1402          General Information    Existing Precautions/Restrictions  fall  -[facility] Name 08/10/20 1402          Cognitive Assessment/Intervention- PT/OT    Orientation Status (Cognition)  oriented to;person;place  -     Cognitive Assessment/Intervention Comment  pleasant and cooperative. STM deficits.  -[facility] Name 08/10/20 1402          Safety Issues, Functional Mobility    Impairments Affecting Function (Mobility)  balance;cognition;strength  -       User Key  (r) = Recorded By, (t) = Taken By, (c) = Cosigned By    Initials Name Provider Type     Latonya Brennan, PT Physical Therapist        Mobility     [facility] Name 08/10/20 1436          Bed Mobility Assessment/Treatment    Scooting/Bridging [place] (Bed Mobility)  conditional independence  -     Supine-Sit [place] (Bed Mobility)  conditional independence  -     Sit-Supine [place] (Bed Mobility)  conditional independence  -     Assistive Device (Bed Mobility)  head of bed elevated  -     Comment (Bed Mobility)  good safety awareness  -[facility] Name 08/10/20 1436          Transfer Assessment/Treatment    Comment (Transfers)  Good safety awareness  -SJ     Row Name 08/10/20 1436          Sit-Stand Transfer    Sit-Stand [place] (Transfers)  stand by assist  -     Assistive Device (Sit-Stand Transfers)  walker, front-wheeled  -      Row Name 08/10/20 1436          Gait/Stairs Assessment/Training    90971 - Gait Training Minutes   23  -SJ     Gait/Stairs Assessment/Training  gait/ambulation independence;gait/ambulation assistive device  -     [place] Level (Gait)  contact guard;stand by assist  -     Assistive Device (Gait)  walker, front-wheeled  -     Distance in Feet (Gait)  450  -SJ     Pattern (Gait)  step-through  -SJ     Deviations/Abnormal Patterns (Gait)  bilateral deviations;rody decreased;stride length decreased  -SJ     Bilateral Gait Deviations  forward flexed posture;heel strike decreased  -SJ     Comment (Gait/Stairs)  Pt needed cues to stay inside RW, had 1 LOB when turning from room into hallway needing Jose De Jesus to correct.   -       User Key  (r) = Recorded By, (t) = Taken By, (c) = Cosigned By    Initials Name Provider Type    Latonya Monk, PT Physical Therapist        Obj/Interventions     Row Name 08/10/20 1437          Static Sitting Balance    Level of [place] (Unsupported Sitting, Static Balance)  independent  -     Sitting Position (Unsupported Sitting, Static Balance)  sitting on edge of bed  -SJ     Row Name 08/10/20 1437          Static Standing Balance    Level of [place] (Supported Standing, Static Balance)  supervision  -     Assistive Device Utilized (Supported Standing, Static Balance)  walker, rolling  -       User Key  (r) = Recorded By, (t) = Taken By, (c) = Cosigned By    Initials Name Provider Type    Latonya Monk, PT Physical Therapist        Goals/Plan    No documentation.       Clinical Impression     Row Name 08/10/20 1438          Pain Assessment    Additional Documentation  Pain Scale: Numbers Pre/Post-Treatment (Group)  -[facility] 08/10/20 1438          Pain Scale: Numbers Pre/Post-Treatment    Pain Scale: Numbers, Pretreatment  0/10 - no pain  -     Pain Scale: Numbers, Post-Treatment  0/10 - no pain  -SJ     Row Name 08/10/20 1438          Plan of  Care Review    Plan of Care Reviewed With  patient;spouse  -     Progress  improving  -     Outcome Summary  Pt dem increased independence with sit <> stand t/f's, req SBA. Pt ambulated laps in hallway with RW with CGA to SBA, with 1 LOB while turning. VSS upon returning to room. Continue POC.  -     Row Name 08/10/20 1438          Positioning and Restraints    Pre-Treatment Position  in bed  -     Post Treatment Position  bed  -SJ     In Bed  notified nsg;fowlers;exit alarm on;encouraged to call for assist;call light within reach;with family/caregiver  -       User Key  (r) = Recorded By, (t) = Taken By, (c) = Cosigned By    Initials Name Provider Type    Latonya Monk PT Physical Therapist        Outcome Measures     Row Name 08/10/20 1442          How much help from another person do you currently need...    Turning from your back to your side while in flat bed without using bedrails?  4  -SJ     Moving from lying on back to sitting on the side of a flat bed without bedrails?  4  -SJ     Moving to and from a bed to a chair (including a wheelchair)?  3  -SJ     Standing up from a chair using your arms (e.g., wheelchair, bedside chair)?  4  -SJ     Climbing 3-5 steps with a railing?  3  -SJ     To walk in hospital room?  3  -SJ     AM-PAC 6 Clicks Score (PT)  21  -     Row Name 08/10/20 1442          Functional Assessment    Outcome Measure Options  AM-PAC 6 Clicks Basic Mobility (PT)  -       User Key  (r) = Recorded By, (t) = Taken By, (c) = Cosigned By    Initials Name Provider Type    Latonya Monk PT Physical Therapist        Physical Therapy Education                 Title: PT OT SLP Therapies (Done)     Topic: Physical Therapy (Done)     Point: Mobility training (Done)     Description:   Instruct learner(s) on safety and technique for assisting patient out of bed, chair or wheelchair.  Instruct in the proper use of assistive devices, such as walker, crutches, cane or brace.               Patient Friendly Description:   It's important to get you on your feet again, but we need to do so in a way that is safe for you. Falling has serious consequences, and your personal safety is the most important thing of all.        When it's time to get out of bed, one of us or a family member will sit next to you on the bed to give you support.     If your doctor or nurse tells you to use a walker, crutches, a cane, or a brace, be sure you use it every time you get out of bed, even if you think you don't need it.    Learning Progress Summary           Patient Acceptance, E, VU,DU by  at 8/10/2020 1442    Acceptance, E, NR by AS at 8/6/2020 1033    Acceptance, E,TB, VU by  at 8/3/2020 1351    Acceptance, E,TB, VU by  at 7/31/2020 0505    Acceptance, E,TB, VU by TH at 7/30/2020 0635    Acceptance, E, NR by  at 7/29/2020 1323    Acceptance, E,TB, VU by PC at 7/28/2020 1546   Significant Other Acceptance, E,TB, VU by PC at 7/28/2020 1546                   Point: Home exercise program (Done)     Description:   Instruct learner(s) on appropriate technique for monitoring, assisting and/or progressing patient with therapeutic exercises and activities.              Learning Progress Summary           Patient Acceptance, E, VU,DU by  at 8/10/2020 1442    Acceptance, E, NR by AS at 8/6/2020 1033    Acceptance, E,TB, VU by  at 8/3/2020 1351    Acceptance, E,TB, VU by TH at 7/31/2020 0505    Acceptance, E,TB, VU by  at 7/30/2020 0635    Acceptance, E,TB, VU by PC at 7/28/2020 1546   Significant Other Acceptance, E,TB, VU by PC at 7/28/2020 1546                   Point: Body mechanics (Done)     Description:   Instruct learner(s) on proper positioning and spine alignment for patient and/or caregiver during mobility tasks and/or exercises.              Learning Progress Summary           Patient Acceptance, E, VU,DU by  at 8/10/2020 1442    Acceptance, E, NR by AS at 8/6/2020 1033    Acceptance, E,TB, VU by   at 8/3/2020 1351    Acceptance, E,TB, VU by TH at 7/31/2020 0505    Acceptance, E,TB, VU by TH at 7/30/2020 0635    Acceptance, E,TB, VU by PC at 7/28/2020 1546   Significant Other Acceptance, E,TB, VU by PC at 7/28/2020 1546                   Point: Precautions (Done)     Description:   Instruct learner(s) on prescribed precautions during mobility and gait tasks              Learning Progress Summary           Patient Acceptance, E, VU,DU by  at 8/10/2020 1442    Acceptance, E, NR by AS at 8/6/2020 1033    Acceptance, E,TB, VU by  at 8/3/2020 1351    Acceptance, E,TB, VU by  at 7/31/2020 0505    Acceptance, E,TB, VU by  at 7/30/2020 0635    Acceptance, E, NR by  at 7/29/2020 1323    Acceptance, E,TB, VU by PC at 7/28/2020 1546   Significant Other Acceptance, E,TB, VU by PC at 7/28/2020 1546                               User Key     Initials Effective Dates Name Provider Type Discipline     06/19/15 -  Latonya Brennan, PT Physical Therapist PT    AS 06/22/15 -  Alvina Amezcua, PTA Physical Therapy Assistant PT     07/24/19 -  Kely Pathak PT Physical Therapist PT     06/16/16 -  Niya Dupont RN Registered Nurse Nurse     01/30/20 -  Magda Humphrey, RN Registered Nurse Nurse     07/07/20 -  Deja Levy, RN Registered Nurse Nurse              PT Recommendation and Plan     Plan of Care Reviewed With: patient, spouse  Progress: improving  Outcome Summary: Pt dem increased independence with sit <> stand t/f's, req SBA. Pt ambulated laps in hallway with RW with CGA to SBA, with 1 LOB while turning. VSS upon returning to room. Continue POC.     Time Calculation:   PT Charges     Row Name 08/10/20 1444 08/10/20 1436          Time Calculation    Start Time  1402  -  --     PT Received On  08/10/20  -  --     PT Goal Re-Cert Due Date  08/19/20  -  --        Time Calculation- PT    Total Timed Code Minutes- PT  23 minute(s)  -  --        Timed Charges    55606 - Gait Training Minutes    --  23  -       User Key  (r) = Recorded By, (t) = Taken By, (c) = Cosigned By    Initials Name Provider Type     Latonya Brennan PT Physical Therapist        Therapy Charges for Today     Code Description Service Date Service Provider Modifiers Qty    52298659885 HC GAIT TRAINING EA 15 MIN 8/10/2020 Latonya Brennan, PT GP 2          PT G-Codes  Outcome Measure Options: AM-PAC 6 Clicks Basic Mobility (PT)  AM-PAC 6 Clicks Score (PT): 21  AM-PAC 6 Clicks Score (OT): 19    Latonya Brennan PT  8/10/2020

## 2020-08-10 NOTE — PROGRESS NOTES
[facility] Progress Note        Antibiotics:  Anti-Infectives (From admission, onward)    Ordered     Dose/Rate Route Frequency Start Stop    08/09/20 0733  vancomycin 1250 mg/250 mL 0.9% NS IVPB ([facility])  Review   Ordering Provider:  Kala Hammer    1,250 mg  over 90 Minutes Intravenous Every 24 Hours 08/09/20 0900 08/15/20 0859    08/08/20 0531  vancomycin (VANCOCIN) in iso-osmotic dextrose IVPB 1 g (premix) 200 mL     Ordering Provider:  William Welch IV, RPH    1,000 mg  over 60 Minutes Intravenous Once 08/08/20 0630 08/08/20 0712    08/07/20 1338  vancomycin (VANCOCIN) in iso-osmotic dextrose IVPB 1 g (premix) 200 mL     Ordering Provider:  Joel Terrazas, RPH    1,000 mg  over 60 Minutes Intravenous Once 08/07/20 1430 08/07/20 1736    08/05/20 0754  vancomycin (VANCOCIN) in iso-osmotic dextrose IVPB 1 g (premix) 200 mL     Ordering Provider:  Norberto Beckham RPH    1,000 mg  over 60 Minutes Intravenous Once 08/05/20 0845 08/05/20 0945    07/29/20 0556  Pharmacy to dose vancomycin     Sabino Sandhu MD reviewed the order on 08/10/20 0553.   Ordering Provider:  Sabino Sandhu MD     Does not apply Continuous PRN 07/29/20 0555 08/12/20 0554    07/28/20 1111  aztreonam (AZACTAM) 2 g/100 mL 0.9% NS ([facility]p)     Ordering Provider:  Norberto Son PA    2 g  over 30 Minutes Intravenous Once 07/28/20 1113 07/28/20 1231    07/28/20 1111  vancomycin 1750 mg/500 mL 0.9% NS IVPB ([facility])     Ordering Provider:  Teresa Pollard DO    20 mg/kg × 86.2 kg Intravenous Once 07/28/20 1113 07/28/20 1626          CC: fatigue    HPI:    Patient is a 79 y.o.  Yr old male with history of TIA/CVA with chronic/residual right-sided weakness and generally poor memory but does not carry a formal diagnosis of dementia.  He was admitted July 28, 2020 with 3 to 4 days worsening confusion from baseline; patient reports dysuria/urinary incontinence/urinary frequency although he could not attach a specific timeline to this and in  general his ability to give detailed history is limited with poor insight.  Nursing reported fever at admission associated with acute kidney injury and evidence for UTI with concern for sepsis urinary source.  Subsequently with blood culture showing gram-positive cocci and preliminary PCR data with enterococcus, vancomycin resistance not detected by initial PCR information; he was placed on empiric vancomycin/Azactam.     He reports some improvement since admission although his insight remains poor.     Wife reports Hx enlarged prostate with chronic urinary retention and has followed with Dr Bazan in urology; allergy to PCN and keflex with rash to both     8/4/20 SUMMER no valvular vegetation;  Descending aorta with protruding plaque    8/5/20 creat trended down some after fluids    8/10/20 seen early and sleepy ; IV fluids per d/w Dr Flowers given creat trended back up;   No new focal pain; Dysuria better;  Denies hematuria or pyuria and no flank pain at this time.     No headache photophobia or neck stiffness.  No nausea vomiting diarrhea or abdominal pain.  No shortness of breath or cough.  No rash.        ROS:      8/10/20 No f/c/s. No n/v/d. No rash. No new ADR to Abx.     Constitutional-- No chills or sweats.  Appetite diminished with generalized fatigue  Heent-- No new vision, hearing or throat complaints.  No epistaxis or oral sores.  Denies odynophagia or dysphagia.  No flashers, floaters or eye pain. No odynophagia or dysphagia. No headache, photophobia or neck stiffness.  CV-- No chest pain, palpitation or syncope  Resp-- No SOB/cough/Hemoptysis  GI- No nausea, vomiting, or diarrhea.  No hematochezia, melena, or hematemesis. Denies jaundice or chronic liver disease.  --as above  Lymph- no swollen lymph nodes in neck/axilla or groin.   Heme- No active bruising or bleeding; no Hx of DVT or PE.  MS-- no swelling or pain in the bones or joints of arms/legs.  No new back pain.  Neuro-- No acute focal weakness  "or numbness in the arms or legs.  No seizures.  Chronic residual right-sided weakness     Full 12 point review of systems reviewed and negative otherwise for acute complaints, except for above      PE:   /85 (BP Location: Left arm, Patient Position: Lying)   Pulse 68   Temp 98.6 °F (37 °C) (Oral)   Resp 18   Ht 185.4 cm (72.99\")   Wt 80.4 kg (177 lb 3.2 oz)   SpO2 94%   BMI 23.38 kg/m²     GENERAL: sleepy, in no acute distress.   HEENT: Normocephalic, atraumatic.  PERRL. EOMI. No conjunctival injection. No icterus. Oropharynx clear without evidence of thrush or exudate. No evidence of peridontal disease.    NECK: Supple without nuchal rigidity. No mass.  LYMPH: No cervical, axillary or inguinal lymphadenopathy.  HEART: RRR; No murmur, rubs, gallops.   LUNGS: diminished at bases to auscultation bilaterally without wheezing, rales, rhonchi. Normal respiratory effort. Nonlabored. No dullness.  ABDOMEN: Soft, nontender, nondistended. Positive bowel sounds. No rebound or guarding. NO mass or HSM.  EXT:  No cyanosis, clubbing or edema. No cord.  MSK: FROM without joint effusions noted arms/legs.    SKIN: Warm and dry without cutaneous eruptions on Inspection/palpation.    NEURO: sleepy     No CVA tenderness     No peripheral stigmata/phenomena of endocarditis       Laboratory Data    Results from last 7 days   Lab Units 08/09/20  0532 08/08/20  0432 08/07/20  0416   WBC 10*3/mm3 9.54 8.88 9.86   HEMOGLOBIN g/dL 12.9* 12.3* 12.2*   HEMATOCRIT % 39.8 37.7 37.6   PLATELETS 10*3/mm3 276 257 256     Results from last 7 days   Lab Units 08/10/20  0417   SODIUM mmol/L 140   POTASSIUM mmol/L 3.7   CHLORIDE mmol/L 104   CO2 mmol/L 29.0   BUN mg/dL 12   CREATININE mg/dL 1.29*   GLUCOSE mg/dL 102*   CALCIUM mg/dL 9.2     Results from last 7 days   Lab Units 08/08/20  0432   ALK PHOS U/L 119*   BILIRUBIN mg/dL 0.5   ALT (SGPT) U/L 24   AST (SGOT) U/L 22               Estimated Creatinine Clearance: 51.9 mL/min (A) (by " C-G formula based on SCr of 1.29 mg/dL (H)).      Microbiology:      Radiology:  Imaging Results (Last 72 Hours)     ** No results found for the last 72 hours. **            Impression:     --Acute sepsis.  Fever/leukocytosis and acute kidney injury with enterococcal bacteremia/septicemia.  Concern for urinary source at present.  Abdominal exam otherwise benign with no nausea/vomiting/diarrhea.  If enterococcus did not grow in the urine or if new abdominal symptoms arise consideration could be given to further GI work-up such as CT scan or GI consultation/endoscopy etc.  Otherwise, chest clear, no cough or breathing difficulty, no obvious skin or soft tissue changes.  Monitor for other potential foci/pathogen     -- Acute  enterococcus bacteremia/septicemia.  Currently no stigmata of endocarditis but certainly could evolve.  High risk for further serious morbidity and other serious sequela.  SUMMER no valvular vegetation but has protruding plaque in descending aorta, ?significance     --Acute enterococcal complicated UTI.  Associated with septicemia/bacteremia as above.  Urology following and any  Further functional/anatomic assessment at their discretion     --Acute kidney injury.  Creat trended up a bit 8/4; vanco adjusted by pharmacy; monitor to help guide further adjustments; creat better 8/5 after fluids; creat back up a bit 8/6 after stopping IV fluids and better again by 8/8    --acute pulm edema?; vascular congestion per radiology on CXR     --Acute encephalopathy.  Baseline not entirely clear with prior history of stroke/TIA and nursing reports baseline with forgetfulness with possible cognitive difficulties at baseline.  Need further clarification from family.  Currently no meningismus.  Further neurologic work-up or neurology consultation at discretion of internal medicine     --History of TIA/CVA with reports of residual right-sided weakness.          PLAN:     --IV vancomycin      --Check/review labs  cultures and scans     --Partial history per nursing staff     --Discussed with microbiology     --Discussed with Dr. Flowers;  He is managing hydration/IV fluids and monitor renal function closely     --Highly complex set of issues with high risk for further serious morbidity and other serious sequela    --urology following    --repeated blood cultures and negative so far; TTE no mention of vegetation per cardiology;    SUMMER  noted     --d/w pharmacy       Sabino Sandhu MD  8/10/2020

## 2020-08-10 NOTE — THERAPY TREATMENT NOTE
Acute Care - Occupational Therapy Treatment Note  [facility]     Patient Name: Domitila Bosch  : 1940  MRN: [id number]  Today's Date: 8/10/2020     Date of Referral to OT: 20  Referring Physician: MD Latrice    Admit Date: 2020       ICD-10-CM ICD-9-CM   1. Acute UTI N39.0 599.0   2. Altered mental status, unspecified altered mental status type R41.82 780.97   3. History of CVA (cerebrovascular accident) Z86.73 V12.54   4. Weakness R53.1 780.79   5. Visual hallucinations R44.1 368.16     Patient Active Problem List   Diagnosis   • Diverticulosis of large intestine with hemorrhage   • Umbilical hernia   • S/P hernia repair   • Dyslipidemia   • Carotid stenosis   • Gout   • GERD (gastroesophageal reflux disease)   • Hypothyroidism   • Neuropathy   • Malignant melanoma (CMS/HCC)   • Asthma   • Anxiety disorder   • Diverticulosis   • Muscle pain   • Lumbar radiculopathy   • Kidney stone   • Deviated nasal septum   • Chronic laryngitis   • Acute CVA (cerebrovascular accident) (CMS/HCC)   • Elevated BP without diagnosis of hypertension   • Allergy to Betadine   • Acute UTI   • Encephalopathy acute   • DANIS (acute kidney injury) (CMS/HCC)   • Bacteremia   • Acute diastolic heart failure (CMS/HCC)   • Acute respiratory failure with hypoxia (CMS/HCC)     Past Medical History:   Diagnosis Date   • Anxiety disorder 2017   • Asthma 2017   • Basal cell carcinoma in situ of skin 2019    right leg    • Carotid stenosis 2017   • Diaphoresis    • Diastolic dysfunction    • Diverticulitis    • Dyslipidemia 2017   • GERD (gastroesophageal reflux disease) 2017   • Gout 2017   • Herpes zoster     Herpes zoster, [id number], right lower extremity.    • Hypertension 2017   • Hypothyroidism 2017   • LVH (left ventricular hypertrophy)    • Malignant melanoma (CMS/HCC) 2017   • Melanoma (CMS/HCC)    • Mitral regurgitation    • Nephrolithiasis    • Post herpetic neuralgia  02/16/2017   • TIA (transient ischemic attack)     TIA, June 2012, with nonobstructive carotid stenosis, dyslipidemia and hypertension     Past Surgical History:   Procedure Laterality Date   • ABDOMINAL SURGERY     • APPENDECTOMY     • COLON RESECTION LEFT  2016   • COLON RESECTION LEFT  2016   • HEEL SPUR SURGERY  1999   • HERNIA REPAIR      hiatal hernia    • NISSEN FUNDOPLICATION  1984   • OTHER SURGICAL HISTORY  2010    Malignant melanoma, status post resection        Therapy Treatment    Rehabilitation Treatment Summary     Row Name 08/10/20 1020             Treatment Time/Intention    Discipline  occupational therapist  -SD      Document Type  therapy note (daily note)  -SD      Subjective Information  no complaints  -SD      Mode of Treatment  occupational therapy  -SD      Patient/Family Observations  Pt. supine in bed. Pt.'s wife present at bedside.  -SD      Care Plan Review  care plan/treatment goals reviewed;current/potential barriers reviewed;patient/other agree to care plan  -SD      Care Plan Review, Other Participant(s)  spouse  -SD      Total Minutes, Occupational Therapy Treatment  32  -SD      Patient Effort  excellent  -SD      Comment  pt. kept repeating himself, unaware that he'd already stated same facts before  -SD      Recorded by [SD] Eva Zuleta OT 08/10/20 1106      Row Name 08/10/20 1020             Vital Signs    Pre Systolic BP Rehab  126  -SD      Pre Treatment Diastolic BP  92  -SD      Posttreatment Heart Rate (beats/min)  72  -SD      Post SpO2 (%)  96  -SD      O2 Delivery Post Treatment  room air  -SD      Pre Patient Position  Supine  -SD      Intra Patient Position  Standing  -SD      Post Patient Position  Sitting  -SD      Recorded by [SD] Eva Zuleta OT 08/10/20 1106      Row Name 08/10/20 1020             Cognitive Assessment/Intervention- PT/OT    Affect/Mental Status (Cognitive)  confused  -SD      Orientation Status (Cognition)  oriented  to;person;place;verbal cues/prompts needed for orientation;other (see comments) unable to recall name of [employer]  -SD      Follows Commands (Cognition)  follows one step commands;over 90% accuracy;verbal cues/prompting required;repetition of directions required  -SD      Cognitive Function (Cognitive)  memory deficit  -SD      Memory Deficit (Cognitive)  mild deficit;short term memory  -SD      Personal Safety Interventions  fall prevention program maintained;gait belt;nonskid shoes/slippers when out of bed  -SD      Recorded by [SD] Eva Zuleta OT 08/10/20 1106      Row Name 08/10/20 1020             Safety Issues, Functional Mobility    Safety Issues Affecting Function (Mobility)  awareness of need for assistance;insight into deficits/self awareness;safety precaution awareness;safety precautions follow-through/compliance;positioning of assistive device  -SD      Impairments Affecting Function (Mobility)  balance;cognition;strength  -SD      Recorded by [SD] Eva Zuleta OT 08/10/20 1106      Row Name 08/10/20 1020             Bed Mobility Assessment/Treatment    Bed Mobility Assessment/Treatment  rolling left;scooting/bridging;supine-sit  -SD      Rolling Left [place] (Bed Mobility)  conditional independence  -SD      Scooting/Bridging [place] (Bed Mobility)  conditional independence  -SD      Supine-Sit [place] (Bed Mobility)  conditional independence  -SD      Assistive Device (Bed Mobility)  head of bed elevated  -SD      Recorded by [SD] Eva Zuleta OT 08/10/20 1106      Row Name 08/10/20 1020             Functional Mobility    Functional Mobility- Ind. Level  contact guard assist  -SD      Functional Mobility- Device  rolling walker  -SD      Functional Mobility-Distance (Feet)  200  -SD      Functional Mobility- Safety Issues  balance decreased during turns  -SD      Recorded by [SD] Eva Zuleta OT 08/10/20 1106      Row Name 08/10/20 1020              Bed-Chair Transfer    Bed-Chair [place] (Transfers)  contact guard  -SD      Assistive Device (Bed-Chair Transfers)  walker, front-wheeled  -SD      Recorded by [SD] Eva Zuleta OT 08/10/20 1106      Row Name 08/10/20 1020             Sit-Stand Transfer    Sit-Stand [place] (Transfers)  contact guard  -SD      Assistive Device (Sit-Stand Transfers)  walker, front-wheeled  -SD      Recorded by [SD] Eva Zuleta OT 08/10/20 1106      Row Name 08/10/20 1020             Stand-Sit Transfer    Stand-Sit [place] (Transfers)  supervision  -SD      Assistive Device (Stand-Sit Transfers)  walker, front-wheeled  -SD      Recorded by [SD] Eva Zuleta OT 08/10/20 1106      Row Name 08/10/20 1020             ADL Assessment/Intervention    62666 - OT Self Care/Mgmt Minutes  15  -SD      BADL Assessment/Intervention  grooming;lower body dressing;upper body dressing  -SD      Recorded by [SD] Eva Zuleta OT 08/10/20 1106      Row Name 08/10/20 1020             Upper Body Dressing Assessment/Training    Upper Body Dressing [place] Level  don;pajama/robe;contact guard assist  -SD      Upper Body Dressing Position  edge of bed sitting  -SD      Recorded by [SD] Eva Zuleta OT 08/10/20 1106      Row Name 08/10/20 1020             Lower Body Dressing Assessment/Training    Lower Body Dressing [place] Level  don;socks;verbal cues  -SD      Lower Body Dressing Position  edge of bed sitting  -SD      Recorded by [SD] Eva Zuleta OT 08/10/20 1106      Row Name 08/10/20 1020             Grooming Assessment/Training    [place] Level (Grooming)  hair care, combing/brushing;oral care regimen;wash face, hands;set up;verbal cues  -SD      Grooming Position  sink side;supported standing  -SD      Recorded by [SD] Eva Zuleta OT 08/10/20 1106      Row Name 08/10/20 1020             Therapeutic Exercise    81398 - OT Therapeutic  Activity Minutes  17  -SD      Recorded by [SD] Eva Zuleta OT 08/10/20 1106      Row Name 08/10/20 1020             Static Sitting Balance    Level of [place] (Unsupported Sitting, Static Balance)  supervision  -SD      Sitting Position (Unsupported Sitting, Static Balance)  sitting on edge of bed  -SD      Time Able to Maintain Position (Unsupported Sitting, Static Balance)  4 to 5 minutes  -SD      Recorded by [SD] Eva Zuleta OT 08/10/20 1106      Row Name 08/10/20 1020             Static Standing Balance    Level of [place] (Supported Standing, Static Balance)  contact guard assist  -SD      Time Able to Maintain Position (Supported Standing, Static Balance)  2 to 3 minutes  -SD      Assistive Device Utilized (Supported Standing, Static Balance)  walker, rolling  -SD      Recorded by [SD] Eva Zuleta OT 08/10/20 1106      Row Name 08/10/20 1020             Dynamic Standing Balance    Level of [place], Reaches Outside Midline (Standing, Dynamic Balance)  contact guard assist  -SD      Time Able to Maintain Position, Reaches Outside Midline (Standing, Dynamic Balance)  more than 5 minutes  -SD      Assistive Device Utilized (Supported Standing, Dynamic Balance)  walker, rolling  -SD      Recorded by [SD] Eva Zuleta OT 08/10/20 1106      Row Name 08/10/20 1020             Positioning and Restraints    Pre-Treatment Position  in bed  -SD      Post Treatment Position  chair  -SD      In Chair  notified nsg;reclined;call light within reach;encouraged to call for assist;exit alarm on;with family/caregiver;legs elevated  -SD      Recorded by [SD] Eva Zuleta OT 08/10/20 1106      Row Name 08/10/20 1020             Pain Scale: Numbers Pre/Post-Treatment    Pain Scale: Numbers, Pretreatment  0/10 - no pain  -SD      Pain Scale: Numbers, Post-Treatment  0/10 - no pain  -SD      Recorded by [SD] Eva Zuleta OT 08/10/20 1106      Row  Name 08/10/20 1020             Coping    Observed Emotional State  accepting;calm;cooperative;pleasant  -SD      Verbalized Emotional State  acceptance  -SD      Recorded by [SD] Eva Zuleta OT 08/10/20 1106      Row Name 08/10/20 1020             Plan of Care Review    Plan of Care Reviewed With  patient;spouse  -SD      Progress  improving  -SD      Recorded by [SD] Eva Zuleta OT 08/10/20 1106      Row Name 08/10/20 1020             Outcome Summary/Treatment Plan (OT)    Daily Summary of Progress (OT)  progress toward functional goals is good  -SD      Barriers to Overall Progress (OT)  cognition  -SD      Plan for Continued Treatment (OT)  cont OT POC  -SD      Anticipated Discharge Disposition (OT)  home with assist;home with OP services  -SD      Recorded by [SD] Eva Zuleta OT 08/10/20 1106        User Key  (r) = Recorded By, (t) = Taken By, (c) = Cosigned By    Initials Name Effective Dates Discipline    SD Eva Zuleta OT 06/08/18 -  OT             Occupational Therapy Education                 Title: PT OT SLP Therapies (In Progress)     Topic: Occupational Therapy (Done)     Point: ADL training (Done)     Description:   Instruct learner(s) on proper safety adaptation and remediation techniques during self care or transfers.   Instruct in proper use of assistive devices.              Learning Progress Summary           Patient Acceptance, E, VU by ROSE at 8/7/2020 0955    Comment:  Benefits of activity; body mechanics for transfers    Acceptance, E, VU by  at 8/5/2020 1040    Acceptance, E,TB, VU by  at 8/3/2020 1351    Acceptance, E, VU by  at 7/31/2020 1030    Acceptance, E,TB, VU by  at 7/31/2020 0505    Acceptance, E, VU by  at 7/30/2020 0820    Comment:  benefits of activity, role of OT    Acceptance, E,TB, VU by  at 7/30/2020 0635    Acceptance, E,TB, VU by  at 7/28/2020 1546   Significant Other Acceptance, E, VU by ROSE at 8/7/2020 0955     Comment:  Benefits of activity; body mechanics for transfers    Acceptance, E,TB, VU by PC at 7/28/2020 1546                   Point: Home exercise program (Done)     Description:   Instruct learner(s) on appropriate technique for monitoring, assisting and/or progressing therapeutic exercises/activities.              Learning Progress Summary           Patient Acceptance, E,TB, VU by LC at 8/3/2020 1351    Acceptance, E,TB, VU by TH at 7/31/2020 0505    Acceptance, E,TB, VU by  at 7/30/2020 0635    Acceptance, E,TB, VU by PC at 7/28/2020 1546   Significant Other Acceptance, E,TB, VU by PC at 7/28/2020 1546                   Point: Precautions (Done)     Description:   Instruct learner(s) on prescribed precautions during self-care and functional transfers.              Learning Progress Summary           Patient Acceptance, E, VU by ROSE at 8/7/2020 0955    Comment:  Benefits of activity; body mechanics for transfers    Acceptance, E,TB, VU by  at 8/3/2020 1351    Acceptance, E,TB, VU by  at 7/31/2020 0505    Acceptance, E,TB, VU by  at 7/30/2020 0635    Acceptance, E,TB, VU by  at 7/28/2020 1546   Significant Other Acceptance, E, VU by ROSE at 8/7/2020 0955    Comment:  Benefits of activity; body mechanics for transfers    Acceptance, E,TB, VU by  at 7/28/2020 1546                   Point: Body mechanics (Done)     Description:   Instruct learner(s) on proper positioning and spine alignment during self-care, functional mobility activities and/or exercises.              Learning Progress Summary           Patient Acceptance, E, VU by ROSE at 8/7/2020 0955    Comment:  Benefits of activity; body mechanics for transfers    Acceptance, E,TB, VU by  at 8/3/2020 1351    Acceptance, E,TB, VU by  at 7/31/2020 0505    Acceptance, E,TB, VU by  at 7/30/2020 0635    Acceptance, E,TB, VU by PC at 7/28/2020 1546   Significant Other Acceptance, E, VU by ROSE at 8/7/2020 0955    Comment:  Benefits of activity; body  mechanics for transfers    Acceptance, E,TB, VU by PC at 7/28/2020 1546                               User Key     Initials Effective Dates Name Provider Type Discipline    AC 06/23/15 -  Fidelina Goyal, OT Occupational Therapist OT    LC 06/16/16 -  Niya Dupont, RN Registered Nurse Nurse    TH 01/30/20 -  Magda Humphrey, RN Registered Nurse Nurse    ROSE 02/14/20 -  Paulette Asif OT Occupational Therapist OT    PC 07/07/20 -  Deja Levy, DOROTHY Registered Nurse Nurse                OT Recommendation and Plan  Outcome Summary/Treatment Plan (OT)  Daily Summary of Progress (OT): progress toward functional goals is good  Barriers to Overall Progress (OT): cognition  Plan for Continued Treatment (OT): cont OT POC  Anticipated Discharge Disposition (OT): home with assist, home with OP services  Daily Summary of Progress (OT): progress toward functional goals is good  Plan of Care Review  Plan of Care Reviewed With: patient, spouse  Plan of Care Reviewed With: patient, spouse  Outcome Summary: Pt. independent with bed mobility in coming to sit on EOB. STS: CGA using RW, ambulated to sink: CGA, grooming skills @sink side: set up & v/c's to stay on task, ambulated 200 ft. in hallway: CGA using RW, chair t/f: CGA & v/c's. Pt. repeating himself throughout tx session & needing prompting for steps in grooming process. Pt. with increasing occupational endurance. Recommend home with assist & outpt. OT upon d/c. @OT  Outcome Measures     Row Name 08/10/20 1020             How much help from another is currently needed...    Putting on and taking off regular lower body clothing?  3  -SD      Bathing (including washing, rinsing, and drying)  3  -SD      Toileting (which includes using toilet bed pan or urinal)  3  -SD      Putting on and taking off regular upper body clothing  3  -SD      Taking care of personal grooming (such as brushing teeth)  3  -SD      Eating meals  4  -SD      AM-PAC 6 Clicks Score (OT)  19  -SD          Functional Assessment    Outcome Measure Options  AM-PAC 6 Clicks Daily Activity (OT)  -SD        User Key  (r) = Recorded By, (t) = Taken By, (c) = Cosigned By    Initials Name Provider Type    Eva Burns OT Occupational Therapist           Time Calculation:   Time Calculation- OT     Row Name 08/10/20 1020             Time Calculation- OT    OT Start Time  1020  -SD      OT Stop Time  1052  -SD      OT Time Calculation (min)  32 min  -SD      OT Received On  08/10/20  -SD      OT Goal Re-Cert Due Date  08/09/20  -SD         Timed Charges    38021 - OT Therapeutic Activity Minutes  17  -SD      94464 - OT Self Care/Mgmt Minutes  15  -SD        User Key  (r) = Recorded By, (t) = Taken By, (c) = Cosigned By    Initials Name Provider Type    Eva Burns OT Occupational Therapist        Therapy Charges for Today     Code Description Service Date Service Provider Modifiers Qty    75356304827 HC OT THERAPEUTIC ACT EA 15 MIN 8/10/2020 Eva Zuleta OT GO 1    76683026801 HC OT SELF CARE/MGMT/TRAIN EA 15 MIN 8/10/2020 Eva Zuleta OT GO 1               Eva Zuleta OT  8/10/2020

## 2020-08-10 NOTE — PLAN OF CARE
Problem: Patient Care Overview  Goal: Plan of Care Review  Outcome: Ongoing (interventions implemented as appropriate)  Flowsheets (Taken 8/10/2020 1020)  Outcome Summary: Pt. independent with bed mobility in coming to sit on EOB. STS: CGA using RW, ambulated to sink: CGA, grooming skills @sink side: set up & v/c's to stay on task, ambulated 200 ft. in hallway: CGA using RW, chair t/f: CGA & v/c's. Pt. repeating himself throughout tx session & needing prompting for steps in grooming process. Pt. with increasing occupational endurance. Recommend home with assist & outpt. OT upon d/c. @OT

## 2020-08-11 LAB
ANION GAP SERPL CALCULATED.3IONS-SCNC: 10 MMOL/L (ref 5–15)
BASOPHILS # BLD AUTO: 0.05 10*3/MM3 (ref 0–0.2)
BASOPHILS NFR BLD AUTO: 0.6 % (ref 0–1.5)
BUN SERPL-MCNC: 11 MG/DL (ref 8–23)
BUN/CREAT SERPL: 9.6 (ref 7–25)
CALCIUM SPEC-SCNC: 9.1 MG/DL (ref 8.6–10.5)
CHLORIDE SERPL-SCNC: 103 MMOL/L (ref 98–107)
CO2 SERPL-SCNC: 27 MMOL/L (ref 22–29)
CREAT SERPL-MCNC: 1.15 MG/DL (ref 0.76–1.27)
DEPRECATED RDW RBC AUTO: 55.2 FL (ref 37–54)
EOSINOPHIL # BLD AUTO: 0.32 10*3/MM3 (ref 0–0.4)
EOSINOPHIL NFR BLD AUTO: 3.8 % (ref 0.3–6.2)
ERYTHROCYTE [DISTWIDTH] IN BLOOD BY AUTOMATED COUNT: 15.4 % (ref 12.3–15.4)
GFR SERPL CREATININE-BSD FRML MDRD: 61 ML/MIN/1.73
GLUCOSE SERPL-MCNC: 91 MG/DL (ref 65–99)
HCT VFR BLD AUTO: 38.5 % (ref 37.5–51)
HGB BLD-MCNC: 12.5 G/DL (ref 13–17.7)
IMM GRANULOCYTES # BLD AUTO: 0.07 10*3/MM3 (ref 0–0.05)
IMM GRANULOCYTES NFR BLD AUTO: 0.8 % (ref 0–0.5)
LYMPHOCYTES # BLD AUTO: 1.37 10*3/MM3 (ref 0.7–3.1)
LYMPHOCYTES NFR BLD AUTO: 16.4 % (ref 19.6–45.3)
MCH RBC QN AUTO: 31.7 PG (ref 26.6–33)
MCHC RBC AUTO-ENTMCNC: 32.5 G/DL (ref 31.5–35.7)
MCV RBC AUTO: 97.7 FL (ref 79–97)
MONOCYTES # BLD AUTO: 0.79 10*3/MM3 (ref 0.1–0.9)
MONOCYTES NFR BLD AUTO: 9.4 % (ref 5–12)
NEUTROPHILS NFR BLD AUTO: 5.76 10*3/MM3 (ref 1.7–7)
NEUTROPHILS NFR BLD AUTO: 69 % (ref 42.7–76)
NRBC BLD AUTO-RTO: 0 /100 WBC (ref 0–0.2)
PLATELET # BLD AUTO: 253 10*3/MM3 (ref 140–450)
PMV BLD AUTO: 10.5 FL (ref 6–12)
POTASSIUM SERPL-SCNC: 3.6 MMOL/L (ref 3.5–5.2)
RBC # BLD AUTO: 3.94 10*6/MM3 (ref 4.14–5.8)
SODIUM SERPL-SCNC: 140 MMOL/L (ref 136–145)
VANCOMYCIN TROUGH SERPL-MCNC: 10.9 MCG/ML (ref 5–20)
WBC # BLD AUTO: 8.36 10*3/MM3 (ref 3.4–10.8)

## 2020-08-11 PROCEDURE — 25010000002 HEPARIN (PORCINE) PER 1000 UNITS: Performed by: INTERNAL MEDICINE

## 2020-08-11 PROCEDURE — 85025 COMPLETE CBC W/AUTO DIFF WBC: CPT | Performed by: NURSE PRACTITIONER

## 2020-08-11 PROCEDURE — 80048 BASIC METABOLIC PNL TOTAL CA: CPT

## 2020-08-11 PROCEDURE — 80202 ASSAY OF VANCOMYCIN: CPT

## 2020-08-11 PROCEDURE — 99232 SBSQ HOSP IP/OBS MODERATE 35: CPT | Performed by: NURSE PRACTITIONER

## 2020-08-11 PROCEDURE — 25010000002 VANCOMYCIN 10 G RECONSTITUTED SOLUTION

## 2020-08-11 RX ADMIN — LEVOTHYROXINE SODIUM 150 MCG: 150 TABLET ORAL at 06:19

## 2020-08-11 RX ADMIN — PANTOPRAZOLE SODIUM 40 MG: 40 TABLET, DELAYED RELEASE ORAL at 09:37

## 2020-08-11 RX ADMIN — BUSPIRONE HYDROCHLORIDE 10 MG: 10 TABLET ORAL at 09:37

## 2020-08-11 RX ADMIN — MEMANTINE 5 MG: 10 TABLET ORAL at 09:37

## 2020-08-11 RX ADMIN — HEPARIN SODIUM 5000 UNITS: 5000 INJECTION INTRAVENOUS; SUBCUTANEOUS at 12:43

## 2020-08-11 RX ADMIN — SODIUM CHLORIDE, PRESERVATIVE FREE 10 ML: 5 INJECTION INTRAVENOUS at 20:18

## 2020-08-11 RX ADMIN — VANCOMYCIN HYDROCHLORIDE 1500 MG: 10 INJECTION, POWDER, LYOPHILIZED, FOR SOLUTION INTRAVENOUS at 10:31

## 2020-08-11 RX ADMIN — ASPIRIN 81 MG: 81 TABLET, COATED ORAL at 09:37

## 2020-08-11 RX ADMIN — BUSPIRONE HYDROCHLORIDE 10 MG: 10 TABLET ORAL at 20:15

## 2020-08-11 RX ADMIN — CLOPIDOGREL BISULFATE 75 MG: 75 TABLET ORAL at 09:37

## 2020-08-11 RX ADMIN — ALLOPURINOL 300 MG: 300 TABLET ORAL at 09:38

## 2020-08-11 RX ADMIN — DOCUSATE SODIUM 100 MG: 100 CAPSULE, LIQUID FILLED ORAL at 20:14

## 2020-08-11 RX ADMIN — TERAZOSIN HYDROCHLORIDE 5 MG: 5 CAPSULE ORAL at 20:14

## 2020-08-11 RX ADMIN — DOCUSATE SODIUM 100 MG: 100 CAPSULE, LIQUID FILLED ORAL at 09:37

## 2020-08-11 RX ADMIN — MELATONIN TAB 5 MG 10 MG: 5 TAB at 20:14

## 2020-08-11 RX ADMIN — POLYETHYLENE GLYCOL 3350 17 G: 17 POWDER, FOR SOLUTION ORAL at 09:44

## 2020-08-11 RX ADMIN — ATORVASTATIN CALCIUM 80 MG: 40 TABLET, FILM COATED ORAL at 20:14

## 2020-08-11 RX ADMIN — HEPARIN SODIUM 5000 UNITS: 5000 INJECTION INTRAVENOUS; SUBCUTANEOUS at 06:19

## 2020-08-11 RX ADMIN — SERTRALINE HYDROCHLORIDE 50 MG: 50 TABLET, FILM COATED ORAL at 09:37

## 2020-08-11 RX ADMIN — FINASTERIDE 5 MG: 5 TABLET, FILM COATED ORAL at 09:37

## 2020-08-11 RX ADMIN — HEPARIN SODIUM 5000 UNITS: 5000 INJECTION INTRAVENOUS; SUBCUTANEOUS at 20:15

## 2020-08-11 NOTE — PROGRESS NOTES
Clinical Nutrition   Reason For Visit: Follow-up protocol    Patient Name: Domitila Bosch  YOB: 1940  MRN: [id number]  Date of Encounter: 08/11/20 11:45  Admission date: 7/28/2020      Nutrition Assessment     Admission Problem List:  UTI  Acute encephalopathy  DANIS  Falls  Sepsis      Applicable PMH:  HTN, HLD  GERD  TIA/CVA with residual right sided weakness  Carotid stenosis  Diverticulitis s/p left colon resection (2016)  Hernia s/p repair  S/p nissen fundoplication (1984)      Applicable medical tests/procedures since admission:      Reported/Observed/Food/Nutrition Related History   Patient and wife present during visit. Patient reports his appetite and PO intake have been fairly normal lately and his wife verified this. Patient declines ONS. No preferences/requests at this time.    Anthropometrics   Height: 73 in  Weight: 170 lbs (bed scale weight 8/11 per nsg doc)  BMI: 22.5  BMI classification: Normal: 18.5-24.9kg/m2   IBW: 184 lbs    Per RD note (7/29):  UBW: 176 lbs (10/19/19 MDOV per EMR)  Weight change: no recent weight loss    Labs reviewed   Labs reviewed: Yes    Medications reviewed   Medications reviewed: Yes  Pertinent: protonix, antibiotic, colace, miralax  GTT: NS @ 100 ml/hr    Current Nutrition Prescription   PO: Diet Regular; Cardiac    Evaluation of Received Nutrient/Fluid Intake: 64% / 9 meals    Nutrition Diagnosis     Problem Inadequate oral intake   Etiology Clinical condition   Signs/Symptoms PO intake: 64% / 9 meals     Intervention   Intervention: Follow treatment progress, Care plan reviewed, Interview for preferences, Encourage intake, Supplement offered/refused    Goal:   General: Nutrition support treatment  PO: Increase intake    Monitoring/Evaluation:       Monitoring/Evaluation: Per protocol, PO intake, Pertinent labs, Weight  Will Continue to follow per protocol  Magda Johnson, RD  Time Spent: 20 min

## 2020-08-11 NOTE — PROGRESS NOTES
[facility] Medicine Services  PROGRESS NOTE    Patient Name: Domitila Bosch  : 1940  MRN: [id number]    Date of Admission: 2020  Length of Stay: 14  Primary Care Physician: Marcin Ferguson MD    Subjective   Subjective   CC:  Follow-up bacteremia    HPI:  Patient sitting up in bed in NAD.  Patient states he is ready to be discharged today, especially because it is his birthday.  Patient states that he feels so much better.  Spoke with Dr. Martínez who wants  patient stay until completion of antibiotics tomorrow.  Spoke with he and his wife who both understood and pending morning lab work, he will be discharged first thing in the morning.  Positive BM.  Wife is in the room.    Review of Systems  Gen- No fevers, chills  CV- No chest pain, palpitations  Resp- No cough, dyspnea  GI- No N/V/D, abd pain  All other systems have been reviewed and the pertinent positives and negatives are listed above in the HPI and ROS    Objective   Objective   Vital Signs:   Temp:  [97.8 °F (36.6 °C)] 97.8 °F (36.6 °C)  Heart Rate:  [56-69] 58  BP: (149)/(99) 149/99  Total (NIH Stroke Scale): 1  Physical Exam:  Constitutional: Alert, WD, WN male sitting up in NAD  Eyes: EOMI, sclerae anicteric, no conjunctival injection  Head: NCAT  ENT: [place], moist mucous membranes   Respiratory: Non-labored, symmetrical chest expansion, CTAB  Cardiovascular: RRR, no M/R/G, +DP pulses bilaterally  Gastrointestinal: Soft, NT, ND +BS  Musculoskeletal: PRADO; no LE edema bilaterally  Neurologic: Oriented to person, place, date, and wife, strength symmetric in all extremities, follows all commands, speech clear  Skin: No rashes on exposed skin  Psychiatric:Pleasant and cooperative; normal affect    Results Reviewed:    Results from last 7 days   Lab Units 20  0352 20  0532 20  0432   WBC 10*3/mm3 8.36 9.54 8.88   HEMOGLOBIN g/dL 12.5* 12.9* 12.3*   HEMATOCRIT % 38.5 39.8 37.7   PLATELETS 10*3/mm3 253  276 257     Results from last 7 days   Lab Units 08/11/20  0352 08/10/20  0742 08/10/20  0417  08/08/20  0432 08/07/20  0416   SODIUM mmol/L 140 139 140   < > 142 140   POTASSIUM mmol/L 3.6 3.6 3.7   < > 3.5 3.9   CHLORIDE mmol/L 103 103 104   < > 108* 105   CO2 mmol/L 27.0 29.0 29.0   < > 25.0 27.0   BUN mg/dL 11 12 12   < > 13 15   CREATININE mg/dL 1.15 1.28* 1.29*   < > 1.16 1.47*   GLUCOSE mg/dL 91 103* 102*   < > 97 99   CALCIUM mg/dL 9.1 9.5 9.2   < > 8.8 8.9   ALT (SGPT) U/L  --   --   --   --  24 27   AST (SGOT) U/L  --   --   --   --  22 27    < > = values in this interval not displayed.     Estimated Creatinine Clearance: 55.2 mL/min (by C-G formula based on SCr of 1.15 mg/dL).    Microbiology Results Abnormal     Procedure Component Value - Date/Time    Blood Culture - Blood, Chest, Right [[id number]] Collected:  07/30/20 0834    Lab Status:  Final result Specimen:  Blood from Chest, Right Updated:  08/04/20 0915     Blood Culture No growth at 5 days    Blood Culture - Blood, Arm, Left [[id number]] Collected:  07/30/20 0834    Lab Status:  Final result Specimen:  Blood from Arm, Left Updated:  08/04/20 0915     Blood Culture No growth at 5 days    COVID PRE-OP / PRE-PROCEDURE SCREENING ORDER (NO ISOLATION) - Swab, Nasopharynx [[id number]] Collected:  08/02/20 1602    Lab Status:  Final result Specimen:  Swab from Nasopharynx Updated:  08/02/20 1713    Narrative:       The following orders were created for panel order COVID PRE-OP / PRE-PROCEDURE SCREENING ORDER (NO ISOLATION) - Swab, Nasopharynx.  Procedure                               Abnormality         Status                     ---------                               -----------         ------                     Respiratory Panel PCR w/...[[id number]]  Normal              Final result                 Please view results for these tests on the individual orders.    Respiratory Panel PCR w/COVID-19(SARS-CoV-2) PATRICIA/GREG/AMELIA In-House, NP Swab in [facility],  8-12 Hr TAT - Swab, Nasopharynx [[id number]]  (Normal) Collected:  08/02/20 1602    Lab Status:  Final result Specimen:  Swab from Nasopharynx Updated:  08/02/20 1713     ADENOVIRUS, PCR Not Detected     Coronavirus 229E Not Detected     Coronavirus HKU1 Not Detected     Coronavirus NL63 Not Detected     Coronavirus OC43 Not Detected     COVID19 Not Detected     Human Metapneumovirus Not Detected     Human Rhinovirus/Enterovirus Not Detected     Influenza A PCR Not Detected     Influenza A H1 Not Detected     Influenza A H1 2009 PCR Not Detected     Influenza A H3 Not Detected     Influenza B PCR Not Detected     Parainfluenza Virus 1 Not Detected     Parainfluenza Virus 2 Not Detected     Parainfluenza Virus 3 Not Detected     Parainfluenza Virus 4 Not Detected     RSV, PCR Not Detected     Bordetella pertussis pcr Not Detected     Bordetella parapertussis PCR Not Detected     Chlamydophila pneumoniae PCR Not Detected     Mycoplasma pneumo by PCR Not Detected    Narrative:       Fact sheet for providers: https://docs.[employer]/wp-content/uploads/SQP1265-8828-VX4.1-EUA-Provider-Fact-Sheet-3.pdf    Fact sheet for patients: https://docs.[employer]/wp-content/uploads/MAD9256-3638-LM6.1-EUA-Patient-Fact-Sheet-1.pdf    Urine Culture - Urine, Urine, Clean Catch [[id number]]  (Abnormal)  (Susceptibility) Collected:  07/28/20 1021    Lab Status:  Edited Result - FINAL Specimen:  Urine, Clean Catch Updated:  07/31/20 1057     Urine Culture >100,000 CFU/mL Enterococcus faecalis    Narrative:        requested Daptomycin 7/30    Susceptibility      Enterococcus faecalis     GURPREET     Ampicillin Susceptible     Daptomycin Susceptible     Levofloxacin Susceptible     Nitrofurantoin Susceptible     Tetracycline Susceptible     Vancomycin Susceptible                    Blood Culture - Blood, Arm, Left [[id number]]  (Abnormal)  (Susceptibility) Collected:  07/28/20 1133    Lab Status:  Edited Result - FINAL Specimen:   Blood from Arm, Left Updated:  07/31/20 0723     Blood Culture Enterococcus faecalis     Comment:   Infectious disease consultation is highly recommended.        Isolated from Aerobic and Anaerobic Bottles     Gram Stain Aerobic Bottle Gram positive cocci in chains      Anaerobic Bottle Gram positive cocci in chains    Narrative:       Dr. Sandhu requested Daptomycin     Susceptibility      Enterococcus faecalis     GURPREET     Ampicillin Susceptible     Daptomycin Intermediate     Gentamicin High Level Synergy Susceptible     Vancomycin Susceptible                    Blood Culture - Blood, Arm, Right [[id number]]  (Abnormal) Collected:  07/28/20 1125    Lab Status:  Final result Specimen:  Blood from Arm, Right Updated:  07/30/20 0656     Blood Culture Enterococcus faecalis     Comment:   Infectious disease consultation is highly recommended.        Isolated from Aerobic and Anaerobic Bottles     Gram Stain Aerobic Bottle Gram positive cocci in chains      Anaerobic Bottle Gram positive cocci in chains    Narrative:       Refer to previous blood culture collected on 7/28/2020 1133 for MICs    Blood Culture ID, PCR - Blood, Arm, Left [[id number]]  (Abnormal) Collected:  07/28/20 1133    Lab Status:  Final result Specimen:  Blood from Arm, Left Updated:  07/29/20 0555     BCID, PCR Enterococcus spp. Identification by BCID PCR.          Imaging Results (Last 24 Hours)     ** No results found for the last 24 hours. **          Results for orders placed during the hospital encounter of 07/28/20   Adult Transesophageal Echo (SUMMER) W/ Cont if Necessary Per Protocol    Narrative · Estimated EF = 65%.  · Left ventricular systolic function is normal.  · Left atrial cavity size is mild-to-moderately dilated.  · There is mild calcification of the aortic valve mainly affecting the non   and right coronary cusp(s).  · Saline test results are negative.  · The aortic valve exhibits moderate sclerosis.  · There is no evidence of  pericardial effusion.  · There is moderate (grade 2) protruding plaque in the descending aorta   present.  · No valvular vegetations demonstrated.        I have reviewed the medications:  Scheduled Meds:    Pharmacy Consult  Does not apply Once   allopurinol 300 mg Oral Daily   aspirin 81 mg Oral Daily   atorvastatin 80 mg Oral Nightly   busPIRone 10 mg Oral Q12H   clopidogrel 75 mg Oral Daily   docusate sodium 100 mg Oral BID   finasteride 5 mg Oral Daily   heparin (porcine) 5,000 Units Subcutaneous Q8H   levothyroxine 150 mcg Oral Q AM   melatonin 10 mg Oral Nightly   memantine 5 mg Oral Daily   pantoprazole 40 mg Oral QAM   polyethylene glycol 17 g Oral Daily   sertraline 50 mg Oral Daily   sodium chloride 10 mL Intravenous Q12H   terazosin 5 mg Oral Nightly   vancomycin 1,250 mg Intravenous Q24H     Continuous Infusions:    Pharmacy to dose vancomycin      PRN Meds:.acetaminophen  •  hydrOXYzine  •  ipratropium-albuterol  •  ondansetron  •  Pharmacy to dose vancomycin  •  potassium chloride  •  potassium chloride  •  sodium chloride  •  sodium chloride  •  sodium chloride    Assessment/Plan   Assessment / Plan     Active Hospital Problems    Diagnosis  POA   • **Acute UTI [N39.0]  Yes   • Acute diastolic heart failure (CMS/HCC) [I50.31]  Unknown   • Acute respiratory failure with hypoxia (CMS/[facility]) [J96.01]  Unknown   • Bacteremia [R78.81]  Unknown   • Encephalopathy acute [G93.40]  Yes   • DANIS (acute kidney injury) (CMS/[facility]) [N17.9]  Yes   • Carotid stenosis [I65.29]  Yes   • Dyslipidemia [E78.5]  Yes   • Hypothyroidism [E03.9]  Yes   • GERD (gastroesophageal reflux disease) [K21.9]  Yes   • Anxiety disorder [F41.9]  Yes      Resolved Hospital Problems   No resolved problems to display.      Brief Hospital Course to date:  Domitila Bosch is a 80 y.o. male PMHx significant for carotid stenosis, dyslipidemia, TIA/CVA with residual right sided weakness, hypertension, hypothyroidism, anxiety who presents to [facility] with  complaints of increasing confusion with dementia at baseline.  Patient found to be septic/bacteremic due to UTI, DANIS/dehydration.  Patient receiving antibiotic therapy.  Monitoring kidney function closely as patient is on vancomycin.    These problems are new to me today    This patient's problems and plans were partially entered by my partner and updated as appropriate by me 08/11/20.     Metabolic Encephalopathy related to sepsis/bacteremia - some improvement   - likely multifactorial and secondary to infection and DANIS/dehydration  - CT head with no acute findings, previous MRI shows history of stroke in the past  - continue namenda     Sepsis - POA   UTI - enterococcus   Bacteremia - enterococcus   - allergy to Cephalosporins and penicillins   - BCx x 2 with enterococcus; UCx enterococcus; repeat blood cultures NGTD    - DC azactam (7/28-7/29) and continue vanc (7/29) (closely monitoring kidney function) through 8/12  - ID following   - Urology consulted - no intervention   - ECHO with no comment on valvular veg -- SUMMER negative  - PICC line ordered 8/5; no home infusion needed  --AM labs     Acute hypoxic resp failure - improved   Acute diastolic heart failure - improved   - Likely related to volume overload; ABG reviewed; CXR reviewed   - ECHO with normal EF; diastolic heart failure  - Wean O2 as tolerated   - maxide on hold secondary to labile renal function  -Continue to monitor for fluid volume overload     DANIS - improved   --Cr 1.15  --baseline around 0.8, 1.9 on admission   --renal US with R non-obs stone; enlargement of prostate; bladder debris  -Since patient is on vancomycin we need to be cautious about damaging the kidneys.    -Maxide on hold  -IV fluids 500ml 8/10; currently off fluids  -Encourage fluid intake  -BMP in AM      BPH  Urinary retention  - continue hytrin and proscar      Falls:  - PT/OT to see, currently lives at home with his wife  - Plans to discharge home with [facility]  HH     HLD  Hx of CVA w/residual right sided weakness  Carotid Stenosis  - continue ASA, plavix, statin     Hypothyroidism:  - levothyroxine     Hypertension:  - continue hytrin, hold hctz    DVT Prophylaxis: SQH    Disposition: I expect the patient to be discharged home tomorrow without abx    CODE STATUS:   Code Status and Medical Interventions:   Ordered at: 07/28/20 1249     Level Of Support Discussed With:    Patient     Code Status:    CPR     Medical Interventions (Level of Support Prior to Arrest):    Full     Electronically signed by YVAN May, 08/11/20, 09:25.

## 2020-08-11 NOTE — PROGRESS NOTES
[facility] Progress Note        Antibiotics:  Anti-Infectives (From admission, onward)    Ordered     Dose/Rate Route Frequency Start Stop    20 0733  vancomycin 1250 mg/250 mL 0.9% NS IVPB ([facility])  Review   Ordering Provider:  Kala Hammer    1,250 mg  over 90 Minutes Intravenous Every 24 Hours 20 0900 08/15/20 0859    20 0531  vancomycin (VANCOCIN) in iso-osmotic dextrose IVPB 1 g (premix) 200 mL     Ordering Provider:  William Welch IV, PharmD    1,000 mg  over 60 Minutes Intravenous Once 20 0630 20 0712    20 1338  vancomycin (VANCOCIN) in iso-osmotic dextrose IVPB 1 g (premix) 200 mL     Ordering Provider:  Joel Terrazas, PharmD    1,000 mg  over 60 Minutes Intravenous Once 20 1430 20 1736    20 0754  vancomycin (VANCOCIN) in iso-osmotic dextrose IVPB 1 g (premix) 200 mL     Ordering Provider:  Norberto Beckham PharmD    1,000 mg  over 60 Minutes Intravenous Once 20 0845 20 0945    20 0556  Pharmacy to dose vancomycin  Undo Let    Sabino Sandhu MD let the order  on 08/10/20 0553.   Ordering Provider:  Sbaino Sandhu MD     Does not apply Continuous PRN 20 0555 20 0554    20 1111  aztreonam (AZACTAM) 2 g/100 mL 0.9% NS (mbp)     Ordering Provider:  Norberto Son PA    2 g  over 30 Minutes Intravenous Once 20 1113 20 1231    20 1111  vancomycin 1750 mg/500 mL 0.9% NS IVPB ([facility])     Ordering Provider:  Teresa Pollard DO    20 mg/kg × 86.2 kg Intravenous Once 20 1113 20 1626          CC: fatigue    HPI:    Patient is a 79 y.o.  Yr old male with history of TIA/CVA with chronic/residual right-sided weakness and generally poor memory but does not carry a formal diagnosis of dementia.  He was admitted 2020 with 3 to 4 days worsening confusion from baseline; patient reports dysuria/urinary incontinence/urinary frequency although he could not attach a specific  timeline to this and in general his ability to give detailed history is limited with poor insight.  Nursing reported fever at admission associated with acute kidney injury and evidence for UTI with concern for sepsis urinary source.  Subsequently with blood culture showing gram-positive cocci and preliminary PCR data with enterococcus, vancomycin resistance not detected by initial PCR information; he was placed on empiric vancomycin/Azactam.     He reports some improvement since admission although his insight remains poor.     Wife reports Hx enlarged prostate with chronic urinary retention and has followed with Dr Bazan in urology; allergy to PCN and keflex with rash to both     8/4/20 SUMMER no valvular vegetation;  Descending aorta with protruding plaque    8/5/20 creat trended down some after fluids    8/11/20 seen early and sleepy ; creat down some per nursing;  IV fluids per d/w Dr Flowers given creat trended back up;   No new focal pain; Dysuria better;  Denies hematuria or pyuria and no flank pain at this time.     No headache photophobia or neck stiffness.  No nausea vomiting diarrhea or abdominal pain.  No shortness of breath or cough.  No rash.        ROS:      8/11/20 No f/c/s. No n/v/d. No rash. No new ADR to Abx.     Constitutional-- No chills or sweats.  Appetite diminished with generalized fatigue  Heent-- No new vision, hearing or throat complaints.  No epistaxis or oral sores.  Denies odynophagia or dysphagia.  No flashers, floaters or eye pain. No odynophagia or dysphagia. No headache, photophobia or neck stiffness.  CV-- No chest pain, palpitation or syncope  Resp-- No SOB/cough/Hemoptysis  GI- No nausea, vomiting, or diarrhea.  No hematochezia, melena, or hematemesis. Denies jaundice or chronic liver disease.  --as above  Lymph- no swollen lymph nodes in neck/axilla or groin.   Heme- No active bruising or bleeding; no Hx of DVT or PE.  MS-- no swelling or pain in the bones or joints of  "arms/legs.  No new back pain.  Neuro-- No acute focal weakness or numbness in the arms or legs.  No seizures.  Chronic residual right-sided weakness     Full 12 point review of systems reviewed and negative otherwise for acute complaints, except for above      PE:   /99   Pulse 58   Temp 97.8 °F (36.6 °C) (Oral)   Resp 18   Ht 185.4 cm (72.99\")   Wt 77.5 kg (170 lb 12.8 oz)   SpO2 (!) 87%   BMI 22.54 kg/m²     GENERAL: sleepy, in no acute distress.   HEENT: Normocephalic, atraumatic.  PERRL. EOMI. No conjunctival injection. No icterus. Oropharynx clear without evidence of thrush or exudate. No evidence of peridontal disease.    NECK: Supple without nuchal rigidity. No mass.  LYMPH: No cervical, axillary or inguinal lymphadenopathy.  HEART: RRR; No murmur, rubs, gallops.   LUNGS: diminished at bases to auscultation bilaterally without wheezing, rales, rhonchi. Normal respiratory effort. Nonlabored. No dullness.  ABDOMEN: Soft, nontender, nondistended. Positive bowel sounds. No rebound or guarding. NO mass or HSM.  EXT:  No cyanosis, clubbing or edema. No cord.  MSK: FROM without joint effusions noted arms/legs.    SKIN: Warm and dry without cutaneous eruptions on Inspection/palpation.    NEURO: sleepy     No CVA tenderness     No peripheral stigmata/phenomena of endocarditis       Laboratory Data    Results from last 7 days   Lab Units 08/11/20  0352 08/09/20  0532 08/08/20  0432   WBC 10*3/mm3 8.36 9.54 8.88   HEMOGLOBIN g/dL 12.5* 12.9* 12.3*   HEMATOCRIT % 38.5 39.8 37.7   PLATELETS 10*3/mm3 253 276 257     Results from last 7 days   Lab Units 08/11/20  0352   SODIUM mmol/L 140   POTASSIUM mmol/L 3.6   CHLORIDE mmol/L 103   CO2 mmol/L 27.0   BUN mg/dL 11   CREATININE mg/dL 1.15   GLUCOSE mg/dL 91   CALCIUM mg/dL 9.1     Results from last 7 days   Lab Units 08/08/20  0432   ALK PHOS U/L 119*   BILIRUBIN mg/dL 0.5   ALT (SGPT) U/L 24   AST (SGOT) U/L 22               Estimated Creatinine Clearance: 55.2 " mL/min (by C-G formula based on SCr of 1.15 mg/dL).      Microbiology:      Radiology:  Imaging Results (Last 72 Hours)     ** No results found for the last 72 hours. **            Impression:     --Acute sepsis.  Fever/leukocytosis and acute kidney injury with enterococcal bacteremia/septicemia.  Concern for urinary source .  Abdominal exam otherwise benign with no nausea/vomiting/diarrhea.    Otherwise, chest clear, no cough or breathing difficulty, no obvious skin or soft tissue changes.  Monitor for other potential foci/pathogen     -- Acute  enterococcus bacteremia/septicemia.  Currently no stigmata of endocarditis but certainly could evolve.  f/u culture negative 7/30; High risk for further serious morbidity and other serious sequela.  SUMMER no valvular vegetation      --Acute enterococcal complicated UTI.  Associated with septicemia/bacteremia as above.  Urology following and any  Further functional/anatomic assessment at their discretion     --Acute kidney injury.  Creat trended up a bit 8/4; vanco adjusted by pharmacy; monitor to help guide further adjustments; creat better 8/5 after fluids; creat back up a bit 8/6 after stopping IV fluids and better again by 8/8     --Acute encephalopathy.  Baseline not entirely clear with prior history of stroke/TIA and nursing reports baseline with forgetfulness with possible cognitive difficulties at baseline.  Need further clarification from family.  Currently no meningismus.  Further neurologic work-up or neurology consultation at discretion of internal medicine     --History of TIA/CVA with reports of residual right-sided weakness.          PLAN:     --IV vancomycin nearing completion of 2 week course from first negative on 7/30     --Check/review labs cultures and scans     --Partial history per nursing staff     --Discussed with microbiology     --Discussed with Dr. Flowers;  He is managing hydration/IV fluids and monitor renal function closely     --Highly complex  set of issues with high risk for further serious morbidity and other serious sequela    --urology following    --repeated blood cultures and negative so far; TTE no mention of vegetation per cardiology;    SUMMER  noted     --d/w pharmacy    --anticipate finishing vancomycin doses 8/12 then consider stop 8/13 if clinically stable        Sabino Sandhu MD  8/11/2020

## 2020-08-11 NOTE — PLAN OF CARE
Pt had a good night. VSS. Oriented to self. Will continue to monitor.   Problem: Patient Care Overview  Goal: Plan of Care Review  Outcome: Ongoing (interventions implemented as appropriate)  Flowsheets  Taken 8/10/2020 1438 by Latonya Brennan PT  Progress: improving  Taken 8/10/2020 2000 by Liz Vyas, RN  Plan of Care Reviewed With: patient     Problem: Fall Risk (Adult)  Goal: Identify Related Risk Factors and Signs and Symptoms  Outcome: Ongoing (interventions implemented as appropriate)  Flowsheets (Taken 8/8/2020 0410 by Alvaro Landrum, RN)  Related Risk Factors (Fall Risk): age-related changes;fatigue/slow reaction;gait/mobility problems  Signs and Symptoms (Fall Risk): presence of risk factors     Problem: Skin Injury Risk (Adult)  Goal: Identify Related Risk Factors and Signs and Symptoms  Outcome: Ongoing (interventions implemented as appropriate)

## 2020-08-11 NOTE — PROGRESS NOTES
"Pharmacy Consult - Vancomycin Dosing    Domitila Bosch is an 79 y.o. male receiving vancomycin therapy.     Indication: E faecalis bacteremia  Consulting Provider: Hospitalist  ID Consult: Yes    Goal Trough: 15-20 mcg/mL    Current Antimicrobial Therapy  Vancomycin 7/28-now    Allergies  Allergies as of 07/28/2020 - Reviewed 07/28/2020   Allergen Reaction Noted    Betadine [povidone iodine]  06/09/2016    Cephalexin  04/20/2016    Flomax [tamsulosin hcl]  02/16/2017    Iodine  06/09/2016    Lisinopril  04/20/2016    Pseudoephedrine  04/20/2016    Sulfamethoxazole-trimethoprim  04/20/2016     Labs  Results from last 7 days   Lab Units 08/11/20  0352 08/10/20  0742 08/10/20  0417   BUN mg/dL 11 12 12   CREATININE mg/dL 1.15 1.28* 1.29*     Results from last 7 days   Lab Units 08/11/20  0352 08/09/20  0532 08/08/20  0432   WBC 10*3/mm3 8.36 9.54 8.88     Evaluation of Dosing    Is Patient on Dialysis or Renal Replacement: no    Ht - 185.4 cm (72.99\")  Wt - 81.6 kg (179 lb 12.8 oz)    Estimated Creatinine Clearance: 55.2 mL/min (by C-G formula based on SCr of 1.15 mg/dL).    Intake & Output (last 3 days)         08/08 0701 - 08/09 0700 08/09 0701 - 08/10 0700 08/10 0701 - 08/11 0700 08/11 0701 - 08/12 0700    P.O. 480 240 240 240    IV Piggyback   250 750    Total Intake(mL/kg) 480 (5.9) 240 (3) 490 (6.3) 990 (12.8)    Urine (mL/kg/hr) 2450 (1.3) 1600 (0.8) 1825 (1)     Stool  0 0     Total Output 2450 1600 1825     Net -1970 -1360 -1335 +990            Urine Unmeasured Occurrence  1 x 1 x     Stool Unmeasured Occurrence  1 x 1 x           Microbiology and Radiology  Microbiology Results (last 10 days)       Procedure Component Value - Date/Time    COVID PRE-OP / PRE-PROCEDURE SCREENING ORDER (NO ISOLATION) - Swab, Nasopharynx [[id number]] Collected:  08/02/20 1602    Lab Status:  Final result Specimen:  Swab from Nasopharynx Updated:  08/02/20 2390    Narrative:       The following orders were created for panel order " COVID PRE-OP / PRE-PROCEDURE SCREENING ORDER (NO ISOLATION) - Swab, Nasopharynx.  Procedure                               Abnormality         Status                     ---------                               -----------         ------                     Respiratory Panel PCR w/...[[id number]]  Normal              Final result                 Please view results for these tests on the individual orders.    Respiratory Panel PCR w/COVID-19(SARS-CoV-2) PATRICIA/GREG/AMELIA In-House, NP Swab in [facility], 8-12 Hr TAT - Swab, Nasopharynx [[id number]]  (Normal) Collected:  08/02/20 1602    Lab Status:  Final result Specimen:  Swab from Nasopharynx Updated:  08/02/20 1713     ADENOVIRUS, PCR Not Detected     Coronavirus 229E Not Detected     Coronavirus HKU1 Not Detected     Coronavirus NL63 Not Detected     Coronavirus OC43 Not Detected     COVID19 Not Detected     Human Metapneumovirus Not Detected     Human Rhinovirus/Enterovirus Not Detected     Influenza A PCR Not Detected     Influenza A H1 Not Detected     Influenza A H1 2009 PCR Not Detected     Influenza A H3 Not Detected     Influenza B PCR Not Detected     Parainfluenza Virus 1 Not Detected     Parainfluenza Virus 2 Not Detected     Parainfluenza Virus 3 Not Detected     Parainfluenza Virus 4 Not Detected     RSV, PCR Not Detected     Bordetella pertussis pcr Not Detected     Bordetella parapertussis PCR Not Detected     Chlamydophila pneumoniae PCR Not Detected     Mycoplasma pneumo by PCR Not Detected    Narrative:       Fact sheet for providers: https://docs.[employer]/wp-content/uploads/AED5473-8078-SD1.1-EUA-Provider-Fact-Sheet-3.pdf    Fact sheet for patients: https://docs.[employer]/wp-content/uploads/JWK0876-7226-KU2.1-EUA-Patient-Fact-Sheet-1.pdf          Evaluation of Level  Results from last 7 days   Lab Units 08/09/20  0532 08/08/20  0432 08/07/20  0936 08/06/20  0347 08/05/20  0431   VANCOMYCIN RM mcg/mL 11.70 14.30 10.90 12.60 12.40   7/30 vancomycin  trough = 6.1 mcg/mL (drawn late, ~25.5h level)  8/3 vancomycin trough = 22.3 mcg/mL (~12h level)  8/5 vancomycin random = 12.4 mcg/mL (~35h level)  8/6 vancomycin random = 12.6 mcg/mL (~19h level)  8/7 vancomycin random = 10.9 mcg/mL (24h level)  8/8 vancomycin random = 14.3 mcg/mL (~12 hour level)  8/9 vancomycin random = 11.7 mcg/mL (~23.5 hour level)  8/11vancomycin trough= 10.9 mcg/ml(~23.5 hour level)  Assessment/Plan:  1. Pharmacy dosing vancomycin for E faecalis bacteremia, goal trough 15-20.  2. Creatinine stable from yesterday at 1.16 to  1.15 today.   3. Vancomycin trough assessed again this morning was 10.9 mcg/mL (True trough level at 23.5 hours). Change vancomycin to 1500mg every 24 hours. No new trough scheduled for now.  4. Pharmacy will continue to monitor and adjust vancomycin dose as necessary based on renal function, cultures, labs, and clinical status.    Thank you,  Norberto Ashton, PharmD  8/11/2020  11:00

## 2020-08-12 VITALS
DIASTOLIC BLOOD PRESSURE: 84 MMHG | OXYGEN SATURATION: 95 % | HEART RATE: 60 BPM | RESPIRATION RATE: 20 BRPM | HEIGHT: 73 IN | BODY MASS INDEX: 22.8 KG/M2 | TEMPERATURE: 98.1 F | SYSTOLIC BLOOD PRESSURE: 138 MMHG | WEIGHT: 172 LBS

## 2020-08-12 PROBLEM — I50.31 ACUTE DIASTOLIC HEART FAILURE (HCC): Status: RESOLVED | Noted: 2020-07-31 | Resolved: 2020-08-12

## 2020-08-12 PROBLEM — G93.40 ENCEPHALOPATHY ACUTE: Status: RESOLVED | Noted: 2020-07-28 | Resolved: 2020-08-12

## 2020-08-12 PROBLEM — N39.0 ACUTE UTI: Status: RESOLVED | Noted: 2020-07-28 | Resolved: 2020-08-12

## 2020-08-12 PROBLEM — R78.81 BACTEREMIA: Status: RESOLVED | Noted: 2020-07-29 | Resolved: 2020-08-12

## 2020-08-12 PROBLEM — J96.01 ACUTE RESPIRATORY FAILURE WITH HYPOXIA (HCC): Status: RESOLVED | Noted: 2020-07-31 | Resolved: 2020-08-12

## 2020-08-12 LAB
ANION GAP SERPL CALCULATED.3IONS-SCNC: 8 MMOL/L (ref 5–15)
BUN SERPL-MCNC: 11 MG/DL (ref 8–23)
BUN/CREAT SERPL: 8.7 (ref 7–25)
CALCIUM SPEC-SCNC: 9.4 MG/DL (ref 8.6–10.5)
CHLORIDE SERPL-SCNC: 103 MMOL/L (ref 98–107)
CO2 SERPL-SCNC: 29 MMOL/L (ref 22–29)
CREAT SERPL-MCNC: 1.26 MG/DL (ref 0.76–1.27)
DEPRECATED RDW RBC AUTO: 54.3 FL (ref 37–54)
ERYTHROCYTE [DISTWIDTH] IN BLOOD BY AUTOMATED COUNT: 15.3 % (ref 12.3–15.4)
GFR SERPL CREATININE-BSD FRML MDRD: 55 ML/MIN/1.73
GLUCOSE SERPL-MCNC: 100 MG/DL (ref 65–99)
HCT VFR BLD AUTO: 39.9 % (ref 37.5–51)
HGB BLD-MCNC: 12.7 G/DL (ref 13–17.7)
MCH RBC QN AUTO: 31.2 PG (ref 26.6–33)
MCHC RBC AUTO-ENTMCNC: 31.8 G/DL (ref 31.5–35.7)
MCV RBC AUTO: 98 FL (ref 79–97)
PLATELET # BLD AUTO: 257 10*3/MM3 (ref 140–450)
PMV BLD AUTO: 10.6 FL (ref 6–12)
POTASSIUM SERPL-SCNC: 3.5 MMOL/L (ref 3.5–5.2)
RBC # BLD AUTO: 4.07 10*6/MM3 (ref 4.14–5.8)
SODIUM SERPL-SCNC: 140 MMOL/L (ref 136–145)
WBC # BLD AUTO: 7.67 10*3/MM3 (ref 3.4–10.8)

## 2020-08-12 PROCEDURE — 25010000002 VANCOMYCIN 10 G RECONSTITUTED SOLUTION

## 2020-08-12 PROCEDURE — 80048 BASIC METABOLIC PNL TOTAL CA: CPT | Performed by: NURSE PRACTITIONER

## 2020-08-12 PROCEDURE — 97535 SELF CARE MNGMENT TRAINING: CPT

## 2020-08-12 PROCEDURE — 85027 COMPLETE CBC AUTOMATED: CPT | Performed by: NURSE PRACTITIONER

## 2020-08-12 PROCEDURE — 25010000002 HEPARIN (PORCINE) PER 1000 UNITS: Performed by: INTERNAL MEDICINE

## 2020-08-12 PROCEDURE — 99239 HOSP IP/OBS DSCHRG MGMT >30: CPT | Performed by: NURSE PRACTITIONER

## 2020-08-12 RX ORDER — POLYETHYLENE GLYCOL 3350 17 G/17G
17 POWDER, FOR SOLUTION ORAL DAILY
Qty: 30 PACKET | Refills: 1 | Status: SHIPPED | OUTPATIENT
Start: 2020-08-13

## 2020-08-12 RX ADMIN — PANTOPRAZOLE SODIUM 40 MG: 40 TABLET, DELAYED RELEASE ORAL at 08:11

## 2020-08-12 RX ADMIN — VANCOMYCIN HYDROCHLORIDE 1500 MG: 10 INJECTION, POWDER, LYOPHILIZED, FOR SOLUTION INTRAVENOUS at 09:28

## 2020-08-12 RX ADMIN — LEVOTHYROXINE SODIUM 150 MCG: 150 TABLET ORAL at 05:52

## 2020-08-12 RX ADMIN — HEPARIN SODIUM 5000 UNITS: 5000 INJECTION INTRAVENOUS; SUBCUTANEOUS at 05:53

## 2020-08-12 RX ADMIN — BUSPIRONE HYDROCHLORIDE 10 MG: 10 TABLET ORAL at 08:11

## 2020-08-12 RX ADMIN — POLYETHYLENE GLYCOL 3350 17 G: 17 POWDER, FOR SOLUTION ORAL at 08:12

## 2020-08-12 RX ADMIN — ASPIRIN 81 MG: 81 TABLET, COATED ORAL at 08:12

## 2020-08-12 RX ADMIN — MEMANTINE 5 MG: 10 TABLET ORAL at 08:11

## 2020-08-12 RX ADMIN — FINASTERIDE 5 MG: 5 TABLET, FILM COATED ORAL at 08:11

## 2020-08-12 RX ADMIN — SERTRALINE HYDROCHLORIDE 50 MG: 50 TABLET, FILM COATED ORAL at 08:11

## 2020-08-12 RX ADMIN — ALLOPURINOL 300 MG: 300 TABLET ORAL at 08:11

## 2020-08-12 RX ADMIN — CLOPIDOGREL BISULFATE 75 MG: 75 TABLET ORAL at 08:11

## 2020-08-12 RX ADMIN — SODIUM CHLORIDE, PRESERVATIVE FREE 10 ML: 5 INJECTION INTRAVENOUS at 08:14

## 2020-08-12 RX ADMIN — DOCUSATE SODIUM 100 MG: 100 CAPSULE, LIQUID FILLED ORAL at 08:11

## 2020-08-12 NOTE — PLAN OF CARE
Problem: Patient Care Overview  Goal: Plan of Care Review  8/12/2020 0858 by Christiane Grimaldo, OT  Flowsheets (Taken 8/12/2020 0831)  Outcome Summary: Pt completes bed mobility with Jose De Jesus and transfers with CGA and HHA. Pt completed LBD independently and washed face/combed hair with setup. Pt with poor short term recall and frequent asks the same questions. Pt will need 24/7 assist if returning home with family. Recommend HH OT.

## 2020-08-12 NOTE — PROGRESS NOTES
Case Management Discharge Note      Final Note: SW met with patient and wife at bedside to discuss discharge planning. Plan is home with family transporting. Patient explained he no longer wants home health. Contacted [facility] ([facility]) [phone] rep Peggy to informed that patient no longer wants home health. Patient denies any discharge needs.          Destination      No service has been selected for the patient.      Durable Medical Equipment      No service has been selected for the patient.      Dialysis/Infusion      No service has been selected for the patient.      Home Medical Care      No service has been selected for the patient.      Therapy      No service has been selected for the patient.      Community Resources      No service has been selected for the patient.             Final Discharge Disposition Code: 01 - home or self-care

## 2020-08-12 NOTE — DISCHARGE SUMMARY
[facility] Medicine Services  DISCHARGE SUMMARY    Patient Name: Domitila Bosch  : 1940  MRN: [id number]    Date of Admission: 2020  9:32 AM  Date of Discharge: 2020  Primary Care Physician: Marcin Ferguson MD    Consults     Date and Time Order Name Status Description    2020 1111 Inpatient Urology Consult Completed     2020 0845 Inpatient Infectious Diseases Consult Completed         Hospital Course   Presenting Problem:   UTI (urinary tract infection) [N39.0]  Acute UTI [N39.0]    Active Hospital Problems    Diagnosis  POA   • DANIS (acute kidney injury) (CMS/HCC) [N17.9]  Yes   • Carotid stenosis [I65.29]  Yes   • Dyslipidemia [E78.5]  Yes   • Hypothyroidism [E03.9]  Yes   • GERD (gastroesophageal reflux disease) [K21.9]  Yes   • Anxiety disorder [F41.9]  Yes      Resolved Hospital Problems    Diagnosis Date Resolved POA   • **Acute UTI [N39.0] 2020 Yes   • Acute diastolic heart failure (CMS/HCC) [I50.31] 2020 Yes   • Acute respiratory failure with hypoxia (CMS/HCC) [J96.01] 2020 Yes   • Bacteremia [R78.81] 2020 Yes   • Encephalopathy acute [G93.40] 2020 Yes      Hospital Course:  Domitila Bosch is a 80 y.o. male with PMH significant for carotid stenosis, dyslipidemia, TIA/CVA with residual right sided weakness, hypertension, hypothyroidism, anxiety who presented to [facility] ED on 2020 with complaints of increasing confusion four days prior to admission.  He had a prior history of stroke but had no focal deficits or slurred speech with his confusion this admission.  Patient's wife was concerned about her ability to care for him with his increasing confusion and weakness.  In the ED, the work-up revealed DANIS and UTI, so patient was admitted to hospital medicine service for further evaluation and treatment.    Encephalopathy was most likely multifactorial and secondary to infection, DANIS and dehydration.  CT head showed no acute  abnormality, but had moderately advanced chronic small vessel ischemic disease.  Patient's Namenda was continued.  Urine had 3+ bacteria and TNTC WBCs.  Azactam was started in the ED and that was continued due to patient's cephalosporin allergy.  Patient's urine culture and for blood cultures came back with enterococcus facialis, so infectious disease was consulted and started the patient on vancomycin x14 days, which was completed today (8/12/2020)..  Patient had PICC line placed with the intention of him being discharged home with home infusion, but his creatinine went up slightly, so Dr. Martínez wanted the patient observed off IV fluids.  Patient was borderline confused, which is his baseline according to his wife, but he could be easily redirected.  Patient was very excited about going home today.  Patient's wife will be transporting him.  Discharge follow-up recommendations and appointments are listed below.    Discharge Follow Up Recommendations for outpatient labs/diagnostics:  --Follow-up with your family doctor in 1 week  --Follow-up with Dr. Martínez by telemedicine in 1 week  Day of Discharge   HPI:   Patient sitting up in chair eating breakfast in NAD.  Patient is not using any oxygen with saturations 97% on room air.  Patient's wife is in the room.  Patient is very excited about being discharged home after his last dose of vancomycin.  +BM. No adverse events overnight.    Review of Systems  Gen- No fevers, chills  CV- No chest pain, palpitations  Resp- No cough, dyspnea  GI- No N/V/D, abd pain  All other systems have been reviewed and the pertinent positives and negatives are listed above in the HPI and ROS    Vital Signs:   Temp:  [98 °F (36.7 °C)-98.2 °F (36.8 °C)] 98.1 °F (36.7 °C)  Heart Rate:  [51-70] 55  Resp:  [18-20] 20  BP: (117-136)/(78-99) 135/89   Physical Exam:  Constitutional: Alert, WD, WN male in NAD  Eyes: EOMI, sclerae anicteric, no conjunctival injection  Head: NCAT  ENT: Reid, moist  mucous membranes   Respiratory: Non-labored, symmetrical chest expansion, CTAB  Cardiovascular: RRR, no M/R/G, +DP pulses bilaterally  Gastrointestinal: Soft, NT, ND +BS  Musculoskeletal: PRADO; no LE edema bilaterally  Neurologic: Oriented x 2, repeats questions frequently, strength symmetric in all extremities, follows all commands, speech clear  Skin: No rashes on exposed skin  Psychiatric: Pleasant and cooperative; normal affect  Pertinent  and/or Most Recent Results     Results from last 7 days   Lab Units 08/12/20  0406 08/11/20  0352 08/10/20  0742 08/10/20  0417 08/09/20  0532 08/08/20  0432 08/07/20  0416 08/06/20  0347   WBC 10*3/mm3 7.67 8.36  --   --  9.54 8.88 9.86 9.95   HEMOGLOBIN g/dL 12.7* 12.5*  --   --  12.9* 12.3* 12.2* 12.9*   HEMATOCRIT % 39.9 38.5  --   --  39.8 37.7 37.6 39.3   PLATELETS 10*3/mm3 257 253  --   --  276 257 256 280   SODIUM mmol/L 140 140 139 140 139 142 140 137   POTASSIUM mmol/L 3.5 3.6 3.6 3.7 3.7 3.5 3.9 3.5   CHLORIDE mmol/L 103 103 103 104 105 108* 105 101   CO2 mmol/L 29.0 27.0 29.0 29.0 26.0 25.0 27.0 27.0   BUN mg/dL 11 11 12 12 13 13 15 15   CREATININE mg/dL 1.26 1.15 1.28* 1.29* 1.15 1.16 1.47* 1.32*   GLUCOSE mg/dL 100* 91 103* 102* 105* 97 99 97   CALCIUM mg/dL 9.4 9.1 9.5 9.2 8.9 8.8 8.9 9.1     Results from last 7 days   Lab Units 08/08/20  0432 08/07/20  0416   BILIRUBIN mg/dL 0.5 0.4   ALK PHOS U/L 119* 131*   ALT (SGPT) U/L 24 27   AST (SGOT) U/L 22 27           Invalid input(s): TG, LDLCALC, LDLREALC        Brief Urine Lab Results  (Last result in the past 365 days)      Color   Clarity   Blood   Leuk Est   Nitrite   Protein   CREAT   Urine HCG        07/28/20 1021 Yellow Cloudy Large (3+) Large (3+) Negative 30 mg/dL (1+)               Microbiology Results Abnormal     Procedure Component Value - Date/Time    Blood Culture - Blood, Chest, Right [[id number]] Collected:  07/30/20 0834    Lab Status:  Final result Specimen:  Blood from Chest, Right Updated:   08/04/20 0915     Blood Culture No growth at 5 days    Blood Culture - Blood, Arm, Left [[id number]] Collected:  07/30/20 0834    Lab Status:  Final result Specimen:  Blood from Arm, Left Updated:  08/04/20 0915     Blood Culture No growth at 5 days    COVID PRE-OP / PRE-PROCEDURE SCREENING ORDER (NO ISOLATION) - Swab, Nasopharynx [[id number]] Collected:  08/02/20 1602    Lab Status:  Final result Specimen:  Swab from Nasopharynx Updated:  08/02/20 1713    Narrative:       The following orders were created for panel order COVID PRE-OP / PRE-PROCEDURE SCREENING ORDER (NO ISOLATION) - Swab, Nasopharynx.  Procedure                               Abnormality         Status                     ---------                               -----------         ------                     Respiratory Panel PCR w/...[[id number]]  Normal              Final result                 Please view results for these tests on the individual orders.    Respiratory Panel PCR w/COVID-19(SARS-CoV-2) PATRICIA/RGEG/AMELIA In-House, NP Swab in [facility], 8-12 Hr TAT - Swab, Nasopharynx [[id number]]  (Normal) Collected:  08/02/20 1602    Lab Status:  Final result Specimen:  Swab from Nasopharynx Updated:  08/02/20 1713     ADENOVIRUS, PCR Not Detected     Coronavirus 229E Not Detected     Coronavirus HKU1 Not Detected     Coronavirus NL63 Not Detected     Coronavirus OC43 Not Detected     COVID19 Not Detected     Human Metapneumovirus Not Detected     Human Rhinovirus/Enterovirus Not Detected     Influenza A PCR Not Detected     Influenza A H1 Not Detected     Influenza A H1 2009 PCR Not Detected     Influenza A H3 Not Detected     Influenza B PCR Not Detected     Parainfluenza Virus 1 Not Detected     Parainfluenza Virus 2 Not Detected     Parainfluenza Virus 3 Not Detected     Parainfluenza Virus 4 Not Detected     RSV, PCR Not Detected     Bordetella pertussis pcr Not Detected     Bordetella parapertussis PCR Not Detected     Chlamydophila pneumoniae PCR Not  Detected     Mycoplasma pneumo by PCR Not Detected    Narrative:       Fact sheet for providers: https://docs.[employer]/wp-content/uploads/SQC9565-2829-WA2.1-EUA-Provider-Fact-Sheet-3.pdf    Fact sheet for patients: https://docs.[employer]/wp-content/uploads/YVJ7443-3299-PK0.1-EUA-Patient-Fact-Sheet-1.pdf    Urine Culture - Urine, Urine, Clean Catch [[id number]]  (Abnormal)  (Susceptibility) Collected:  07/28/20 1021    Lab Status:  Edited Result - FINAL Specimen:  Urine, Clean Catch Updated:  07/31/20 1057     Urine Culture >100,000 CFU/mL Enterococcus faecalis    Narrative:        requested Daptomycin 7/30    Susceptibility      Enterococcus faecalis     GURPREET     Ampicillin Susceptible     Daptomycin Susceptible     Levofloxacin Susceptible     Nitrofurantoin Susceptible     Tetracycline Susceptible     Vancomycin Susceptible                    Blood Culture - Blood, Arm, Left [[id number]]  (Abnormal)  (Susceptibility) Collected:  07/28/20 1133    Lab Status:  Edited Result - FINAL Specimen:  Blood from Arm, Left Updated:  07/31/20 0723     Blood Culture Enterococcus faecalis     Comment:   Infectious disease consultation is highly recommended.        Isolated from Aerobic and Anaerobic Bottles     Gram Stain Aerobic Bottle Gram positive cocci in chains      Anaerobic Bottle Gram positive cocci in chains    Narrative:       Dr. Sandhu requested Daptomycin     Susceptibility      Enterococcus faecalis     GURPREET     Ampicillin Susceptible     Daptomycin Intermediate     Gentamicin High Level Synergy Susceptible     Vancomycin Susceptible                    Blood Culture - Blood, Arm, Right [[id number]]  (Abnormal) Collected:  07/28/20 1125    Lab Status:  Final result Specimen:  Blood from Arm, Right Updated:  07/30/20 0656     Blood Culture Enterococcus faecalis     Comment:   Infectious disease consultation is highly recommended.        Isolated from Aerobic and Anaerobic Bottles     Gram Stain Aerobic  Bottle Gram positive cocci in chains      Anaerobic Bottle Gram positive cocci in chains    Narrative:       Refer to previous blood culture collected on 7/28/2020 1133 for MICs    Blood Culture ID, PCR - Blood, Arm, Left [[id number]]  (Abnormal) Collected:  07/28/20 1133    Lab Status:  Final result Specimen:  Blood from Arm, Left Updated:  07/29/20 0555     BCID, PCR Enterococcus spp. Identification by BCID PCR.          Imaging Results (All)     Procedure Component Value Units Date/Time    XR Chest 1 View [[id number]] Collected:  07/31/20 1347     Updated:  07/31/20 2255    Narrative:       EXAMINATION: XR CHEST 1 VW-07/31/2020:     INDICATION: Hypoxia; N39.0-Urinary tract infection, site not specified;  R41.82-Altered mental status, unspecified; Z86.73-Personal history of  transient ischemic attack (TIA), and cerebral infarction without  residual deficits; R53.1-Weakness; R44.1-Visual hallucinations.     COMPARISON: 07/31/2020 2:09 AM.     FINDINGS: Radiograph obtained at 12:26 PM shows the lung volumes  remaining relatively low. The heart and vasculature appear upper limits  of normal size. Mild diffuse pulmonary interstitial changes appear  stable overall compared to the prior exam. No densely consolidated lung  effusion or pneumothorax is seen.       Impression:       Low lung volumes and mild diffuse pulmonary interstitial  disease, similar to earlier 2:09 AM exam.     D:  07/31/2020  E:  07/31/2020            This report was finalized on 7/31/2020 10:52 PM by Dr. Félix Chu MD.       XR Chest 1 View [[id number]] Collected:  07/31/20 0237     Updated:  07/31/20 0239    Narrative:       CR Chest 1 Vw    INDICATION:   Dyspnea today.     COMPARISON:    7/28/2020    FINDINGS:  Single portable AP view(s) of the chest.    Lung volumes are lower today. The heart is enlarged. There is vascular congestion. There is some atelectasis in the lung bases. No pneumothorax. There is elevation of the right hemidiaphragm.        Impression:       Persistent cardiomegaly with vascular congestion. Lower lung volumes today with bibasilar atelectasis.    Signer Name: Ammon Thayer MD   Signed: 7/31/2020 2:37 AM   Workstation Name: [email or web address]    [facility]    CT Head Without Contrast [[id number]] Collected:  07/28/20 1602     Updated:  07/29/20 1659    Narrative:       EXAMINATION: CT HEAD WO CONTRAST-07/28/2020:      INDICATION: Encephalopathy; N39.0-Urinary tract infection, site not  specified; R41.82-Altered mental status, unspecified; Z86.73-Personal  history of transient ischemic attack (TIA), and cerebral infarction  without residual deficits; R53.1-Weakness; R44.1-Visual hallucinations.      TECHNIQUE: CT head without intravenous contrast.     The radiation dose reduction device was turned on for each scan per the  ALARA (As Low as Reasonably Achievable) protocol.     COMPARISON: NONE.     FINDINGS: The midline structures are symmetric without evidence of mass,  mass effect or midline shift. Ventricles and sulci within normal limits.  At least moderate low-attenuation signal in the periventricular and deep  white matter suggesting moderately advanced chronic small vessel  ischemic disease without intra-axial hemorrhage or extra-axial fluid  collection. Globes and orbits are unremarkable. The visualized paranasal  sinuses and mastoid air cells are grossly clear and well pneumatized.  Calvarium intact.       Impression:       No acute intracranial abnormality with moderately advanced  chronic small vessel ischemic disease findings noted in the  periventricular and deep white matter.     D:  07/28/2020  E:  07/28/2020           This report was finalized on 7/29/2020 4:56 PM by Dr. Uriel Childers.       US Renal Limited [[id number]] Collected:  07/29/20 0822     Updated:  07/29/20 1653    Narrative:       EXAMINATION: US RENAL LIMITED-     INDICATION: History of kidney stones, DANIS, flank pain; N39.0-Urinary  tract  infection, site not specified; R41.82-Altered mental status,  unspecified; Z86.73-Personal history of transient ischemic attack (TIA),  and cerebral infarction without residual deficits; R53.1-Weakness;  R44.1-Visual hallucinations; renal insufficiency.     TECHNIQUE: Sonographic imaging was obtained of the kidneys in both the  sagittal and transverse planes.     COMPARISON: None.     FINDINGS: The right kidney measures in length from pole to pole 12.5 cm.  There is a renal cortical cyst identified measuring 7.2 cm. Smaller cyst  measuring 3.8 cm. No solid mass is identified. There are several  nonobstructing stones the largest measuring 5 mm. No hydronephrosis.     The left kidney measures in length from pole to pole 10.9 cm. Several  cystic structures identified the largest 1.1 cm.     Imaging of the bladder reveals echogenic debris suggesting sludge. There  is what appears to be posterior impression on the base of the bladder  suggesting an enlarged prostate.       Impression:       Nonobstructing stone in the right kidney. Enlargement of the  prostate with posterior impression on the base of the bladder. Debris  identified in the bladder. No definite wall thickening. Bilateral renal  cortical cysts.      D:  07/29/2020  E:  07/29/2020     This report was finalized on 7/29/2020 4:50 PM by Dr. Cari Osborne MD.       XR Chest 1 View [[id number]] Collected:  07/28/20 1027     Updated:  07/28/20 1701    Narrative:          EXAMINATION: XR CHEST 1 VW-07/28/2020:      INDICATION: Weak/Dizzy/AMS, triage protocol.      COMPARISON: 05/20/2016.     FINDINGS: Portable chest reveals cardiac and mediastinal silhouettes  within normal limits. The lung fields are clear. No focal parenchymal  opacification present. No pleural effusion or pneumothorax. Degenerative  changes seen within the spine.           Impression:       No acute cardiopulmonary disease.     D:  07/28/2020  E:  07/28/2020     This report was finalized on  7/28/2020 4:58 PM by Dr. Cari Osborne MD.             Results for orders placed during the hospital encounter of 09/02/19   Duplex Carotid Ultrasound CAR    Narrative · Right internal carotid artery stenosis of 50-69%.  · Left internal carotid artery stenosis of 50-69%.  · Progression of NANO stenosis compared with 2018 study.          Results for orders placed during the hospital encounter of 09/02/19   Duplex Carotid Ultrasound CAR    Narrative · Right internal carotid artery stenosis of 50-69%.  · Left internal carotid artery stenosis of 50-69%.  · Progression of NANO stenosis compared with 2018 study.          Results for orders placed during the hospital encounter of 07/28/20   Adult Transesophageal Echo (SUMMER) W/ Cont if Necessary Per Protocol    Narrative · Estimated EF = 65%.  · Left ventricular systolic function is normal.  · Left atrial cavity size is mild-to-moderately dilated.  · There is mild calcification of the aortic valve mainly affecting the non   and right coronary cusp(s).  · Saline test results are negative.  · The aortic valve exhibits moderate sclerosis.  · There is no evidence of pericardial effusion.  · There is moderate (grade 2) protruding plaque in the descending aorta   present.  · No valvular vegetations demonstrated.          Plan for Follow-up of Pending Labs/Results: None  Discharge Details        Discharge Medications      New Medications      Instructions Start Date   polyethylene glycol 17 g packet  Commonly known as:  MIRALAX   17 g, Oral, Daily   Start Date:  August 13, 2020        Continue These Medications      Instructions Start Date   allopurinol 300 MG tablet  Commonly known as:  ZYLOPRIM   300 mg, Oral, Daily      aspirin 81 MG tablet   81 mg, Oral, Daily      atorvastatin 80 MG tablet  Commonly known as:  LIPITOR   80 mg, Oral, Nightly      busPIRone 10 MG tablet  Commonly known as:  BUSPAR   10 mg, Oral, 2 Times Daily      Cholecalciferol 125 MCG (5000 UT) tablet    5,000 Units, Oral, Daily      Claritin 10 MG tablet  Generic drug:  loratadine   1 tablet, Oral, Daily PRN      clopidogrel 75 MG tablet  Commonly known as:  PLAVIX   75 mg, Oral, Daily      doxazosin 4 MG tablet  Commonly known as:  CARDURA   4 mg, Oral, Daily      finasteride 5 MG tablet  Commonly known as:  PROSCAR   5 mg, Oral, Daily      levothyroxine 150 MCG tablet  Commonly known as:  SYNTHROID, LEVOTHROID   150 mcg, Oral, Daily      memantine 5 MG tablet  Commonly known as:  NAMENDA   5 mg, Oral, Daily      METAMUCIL PO   1 tablet, Oral, Daily      omeprazole 40 MG capsule  Commonly known as:  priLOSEC   40 mg, Oral, Daily      sertraline 50 MG tablet  Commonly known as:  ZOLOFT   50 mg, Oral, Daily      triamterene-hydrochlorothiazide 37.5-25 MG per tablet  Commonly known as:  MAXZIDE-25   1 tablet, Oral, Daily      vitamin B-12 2500 MCG sublingual tablet tablet  Commonly known as:  CYANOCOBALAMIN   2,500 mcg, Sublingual, Daily             Allergies   Allergen Reactions   • Cephalexin Rash   • Lisinopril Angioedema   • Sulfamethoxazole-Trimethoprim Rash   • Betadine [Povidone Iodine] Other (See Comments)     Skin Burning    • Iodine Other (See Comments)     Skin Burning    • Flomax [Tamsulosin Hcl] Rash   • Penicillins Rash   • Pseudoephedrine Rash         Discharge Disposition:  Home or Self Care    Diet:  Hospital:  Diet Order   Procedures   • Diet Regular; Cardiac       Activity:  Activity Instructions     Activity as Tolerated            Restrictions or Other Recommendations:  None       CODE STATUS:    Code Status and Medical Interventions:   Ordered at: 07/28/20 1245     Level Of Support Discussed With:    Patient     Code Status:    CPR     Medical Interventions (Level of Support Prior to Arrest):    Full     Additional Instructions for the Follow-ups that You Need to Schedule     Discharge Follow-up with PCP   As directed       Currently Documented PCP:    Marcin Ferguson MD    PCP Phone Number:     [phone]     Follow Up Details:  1 week; please schedule appointment prior to patient's discharge         Discharge Follow-up with Specified Provider: Dr. Martínez; 1 week by telemedicine; please schedule appointment prior to patient's discharge   As directed      To:  Dr. Martínez; 1 week by telemedicine; please schedule appointment prior to patient's discharge             Electronically signed by YVAN May, 08/12/20, 10:46 AM.    Time Spent on Discharge:  I spent  40  minutes on this discharge activity which included: face-to-face encounter with the patient, reviewing the data in the system, coordination of the care with the nursing staff as well as consultants, documentation, and entering orders.

## 2020-08-12 NOTE — PLAN OF CARE
Problem: Patient Care Overview  Goal: Plan of Care Review  Outcome: Ongoing (interventions implemented as appropriate)  Flowsheets  Taken 8/10/2020 1438 by Latonya Brennan PT  Progress: improving  Taken 8/11/2020 2000 by Liz Vyas, RN  Plan of Care Reviewed With: patient;spouse     Problem: Fall Risk (Adult)  Goal: Identify Related Risk Factors and Signs and Symptoms  Outcome: Ongoing (interventions implemented as appropriate)  Flowsheets (Taken 8/8/2020 0410 by Alvaro Landrum, RN)  Related Risk Factors (Fall Risk): age-related changes;fatigue/slow reaction;gait/mobility problems  Signs and Symptoms (Fall Risk): presence of risk factors     Problem: Pain, Chronic (Adult)  Goal: Identify Related Risk Factors and Signs and Symptoms  Outcome: Ongoing (interventions implemented as appropriate)

## 2020-08-12 NOTE — THERAPY PROGRESS REPORT/RE-CERT
Acute Care - Occupational Therapy Progress Note  [facility]     Patient Name: Domitila Bosch  : 1940  MRN: [id number]  Today's Date: 2020     Date of Referral to OT: 20  Referring Physician: MD Latrice    Admit Date: 2020       ICD-10-CM ICD-9-CM   1. Acute UTI N39.0 599.0   2. Altered mental status, unspecified altered mental status type R41.82 780.97   3. History of CVA (cerebrovascular accident) Z86.73 V12.54   4. Weakness R53.1 780.79   5. Visual hallucinations R44.1 368.16     Patient Active Problem List   Diagnosis   • Diverticulosis of large intestine with hemorrhage   • Umbilical hernia   • S/P hernia repair   • Dyslipidemia   • Carotid stenosis   • Gout   • GERD (gastroesophageal reflux disease)   • Hypothyroidism   • Neuropathy   • Malignant melanoma (CMS/HCC)   • Asthma   • Anxiety disorder   • Diverticulosis   • Muscle pain   • Lumbar radiculopathy   • Kidney stone   • Deviated nasal septum   • Chronic laryngitis   • Acute CVA (cerebrovascular accident) (CMS/HCC)   • Elevated BP without diagnosis of hypertension   • Allergy to Betadine   • Acute UTI   • Encephalopathy acute   • DANIS (acute kidney injury) (CMS/HCC)   • Bacteremia   • Acute diastolic heart failure (CMS/HCC)   • Acute respiratory failure with hypoxia (CMS/HCC)     Past Medical History:   Diagnosis Date   • Anxiety disorder 2017   • Asthma 2017   • Basal cell carcinoma in situ of skin 2019    right leg    • Carotid stenosis 2017   • Diaphoresis    • Diastolic dysfunction    • Diverticulitis    • Dyslipidemia 2017   • GERD (gastroesophageal reflux disease) 2017   • Gout 2017   • Herpes zoster     Herpes zoster, [id number], right lower extremity.    • Hypertension 2017   • Hypothyroidism 2017   • LVH (left ventricular hypertrophy)    • Malignant melanoma (CMS/HCC) 2017   • Melanoma (CMS/HCC)    • Mitral regurgitation    • Nephrolithiasis    • Post herpetic neuralgia  02/16/2017   • TIA (transient ischemic attack)     TIA, June 2012, with nonobstructive carotid stenosis, dyslipidemia and hypertension     Past Surgical History:   Procedure Laterality Date   • ABDOMINAL SURGERY     • APPENDECTOMY     • COLON RESECTION LEFT  2016   • COLON RESECTION LEFT  2016   • HEEL SPUR SURGERY  1999   • HERNIA REPAIR      hiatal hernia    • NISSEN FUNDOPLICATION  1984   • OTHER SURGICAL HISTORY  2010    Malignant melanoma, status post resection        Therapy Treatment    Rehabilitation Treatment Summary     Row Name 08/12/20 0831             Treatment Time/Intention    Discipline  occupational therapist  -HK      Document Type  progress note/recertification  -HK2      Subjective Information  no complaints  -HK      Mode of Treatment  individual therapy;occupational therapy  -HK      Patient/Family Observations  Pt received supine in bed.   -HK      Care Plan Review  care plan/treatment goals reviewed;risks/benefits reviewed;patient/other agree to care plan  -HK      Patient Effort  excellent  -HK      Existing Precautions/Restrictions  fall  -HK      Recorded by [HK] Christiane Grimaldo, OT 08/12/20 0857  [HK2] Christiane Grimaldo, OT 08/12/20 0951      Row Name 08/12/20 0831             Vital Signs    Pre Systolic BP Rehab  135  -HK      Pre Treatment Diastolic BP  89  -HK      Post Systolic BP Rehab  157  -HK      Post Treatment Diastolic BP  80  -HK      Pre Patient Position  Supine  -HK      Intra Patient Position  Standing  -HK      Post Patient Position  Sitting  -HK      Recorded by [HK] Christiane Grimaldo, OT 08/12/20 0857      Row Name 08/12/20 0831             Cognitive Assessment/Intervention    Additional Documentation  Cognitive Assessment/Intervention (Group)  -HK      Recorded by [HK] Christiane Grimaldo, OT 08/12/20 0857      Row Name 08/12/20 0831             Cognitive Assessment/Intervention- PT/OT    Affect/Mental Status (Cognitive)  confused  -HK      Orientation Status (Cognition)  oriented x 4   -HK      Follows Commands (Cognition)  follows one step commands;over 90% accuracy  -HK      Cognitive Function (Cognitive)  safety deficit;memory deficit  -HK      Memory Deficit (Cognitive)  moderate deficit  -HK      Safety Deficit (Cognitive)  mild deficit;safety precautions awareness;safety precautions follow-through/compliance  -HK      Recorded by [HK] Christiane Grimaldo, OT 08/12/20 0857      Row Name 08/12/20 0831             Safety Issues, Functional Mobility    Safety Issues Affecting Function (Mobility)  safety precautions follow-through/compliance;safety precaution awareness;insight into deficits/self awareness;awareness of need for assistance  -HK      Impairments Affecting Function (Mobility)  balance;strength  -HK      Recorded by [HK] Christiane Grimaldo, OT 08/12/20 0857      Row Name 08/12/20 0831             Bed Mobility Assessment/Treatment    Bed Mobility Assessment/Treatment  scooting/bridging;supine-sit  -HK      Scooting/Bridging [place] (Bed Mobility)  supervision  -HK      Supine-Sit [place] (Bed Mobility)  minimum assist (75% patient effort);verbal cues  -HK      Bed Mobility, Safety Issues  decreased use of arms for pushing/pulling;decreased use of legs for bridging/pushing;impaired trunk control for bed mobility  -HK      Assistive Device (Bed Mobility)  bed rails;head of bed elevated  -HK      Comment (Bed Mobility)  Pt advanced to EOB with Jose De Jesus from OT.   -HK      Recorded by [HK] Christiane Grimaldo, OT 08/12/20 0857      Row Name 08/12/20 0831             Functional Mobility    Functional Mobility- Ind. Level  contact guard assist  -HK      Functional Mobility- Device  -- none  -HK      Functional Mobility-Distance (Feet)  5  -HK      Functional Mobility- Safety Issues  step length decreased;weight-shifting ability decreased  -HK      Functional Mobility- Comment  pt ambulated from bed to chair with CGA and no AE.   -HK      Recorded by [HK] Christiane Grimaldo, OT 08/12/20 0857      Row Name  08/12/20 0831             Transfer Assessment/Treatment    Transfer Assessment/Treatment  sit-stand transfer;stand-sit transfer  -HK      Comment (Transfers)  Verbal cues for safe hand placement and sequencing.  -HK      Recorded by [HK] Christiane Grimaldo, OT 08/12/20 0857      Row Name 08/12/20 0831             Sit-Stand Transfer    Sit-Stand [place] (Transfers)  contact guard  -HK      Assistive Device (Sit-Stand Transfers)  -- HHA  -HK      Recorded by [HK] Christiane Griamldo, OT 08/12/20 0857      Row Name 08/12/20 0831             Stand-Sit Transfer    Stand-Sit [place] (Transfers)  contact guard  -HK      Assistive Device (Stand-Sit Transfers)  -- HHA  -HK      Recorded by [HK] Christiane Grimaldo, OT 08/12/20 0857      Row Name 08/12/20 0831             ADL Assessment/Intervention    BADL Assessment/Intervention  lower body dressing;grooming;feeding  -HK      Recorded by [HK] Christiane Grimaldo, OT 08/12/20 0859      Row Name 08/12/20 0831             Lower Body Dressing Assessment/Training    Lower Body Dressing [place] Level  doff;don;socks;independent  -HK      Lower Body Dressing Position  unsupported sitting  -HK      Comment (Lower Body Dressing)  extra time/effort for completion.  -HK      Recorded by [HK] Christiane Grimaldo, OT 08/12/20 0857      Row Name 08/12/20 0831             Grooming Assessment/Training    [place] Level (Grooming)  hair care, combing/brushing;wash face, hands;set up  -HK      Grooming Position  supported sitting  -HK      Recorded by [HK] Christiane Grimaldo, OT 08/12/20 0857      Row Name 08/12/20 0831             Self-Feeding Assessment/Training    [place] Level (Feeding)  liquids to mouth;set up  -HK      Self-Feeding Assess/Train, Spillage Amount  minimal  -HK      Position (Self-Feeding)  supported sitting  -HK      Recorded by [HK] Christiane Grimaldo, OT 08/12/20 0859      Row Name 08/12/20 0831             Toileting Assessment/Training    Comment (Toileting)  Pt declines need to  void   -HK      Recorded by [HK] Christiane Grimaldo, OT 08/12/20 0857      Row Name 08/12/20 0831             BADL Safety/Performance    Impairments, BADL Safety/Performance  strength;balance  -HK      Recorded by [HK] Christiane Grimaldo, OT 08/12/20 0857      Row Name 08/12/20 0831             Motor Skills Assessment/Interventions    Additional Documentation  Balance (Group);Therapeutic Exercise (Group)  -HK      Recorded by [HK] Christiane Grimaldo, OT 08/12/20 0857      Row Name 08/12/20 0831             Therapeutic Exercise    78798 - OT Therapeutic Exercise Minutes  5  -HK      Recorded by [HK] Christiane Grimaldo, OT 08/12/20 0857      Row Name 08/12/20 0831             Therapeutic Exercise    Upper Extremity Range of Motion (Therapeutic Exercise)  shoulder flexion/extension, bilateral;shoulder abduction/adduction, bilateral;elbow flexion/extension, bilateral;wrist flexion/extension, bilateral  -HK      Hand (Therapeutic Exercise)  finger flexion/extension, bilateral  -HK      Exercise Type (Therapeutic Exercise)  AROM (active range of motion)  -HK      Position (Therapeutic Exercise)  seated  -HK      Sets/Reps (Therapeutic Exercise)  1/10  -HK      Recorded by [HK] Christiane Grimaldo, OT 08/12/20 0857      Row Name 08/12/20 0831             Balance    Balance  static sitting balance;static standing balance;dynamic sitting balance  -HK      Recorded by [HK] Christiane Grimaldo, OT 08/12/20 0857      Row Name 08/12/20 0831             Static Sitting Balance    Level of [place] (Unsupported Sitting, Static Balance)  independent  -HK      Sitting Position (Unsupported Sitting, Static Balance)  sitting on edge of bed  -HK      Time Able to Maintain Position (Unsupported Sitting, Static Balance)  2 to 3 minutes  -HK      Recorded by [HK] Christiane Grimaldo, OT 08/12/20 0857      Row Name 08/12/20 0831             Dynamic Sitting Balance    Level of [place], Reaches Outside Midline (Sitting, Dynamic Balance)  independent  -HK      Sitting  Position, Reaches Outside Midline (Sitting, Dynamic Balance)  sitting on edge of bed  -HK      Comment, Reaches Outside Midline (Sitting, Dynamic Balance)  completing LBD   -HK      Recorded by [HK] Christiane Grimaldo, OT 08/12/20 0857      Row Name 08/12/20 0831             Static Standing Balance    Level of [place] (Supported Standing, Static Balance)  contact guard assist  -HK      Time Able to Maintain Position (Supported Standing, Static Balance)  1 to 2 minutes  -HK      Assistive Device Utilized (Supported Standing, Static Balance)  -- HHA  -HK      Recorded by [HK] Christiane Grimaldo, OT 08/12/20 0857      Row Name 08/12/20 0831             Positioning and Restraints    Pre-Treatment Position  in bed  -HK      Post Treatment Position  chair  -HK      In Chair  notified nsg;reclined;call light within reach;encouraged to call for assist;exit alarm on  -HK      Recorded by [HK] Christiane Grimaldo, OT 08/12/20 0857      Row Name 08/12/20 0831             Pain Assessment    Additional Documentation  Pain Scale: Numbers Pre/Post-Treatment (Group)  -HK      Recorded by [HK] Christiane Grimaldo, OT 08/12/20 0857      Row Name 08/12/20 0831             Pain Scale: Numbers Pre/Post-Treatment    Pain Scale: Numbers, Pretreatment  0/10 - no pain  -HK      Pain Scale: Numbers, Post-Treatment  0/10 - no pain  -HK      Recorded by [HK] Christiane Grimaldo, OT 08/12/20 0857      Row Name 08/12/20 0831             Coping    Observed Emotional State  accepting;calm;cooperative  -HK      Recorded by [HK] Christiane Grimaldo, OT 08/12/20 0857      Row Name 08/12/20 0831             Plan of Care Review    Plan of Care Reviewed With  patient  -HK      Progress  improving  -HK      Outcome Summary  Pt completes bed mobility with Jose De Jesus and transfers with CGA and HHA. Pt completed LBD independently and washed face/combed hair with setup. Pt with poor short term recall and frequent asks the same questions. Pt will need 24/7 assist if returning home with family.  Recommend  OT.  -HK      Recorded by [HK] Christiane Grimaldo, OT 08/12/20 0857      Row Name 08/12/20 0831             Outcome Summary/Treatment Plan (OT)    Daily Summary of Progress (OT)  progress toward functional goals as expected  -HK      Recorded by [HK] Christiane Grimaldo, OT 08/12/20 0857        User Key  (r) = Recorded By, (t) = Taken By, (c) = Cosigned By    Initials Name Effective Dates Discipline     Christiane Grimaldo, OT 03/07/18 -  OT           Rehab Goal Summary     Row Name 08/12/20 0831             Bed Mobility Goal 1 (OT)    Progress/Outcomes (Bed Mobility Goal 1, OT)  goal ongoing  -HK         Transfer Goal 1 (OT)    Progress/Outcome (Transfer Goal 1, OT)  goal ongoing  -HK         Dressing Goal 1 (OT)    Progress/Outcome (Dressing Goal 1, OT)  goal met  -HK         Toileting Goal 1 (OT)    Progress/Outcome (Toileting Goal 1, OT)  goal ongoing  -HK        User Key  (r) = Recorded By, (t) = Taken By, (c) = Cosigned By    Initials Name Provider Type Discipline     Christiane Grimaldo, OT Occupational Therapist OT        Occupational Therapy Education                 Title: PT OT SLP Therapies (Done)     Topic: Occupational Therapy (Done)     Point: ADL training (Done)     Description:   Instruct learner(s) on proper safety adaptation and remediation techniques during self care or transfers.   Instruct in proper use of assistive devices.              Learning Progress Summary           Patient Acceptance, E,TB,D, VU by  at 8/12/2020 0831    Comment:  Pt educated on ADL retraining with grooming and self feeding, safety precautions, B UE HEP, and appropriate body mechanics.    Acceptance, E, VU by ROSE at 8/7/2020 0955    Comment:  Benefits of activity; body mechanics for transfers    Acceptance, E, VU by  at 8/5/2020 1040    Acceptance, E,TB, VU by LC at 8/3/2020 1351    Acceptance, E, VU by  at 7/31/2020 1030    Acceptance, E,TB, VU by  at 7/31/2020 0505    Acceptance, E, VU by  at 7/30/2020 0820     Comment:  benefits of activity, role of OT    Acceptance, E,TB, VU by TH at 7/30/2020 0635    Acceptance, E,TB, VU by PC at 7/28/2020 1546   Significant Other Acceptance, E, VU by ROSE at 8/7/2020 0955    Comment:  Benefits of activity; body mechanics for transfers    Acceptance, E,TB, VU by PC at 7/28/2020 1546                   Point: Home exercise program (Done)     Description:   Instruct learner(s) on appropriate technique for monitoring, assisting and/or progressing therapeutic exercises/activities.              Learning Progress Summary           Patient Acceptance, E,TB,D, VU by HK at 8/12/2020 0831    Comment:  Pt educated on ADL retraining with grooming and self feeding, safety precautions, B UE HEP, and appropriate body mechanics.    Acceptance, E,TB, VU by  at 8/3/2020 1351    Acceptance, E,TB, VU by TH at 7/31/2020 0505    Acceptance, E,TB, VU by  at 7/30/2020 0635    Acceptance, E,TB, VU by PC at 7/28/2020 1546   Significant Other Acceptance, E,TB, VU by PC at 7/28/2020 1546                   Point: Precautions (Done)     Description:   Instruct learner(s) on prescribed precautions during self-care and functional transfers.              Learning Progress Summary           Patient Acceptance, E,TB,D, VU by HK at 8/12/2020 0831    Comment:  Pt educated on ADL retraining with grooming and self feeding, safety precautions, B UE HEP, and appropriate body mechanics.    Acceptance, E, VU by ROSE at 8/7/2020 0955    Comment:  Benefits of activity; body mechanics for transfers    Acceptance, E,TB, VU by  at 8/3/2020 1351    Acceptance, E,TB, VU by TH at 7/31/2020 0505    Acceptance, E,TB, VU by  at 7/30/2020 0635    Acceptance, E,TB, VU by PC at 7/28/2020 1546   Significant Other Acceptance, E, VU by ROSE at 8/7/2020 0955    Comment:  Benefits of activity; body mechanics for transfers    Acceptance, E,TB, VU by PC at 7/28/2020 1546                   Point: Body mechanics (Done)     Description:   Instruct  learner(s) on proper positioning and spine alignment during self-care, functional mobility activities and/or exercises.              Learning Progress Summary           Patient Acceptance, E,TB,D, VU by  at 8/12/2020 0831    Comment:  Pt educated on ADL retraining with grooming and self feeding, safety precautions, B UE HEP, and appropriate body mechanics.    Acceptance, E, VU by ROSE at 8/7/2020 0955    Comment:  Benefits of activity; body mechanics for transfers    Acceptance, E,TB, VU by  at 8/3/2020 1351    Acceptance, E,TB, VU by  at 7/31/2020 0505    Acceptance, E,TB, VU by  at 7/30/2020 0635    Acceptance, E,TB, VU by  at 7/28/2020 1546   Significant Other Acceptance, E, VU by ROSE at 8/7/2020 0955    Comment:  Benefits of activity; body mechanics for transfers    Acceptance, E,TB, VU by  at 7/28/2020 1546                               User Key     Initials Effective Dates Name Provider Type Discipline     06/23/15 -  Fidelina Goyal, OT Occupational Therapist OT     06/16/16 -  Niya Dupont, RN Registered Nurse Nurse     03/07/18 -  Christiane Grimaldo, OT Occupational Therapist OT     01/30/20 -  Magda Humphrey, RN Registered Nurse Nurse     02/14/20 -  Paulette Asif OT Occupational Therapist OT     07/07/20 -  Deja Levy, DOROTHY Registered Nurse Nurse                OT Recommendation and Plan  Outcome Summary/Treatment Plan (OT)  Daily Summary of Progress (OT): progress toward functional goals as expected  Daily Summary of Progress (OT): progress toward functional goals as expected  Plan of Care Review  Plan of Care Reviewed With: patient  Plan of Care Reviewed With: patient  Outcome Summary: Pt completes bed mobility with Jose De Jesus and transfers with CGA and HHA. Pt completed LBD independently and washed face/combed hair with setup. Pt with poor short term recall and frequent asks the same questions. Pt will need 24/7 assist if returning home with family. Recommend HH OT.  Outcome Measures     Row  Name 08/12/20 0831 08/10/20 1020          How much help from another is currently needed...    Putting on and taking off regular lower body clothing?  4  -HK  3  -SD     Bathing (including washing, rinsing, and drying)  3  -HK  3  -SD     Toileting (which includes using toilet bed pan or urinal)  3  -HK  3  -SD     Putting on and taking off regular upper body clothing  4  -HK  3  -SD     Taking care of personal grooming (such as brushing teeth)  3  -HK  3  -SD     Eating meals  4  -HK  4  -SD     AM-PAC 6 Clicks Score (OT)  21  -HK  19  -SD        Functional Assessment    Outcome Measure Options  AM-PAC 6 Clicks Daily Activity (OT)  -HK  AM-PAC 6 Clicks Daily Activity (OT)  -SD       User Key  (r) = Recorded By, (t) = Taken By, (c) = Cosigned By    Initials Name Provider Type    Eva Burns, OT Occupational Therapist     Christiane Grimaldo, OT Occupational Therapist           Time Calculation:   Time Calculation- OT     Row Name 08/12/20 0831             Time Calculation- OT    OT Start Time  0831  -      OT Received On  08/12/20  -      OT Goal Re-Cert Due Date  08/22/20  -         Timed Charges    32682 - OT Therapeutic Exercise Minutes  5  -HK      37514 - OT Self Care/Mgmt Minutes  8  -HK        User Key  (r) = Recorded By, (t) = Taken By, (c) = Cosigned By    Initials Name Provider Type    Christiane Gorman, OT Occupational Therapist        Therapy Charges for Today     Code Description Service Date Service Provider Modifiers Qty    [id number]  OT SELF CARE/MGMT/TRAIN EA 15 MIN 8/12/2020 Christiane Grimaldo OT GO 1               Christiane Grimaldo OT  8/12/2020

## 2020-08-12 NOTE — PROGRESS NOTES
Continued Stay Note   [place]     Patient Name: Domitila Bosch  MRN: [id number]  Today's Date: 8/12/2020    Admit Date: 7/28/2020    Discharge Plan     Row Name 08/12/20 1154       Plan    Plan  Home    Final Discharge Disposition Code  01 - home or self-care    Final Note  SW met with patient and wife at bedside to discuss discharge planning. Plan is home with family transporting. Patient explained he no longer wants home health. Contacted [facility] ([facility] [phone] rep Peggy to informed that patient no longer wants home health. Patient denies any discharge needs.         Discharge Codes    No documentation.       Expected Discharge Date and Time     Expected Discharge Date Expected Discharge Time    Aug 12, 2020             JACK Rodriguez (Kay)

## 2020-08-12 NOTE — THERAPY TREATMENT NOTE
Acute Care - Occupational Therapy Treatment Note  [facility]     Patient Name: Domitila Bosch  : 1940  MRN: [id number]  Today's Date: 2020     Date of Referral to OT: 20  Referring Physician: MD Latrice    Admit Date: 2020       ICD-10-CM ICD-9-CM   1. Acute UTI N39.0 599.0   2. Altered mental status, unspecified altered mental status type R41.82 780.97   3. History of CVA (cerebrovascular accident) Z86.73 V12.54   4. Weakness R53.1 780.79   5. Visual hallucinations R44.1 368.16     Patient Active Problem List   Diagnosis   • Diverticulosis of large intestine with hemorrhage   • Umbilical hernia   • S/P hernia repair   • Dyslipidemia   • Carotid stenosis   • Gout   • GERD (gastroesophageal reflux disease)   • Hypothyroidism   • Neuropathy   • Malignant melanoma (CMS/HCC)   • Asthma   • Anxiety disorder   • Diverticulosis   • Muscle pain   • Lumbar radiculopathy   • Kidney stone   • Deviated nasal septum   • Chronic laryngitis   • Acute CVA (cerebrovascular accident) (CMS/HCC)   • Elevated BP without diagnosis of hypertension   • Allergy to Betadine   • Acute UTI   • Encephalopathy acute   • DANIS (acute kidney injury) (CMS/HCC)   • Bacteremia   • Acute diastolic heart failure (CMS/HCC)   • Acute respiratory failure with hypoxia (CMS/HCC)     Past Medical History:   Diagnosis Date   • Anxiety disorder 2017   • Asthma 2017   • Basal cell carcinoma in situ of skin 2019    right leg    • Carotid stenosis 2017   • Diaphoresis    • Diastolic dysfunction    • Diverticulitis    • Dyslipidemia 2017   • GERD (gastroesophageal reflux disease) 2017   • Gout 2017   • Herpes zoster     Herpes zoster, [id number], right lower extremity.    • Hypertension 2017   • Hypothyroidism 2017   • LVH (left ventricular hypertrophy)    • Malignant melanoma (CMS/HCC) 2017   • Melanoma (CMS/HCC)    • Mitral regurgitation    • Nephrolithiasis    • Post herpetic neuralgia  02/16/2017   • TIA (transient ischemic attack)     TIA, June 2012, with nonobstructive carotid stenosis, dyslipidemia and hypertension     Past Surgical History:   Procedure Laterality Date   • ABDOMINAL SURGERY     • APPENDECTOMY     • COLON RESECTION LEFT  2016   • COLON RESECTION LEFT  2016   • HEEL SPUR SURGERY  1999   • HERNIA REPAIR      hiatal hernia    • NISSEN FUNDOPLICATION  1984   • OTHER SURGICAL HISTORY  2010    Malignant melanoma, status post resection        Therapy Treatment    Rehabilitation Treatment Summary     Row Name 08/12/20 0831             Treatment Time/Intention    Discipline  occupational therapist  -HK      Document Type  therapy note (daily note)  -HK      Subjective Information  no complaints  -HK      Mode of Treatment  individual therapy;occupational therapy  -HK      Patient/Family Observations  Pt received supine in bed.   -HK      Care Plan Review  care plan/treatment goals reviewed;risks/benefits reviewed;patient/other agree to care plan  -HK      Patient Effort  excellent  -HK      Existing Precautions/Restrictions  fall  -HK      Recorded by [HK] Christiane Grimaldo, OT 08/12/20 0857      Row Name 08/12/20 0831             Vital Signs    Pre Systolic BP Rehab  135  -HK      Pre Treatment Diastolic BP  89  -HK      Post Systolic BP Rehab  157  -HK      Post Treatment Diastolic BP  80  -HK      Pre Patient Position  Supine  -HK      Intra Patient Position  Standing  -HK      Post Patient Position  Sitting  -HK      Recorded by [HK] Christiane Grimaldo, OT 08/12/20 0857      Row Name 08/12/20 0831             Cognitive Assessment/Intervention    Additional Documentation  Cognitive Assessment/Intervention (Group)  -HK      Recorded by [HK] Christiane Grimaldo, OT 08/12/20 0857      Row Name 08/12/20 0831             Cognitive Assessment/Intervention- PT/OT    Affect/Mental Status (Cognitive)  confused  -HK      Orientation Status (Cognition)  oriented x 4  -HK      Follows Commands (Cognition)   follows one step commands;over 90% accuracy  -HK      Cognitive Function (Cognitive)  safety deficit;memory deficit  -HK      Memory Deficit (Cognitive)  moderate deficit  -HK      Safety Deficit (Cognitive)  mild deficit;safety precautions awareness;safety precautions follow-through/compliance  -HK      Recorded by [HK] Christiane Grimaldo, OT 08/12/20 0857      Row Name 08/12/20 0831             Safety Issues, Functional Mobility    Safety Issues Affecting Function (Mobility)  safety precautions follow-through/compliance;safety precaution awareness;insight into deficits/self awareness;awareness of need for assistance  -HK      Impairments Affecting Function (Mobility)  balance;strength  -HK      Recorded by [HK] Christiane Grimaldo, OT 08/12/20 0857      Row Name 08/12/20 0831             Bed Mobility Assessment/Treatment    Bed Mobility Assessment/Treatment  scooting/bridging;supine-sit  -HK      Scooting/Bridging Reading (Bed Mobility)  supervision  -HK      Supine-Sit Reading (Bed Mobility)  minimum assist (75% patient effort);verbal cues  -HK      Bed Mobility, Safety Issues  decreased use of arms for pushing/pulling;decreased use of legs for bridging/pushing;impaired trunk control for bed mobility  -HK      Assistive Device (Bed Mobility)  bed rails;head of bed elevated  -HK      Comment (Bed Mobility)  Pt advanced to EOB with Jose De Jesus from OT.   -HK      Recorded by [HK] Christiane Grimaldo, OT 08/12/20 0857      Row Name 08/12/20 0831             Functional Mobility    Functional Mobility- Ind. Level  contact guard assist  -HK      Functional Mobility- Device  -- none  -HK      Functional Mobility-Distance (Feet)  5  -HK      Functional Mobility- Safety Issues  step length decreased;weight-shifting ability decreased  -HK      Functional Mobility- Comment  pt ambulated from bed to chair with CGA and no AE.   -HK      Recorded by [HK] Christiane Grimaldo, OT 08/12/20 0857      Row Name 08/12/20 0831             Transfer  Assessment/Treatment    Transfer Assessment/Treatment  sit-stand transfer;stand-sit transfer  -HK      Comment (Transfers)  Verbal cues for safe hand placement and sequencing.  -HK      Recorded by [HK] Christiane Grimaldo, OT 08/12/20 0857      Row Name 08/12/20 0831             Sit-Stand Transfer    Sit-Stand [place] (Transfers)  contact guard  -HK      Assistive Device (Sit-Stand Transfers)  -- HHA  -HK      Recorded by [HK] Christiane Grimaldo, OT 08/12/20 0857      Row Name 08/12/20 0831             Stand-Sit Transfer    Stand-Sit [place] (Transfers)  contact guard  -HK      Assistive Device (Stand-Sit Transfers)  -- HHA  -HK      Recorded by [HK] Christiane Grimaldo, OT 08/12/20 0857      Row Name 08/12/20 0831             ADL Assessment/Intervention    BADL Assessment/Intervention  lower body dressing;grooming;feeding  -HK      Recorded by [HK] Christiane Grimaldo, OT 08/12/20 0859      Row Name 08/12/20 0831             Lower Body Dressing Assessment/Training    Lower Body Dressing [place] Level  doff;don;socks;independent  -HK      Lower Body Dressing Position  unsupported sitting  -HK      Comment (Lower Body Dressing)  extra time/effort for completion.  -HK      Recorded by [HK] Christiane Grimaldo, OT 08/12/20 0857      Row Name 08/12/20 0831             Grooming Assessment/Training    [place] Level (Grooming)  hair care, combing/brushing;wash face, hands;set up  -HK      Grooming Position  supported sitting  -HK      Recorded by [HK] Christiane Grimaldo, OT 08/12/20 0857      Row Name 08/12/20 0831             Self-Feeding Assessment/Training    [place] Level (Feeding)  liquids to mouth;set up  -HK      Self-Feeding Assess/Train, Spillage Amount  minimal  -HK      Position (Self-Feeding)  supported sitting  -HK      Recorded by [HK] Christiane Grimaldo, OT 08/12/20 0859      Row Name 08/12/20 0831             Toileting Assessment/Training    Comment (Toileting)  Pt declines need to void   -HK      Recorded by [HK] Dillan  Christiane TO, OT 08/12/20 0857      Row Name 08/12/20 0831             BADL Safety/Performance    Impairments, BADL Safety/Performance  strength;balance  -HK      Recorded by [HK] Christiane Grimaldo, OT 08/12/20 0857      Row Name 08/12/20 0831             Motor Skills Assessment/Interventions    Additional Documentation  Balance (Group);Therapeutic Exercise (Group)  -HK      Recorded by [HK] Christiane Grimaldo, OT 08/12/20 0857      Row Name 08/12/20 0831             Therapeutic Exercise    38326 - OT Therapeutic Exercise Minutes  5  -HK      Recorded by [HK] Christiane Grimaldo, OT 08/12/20 0857      Row Name 08/12/20 0831             Therapeutic Exercise    Upper Extremity Range of Motion (Therapeutic Exercise)  shoulder flexion/extension, bilateral;shoulder abduction/adduction, bilateral;elbow flexion/extension, bilateral;wrist flexion/extension, bilateral  -HK      Hand (Therapeutic Exercise)  finger flexion/extension, bilateral  -HK      Exercise Type (Therapeutic Exercise)  AROM (active range of motion)  -HK      Position (Therapeutic Exercise)  seated  -HK      Sets/Reps (Therapeutic Exercise)  1/10  -HK      Recorded by [HK] Christiane Grimaldo, OT 08/12/20 0857      Row Name 08/12/20 0831             Balance    Balance  static sitting balance;static standing balance;dynamic sitting balance  -HK      Recorded by [HK] Christiane Grimaldo, OT 08/12/20 0857      Row Name 08/12/20 0831             Static Sitting Balance    Level of [place] (Unsupported Sitting, Static Balance)  independent  -HK      Sitting Position (Unsupported Sitting, Static Balance)  sitting on edge of bed  -HK      Time Able to Maintain Position (Unsupported Sitting, Static Balance)  2 to 3 minutes  -HK      Recorded by [HK] Christiane Grimaldo, OT 08/12/20 0857      Row Name 08/12/20 0831             Dynamic Sitting Balance    Level of [place], Reaches Outside Midline (Sitting, Dynamic Balance)  independent  -HK      Sitting Position, Reaches Outside Midline  (Sitting, Dynamic Balance)  sitting on edge of bed  -HK      Comment, Reaches Outside Midline (Sitting, Dynamic Balance)  completing LBD   -HK      Recorded by [HK] Christiane Grimaldo, OT 08/12/20 0857      Row Name 08/12/20 0831             Static Standing Balance    Level of [place] (Supported Standing, Static Balance)  contact guard assist  -HK      Time Able to Maintain Position (Supported Standing, Static Balance)  1 to 2 minutes  -HK      Assistive Device Utilized (Supported Standing, Static Balance)  -- HHA  -HK      Recorded by [HK] Christiane Grimaldo, OT 08/12/20 0857      Row Name 08/12/20 0831             Positioning and Restraints    Pre-Treatment Position  in bed  -HK      Post Treatment Position  chair  -HK      In Chair  notified nsg;reclined;call light within reach;encouraged to call for assist;exit alarm on  -HK      Recorded by [HK] Christiane Grimaldo, OT 08/12/20 0857      Row Name 08/12/20 0831             Pain Assessment    Additional Documentation  Pain Scale: Numbers Pre/Post-Treatment (Group)  -HK      Recorded by [HK] Christiane Grimaldo, OT 08/12/20 0857      Row Name 08/12/20 0831             Pain Scale: Numbers Pre/Post-Treatment    Pain Scale: Numbers, Pretreatment  0/10 - no pain  -HK      Pain Scale: Numbers, Post-Treatment  0/10 - no pain  -HK      Recorded by [HK] Christiane Grimaldo, OT 08/12/20 0857      Row Name 08/12/20 0831             Coping    Observed Emotional State  accepting;calm;cooperative  -HK      Recorded by [HK] Christiane Grimaldo, OT 08/12/20 0857      Row Name 08/12/20 0831             Plan of Care Review    Plan of Care Reviewed With  patient  -HK      Progress  improving  -HK      Outcome Summary  Pt completes bed mobility with Jose De Jesus and transfers with CGA and HHA. Pt completed LBD independently and washed face/combed hair with setup. Pt with poor short term recall and frequent asks the same questions. Pt will need 24/7 assist if returning home with family. Recommend  OT.  -HK       Recorded by [HK] Christiane Grimaldo OT 08/12/20 0857      Row Name 08/12/20 0831             Outcome Summary/Treatment Plan (OT)    Daily Summary of Progress (OT)  progress toward functional goals as expected  -      Recorded by [HK] Christiane Grimaldo OT 08/12/20 0857        User Key  (r) = Recorded By, (t) = Taken By, (c) = Cosigned By    Initials Name Effective Dates Discipline     Christiane Grimaldo OT 03/07/18 -  OT             Occupational Therapy Education                 Title: PT OT SLP Therapies (Done)     Topic: Occupational Therapy (Done)     Point: ADL training (Done)     Description:   Instruct learner(s) on proper safety adaptation and remediation techniques during self care or transfers.   Instruct in proper use of assistive devices.              Learning Progress Summary           Patient Acceptance, E,TB,D, VU by  at 8/12/2020 0831    Comment:  Pt educated on ADL retraining with grooming and self feeding, safety precautions, B UE HEP, and appropriate body mechanics.    Acceptance, E, VU by ROSE at 8/7/2020 0955    Comment:  Benefits of activity; body mechanics for transfers    Acceptance, E, VU by  at 8/5/2020 1040    Acceptance, E,TB, VU by  at 8/3/2020 1351    Acceptance, E, VU by  at 7/31/2020 1030    Acceptance, E,TB, VU by  at 7/31/2020 0505    Acceptance, E, VU by  at 7/30/2020 0820    Comment:  benefits of activity, role of OT    Acceptance, E,TB, VU by  at 7/30/2020 0635    Acceptance, E,TB, VU by PC at 7/28/2020 1546   Significant Other Acceptance, E, VU by ROSE at 8/7/2020 0955    Comment:  Benefits of activity; body mechanics for transfers    Acceptance, E,TB, VU by PC at 7/28/2020 1546                   Point: Home exercise program (Done)     Description:   Instruct learner(s) on appropriate technique for monitoring, assisting and/or progressing therapeutic exercises/activities.              Learning Progress Summary           Patient Acceptance, E,TB,D, VU by  at 8/12/2020 0831     Comment:  Pt educated on ADL retraining with grooming and self feeding, safety precautions, B UE HEP, and appropriate body mechanics.    Acceptance, E,TB, VU by  at 8/3/2020 1351    Acceptance, E,TB, VU by TH at 7/31/2020 0505    Acceptance, E,TB, VU by TH at 7/30/2020 0635    Acceptance, E,TB, VU by PC at 7/28/2020 1546   Significant Other Acceptance, E,TB, VU by PC at 7/28/2020 1546                   Point: Precautions (Done)     Description:   Instruct learner(s) on prescribed precautions during self-care and functional transfers.              Learning Progress Summary           Patient Acceptance, E,TB,D, VU by HK at 8/12/2020 0831    Comment:  Pt educated on ADL retraining with grooming and self feeding, safety precautions, B UE HEP, and appropriate body mechanics.    Acceptance, E, VU by ROSE at 8/7/2020 0955    Comment:  Benefits of activity; body mechanics for transfers    Acceptance, E,TB, VU by  at 8/3/2020 1351    Acceptance, E,TB, VU by TH at 7/31/2020 0505    Acceptance, E,TB, VU by TH at 7/30/2020 0635    Acceptance, E,TB, VU by PC at 7/28/2020 1546   Significant Other Acceptance, E, VU by ROSE at 8/7/2020 0955    Comment:  Benefits of activity; body mechanics for transfers    Acceptance, E,TB, VU by PC at 7/28/2020 1546                   Point: Body mechanics (Done)     Description:   Instruct learner(s) on proper positioning and spine alignment during self-care, functional mobility activities and/or exercises.              Learning Progress Summary           Patient Acceptance, E,TB,D, VU by HK at 8/12/2020 0831    Comment:  Pt educated on ADL retraining with grooming and self feeding, safety precautions, B UE HEP, and appropriate body mechanics.    Acceptance, E, VU by ROSE at 8/7/2020 0955    Comment:  Benefits of activity; body mechanics for transfers    Acceptance, E,TB, VU by  at 8/3/2020 1351    Acceptance, E,TB, VU by TH at 7/31/2020 0505    Acceptance, E,TB, VU by TH at 7/30/2020 0635     Acceptance, E,TB, VU by PC at 7/28/2020 1546   Significant Other Acceptance, E, VU by ROSE at 8/7/2020 0955    Comment:  Benefits of activity; body mechanics for transfers    Acceptance, E,TB, VU by PC at 7/28/2020 1546                               User Key     Initials Effective Dates Name Provider Type Discipline     06/23/15 -  Fidelina Goyal, OT Occupational Therapist OT    LC 06/16/16 -  Niya Dupont RN Registered Nurse Nurse     03/07/18 -  Christiane Grimaldo, OT Occupational Therapist OT     01/30/20 -  Magda Humphrey, RN Registered Nurse Nurse    ROSE 02/14/20 -  Paulette Asif OT Occupational Therapist OT    PC 07/07/20 -  Deja Levy, DOROTHY Registered Nurse Nurse                OT Recommendation and Plan  Outcome Summary/Treatment Plan (OT)  Daily Summary of Progress (OT): progress toward functional goals as expected  Daily Summary of Progress (OT): progress toward functional goals as expected  Plan of Care Review  Plan of Care Reviewed With: patient  Plan of Care Reviewed With: patient  Outcome Summary: Pt completes bed mobility with Jose De Jesus and transfers with CGA and HHA. Pt completed LBD independently and washed face/combed hair with setup. Pt with poor short term recall and frequent asks the same questions. Pt will need 24/7 assist if returning home with family. Recommend HH OT.  Outcome Measures     Row Name 08/12/20 0831 08/10/20 1020          How much help from another is currently needed...    Putting on and taking off regular lower body clothing?  4  -HK  3  -SD     Bathing (including washing, rinsing, and drying)  3  -HK  3  -SD     Toileting (which includes using toilet bed pan or urinal)  3  -HK  3  -SD     Putting on and taking off regular upper body clothing  4  -HK  3  -SD     Taking care of personal grooming (such as brushing teeth)  3  -HK  3  -SD     Eating meals  4  -HK  4  -SD     AM-PAC 6 Clicks Score (OT)  21  -HK  19  -SD        Functional Assessment    Outcome Measure Options  AM-PAC 6  Clicks Daily Activity (OT)  -  AM-PAC 6 Clicks Daily Activity (OT)  -SD       User Key  (r) = Recorded By, (t) = Taken By, (c) = Cosigned By    Initials Name Provider Type    Eva Burns, OT Occupational Therapist     Christiane Grimaldo OT Occupational Therapist           Time Calculation:   Time Calculation- OT     Row Name 08/12/20 0831             Time Calculation- OT    OT Start Time  0831  -      OT Received On  08/12/20  -         Timed Charges    31508 - OT Therapeutic Exercise Minutes  5  -      19780 - OT Self Care/Mgmt Minutes  8  -        User Key  (r) = Recorded By, (t) = Taken By, (c) = Cosigned By    Initials Name Provider Type    Christiane Gorman, OT Occupational Therapist        Therapy Charges for Today     Code Description Service Date Service Provider Modifiers Qty    [id number] HC OT SELF CARE/MGMT/TRAIN EA 15 MIN 8/12/2020 Christiane Grimaldo, OT GO 1               Christiane Grimaldo OT  8/12/2020

## 2020-08-12 NOTE — NURSING NOTE
Prior to central line removal, order for the removal of catheter was verified, patient was assessed, necessary materials were gathered and patient was educated regarding procedure .    Patient was positioned flat to ensure that the insertion site was at or below the level of the heart.    Hands were washed, clean gloves were applied and central line dressing was gently removed. Catheter exit site was not cultured.     A new pair of clean gloves were then applied. Insertion site was cleansed with 2% Chlorhexidine swab using a circular motion beginning at the insertion site and moving outward for 30 seconds and allowed to dry.     Clamp on line was not present.     Patient was instructed to perform Valsalva maneuver as catheter was withdrawn.     The central line was grasped at the insertion site and slowly pulled outward parallel to the skin. Resistance was not met.    After central line was completely removed, a sterile 4x4 gauze pad was used to apply light pressure until bleeding stopped. At that time, petroleum-based gauze and a sterile occlusive dressing was applied to exit site.     Patient was instructed to keep dressing in place for at least 24 hours and to remain in a flat or reclining position for 30 minutes post-removal.     Catheter was inspected after removal and was intact. Tip of central line was not sent for culture. Patient tolerated procedure.

## 2020-08-12 NOTE — PROGRESS NOTES
[facility] Progress Note        Antibiotics:  Anti-Infectives (From admission, onward)    Ordered     Dose/Rate Route Frequency Start Stop    20 0946  vancomycin 1500 mg/500 mL 0.9% NS IVPB ([facility])  Undo Let    Norberto Ashton PharmD let the order  on 20 0756.   Ordering Provider:  Norberto Ashton PharmD    1,500 mg  over 90 Minutes Intravenous Every 24 Hours 20 1100 20 2359    20 0531  vancomycin (VANCOCIN) in iso-osmotic dextrose IVPB 1 g (premix) 200 mL     Ordering Provider:  William Welch IV, PharmD    1,000 mg  over 60 Minutes Intravenous Once 20 0630 20 0712    20 1338  vancomycin (VANCOCIN) in iso-osmotic dextrose IVPB 1 g (premix) 200 mL     Ordering Provider:  Joel Terrazas PharmD    1,000 mg  over 60 Minutes Intravenous Once 20 1430 20 1736    20 0754  vancomycin (VANCOCIN) in iso-osmotic dextrose IVPB 1 g (premix) 200 mL     Ordering Provider:  Norberto Beckham PharmD    1,000 mg  over 60 Minutes Intravenous Once 20 0845 20 0945    20 1111  aztreonam (AZACTAM) 2 g/100 mL 0.9% NS ([facility]p)     Ordering Provider:  Norberto Son PA    2 g  over 30 Minutes Intravenous Once 20 1113 20 1231    20 1111  vancomycin 1750 mg/500 mL 0.9% NS IVPB ([facility])     Ordering Provider:  Teresa Pollard DO    20 mg/kg × 86.2 kg Intravenous Once 20 1113 20 1626          CC: fatigue    HPI:    Patient is a 79 y.o.  Yr old male with history of TIA/CVA with chronic/residual right-sided weakness and generally poor memory but does not carry a formal diagnosis of dementia.  He was admitted 2020 with 3 to 4 days worsening confusion from baseline; patient reports dysuria/urinary incontinence/urinary frequency although he could not attach a specific timeline to this and in general his ability to give detailed history is limited with poor insight.  Nursing reported fever at admission associated with  "acute kidney injury and evidence for UTI with concern for sepsis urinary source.  Subsequently with blood culture showing gram-positive cocci and preliminary PCR data with enterococcus, vancomycin resistance not detected by initial PCR information; he was placed on empiric vancomycin/Azactam.     He reports some improvement since admission although his insight remains poor.     Wife reports Hx enlarged prostate with chronic urinary retention and has followed with Dr Bazan in urology; allergy to PCN and keflex with rash to both     8/4/20 SUMMER no valvular vegetation;  Descending aorta with protruding plaque    8/5/20 creat trended down some after fluids    8/12/20  He wants home and feels \"great\" per him;    No new focal pain; Dysuria better;  Denies hematuria or pyuria and no flank pain at this time.     No headache photophobia or neck stiffness.  No nausea vomiting diarrhea or abdominal pain.  No shortness of breath or cough.  No rash.        ROS:      8/12/20 No f/c/s. No n/v/d. No rash. No new ADR to Abx.     Constitutional-- No chills or sweats.  Appetite diminished with generalized fatigue  Heent-- No new vision, hearing or throat complaints.  No epistaxis or oral sores.  Denies odynophagia or dysphagia.  No flashers, floaters or eye pain. No odynophagia or dysphagia. No headache, photophobia or neck stiffness.  CV-- No chest pain, palpitation or syncope  Resp-- No SOB/cough/Hemoptysis  GI- No nausea, vomiting, or diarrhea.  No hematochezia, melena, or hematemesis. Denies jaundice or chronic liver disease.  --as above  Lymph- no swollen lymph nodes in neck/axilla or groin.   Heme- No active bruising or bleeding; no Hx of DVT or PE.  MS-- no swelling or pain in the bones or joints of arms/legs.  No new back pain.  Neuro-- No acute focal weakness or numbness in the arms or legs.  No seizures.  Chronic residual right-sided weakness     Full 12 point review of systems reviewed and negative otherwise for acute " "complaints, except for above      PE:   /89 (BP Location: Left arm, Patient Position: Lying)   Pulse 55   Temp 98.1 °F (36.7 °C) (Oral)   Resp 20   Ht 185.4 cm (72.99\")   Wt 78 kg (172 lb)   SpO2 95%   BMI 22.70 kg/m²     GENERAL: awake, in no acute distress.   HEENT: Normocephalic, atraumatic.  PERRL. EOMI. No conjunctival injection. No icterus. Oropharynx clear without evidence of thrush or exudate. No evidence of peridontal disease.    NECK: Supple without nuchal rigidity. No mass.  LYMPH: No cervical, axillary or inguinal lymphadenopathy.  HEART: RRR; No murmur, rubs, gallops.   LUNGS: diminished at bases to auscultation bilaterally without wheezing, rales, rhonchi. Normal respiratory effort. Nonlabored. No dullness.  ABDOMEN: Soft, nontender, nondistended. Positive bowel sounds. No rebound or guarding. NO mass or HSM.  EXT:  No cyanosis, clubbing or edema. No cord.  MSK: FROM without joint effusions noted arms/legs.    SKIN: Warm and dry without cutaneous eruptions on Inspection/palpation.    NEURO: awake     No CVA tenderness     No peripheral stigmata/phenomena of endocarditis       Laboratory Data    Results from last 7 days   Lab Units 08/12/20  0406 08/11/20  0352 08/09/20  0532   WBC 10*3/mm3 7.67 8.36 9.54   HEMOGLOBIN g/dL 12.7* 12.5* 12.9*   HEMATOCRIT % 39.9 38.5 39.8   PLATELETS 10*3/mm3 257 253 276     Results from last 7 days   Lab Units 08/12/20  0406   SODIUM mmol/L 140   POTASSIUM mmol/L 3.5   CHLORIDE mmol/L 103   CO2 mmol/L 29.0   BUN mg/dL 11   CREATININE mg/dL 1.26   GLUCOSE mg/dL 100*   CALCIUM mg/dL 9.4     Results from last 7 days   Lab Units 08/08/20  0432   ALK PHOS U/L 119*   BILIRUBIN mg/dL 0.5   ALT (SGPT) U/L 24   AST (SGOT) U/L 22               Estimated Creatinine Clearance: 50.7 mL/min (by C-G formula based on SCr of 1.26 mg/dL).      Microbiology:      Radiology:  Imaging Results (Last 72 Hours)     ** No results found for the last 72 hours. **    "         Impression:     --Acute sepsis.  Fever/leukocytosis and acute kidney injury with enterococcal bacteremia/septicemia.  Concern for urinary source .  Abdominal exam otherwise benign with no nausea/vomiting/diarrhea.    Otherwise, chest clear, no cough or breathing difficulty, no obvious skin or soft tissue changes.  Monitor for other potential foci/pathogen     -- Acute  enterococcus bacteremia/septicemia.  Currently no stigmata of endocarditis but certainly could evolve.  f/u culture negative 7/30; High risk for further serious morbidity and other serious sequela.  SUMMER no Vegetation per cardiology      --Acute enterococcal complicated UTI.  Associated with septicemia/bacteremia as above.  Urology following and any  Further functional/anatomic assessment at their discretion     --Acute kidney injury.  Creat trended up a bit 8/4; vanco adjusted by pharmacy; monitor to help guide further adjustments; creat better 8/5 after fluids; creat back up a bit 8/6 after stopping IV fluids and better again by 8/8     --Acute encephalopathy.  Baseline not entirely clear with prior history of stroke/TIA and nursing reports baseline with forgetfulness with possible cognitive difficulties at baseline.  Need further clarification from family.  Currently no meningismus.  Further neurologic work-up or neurology consultation at discretion of internal medicine     --History of TIA/CVA with reports of residual right-sided weakness.          PLAN:     --IV vancomycin finishes 2 week course with last dose on 8/12 from first negative on 7/30     --Check/review labs cultures and scans     --Partial history per nursing staff     --Discussed with microbiology     --Discussed with Dr. Flowers;  He is managing hydration/IV fluids and monitor renal function closely     --Highly complex set of issues with high risk for further serious morbidity and other serious sequela    --urology following    --repeated blood cultures and negative so far;  TTE no mention of vegetation per cardiology;    SUMMER  noted     --d/w pharmacy         Sabino Sandhu MD  8/12/2020

## 2020-08-13 ENCOUNTER — READMISSION MANAGEMENT (OUTPATIENT)
Dept: CALL CENTER | Facility: HOSPITAL | Age: 80
End: 2020-08-13

## 2020-08-13 NOTE — OUTREACH NOTE
Prep Survey      Responses   [facility] patient discharged from?  [place]   Is LACE score < 7 ?  No   Eligibility  Readm Mgmt   Discharge diagnosis  **Acute UTI, --Acute sepsis   Does the patient have one of the following disease processes/diagnoses(primary or secondary)?  Sepsis   Does the patient have Home health ordered?  No   Is there a DME ordered?  No   General alerts for this patient  Hx: CHF    Prep survey completed?  Yes          Nida Kirkland RN

## 2020-08-15 DIAGNOSIS — E78.5 HYPERLIPIDEMIA LDL GOAL <70: ICD-10-CM

## 2020-08-17 RX ORDER — ATORVASTATIN CALCIUM 80 MG/1
TABLET, FILM COATED ORAL
Qty: 90 TABLET | Refills: 0 | Status: SHIPPED | OUTPATIENT
Start: 2020-08-17 | End: 2021-03-08 | Stop reason: SDUPTHER

## 2020-08-18 ENCOUNTER — READMISSION MANAGEMENT (OUTPATIENT)
Dept: CALL CENTER | Facility: HOSPITAL | Age: 80
End: 2020-08-18

## 2020-08-18 NOTE — OUTREACH NOTE
Sepsis Week 1 Survey      Responses   [facility] patient discharged from?  [place]   COVID-19 Test Status  Not tested   Does the patient have one of the following disease processes/diagnoses(primary or secondary)?  Sepsis   Is there a successful TCM telephone encounter documented?  No   Week 1 attempt successful?  Yes   Call start time  1019   Call end time  1021   General alerts for this patient  Hx: CHF    Discharge diagnosis  **Acute UTI, --Acute sepsis   Is patient permission given to speak with other caregiver?  Yes   Meds reviewed with patient/caregiver?  Yes   Is the patient having any side effects they believe may be caused by any medication additions or changes?  No   Does the patient have all medications related to this admission filled (includes all antibiotics, inhalers, nebulizers,steroids,etc.)  Yes   Is the patient taking all medications as directed (includes completed medication regime)?  Yes   Does the patient have a primary care provider?   Yes   Does the patient have an appointment with their PCP within 7 days of discharge?  Yes   Has the patient kept scheduled appointments due by today?  Yes   Has home health visited the patient within 72 hours of discharge?  N/A   Pulse Ox monitoring  None   Psychosocial issues?  No   Did the patient receive a copy of their discharge instructions?  Yes   Nursing interventions  Reviewed instructions with patient   What is the patient's perception of their health status since discharge?  Improving   Nursing interventions  Nurse provided patient education   Is the patient/caregiver able to teach back Sepsis?  S - Shivering,fever or very cold, E - Extreme pain or generalized discomfort (worst ever,especially abdomen)   Nursing interventions  Nurse provided reassurance to patient, Nurse provided patient education   Is patient/caregiver able to teach back steps to recovery at home?  Set small, achievable goals for return to baseline health, Rest and regain  strength   Is the patient/caregiver able to teach back signs and symptoms of worsening condition:  Fever, Hyperthermia   Is the patient/caregiver able to teach back the hierarchy of who to call/visit for symptoms/problems? PCP, Specialist, Home health nurse, Urgent Care, ED, 911  Yes   Week 1 call completed?  Yes          Shravan Hand RN

## 2020-08-19 ENCOUNTER — LAB (OUTPATIENT)
Dept: LAB | Facility: HOSPITAL | Age: 80
End: 2020-08-19

## 2020-08-19 ENCOUNTER — TRANSCRIBE ORDERS (OUTPATIENT)
Dept: LAB | Facility: HOSPITAL | Age: 80
End: 2020-08-19

## 2020-08-19 DIAGNOSIS — N39.0 URINARY TRACT INFECTION WITHOUT HEMATURIA, SITE UNSPECIFIED: ICD-10-CM

## 2020-08-19 DIAGNOSIS — N39.0 URINARY TRACT INFECTION WITHOUT HEMATURIA, SITE UNSPECIFIED: Primary | ICD-10-CM

## 2020-08-19 DIAGNOSIS — A41.81 SEPTICEMIA DUE TO ENTEROCOCCUS (HCC): ICD-10-CM

## 2020-08-19 LAB
BACTERIA UR QL AUTO: NORMAL /HPF
BILIRUB UR QL STRIP: NEGATIVE
CLARITY UR: CLEAR
COLOR UR: YELLOW
GLUCOSE UR STRIP-MCNC: NEGATIVE MG/DL
HGB UR QL STRIP.AUTO: NEGATIVE
HYALINE CASTS UR QL AUTO: NORMAL /LPF
KETONES UR QL STRIP: NEGATIVE
LEUKOCYTE ESTERASE UR QL STRIP.AUTO: ABNORMAL
NITRITE UR QL STRIP: NEGATIVE
PH UR STRIP.AUTO: 6.5 [PH] (ref 5–8)
PROT UR QL STRIP: ABNORMAL
RBC # UR: NORMAL /HPF
REF LAB TEST METHOD: NORMAL
SP GR UR STRIP: 1.02 (ref 1–1.03)
SQUAMOUS #/AREA URNS HPF: NORMAL /HPF
UROBILINOGEN UR QL STRIP: ABNORMAL
WBC UR QL AUTO: NORMAL /HPF

## 2020-08-19 PROCEDURE — 81001 URINALYSIS AUTO W/SCOPE: CPT

## 2020-08-26 ENCOUNTER — READMISSION MANAGEMENT (OUTPATIENT)
Dept: CALL CENTER | Facility: HOSPITAL | Age: 80
End: 2020-08-26

## 2020-08-26 NOTE — OUTREACH NOTE
Sepsis Week 2 Survey      Responses   [facility] patient discharged from?  [place]   COVID-19 Test Status  Not tested   Does the patient have one of the following disease processes/diagnoses(primary or secondary)?  Sepsis   Week 2 attempt successful?  Yes   Call start time  1633   Call end time  1641   Discharge diagnosis  **Acute UTI, --Acute sepsis   Person spoke with today (if not patient) and relationship  Blank, spouse   Meds reviewed with patient/caregiver?  Yes   Is the patient having any side effects they believe may be caused by any medication additions or changes?  No   Does the patient have all medications related to this admission filled (includes all antibiotics, inhalers, nebulizers,steroids,etc.)  Yes   Is the patient taking all medications as directed (includes completed medication regime)?  Yes   Does the patient have a primary care provider?   Yes   Does the patient have an appointment with their PCP within 7 days of discharge?  Yes   Has the patient kept scheduled appointments due by today?  Yes   What is the Home health agency?   none   Has home health visited the patient within 72 hours of discharge?  N/A   Pulse Ox monitoring  None   Psychosocial issues?  No   Did the patient receive a copy of their discharge instructions?  Yes   Nursing interventions  Reviewed instructions with patient   What is the patient's perception of their health status since discharge?  Improving   Nursing interventions  Nurse provided patient education   Is the patient/caregiver able to teach back Sepsis?  S - Shivering,fever or very cold, E - Extreme pain or generalized discomfort (worst ever,especially abdomen), P - Pale or discolored skin, S - Sleepy, difficult to arouse,confused, I -   I feel like I might die-a feeling of hopelessness, S - Short of breath   Nursing interventions  Nurse provided reassurance to patient, Nurse provided patient education   Is patient/caregiver able to teach back steps to  recovery at home?  Set small, achievable goals for return to baseline health, Rest and regain strength, Talk about feelings with family/friends   Is the patient/caregiver able to teach back signs and symptoms of worsening condition:  Fever, Rapid heart rate (>90), Altered mental status(confusion/coma), Hyperthermia, Shortness of breath/rapid respiratory rate, Edema   Is the patient/caregiver able to teach back the hierarchy of who to call/visit for symptoms/problems? PCP, Specialist, Home health nurse, Urgent Care, ED, 911  Yes   Additional teach back comments  wife states pt not interested in drinking fluids, has tried to encourage pt   Week 2 call completed?  Yes          Kiley Chow RN

## 2020-10-14 ENCOUNTER — OFFICE VISIT (OUTPATIENT)
Dept: CARDIOLOGY | Facility: CLINIC | Age: 80
End: 2020-10-14

## 2020-10-14 VITALS
HEART RATE: 77 BPM | BODY MASS INDEX: 22.13 KG/M2 | WEIGHT: 167 LBS | DIASTOLIC BLOOD PRESSURE: 72 MMHG | OXYGEN SATURATION: 97 % | SYSTOLIC BLOOD PRESSURE: 130 MMHG | HEIGHT: 73 IN

## 2020-10-14 DIAGNOSIS — I10 ESSENTIAL HYPERTENSION: ICD-10-CM

## 2020-10-14 DIAGNOSIS — I25.10 CORONARY ARTERY DISEASE INVOLVING NATIVE CORONARY ARTERY OF NATIVE HEART WITHOUT ANGINA PECTORIS: Primary | ICD-10-CM

## 2020-10-14 DIAGNOSIS — I67.9 CVD (CEREBROVASCULAR DISEASE): ICD-10-CM

## 2020-10-14 DIAGNOSIS — E78.2 MIXED HYPERLIPIDEMIA: ICD-10-CM

## 2020-10-14 DIAGNOSIS — I65.23 OCCLUSION AND STENOSIS OF BILATERAL CAROTID ARTERIES: ICD-10-CM

## 2020-10-14 PROCEDURE — 99214 OFFICE O/P EST MOD 30 MIN: CPT | Performed by: INTERNAL MEDICINE

## 2020-10-14 NOTE — PROGRESS NOTES
[facility] Cardiology  Office Progress Note  Domitila Bosch  1940  [address] [facility] [address]       Visit Date: 10/14/20    PCP: Marcin Ferguson MD  [address]  [facility] [address]    IDENTIFICATION: A 80 y.o. male retired  from [employer], formerly from [employer]      PROBLEM LIST:   1. Probable coronary  artery disease:  a. August 2012, MPS per [facility]  at [facility] with normal perfusion and EF of 68%.    2.  Diaphoresis, no prior evaluation.  3. Hypertension, presumed essential.  4. Dyslipidemia, on statin and fibrate therapy:               February 2015 - total cholesterol 227, triglycerides 124, HDL 60.4, and .              9/19 174/223/43/86  5. CVA  a. 9/2019 MRI left corona radiata infarction  b. 9/2019 CTA head and neck significant intracranial stenosis  c. 9/2019 CUS bilateral 50 to 69% stenosis  d. 9/4/2019 -10/3/2019 event monitor: Sinus rhythm  e. 7/30/2020 Echo: EF 60%, mild MR, grade 1 diastolic dysfunction, AV calcification, RVSP 15  f. 8/4/2020 SUMMER: EF 65%, LA mild to moderately dilated, mild AV calcification, saline test negative, moderate grade 2 protruding plaque in the descending aorta, no valvular vegetations (during admission for encephalopathy D/T UTI)  6. Carotid stenosis:  a. June 2012, carotid duplex with moderate bilateral stenosis, no significant change from 2010.  b. MRA,  January 2015, shows mild stenosis of the distal left common carotid.  c. 9/19 CUS <70% bilat, CTA <70% bilat.  7. Subclavian stenosis R>L  a. 9/2019 CTA L proximal stenosis  8.  Gout.  9. GERD, status post GI bleed in 2011, followed by Dr. Coulter.  10. Hypothyroidism.  11. Remote Nissen fundoplication, 1984.  12. Heel spur surgery, 1999.  13. Nephrolithiasis.  14. TIA, June 2012, with nonobstructive carotid stenosis, dyslipidemia and  hypertension.  15. Neuropathy.  16. Malignant melanoma, status post resection in 2010.  17. Asthma.    18. Herpes zoster, [id number], right lower extremity.   19. Anxiety disorder.  20. [facility] admission 8/2020 UTI w encephalopathy         CC:   Chief Complaint   Patient presents with   • Hyperlipidemia   • Essential hypertension       Allergies  Allergies   Allergen Reactions   • Cephalexin Rash   • Lisinopril Angioedema   • Sulfamethoxazole-Trimethoprim Rash   • Betadine [Povidone Iodine] Other (See Comments)     Skin Burning    • Iodine Other (See Comments)     Skin Burning    • Flomax [Tamsulosin Hcl] Rash   • Penicillins Rash   • Pseudoephedrine Rash       Current Medications    Current Outpatient Medications:   •  allopurinol (ZYLOPRIM) 300 MG tablet, Take 300 mg by mouth daily., Disp: , Rfl:   •  aspirin 81 MG tablet, Take 81 mg by mouth Daily., Disp: , Rfl:   •  atorvastatin (LIPITOR) 80 MG tablet, TAKE 1 TABLET BY MOUTH ONCE DAILY AT NIGHT, Disp: 90 tablet, Rfl: 0  •  busPIRone (BUSPAR) 10 MG tablet, Take 10 mg by mouth 2 (Two) Times a Day., Disp: , Rfl:   •  Cholecalciferol 125 MCG (5000 UT) tablet, Take 5,000 Units by mouth Daily., Disp: , Rfl:   •  clopidogrel (PLAVIX) 75 MG tablet, Take 1 tablet by mouth Daily., Disp: 30 tablet, Rfl: 0  •  doxazosin (CARDURA) 4 MG tablet, Take 4 mg by mouth daily., Disp: , Rfl:   •  finasteride (PROSCAR) 5 MG tablet, Take 5 mg by mouth Daily., Disp: , Rfl:   •  levothyroxine (SYNTHROID, LEVOTHROID) 150 MCG tablet, Take 150 mcg by mouth Daily., Disp: , Rfl:   •  loratadine (Claritin) 10 MG tablet, Take 1 tablet by mouth Daily As Needed (Allergies)., Disp: , Rfl:   •  memantine (NAMENDA) 5 MG tablet, Take 5 mg by mouth Daily., Disp: , Rfl:   •  omeprazole (PriLOSEC) 40 MG capsule, Take 40 mg by mouth Daily., Disp: , Rfl:   •  polyethylene glycol (MIRALAX) 17 g packet, Take 17 g by mouth Daily., Disp: 30 packet, Rfl: 1  •  Psyllium (METAMUCIL PO), Take 1 tablet by mouth Daily., Disp: , Rfl:   •  sertraline (ZOLOFT) 50 MG tablet, Take 50 mg by mouth Daily., Disp: , Rfl:   •   "triamterene-hydrochlorothiazide (MAXZIDE-25) 37.5-25 MG per tablet, Take 1 tablet by mouth Daily., Disp: , Rfl:   •  vitamin B-12 (CYANOCOBALAMIN) 2500 MCG sublingual tablet tablet, Place 2,500 mcg under the tongue Daily., Disp: , Rfl:       History of Present Illness   Domitila Bosch is a 80 y.o. year old male here for follow up.    Pt denies any chest pain, dyspnea, dyspnea on exertion, orthopnea, PND, palpitations, lower extremity edema, or claudication.  Companied by his wife in the office today.  She states that since his admission this summer he continues to have some memory decline.  He has had no chest discomfort TIA symptomatology otherwise.      OBJECTIVE:  Vitals:    10/14/20 1042   BP: 130/72   BP Location: Right arm   Patient Position: Sitting   Pulse: 77   SpO2: 97%   Weight: 75.8 kg (167 lb)   Height: 185.4 cm (73\")     Constitutional:       Appearance: Healthy appearance. Not in distress.   Neck:      Vascular: Carotid bruit present. No JVR. JVD normal.   Pulmonary:      Effort: Pulmonary effort is normal.      Breath sounds: Normal breath sounds. No wheezing. No rhonchi. No rales.   Chest:      Chest wall: Not tender to palpatation.   Abdominal:      General: Bowel sounds are normal.      Palpations: Abdomen is soft.      Tenderness: There is no abdominal tenderness.   Musculoskeletal: Normal range of motion.         General: No tenderness.   Skin:     General: Skin is warm and dry.   Neurological:      General: No focal deficit present.      Mental Status: Alert and oriented to person, place and time.         Diagnostic Data:  Procedures      ASSESSMENT:   Diagnosis Plan   1. Coronary artery disease involving native coronary artery of native heart without angina pectoris     2. CVD (cerebrovascular disease)     3. Essential hypertension     4. Mixed hyperlipidemia         PLAN:  Coronary disease no anginal equivalent preserved LV function continue current regimen    Cerebrovascular disease " approaching revascularization standard with memory impairment unfortunately.  We will follow-up carotid duplex this following spring.  Continue DAPT    Hypertension controlled Maxide    Dyslipidemia on statin therapy    Return in about 6 months (around 4/14/2021).    Marcin Ferguson MD, thank you for referring Mr. Bosch for evaluation.  I have forwarded my electronically generated recommendations to you for review.  Please do not hesitate to call with any questions.      Donaldo Lazo MD, FACC

## 2021-03-08 DIAGNOSIS — E78.5 HYPERLIPIDEMIA LDL GOAL <70: ICD-10-CM

## 2021-03-08 RX ORDER — ATORVASTATIN CALCIUM 80 MG/1
80 TABLET, FILM COATED ORAL NIGHTLY
Qty: 90 TABLET | Refills: 3 | Status: SHIPPED | OUTPATIENT
Start: 2021-03-08

## 2021-06-09 ENCOUNTER — TRANSCRIBE ORDERS (OUTPATIENT)
Dept: HOME HEALTH SERVICES | Facility: HOME HEALTHCARE | Age: 81
End: 2021-06-09

## 2021-06-09 ENCOUNTER — HOME HEALTH ADMISSION (OUTPATIENT)
Dept: HOME HEALTH SERVICES | Facility: HOME HEALTHCARE | Age: 81
End: 2021-06-09

## 2021-06-09 DIAGNOSIS — I10 ESSENTIAL HYPERTENSION, MALIGNANT: Primary | ICD-10-CM

## 2021-07-03 ENCOUNTER — HOSPITAL ENCOUNTER (INPATIENT)
Facility: HOSPITAL | Age: 81
LOS: 2 days | Discharge: HOME HEALTH | End: 2021-07-05
Attending: EMERGENCY MEDICINE | Admitting: INTERNAL MEDICINE

## 2021-07-03 ENCOUNTER — APPOINTMENT (OUTPATIENT)
Dept: GENERAL RADIOLOGY | Facility: HOSPITAL | Age: 81
End: 2021-07-03

## 2021-07-03 ENCOUNTER — APPOINTMENT (OUTPATIENT)
Dept: CT IMAGING | Facility: HOSPITAL | Age: 81
End: 2021-07-03

## 2021-07-03 DIAGNOSIS — R41.0 CONFUSION: ICD-10-CM

## 2021-07-03 DIAGNOSIS — R29.6 MULTIPLE FALLS: Primary | ICD-10-CM

## 2021-07-03 DIAGNOSIS — Z91.81 AT HIGH RISK FOR FALLS: ICD-10-CM

## 2021-07-03 DIAGNOSIS — Z74.09 IMPAIRED FUNCTIONAL MOBILITY, BALANCE, GAIT, AND ENDURANCE: ICD-10-CM

## 2021-07-03 DIAGNOSIS — E03.9 HYPOTHYROIDISM, UNSPECIFIED TYPE: ICD-10-CM

## 2021-07-03 DIAGNOSIS — E03.9 SEVERE HYPOTHYROIDISM: ICD-10-CM

## 2021-07-03 DIAGNOSIS — Z86.73 HISTORY OF ISCHEMIC STROKE: ICD-10-CM

## 2021-07-03 DIAGNOSIS — G62.9 NEUROPATHY: ICD-10-CM

## 2021-07-03 PROBLEM — I10 ESSENTIAL HYPERTENSION: Status: ACTIVE | Noted: 2019-09-03

## 2021-07-03 LAB
ALBUMIN SERPL-MCNC: 4.2 G/DL (ref 3.5–5.2)
ALBUMIN/GLOB SERPL: 1.6 G/DL
ALP SERPL-CCNC: 108 U/L (ref 39–117)
ALT SERPL W P-5'-P-CCNC: 14 U/L (ref 1–41)
ANION GAP SERPL CALCULATED.3IONS-SCNC: 9 MMOL/L (ref 5–15)
AST SERPL-CCNC: 21 U/L (ref 1–40)
BASOPHILS # BLD AUTO: 0.06 10*3/MM3 (ref 0–0.2)
BASOPHILS NFR BLD AUTO: 0.7 % (ref 0–1.5)
BILIRUB SERPL-MCNC: 0.5 MG/DL (ref 0–1.2)
BILIRUB UR QL STRIP: NEGATIVE
BUN SERPL-MCNC: 15 MG/DL (ref 8–23)
BUN/CREAT SERPL: 11.2 (ref 7–25)
CALCIUM SPEC-SCNC: 9.6 MG/DL (ref 8.6–10.5)
CHLORIDE SERPL-SCNC: 103 MMOL/L (ref 98–107)
CLARITY UR: CLEAR
CO2 SERPL-SCNC: 27 MMOL/L (ref 22–29)
COLOR UR: YELLOW
CORTIS SERPL-MCNC: 16 MCG/DL
CREAT SERPL-MCNC: 1.34 MG/DL (ref 0.76–1.27)
DEPRECATED RDW RBC AUTO: 49.1 FL (ref 37–54)
EOSINOPHIL # BLD AUTO: 0.27 10*3/MM3 (ref 0–0.4)
EOSINOPHIL NFR BLD AUTO: 2.9 % (ref 0.3–6.2)
ERYTHROCYTE [DISTWIDTH] IN BLOOD BY AUTOMATED COUNT: 13.8 % (ref 12.3–15.4)
GFR SERPL CREATININE-BSD FRML MDRD: 51 ML/MIN/1.73
GLOBULIN UR ELPH-MCNC: 2.6 GM/DL
GLUCOSE SERPL-MCNC: 128 MG/DL (ref 65–99)
GLUCOSE UR STRIP-MCNC: NEGATIVE MG/DL
HCT VFR BLD AUTO: 41.5 % (ref 37.5–51)
HGB BLD-MCNC: 14 G/DL (ref 13–17.7)
HGB UR QL STRIP.AUTO: NEGATIVE
IMM GRANULOCYTES # BLD AUTO: 0.04 10*3/MM3 (ref 0–0.05)
IMM GRANULOCYTES NFR BLD AUTO: 0.4 % (ref 0–0.5)
KETONES UR QL STRIP: NEGATIVE
LEUKOCYTE ESTERASE UR QL STRIP.AUTO: NEGATIVE
LYMPHOCYTES # BLD AUTO: 0.99 10*3/MM3 (ref 0.7–3.1)
LYMPHOCYTES NFR BLD AUTO: 10.8 % (ref 19.6–45.3)
MCH RBC QN AUTO: 33.1 PG (ref 26.6–33)
MCHC RBC AUTO-ENTMCNC: 33.7 G/DL (ref 31.5–35.7)
MCV RBC AUTO: 98.1 FL (ref 79–97)
MONOCYTES # BLD AUTO: 0.64 10*3/MM3 (ref 0.1–0.9)
MONOCYTES NFR BLD AUTO: 7 % (ref 5–12)
NEUTROPHILS NFR BLD AUTO: 7.19 10*3/MM3 (ref 1.7–7)
NEUTROPHILS NFR BLD AUTO: 78.2 % (ref 42.7–76)
NITRITE UR QL STRIP: NEGATIVE
NRBC BLD AUTO-RTO: 0 /100 WBC (ref 0–0.2)
PH UR STRIP.AUTO: 6.5 [PH] (ref 5–8)
PLATELET # BLD AUTO: 181 10*3/MM3 (ref 140–450)
PMV BLD AUTO: 9.8 FL (ref 6–12)
POTASSIUM SERPL-SCNC: 3.8 MMOL/L (ref 3.5–5.2)
PROT SERPL-MCNC: 6.8 G/DL (ref 6–8.5)
PROT UR QL STRIP: ABNORMAL
RBC # BLD AUTO: 4.23 10*6/MM3 (ref 4.14–5.8)
SODIUM SERPL-SCNC: 139 MMOL/L (ref 136–145)
SP GR UR STRIP: 1.02 (ref 1–1.03)
T4 FREE SERPL-MCNC: <0.1 NG/DL (ref 0.93–1.7)
TROPONIN T SERPL-MCNC: <0.01 NG/ML (ref 0–0.03)
TSH SERPL DL<=0.05 MIU/L-ACNC: 82.68 UIU/ML (ref 0.27–4.2)
UROBILINOGEN UR QL STRIP: ABNORMAL
WBC # BLD AUTO: 9.19 10*3/MM3 (ref 3.4–10.8)

## 2021-07-03 PROCEDURE — 99223 1ST HOSP IP/OBS HIGH 75: CPT | Performed by: INTERNAL MEDICINE

## 2021-07-03 PROCEDURE — 73030 X-RAY EXAM OF SHOULDER: CPT

## 2021-07-03 PROCEDURE — 70450 CT HEAD/BRAIN W/O DYE: CPT

## 2021-07-03 PROCEDURE — 93005 ELECTROCARDIOGRAM TRACING: CPT | Performed by: EMERGENCY MEDICINE

## 2021-07-03 PROCEDURE — 81003 URINALYSIS AUTO W/O SCOPE: CPT | Performed by: EMERGENCY MEDICINE

## 2021-07-03 PROCEDURE — U0004 COV-19 TEST NON-CDC HGH THRU: HCPCS | Performed by: INTERNAL MEDICINE

## 2021-07-03 PROCEDURE — 72220 X-RAY EXAM SACRUM TAILBONE: CPT

## 2021-07-03 PROCEDURE — 72125 CT NECK SPINE W/O DYE: CPT

## 2021-07-03 PROCEDURE — 84484 ASSAY OF TROPONIN QUANT: CPT | Performed by: EMERGENCY MEDICINE

## 2021-07-03 PROCEDURE — 99285 EMERGENCY DEPT VISIT HI MDM: CPT

## 2021-07-03 PROCEDURE — 84439 ASSAY OF FREE THYROXINE: CPT | Performed by: INTERNAL MEDICINE

## 2021-07-03 PROCEDURE — 80053 COMPREHEN METABOLIC PANEL: CPT | Performed by: EMERGENCY MEDICINE

## 2021-07-03 PROCEDURE — 84443 ASSAY THYROID STIM HORMONE: CPT | Performed by: EMERGENCY MEDICINE

## 2021-07-03 PROCEDURE — 85025 COMPLETE CBC W/AUTO DIFF WBC: CPT | Performed by: EMERGENCY MEDICINE

## 2021-07-03 PROCEDURE — 71045 X-RAY EXAM CHEST 1 VIEW: CPT

## 2021-07-03 PROCEDURE — 25010000002 FENTANYL CITRATE (PF) 50 MCG/ML SOLUTION: Performed by: EMERGENCY MEDICINE

## 2021-07-03 PROCEDURE — U0005 INFEC AGEN DETEC AMPLI PROBE: HCPCS | Performed by: INTERNAL MEDICINE

## 2021-07-03 PROCEDURE — 82533 TOTAL CORTISOL: CPT | Performed by: INTERNAL MEDICINE

## 2021-07-03 RX ORDER — POLYETHYLENE GLYCOL 3350 17 G/17G
17 POWDER, FOR SOLUTION ORAL DAILY
Status: DISCONTINUED | OUTPATIENT
Start: 2021-07-04 | End: 2021-07-05 | Stop reason: HOSPADM

## 2021-07-03 RX ORDER — ATORVASTATIN CALCIUM 40 MG/1
80 TABLET, FILM COATED ORAL NIGHTLY
Status: DISCONTINUED | OUTPATIENT
Start: 2021-07-04 | End: 2021-07-05 | Stop reason: HOSPADM

## 2021-07-03 RX ORDER — ACETAMINOPHEN 325 MG/1
650 TABLET ORAL EVERY 4 HOURS PRN
Status: DISCONTINUED | OUTPATIENT
Start: 2021-07-03 | End: 2021-07-05 | Stop reason: HOSPADM

## 2021-07-03 RX ORDER — ALLOPURINOL 300 MG/1
300 TABLET ORAL DAILY
Status: DISCONTINUED | OUTPATIENT
Start: 2021-07-04 | End: 2021-07-05 | Stop reason: HOSPADM

## 2021-07-03 RX ORDER — ASPIRIN 81 MG/1
81 TABLET ORAL DAILY
Status: DISCONTINUED | OUTPATIENT
Start: 2021-07-04 | End: 2021-07-05 | Stop reason: HOSPADM

## 2021-07-03 RX ORDER — CLONAZEPAM 0.5 MG/1
0.5 TABLET ORAL 2 TIMES DAILY PRN
Status: DISCONTINUED | OUTPATIENT
Start: 2021-07-03 | End: 2021-07-05 | Stop reason: HOSPADM

## 2021-07-03 RX ORDER — LIOTHYRONINE SODIUM 25 UG/1
25 TABLET ORAL DAILY
Status: DISCONTINUED | OUTPATIENT
Start: 2021-07-04 | End: 2021-07-05 | Stop reason: HOSPADM

## 2021-07-03 RX ORDER — FENTANYL CITRATE 50 UG/ML
50 INJECTION, SOLUTION INTRAMUSCULAR; INTRAVENOUS ONCE
Status: COMPLETED | OUTPATIENT
Start: 2021-07-03 | End: 2021-07-03

## 2021-07-03 RX ORDER — CLOPIDOGREL BISULFATE 75 MG/1
75 TABLET ORAL DAILY
Status: DISCONTINUED | OUTPATIENT
Start: 2021-07-04 | End: 2021-07-05 | Stop reason: HOSPADM

## 2021-07-03 RX ORDER — FINASTERIDE 5 MG/1
5 TABLET, FILM COATED ORAL DAILY
Status: DISCONTINUED | OUTPATIENT
Start: 2021-07-04 | End: 2021-07-05 | Stop reason: HOSPADM

## 2021-07-03 RX ORDER — LEVOTHYROXINE SODIUM 20 UG/ML
100 INJECTION, SOLUTION INTRAVENOUS
Status: DISCONTINUED | OUTPATIENT
Start: 2021-07-04 | End: 2021-07-05 | Stop reason: HOSPADM

## 2021-07-03 RX ORDER — BUSPIRONE HYDROCHLORIDE 10 MG/1
10 TABLET ORAL EVERY 12 HOURS SCHEDULED
Status: DISCONTINUED | OUTPATIENT
Start: 2021-07-04 | End: 2021-07-05 | Stop reason: HOSPADM

## 2021-07-03 RX ORDER — MEMANTINE HYDROCHLORIDE 10 MG/1
5 TABLET ORAL DAILY
Status: DISCONTINUED | OUTPATIENT
Start: 2021-07-04 | End: 2021-07-05 | Stop reason: HOSPADM

## 2021-07-03 RX ORDER — PANTOPRAZOLE SODIUM 40 MG/1
40 TABLET, DELAYED RELEASE ORAL EVERY MORNING
Status: DISCONTINUED | OUTPATIENT
Start: 2021-07-04 | End: 2021-07-05 | Stop reason: HOSPADM

## 2021-07-03 RX ORDER — LEVOTHYROXINE SODIUM 0.07 MG/1
150 TABLET ORAL DAILY
Status: DISCONTINUED | OUTPATIENT
Start: 2021-07-04 | End: 2021-07-03

## 2021-07-03 RX ORDER — SODIUM CHLORIDE 0.9 % (FLUSH) 0.9 %
10 SYRINGE (ML) INJECTION AS NEEDED
Status: DISCONTINUED | OUTPATIENT
Start: 2021-07-03 | End: 2021-07-05 | Stop reason: HOSPADM

## 2021-07-03 RX ADMIN — CLONAZEPAM 0.5 MG: 0.5 TABLET ORAL at 20:18

## 2021-07-03 RX ADMIN — FENTANYL CITRATE 50 MCG: 50 INJECTION INTRAMUSCULAR; INTRAVENOUS at 16:48

## 2021-07-03 NOTE — H&P
[facility] Medicine Services  HISTORY AND PHYSICAL    Patient Name: Domitila Bosch  : 1940  MRN: [id number]  Primary Care Physician: Marcin Ferguson MD    Subjective   Subjective     Chief Complaint:    HPI:  Domitila Bosch is a 80 y.o. male with HO HTN, anxiety, HLP, hypothyroidism, GERD, dementia and stroke who presented to the ED for evaluation of multiple falls including one that hurt his neck. His daughter has been living with him since before his wife  suddenly in February. Since then he has not done well, he has lost weight, he in confused and requires a lot of attention. HE has been falling frequently but no evidence of focal deficits. Patient reports that only his right shoulder hurts.   In the ED patient was found to have an extremely elevated TSH to be hypoxic- his daughter reports she has been putting his meds in an organizer but not sure that he has been taking them.    Review of Systems   Otherwise complete 10-system ROS is negative except as mentioned in the HPI.    Personal History     Past Medical History:   Diagnosis Date   • Anxiety disorder 2017   • Asthma 2017   • Basal cell carcinoma in situ of skin 2019    right leg    • Carotid stenosis 2017   • Diaphoresis    • Diastolic dysfunction    • Diverticulitis    • Dyslipidemia 2017   • GERD (gastroesophageal reflux disease) 2017   • Gout 2017   • Herpes zoster     Herpes zoster, [id number], right lower extremity.    • Hyperlipidemia    • Hypertension 2017   • Hypothyroidism 2017   • LVH (left ventricular hypertrophy)    • Malignant melanoma (CMS/HCC) 2017   • Melanoma (CMS/HCC)    • Mitral regurgitation    • Nephrolithiasis    • Post herpetic neuralgia 2017   • TIA (transient ischemic attack)     TIA, 2012, with nonobstructive carotid stenosis, dyslipidemia and hypertension       Past Surgical History:   Procedure Laterality Date   • ABDOMINAL  SURGERY     • APPENDECTOMY     • COLON RESECTION LEFT  2016   • COLON RESECTION LEFT  2016   • CYST REMOVAL      left side of neck.    • HEEL SPUR SURGERY     • HERNIA REPAIR      hiatal hernia    • NISSEN FUNDOPLICATION     • OTHER SURGICAL HISTORY  2010    Malignant melanoma, status post resection        Family History: family history includes Heart attack in his mother; Heart disease in his mother; Hyperlipidemia in his mother; Stroke in his mother.     Social History:  reports that he quit smoking about 23 years ago. His smoking use included cigars and pipe. He has never used smokeless tobacco. He reports that he does not drink alcohol and does not use drugs.  His wife  during surgery in February of this year. He only has one daughter who lives with him. He work at [employer] for 40 years.  Medications:  Cholecalciferol, Psyllium, allopurinol, aspirin, atorvastatin, busPIRone, clopidogrel, doxazosin, finasteride, levothyroxine, loratadine, memantine, omeprazole, polyethylene glycol, sertraline, triamterene-hydrochlorothiazide, and vitamin B-12      Allergies   Allergen Reactions   • Cephalexin Rash   • Lisinopril Angioedema   • Sulfamethoxazole-Trimethoprim Rash   • Betadine [Povidone Iodine] Other (See Comments)     Skin Burning    • Iodine Other (See Comments)     Skin Burning    • Flomax [Tamsulosin Hcl] Rash   • Penicillins Rash   • Pseudoephedrine Rash       Objective   Objective     Vital Signs:   Temp:  [97.9 °F (36.6 °C)] 97.9 °F (36.6 °C)  Heart Rate:  [64-80] 80  Resp:  [12] 12  BP: (139-179)/() 176/113  Flow (L/min):  [3] 3    Patient is alert and talkative, he wants to go home, it took him several times to realize that his wife was dead.  Neck is without mass or JVD  Heart is Reg wo murmur  Lungs are clear wo wheeze or crackle, requiring oxygen  Abd is soft without HSM or mass, not tender or distended  MAEW-- chronic arthritic changes  Skin is without rash, he has chronic  venous changes and poor perfusion in his feet  Neurologic exam is nonfocal -- CN intact, strength intact, DTRs symmetric  Mood is appropriate-- agreeable but poorly oriented.      Result Review:  I have personally reviewed the results from the time of admission and agree with these findings:  [x]  Laboratory  [x]  Radiology  [x]  EKG/Telemetry   []  Pathology  [x]  Old records  []  Other:  Most notable findings include:TSH 82    LAB RESULTS:      Lab 07/03/21  1744   WBC 9.19   HEMOGLOBIN 14.0   HEMATOCRIT 41.5   PLATELETS 181   NEUTROS ABS 7.19*   IMMATURE GRANS (ABS) 0.04   LYMPHS ABS 0.99   MONOS ABS 0.64   EOS ABS 0.27   MCV 98.1*         Lab 07/03/21  1744   SODIUM 139   POTASSIUM 3.8   CHLORIDE 103   CO2 27.0   ANION GAP 9.0   BUN 15   CREATININE 1.34*   GLUCOSE 128*   CALCIUM 9.6   TSH 82.680*         Lab 07/03/21  1744   TOTAL PROTEIN 6.8   ALBUMIN 4.20   GLOBULIN 2.6   ALT (SGPT) 14   AST (SGOT) 21   BILIRUBIN 0.5   ALK PHOS 108         Lab 07/03/21  1744   TROPONIN T <0.010                 Brief Urine Lab Results  (Last result in the past 365 days)      Color   Clarity   Blood   Leuk Est   Nitrite   Protein   CREAT   Urine HCG        07/03/21 1825 Yellow Clear Negative Negative Negative Trace             Microbiology Results (last 10 days)     ** No results found for the last 240 hours. **          XR Sacrum & Coccyx    Result Date: 7/3/2021   EXAMINATION: XR SACRUM AND COCCYX-  INDICATION: fall, pain  COMPARISON: NONE  FINDINGS: 3 views of the sacrum and coccyx reveal degenerative changes seen of the sequelae joints bilaterally. There is no fracture or dislocation. Cortex is intact. Overlying stool does obscure some image detail.         Impression: There is no evidence of acute bony or normality. Degenerative changes seen of the sequelae joints bilaterally and lower lumbar spine.       XR Shoulder 2+ View Right    Result Date: 7/3/2021   EXAMINATION: XR SHOULDER 2+ VW RIGHT-  INDICATION: trauma   COMPARISON: NONE  FINDINGS: 2 views of the right shoulder reveal no evidence of fracture or dislocation present the cortex is intact. Joint spaces are preserved. Minimal degenerative changes seen of the common clavicular joint. Spurring of the humeral head.         Impression: Minimal degenerative changes seen of the shoulder with no evidence of fracture or dislocation.       CT Head Without Contrast    Result Date: 7/3/2021  EXAMINATION: CT HEAD WO CONTRAST-  INDICATION: FALL head trauma  TECHNIQUE: Multiple axial CT imaging is obtained of the head from skull base to skull vertex without the ministration of intravenous contrast.  The radiation dose reduction device was turned on for each scan per the ALARA (As Low as Reasonably Achievable) protocol.  COMPARISON: NONE  FINDINGS: Mild atrophy the brain. Some low-density area seen in the periventricular and subcortical white matter suggesting chronic small vessel ischemic change. No hemorrhage or hydrocephalus. No mass, mass effect, midline shift. No abnormal extra axial fluid collections identified. The bony structures reveal no evidence of osseous abnormality. The visualized paranasal sinuses are clear. The mastoid air cells are patent.        Impression: Atrophy and chronic changes seen within the brain with no acute intracranial normality.       CT Cervical Spine Without Contrast    Result Date: 7/3/2021  EXAMINATION: CT CERVICAL SPINE WO CONTRAST-  INDICATION: FALL trauma, neck pain  TECHNIQUE: Multiple axial CT imaging is obtained of the cervical spine without the ministration of intravenous contrast. Coronal and sagittal reformatted images persuaded to further facilitate diagnostic accuracy and treatment planning.  The radiation dose reduction device was turned on for each scan per the ALARA (As Low as Reasonably Achievable) protocol.  COMPARISON: NONE  FINDINGS: Since of atherosclerotic disease seen within the carotid bifurcations bilaterally left greater than  right. Multilevel degenerative changes seen throughout the cervical spine probably within the posterior facets and disc space narrowing at the C5/C6 and C6/C7 level. There is straightening of the normal lordosis of the cervical spine. Anterolisthesis identified of C4 on C5 with disc space narrowing of C5 and C6 and C6 and C7. Anterior spurring and posterior osteophyte formation present. Odontoid is intact. The lateral masses are well aligned.        Impression: Mild anterolisthesis of C4 on C5 with extensive degenerative changes seen of C5-C7. There is no fracture or dislocation.       XR Chest 1 View    Result Date: 7/3/2021   EXAMINATION: XR CHEST 1 VW-  INDICATION: Falls, weakness  COMPARISON: 07/31/2020  FINDINGS: Portable chest reveals cardiac and mediastinal silhouettes within normal limits. Lung fields are grossly clear with chronic changes seen the lung fields bilaterally.. No focal right opacification present. No pleural effusion or pneumothorax. The bony structures reveal degenerative changes seen within the spine. Pulmonary vascularity is within normal limits.         Impression: Chronic changes in with the lung fields bilaterally with no evidence of acute parenchymal disease. Lung volumes are low.         Results for orders placed during the hospital encounter of 07/28/20    Adult Transesophageal Echo (SUMMER) W/ Cont if Necessary Per Protocol    Interpretation Summary  · Estimated EF = 65%.  · Left ventricular systolic function is normal.  · Left atrial cavity size is mild-to-moderately dilated.  · There is mild calcification of the aortic valve mainly affecting the non and right coronary cusp(s).  · Saline test results are negative.  · The aortic valve exhibits moderate sclerosis.  · There is no evidence of pericardial effusion.  · There is moderate (grade 2) protruding plaque in the descending aorta present.  · No valvular vegetations demonstrated.      Current COVID Risks are:  [] Fever []  Cough []  Shortness of breath [] Change in taste or smell  [] Exposure to COVID patient  [] High risk facility   [x]  NONE  The patient qualifies to receive the vaccine, but they have not yet received it.    Assessment/Plan   Assessment / Plan       Severe hypothyroidism    Carotid stenosis    Asthma    Anxiety disorder    Lumbar radiculopathy    Essential hypertension    Multiple falls    History of ischemic stroke  Assessment & Plan:    Domitila Bosch is a 80 y.o. male with HO HTN, anxiety, HLP, hypothyroidism, GERD, dementia and stroke who presented to the ED for evaluation of multiple falls including one that hurt his neck. Admitted for TSH of 82    Severe hypothyroidism  -IV levothyroixine, cytomel for 3 days, one dose of hydrocortisone  -discussed the importance of taking synthroid on an empty stomach with the daughter    Dementia/Anxiety  -required much redirection  -he is on namenda  -daughter reports he is depressed is already on zoloft and buspar    Frequent Falls  Carotid stenosis  Hyperlipidemia  -will repeat carotid US  · Last result  9/2019: Right internal carotid artery stenosis of 50-69%.   Left internal carotid artery stenosis of 50-69%.  -Cont plavix, lipitor (check CK)  - PT/OT consult    Hypertension  -hold diuretics and cardura    GOut  OA  BPH  -cont chronic meds    Weight loss  -Nutrition COnsult    DVT prophylaxis: lovenox    CODE STATUS:Discussed with daughter and she believes he would NOT want to be resuscitated.    Admission Status: INPATIENT status due to need for IV medications to treat hypothyroidism.  I feel patient’s risk for adverse outcomes and   need for care warrant INPATIENT evaluation and I predict the patient’s care encounter to likely last beyond 2 midnights.    Electronically signed by Kerline Galarza MD 07/03/21 19:14 EDT

## 2021-07-04 ENCOUNTER — APPOINTMENT (OUTPATIENT)
Dept: CARDIOLOGY | Facility: HOSPITAL | Age: 81
End: 2021-07-04

## 2021-07-04 LAB
ANION GAP SERPL CALCULATED.3IONS-SCNC: 17 MMOL/L (ref 5–15)
BASOPHILS # BLD AUTO: 0.05 10*3/MM3 (ref 0–0.2)
BASOPHILS NFR BLD AUTO: 0.6 % (ref 0–1.5)
BH CV ECHO MEAS - BSA(HAYCOCK): 2 M^2
BH CV ECHO MEAS - BSA: 2 M^2
BH CV ECHO MEAS - BZI_BMI: 25.1 KILOGRAMS/M^2
BH CV ECHO MEAS - BZI_METRIC_HEIGHT: 177.8 CM
BH CV ECHO MEAS - BZI_METRIC_WEIGHT: 79.4 KG
BH CV XLRA MEAS LEFT CAROTID BULB EDV: 20.2 CM/SEC
BH CV XLRA MEAS LEFT CAROTID BULB PSV: 180 CM/SEC
BH CV XLRA MEAS LEFT CCA RATIO VEL: 44.2 CM/SEC
BH CV XLRA MEAS LEFT DIST CCA EDV: 11.3 CM/SEC
BH CV XLRA MEAS LEFT DIST CCA PSV: 57.9 CM/SEC
BH CV XLRA MEAS LEFT DIST ICA EDV: 19.6 CM/SEC
BH CV XLRA MEAS LEFT DIST ICA PSV: 51.5 CM/SEC
BH CV XLRA MEAS LEFT ICA RATIO VEL: 202 CM/SEC
BH CV XLRA MEAS LEFT ICA/CCA RATIO: 4.6
BH CV XLRA MEAS LEFT MID CCA EDV: 8.3 CM/SEC
BH CV XLRA MEAS LEFT MID CCA PSV: 44.7 CM/SEC
BH CV XLRA MEAS LEFT MID ICA EDV: 12.2 CM/SEC
BH CV XLRA MEAS LEFT MID ICA PSV: 58.5 CM/SEC
BH CV XLRA MEAS LEFT PROX CCA EDV: 9.3 CM/SEC
BH CV XLRA MEAS LEFT PROX CCA PSV: 62.9 CM/SEC
BH CV XLRA MEAS LEFT PROX ECA PSV: 158 CM/SEC
BH CV XLRA MEAS LEFT PROX ICA EDV: 33.9 CM/SEC
BH CV XLRA MEAS LEFT PROX ICA PSV: 203.7 CM/SEC
BH CV XLRA MEAS LEFT PROX SCLA PSV: 85.9 CM/SEC
BH CV XLRA MEAS LEFT VERTEBRAL A PSV: 36.2 CM/SEC
BH CV XLRA MEAS RIGHT BULB EDV: 13.2 CM/SEC
BH CV XLRA MEAS RIGHT BULB PSV: 98.1 CM/SEC
BH CV XLRA MEAS RIGHT CAROTID BULB EDV: 12.6 CM/SEC
BH CV XLRA MEAS RIGHT CAROTID BULB PSV: 77.9 CM/SEC
BH CV XLRA MEAS RIGHT CCA RATIO VEL: 71.7 CM/SEC
BH CV XLRA MEAS RIGHT DIST CCA EDV: 11.3 CM/SEC
BH CV XLRA MEAS RIGHT DIST CCA PSV: 77.3 CM/SEC
BH CV XLRA MEAS RIGHT DIST ICA EDV: 17 CM/SEC
BH CV XLRA MEAS RIGHT DIST ICA PSV: 58.1 CM/SEC
BH CV XLRA MEAS RIGHT ICA RATIO VEL: 93 CM/SEC
BH CV XLRA MEAS RIGHT ICA/CCA RATIO: 1.3
BH CV XLRA MEAS RIGHT MID CCA EDV: 13.2 CM/SEC
BH CV XLRA MEAS RIGHT MID CCA PSV: 71.7 CM/SEC
BH CV XLRA MEAS RIGHT MID ICA EDV: 18.3 CM/SEC
BH CV XLRA MEAS RIGHT MID ICA PSV: 59.4 CM/SEC
BH CV XLRA MEAS RIGHT PROX CCA EDV: 11.3 CM/SEC
BH CV XLRA MEAS RIGHT PROX CCA PSV: 77.9 CM/SEC
BH CV XLRA MEAS RIGHT PROX ECA PSV: 115.2 CM/SEC
BH CV XLRA MEAS RIGHT PROX ICA EDV: 22.6 CM/SEC
BH CV XLRA MEAS RIGHT PROX ICA PSV: 93.7 CM/SEC
BH CV XLRA MEAS RIGHT PROX SCLA PSV: 146.9 CM/SEC
BH CV XLRA MEAS RIGHT VERTEBRAL A PSV: 59 CM/SEC
BUN SERPL-MCNC: 13 MG/DL (ref 8–23)
BUN/CREAT SERPL: 10.4 (ref 7–25)
CALCIUM SPEC-SCNC: 9.3 MG/DL (ref 8.6–10.5)
CHLORIDE SERPL-SCNC: 101 MMOL/L (ref 98–107)
CK SERPL-CCNC: 289 U/L (ref 20–200)
CO2 SERPL-SCNC: 22 MMOL/L (ref 22–29)
CREAT SERPL-MCNC: 1.25 MG/DL (ref 0.76–1.27)
DEPRECATED RDW RBC AUTO: 50.8 FL (ref 37–54)
EOSINOPHIL # BLD AUTO: 0.01 10*3/MM3 (ref 0–0.4)
EOSINOPHIL NFR BLD AUTO: 0.1 % (ref 0.3–6.2)
ERYTHROCYTE [DISTWIDTH] IN BLOOD BY AUTOMATED COUNT: 13.6 % (ref 12.3–15.4)
GFR SERPL CREATININE-BSD FRML MDRD: 55 ML/MIN/1.73
GLUCOSE SERPL-MCNC: 126 MG/DL (ref 65–99)
HCT VFR BLD AUTO: 45.8 % (ref 37.5–51)
HGB BLD-MCNC: 14.8 G/DL (ref 13–17.7)
IMM GRANULOCYTES # BLD AUTO: 0.05 10*3/MM3 (ref 0–0.05)
IMM GRANULOCYTES NFR BLD AUTO: 0.6 % (ref 0–0.5)
LEFT ARM BP: NORMAL MMHG
LYMPHOCYTES # BLD AUTO: 0.75 10*3/MM3 (ref 0.7–3.1)
LYMPHOCYTES NFR BLD AUTO: 8.8 % (ref 19.6–45.3)
MCH RBC QN AUTO: 32.8 PG (ref 26.6–33)
MCHC RBC AUTO-ENTMCNC: 32.3 G/DL (ref 31.5–35.7)
MCV RBC AUTO: 101.6 FL (ref 79–97)
MONOCYTES # BLD AUTO: 0.23 10*3/MM3 (ref 0.1–0.9)
MONOCYTES NFR BLD AUTO: 2.7 % (ref 5–12)
NEUTROPHILS NFR BLD AUTO: 7.43 10*3/MM3 (ref 1.7–7)
NEUTROPHILS NFR BLD AUTO: 87.2 % (ref 42.7–76)
NRBC BLD AUTO-RTO: 0 /100 WBC (ref 0–0.2)
PLATELET # BLD AUTO: 204 10*3/MM3 (ref 140–450)
PMV BLD AUTO: 10.3 FL (ref 6–12)
POTASSIUM SERPL-SCNC: 3.7 MMOL/L (ref 3.5–5.2)
RBC # BLD AUTO: 4.51 10*6/MM3 (ref 4.14–5.8)
SARS-COV-2 RNA NOSE QL NAA+PROBE: NOT DETECTED
SODIUM SERPL-SCNC: 140 MMOL/L (ref 136–145)
VIT B12 BLD-MCNC: 532 PG/ML (ref 211–946)
WBC # BLD AUTO: 8.52 10*3/MM3 (ref 3.4–10.8)

## 2021-07-04 PROCEDURE — 99233 SBSQ HOSP IP/OBS HIGH 50: CPT | Performed by: INTERNAL MEDICINE

## 2021-07-04 PROCEDURE — 82607 VITAMIN B-12: CPT | Performed by: INTERNAL MEDICINE

## 2021-07-04 PROCEDURE — 82550 ASSAY OF CK (CPK): CPT | Performed by: INTERNAL MEDICINE

## 2021-07-04 PROCEDURE — 80048 BASIC METABOLIC PNL TOTAL CA: CPT | Performed by: INTERNAL MEDICINE

## 2021-07-04 PROCEDURE — 93880 EXTRACRANIAL BILAT STUDY: CPT

## 2021-07-04 PROCEDURE — 25010000002 HYDROCORTISONE SODIUM SUCCINATE 100 MG RECONSTITUTED SOLUTION: Performed by: INTERNAL MEDICINE

## 2021-07-04 PROCEDURE — 93880 EXTRACRANIAL BILAT STUDY: CPT | Performed by: INTERNAL MEDICINE

## 2021-07-04 PROCEDURE — 97162 PT EVAL MOD COMPLEX 30 MIN: CPT

## 2021-07-04 PROCEDURE — 85025 COMPLETE CBC W/AUTO DIFF WBC: CPT | Performed by: INTERNAL MEDICINE

## 2021-07-04 PROCEDURE — 25010000002 ENOXAPARIN PER 10 MG: Performed by: INTERNAL MEDICINE

## 2021-07-04 RX ORDER — CYCLOBENZAPRINE HCL 10 MG
5 TABLET ORAL 3 TIMES DAILY PRN
Status: DISCONTINUED | OUTPATIENT
Start: 2021-07-04 | End: 2021-07-05 | Stop reason: HOSPADM

## 2021-07-04 RX ADMIN — ATORVASTATIN CALCIUM 80 MG: 40 TABLET, FILM COATED ORAL at 20:19

## 2021-07-04 RX ADMIN — FINASTERIDE 5 MG: 5 TABLET, FILM COATED ORAL at 10:20

## 2021-07-04 RX ADMIN — ACETAMINOPHEN 650 MG: 325 TABLET, FILM COATED ORAL at 20:20

## 2021-07-04 RX ADMIN — ASPIRIN 81 MG: 81 TABLET, COATED ORAL at 10:17

## 2021-07-04 RX ADMIN — ENOXAPARIN SODIUM 30 MG: 30 INJECTION, SOLUTION INTRAVENOUS; SUBCUTANEOUS at 10:16

## 2021-07-04 RX ADMIN — HYDROCORTISONE SODIUM SUCCINATE 100 MG: 100 INJECTION, POWDER, FOR SOLUTION INTRAMUSCULAR; INTRAVENOUS at 00:21

## 2021-07-04 RX ADMIN — LIOTHYRONINE SODIUM 25 MCG: 25 TABLET ORAL at 10:20

## 2021-07-04 RX ADMIN — BUSPIRONE HYDROCHLORIDE 10 MG: 10 TABLET ORAL at 20:19

## 2021-07-04 RX ADMIN — ALLOPURINOL 300 MG: 300 TABLET ORAL at 10:17

## 2021-07-04 RX ADMIN — BUSPIRONE HYDROCHLORIDE 10 MG: 10 TABLET ORAL at 10:17

## 2021-07-04 RX ADMIN — MEMANTINE 5 MG: 10 TABLET ORAL at 10:17

## 2021-07-04 RX ADMIN — LEVOTHYROXINE SODIUM ANHYDROUS 100 MCG: 100 INJECTION, POWDER, LYOPHILIZED, FOR SOLUTION INTRAVENOUS at 04:47

## 2021-07-04 RX ADMIN — SERTRALINE HYDROCHLORIDE 50 MG: 50 TABLET ORAL at 10:20

## 2021-07-04 RX ADMIN — PANTOPRAZOLE SODIUM 40 MG: 40 TABLET, DELAYED RELEASE ORAL at 04:47

## 2021-07-04 RX ADMIN — POLYETHYLENE GLYCOL 3350 17 G: 17 POWDER, FOR SOLUTION ORAL at 10:16

## 2021-07-04 RX ADMIN — HYDROCORTISONE SODIUM SUCCINATE 50 MG: 100 INJECTION, POWDER, FOR SOLUTION INTRAMUSCULAR; INTRAVENOUS at 17:08

## 2021-07-04 RX ADMIN — CYCLOBENZAPRINE 5 MG: 10 TABLET, FILM COATED ORAL at 20:19

## 2021-07-04 RX ADMIN — CLOPIDOGREL BISULFATE 75 MG: 75 TABLET ORAL at 10:19

## 2021-07-04 RX ADMIN — ACETAMINOPHEN 650 MG: 325 TABLET, FILM COATED ORAL at 12:14

## 2021-07-04 RX ADMIN — HYDROCORTISONE SODIUM SUCCINATE 50 MG: 100 INJECTION, POWDER, FOR SOLUTION INTRAMUSCULAR; INTRAVENOUS at 10:16

## 2021-07-04 RX ADMIN — CLONAZEPAM 0.5 MG: 0.5 TABLET ORAL at 20:19

## 2021-07-04 NOTE — PROGRESS NOTES
Clinical Nutrition       Patient Name: Domitila Bosch  YOB: 1940  MRN: [id number]  Date of Encounter: 21 15:53 EDT  Admission date: 7/3/2021      Reason for Visit   Chronic poor intake consult.  RD noted MST score=0 and no nutrition risk screen indicators present.  RD noted pt's wt 1 year ago on 20 was 179lb, which is c/w bed wt of 175lb on 7/3/21. RD spoke with pt and dtr in room and verified no signif wt loss prior to adm.   Ht=70in, hr=590yd per bed wt on 7/3, and BMI=25.1. GDU=352wm; pt is 105% IBW (wnl)    EMR  Reviewed   Yes         Reported/Observed/Food/Nutrition Related - Comments     Dtr reports her mother/pt's wife  in 2021 and states pt has been eating 1/2-3/4 of usual po intake during the past 6 months. However, dtr states pt drinks Boost supplements at home in addition to meals, pt likes them very well, and will drink up to 6/day.  Pt wishes to receive Boost plus 4x/d during adm; RD obtained flavor pref.        Current Nutrition Prescription     Diet Regular        Average po Intake: insuffic data      Actions     Follow treatment progress, Supplement provided   Will order/send Boost plus 4x/d.    Monitor Per Protocol      Tila Godoy, MS,RD,LD,   Time Spent: 25 mins

## 2021-07-04 NOTE — PLAN OF CARE
Goal Outcome Evaluation:                     Patient arrived to the floor very confused he stated that he thought he was in senior living, complaints of rt shoulder pain, disoriented x 4, blood pressure elevated, mild abrasion to the rt shoulder. Otherwise stable, will continue to monitor

## 2021-07-04 NOTE — THERAPY EVALUATION
Patient Name: Domitila Bosch  : 1940    MRN: [id number]                              Today's Date: 2021       Admit Date: 7/3/2021    Visit Dx: No diagnosis found.  Patient Active Problem List   Diagnosis   • Diverticulosis of large intestine with hemorrhage   • Umbilical hernia   • S/P hernia repair   • Hypertension   • Dyslipidemia   • Carotid stenosis   • Gout   • GERD (gastroesophageal reflux disease)   • Hypothyroidism   • Neuropathy   • Malignant melanoma (CMS/HCC)   • Asthma   • Anxiety disorder   • Diverticulosis   • Muscle pain   • Lumbar radiculopathy   • Kidney stone   • Deviated nasal septum   • Chronic laryngitis   • Acute CVA (cerebrovascular accident) (CMS/HCC)   • Essential hypertension   • Allergy to Betadine   • DANIS (acute kidney injury) (CMS/HCC)   • Multiple falls   • Severe hypothyroidism   • History of ischemic stroke     Past Medical History:   Diagnosis Date   • Anxiety disorder 2017   • Asthma 2017   • Basal cell carcinoma in situ of skin 2019    right leg    • Carotid stenosis 2017   • Diaphoresis    • Diastolic dysfunction    • Diverticulitis    • Dyslipidemia 2017   • GERD (gastroesophageal reflux disease) 2017   • Gout 2017   • Herpes zoster     Herpes zoster, [id number], right lower extremity.    • Hyperlipidemia    • Hypertension 2017   • Hypothyroidism 2017   • LVH (left ventricular hypertrophy)    • Malignant melanoma (CMS/HCC) 2017   • Melanoma (CMS/HCC)    • Mitral regurgitation    • Nephrolithiasis    • Post herpetic neuralgia 2017   • TIA (transient ischemic attack)     TIA, 2012, with nonobstructive carotid stenosis, dyslipidemia and hypertension     Past Surgical History:   Procedure Laterality Date   • ABDOMINAL SURGERY     • APPENDECTOMY     • COLON RESECTION LEFT     • COLON RESECTION LEFT     • CYST REMOVAL      left side of neck.    • HEEL SPUR SURGERY     • HERNIA REPAIR      hiatal  hernia    • NISSEN FUNDOPLICATION  1984   • OTHER SURGICAL HISTORY  2010    Malignant melanoma, status post resection      General Information     Row Name 07/04/21 0935          Physical Therapy Time and Intention    Document Type  evaluation  -MB     Mode of Treatment  physical therapy  -MB     Row Name 07/04/21 0935          General Information    Patient Profile Reviewed  yes  -MB     Prior Level of Function  independent:;bed mobility;transfer;gait;ADL's;all household mobility per patient (questionable historian)  -MB     Barriers to Rehab  previous functional deficit;cognitive status  -MB     Row Name 07/04/21 0935          Living Environment    Lives With  child(diana), adult Per chart, patient's dtr has been staying w/ him since his wife passed a few months ago.  -MB     Row Name 07/04/21 0935          Home Main Entrance    Number of Stairs, Main Entrance  one  -MB     Stair Railings, Main Entrance  none  -MB     Row Name 07/04/21 0935          Stairs Within Home, Primary    Number of Stairs, Within Home, Primary  none  -MB     Row Name 07/04/21 0935          Cognition    Orientation Status (Cognition)  oriented to;person  -MB     Row Name 07/04/21 0935          Safety Issues, Functional Mobility    Safety Issues Affecting Function (Mobility)  ability to follow commands;at risk behavior observed;awareness of need for assistance;insight into deficits/self-awareness;judgment;positioning of assistive device;problem-solving;safety precaution awareness;safety precautions follow-through/compliance;sequencing abilities  -MB     Impairments Affecting Function (Mobility)  balance;cognition;endurance/activity tolerance;strength  -MB     Cognitive Impairments, Mobility Safety/Performance  attention;awareness, need for assistance;impulsivity;insight into deficits/self-awareness;judgment;problem-solving/reasoning;safety precaution awareness;safety precaution follow-through;sequencing abilities  -MB     Comment, Safety  Issues/Impairments (Mobility)  Pt. repeatedly asking where his daughter is.  -MB       User Key  (r) = Recorded By, (t) = Taken By, (c) = Cosigned By    Initials Name Provider Type    MB Angella Carlos, PT Physical Therapist        Mobility     Row Name 07/04/21 0935          Bed Mobility    Bed Mobility  supine-sit  -MB     Supine-Sit [place] (Bed Mobility)  moderate assist (50% patient effort);verbal cues;nonverbal cues (demo/gesture)  -MB     Assistive Device (Bed Mobility)  bed rails;draw sheet;head of bed elevated  -MB     Comment (Bed Mobility)  Pt. required assist to move BLEs towards EOB, raise trunk/shoulders,and scoot hips forward to EOB.  Pt. w/ posterior lean in sitting, requiring consistent assist to recover.  -MB     Row Name 07/04/21 0935          Transfers    Comment (Transfers)  STS 3x from EOB w/ assist for set up and VCs/tactile cues for safe hand placement, sequencing, and upright posture in standing.  Pt. w/ retrograde posture in standing w/ +LOB posteriorly.  -MB     Row Name 07/04/21 0935          Bed-Chair Transfer    Bed-Chair [place] (Transfers)  moderate assist (50% patient effort);verbal cues;nonverbal cues (demo/gesture)  -MB     Assistive Device (Bed-Chair Transfers)  walker, front-wheeled  -MB     Row Name 07/04/21 0935          Sit-Stand Transfer    Sit-Stand [place] (Transfers)  moderate assist (50% patient effort);verbal cues;nonverbal cues (demo/gesture)  -MB     Assistive Device (Sit-Stand Transfers)  walker, front-wheeled  -MB     Row Name 07/04/21 0935          Gait/Stairs (Locomotion)    [place] Level (Gait)  moderate assist (50% patient effort);verbal cues;nonverbal cues (demo/gesture)  -MB     Assistive Device (Gait)  walker, front-wheeled  -MB     Distance in Feet (Gait)  6  -MB     Deviations/Abnormal Patterns (Gait)  rody decreased;festinating/shuffling;gait speed decreased;stride length decreased;weight shifting decreased  -MB     Bilateral  Gait Deviations  forward flexed posture;heel strike decreased  -MB     [place] Level (Stairs)  unable to assess  -MB     Comment (Gait/Stairs)  Pt. ambulated w/ short, shuffling step to gait pattern w/ very slow pace.  Pt. required VCs/tactile cues for step sequence, upright posture, increased B step length and max A to manage RW.  -MB       User Key  (r) = Recorded By, (t) = Taken By, (c) = Cosigned By    Initials Name Provider Type    Angella Dozier, PT Physical Therapist        Obj/Interventions     Row Name 07/04/21 0935          Range of Motion Comprehensive    General Range of Motion  bilateral lower extremity ROM WFL  -MB     Row Name 07/04/21 0935          Strength Comprehensive (MMT)    General Manual Muscle Testing (MMT) Assessment  upper extremity strength deficits identified;lower extremity strength deficits identified  -MB     Comment, General Manual Muscle Testing (MMT) Assessment  BLEs grossly 4-/5,  -MB     Row Name 07/04/21 0935          Balance    Balance Assessment  sitting static balance;sitting dynamic balance;standing static balance;standing dynamic balance  -MB     Static Sitting Balance  moderate impairment;unsupported;sitting, edge of bed  -MB     Dynamic Sitting Balance  moderate impairment;unsupported;sitting, edge of bed  -MB     Static Standing Balance  moderate impairment;supported  -MB     Dynamic Standing Balance  severe impairment;supported  -MB     Balance Interventions  standing;sit to stand;weight shifting activity  -MB     Row Name 07/04/21 0935          Sensory Assessment (Somatosensory)    Sensory Assessment (Somatosensory)  LE sensation intact;UE sensation intact  -MB       User Key  (r) = Recorded By, (t) = Taken By, (c) = Cosigned By    Initials Name Provider Type    Angella Dozier, PT Physical Therapist        Goals/Plan     Row Name 07/04/21 0935          Bed Mobility Goal 1 (PT)    Activity/Assistive Device (Bed Mobility Goal 1, PT)  bed mobility  activities, all  -MB     [place] Level/Cues Needed (Bed Mobility Goal 1, PT)  minimum assist (75% or more patient effort)  -MB     Time Frame (Bed Mobility Goal 1, PT)  10 days  -MB     Progress/Outcomes (Bed Mobility Goal 1, PT)  goal ongoing  -MB     Row Name 07/04/21 0935          Transfer Goal 1 (PT)    Activity/Assistive Device (Transfer Goal 1, PT)  transfers, all;walker, rolling  -MB     [place] Level/Cues Needed (Transfer Goal 1, PT)  minimum assist (75% or more patient effort)  -MB     Time Frame (Transfer Goal 1, PT)  10 days  -MB     Progress/Outcome (Transfer Goal 1, PT)  goal ongoing  -MB     Row Name 07/04/21 0935          Gait Training Goal 1 (PT)    Activity/Assistive Device (Gait Training Goal 1, PT)  gait (walking locomotion);walker, rolling  -MB     [place] Level (Gait Training Goal 1, PT)  minimum assist (75% or more patient effort)  -MB     Distance (Gait Training Goal 1, PT)  50  -MB     Time Frame (Gait Training Goal 1, PT)  10 days  -MB     Progress/Outcome (Gait Training Goal 1, PT)  goal ongoing  -MB     Row Name 07/04/21 0935          Patient Education Goal (PT)    Activity (Patient Education Goal, PT)  HEP  -MB     [place]/Cues/Accuracy (Memory Goal 2, PT)  demonstrates adequately  -MB     Time Frame (Patient Education Goal, PT)  10 days  -MB     Progress/Outcome (Patient Education Goal, PT)  goal ongoing  -MB       User Key  (r) = Recorded By, (t) = Taken By, (c) = Cosigned By    Initials Name Provider Type    Angella Dozier, PT Physical Therapist        Clinical Impression     Row Name 07/04/21 0935          Pain    Additional Documentation  Pain Scale: Numbers Pre/Post-Treatment (Group);Pain Scale: FACES Pre/Post-Treatment (Group)  -MB     Row Name 07/04/21 0935          Pain Scale: FACES Pre/Post-Treatment    Pain: FACES Scale, Pretreatment  2-->hurts little bit  -MB     Posttreatment Pain Rating  2-->hurts little bit  -MB     Pain Location - Side  Right   -MB     Pain Location - Orientation  anterior  -MB     Pain Location  shoulder  -MB     Row Name 07/04/21 0935          Plan of Care Review    Plan of Care Reviewed With  patient  -MB     Progress  no change  -MB     Outcome Summary  PT eval completed.  Patient presents w/ decreased cognition/safety awareness, generalized weakness, impaired balance, gait instability/ high fall risk, and functional decline.  He completed bed mobility w/ mod A, STS transfers w/ RW and mod A, and ambulated 6ft w/ RW and mod A.  Skilled IPPT indicated to improve pt's safety and independence w/ functional mobility.  Recommend SNF rehab at D/C for best outcome.  -MB     Row Name 07/04/21 0935          Therapy Assessment/Plan (PT)    Patient/Family Therapy Goals Statement (PT)  Return to home.  -MB     Rehab Potential (PT)  fair, will monitor progress closely  -MB     Criteria for Skilled Interventions Met (PT)  yes;meets criteria;skilled treatment is necessary  -MB     Row Name 07/04/21 0935          Vital Signs    Pre Systolic BP Rehab  127  -MB     Pre Treatment Diastolic BP  98  -MB     Pretreatment Heart Rate (beats/min)  75  -MB     Pre SpO2 (%)  94  -MB     O2 Delivery Pre Treatment  nasal cannula  -MB     O2 Delivery Intra Treatment  nasal cannula  -MB     Post SpO2 (%)  93  -MB     O2 Delivery Post Treatment  nasal cannula  -MB     Pre Patient Position  Supine  -MB     Intra Patient Position  Standing  -MB     Post Patient Position  Sitting  -MB     Row Name 07/04/21 0935          Positioning and Restraints    Pre-Treatment Position  in bed  -MB     Post Treatment Position  chair  -MB     In Chair  notified nsg;reclined;call light within reach;encouraged to call for assist;exit alarm on;waffle cushion;on mechanical lift sling;legs elevated;heels elevated  -MB       User Key  (r) = Recorded By, (t) = Taken By, (c) = Cosigned By    Initials Name Provider Type    Angella Dozier, PT Physical Therapist        Outcome Measures      Row Name 07/04/21 0935 07/04/21 0800       How much help from another person do you currently need...    Turning from your back to your side while in flat bed without using bedrails?  3  -MB  3  -BC    Moving from lying on back to sitting on the side of a flat bed without bedrails?  2  -MB  3  -BC    Moving to and from a bed to a chair (including a wheelchair)?  2  -MB  3  -BC    Standing up from a chair using your arms (e.g., wheelchair, bedside chair)?  2  -MB  3  -BC    Climbing 3-5 steps with a railing?  1  -MB  2  -BC    To walk in hospital room?  2  -MB  3  -BC    AM-PAC 6 Clicks Score (PT)  12  -MB  17  -BC    Row Name 07/04/21 0935          Functional Assessment    Outcome Measure Options  AM-PAC 6 Clicks Basic Mobility (PT)  -MB       User Key  (r) = Recorded By, (t) = Taken By, (c) = Cosigned By    Initials Name Provider Type    Angella Dozier, PT Physical Therapist    Iris Vera, RN Registered Nurse        Physical Therapy Education                 Title: PT OT SLP Therapies (In Progress)     Topic: Physical Therapy (In Progress)     Point: Mobility training (In Progress)     Learning Progress Summary           Patient Acceptance, E,D, NR by MB at 7/4/2021 1414    Acceptance, E, NR by SEBASTIÁN at 7/3/2021 2133    Comment: Call dont fall video                   Point: Home exercise program (In Progress)     Learning Progress Summary           Patient Acceptance, E,D, NR by MB at 7/4/2021 1414    Acceptance, E, NR by LG at 7/3/2021 2133    Comment: Call dont fall video                   Point: Body mechanics (In Progress)     Learning Progress Summary           Patient Acceptance, E,D, NR by MB at 7/4/2021 1414    Acceptance, E, NR by LG at 7/3/2021 2133    Comment: Call dont fall video                   Point: Precautions (In Progress)     Learning Progress Summary           Patient Acceptance, E,D, NR by MB at 7/4/2021 1414    Acceptance, E, NR by LG at 7/3/2021 2133    Comment: Call dont fall  video                               User Key     Initials Effective Dates Name Provider Type Discipline    MB 06/16/21 -  Angella Carlos, PT Physical Therapist PT    LG 11/16/18 -  Jaron Flores RN Registered Nurse Nurse              PT Recommendation and Plan  Planned Therapy Interventions (PT): balance training, bed mobility training, gait training, home exercise program, patient/family education, stair training, strengthening, transfer training, postural re-education  Plan of Care Reviewed With: patient  Progress: no change  Outcome Summary: PT eval completed.  Patient presents w/ decreased cognition/safety awareness, generalized weakness, impaired balance, gait instability/ high fall risk, and functional decline.  He completed bed mobility w/ mod A, STS transfers w/ RW and mod A, and ambulated 6ft w/ RW and mod A.  Skilled IPPT indicated to improve pt's safety and independence w/ functional mobility.  Recommend SNF rehab at D/C for best outcome.     Time Calculation:   PT Charges     Row Name 07/04/21 1415             Time Calculation    Start Time  0935  -MB      PT Received On  07/04/21  -MB      PT Goal Re-Cert Due Date  07/14/21  -MB         Untimed Charges    PT Eval/Re-eval Minutes  50  -MB         Total Minutes    Untimed Charges Total Minutes  50  -MB       Total Minutes  50  -MB        User Key  (r) = Recorded By, (t) = Taken By, (c) = Cosigned By    Initials Name Provider Type    Angella Dozier, PT Physical Therapist        Therapy Charges for Today     Code Description Service Date Service Provider Modifiers Qty    41308320471 HC PT EVAL MOD COMPLEXITY 4 7/4/2021 Angella Carlos, PT GP 1          PT G-Codes  Outcome Measure Options: AM-PAC 6 Clicks Basic Mobility (PT)  AM-PAC 6 Clicks Score (PT): 12    Angella Carlos PT  7/4/2021

## 2021-07-04 NOTE — CASE MANAGEMENT/SOCIAL WORK
Discharge Planning Assessment  [facility]     Patient Name: Domitila Bosch  MRN: [id number]  Today's Date: 2021    Admit Date: 7/3/2021    Discharge Needs Assessment     Row Name 21 1137       Living Environment    Lives With  child(diana), adult    Name(s) of Who Lives With Patient  Yoly Bosch (daughter) [phone]    Current Living Arrangements  home/apartment/condo    Primary Care Provided by  child(diana)    Family Caregiver if Needed  child(diana), adult    Family Caregiver Names  Yoly ( daughter)    Quality of Family Relationships  helpful;involved;supportive    Able to Return to Prior Arrangements  -- to be determined       Resource/Environmental Concerns    Resource/Environmental Concerns  none    Transportation Concerns  car, none       Transition Planning    Patient/Family Anticipates Transition to  home with family    Patient/Family Anticipated Services at Transition  home health care;rehabilitation services    Transportation Anticipated  family or friend will provide       Discharge Needs Assessment    Readmission Within the Last 30 Days  no previous admission in last 30 days    Equipment Currently Used at Home  walker, standard;cane, straight does not use his walker or canr per daughter    Concerns to be Addressed  basic needs;discharge planning    Anticipated Changes Related to Illness  other (see comments) inability to return home    Outpatient/Agency/Support Group Needs  skilled nursing facility;homecare agency SNF vs HH    Current Discharge Risk  dependent with mobility/activities of daily living        Discharge Plan     Row Name 21 1140       Plan    Plan  Home with HH vs SNF    Patient/Family in Agreement with Plan  yes    Plan Comments  Chart reviewed, I met with Mr Bosch and daughter at bedside ( Yoly Bosch). She has been staying with him since  in his home after his wife . She assist with all ADLs, he is able to shower and dress himself. She states he has access  to a std walker and cane but does not use them. He has had frequent falls at home. PT has recommended SNF for skilled rehab. he is very reluctant to consider this. Case mg will follow and assist with final d/c plan        Continued Care and Services - Admitted Since 7/3/2021    Coordination has not been started for this encounter.         Demographic Summary     Row Name 07/04/21 1133       General Information    Admission Type  inpatient    Arrived From  home    Referral Source  physician    Reason for Consult  discharge planning    Preferred Language  English    General Information Comments  PCP- Marcin Ferguson       Contact Information    Permission Granted to Share Info With  ;family/designee    Contact Information Comments  Yoly Bosch (daughter & POA) [phone]        Functional Status     Row Name 07/04/21 1133       Functional Status    Usual Activity Tolerance  moderate    Current Activity Tolerance  -- frequent falls       Functional Status, IADL    Medications  assistive person    Meal Preparation  assistive person    Housekeeping  assistive person    Laundry  assistive person    Shopping  assistive person       Mental Status    General Appearance WDL  WDL       Mental Status Summary    Recent Changes in Mental Status/Cognitive Functioning  unable to assess       Employment/    Employment Status  retired    Employment/ Comments  insurance- Medicare and [employer]        Psychosocial    No documentation.       Abuse/Neglect    No documentation.       Legal    No documentation.       Substance Abuse    No documentation.       Patient Forms    No documentation.           Sonja C Kellerman, RN

## 2021-07-04 NOTE — PLAN OF CARE
Goal Outcome Evaluation:  Plan of Care Reviewed With: patient        Progress: no change  Outcome Summary: PT eval completed.  Patient presents w/ decreased cognition/safety awareness, generalized weakness, impaired balance, gait instability/ high fall risk, and functional decline.  He completed bed mobility w/ mod A, STS transfers w/ RW and mod A, and ambulated 6ft w/ RW and mod A.  Skilled IPPT indicated to improve pt's safety and independence w/ functional mobility.  Recommend SNF rehab at D/C for best outcome.

## 2021-07-04 NOTE — PROGRESS NOTES
[facility]  PROGRESS NOTE    Patient Name: Domitila Bosch  : 1940  MRN: [id number]    Date of Admission: 7/3/2021  Primary Care Physician: Marcin Ferguson MD    Subjective   Subjective     CC: f/u falls    HPI: Up in bed. Says he is doing fine. Asking where his daughter is because he wants a diet coke.    ROS:  Gen- No fevers, chills  CV- No chest pain, palpitations  Resp- No cough, dyspnea  GI- No N/V/D, abd pain    Objective   Objective     Vital Signs:   Temp:  [97.6 °F (36.4 °C)-98.4 °F (36.9 °C)] 98 °F (36.7 °C)  Heart Rate:  [61-80] 75  Resp:  [12-16] 16  BP: (124-179)/() 149/96        Physical Exam:  Constitutional: No acute distress, awake, alert  HENT: NCAT, mucous membranes moist  Respiratory: Clear to auscultation bilaterally, respiratory effort normal   Cardiovascular: RRR, no murmurs, rubs, or gallops  Gastrointestinal: Positive bowel sounds, soft, nontender, nondistended  Musculoskeletal: No bilateral ankle edema  Psychiatric: Appropriate affect, cooperative  Neurologic: Disoriented but mostly appropriate in conversation, strength symmetric in all extremities, Cranial Nerves grossly intact to confrontation, speech clear  Skin: No rashes    Results Reviewed:  Results from last 7 days   Lab Units 21  0716 21  1744   WBC 10*3/mm3 8.52 9.19   HEMOGLOBIN g/dL 14.8 14.0   HEMATOCRIT % 45.8 41.5   PLATELETS 10*3/mm3 204 181     Results from last 7 days   Lab Units 21  0716 21  1744   SODIUM mmol/L 140 139   POTASSIUM mmol/L 3.7 3.8   CHLORIDE mmol/L 101 103   CO2 mmol/L 22.0 27.0   BUN mg/dL 13 15   CREATININE mg/dL 1.25 1.34*   GLUCOSE mg/dL 126* 128*   CALCIUM mg/dL 9.3 9.6   ALT (SGPT) U/L  --  14   AST (SGOT) U/L  --  21   TROPONIN T ng/mL  --  <0.010     Estimated Creatinine Clearance: 52.1 mL/min (by C-G formula based on SCr of 1.25 mg/dL).    Microbiology Results Abnormal     None        CXR personally reviewed  showing no acute disease. Agree with interpretation.  Imaging Results (Last 24 Hours)     Procedure Component Value Units Date/Time    CT Cervical Spine Without Contrast [[id number]] Collected: 07/03/21 1633     Updated: 07/03/21 1945    Narrative:      EXAMINATION: CT CERVICAL SPINE WO CONTRAST - 07/03/2021      INDICATION: Fall, neck pain.     TECHNIQUE: Multiple axial CT imaging is obtained of the cervical spine  without the administration of intravenous contrast. Coronal and sagittal  reformatted images were submitted to further facilitate diagnostic  accuracy and treatment planning.     The radiation dose reduction device was turned on for each scan per the  ALARA (As Low as Reasonably Achievable) protocol.     COMPARISON: None.     FINDINGS: Extensive atherosclerotic disease seen within the carotid  bifurcations bilaterally, left greater than right. Multilevel  degenerative changes seen throughout the cervical spine predominantly  within the posterior facets. There is disc space narrowing at the C5/C6  and C6/C7 levels. There is straightening of the normal lordosis of the  cervical spine. Anterolisthesis identified of C4 on C5 with disc space  narrowing of C5 and C6 and C6 and C7. Anterior spurring and posterior  osteophyte formation present. Odontoid is intact. The lateral masses are  well aligned.       Impression:      Mild anterolisthesis of C4 on C5 with extensive degenerative  changes seen of C5-C7. There is no fracture or dislocation.     DICTATED:   07/03/2021  EDITED/ls :   07/03/2021          CT Head Without Contrast [[id number]] Collected: 07/03/21 1632     Updated: 07/03/21 1941    Narrative:      EXAMINATION: CT HEAD WO CONTRAST - 07/03/2021     INDICATION: Fall, head trauma     TECHNIQUE: Multiple axial CT imaging is obtained of the head from skull  base to skull vertex without the administration of intravenous contrast.     The radiation dose reduction device was turned on for each scan per  the  ALARA (As Low as Reasonably Achievable) protocol.     COMPARISON: None.     FINDINGS: Mild atrophy of the brain. Some low-density area seen in the  periventricular and subcortical white matter suggesting chronic small  vessel ischemic change. No hemorrhage or hydrocephalus. No mass, mass  effect, or midline shift. No abnormal extra-axial fluid collections  identified. The bony structures reveal no evidence of osseous  abnormality. The visualized paranasal sinuses are clear. The mastoid air  cells are patent.       Impression:      Atrophy and chronic changes seen within the brain with no  acute intracranial abnormality.     DICTATED:   07/03/2021  EDITED/ls :   07/03/2021          XR Shoulder 2+ View Right [[id number]] Collected: 07/03/21 1829     Updated: 07/03/21 1920    Narrative:         EXAMINATION: XR SHOULDER 2 VIEWS RIGHT - 07/03/2021      INDICATION: Falls.     COMPARISON: None.     FINDINGS: 2 views of the right shoulder reveal no evidence of fracture  or dislocation. The cortex is intact. Joint spaces are preserved.  Minimal degenerative changes seen of the acromioclavicular joint.  Spurring of the humeral head.          Impression:      Minimal degenerative changes seen of the shoulder with no  evidence of fracture or dislocation.     DICTATED:   07/03/2021  EDITED/ls :   07/03/2021          XR Sacrum & Coccyx [[id number]] Collected: 07/03/21 1824     Updated: 07/03/21 1919    Narrative:         EXAMINATION: XR SACRUM AND COCCYX - 07/03/2021     INDICATION: Fall.     COMPARISON: None.     FINDINGS: 3 views of the sacrum and coccyx reveal degenerative changes  seen of the sacroiliac joints bilaterally. There is no fracture or  dislocation. Cortex is intact. Overlying stool does obscure some image  detail.        Impression:      There is no evidence of acute bony abnormality. Degenerative  changes seen of the sacroiliac joints bilaterally and lower lumbar  spine.     DICTATED:   07/03/2021  EDITED/ls :    07/03/2021          XR Chest 1 View [[id number]] Collected: 07/03/21 1823     Updated: 07/03/21 1918    Narrative:         EXAMINATION: XR CHEST 1 VW - 07/03/2021     INDICATION: Falls, weakness.     COMPARISON: 07/31/2020     FINDINGS: Portable chest reveals cardiac and mediastinal silhouettes  within normal limits. Lung fields are grossly clear with chronic changes  seen the lung fields bilaterally. No focal parenchymal opacification is  present. No pleural effusion or pneumothorax. The bony structures reveal  degenerative changes seen within the spine. The pulmonary vascularity is  within normal limits.       Impression:      Chronic changes within the lung fields bilaterally with no  evidence of acute parenchymal disease. Lung volumes are low.     DICTATED:   07/03/2021  EDITED/ls :   07/03/2021                Results for orders placed during the hospital encounter of 07/28/20    Adult Transesophageal Echo (SUMMER) W/ Cont if Necessary Per Protocol    Interpretation Summary  · Estimated EF = 65%.  · Left ventricular systolic function is normal.  · Left atrial cavity size is mild-to-moderately dilated.  · There is mild calcification of the aortic valve mainly affecting the non and right coronary cusp(s).  · Saline test results are negative.  · The aortic valve exhibits moderate sclerosis.  · There is no evidence of pericardial effusion.  · There is moderate (grade 2) protruding plaque in the descending aorta present.  · No valvular vegetations demonstrated.      I have reviewed the medications:  Scheduled Meds:allopurinol, 300 mg, Oral, Daily  aspirin, 81 mg, Oral, Daily  atorvastatin, 80 mg, Oral, Nightly  busPIRone, 10 mg, Oral, Q12H  clopidogrel, 75 mg, Oral, Daily  enoxaparin, 30 mg, Subcutaneous, Q24H  finasteride, 5 mg, Oral, Daily  hydrocortisone sodium succinate, 50 mg, Intravenous, Q8H  Levothyroxine Sodium, 100 mcg, Intravenous, Daily  liothyronine, 25 mcg, Oral, Daily  memantine, 5 mg, Oral,  Daily  pantoprazole, 40 mg, Oral, QAM  polyethylene glycol, 17 g, Oral, Daily  sertraline, 50 mg, Oral, Daily      Continuous Infusions:   PRN Meds:.•  acetaminophen  •  clonazePAM  •  [COMPLETED] Insert peripheral IV **AND** sodium chloride    Assessment/Plan   Assessment & Plan     Active Hospital Problems    Diagnosis  POA   • Multiple falls [R29.6]  Not Applicable   • Severe hypothyroidism [E03.8]  Yes   • History of ischemic stroke [Z86.73]  Not Applicable   • Essential hypertension [I10]  Yes   • Lumbar radiculopathy [M54.16]  Yes   • Carotid stenosis [I65.29]  Yes   • Asthma [J45.909]  Yes   • Anxiety disorder [F41.9]  Yes      Resolved Hospital Problems   No resolved problems to display.        Brief Hospital Course to date:  Domitila Bosch is a 80 y.o. male with HO HTN, anxiety, HLP, hypothyroidism, GERD, dementia and stroke who presented to the ED for evaluation of multiple falls including one that hurt his neck.    Frequent Falls  -Suspect multifactorial due to severe hypothyroidism and poor awareness due to dementia.   -Fall precautions.   -PT/OT.    Severe hypothyroidism  -Continue IV levothyroixine, cytomel for 3 days, one dose of hydrocortisone. My partner discussed the importance of taking synthroid on an empty stomach with the daughter.     Dementia  Depression/Anxiety  -Namenda  -Depression uncontrolled on zoloft and buspar     Mod carotid stenosis  Hyperlipidemia  -Carotid US pending, last US w/ moderate bilateral disease. Doubt playing significant role in above.  -Cont plavix, lipitor (check CK)  -PT/OT consult     Hypertension  -Controlled, continue to hold diuretics and cardura     GOut  OA  BPH  -cont chronic meds     Weight loss  -Nutrition COnsult    Have attempted to update daughter Yoly @ 1124 w/out success. Will try at later date.     DVT prophylaxis: lovenox     CODE STATUS:   Code Status and Medical Interventions:   Ordered at: 07/03/21 1813     Code Status:    No CPR     Medical  Interventions (Level of Support Prior to Arrest):    Full       Mildred Chang II, DO  07/04/21

## 2021-07-04 NOTE — PLAN OF CARE
Goal Outcome Evaluation:  Plan of Care Reviewed With: patient           Outcome Summary: Ambulated to chair with assist x1. Voiding well. Confused at times has been asking when wife is coming to visit, however she recently passed away. Daughter was at bedside today. Minimal complaints of pain today. Will continue to monitor.

## 2021-07-05 VITALS
BODY MASS INDEX: 25.05 KG/M2 | TEMPERATURE: 98.2 F | WEIGHT: 175 LBS | HEIGHT: 70 IN | SYSTOLIC BLOOD PRESSURE: 133 MMHG | DIASTOLIC BLOOD PRESSURE: 82 MMHG | RESPIRATION RATE: 16 BRPM | HEART RATE: 65 BPM | OXYGEN SATURATION: 96 %

## 2021-07-05 PROBLEM — R29.6 MULTIPLE FALLS: Status: RESOLVED | Noted: 2021-07-03 | Resolved: 2021-07-05

## 2021-07-05 PROCEDURE — 97165 OT EVAL LOW COMPLEX 30 MIN: CPT | Performed by: OCCUPATIONAL THERAPIST

## 2021-07-05 PROCEDURE — 97116 GAIT TRAINING THERAPY: CPT

## 2021-07-05 PROCEDURE — 99239 HOSP IP/OBS DSCHRG MGMT >30: CPT | Performed by: INTERNAL MEDICINE

## 2021-07-05 PROCEDURE — 25010000002 ENOXAPARIN PER 10 MG: Performed by: INTERNAL MEDICINE

## 2021-07-05 PROCEDURE — 97110 THERAPEUTIC EXERCISES: CPT

## 2021-07-05 RX ORDER — LEVOTHYROXINE SODIUM 0.15 MG/1
150 TABLET ORAL DAILY
Qty: 30 TABLET | Refills: 0 | Status: SHIPPED | OUTPATIENT
Start: 2021-07-05

## 2021-07-05 RX ADMIN — PANTOPRAZOLE SODIUM 40 MG: 40 TABLET, DELAYED RELEASE ORAL at 06:13

## 2021-07-05 RX ADMIN — SERTRALINE HYDROCHLORIDE 50 MG: 50 TABLET ORAL at 09:11

## 2021-07-05 RX ADMIN — ALLOPURINOL 300 MG: 300 TABLET ORAL at 09:11

## 2021-07-05 RX ADMIN — ASPIRIN 81 MG: 81 TABLET, COATED ORAL at 09:11

## 2021-07-05 RX ADMIN — MEMANTINE 5 MG: 10 TABLET ORAL at 09:11

## 2021-07-05 RX ADMIN — SODIUM CHLORIDE, PRESERVATIVE FREE 10 ML: 5 INJECTION INTRAVENOUS at 09:11

## 2021-07-05 RX ADMIN — BUSPIRONE HYDROCHLORIDE 10 MG: 10 TABLET ORAL at 09:11

## 2021-07-05 RX ADMIN — POLYETHYLENE GLYCOL 3350 17 G: 17 POWDER, FOR SOLUTION ORAL at 09:10

## 2021-07-05 RX ADMIN — ACETAMINOPHEN 650 MG: 325 TABLET, FILM COATED ORAL at 13:38

## 2021-07-05 RX ADMIN — LIOTHYRONINE SODIUM 25 MCG: 25 TABLET ORAL at 09:11

## 2021-07-05 RX ADMIN — ENOXAPARIN SODIUM 30 MG: 30 INJECTION, SOLUTION INTRAVENOUS; SUBCUTANEOUS at 09:11

## 2021-07-05 RX ADMIN — CLOPIDOGREL BISULFATE 75 MG: 75 TABLET ORAL at 09:11

## 2021-07-05 RX ADMIN — FINASTERIDE 5 MG: 5 TABLET, FILM COATED ORAL at 09:12

## 2021-07-05 NOTE — PLAN OF CARE
Problem: Adult Inpatient Plan of Care  Goal: Plan of Care Review  Flowsheets (Taken 7/5/2021 1992)  Progress: improving  Plan of Care Reviewed With: patient  Outcome Summary: Pt increased ambulation distance to 50 feet with RW and min Ax2 for balance and AD management. Gait limited by fatigue and weakness. Bed mobility and STS performed with min Ax2. Reviewed and progressed HE. Will continue to progress strength and mobility as able.   Goal Outcome Evaluation:  Plan of Care Reviewed With: patient        Progress: improving  Outcome Summary: Pt increased ambulation distance to 50 feet with RW and min Ax2 for balance and AD management. Gait limited by fatigue and weakness. Bed mobility and STS performed with min Ax2. Reviewed and progressed HE. Will continue to progress strength and mobility as able.

## 2021-07-05 NOTE — THERAPY EVALUATION
Patient Name: Domitila Bosch  : 1940    MRN: [id number]                              Today's Date: 2021       Admit Date: 7/3/2021    Visit Dx: No diagnosis found.  Patient Active Problem List   Diagnosis   • Diverticulosis of large intestine with hemorrhage   • Umbilical hernia   • S/P hernia repair   • Hypertension   • Dyslipidemia   • Carotid stenosis   • Gout   • GERD (gastroesophageal reflux disease)   • Hypothyroidism   • Neuropathy   • Malignant melanoma (CMS/HCC)   • Asthma   • Anxiety disorder   • Diverticulosis   • Muscle pain   • Lumbar radiculopathy   • Kidney stone   • Deviated nasal septum   • Chronic laryngitis   • Acute CVA (cerebrovascular accident) (CMS/HCC)   • Essential hypertension   • Allergy to Betadine   • DANIS (acute kidney injury) (CMS/HCC)   • Multiple falls   • Severe hypothyroidism   • History of ischemic stroke     Past Medical History:   Diagnosis Date   • Anxiety disorder 2017   • Asthma 2017   • Basal cell carcinoma in situ of skin 2019    right leg    • Carotid stenosis 2017   • Diaphoresis    • Diastolic dysfunction    • Diverticulitis    • Dyslipidemia 2017   • GERD (gastroesophageal reflux disease) 2017   • Gout 2017   • Herpes zoster     Herpes zoster, [id number], right lower extremity.    • Hyperlipidemia    • Hypertension 2017   • Hypothyroidism 2017   • LVH (left ventricular hypertrophy)    • Malignant melanoma (CMS/HCC) 2017   • Melanoma (CMS/HCC)    • Mitral regurgitation    • Nephrolithiasis    • Post herpetic neuralgia 2017   • TIA (transient ischemic attack)     TIA, 2012, with nonobstructive carotid stenosis, dyslipidemia and hypertension     Past Surgical History:   Procedure Laterality Date   • ABDOMINAL SURGERY     • APPENDECTOMY     • COLON RESECTION LEFT     • COLON RESECTION LEFT     • CYST REMOVAL      left side of neck.    • HEEL SPUR SURGERY     • HERNIA REPAIR      hiatal  hernia    • NISSEN FUNDOPLICATION  1984   • OTHER SURGICAL HISTORY  2010    Malignant melanoma, status post resection      General Information     Row Name 07/05/21 1052          OT Time and Intention    Document Type  evaluation  -AR     Mode of Treatment  individual therapy;occupational therapy  -AR     Row Name 07/05/21 1052          General Information    Patient Profile Reviewed  yes  -AR     Prior Level of Function  independent:;all household mobility;gait;transfer;ADL's multiple recent falls at home  -AR     Existing Precautions/Restrictions  fall;other (see comments) confusion  -AR     Row Name 07/05/21 1052          Living Environment    Lives With  child(diana), adult  -AR     Row Name 07/05/21 1052          Home Main Entrance    Number of Stairs, Main Entrance  one  -AR     Stair Railings, Main Entrance  none  -AR     Row Name 07/05/21 1052          Stairs Within Home, Primary    Number of Stairs, Within Home, Primary  none  -AR     Stairs Comment, Within Home, Primary  Pt has tub shower with standard shower seat and grab bars  -AR     Row Name 07/05/21 1052          Cognition    Orientation Status (Cognition)  oriented to;person;place;verbal cues/prompts needed for orientation;time;disoriented to;situation  -AR     Row Name 07/05/21 1052          Safety Issues, Functional Mobility    Safety Issues Affecting Function (Mobility)  awareness of need for assistance;insight into deficits/self-awareness;judgment;positioning of assistive device;problem-solving;safety precaution awareness;safety precautions follow-through/compliance;sequencing abilities  -AR     Impairments Affecting Function (Mobility)  balance;cognition;endurance/activity tolerance;strength;pain;range of motion (ROM)  -AR     Cognitive Impairments, Mobility Safety/Performance  attention;awareness, need for assistance;insight into deficits/self-awareness;judgment;problem-solving/reasoning;safety precaution awareness;safety precaution  follow-through;sequencing abilities  -AR     Row Name 07/05/21 1052          Relationship/Environment    Name(s) of Who Lives With Patient  Pt was  in 2/21, daughter has been staying with him since that time at his house, he did not recall this  -AR       User Key  (r) = Recorded By, (t) = Taken By, (c) = Cosigned By    Initials Name Provider Type    Muna Owusu OT Occupational Therapist          Mobility/ADL's     Row Name 07/05/21 1054          Transfers    Transfers  sit-stand transfer  -AR     Comment (Transfers)  Cues for hand placement, severe retrograde LOB instance noted. Poor control during stand-to-sit transition  -AR     Sit-Stand [place] (Transfers)  verbal cues;minimum assist (75% patient effort);2 person assist  -AR     Row Name 07/05/21 1054          Sit-Stand Transfer    Assistive Device (Sit-Stand Transfers)  walker, front-wheeled  -AR     Row Name 07/05/21 1054          Functional Mobility    Functional Mobility- Ind. Level  moderate assist (50% patient effort);2 person assist required  -AR     Functional Mobility- Device  rolling walker  -AR     Functional Mobility-Distance (Feet)  4  -AR     Functional Mobility- Safety Issues  loses balance backward  -AR     Row Name 07/05/21 1054          Activities of Daily Living    BADL Assessment/Intervention  lower body dressing;feeding  -AR     Row Name 07/05/21 1054          Lower Body Dressing Assessment/Training    [place] Level (Lower Body Dressing)  don;socks;maximum assist (25% patient effort)  -AR     Position (Lower Body Dressing)  supported sitting  -AR     Row Name 07/05/21 1054          Self-Feeding Assessment/Training    [place] Level (Feeding)  feeding skills;supervision  -AR     Position (Self-Feeding)  supported sitting  -AR       User Key  (r) = Recorded By, (t) = Taken By, (c) = Cosigned By    Initials Name Provider Type    Muna Owusu OT Occupational Therapist        Obj/Interventions     Row  Name 07/05/21 1056          Sensory Assessment (Somatosensory)    Sensory Assessment (Somatosensory)  UE sensation intact  -AR     Row Name 07/05/21 1056          Vision Assessment/Intervention    Visual Impairment/Limitations  WFL  -AR     Row Name 07/05/21 1056          Range of Motion Comprehensive    General Range of Motion  bilateral upper extremity ROM WNL  -AR     Comment, General Range of Motion  Pt c/o pain with R shoulder AROM, however ROM WFL  -AR     Row Name 07/05/21 1056          Strength Comprehensive (MMT)    Comment, General Manual Muscle Testing (MMT) Assessment  LUE 5/5, deferred RUE MMT d/t c/o shoulder pain and daughter reports he fell on R shoulder recently  -AR     Row Name 07/05/21 1056          Balance    Balance Assessment  sitting static balance;sitting dynamic balance;standing static balance;standing dynamic balance  -AR     Static Sitting Balance  WFL;supported;sitting in chair  -AR     Dynamic Sitting Balance  WFL;supported;sitting in chair  -AR     Static Standing Balance  supported;standing;mild impairment  -AR     Dynamic Standing Balance  moderate impairment;supported;standing  -AR       User Key  (r) = Recorded By, (t) = Taken By, (c) = Cosigned By    Initials Name Provider Type    AR Muna Hernández, OT Occupational Therapist        Goals/Plan     Row Name 07/05/21 1104          Transfer Goal 1 (OT)    Activity/Assistive Device (Transfer Goal 1, OT)  sit-to-stand/stand-to-sit;commode, bedside without drop arms;walker, rolling  -AR     [place] Level/Cues Needed (Transfer Goal 1, OT)  minimum assist (75% or more patient effort);verbal cues required  -AR     Time Frame (Transfer Goal 1, OT)  long term goal (LTG);1 week  -AR     Progress/Outcome (Transfer Goal 1, OT)  goal ongoing  -AR     Row Name 07/05/21 1104          Dressing Goal 1 (OT)    Activity/Device (Dressing Goal 1, OT)  lower body dressing  -AR     [place]/Cues Needed (Dressing Goal 1, OT)  moderate assist  (50-74% patient effort);verbal cues required  -AR     Time Frame (Dressing Goal 1, OT)  long term goal (LTG);1 week  -AR     Progress/Outcome (Dressing Goal 1, OT)  goal ongoing  -AR     Row Name 07/05/21 1104          Toileting Goal 1 (OT)    Activity/Device (Toileting Goal 1, OT)  toileting skills, all;grab bar/safety frame;raised toilet seat  -AR     [place] Level/Cues Needed (Toileting Goal 1, OT)  verbal cues required;moderate assist (50-74% patient effort)  -AR     Time Frame (Toileting Goal 1, OT)  long term goal (LTG);1 week  -AR     Progress/Outcome (Toileting Goal 1, OT)  goal ongoing  -AR     Row Name 07/05/21 1104          Therapy Assessment/Plan (OT)    Planned Therapy Interventions (OT)  adaptive equipment training;BADL retraining;functional balance retraining;IADL retraining;occupation/activity based interventions;patient/caregiver education/training;ROM/therapeutic exercise;transfer/mobility retraining;strengthening exercise  -AR       User Key  (r) = Recorded By, (t) = Taken By, (c) = Cosigned By    Initials Name Provider Type    AR Muna Hernández, OT Occupational Therapist        Clinical Impression     Row Name 07/05/21 1058          Pain Assessment    Additional Documentation  Pain Scale: FACES Pre/Post-Treatment (Group)  -AR     Row Name 07/05/21 1058          Pain Scale: Numbers Pre/Post-Treatment    Pain Intervention(s)  Repositioned;Ambulation/increased activity  -AR     Row Name 07/05/21 1058          Pain Scale: FACES Pre/Post-Treatment    Pain: FACES Scale, Pretreatment  0-->no hurt  -AR     Posttreatment Pain Rating  2-->hurts little bit  -AR     Pain Location - Side  Right  -AR     Pain Location - Orientation  anterior  -AR     Pain Location  shoulder  -AR     Row Name 07/05/21 1058          Plan of Care Review    Plan of Care Reviewed With  patient;daughter  -AR     Outcome Summary  Pt alert, Ox3 and confusion noted. Pt poor historian, daughter at bedside to assist. He  completes self-feeding with supervision, LB dressing with max assist and transfer training with min assist x2. Pt demo significant retrograde LOB noted brief ambulation in-room. Daughter at bedside reports multiple recent falls. Recommend SNF-level rehab in preparation for safe DC home and discussed with pt and daughter. They desire DC home with resumption of 24/7 assist, daughter interested in hiring private caregiver as well as getting HHOT. If DC home, recommend BSC, bath bench (issued handouts and reviewed places to obtain), RW, OT and 24/7 assist.  -AR     Row Name 07/05/21 1058          Therapy Assessment/Plan (OT)    Rehab Potential (OT)  good, to achieve stated therapy goals  -AR     Criteria for Skilled Therapeutic Interventions Met (OT)  yes  -AR     Therapy Frequency (OT)  daily  -AR     Row Name 07/05/21 1058          Therapy Plan Review/Discharge Plan (OT)    Anticipated Discharge Disposition (OT)  home with 24/7 care;home with home health per daughter requst, however OT recommends SNF  -AR     Row Name 07/05/21 1058          Vital Signs    Pre SpO2 (%)  95  -AR     O2 Delivery Pre Treatment  supplemental O2  -AR     Intra SpO2 (%)  94  -AR     O2 Delivery Intra Treatment  supplemental O2  -AR     Post SpO2 (%)  94  -AR     O2 Delivery Post Treatment  supplemental O2  -AR     Pre Patient Position  Sitting  -AR     Intra Patient Position  Standing  -AR     Post Patient Position  Sitting  -AR     Row Name 07/05/21 1058          Positioning and Restraints    Pre-Treatment Position  sitting in chair/recliner  -AR     Post Treatment Position  chair  -AR     In Chair  reclined;call light within reach;encouraged to call for assist;exit alarm on;with family/caregiver;legs elevated;compression device  -AR       User Key  (r) = Recorded By, (t) = Taken By, (c) = Cosigned By    Initials Name Provider Type    Muna Owusu, OT Occupational Therapist        Outcome Measures     Row Name 07/05/21 4668           How much help from another is currently needed...    Putting on and taking off regular lower body clothing?  2  -AR     Bathing (including washing, rinsing, and drying)  2  -AR     Toileting (which includes using toilet bed pan or urinal)  2  -AR     Putting on and taking off regular upper body clothing  3  -AR     Taking care of personal grooming (such as brushing teeth)  3  -AR     Eating meals  3  -AR     AM-PAC 6 Clicks Score (OT)  15  -AR     Row Name 07/05/21 0920          How much help from another person do you currently need...    Turning from your back to your side while in flat bed without using bedrails?  3  -RUT     Moving from lying on back to sitting on the side of a flat bed without bedrails?  2  -RUT     Moving to and from a bed to a chair (including a wheelchair)?  2  -RUT     Standing up from a chair using your arms (e.g., wheelchair, bedside chair)?  2  -RUT     Climbing 3-5 steps with a railing?  2  -RUT     To walk in hospital room?  2  -RUT     AM-PAC 6 Clicks Score (PT)  13  -RUT     Row Name 07/05/21 1106 07/05/21 0920       Functional Assessment    Outcome Measure Options  AM-PAC 6 Clicks Daily Activity (OT)  -AR  AM-PAC 6 Clicks Basic Mobility (PT)  -RUT      User Key  (r) = Recorded By, (t) = Taken By, (c) = Cosigned By    Initials Name Provider Type    Muna Owusu, OT Occupational Therapist    Mata Lau, PT Physical Therapist        Occupational Therapy Education                 Title: PT OT SLP Therapies (In Progress)     Topic: Occupational Therapy (Done)     Point: ADL training (Done)     Description:   Instruct learner(s) on proper safety adaptation and remediation techniques during self care or transfers.   Instruct in proper use of assistive devices.              Learning Progress Summary           Patient Deep, COY,SANIA,YOUSUF,H, VU,NR by AR at 7/5/2021 1106    Acceptance, E, NR by LG at 7/3/2021 2133    Comment: Call dont fall video   Family COY Adame,SANIA,YOUSUF,H, VU,NR by AR at  7/5/2021 1106                   Point: Home exercise program (Done)     Description:   Instruct learner(s) on appropriate technique for monitoring, assisting and/or progressing therapeutic exercises/activities.              Learning Progress Summary           Patient Eager, E,TB,D,H, VU,NR by AR at 7/5/2021 1106    Acceptance, E, NR by LG at 7/3/2021 2133    Comment: Call dont fall video   Family Eager, E,TB,D,H, VU,NR by AR at 7/5/2021 1106                   Point: Precautions (Done)     Description:   Instruct learner(s) on prescribed precautions during self-care and functional transfers.              Learning Progress Summary           Patient Eager, E,TB,D,H, VU,NR by AR at 7/5/2021 1106    Acceptance, E, NR by LG at 7/3/2021 2133    Comment: Call dont fall video   Family Eager, E,TB,D,H, VU,NR by AR at 7/5/2021 1106                   Point: Body mechanics (Done)     Description:   Instruct learner(s) on proper positioning and spine alignment during self-care, functional mobility activities and/or exercises.              Learning Progress Summary           Patient Eager, E,TB,D,H, VU,NR by AR at 7/5/2021 1106    Acceptance, E, NR by LG at 7/3/2021 2133    Comment: Call dont fall video   Family Eager, E,TB,D,H, VU,NR by AR at 7/5/2021 1106                               User Key     Initials Effective Dates Name Provider Type Discipline    AR 06/16/21 -  Muna Hernández OT Occupational Therapist OT     11/16/18 -  Jaron Flores RN Registered Nurse Nurse              OT Recommendation and Plan  Planned Therapy Interventions (OT): adaptive equipment training, BADL retraining, functional balance retraining, IADL retraining, occupation/activity based interventions, patient/caregiver education/training, ROM/therapeutic exercise, transfer/mobility retraining, strengthening exercise  Therapy Frequency (OT): daily  Plan of Care Review  Plan of Care Reviewed With: patient, daughter  Outcome Summary: Pt  alert, Ox3 and confusion noted. Pt poor historian, daughter at bedside to assist. He completes self-feeding with supervision, LB dressing with max assist and transfer training with min assist x2. Pt demo significant retrograde LOB noted brief ambulation in-room. Daughter at bedside reports multiple recent falls. Recommend SNF-level rehab in preparation for safe DC home and discussed with pt and daughter. They desire DC home with resumption of 24/7 assist, daughter interested in hiring private caregiver as well as getting HHOT. If DC home, recommend BSC, bath bench (issued handouts and reviewed places to obtain), RW, OT and 24/7 assist.     Time Calculation:   Time Calculation- OT     Row Name 07/05/21 1106 07/05/21 0920          Time Calculation- OT    OT Start Time  1106  -AR  --     OT Received On  07/05/21  -AR  --     OT Goal Re-Cert Due Date  07/15/21  -AR  --        Timed Charges    59716 - Gait Training Minutes   --  14  -RUT        Untimed Charges    OT Eval/Re-eval Minutes  55  -AR  --        Total Minutes    Timed Charges Total Minutes  --  14  -RUT     Untimed Charges Total Minutes  55  -AR  --      Total Minutes  55  -AR  14  -RUT       User Key  (r) = Recorded By, (t) = Taken By, (c) = Cosigned By    Initials Name Provider Type    Muna Owusu OT Occupational Therapist    Mata Lau, PT Physical Therapist        Therapy Charges for Today     Code Description Service Date Service Provider Modifiers Qty    [id number] HC OT EVAL LOW COMPLEXITY 4 7/5/2021 Muna Hernández OT GO 1    [id number]  OT THER SUPP EA 15 MIN 7/5/2021 Muna Hernández OT GO 3               Muna Hernández OT  7/5/2021

## 2021-07-05 NOTE — PLAN OF CARE
Goal Outcome Evaluation:               Pt is alert and confused . VSS. Slept throughout the night. Pain controled with po tylenol. 4l of oxygen @ [place].

## 2021-07-05 NOTE — THERAPY TREATMENT NOTE
Patient Name: Domitila Bosch  : 1940    MRN: [id number]                              Today's Date: 2021       Admit Date: 7/3/2021    Visit Dx: No diagnosis found.  Patient Active Problem List   Diagnosis   • Diverticulosis of large intestine with hemorrhage   • Umbilical hernia   • S/P hernia repair   • Hypertension   • Dyslipidemia   • Carotid stenosis   • Gout   • GERD (gastroesophageal reflux disease)   • Hypothyroidism   • Neuropathy   • Malignant melanoma (CMS/HCC)   • Asthma   • Anxiety disorder   • Diverticulosis   • Muscle pain   • Lumbar radiculopathy   • Kidney stone   • Deviated nasal septum   • Chronic laryngitis   • Acute CVA (cerebrovascular accident) (CMS/HCC)   • Essential hypertension   • Allergy to Betadine   • DANIS (acute kidney injury) (CMS/HCC)   • Multiple falls   • Severe hypothyroidism   • History of ischemic stroke     Past Medical History:   Diagnosis Date   • Anxiety disorder 2017   • Asthma 2017   • Basal cell carcinoma in situ of skin 2019    right leg    • Carotid stenosis 2017   • Diaphoresis    • Diastolic dysfunction    • Diverticulitis    • Dyslipidemia 2017   • GERD (gastroesophageal reflux disease) 2017   • Gout 2017   • Herpes zoster     Herpes zoster, [id number], right lower extremity.    • Hyperlipidemia    • Hypertension 2017   • Hypothyroidism 2017   • LVH (left ventricular hypertrophy)    • Malignant melanoma (CMS/HCC) 2017   • Melanoma (CMS/HCC)    • Mitral regurgitation    • Nephrolithiasis    • Post herpetic neuralgia 2017   • TIA (transient ischemic attack)     TIA, 2012, with nonobstructive carotid stenosis, dyslipidemia and hypertension     Past Surgical History:   Procedure Laterality Date   • ABDOMINAL SURGERY     • APPENDECTOMY     • COLON RESECTION LEFT     • COLON RESECTION LEFT     • CYST REMOVAL      left side of neck.    • HEEL SPUR SURGERY     • HERNIA REPAIR      hiatal  hernia    • NISSEN FUNDOPLICATION  1984   • OTHER SURGICAL HISTORY  2010    Malignant melanoma, status post resection      General Information     Row Name 07/05/21 0920          Physical Therapy Time and Intention    Document Type  therapy note (daily note)  -     Mode of Treatment  individual therapy;physical therapy  -     Row Name 07/05/21 0920          General Information    Existing Precautions/Restrictions  fall  -     Row Name 07/05/21 0920          Cognition    Orientation Status (Cognition)  oriented to;person;place;disoriented to;time  -     Row Name 07/05/21 0920          Safety Issues, Functional Mobility    Safety Issues Affecting Function (Mobility)  ability to follow commands;awareness of need for assistance;insight into deficits/self-awareness;judgment;problem-solving;safety precaution awareness;safety precautions follow-through/compliance;sequencing abilities  -     Impairments Affecting Function (Mobility)  balance;cognition;endurance/activity tolerance;strength  -     Cognitive Impairments, Mobility Safety/Performance  attention;awareness, need for assistance;insight into deficits/self-awareness;judgment;safety precaution awareness;safety precaution follow-through;sequencing abilities  -       User Key  (r) = Recorded By, (t) = Taken By, (c) = Cosigned By    Initials Name Provider Type     Mata Herrera, PT Physical Therapist        Mobility     Row Name 07/05/21 0920          Bed Mobility    Bed Mobility  scooting/bridging;supine-sit  -     Scooting/Bridging [place] (Bed Mobility)  verbal cues;contact guard  -     Supine-Sit [place] (Bed Mobility)  verbal cues;minimum assist (75% patient effort)  -     Assistive Device (Bed Mobility)  bed rails;head of bed elevated  -     Comment (Bed Mobility)  Min A for LE management off of GRAYSON and trunk control into sitting  -     Row Name 07/05/21 0920          Transfers    Comment (Transfers)  Verbal cues for safe hand  placement during standing/sitting a  -RUT     Row Name 07/05/21 0920          Sit-Stand Transfer    Sit-Stand [place] (Transfers)  verbal cues;minimum assist (75% patient effort);2 person assist  -RUT     Assistive Device (Sit-Stand Transfers)  walker, front-wheeled  -RUT     Row Name 07/05/21 0920          Gait/Stairs (Locomotion)    [place] Level (Gait)  verbal cues;minimum assist (75% patient effort);2 person assist  -RUT     Assistive Device (Gait)  walker, front-wheeled  -RUT     Distance in Feet (Gait)  50 feet  -RUT     Deviations/Abnormal Patterns (Gait)  rody decreased;festinating/shuffling;gait speed decreased;stride length decreased;weight shifting decreased;bilateral deviations  -RUT     Bilateral Gait Deviations  forward flexed posture;heel strike decreased  -RUT     [place] Level (Stairs)  not tested  -RUT     Comment (Gait/Stairs)  Pt ambulated with step to pattern and decreased speed. Verbal cues for maintaining upright posture, body within walker, increase step length, and decrease WB through UEs. Gait limited by fatigue and weakness.  -       User Key  (r) = Recorded By, (t) = Taken By, (c) = Cosigned By    Initials Name Provider Type    RUT Mata Herrera, PT Physical Therapist        Obj/Interventions     Row Name 07/05/21 0920          Motor Skills    Therapeutic Exercise  knee;hip;ankle  -     Row Name 07/05/21 0920          Hip (Therapeutic Exercise)    Hip (Therapeutic Exercise)  isometric exercises;strengthening exercise  -     Hip Isometrics (Therapeutic Exercise)  gluteal sets;10 repetitions  -     Hip Strengthening (Therapeutic Exercise)  bilateral;aBduction;aDduction;10 repetitions  -     Row Name 07/05/21 0920          Knee (Therapeutic Exercise)    Knee (Therapeutic Exercise)  isometric exercises;strengthening exercise  -     Knee Isometrics (Therapeutic Exercise)  quad sets;5 repetitions  -     Knee Strengthening (Therapeutic Exercise)  bilateral;marching while  seated;SLR (straight leg raise);LAQ (long arc quad);heel slides  -[facility] Name 07/05/21 0920          Ankle (Therapeutic Exercise)    Ankle (Therapeutic Exercise)  AROM (active range of motion)  -     Ankle AROM (Therapeutic Exercise)  bilateral;dorsiflexion;plantarflexion;10 repetitions  -[facility] Name 07/05/21 0920          Balance    Balance Assessment  sitting static balance;standing static balance;standing dynamic balance  -     Static Sitting Balance  mild impairment;unsupported;sitting, edge of bed  -     Static Standing Balance  mild impairment;supported;standing  -     Dynamic Standing Balance  mild impairment;supported;standing  -       User Key  (r) = Recorded By, (t) = Taken By, (c) = Cosigned By    Initials Name Provider Type    Mata Lau, PT Physical Therapist        Goals/Plan    No documentation.       Clinical Impression     Row Name 07/05/21 0920          Pain Scale: Numbers Pre/Post-Treatment    Pretreatment Pain Rating  3/10  -RUT     Posttreatment Pain Rating  5/10  -RUT     Pain Location - Side  Right  -RUT     Pain Location - Orientation  generalized  -     Pain Location  shoulder  -     Pain Intervention(s)  Repositioned;Ambulation/increased activity  -[facility] Name 07/05/21 0920          Therapy Assessment/Plan (PT)    Rehab Potential (PT)  fair, will monitor progress closely  -     Criteria for Skilled Interventions Met (PT)  yes;meets criteria;skilled treatment is necessary  -[facility] Name 07/05/21 0920          Positioning and Restraints    Pre-Treatment Position  in bed  -     Post Treatment Position  chair  -RUT     In Chair  notified nsg;reclined;call light within reach;encouraged to call for assist;exit alarm on;legs elevated;compression device;waffle cushion  -       User Key  (r) = Recorded By, (t) = Taken By, (c) = Cosigned By    Initials Name Provider Type    Mata Lau, PT Physical Therapist        Outcome Measures     Row Name 07/05/21 0920           How much help from another person do you currently need...    Turning from your back to your side while in flat bed without using bedrails?  3  -RUT     Moving from lying on back to sitting on the side of a flat bed without bedrails?  2  -RUT     Moving to and from a bed to a chair (including a wheelchair)?  2  -RUT     Standing up from a chair using your arms (e.g., wheelchair, bedside chair)?  2  -RUT     Climbing 3-5 steps with a railing?  2  -RUT     To walk in hospital room?  2  -RUT     AM-PAC 6 Clicks Score (PT)  13  -RUT     Row Name 07/05/21 0920          Functional Assessment    Outcome Measure Options  AM-PAC 6 Clicks Basic Mobility (PT)  -RUT       User Key  (r) = Recorded By, (t) = Taken By, (c) = Cosigned By    Initials Name Provider Type    Mata Lau, PT Physical Therapist        Physical Therapy Education                 Title: PT OT SLP Therapies (In Progress)     Topic: Physical Therapy (In Progress)     Point: Mobility training (In Progress)     Learning Progress Summary           Patient Acceptance, D,E, NR by RUT at 7/5/2021 0920    Comment: Educated on safe sequencing with bed mobility, ambulatory transfers and gait. Reviewed HEP.    Acceptance, E,D, NR by MB at 7/4/2021 1414    Acceptance, E, NR by LG at 7/3/2021 2133    Comment: Call dont fall video                   Point: Home exercise program (In Progress)     Learning Progress Summary           Patient Acceptance, D,E, NR by RUT at 7/5/2021 0920    Comment: Educated on safe sequencing with bed mobility, ambulatory transfers and gait. Reviewed HEP.    Acceptance, E,D, NR by MB at 7/4/2021 1414    Acceptance, E, NR by SEBASTIÁN at 7/3/2021 2133    Comment: Call dont fall video                   Point: Body mechanics (In Progress)     Learning Progress Summary           Patient Acceptance, D,E, NR by RUT at 7/5/2021 0920    Comment: Educated on safe sequencing with bed mobility, ambulatory transfers and gait. Reviewed HEP.    Acceptance, E,D, NR by MB  at 7/4/2021 1414    Acceptance, E, NR by LG at 7/3/2021 2133    Comment: Call dont fall video                   Point: Precautions (In Progress)     Learning Progress Summary           Patient Acceptance, D,E, NR by RUT at 7/5/2021 0920    Comment: Educated on safe sequencing with bed mobility, ambulatory transfers and gait. Reviewed HEP.    Acceptance, E,D, NR by MB at 7/4/2021 1414    Acceptance, E, NR by  at 7/3/2021 2133    Comment: Call dont fall video                               User Key     Initials Effective Dates Name Provider Type Discipline    MB 06/16/21 -  Angella Carlos PT Physical Therapist PT     11/16/18 -  Jaron Flores RN Registered Nurse Nurse    RUT 06/16/21 -  Mata Herrera PT Physical Therapist PT              PT Recommendation and Plan     Plan of Care Reviewed With: patient  Progress: improving  Outcome Summary: Pt increased ambulation distance to 50 feet with RW and min Ax2 for balance and AD management. Gait limited by fatigue and weakness. Bed mobility and STS performed with min Ax2. Reviewed and progressed HE. Will continue to progress strength and mobility as able.     Time Calculation:   PT Charges     Row Name 07/05/21 0920             Time Calculation    Start Time  0920  -      PT Received On  07/05/21  -      PT Goal Re-Cert Due Date  07/14/21  -         Time Calculation- PT    Total Timed Code Minutes- PT  24 minute(s)  -         Timed Charges    77682 - PT Therapeutic Exercise Minutes  10  -RUT      66543 - Gait Training Minutes   14  -RUT         Total Minutes    Timed Charges Total Minutes  24  -RUT       Total Minutes  24  -RUT        User Key  (r) = Recorded By, (t) = Taken By, (c) = Cosigned By    Initials Name Provider Type    Mata Lau, PT Physical Therapist        Therapy Charges for Today     Code Description Service Date Service Provider Modifiers Qty    51611028878 HC PT THER PROC EA 15 MIN 7/5/2021 Mata Herrera, PT GP 1    57588900850 HC GAIT  TRAINING EA 15 MIN 7/5/2021 Mata Herrera, PT GP 1          PT G-Codes  Outcome Measure Options: AM-PAC 6 Clicks Basic Mobility (PT)  AM-PAC 6 Clicks Score (PT): 13    Mata Herrera, PT  7/5/2021

## 2021-07-05 NOTE — PLAN OF CARE
Goal Outcome Evaluation:  Plan of Care Reviewed With: patient, daughter           Outcome Summary: Pt alert, Ox3 and confusion noted. Pt poor historian, daughter at bedside to assist. He completes self-feeding with supervision, LB dressing with max assist and transfer training with min assist x2. Pt demo significant retrograde LOB noted brief ambulation in-room. Daughter at bedside reports multiple recent falls. Recommend SNF-level rehab in preparation for safe DC home and discussed with pt and daughter. They desire DC home with resumption of 24/7 assist, daughter interested in hiring private caregiver as well as getting HHOT. If DC home, recommend BSC, bath bench (issued handouts and reviewed places to obtain), RW, OT and 24/7 assist.

## 2021-07-05 NOTE — DISCHARGE SUMMARY
[facility] Medicine Services  DISCHARGE SUMMARY    Patient Name: Domitila Bosch  : 1940  MRN: [id number]    Date of Admission: 7/3/2021  3:37 PM  Date of Discharge:  2021  Primary Care Physician: Marcin Ferguson MD    Consults     No orders found from 2021 to 2021.          Hospital Course     Presenting Problem:   Multiple falls [R29.6]  Severe hypothyroidism [E03.8]    Active Hospital Problems    Diagnosis  POA   • Severe hypothyroidism [E03.8]  Yes   • History of ischemic stroke [Z86.73]  Not Applicable   • Essential hypertension [I10]  Yes   • Lumbar radiculopathy [M54.16]  Yes   • Carotid stenosis [I65.29]  Yes   • Asthma [J45.909]  Yes   • Anxiety disorder [F41.9]  Yes      Resolved Hospital Problems    Diagnosis Date Resolved POA   • Multiple falls [R29.6] 2021 Not Applicable          Hospital Course:  Domitila Bosch is a 80 y.o. male with HO HTN, anxiety, HLP, hypothyroidism, GERD, dementia and stroke who presented to the ED for evaluation of multiple falls including one that hurt his neck.     Frequent Falls  -Suspect multifactorial due to severe hypothyroidism and poor awareness due to dementia. He underwent extensive trauma eval upon arrival which showed no evidence of any fractures. His hypothyroidism was treated as below. He was evaluated by PT/OT during his stay and did fairly well. Will go home with h/h pt and 24 hour care by daughter at home.    Severe hypothyroidism  -Unclear if medication was being taken incorrectly or at all. Daughter does not recall ever giving him Synthroid. He was placed on IV levothyroixine, cytomel and given IV hydrocortisone upon arrival. He was counseled on appropriately taking synthroid upon first waking in am, they both voiced understanding.   -F/U TSH in 4-6 weeks.    Discharge Follow Up Recommendations for outpatient labs/diagnostics:   Dr. Ferguson in 1-2 weeks. Recheck TSH in 4-6 weeks.    Day of Discharge      See daily note    Pertinent  and/or Most Recent Results     LAB RESULTS:      Lab 07/04/21  0716 07/03/21  1744   WBC 8.52 9.19   HEMOGLOBIN 14.8 14.0   HEMATOCRIT 45.8 41.5   PLATELETS 204 181   NEUTROS ABS 7.43* 7.19*   IMMATURE GRANS (ABS) 0.05 0.04   LYMPHS ABS 0.75 0.99   MONOS ABS 0.23 0.64   EOS ABS 0.01 0.27   .6* 98.1*         Lab 07/04/21  0716 07/03/21  1744   SODIUM 140 139   POTASSIUM 3.7 3.8   CHLORIDE 101 103   CO2 22.0 27.0   ANION GAP 17.0* 9.0   BUN 13 15   CREATININE 1.25 1.34*   GLUCOSE 126* 128*   CALCIUM 9.3 9.6   TSH  --  82.680*         Lab 07/03/21  1744   TOTAL PROTEIN 6.8   ALBUMIN 4.20   GLOBULIN 2.6   ALT (SGPT) 14   AST (SGOT) 21   BILIRUBIN 0.5   ALK PHOS 108         Lab 07/03/21  1744   TROPONIN T <0.010             Lab 07/04/21  0126   VITAMIN B 12 532         Brief Urine Lab Results  (Last result in the past 365 days)      Color   Clarity   Blood   Leuk Est   Nitrite   Protein   CREAT   Urine HCG        07/03/21 1825 Yellow Clear Negative Negative Negative Trace             Microbiology Results (last 10 days)     Procedure Component Value - Date/Time    COVID PRE-OP / PRE-PROCEDURE SCREENING ORDER (NO ISOLATION) - Swab, Nasopharynx [[id number]] Collected: 07/03/21 1844    Lab Status: Final result Specimen: Swab from Nasopharynx Updated: 07/04/21 1744    Narrative:      The following orders were created for panel order COVID PRE-OP / PRE-PROCEDURE SCREENING ORDER (NO ISOLATION) - Swab, Nasopharynx.  Procedure                               Abnormality         Status                     ---------                               -----------         ------                     COVID-19 PCR, [facility]...[[id number]]                      Final result                 Please view results for these tests on the individual orders.    COVID-19 PCR, [facility], NP SWAB IN LEXAR VIRAL TRANSPORT MEDIA/ORAL SWISH 24-30 HR TAT - Swab, Nasopharynx [[id number]] Collected: 07/03/21 1844    Lab  Status: Final result Specimen: Swab from Nasopharynx Updated: 07/04/21 1744     SARS-CoV-2 AGUILAR Not Detected          XR Sacrum & Coccyx    Result Date: 7/5/2021   EXAMINATION: XR SACRUM AND COCCYX - 07/03/2021  INDICATION: Fall.  COMPARISON: None.  FINDINGS: 3 views of the sacrum and coccyx reveal degenerative changes seen of the sacroiliac joints bilaterally. There is no fracture or dislocation. Cortex is intact. Overlying stool does obscure some image detail.      There is no evidence of acute bony abnormality. Degenerative changes seen of the sacroiliac joints bilaterally and lower lumbar spine.  DICTATED:   07/03/2021 EDITED/ls :   07/03/2021  This report was finalized on 7/5/2021 12:55 PM by Dr. Cari Osborne MD.      XR Shoulder 2+ View Right    Result Date: 7/5/2021   EXAMINATION: XR SHOULDER 2 VIEWS RIGHT - 07/03/2021  INDICATION: Falls.  COMPARISON: None.  FINDINGS: 2 views of the right shoulder reveal no evidence of fracture or dislocation. The cortex is intact. Joint spaces are preserved. Minimal degenerative changes seen of the acromioclavicular joint. Spurring of the humeral head.        Minimal degenerative changes seen of the shoulder with no evidence of fracture or dislocation.  DICTATED:   07/03/2021 EDITED/ls :   07/03/2021  This report was finalized on 7/5/2021 12:55 PM by Dr. Cari Osborne MD.      CT Head Without Contrast    Result Date: 7/5/2021  EXAMINATION: CT HEAD WO CONTRAST - 07/03/2021  INDICATION: Fall, head trauma  TECHNIQUE: Multiple axial CT imaging is obtained of the head from skull base to skull vertex without the administration of intravenous contrast.  The radiation dose reduction device was turned on for each scan per the ALARA (As Low as Reasonably Achievable) protocol.  COMPARISON: None.  FINDINGS: Mild atrophy of the brain. Some low-density area seen in the periventricular and subcortical white matter suggesting chronic small vessel ischemic change. No hemorrhage or  hydrocephalus. No mass, mass effect, or midline shift. No abnormal extra-axial fluid collections identified. The bony structures reveal no evidence of osseous abnormality. The visualized paranasal sinuses are clear. The mastoid air cells are patent.      Atrophy and chronic changes seen within the brain with no acute intracranial abnormality.  DICTATED:   07/03/2021 EDITED/ls :   07/03/2021   This report was finalized on 7/5/2021 12:55 PM by Dr. Cari Osborne MD.      CT Cervical Spine Without Contrast    Result Date: 7/5/2021  EXAMINATION: CT CERVICAL SPINE WO CONTRAST - 07/03/2021  INDICATION: Fall, neck pain.  TECHNIQUE: Multiple axial CT imaging is obtained of the cervical spine without the administration of intravenous contrast. Coronal and sagittal reformatted images were submitted to further facilitate diagnostic accuracy and treatment planning.  The radiation dose reduction device was turned on for each scan per the ALARA (As Low as Reasonably Achievable) protocol.  COMPARISON: None.  FINDINGS: Extensive atherosclerotic disease seen within the carotid bifurcations bilaterally, left greater than right. Multilevel degenerative changes seen throughout the cervical spine predominantly within the posterior facets. There is disc space narrowing at the C5/C6 and C6/C7 levels. There is straightening of the normal lordosis of the cervical spine. Anterolisthesis identified of C4 on C5 with disc space narrowing of C5 and C6 and C6 and C7. Anterior spurring and posterior osteophyte formation present. Odontoid is intact. The lateral masses are well aligned.      Mild anterolisthesis of C4 on C5 with extensive degenerative changes seen of C5-C7. There is no fracture or dislocation.  DICTATED:   07/03/2021 EDITED/ls :   07/03/2021   This report was finalized on 7/5/2021 12:55 PM by Dr. Cari Osborne MD.      XR Chest 1 View    Result Date: 7/5/2021   EXAMINATION: XR CHEST 1 VW - 07/03/2021  INDICATION: Falls,  weakness.  COMPARISON: 07/31/2020  FINDINGS: Portable chest reveals cardiac and mediastinal silhouettes within normal limits. Lung fields are grossly clear with chronic changes seen the lung fields bilaterally. No focal parenchymal opacification is present. No pleural effusion or pneumothorax. The bony structures reveal degenerative changes seen within the spine. The pulmonary vascularity is within normal limits.      Chronic changes within the lung fields bilaterally with no evidence of acute parenchymal disease. Lung volumes are low.  DICTATED:   07/03/2021 EDITED/ls :   07/03/2021  This report was finalized on 7/5/2021 12:55 PM by Dr. Cari Osborne MD.      Duplex Carotid Ultrasound CAR    Result Date: 7/4/2021  · Proximal right internal carotid artery plaque without significant stenosis. · Right internal carotid artery stenosis of 0-49%. · Left internal carotid artery stenosis of 50-69%.        Results for orders placed during the hospital encounter of 07/03/21    Duplex Carotid Ultrasound CAR    Interpretation Summary  · Proximal right internal carotid artery plaque without significant stenosis.  · Right internal carotid artery stenosis of 0-49%.  · Left internal carotid artery stenosis of 50-69%.      Results for orders placed during the hospital encounter of 07/03/21    Duplex Carotid Ultrasound CAR    Interpretation Summary  · Proximal right internal carotid artery plaque without significant stenosis.  · Right internal carotid artery stenosis of 0-49%.  · Left internal carotid artery stenosis of 50-69%.      Results for orders placed during the hospital encounter of 07/28/20    Adult Transesophageal Echo (SUMMER) W/ Cont if Necessary Per Protocol    Interpretation Summary  · Estimated EF = 65%.  · Left ventricular systolic function is normal.  · Left atrial cavity size is mild-to-moderately dilated.  · There is mild calcification of the aortic valve mainly affecting the non and right coronary cusp(s).  ·  Saline test results are negative.  · The aortic valve exhibits moderate sclerosis.  · There is no evidence of pericardial effusion.  · There is moderate (grade 2) protruding plaque in the descending aorta present.  · No valvular vegetations demonstrated.      Discharge Details        Discharge Medications      Continue These Medications      Instructions Start Date   allopurinol 300 MG tablet  Commonly known as: ZYLOPRIM   300 mg, Oral, Daily      aspirin 81 MG tablet   81 mg, Oral, Daily      atorvastatin 80 MG tablet  Commonly known as: LIPITOR   80 mg, Oral, Nightly      busPIRone 10 MG tablet  Commonly known as: BUSPAR   10 mg, Oral, 2 Times Daily      Cholecalciferol 125 MCG (5000 UT) tablet   5,000 Units, Oral, Daily      Claritin 10 MG tablet  Generic drug: loratadine   1 tablet, Oral, Daily PRN      clopidogrel 75 MG tablet  Commonly known as: PLAVIX   75 mg, Oral, Daily      doxazosin 4 MG tablet  Commonly known as: CARDURA   4 mg, Oral, Daily      finasteride 5 MG tablet  Commonly known as: PROSCAR   5 mg, Oral, Daily      levothyroxine 150 MCG tablet  Commonly known as: SYNTHROID, LEVOTHROID   150 mcg, Oral, Daily      memantine 5 MG tablet  Commonly known as: NAMENDA   5 mg, Oral, Daily      METAMUCIL PO   1 tablet, Oral, Daily      omeprazole 40 MG capsule  Commonly known as: priLOSEC   40 mg, Oral, Daily      polyethylene glycol 17 g packet  Commonly known as: MIRALAX   17 g, Oral, Daily      sertraline 50 MG tablet  Commonly known as: ZOLOFT   50 mg, Oral, Daily      vitamin B-12 2500 MCG sublingual tablet tablet  Commonly known as: CYANOCOBALAMIN   2,500 mcg, Sublingual, Daily         Stop These Medications    triamterene-hydrochlorothiazide 37.5-25 MG per tablet  Commonly known as: MAXZIDE-25            Allergies   Allergen Reactions   • Cephalexin Rash   • Lisinopril Angioedema   • Sulfamethoxazole-Trimethoprim Rash   • Betadine [Povidone Iodine] Other (See Comments)     Skin Burning    • Iodine  Other (See Comments)     Skin Burning    • Flomax [Tamsulosin Hcl] Rash   • Penicillins Rash   • Pseudoephedrine Rash         Discharge Disposition:      Diet:  Hospital:  Diet Order   Procedures   • Diet Regular            CODE STATUS:    Code Status and Medical Interventions:   Ordered at: 07/03/21 8311     Code Status:    No CPR     Medical Interventions (Level of Support Prior to Arrest):    Full       No future appointments.    Additional Instructions for the Follow-ups that You Need to Schedule     Discharge Follow-up with PCP   As directed       Currently Documented PCP:    Marcin Ferguson MD    PCP Phone Number:    [phone]     Follow Up Details: 1-2 week hospital follow up                     Mildred Chang II, DO  07/05/21      Time Spent on Discharge:  I spent 33 minutes on this discharge activity which included: face-to-face encounter with the patient, reviewing the data in the system, coordination of the care with the nursing staff as well as consultants, documentation, and entering orders.

## 2021-07-05 NOTE — PROGRESS NOTES
[facility] Medicine Services  PROGRESS NOTE    Patient Name: Domitila Bosch  : 1940  MRN: [id number]    Date of Admission: 7/3/2021  Primary Care Physician: Marcin Ferguson MD    Subjective   Subjective     CC: f/u weakness    HPI: Resting comfortably in bed. No issues overnight. Breathing comfortably. No pain.    ROS:  Gen- No fevers, chills  CV- No chest pain, palpitations  Resp- No cough, dyspnea  GI- No N/V/D, abd pain    Objective   Objective     Vital Signs:   Temp:  [98.2 °F (36.8 °C)-98.5 °F (36.9 °C)] 98.5 °F (36.9 °C)  Heart Rate:  [57-65] 60  Resp:  [16] 16  BP: (113-147)/(70-97) 142/75        Physical Exam:  Constitutional: No acute distress, awake, alert  HENT: NCAT, mucous membranes moist, O2  Respiratory: Clear to auscultation bilaterally, respiratory effort normal   Cardiovascular: RRR, no murmurs, rubs, or gallops  Gastrointestinal: Positive bowel sounds, soft, nontender, nondistended  Musculoskeletal: No bilateral ankle edema  Psychiatric: Appropriate affect, cooperative  Neurologic: Interactive, oriented to person, place, strength symmetric in all extremities, Cranial Nerves grossly intact to confrontation, speech clear  Skin: No rashes    Results Reviewed:  Results from last 7 days   Lab Units 21  0716 21  1744   WBC 10*3/mm3 8.52 9.19   HEMOGLOBIN g/dL 14.8 14.0   HEMATOCRIT % 45.8 41.5   PLATELETS 10*3/mm3 204 181     Results from last 7 days   Lab Units 21  0716 21  1744   SODIUM mmol/L 140 139   POTASSIUM mmol/L 3.7 3.8   CHLORIDE mmol/L 101 103   CO2 mmol/L 22.0 27.0   BUN mg/dL 13 15   CREATININE mg/dL 1.25 1.34*   GLUCOSE mg/dL 126* 128*   CALCIUM mg/dL 9.3 9.6   ALT (SGPT) U/L  --  14   AST (SGOT) U/L  --  21   TROPONIN T ng/mL  --  <0.010     Estimated Creatinine Clearance: 52.1 mL/min (by C-G formula based on SCr of 1.25 mg/dL).    Microbiology Results Abnormal     Procedure Component Value - Date/Time    COVID PRE-OP /  PRE-PROCEDURE SCREENING ORDER (NO ISOLATION) - Swab, Nasopharynx [[id number]] Collected: 07/03/21 1844    Lab Status: Final result Specimen: Swab from Nasopharynx Updated: 07/04/21 1744    Narrative:      The following orders were created for panel order COVID PRE-OP / PRE-PROCEDURE SCREENING ORDER (NO ISOLATION) - Swab, Nasopharynx.  Procedure                               Abnormality         Status                     ---------                               -----------         ------                     COVID-19 PCR, [facility]...[[id number]]                      Final result                 Please view results for these tests on the individual orders.    COVID-19 PCR, [facility], NP SWAB IN LEXAR VIRAL TRANSPORT MEDIA/ORAL SWISH 24-30 HR TAT - Swab, Nasopharynx [[id number]] Collected: 07/03/21 1844    Lab Status: Final result Specimen: Swab from Nasopharynx Updated: 07/04/21 1744     SARS-CoV-2 AGUILAR Not Detected          Imaging Results (Last 24 Hours)     ** No results found for the last 24 hours. **          Results for orders placed during the hospital encounter of 07/28/20    Adult Transesophageal Echo (SUMMER) W/ Cont if Necessary Per Protocol    Interpretation Summary  · Estimated EF = 65%.  · Left ventricular systolic function is normal.  · Left atrial cavity size is mild-to-moderately dilated.  · There is mild calcification of the aortic valve mainly affecting the non and right coronary cusp(s).  · Saline test results are negative.  · The aortic valve exhibits moderate sclerosis.  · There is no evidence of pericardial effusion.  · There is moderate (grade 2) protruding plaque in the descending aorta present.  · No valvular vegetations demonstrated.      I have reviewed the medications:  Scheduled Meds:allopurinol, 300 mg, Oral, Daily  aspirin, 81 mg, Oral, Daily  atorvastatin, 80 mg, Oral, Nightly  busPIRone, 10 mg, Oral, Q12H  clopidogrel, 75 mg, Oral, Daily  enoxaparin, 30 mg, Subcutaneous, Q24H  finasteride,  5 mg, Oral, Daily  Levothyroxine Sodium, 100 mcg, Intravenous, Daily  liothyronine, 25 mcg, Oral, Daily  memantine, 5 mg, Oral, Daily  pantoprazole, 40 mg, Oral, QAM  polyethylene glycol, 17 g, Oral, Daily  sertraline, 50 mg, Oral, Daily      Continuous Infusions:   PRN Meds:.•  acetaminophen  •  clonazePAM  •  cyclobenzaprine  •  [COMPLETED] Insert peripheral IV **AND** sodium chloride    Assessment/Plan   Assessment & Plan     Active Hospital Problems    Diagnosis  POA   • Multiple falls [R29.6]  Not Applicable   • Severe hypothyroidism [E03.8]  Yes   • History of ischemic stroke [Z86.73]  Not Applicable   • Essential hypertension [I10]  Yes   • Lumbar radiculopathy [M54.16]  Yes   • Carotid stenosis [I65.29]  Yes   • Asthma [J45.909]  Yes   • Anxiety disorder [F41.9]  Yes      Resolved Hospital Problems   No resolved problems to display.        Brief Hospital Course to date:  Domitila Bosch is a 80 y.o. male with HO HTN, anxiety, HLP, hypothyroidism, GERD, dementia and stroke who presented to the ED for evaluation of multiple falls including one that hurt his neck.     Frequent Falls  -Suspect multifactorial due to severe hypothyroidism and poor awareness due to dementia.   -Fall precautions.   -PT/OT. CM, would likely benefit from SNF.     Severe hypothyroidism  -Continue IV levothyroixine, cytomel for 3 days. My partner discussed the importance of taking synthroid on an empty stomach with the daughter.  -Plan to transition to PO synthroid any time.    Hypoxia   -Suspect due to atelectasis, poor inspiratory drive. CXR w/out airspace disease.   -Increase pulmonary toilet, ICS, flutter valve. OOB.    Dementia  Depression/Anxiety  -Namenda  -Depression uncontrolled on zoloft and buspar     Mod carotid stenosis  Hyperlipidemia  -Carotid US  Mild-moderate bilateral disease. Doubt playing significant role in above.  -Cont plavix, lipitor.  -PT/OT consult     Hypertension  -Controlled, continue to hold diuretics and  cardura     GOut  OA  BPH  -cont chronic meds     Weight loss  -Nutrition COnsult     Have attempted to update daughter Yoly today @ 1042 w/out success. Will try at later date.      DVT prophylaxis: lovenox    CODE STATUS:   Code Status and Medical Interventions:   Ordered at: 07/03/21 4031     Code Status:    No CPR     Medical Interventions (Level of Support Prior to Arrest):    Full       Mildred Chang II, DO  07/05/21

## 2021-07-05 NOTE — CASE MANAGEMENT/SOCIAL WORK
Case Management Discharge Note      Final Note: Case mgt f/u. PT progress noted. D/C plan is to return home wiht daughter as caregiver. they would like Home Health therapy, the HH agencies are closed today due to holiday so arrangements for HH will have to made made on 7/6.Arrangements made for a rolling walker and BSC to be delivered by [facility]. Mr Bosch and daughter in agreement with plan         Selected Continued Care - Admitted Since 7/3/2021     Destination    No services have been selected for the patient.              Durable Medical Equipment Coordination complete    Service Provider Selected Services Address Phone Fax Patient Preferred    [facility] [place]  [facility] [address] [facility] [facility] [address] [phone] --          Dialysis/Infusion    No services have been selected for the patient.              Home Medical Care    No services have been selected for the patient.              Therapy    No services have been selected for the patient.              Community Resources    No services have been selected for the patient.              Community & DME    No services have been selected for the patient.                       Final Discharge Disposition Code: 06 - home with home health care

## 2021-07-06 ENCOUNTER — READMISSION MANAGEMENT (OUTPATIENT)
Dept: CALL CENTER | Facility: HOSPITAL | Age: 81
End: 2021-07-06

## 2021-07-06 ENCOUNTER — HOME HEALTH ADMISSION (OUTPATIENT)
Dept: HOME HEALTH SERVICES | Facility: HOME HEALTHCARE | Age: 81
End: 2021-07-06

## 2021-07-06 NOTE — CASE MANAGEMENT/SOCIAL WORK
Continued Stay Note  [facility]     Patient Name: Domitila Bosch  MRN: [id number]  Today's Date: 7/6/2021    Admit Date: 7/3/2021    Discharge Plan     Row Name 07/06/21 0927       Plan    Plan Comments  CM has confirmed [facility]  can accept pt    Final Discharge Disposition Code  06 - home with home health care        Discharge Codes    No documentation.       Expected Discharge Date and Time     Expected Discharge Date Expected Discharge Time    Jul 5, 2021             Dana Lcuas RN

## 2021-07-06 NOTE — OUTREACH NOTE
Prep Survey      Responses   [facility] facility patient discharged from?  [place]   Is LACE score < 7 ?  No   Emergency Room discharge w/ pulse ox?  No   Eligibility  Readm Mgmt   Discharge diagnosis  Severe hypothyroidism:Multiple falls    Does the patient have one of the following disease processes/diagnoses(primary or secondary)?  Other   Does the patient have Home health ordered?  Yes   Is there a DME ordered?  Yes   What DME was ordered?  rolling   Prep survey completed?  Yes          Safia Mahmood RN

## 2021-07-07 ENCOUNTER — HOME CARE VISIT (OUTPATIENT)
Dept: HOME HEALTH SERVICES | Facility: HOME HEALTHCARE | Age: 81
End: 2021-07-07

## 2021-07-07 VITALS
DIASTOLIC BLOOD PRESSURE: 78 MMHG | HEIGHT: 70 IN | SYSTOLIC BLOOD PRESSURE: 138 MMHG | HEART RATE: 78 BPM | RESPIRATION RATE: 18 BRPM | TEMPERATURE: 98.2 F | WEIGHT: 175 LBS | BODY MASS INDEX: 25.05 KG/M2

## 2021-07-07 PROCEDURE — G0151 HHCP-SERV OF PT,EA 15 MIN: HCPCS

## 2021-07-08 ENCOUNTER — HOME CARE VISIT (OUTPATIENT)
Dept: HOME HEALTH SERVICES | Facility: HOME HEALTHCARE | Age: 81
End: 2021-07-08

## 2021-07-08 ENCOUNTER — READMISSION MANAGEMENT (OUTPATIENT)
Dept: CALL CENTER | Facility: HOSPITAL | Age: 81
End: 2021-07-08

## 2021-07-08 PROCEDURE — G0155 HHCP-SVS OF CSW,EA 15 MIN: HCPCS

## 2021-07-08 PROCEDURE — G0152 HHCP-SERV OF OT,EA 15 MIN: HCPCS

## 2021-07-08 NOTE — OUTREACH NOTE
Medical Week 1 Survey      Responses   [facility] patient discharged from?  [place]   Does the patient have one of the following disease processes/diagnoses(primary or secondary)?  Other   Week 1 attempt successful?  No   Unsuccessful attempts  Attempt 1          Radha Rankin RN

## 2021-07-08 NOTE — HOME HEALTH
"Reason for referral:  81 yo male s/p hopsitalization for  (Per d/c summary) \"Domitila Bosch is a 80 y.o. male with HO HTN, anxiety, HLP, hypothyroidism, GERD, dementia and stroke who presented to the ED for evaluation of multiple falls including one that hurt his neck.\"    Active hospital problems included: severe hypothyroidism, h/o ischemic stroke, HTN, lumbar radiculopathy, carotid stenosis, asthma, anxiety disorder, multiple falls    Also per d/c summary \" Severe hypothyroidism-Suspect multifactorial due to severe hypothyroidism and poor awareness due to dementia. He underwent extensive trauma eval upon arrival which showed no evidence of any fractures. His hypothyroidism was treated as below. He was evaluated by PT/OT during his stay and did fairly well. Will go home with h/h pt and 24 hour care by daughter at home.\"  Daughter moved in with her father this year after his spouse (her mother) passed away. Concerns re: perhaps thyroid medication was not being taken correctly contributing to the current exacerbation/decline.    PCP is Dr. Ferguson   Pt referred to home care for PT and OT. PT will also request MSW f/u with daugther to offer support and resources re: dementia care and aging in place. Agreeable to [facility] for showers (pt intermittently objected but then agreed, conversation very circular)"

## 2021-07-09 ENCOUNTER — HOME CARE VISIT (OUTPATIENT)
Dept: HOME HEALTH SERVICES | Facility: HOME HEALTHCARE | Age: 81
End: 2021-07-09

## 2021-07-11 NOTE — ED PROVIDER NOTES
Subjective   Pt is a pleasant 80 year old male who presents with multiple falls and increasing confusion.  His wife passed away in February of this year and pt has experienced a generalized decline since that time.  He daughter, who currently lives with and helps take care of him, is the primary historian.  She states that he has fallen several times over the past couple weeks.  Pt injured his neck and right shoulder during tonight's fall.  Daughter states that she makes a weekly pill container for him, but his compliance with these medications is questionable. Denies fever, chills, chest pain, soa, abd pain, nausea, vomiting, or other acute complaints.        Fall  Mechanism of injury: fall    Injury location:  Head/neck and shoulder/arm  Head/neck injury location:  R neck  Shoulder/arm injury location:  R shoulder  Incident location:  Home  Arrived directly from scene: yes    Fall:     Fall occurred:  Walking    Height of fall:  Standing    Point of impact:  Neck  Prior to arrival data:     Blood loss:  None  Associated symptoms: back pain (cocccyx)    Associated symptoms: no abdominal pain, no chest pain, no headaches, no loss of consciousness, no seizures and no vomiting        Review of Systems   Cardiovascular: Negative for chest pain.   Gastrointestinal: Negative for abdominal pain and vomiting.   Musculoskeletal: Positive for back pain (cocccyx).   Neurological: Negative for seizures, loss of consciousness and headaches.   All other systems reviewed and are negative.      Past Medical History:   Diagnosis Date   • Anxiety disorder 2/16/2017   • Asthma 2/16/2017   • Basal cell carcinoma in situ of skin 2019    right leg    • Carotid stenosis 2/16/2017   • Diaphoresis    • Diastolic dysfunction 2014   • Diverticulitis    • Dyslipidemia 2/16/2017   • GERD (gastroesophageal reflux disease) 2/16/2017   • Gout 2/16/2017   • Herpes zoster     Herpes zoster, [id number], right lower extremity.    • Hyperlipidemia    •  Hypertension 2017   • Hypothyroidism 2017   • LVH (left ventricular hypertrophy)    • Malignant melanoma (CMS/HCC) 2017   • Melanoma (CMS/HCC)    • Mitral regurgitation    • Nephrolithiasis    • Post herpetic neuralgia 2017   • TIA (transient ischemic attack)     TIA, 2012, with nonobstructive carotid stenosis, dyslipidemia and hypertension       Allergies   Allergen Reactions   • Cephalexin Rash   • Lisinopril Angioedema   • Sulfamethoxazole-Trimethoprim Rash   • Betadine [Povidone Iodine] Other (See Comments)     Skin Burning    • Iodine Other (See Comments)     Skin Burning    • Flomax [Tamsulosin Hcl] Rash   • Penicillins Rash   • Pseudoephedrine Rash       Past Surgical History:   Procedure Laterality Date   • ABDOMINAL SURGERY     • APPENDECTOMY     • COLON RESECTION LEFT     • COLON RESECTION LEFT     • CYST REMOVAL      left side of neck.    • HEEL SPUR SURGERY     • HERNIA REPAIR      hiatal hernia    • NISSEN FUNDOPLICATION     • OTHER SURGICAL HISTORY      Malignant melanoma, status post resection        Family History   Problem Relation Age of Onset   • Hyperlipidemia Mother    • Heart disease Mother    • Stroke Mother    • Heart attack Mother        Social History     Socioeconomic History   • Marital status:      Spouse name: Not on file   • Number of children: Not on file   • Years of education: Not on file   • Highest education level: Not on file   Tobacco Use   • Smoking status: Former Smoker     Types: Cigars, Pipe     Quit date:      Years since quittin.5   • Smokeless tobacco: Never Used   Substance and Sexual Activity   • Alcohol use: No   • Drug use: No   • Sexual activity: Defer           Objective   Physical Exam  Constitutional:       General: He is not in acute distress.     Appearance: Normal appearance. He is not ill-appearing.   HENT:      Head: Normocephalic and atraumatic.   Eyes:      Extraocular Movements: Extraocular  movements intact.      Conjunctiva/sclera: Conjunctivae normal.      Pupils: Pupils are equal, round, and reactive to light.   Cardiovascular:      Rate and Rhythm: Normal rate and regular rhythm.      Heart sounds: Normal heart sounds.   Pulmonary:      Effort: Pulmonary effort is normal.      Breath sounds: Normal breath sounds.   Abdominal:      General: Bowel sounds are normal. There is no distension.      Palpations: Abdomen is soft.      Tenderness: There is no abdominal tenderness. There is no guarding or rebound.   Musculoskeletal:         General: Normal range of motion.      Cervical back: Normal range of motion.      Right lower leg: Edema present.      Left lower leg: Edema present.   Skin:     General: Skin is warm and dry.      Capillary Refill: Capillary refill takes 2 to 3 seconds.   Neurological:      General: No focal deficit present.      Mental Status: He is alert.      Comments: Pt is alert, but confused. No focal neuro deficits   Psychiatric:         Mood and Affect: Mood normal.         Behavior: Behavior normal.         Procedures           ED Course                                 XR Sacrum & Coccyx    Result Date: 7/5/2021   EXAMINATION: XR SACRUM AND COCCYX - 07/03/2021  INDICATION: Fall.  COMPARISON: None.  FINDINGS: 3 views of the sacrum and coccyx reveal degenerative changes seen of the sacroiliac joints bilaterally. There is no fracture or dislocation. Cortex is intact. Overlying stool does obscure some image detail.      There is no evidence of acute bony abnormality. Degenerative changes seen of the sacroiliac joints bilaterally and lower lumbar spine.  DICTATED:   07/03/2021 EDITED/ls :   07/03/2021  This report was finalized on 7/5/2021 12:55 PM by Dr. Cari Osborne MD.      XR Shoulder 2+ View Right    Result Date: 7/5/2021   EXAMINATION: XR SHOULDER 2 VIEWS RIGHT - 07/03/2021  INDICATION: Falls.  COMPARISON: None.  FINDINGS: 2 views of the right shoulder reveal no evidence  of fracture or dislocation. The cortex is intact. Joint spaces are preserved. Minimal degenerative changes seen of the acromioclavicular joint. Spurring of the humeral head.        Minimal degenerative changes seen of the shoulder with no evidence of fracture or dislocation.  DICTATED:   07/03/2021 EDITED/ls :   07/03/2021  This report was finalized on 7/5/2021 12:55 PM by Dr. Cari Osborne MD.      CT Head Without Contrast    Result Date: 7/5/2021  EXAMINATION: CT HEAD WO CONTRAST - 07/03/2021  INDICATION: Fall, head trauma  TECHNIQUE: Multiple axial CT imaging is obtained of the head from skull base to skull vertex without the administration of intravenous contrast.  The radiation dose reduction device was turned on for each scan per the ALARA (As Low as Reasonably Achievable) protocol.  COMPARISON: None.  FINDINGS: Mild atrophy of the brain. Some low-density area seen in the periventricular and subcortical white matter suggesting chronic small vessel ischemic change. No hemorrhage or hydrocephalus. No mass, mass effect, or midline shift. No abnormal extra-axial fluid collections identified. The bony structures reveal no evidence of osseous abnormality. The visualized paranasal sinuses are clear. The mastoid air cells are patent.      Atrophy and chronic changes seen within the brain with no acute intracranial abnormality.  DICTATED:   07/03/2021 EDITED/ls :   07/03/2021   This report was finalized on 7/5/2021 12:55 PM by Dr. Cari Osborne MD.      CT Cervical Spine Without Contrast    Result Date: 7/5/2021  EXAMINATION: CT CERVICAL SPINE WO CONTRAST - 07/03/2021  INDICATION: Fall, neck pain.  TECHNIQUE: Multiple axial CT imaging is obtained of the cervical spine without the administration of intravenous contrast. Coronal and sagittal reformatted images were submitted to further facilitate diagnostic accuracy and treatment planning.  The radiation dose reduction device was turned on for each scan per the  ALARA (As Low as Reasonably Achievable) protocol.  COMPARISON: None.  FINDINGS: Extensive atherosclerotic disease seen within the carotid bifurcations bilaterally, left greater than right. Multilevel degenerative changes seen throughout the cervical spine predominantly within the posterior facets. There is disc space narrowing at the C5/C6 and C6/C7 levels. There is straightening of the normal lordosis of the cervical spine. Anterolisthesis identified of C4 on C5 with disc space narrowing of C5 and C6 and C6 and C7. Anterior spurring and posterior osteophyte formation present. Odontoid is intact. The lateral masses are well aligned.      Mild anterolisthesis of C4 on C5 with extensive degenerative changes seen of C5-C7. There is no fracture or dislocation.  DICTATED:   07/03/2021 EDITED/ls :   07/03/2021   This report was finalized on 7/5/2021 12:55 PM by Dr. Cari Osborne MD.      XR Chest 1 View    Result Date: 7/5/2021   EXAMINATION: XR CHEST 1 VW - 07/03/2021  INDICATION: Falls, weakness.  COMPARISON: 07/31/2020  FINDINGS: Portable chest reveals cardiac and mediastinal silhouettes within normal limits. Lung fields are grossly clear with chronic changes seen the lung fields bilaterally. No focal parenchymal opacification is present. No pleural effusion or pneumothorax. The bony structures reveal degenerative changes seen within the spine. The pulmonary vascularity is within normal limits.      Chronic changes within the lung fields bilaterally with no evidence of acute parenchymal disease. Lung volumes are low.  DICTATED:   07/03/2021 EDITED/ls :   07/03/2021  This report was finalized on 7/5/2021 12:55 PM by Dr. Cari Osborne MD.      Duplex Carotid Ultrasound CAR    Result Date: 7/4/2021  · Proximal right internal carotid artery plaque without significant stenosis. · Right internal carotid artery stenosis of 0-49%. · Left internal carotid artery stenosis of 50-69%.      Results for MARIA TERESA ANDUJAR (MRN  [id number]) as of 7/11/2021 12:14   Ref. Range 7/3/2021 17:44   Troponin T Latest Ref Range: 0.000 - 0.030 ng/mL <0.010   Glucose Latest Ref Range: 65 - 99 mg/dL 128 (H)   Sodium Latest Ref Range: 136 - 145 mmol/L 139   Potassium Latest Ref Range: 3.5 - 5.2 mmol/L 3.8   CO2 Latest Ref Range: 22.0 - 29.0 mmol/L 27.0   Chloride Latest Ref Range: 98 - 107 mmol/L 103   Anion Gap Latest Ref Range: 5.0 - 15.0 mmol/L 9.0   Creatinine Latest Ref Range: 0.76 - 1.27 mg/dL 1.34 (H)   BUN Latest Ref Range: 8 - 23 mg/dL 15   BUN/Creatinine Ratio Latest Ref Range: 7.0 - 25.0  11.2   Calcium Latest Ref Range: 8.6 - 10.5 mg/dL 9.6   eGFR Non  Am Latest Ref Range: >60 mL/min/1.73 51 (L)   Alkaline Phosphatase Latest Ref Range: 39 - 117 U/L 108   Total Protein Latest Ref Range: 6.0 - 8.5 g/dL 6.8   ALT (SGPT) Latest Ref Range: 1 - 41 U/L 14   AST (SGOT) Latest Ref Range: 1 - 40 U/L 21   Total Bilirubin Latest Ref Range: 0.0 - 1.2 mg/dL 0.5   Albumin Latest Ref Range: 3.50 - 5.20 g/dL 4.20   Globulin Latest Units: gm/dL 2.6   A/G Ratio Latest Units: g/dL 1.6   Cortisol Latest Ref Range:   mcg/dL 16.00   TSH Baseline Latest Ref Range: 0.270 - 4.200 uIU/mL 82.680 (H)   Free T4 Latest Ref Range: 0.93 - 1.70 ng/dL <0.10 (L)   WBC Latest Ref Range: 3.40 - 10.80 10*3/mm3 9.19   RBC Latest Ref Range: 4.14 - 5.80 10*6/mm3 4.23   Hemoglobin Latest Ref Range: 13.0 - 17.7 g/dL 14.0   Hematocrit Latest Ref Range: 37.5 - 51.0 % 41.5   RDW Latest Ref Range: 12.3 - 15.4 % 13.8   MCV Latest Ref Range: 79.0 - 97.0 fL 98.1 (H)   MCH Latest Ref Range: 26.6 - 33.0 pg 33.1 (H)   MCHC Latest Ref Range: 31.5 - 35.7 g/dL 33.7   MPV Latest Ref Range: 6.0 - 12.0 fL 9.8   Platelets Latest Ref Range: 140 - 450 10*3/mm3 181   RDW-SD Latest Ref Range: 37.0 - 54.0 fl 49.1   Neutrophil Rel % Latest Ref Range: 42.7 - 76.0 % 78.2 (H)   Lymphocyte Rel % Latest Ref Range: 19.6 - 45.3 % 10.8 (L)   Monocyte Rel % Latest Ref Range: 5.0 - 12.0 % 7.0   Eosinophil Rel  % Latest Ref Range: 0.3 - 6.2 % 2.9   Basophil Rel % Latest Ref Range: 0.0 - 1.5 % 0.7   Immature Granulocyte Rel % Latest Ref Range: 0.0 - 0.5 % 0.4   Neutrophils Absolute Latest Ref Range: 1.70 - 7.00 10*3/mm3 7.19 (H)   Lymphocytes Absolute Latest Ref Range: 0.70 - 3.10 10*3/mm3 0.99   Monocytes Absolute Latest Ref Range: 0.10 - 0.90 10*3/mm3 0.64   Eosinophils Absolute Latest Ref Range: 0.00 - 0.40 10*3/mm3 0.27   Basophils Absolute Latest Ref Range: 0.00 - 0.20 10*3/mm3 0.06   Immature Grans, Absolute Latest Ref Range: 0.00 - 0.05 10*3/mm3 0.04   nRBC Latest Ref Range: 0.0 - 0.2 /100 WBC 0.0             MDM    Final diagnoses:   Multiple falls   Hypothyroidism, unspecified type   Confusion   At high risk for falls       ED Disposition  ED Disposition     ED Disposition Condition Comment    Decision to Admit  Level of Care: Telemetry [5]   Diagnosis: Severe hypothyroidism [840870]   Certification: I Certify That Inpatient Hospital Services Are Medically Necessary For Greater Than 2 Midnights            Marcin Fegruson MD  [address] [facility] [address] [address]  [place] [address]  [phone]      1-2 week hospital follow up    [facility]  [address] [facility] [address] [phone]             Medication List      Stop    triamterene-hydrochlorothiazide 37.5-25 MG per tablet  Commonly known as: MAXZIDE-25           Where to Get Your Medications      These medications were sent to [facility] [address] [place] [address] - [address] [facility] - [phone]  - [phone]   [address] [place] [id number]    Phone: [phone]   · levothyroxine 150 MCG tablet          Aric Orantes, DO  07/11/21 1216       Aric Orantes, DO  10/22/21 6868

## 2021-07-12 ENCOUNTER — READMISSION MANAGEMENT (OUTPATIENT)
Dept: CALL CENTER | Facility: HOSPITAL | Age: 81
End: 2021-07-12

## 2021-07-12 NOTE — OUTREACH NOTE
Medical Week 1 Survey      Responses   [facility] patient discharged from?  [place]   Does the patient have one of the following disease processes/diagnoses(primary or secondary)?  Other   Week 1 attempt successful?  No   Unsuccessful attempts  Attempt 2          Dalia Deleon RN

## 2021-07-13 ENCOUNTER — HOME CARE VISIT (OUTPATIENT)
Dept: HOME HEALTH SERVICES | Facility: HOME HEALTHCARE | Age: 81
End: 2021-07-13

## 2021-07-13 PROCEDURE — G0152 HHCP-SERV OF OT,EA 15 MIN: HCPCS

## 2021-07-14 ENCOUNTER — HOME CARE VISIT (OUTPATIENT)
Dept: HOME HEALTH SERVICES | Facility: HOME HEALTHCARE | Age: 81
End: 2021-07-14

## 2021-07-14 PROCEDURE — G0157 HHC PT ASSISTANT EA 15: HCPCS

## 2021-07-15 ENCOUNTER — HOME CARE VISIT (OUTPATIENT)
Dept: HOME HEALTH SERVICES | Facility: HOME HEALTHCARE | Age: 81
End: 2021-07-15

## 2021-07-15 VITALS
OXYGEN SATURATION: 98 % | TEMPERATURE: 94.4 F | DIASTOLIC BLOOD PRESSURE: 90 MMHG | SYSTOLIC BLOOD PRESSURE: 150 MMHG | HEART RATE: 78 BPM

## 2021-07-15 LAB
QT INTERVAL: 452 MS
QTC INTERVAL: 488 MS

## 2021-07-15 PROCEDURE — G0153 HHCP-SVS OF S/L PATH,EA 15MN: HCPCS

## 2021-07-16 ENCOUNTER — HOME CARE VISIT (OUTPATIENT)
Dept: HOME HEALTH SERVICES | Facility: HOME HEALTHCARE | Age: 81
End: 2021-07-16

## 2021-07-16 ENCOUNTER — READMISSION MANAGEMENT (OUTPATIENT)
Dept: CALL CENTER | Facility: HOSPITAL | Age: 81
End: 2021-07-16

## 2021-07-16 VITALS
RESPIRATION RATE: 18 BRPM | DIASTOLIC BLOOD PRESSURE: 78 MMHG | SYSTOLIC BLOOD PRESSURE: 120 MMHG | TEMPERATURE: 97.2 F | HEART RATE: 68 BPM

## 2021-07-16 PROCEDURE — G0157 HHC PT ASSISTANT EA 15: HCPCS

## 2021-07-16 PROCEDURE — G0156 HHCP-SVS OF AIDE,EA 15 MIN: HCPCS

## 2021-07-16 NOTE — OUTREACH NOTE
Medical Week 2 Survey      Responses   [facility] patient discharged from?  [place]   Does the patient have one of the following disease processes/diagnoses(primary or secondary)?  Other   Week 2 attempt successful?  No   Unsuccessful attempts  Attempt 1          Fidelina Hernandez RN

## 2021-07-19 ENCOUNTER — HOME CARE VISIT (OUTPATIENT)
Dept: HOME HEALTH SERVICES | Facility: HOME HEALTHCARE | Age: 81
End: 2021-07-19

## 2021-07-19 VITALS
TEMPERATURE: 98.3 F | HEART RATE: 76 BPM | DIASTOLIC BLOOD PRESSURE: 101 MMHG | RESPIRATION RATE: 15 BRPM | OXYGEN SATURATION: 97 % | SYSTOLIC BLOOD PRESSURE: 174 MMHG | HEART RATE: 81 BPM | SYSTOLIC BLOOD PRESSURE: 129 MMHG | RESPIRATION RATE: 15 BRPM | TEMPERATURE: 97.3 F | OXYGEN SATURATION: 97 % | DIASTOLIC BLOOD PRESSURE: 79 MMHG

## 2021-07-19 VITALS
HEART RATE: 85 BPM | OXYGEN SATURATION: 98 % | RESPIRATION RATE: 18 BRPM | DIASTOLIC BLOOD PRESSURE: 100 MMHG | TEMPERATURE: 97.7 F | SYSTOLIC BLOOD PRESSURE: 160 MMHG

## 2021-07-19 VITALS
SYSTOLIC BLOOD PRESSURE: 155 MMHG | OXYGEN SATURATION: 94 % | TEMPERATURE: 97.6 F | DIASTOLIC BLOOD PRESSURE: 98 MMHG | HEART RATE: 83 BPM | RESPIRATION RATE: 16 BRPM

## 2021-07-19 PROCEDURE — G0157 HHC PT ASSISTANT EA 15: HCPCS

## 2021-07-20 ENCOUNTER — HOME CARE VISIT (OUTPATIENT)
Dept: HOME HEALTH SERVICES | Facility: HOME HEALTHCARE | Age: 81
End: 2021-07-20

## 2021-07-20 VITALS
DIASTOLIC BLOOD PRESSURE: 100 MMHG | OXYGEN SATURATION: 91 % | HEART RATE: 76 BPM | SYSTOLIC BLOOD PRESSURE: 160 MMHG | TEMPERATURE: 95.2 F | RESPIRATION RATE: 16 BRPM

## 2021-07-20 PROCEDURE — G0153 HHCP-SVS OF S/L PATH,EA 15MN: HCPCS

## 2021-07-22 ENCOUNTER — HOME CARE VISIT (OUTPATIENT)
Dept: HOME HEALTH SERVICES | Facility: HOME HEALTHCARE | Age: 81
End: 2021-07-22

## 2021-07-22 PROCEDURE — G0156 HHCP-SVS OF AIDE,EA 15 MIN: HCPCS

## 2021-07-23 ENCOUNTER — HOME CARE VISIT (OUTPATIENT)
Dept: HOME HEALTH SERVICES | Facility: HOME HEALTHCARE | Age: 81
End: 2021-07-23

## 2021-07-23 VITALS — TEMPERATURE: 96 F | SYSTOLIC BLOOD PRESSURE: 110 MMHG | HEART RATE: 68 BPM | DIASTOLIC BLOOD PRESSURE: 78 MMHG

## 2021-07-27 ENCOUNTER — HOME CARE VISIT (OUTPATIENT)
Dept: HOME HEALTH SERVICES | Facility: HOME HEALTHCARE | Age: 81
End: 2021-07-27

## 2021-07-28 ENCOUNTER — HOME CARE VISIT (OUTPATIENT)
Dept: HOME HEALTH SERVICES | Facility: HOME HEALTHCARE | Age: 81
End: 2021-07-28

## 2021-07-28 VITALS
SYSTOLIC BLOOD PRESSURE: 150 MMHG | RESPIRATION RATE: 18 BRPM | OXYGEN SATURATION: 97 % | DIASTOLIC BLOOD PRESSURE: 95 MMHG | HEART RATE: 63 BPM | TEMPERATURE: 97.6 F

## 2021-07-28 PROCEDURE — G0157 HHC PT ASSISTANT EA 15: HCPCS

## 2021-07-29 ENCOUNTER — HOME CARE VISIT (OUTPATIENT)
Dept: HOME HEALTH SERVICES | Facility: HOME HEALTHCARE | Age: 81
End: 2021-07-29

## 2021-07-29 VITALS
RESPIRATION RATE: 16 BRPM | HEART RATE: 67 BPM | TEMPERATURE: 94.3 F | SYSTOLIC BLOOD PRESSURE: 150 MMHG | DIASTOLIC BLOOD PRESSURE: 80 MMHG | OXYGEN SATURATION: 97 %

## 2021-07-29 PROCEDURE — G0153 HHCP-SVS OF S/L PATH,EA 15MN: HCPCS

## 2021-07-30 ENCOUNTER — HOME CARE VISIT (OUTPATIENT)
Dept: HOME HEALTH SERVICES | Facility: HOME HEALTHCARE | Age: 81
End: 2021-07-30

## 2021-07-30 PROCEDURE — G0157 HHC PT ASSISTANT EA 15: HCPCS

## 2021-07-30 PROCEDURE — G0156 HHCP-SVS OF AIDE,EA 15 MIN: HCPCS

## 2021-07-30 NOTE — HOME HEALTH
Patient got into the shower today, felt like he could, tolerated it fine. He used his walker to steady hisself.

## 2021-08-01 ENCOUNTER — APPOINTMENT (OUTPATIENT)
Dept: GENERAL RADIOLOGY | Facility: HOSPITAL | Age: 81
End: 2021-08-01

## 2021-08-01 ENCOUNTER — HOSPITAL ENCOUNTER (EMERGENCY)
Facility: HOSPITAL | Age: 81
Discharge: HOME | End: 2021-08-01
Attending: EMERGENCY MEDICINE | Admitting: EMERGENCY MEDICINE

## 2021-08-01 VITALS
OXYGEN SATURATION: 96 % | HEIGHT: 73 IN | DIASTOLIC BLOOD PRESSURE: 94 MMHG | WEIGHT: 175 LBS | TEMPERATURE: 97.9 F | HEART RATE: 90 BPM | RESPIRATION RATE: 16 BRPM | SYSTOLIC BLOOD PRESSURE: 144 MMHG | BODY MASS INDEX: 23.19 KG/M2

## 2021-08-01 DIAGNOSIS — M25.511 ACUTE PAIN OF RIGHT SHOULDER: Primary | ICD-10-CM
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
## 2021-08-01 DIAGNOSIS — S40.011A CONTUSION OF RIGHT SHOULDER, INITIAL ENCOUNTER: ICD-10-CM

## 2021-08-01 PROCEDURE — 73030 X-RAY EXAM OF SHOULDER: CPT

## 2021-08-01 PROCEDURE — 99283 EMERGENCY DEPT VISIT LOW MDM: CPT

## 2021-08-02 VITALS — RESPIRATION RATE: 18 BRPM | DIASTOLIC BLOOD PRESSURE: 90 MMHG | SYSTOLIC BLOOD PRESSURE: 155 MMHG | TEMPERATURE: 97.6 F

## 2021-08-02 NOTE — ED PROVIDER NOTES
Subjective   Patient is a 80 year old male with history of dementia who presents to the ER for evaluation of right shoulder pain following a fall. Patient shares that he thinks he just tripped while walking. Per EMS patient tripped and fell onto concrete, no loss of consciousness and patient did not hit his head.  Patient is currently taking Plavix.  On exam the patient complains of some discomfort in his right shoulder.  He states that he wants to be evaluated and go home.          Review of Systems   Constitutional: Negative.    HENT: Negative.    Respiratory: Negative.    Cardiovascular: Negative.    Gastrointestinal: Negative.    Genitourinary: Negative.    Musculoskeletal: Positive for arthralgias and myalgias.   Skin: Positive for wound.   Neurological: Negative.        Past Medical History:   Diagnosis Date   • Anxiety disorder 2/16/2017   • Asthma 2/16/2017   • Basal cell carcinoma in situ of skin 2019    right leg    • Carotid stenosis 2/16/2017   • Diaphoresis    • Diastolic dysfunction 2014   • Diverticulitis    • Dyslipidemia 2/16/2017   • GERD (gastroesophageal reflux disease) 2/16/2017   • Gout 2/16/2017   • Herpes zoster     Herpes zoster, [id number], right lower extremity.    • Hyperlipidemia    • Hypertension 2/16/2017   • Hypothyroidism 2/16/2017   • LVH (left ventricular hypertrophy)    • Malignant melanoma (CMS/HCC) 2/16/2017   • Melanoma (CMS/HCC)    • Mitral regurgitation 2014   • Nephrolithiasis    • Post herpetic neuralgia 02/16/2017   • TIA (transient ischemic attack)     TIA, June 2012, with nonobstructive carotid stenosis, dyslipidemia and hypertension       Allergies   Allergen Reactions   • Cephalexin Rash   • Lisinopril Angioedema   • Sulfamethoxazole-Trimethoprim Rash   • Betadine [Povidone Iodine] Other (See Comments)     Skin Burning    • Iodine Other (See Comments)     Skin Burning    • Flomax [Tamsulosin Hcl] Rash   • Penicillins Rash   • Pseudoephedrine Rash       Past Surgical  History:   Procedure Laterality Date   • ABDOMINAL SURGERY     • APPENDECTOMY     • COLON RESECTION LEFT  2016   • COLON RESECTION LEFT  2016   • CYST REMOVAL      left side of neck.    • HEEL SPUR SURGERY     • HERNIA REPAIR      hiatal hernia    • NISSEN FUNDOPLICATION     • OTHER SURGICAL HISTORY  2010    Malignant melanoma, status post resection        Family History   Problem Relation Age of Onset   • Hyperlipidemia Mother    • Heart disease Mother    • Stroke Mother    • Heart attack Mother        Social History     Socioeconomic History   • Marital status:      Spouse name: Not on file   • Number of children: Not on file   • Years of education: Not on file   • Highest education level: Not on file   Tobacco Use   • Smoking status: Former Smoker     Types: Cigars, Pipe     Quit date:      Years since quittin.6   • Smokeless tobacco: Never Used   Substance and Sexual Activity   • Alcohol use: No   • Drug use: No   • Sexual activity: Defer           Objective   Physical Exam  Vitals and nursing note reviewed.   Constitutional:       General: He is not in acute distress.     Appearance: Normal appearance. He is not ill-appearing or toxic-appearing.   HENT:      Head: Normocephalic and atraumatic.      Nose: Nose normal.      Mouth/Throat:      Mouth: Mucous membranes are moist.   Eyes:      Extraocular Movements: Extraocular movements intact.      Conjunctiva/sclera: Conjunctivae normal.   Cardiovascular:      Rate and Rhythm: Normal rate.      Pulses: Normal pulses.   Pulmonary:      Effort: Pulmonary effort is normal. No respiratory distress.   Abdominal:      Palpations: Abdomen is soft.      Tenderness: There is no abdominal tenderness.   Musculoskeletal:      Right shoulder: Tenderness present. No bony tenderness. Decreased range of motion. Normal strength. Normal pulse.      Cervical back: Normal range of motion.   Skin:     General: Skin is warm and dry.      Capillary Refill:  Capillary refill takes less than 2 seconds.             Comments: Superficial abrasion to right shoulder   Neurological:      General: No focal deficit present.      Mental Status: He is alert. Mental status is at baseline.   Psychiatric:         Mood and Affect: Mood normal.         Behavior: Behavior normal.         Thought Content: Thought content normal.         Judgment: Judgment normal.         Procedures           ED Course  ED Course as of Aug 04 1628   Sun Aug 01, 2021   2158 On reassessment at bedside with family present, they note that patient fell on his right side while outside.  Patient did not hit his head or lose consciousness.  Patient has history of multiple falls    [JG]   Wed Aug 04, 2021   1625 Patient with history of multiple falls and dementia presents to ED following a fall. Presents with shoulder in sling and right shoulder pain. No acute or emergent findings on physical exam, patient neurovascularly intact, diffuse tenderness to left shoulder, ROM not limited.  X-ray of left shoulder without acute fractures or dislocations.  Patient is afebrile, nontoxic appearing, vital signs stable and able to maintain O2 sats of 96% on room air. Patient will be discharged home with outpatient follow up to their primary care provider as recommended. Patient is agreeable to plan of care of outpatient follow up, provided clear return precautions and demonstrated understanding.    [JG]      ED Course User Index  [JG] Gallo Quiñonez, PA      No results found for this or any previous visit (from the past 24 hour(s)).  Note: In addition to lab results from this visit, the labs listed above may include labs taken at another facility or during a different encounter within the last 24 hours. Please correlate lab times with ED admission and discharge times for further clarification of the services performed during this visit.    XR Shoulder 2+ View Right   Final Result   Marked narrowing of the acromiohumeral joint  "space as an interval change from the prior study of July 3, 2021. This finding can be seen in rotator cuff pathology. Clinical aspects of the case will determine if MR of the right shoulder could provide   additional information.      Signer Name: Donaldo Gutierrez MD    Signed: 8/1/2021 9:40 PM    Workstation Name: [id number]     [facility]        Vitals:    08/01/21 2019 08/01/21 2020 08/01/21 2220   BP: 144/94  144/94   BP Location: Left arm     Patient Position: Lying  Lying   Pulse: 90  90   Resp: 16  16   Temp:  97.9 °F (36.6 °C) 97.9 °F (36.6 °C)   TempSrc:  Oral Oral   SpO2: 96%  96%   Weight: 79.4 kg (175 lb)     Height: 185.4 cm (73\")       Medications - No data to display  ECG/EMG Results (last 24 hours)     ** No results found for the last 24 hours. **        No orders to display                                          MDM  Number of Diagnoses or Management Options  Acute pain of right shoulder: new and requires workup  Contusion of right shoulder, initial encounter: new and requires workup  Fall, initial encounter: new and requires workup     Amount and/or Complexity of Data Reviewed  Tests in the radiology section of CPT®: reviewed    Risk of Complications, Morbidity, and/or Mortality  Presenting problems: moderate  Diagnostic procedures: moderate  Management options: moderate    Patient Progress  Patient progress: stable      Final diagnoses:   Acute pain of right shoulder   Contusion of right shoulder, initial encounter   Fall, initial encounter       ED Disposition  ED Disposition     ED Disposition Condition Comment    Discharge Stable Patient discharged to home. Patient paperwork completed, copies given, vitals obtained. Patient in no obvious distress at time of discharge.          Marcin Ferguson MD  [address] [place] [id number]  [phone]    Schedule an appointment as soon as possible for a visit       [facility] " [address] [address] [phone]  Go to   If symptoms worsen         Medication List      No changes were made to your prescriptions during this visit.          Gallo Quiñonez PA  08/04/21 1581

## 2021-08-02 NOTE — DISCHARGE INSTRUCTIONS
Symptomatic care is recommended with tylenol or ibuprofen as needed for pain. Take all medications as prescribed and instructed. Follow up with your primary care as directed or return to Emergency Department with worsening of symptoms.

## 2021-08-03 ENCOUNTER — HOME CARE VISIT (OUTPATIENT)
Dept: HOME HEALTH SERVICES | Facility: HOME HEALTHCARE | Age: 81
End: 2021-08-03

## 2021-08-03 VITALS — TEMPERATURE: 94.6 F | DIASTOLIC BLOOD PRESSURE: 80 MMHG | SYSTOLIC BLOOD PRESSURE: 128 MMHG | RESPIRATION RATE: 16 BRPM

## 2021-08-03 PROCEDURE — G0153 HHCP-SVS OF S/L PATH,EA 15MN: HCPCS

## 2021-08-05 ENCOUNTER — HOME CARE VISIT (OUTPATIENT)
Dept: HOME HEALTH SERVICES | Facility: HOME HEALTHCARE | Age: 81
End: 2021-08-05

## 2021-08-05 VITALS
TEMPERATURE: 97.6 F | DIASTOLIC BLOOD PRESSURE: 95 MMHG | HEART RATE: 86 BPM | OXYGEN SATURATION: 93 % | SYSTOLIC BLOOD PRESSURE: 130 MMHG | RESPIRATION RATE: 18 BRPM

## 2021-08-05 PROCEDURE — G0157 HHC PT ASSISTANT EA 15: HCPCS

## 2021-08-05 NOTE — HOME HEALTH
Pt's daughter reports he was seen [facility] ED on 8/1/2021 for fall that occurred outdoors that night. Pt did well with PT interventions today but continues to fall, most likely due to poor cognition related to dementia. PTA educated his daughter on this, and that the only way to prevent further falls is 24 hr supervision.

## 2021-08-06 ENCOUNTER — HOME CARE VISIT (OUTPATIENT)
Dept: HOME HEALTH SERVICES | Facility: HOME HEALTHCARE | Age: 81
End: 2021-08-06

## 2021-08-09 ENCOUNTER — HOME CARE VISIT (OUTPATIENT)
Dept: HOME HEALTH SERVICES | Facility: HOME HEALTHCARE | Age: 81
End: 2021-08-09

## 2021-08-09 VITALS
TEMPERATURE: 97.4 F | HEART RATE: 80 BPM | SYSTOLIC BLOOD PRESSURE: 162 MMHG | DIASTOLIC BLOOD PRESSURE: 78 MMHG | RESPIRATION RATE: 18 BRPM

## 2021-08-09 PROCEDURE — G0156 HHCP-SVS OF AIDE,EA 15 MIN: HCPCS

## 2021-08-11 ENCOUNTER — TRANSCRIBE ORDERS (OUTPATIENT)
Dept: ADMINISTRATIVE | Facility: HOSPITAL | Age: 81
End: 2021-08-11

## 2021-08-11 DIAGNOSIS — S46.012A TRAUMATIC INCOMPLETE TEAR OF LEFT ROTATOR CUFF, INITIAL ENCOUNTER: ICD-10-CM

## 2021-08-11 DIAGNOSIS — R29.6 FREQUENT FALLS: Primary | ICD-10-CM

## 2021-08-12 ENCOUNTER — HOME CARE VISIT (OUTPATIENT)
Dept: HOME HEALTH SERVICES | Facility: HOME HEALTHCARE | Age: 81
End: 2021-08-12

## 2021-08-12 VITALS
RESPIRATION RATE: 16 BRPM | HEART RATE: 66 BPM | TEMPERATURE: 96.9 F | DIASTOLIC BLOOD PRESSURE: 73 MMHG | SYSTOLIC BLOOD PRESSURE: 137 MMHG | OXYGEN SATURATION: 99 %

## 2021-08-12 PROCEDURE — G0151 HHCP-SERV OF PT,EA 15 MIN: HCPCS

## 2021-08-17 ENCOUNTER — HOME CARE VISIT (OUTPATIENT)
Dept: HOME HEALTH SERVICES | Facility: HOME HEALTHCARE | Age: 81
End: 2021-08-17

## 2021-08-17 VITALS
HEART RATE: 63 BPM | TEMPERATURE: 94.4 F | SYSTOLIC BLOOD PRESSURE: 160 MMHG | OXYGEN SATURATION: 95 % | DIASTOLIC BLOOD PRESSURE: 80 MMHG

## 2021-08-17 PROCEDURE — G0153 HHCP-SVS OF S/L PATH,EA 15MN: HCPCS

## 2021-08-17 NOTE — HOME HEALTH
Daughter reports fall this last night about 4:00-5:00 in the evening. Fall was not witnessed by daughter and pt. cannot recall falling.  Daughter reports that he was on the floor and hit his shoulder.  She called PCP and Dr. Ferguson is ordering MRI for shoulder and brain.

## 2021-08-18 ENCOUNTER — HOME CARE VISIT (OUTPATIENT)
Dept: HOME HEALTH SERVICES | Facility: HOME HEALTHCARE | Age: 81
End: 2021-08-18

## 2021-08-18 VITALS
SYSTOLIC BLOOD PRESSURE: 130 MMHG | TEMPERATURE: 97.4 F | RESPIRATION RATE: 18 BRPM | DIASTOLIC BLOOD PRESSURE: 78 MMHG | HEART RATE: 68 BPM

## 2021-08-18 PROCEDURE — G0156 HHCP-SVS OF AIDE,EA 15 MIN: HCPCS

## 2021-08-19 ENCOUNTER — HOME CARE VISIT (OUTPATIENT)
Dept: HOME HEALTH SERVICES | Facility: HOME HEALTHCARE | Age: 81
End: 2021-08-19

## 2021-08-19 VITALS
OXYGEN SATURATION: 96 % | SYSTOLIC BLOOD PRESSURE: 138 MMHG | TEMPERATURE: 94.2 F | HEART RATE: 60 BPM | DIASTOLIC BLOOD PRESSURE: 90 MMHG

## 2021-08-19 PROCEDURE — G0153 HHCP-SVS OF S/L PATH,EA 15MN: HCPCS

## 2021-08-19 NOTE — HOME HEALTH
Daughter reports fall on 8.18.21.  Pt. went to sit on couch and missed and ended up on floor.  Daughter reports that she did not witness the fall but doesn't think pt. took his walker with him.  Pt was able to pick himself up off the floor after a few minutes without injury.  MD notified of multiple falls by daughter.  Pt. has MRI and MD appointment set up for 9.6.21.

## 2021-08-20 ENCOUNTER — HOME CARE VISIT (OUTPATIENT)
Dept: HOME HEALTH SERVICES | Facility: HOME HEALTHCARE | Age: 81
End: 2021-08-20

## 2021-08-20 VITALS
OXYGEN SATURATION: 96 % | SYSTOLIC BLOOD PRESSURE: 132 MMHG | RESPIRATION RATE: 16 BRPM | DIASTOLIC BLOOD PRESSURE: 86 MMHG | HEART RATE: 62 BPM | TEMPERATURE: 98.2 F

## 2021-08-20 PROCEDURE — G0157 HHC PT ASSISTANT EA 15: HCPCS

## 2021-08-23 ENCOUNTER — HOME CARE VISIT (OUTPATIENT)
Dept: HOME HEALTH SERVICES | Facility: HOME HEALTHCARE | Age: 81
End: 2021-08-23

## 2021-08-23 VITALS
DIASTOLIC BLOOD PRESSURE: 70 MMHG | SYSTOLIC BLOOD PRESSURE: 140 MMHG | HEART RATE: 71 BPM | RESPIRATION RATE: 17 BRPM | TEMPERATURE: 96.6 F | OXYGEN SATURATION: 96 %

## 2021-08-23 PROCEDURE — G0157 HHC PT ASSISTANT EA 15: HCPCS

## 2021-08-25 ENCOUNTER — HOME CARE VISIT (OUTPATIENT)
Dept: HOME HEALTH SERVICES | Facility: HOME HEALTHCARE | Age: 81
End: 2021-08-25

## 2021-08-25 PROCEDURE — G0153 HHCP-SVS OF S/L PATH,EA 15MN: HCPCS

## 2021-08-25 NOTE — CASE COMMUNICATION
Pt. discharged from physical therapy on 8/23/2021 no further progress anticipated from skilled PT services at this time.

## 2021-08-26 ENCOUNTER — HOME CARE VISIT (OUTPATIENT)
Dept: HOME HEALTH SERVICES | Facility: HOME HEALTHCARE | Age: 81
End: 2021-08-26

## 2021-08-26 VITALS
OXYGEN SATURATION: 98 % | HEART RATE: 70 BPM | SYSTOLIC BLOOD PRESSURE: 140 MMHG | RESPIRATION RATE: 16 BRPM | DIASTOLIC BLOOD PRESSURE: 70 MMHG

## 2021-08-31 ENCOUNTER — HOME CARE VISIT (OUTPATIENT)
Dept: HOME HEALTH SERVICES | Facility: HOME HEALTHCARE | Age: 81
End: 2021-08-31

## 2021-08-31 VITALS — SYSTOLIC BLOOD PRESSURE: 145 MMHG | TEMPERATURE: 94.8 F | RESPIRATION RATE: 16 BRPM | DIASTOLIC BLOOD PRESSURE: 65 MMHG

## 2021-08-31 PROCEDURE — G0153 HHCP-SVS OF S/L PATH,EA 15MN: HCPCS

## 2021-08-31 NOTE — HOME HEALTH
Extensive education and training provided to patient's daughter, who is  regarding safety, fall prevention and progression of dementia status.  Daughter verbalizes understanding during education, however, has not put in place strategies and/or aides that have been recommended by therapist.  Pt. has had multiple falls despite education and training to use walker.  MSW along with PT and ST have recommended she hire a caregiver to assist with pt's care.  Daughter has not done this at the time of discharge.      Pt. fell 8.29.21 per daughter's report- she heard ptPolly travis and she found him on the floor.  She reports that he hit his head on the table beside the bed.  Pt. does not recall fall.  No ER visit or call to the MD.  Daughter assisted pt. back to bed.  SLP to notifiy MD of fall.

## 2021-09-09 ENCOUNTER — HOSPITAL ENCOUNTER (OUTPATIENT)
Dept: MRI IMAGING | Facility: HOSPITAL | Age: 81
Discharge: HOME | End: 2021-09-09

## 2021-09-09 DIAGNOSIS — R29.6 FREQUENT FALLS: ICD-10-CM

## 2021-09-09 DIAGNOSIS — S46.012A TRAUMATIC INCOMPLETE TEAR OF LEFT ROTATOR CUFF, INITIAL ENCOUNTER: ICD-10-CM

## 2021-09-09 LAB — CREAT BLDA-MCNC: 1.2 MG/DL (ref 0.6–1.3)

## 2021-09-09 PROCEDURE — 82565 ASSAY OF CREATININE: CPT

## 2021-09-09 PROCEDURE — 0 GADOBENATE DIMEGLUMINE 529 MG/ML SOLUTION: Performed by: FAMILY MEDICINE

## 2021-09-09 PROCEDURE — 73221 MRI JOINT UPR EXTREM W/O DYE: CPT

## 2021-09-09 PROCEDURE — A9577 INJ MULTIHANCE: HCPCS | Performed by: FAMILY MEDICINE

## 2021-09-09 PROCEDURE — 70553 MRI BRAIN STEM W/O & W/DYE: CPT

## 2021-09-09 RX ADMIN — GADOBENATE DIMEGLUMINE 15 ML: 529 INJECTION, SOLUTION INTRAVENOUS at 15:00

## 2023-05-17 ENCOUNTER — APPOINTMENT (OUTPATIENT)
Dept: CT IMAGING | Facility: HOSPITAL | Age: 83
End: 2023-05-17
Payer: MEDICARE

## 2023-05-17 ENCOUNTER — HOSPITAL ENCOUNTER (OUTPATIENT)
Facility: HOSPITAL | Age: 83
Setting detail: OBSERVATION
Discharge: HOME HEALTH | End: 2023-06-04
Attending: EMERGENCY MEDICINE | Admitting: INTERNAL MEDICINE
Payer: MEDICARE

## 2023-05-17 DIAGNOSIS — J37.0 CHRONIC LARYNGITIS: ICD-10-CM

## 2023-05-17 DIAGNOSIS — M54.16 LUMBAR RADICULOPATHY: ICD-10-CM

## 2023-05-17 DIAGNOSIS — E03.9 SEVERE HYPOTHYROIDISM: ICD-10-CM

## 2023-05-17 DIAGNOSIS — Z87.19 S/P HERNIA REPAIR: ICD-10-CM

## 2023-05-17 DIAGNOSIS — R41.82 ACUTE ON CHRONIC ALTERATION IN MENTAL STATUS: ICD-10-CM

## 2023-05-17 DIAGNOSIS — N17.9 AKI (ACUTE KIDNEY INJURY): ICD-10-CM

## 2023-05-17 DIAGNOSIS — J34.2 DEVIATED NASAL SEPTUM: ICD-10-CM

## 2023-05-17 DIAGNOSIS — R45.1 AGITATION: ICD-10-CM

## 2023-05-17 DIAGNOSIS — I10 ESSENTIAL HYPERTENSION: ICD-10-CM

## 2023-05-17 DIAGNOSIS — E78.5 DYSLIPIDEMIA: ICD-10-CM

## 2023-05-17 DIAGNOSIS — E03.9 HYPOTHYROIDISM, UNSPECIFIED TYPE: ICD-10-CM

## 2023-05-17 DIAGNOSIS — F03.90 RAPIDLY PROGRESSIVE DEMENTIA: Primary | ICD-10-CM

## 2023-05-17 DIAGNOSIS — Z88.3: ICD-10-CM

## 2023-05-17 DIAGNOSIS — K57.31 DIVERTICULOSIS OF LARGE INTESTINE WITH HEMORRHAGE: ICD-10-CM

## 2023-05-17 DIAGNOSIS — K57.90 DIVERTICULOSIS: ICD-10-CM

## 2023-05-17 DIAGNOSIS — Z86.73 HISTORY OF ISCHEMIC STROKE: ICD-10-CM

## 2023-05-17 DIAGNOSIS — G62.9 NEUROPATHY: ICD-10-CM

## 2023-05-17 DIAGNOSIS — N20.0 KIDNEY STONE: ICD-10-CM

## 2023-05-17 DIAGNOSIS — F41.9 ANXIETY DISORDER, UNSPECIFIED TYPE: ICD-10-CM

## 2023-05-17 DIAGNOSIS — Z98.890 S/P HERNIA REPAIR: ICD-10-CM

## 2023-05-17 DIAGNOSIS — I10 ELEVATED BLOOD PRESSURE READING WITH DIAGNOSIS OF HYPERTENSION: ICD-10-CM

## 2023-05-17 DIAGNOSIS — M79.10 MUSCLE PAIN: ICD-10-CM

## 2023-05-17 DIAGNOSIS — I63.9 ACUTE CVA (CEREBROVASCULAR ACCIDENT): ICD-10-CM

## 2023-05-17 PROBLEM — N18.30 CKD (CHRONIC KIDNEY DISEASE), STAGE III: Status: ACTIVE | Noted: 2023-05-17

## 2023-05-17 LAB
ALBUMIN SERPL-MCNC: 3.9 G/DL (ref 3.5–5.2)
ALBUMIN/GLOB SERPL: 1.7 G/DL
ALP SERPL-CCNC: 126 U/L (ref 39–117)
ALT SERPL W P-5'-P-CCNC: 8 U/L (ref 1–41)
ANION GAP SERPL CALCULATED.3IONS-SCNC: 11 MMOL/L (ref 5–15)
AST SERPL-CCNC: 14 U/L (ref 1–40)
BACTERIA UR QL AUTO: ABNORMAL /HPF
BASOPHILS # BLD AUTO: 0.08 10*3/MM3 (ref 0–0.2)
BASOPHILS NFR BLD AUTO: 1.1 % (ref 0–1.5)
BILIRUB SERPL-MCNC: 0.4 MG/DL (ref 0–1.2)
BILIRUB UR QL STRIP: NEGATIVE
BUN SERPL-MCNC: 10 MG/DL (ref 8–23)
BUN/CREAT SERPL: 7 (ref 7–25)
CALCIUM SPEC-SCNC: 9.6 MG/DL (ref 8.6–10.5)
CHLORIDE SERPL-SCNC: 104 MMOL/L (ref 98–107)
CLARITY UR: ABNORMAL
CO2 SERPL-SCNC: 24 MMOL/L (ref 22–29)
COLOR UR: YELLOW
CREAT SERPL-MCNC: 1.43 MG/DL (ref 0.76–1.27)
DEPRECATED RDW RBC AUTO: 45.5 FL (ref 37–54)
EGFRCR SERPLBLD CKD-EPI 2021: 48.6 ML/MIN/1.73
EOSINOPHIL # BLD AUTO: 0.15 10*3/MM3 (ref 0–0.4)
EOSINOPHIL NFR BLD AUTO: 2.1 % (ref 0.3–6.2)
ERYTHROCYTE [DISTWIDTH] IN BLOOD BY AUTOMATED COUNT: 13.8 % (ref 12.3–15.4)
FOLATE SERPL-MCNC: 4.92 NG/ML (ref 4.78–24.2)
GLOBULIN UR ELPH-MCNC: 2.3 GM/DL
GLUCOSE SERPL-MCNC: 91 MG/DL (ref 65–99)
GLUCOSE UR STRIP-MCNC: NEGATIVE MG/DL
HCT VFR BLD AUTO: 46.8 % (ref 37.5–51)
HGB BLD-MCNC: 15.5 G/DL (ref 13–17.7)
HGB UR QL STRIP.AUTO: ABNORMAL
HYALINE CASTS UR QL AUTO: ABNORMAL /LPF
IMM GRANULOCYTES # BLD AUTO: 0.03 10*3/MM3 (ref 0–0.05)
IMM GRANULOCYTES NFR BLD AUTO: 0.4 % (ref 0–0.5)
KETONES UR QL STRIP: NEGATIVE
LEUKOCYTE ESTERASE UR QL STRIP.AUTO: ABNORMAL
LIPASE SERPL-CCNC: 31 U/L (ref 13–60)
LYMPHOCYTES # BLD AUTO: 1.59 10*3/MM3 (ref 0.7–3.1)
LYMPHOCYTES NFR BLD AUTO: 22.2 % (ref 19.6–45.3)
MCH RBC QN AUTO: 29.8 PG (ref 26.6–33)
MCHC RBC AUTO-ENTMCNC: 33.1 G/DL (ref 31.5–35.7)
MCV RBC AUTO: 90 FL (ref 79–97)
MONOCYTES # BLD AUTO: 0.58 10*3/MM3 (ref 0.1–0.9)
MONOCYTES NFR BLD AUTO: 8.1 % (ref 5–12)
NEUTROPHILS NFR BLD AUTO: 4.74 10*3/MM3 (ref 1.7–7)
NEUTROPHILS NFR BLD AUTO: 66.1 % (ref 42.7–76)
NITRITE UR QL STRIP: NEGATIVE
NRBC BLD AUTO-RTO: 0 /100 WBC (ref 0–0.2)
PH UR STRIP.AUTO: 7 [PH] (ref 5–8)
PLATELET # BLD AUTO: 252 10*3/MM3 (ref 140–450)
PMV BLD AUTO: 9.4 FL (ref 6–12)
POTASSIUM SERPL-SCNC: 4 MMOL/L (ref 3.5–5.2)
PROT SERPL-MCNC: 6.2 G/DL (ref 6–8.5)
PROT UR QL STRIP: NEGATIVE
RBC # BLD AUTO: 5.2 10*6/MM3 (ref 4.14–5.8)
RBC # UR STRIP: ABNORMAL /HPF
REF LAB TEST METHOD: ABNORMAL
SODIUM SERPL-SCNC: 139 MMOL/L (ref 136–145)
SP GR UR STRIP: 1.01 (ref 1–1.03)
SQUAMOUS #/AREA URNS HPF: ABNORMAL /HPF
TSH SERPL DL<=0.05 MIU/L-ACNC: 60.67 UIU/ML (ref 0.27–4.2)
UROBILINOGEN UR QL STRIP: ABNORMAL
VIT B12 BLD-MCNC: 593 PG/ML (ref 211–946)
WBC # UR STRIP: ABNORMAL /HPF
WBC NRBC COR # BLD: 7.17 10*3/MM3 (ref 3.4–10.8)

## 2023-05-17 PROCEDURE — 93005 ELECTROCARDIOGRAM TRACING: CPT | Performed by: PHYSICIAN ASSISTANT

## 2023-05-17 PROCEDURE — 87086 URINE CULTURE/COLONY COUNT: CPT | Performed by: INTERNAL MEDICINE

## 2023-05-17 PROCEDURE — 84443 ASSAY THYROID STIM HORMONE: CPT | Performed by: INTERNAL MEDICINE

## 2023-05-17 PROCEDURE — G0378 HOSPITAL OBSERVATION PER HR: HCPCS

## 2023-05-17 PROCEDURE — 99223 1ST HOSP IP/OBS HIGH 75: CPT | Performed by: INTERNAL MEDICINE

## 2023-05-17 PROCEDURE — 36415 COLL VENOUS BLD VENIPUNCTURE: CPT

## 2023-05-17 PROCEDURE — 80053 COMPREHEN METABOLIC PANEL: CPT | Performed by: EMERGENCY MEDICINE

## 2023-05-17 PROCEDURE — 70450 CT HEAD/BRAIN W/O DYE: CPT

## 2023-05-17 PROCEDURE — 25010000002 LEVOFLOXACIN PER 250 MG: Performed by: INTERNAL MEDICINE

## 2023-05-17 PROCEDURE — 96372 THER/PROPH/DIAG INJ SC/IM: CPT

## 2023-05-17 PROCEDURE — 82746 ASSAY OF FOLIC ACID SERUM: CPT | Performed by: INTERNAL MEDICINE

## 2023-05-17 PROCEDURE — 81001 URINALYSIS AUTO W/SCOPE: CPT | Performed by: EMERGENCY MEDICINE

## 2023-05-17 PROCEDURE — 99284 EMERGENCY DEPT VISIT MOD MDM: CPT

## 2023-05-17 PROCEDURE — 85025 COMPLETE CBC W/AUTO DIFF WBC: CPT | Performed by: EMERGENCY MEDICINE

## 2023-05-17 PROCEDURE — 25010000002 HEPARIN (PORCINE) PER 1000 UNITS: Performed by: INTERNAL MEDICINE

## 2023-05-17 PROCEDURE — 82607 VITAMIN B-12: CPT | Performed by: INTERNAL MEDICINE

## 2023-05-17 PROCEDURE — 83690 ASSAY OF LIPASE: CPT | Performed by: EMERGENCY MEDICINE

## 2023-05-17 RX ORDER — ACETAMINOPHEN 650 MG/1
650 SUPPOSITORY RECTAL EVERY 4 HOURS PRN
Status: DISCONTINUED | OUTPATIENT
Start: 2023-05-17 | End: 2023-06-04 | Stop reason: HOSPADM

## 2023-05-17 RX ORDER — SODIUM CHLORIDE 9 MG/ML
100 INJECTION, SOLUTION INTRAVENOUS CONTINUOUS
Status: ACTIVE | OUTPATIENT
Start: 2023-05-17 | End: 2023-05-18

## 2023-05-17 RX ORDER — LEVOTHYROXINE SODIUM 0.15 MG/1
150 TABLET ORAL
Status: DISCONTINUED | OUTPATIENT
Start: 2023-05-18 | End: 2023-05-18

## 2023-05-17 RX ORDER — BISACODYL 10 MG
10 SUPPOSITORY, RECTAL RECTAL DAILY PRN
Status: DISCONTINUED | OUTPATIENT
Start: 2023-05-17 | End: 2023-06-04 | Stop reason: HOSPADM

## 2023-05-17 RX ORDER — SODIUM CHLORIDE 9 MG/ML
40 INJECTION, SOLUTION INTRAVENOUS AS NEEDED
Status: DISCONTINUED | OUTPATIENT
Start: 2023-05-17 | End: 2023-05-26

## 2023-05-17 RX ORDER — BISACODYL 5 MG/1
5 TABLET, DELAYED RELEASE ORAL DAILY PRN
Status: DISCONTINUED | OUTPATIENT
Start: 2023-05-17 | End: 2023-06-04 | Stop reason: HOSPADM

## 2023-05-17 RX ORDER — BUSPIRONE HYDROCHLORIDE 10 MG/1
10 TABLET ORAL EVERY 12 HOURS SCHEDULED
Status: DISCONTINUED | OUTPATIENT
Start: 2023-05-17 | End: 2023-05-19

## 2023-05-17 RX ORDER — ACETAMINOPHEN 325 MG/1
650 TABLET ORAL EVERY 4 HOURS PRN
Status: DISCONTINUED | OUTPATIENT
Start: 2023-05-17 | End: 2023-06-04 | Stop reason: HOSPADM

## 2023-05-17 RX ORDER — POLYETHYLENE GLYCOL 3350 17 G/17G
17 POWDER, FOR SOLUTION ORAL DAILY PRN
Status: DISCONTINUED | OUTPATIENT
Start: 2023-05-17 | End: 2023-06-04 | Stop reason: HOSPADM

## 2023-05-17 RX ORDER — CLOPIDOGREL BISULFATE 75 MG/1
75 TABLET ORAL DAILY
Status: DISCONTINUED | OUTPATIENT
Start: 2023-05-18 | End: 2023-06-04 | Stop reason: HOSPADM

## 2023-05-17 RX ORDER — QUETIAPINE FUMARATE 25 MG/1
25 TABLET, FILM COATED ORAL 2 TIMES DAILY PRN
Status: DISCONTINUED | OUTPATIENT
Start: 2023-05-17 | End: 2023-05-24

## 2023-05-17 RX ORDER — HYDROXYZINE HYDROCHLORIDE 10 MG/1
10 TABLET, FILM COATED ORAL ONCE
Status: COMPLETED | OUTPATIENT
Start: 2023-05-17 | End: 2023-05-17

## 2023-05-17 RX ORDER — MEMANTINE HYDROCHLORIDE 5 MG/1
5 TABLET ORAL DAILY
Status: DISCONTINUED | OUTPATIENT
Start: 2023-05-18 | End: 2023-05-19

## 2023-05-17 RX ORDER — TERAZOSIN 2 MG/1
2 CAPSULE ORAL NIGHTLY
Status: DISCONTINUED | OUTPATIENT
Start: 2023-05-17 | End: 2023-06-04 | Stop reason: HOSPADM

## 2023-05-17 RX ORDER — AMLODIPINE BESYLATE 5 MG/1
5 TABLET ORAL DAILY
Status: DISCONTINUED | OUTPATIENT
Start: 2023-05-18 | End: 2023-05-30

## 2023-05-17 RX ORDER — AMOXICILLIN 250 MG
2 CAPSULE ORAL 2 TIMES DAILY PRN
Status: DISCONTINUED | OUTPATIENT
Start: 2023-05-17 | End: 2023-06-04 | Stop reason: HOSPADM

## 2023-05-17 RX ORDER — ACETAMINOPHEN 160 MG/5ML
650 SOLUTION ORAL EVERY 4 HOURS PRN
Status: DISCONTINUED | OUTPATIENT
Start: 2023-05-17 | End: 2023-06-04 | Stop reason: HOSPADM

## 2023-05-17 RX ORDER — HEPARIN SODIUM 5000 [USP'U]/ML
5000 INJECTION, SOLUTION INTRAVENOUS; SUBCUTANEOUS EVERY 8 HOURS SCHEDULED
Status: DISCONTINUED | OUTPATIENT
Start: 2023-05-17 | End: 2023-06-04 | Stop reason: HOSPADM

## 2023-05-17 RX ORDER — ONDANSETRON 4 MG/1
4 TABLET, FILM COATED ORAL EVERY 6 HOURS PRN
Status: DISCONTINUED | OUTPATIENT
Start: 2023-05-17 | End: 2023-06-04 | Stop reason: HOSPADM

## 2023-05-17 RX ORDER — ALLOPURINOL 300 MG/1
300 TABLET ORAL DAILY
Status: DISCONTINUED | OUTPATIENT
Start: 2023-05-18 | End: 2023-05-19

## 2023-05-17 RX ORDER — ATORVASTATIN CALCIUM 40 MG/1
80 TABLET, FILM COATED ORAL NIGHTLY
Status: DISCONTINUED | OUTPATIENT
Start: 2023-05-17 | End: 2023-06-04 | Stop reason: HOSPADM

## 2023-05-17 RX ORDER — ASPIRIN 81 MG/1
81 TABLET, CHEWABLE ORAL DAILY
Status: DISCONTINUED | OUTPATIENT
Start: 2023-05-18 | End: 2023-06-04 | Stop reason: HOSPADM

## 2023-05-17 RX ORDER — POLYETHYLENE GLYCOL 3350 17 G/17G
17 POWDER, FOR SOLUTION ORAL DAILY
Status: DISCONTINUED | OUTPATIENT
Start: 2023-05-17 | End: 2023-06-04 | Stop reason: HOSPADM

## 2023-05-17 RX ORDER — SODIUM CHLORIDE 0.9 % (FLUSH) 0.9 %
10 SYRINGE (ML) INJECTION AS NEEDED
Status: DISCONTINUED | OUTPATIENT
Start: 2023-05-17 | End: 2023-05-26

## 2023-05-17 RX ORDER — SODIUM CHLORIDE 0.9 % (FLUSH) 0.9 %
10 SYRINGE (ML) INJECTION EVERY 12 HOURS SCHEDULED
Status: DISCONTINUED | OUTPATIENT
Start: 2023-05-17 | End: 2023-05-26

## 2023-05-17 RX ORDER — CHOLECALCIFEROL (VITAMIN D3) 125 MCG
5 CAPSULE ORAL NIGHTLY PRN
Status: DISCONTINUED | OUTPATIENT
Start: 2023-05-17 | End: 2023-05-18

## 2023-05-17 RX ORDER — PANTOPRAZOLE SODIUM 40 MG/1
40 TABLET, DELAYED RELEASE ORAL DAILY
Status: DISCONTINUED | OUTPATIENT
Start: 2023-05-18 | End: 2023-06-04 | Stop reason: HOSPADM

## 2023-05-17 RX ORDER — LEVOFLOXACIN 5 MG/ML
750 INJECTION, SOLUTION INTRAVENOUS
Status: DISCONTINUED | OUTPATIENT
Start: 2023-05-17 | End: 2023-05-19

## 2023-05-17 RX ORDER — FINASTERIDE 5 MG/1
5 TABLET, FILM COATED ORAL DAILY
Status: DISCONTINUED | OUTPATIENT
Start: 2023-05-18 | End: 2023-06-04 | Stop reason: HOSPADM

## 2023-05-17 RX ORDER — ONDANSETRON 2 MG/ML
4 INJECTION INTRAMUSCULAR; INTRAVENOUS EVERY 6 HOURS PRN
Status: DISCONTINUED | OUTPATIENT
Start: 2023-05-17 | End: 2023-06-04 | Stop reason: HOSPADM

## 2023-05-17 RX ADMIN — HEPARIN SODIUM 5000 UNITS: 5000 INJECTION, SOLUTION INTRAVENOUS; SUBCUTANEOUS at 21:00

## 2023-05-17 RX ADMIN — ATORVASTATIN CALCIUM 80 MG: 40 TABLET, FILM COATED ORAL at 21:00

## 2023-05-17 RX ADMIN — LEVOFLOXACIN 750 MG: 750 INJECTION, SOLUTION INTRAVENOUS at 18:12

## 2023-05-17 RX ADMIN — POLYETHYLENE GLYCOL 3350 17 G: 17 POWDER, FOR SOLUTION ORAL at 17:57

## 2023-05-17 RX ADMIN — SODIUM CHLORIDE 100 ML/HR: 9 INJECTION, SOLUTION INTRAVENOUS at 17:00

## 2023-05-17 RX ADMIN — BUSPIRONE HYDROCHLORIDE 10 MG: 10 TABLET ORAL at 21:00

## 2023-05-17 RX ADMIN — TERAZOSIN HYDROCHLORIDE 2 MG: 2 CAPSULE ORAL at 21:00

## 2023-05-17 RX ADMIN — Medication 5 MG: at 22:42

## 2023-05-17 RX ADMIN — QUETIAPINE FUMARATE 25 MG: 25 TABLET ORAL at 19:39

## 2023-05-17 RX ADMIN — HYDROXYZINE HYDROCHLORIDE 10 MG: 10 TABLET ORAL at 22:42

## 2023-05-17 NOTE — H&P
[facility] Medicine Services  HISTORY AND PHYSICAL    Patient Name: Domitila Bosch  : 1940  MRN: [id number]  Primary Care Physician: Marcin Ferguson MD  Date of admission: 2023      Subjective   Subjective     Chief Complaint: confusion      HPI:  Domitila Bosch is a 82 y.o. male with dementia who lives with his daughter.  EMS was called due to increased confusion/agitation at home.  While EMS was at the house apparently his daughter had a seizure and they were both seen in the ED.  He was found to have a UTI and creatinine elevation beyond baseline and admission was requested for further management.  History is limited due to patient's dementia.  Nephew at bedside provides some history but unsure of all details.      Review of Systems   Gen- No fevers, chills  CV- No chest pain, palpitations  Resp- No cough, dyspnea  GI- No N/V/D, abd pain    ROS not reliable due to dementia      Personal History     Past Medical History:   Diagnosis Date   • Anxiety disorder 2017   • Asthma 2017   • Basal cell carcinoma in situ of skin 2019    right leg    • Carotid stenosis 2017   • Dementia    • Diaphoresis    • Diastolic dysfunction    • Diverticulitis    • Dyslipidemia 2017   • GERD (gastroesophageal reflux disease) 2017   • Gout 2017   • Herpes zoster     Herpes zoster, [id number], right lower extremity.    • Hyperlipidemia    • Hypertension 2017   • Hypothyroidism 2017   • LVH (left ventricular hypertrophy)    • Malignant melanoma 2017   • Melanoma    • Mitral regurgitation    • Nephrolithiasis    • Post herpetic neuralgia 2017   • TIA (transient ischemic attack)     TIA, 2012, with nonobstructive carotid stenosis, dyslipidemia and hypertension         Oncology Problem List:  Malignant melanoma (2017; Status: Active)       Past Surgical History:   Procedure Laterality Date   • ABDOMINAL SURGERY     •  APPENDECTOMY     • COLON RESECTION LEFT  2016   • COLON RESECTION LEFT  2016   • CYST REMOVAL      left side of neck.    • HEEL SPUR SURGERY  1999   • HERNIA REPAIR      hiatal hernia    • NISSEN FUNDOPLICATION  1984   • OTHER SURGICAL HISTORY  2010    Malignant melanoma, status post resection        Family History: family history includes Heart attack in his mother; Heart disease in his mother; Hyperlipidemia in his mother; Stroke in his mother.     Social History:  reports that he quit smoking about 25 years ago. His smoking use included cigars and pipe. He has never used smokeless tobacco. He reports that he does not drink alcohol and does not use drugs.  Social History     Social History Narrative    Retired         Medications:  Available home medication information reviewed.  Medications Prior to Admission   Medication Sig Dispense Refill Last Dose   • allopurinol (ZYLOPRIM) 300 MG tablet Take 300 mg by mouth daily.      • amLODIPine (Norvasc) 5 MG tablet Take 5 mg by mouth Daily.      • ASPIRIN 81 PO Take 81 mg by mouth Daily.      • atorvastatin (LIPITOR) 80 MG tablet Take 1 tablet by mouth Every Night. 90 tablet 3    • busPIRone (BUSPAR) 10 MG tablet Take 10 mg by mouth 2 (Two) Times a Day.      • Cholecalciferol 125 MCG (5000 UT) tablet Take 5,000 Units by mouth Daily.      • clopidogrel (PLAVIX) 75 MG tablet Take 1 tablet by mouth Daily. 30 tablet 0    • doxazosin (CARDURA) 4 MG tablet Take 4 mg by mouth daily.      • finasteride (PROSCAR) 5 MG tablet Take 5 mg by mouth Daily.      • levothyroxine (Euthyrox) 150 MCG tablet Take 150 mcg by mouth Daily.      • levothyroxine (SYNTHROID, LEVOTHROID) 150 MCG tablet Take 1 tablet by mouth Daily. 30 tablet 0    • loratadine (Claritin) 10 MG tablet Take 1 tablet by mouth Daily As Needed (Allergies).      • memantine (NAMENDA) 5 MG tablet Take 5 mg by mouth Daily.      • omeprazole (PriLOSEC) 40 MG capsule Take 40 mg by mouth Daily.      • polyethylene  glycol (MIRALAX) 17 g packet Take 17 g by mouth Daily. 30 packet 1    • Psyllium (METAMUCIL PO) Take 1 tablet by mouth Daily.      • sertraline (ZOLOFT) 50 MG tablet Take 50 mg by mouth Daily.      • vitamin B-12 (CYANOCOBALAMIN) 2500 MCG sublingual tablet tablet Place 2,500 mcg under the tongue Daily.          Allergies   Allergen Reactions   • Cephalexin Rash   • Lisinopril Angioedema   • Sulfamethoxazole-Trimethoprim Rash   • Betadine [Povidone Iodine] Other (See Comments)     Skin Burning    • Iodine Other (See Comments)     Skin Burning    • Flomax [Tamsulosin Hcl] Rash   • Penicillins Rash   • Pseudoephedrine Rash       Objective   Objective     Vital Signs:   Temp:  [98.1 °F (36.7 °C)-98.7 °F (37.1 °C)] 98.7 °F (37.1 °C)  Heart Rate:  [68-74] 74  Resp:  [18-20] 18  BP: (164-168)/(90-99) 164/90       Physical Exam   Constitutional: No acute distress, awake, alert, laying in bed  HENT: NCAT, mucous membranes moist  Respiratory: Respiratory effort normal   Cardiovascular: RRR, no murmurs, rubs, or gallops  Gastrointestinal: Positive bowel sounds, soft, nontender, nondistended  Musculoskeletal: No bilateral ankle edema  Psychiatric: Appropriate affect, cooperative  Neurologic: Speech clear and fluent, oriented x 1  Skin: No rashes on exposed surfaces    Result Review:  I have personally reviewed the results from the time of this admission to 5/17/2023 19:16 EDT and agree with these findings:  [x]  Laboratory list / accordion  []  Microbiology  []  Radiology  []  EKG/Telemetry   []  Cardiology/Vascular   []  Pathology  [x]  Old records  []  Other:  Most notable findings include:       LAB RESULTS:      Lab 05/17/23  1133   WBC 7.17   HEMOGLOBIN 15.5   HEMATOCRIT 46.8   PLATELETS 252   NEUTROS ABS 4.74   IMMATURE GRANS (ABS) 0.03   LYMPHS ABS 1.59   MONOS ABS 0.58   EOS ABS 0.15   MCV 90.0         Lab 05/17/23  1133   SODIUM 139   POTASSIUM 4.0   CHLORIDE 104   CO2 24.0   ANION GAP 11.0   BUN 10   CREATININE 1.43*    EGFR 48.6*   GLUCOSE 91   CALCIUM 9.6   TSH 60.670*         Lab 05/17/23  1133   TOTAL PROTEIN 6.2   ALBUMIN 3.9   GLOBULIN 2.3   ALT (SGPT) 8   AST (SGOT) 14   BILIRUBIN 0.4   ALK PHOS 126*   LIPASE 31                     UA        5/17/2023    12:32   Urinalysis   Squamous Epithelial Cells, UA 0-2     Specific Gravity, UA 1.013     Ketones, UA Negative     Blood, UA Trace     Leukocytes, UA Large (3+)     Nitrite, UA Negative     RBC, UA 3-6     WBC, UA Too Numerous to Count     Bacteria, UA None Seen         Microbiology Results (last 10 days)     ** No results found for the last 240 hours. **          CT Head Without Contrast    Result Date: 5/17/2023  CT HEAD WO CONTRAST Date of Exam: 5/17/2023 11:45 AM EDT Indication: acute on chronic alteration in mental status. Comparison: MRI brain from September 9, 2021 and CT head from July 3, 2021 Technique: Axial CT images were obtained of the head without contrast administration.  Reconstructed coronal and sagittal images were also obtained. Automated exposure control and iterative construction methods were used. Findings: No acute intracranial hemorrhage or extra-axial collection is identified. The ventricles appear normal in caliber, with no evidence of mass effect or midline shift. The basal cisterns appear patent. The gray-white differentiation appears preserved. The calvarium appear intact. The paranasal sinuses are clear. The mastoid air cells are well-aerated. There is mild generalized parenchymal atrophy. Patchy areas of periventricular and subcortical white matter hypodensities are nonspecific, but likely the sequela of moderate chronic small vessel ischemic disease.     Impression: Impression: 1.No acute intracranial process or calvarial fracture identified. 2.Findings suggestive of moderate chronic small vessel ischemic disease. Electronically Signed: Shravan Yarbrough  5/17/2023 12:03 PM EDT  Workstation ID: [id number]      Results for orders placed during  the hospital encounter of 07/28/20    Adult Transesophageal Echo (SUMMER) W/ Cont if Necessary Per Protocol    Interpretation Summary  · Estimated EF = 65%.  · Left ventricular systolic function is normal.  · Left atrial cavity size is mild-to-moderately dilated.  · There is mild calcification of the aortic valve mainly affecting the non and right coronary cusp(s).  · Saline test results are negative.  · The aortic valve exhibits moderate sclerosis.  · There is no evidence of pericardial effusion.  · There is moderate (grade 2) protruding plaque in the descending aorta present.  · No valvular vegetations demonstrated.      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  B12, folate pending.  -Case management following    CKD III with elevated creatinine  -Gentle IVF overnight  -BMP in am    UTI  -Add urine culture  -Due to numerous allergies and prior culture with enterococcus faecalis, will start with levofloxacin  -EKG to monitor QT    Hypothyroidism  -Continue levothyroxine  -TSH is 60.67  -Will need to verify with his daughter if he is taking appropriately or if he needs dose adjusted    Anxiety disorder  -Continue home meds based on last discharge summary    Need pharmacy's help to verify home med list and adjust as needed    I tried to call his daughter a couple of times but as she has her own health issues going on today, was unable to reach her to verify code status and home medications.  Will need to try again to call her.    DVT prophylaxis:  SQ heparin    Total time spent: 75 minutes  Time spent includes time reviewing chart, face-to-face time, counseling  patient/family/caregiver, ordering medications/tests/procedures, communicating with other health care professionals, documenting clinical information in the electronic health record, and coordination of care.    CODE STATUS:    Code Status and Medical Interventions:   Ordered at: 05/17/23 1658     Code Status (Patient has no pulse and is not breathing):    CPR (Attempt to Resuscitate)     Medical Interventions (Patient has pulse or is breathing):    Full Support     Comments:    Unable to reach daughter to discuss-will try again later       Expected Discharge   Expected Discharge Date: 5/22/2023; Expected Discharge Time:      Susan Rodriguez MD  05/17/23

## 2023-05-17 NOTE — ED PROVIDER NOTES
Subjective   History of Present Illness  Patient is an 82-year-old male presenting the emergency department with increased confusion with episodes of agitation.  EMS was called secondary to this to the family home.  Patient lives at home with his daughter.  Patient has a history of dementia which has been getting progressively worse.  The daughter gives most of the information in conjunction with EMS and advises that he has gotten more confused.  She still feels as though she can care for him at home.  She does not feel unsafe at this point.    History provided by:  EMS personnel and relative  History limited by:  Dementia      Review of Systems    Past Medical History:   Diagnosis Date   • Anxiety disorder 02/16/2017   • Asthma 02/16/2017   • Basal cell carcinoma in situ of skin 2019    right leg    • Carotid stenosis 02/16/2017   • Dementia    • Diaphoresis    • Diastolic dysfunction 2014   • Diverticulitis    • Dyslipidemia 02/16/2017   • GERD (gastroesophageal reflux disease) 02/16/2017   • Gout 02/16/2017   • Herpes zoster     Herpes zoster, [id number], right lower extremity.    • Hyperlipidemia    • Hypertension 02/16/2017   • Hypothyroidism 02/16/2017   • LVH (left ventricular hypertrophy)    • Malignant melanoma 02/16/2017   • Melanoma    • Mitral regurgitation 2014   • Nephrolithiasis    • Post herpetic neuralgia 02/16/2017   • TIA (transient ischemic attack)     TIA, June 2012, with nonobstructive carotid stenosis, dyslipidemia and hypertension       Allergies   Allergen Reactions   • Cephalexin Rash   • Lisinopril Angioedema   • Sulfamethoxazole-Trimethoprim Rash   • Betadine [Povidone Iodine] Other (See Comments)     Skin Burning    • Iodine Other (See Comments)     Skin Burning    • Flomax [Tamsulosin Hcl] Rash   • Penicillins Rash   • Pseudoephedrine Rash       Past Surgical History:   Procedure Laterality Date   • ABDOMINAL SURGERY     • APPENDECTOMY     • COLON RESECTION LEFT  2016   • COLON RESECTION  LEFT  2016   • CYST REMOVAL      left side of neck.    • HEEL SPUR SURGERY     • HERNIA REPAIR      hiatal hernia    • NISSEN FUNDOPLICATION     • OTHER SURGICAL HISTORY  2010    Malignant melanoma, status post resection        Family History   Problem Relation Age of Onset   • Hyperlipidemia Mother    • Heart disease Mother    • Stroke Mother    • Heart attack Mother        Social History     Socioeconomic History   • Marital status:    Tobacco Use   • Smoking status: Former     Types: Cigars, Pipe     Quit date:      Years since quittin.3   • Smokeless tobacco: Never   Substance and Sexual Activity   • Alcohol use: No   • Drug use: No   • Sexual activity: Defer           Objective   Physical Exam  Vitals and nursing note reviewed.   Cardiovascular:      Rate and Rhythm: Normal rate and regular rhythm.      Heart sounds: Normal heart sounds.   Pulmonary:      Effort: Pulmonary effort is normal. No respiratory distress.      Breath sounds: Normal breath sounds.   Abdominal:      Palpations: Abdomen is soft.   Musculoskeletal:         General: Normal range of motion.      Comments: Ambulatory without assistance.   Neurological:      Mental Status: He is alert.      GCS: GCS eye subscore is 4. GCS verbal subscore is 4. GCS motor subscore is 6.      Comments: Patient ambulatory and answers most questions though there is clear confusion consistent with dementia.   Psychiatric:         Mood and Affect: Mood normal.         Behavior: Behavior normal.         Procedures           ED Course  ED Course as of 23 1614   Wed May 17, 2023   1230 Creatinine(!): 1.43  Mild DANIS.  [RS]   1230 CT Head Without Contrast  Personally reviewed CT scan demonstrating no focal hemorrhage or mass effect.  See report from radiology for details. [RS]   1258 Lengthy discussion with family and they are amenable to assistance and potential placement in this patient secondary to his progressive confusion and  Alzheimer's dementia.  We will plan admission for further evaluation and management. [RS]      ED Course User Index  [RS] Hair Fuentes MD                                           Medical Decision Making  Acute on chronic alteration in mental status: acute illness or injury  Agitation: acute illness or injury  DANIS (acute kidney injury): acute illness or injury  Elevated blood pressure reading with diagnosis of hypertension: acute illness or injury  Rapidly progressive dementia: acute illness or injury  Amount and/or Complexity of Data Reviewed  Independent Historian: caregiver and EMS  Labs: ordered. Decision-making details documented in ED Course.  Radiology: ordered. Decision-making details documented in ED Course.      Risk  Decision regarding hospitalization.          Final diagnoses:   Rapidly progressive dementia   Agitation   Acute on chronic alteration in mental status   Elevated blood pressure reading with diagnosis of hypertension   DANIS (acute kidney injury)       ED Disposition  ED Disposition     ED Disposition   Decision to Admit    Condition   --    Comment   Level of Care: Med/Surg [1]   Diagnosis: Rapidly progressive dementia [867777]   Admitting Physician: JASPER PACHECO [[id number]]   Attending Physician: JASPER PACHECO [[id number]]               No follow-up provider specified.       Medication List      No changes were made to your prescriptions during this visit.          Hair Fuentes MD  05/17/23 [id number]

## 2023-05-17 NOTE — CASE MANAGEMENT/SOCIAL WORK
Discharge Planning Assessment  [facility]     Patient Name: Domitila Bosch  MRN: [id number]  Today's Date: 2023    Admit Date: 2023    Plan: Discharge plan   Discharge Needs Assessment    No documentation.                Discharge Plan     Row Name 23 1232       Plan    Plan Discharge plan    Patient/Family in Agreement with Plan yes    Plan Comments I was called by Efrain, primary RN, as Mr Bosch has been taken care of by his daughter and she is no longer able to take care of him.  Mr Bosch is able to perform tasks physically, however his dementia has progressed.  I spoke with Mr Bosch's daughter and her best friend at bedside.  Mr Bosch is currently residing with his daughter in a house, and she has no other assistance from any family.  She called EMS today for him to be assessed, but while EMS was at their house, she had a seizure.  At this time, she feels as though she is unable to take care of him.  Per her best friend, Mr Bosch has attempted to grab her, and while in the ER, keeps stating that he wants to see his wife, even though his wife is . During his ER visit, Mr Bosch has not been aggressive with staff.  I was able to print out information for both [facility] and private sitters.  I have given this information to Mr Bosch's daughter and her best friend.  At this time, there are no other needs.    Final Discharge Disposition Code 01 - home or self-care              Continued Care and Services - Admitted Since 2023    Coordination has not been started for this encounter.          Demographic Summary    No documentation.                Functional Status    No documentation.                Psychosocial    No documentation.                Abuse/Neglect    No documentation.                Legal    No documentation.                Substance Abuse    No documentation.                Patient Forms    No documentation.                   Lashawn Gayle RN

## 2023-05-18 LAB
ANION GAP SERPL CALCULATED.3IONS-SCNC: 8 MMOL/L (ref 5–15)
BACTERIA SPEC AEROBE CULT: NO GROWTH
BUN SERPL-MCNC: 10 MG/DL (ref 8–23)
BUN/CREAT SERPL: 7.1 (ref 7–25)
CALCIUM SPEC-SCNC: 8.4 MG/DL (ref 8.6–10.5)
CHLORIDE SERPL-SCNC: 109 MMOL/L (ref 98–107)
CO2 SERPL-SCNC: 23 MMOL/L (ref 22–29)
CREAT SERPL-MCNC: 1.4 MG/DL (ref 0.76–1.27)
EGFRCR SERPLBLD CKD-EPI 2021: 49.9 ML/MIN/1.73
GLUCOSE SERPL-MCNC: 95 MG/DL (ref 65–99)
POTASSIUM SERPL-SCNC: 4 MMOL/L (ref 3.5–5.2)
QT INTERVAL: 442 MS
QTC INTERVAL: 467 MS
SODIUM SERPL-SCNC: 140 MMOL/L (ref 136–145)

## 2023-05-18 PROCEDURE — 96372 THER/PROPH/DIAG INJ SC/IM: CPT

## 2023-05-18 PROCEDURE — 97535 SELF CARE MNGMENT TRAINING: CPT

## 2023-05-18 PROCEDURE — 97162 PT EVAL MOD COMPLEX 30 MIN: CPT

## 2023-05-18 PROCEDURE — 97110 THERAPEUTIC EXERCISES: CPT

## 2023-05-18 PROCEDURE — G0378 HOSPITAL OBSERVATION PER HR: HCPCS

## 2023-05-18 PROCEDURE — 80048 BASIC METABOLIC PNL TOTAL CA: CPT | Performed by: INTERNAL MEDICINE

## 2023-05-18 PROCEDURE — 97165 OT EVAL LOW COMPLEX 30 MIN: CPT

## 2023-05-18 PROCEDURE — 25010000002 HEPARIN (PORCINE) PER 1000 UNITS: Performed by: INTERNAL MEDICINE

## 2023-05-18 PROCEDURE — 99232 SBSQ HOSP IP/OBS MODERATE 35: CPT | Performed by: INTERNAL MEDICINE

## 2023-05-18 PROCEDURE — 97116 GAIT TRAINING THERAPY: CPT

## 2023-05-18 RX ORDER — LEVOTHYROXINE SODIUM 175 UG/1
175 TABLET ORAL
Status: DISCONTINUED | OUTPATIENT
Start: 2023-05-19 | End: 2023-06-04 | Stop reason: HOSPADM

## 2023-05-18 RX ORDER — CHOLECALCIFEROL (VITAMIN D3) 125 MCG
5 CAPSULE ORAL NIGHTLY
Status: DISCONTINUED | OUTPATIENT
Start: 2023-05-18 | End: 2023-06-04 | Stop reason: HOSPADM

## 2023-05-18 RX ORDER — QUETIAPINE FUMARATE 25 MG/1
25 TABLET, FILM COATED ORAL NIGHTLY
Status: DISCONTINUED | OUTPATIENT
Start: 2023-05-18 | End: 2023-05-19

## 2023-05-18 RX ORDER — HYDROXYZINE HYDROCHLORIDE 10 MG/1
10 TABLET, FILM COATED ORAL 3 TIMES DAILY PRN
Status: DISCONTINUED | OUTPATIENT
Start: 2023-05-18 | End: 2023-06-04 | Stop reason: HOSPADM

## 2023-05-18 RX ADMIN — QUETIAPINE FUMARATE 25 MG: 25 TABLET ORAL at 20:18

## 2023-05-18 RX ADMIN — HYDROXYZINE HYDROCHLORIDE 10 MG: 10 TABLET ORAL at 20:18

## 2023-05-18 RX ADMIN — HEPARIN SODIUM 5000 UNITS: 5000 INJECTION, SOLUTION INTRAVENOUS; SUBCUTANEOUS at 09:11

## 2023-05-18 RX ADMIN — Medication 10 ML: at 11:18

## 2023-05-18 RX ADMIN — ASPIRIN 81 MG 81 MG: 81 TABLET ORAL at 09:11

## 2023-05-18 RX ADMIN — FINASTERIDE 5 MG: 5 TABLET, FILM COATED ORAL at 09:11

## 2023-05-18 RX ADMIN — Medication 10 ML: at 20:21

## 2023-05-18 RX ADMIN — ATORVASTATIN CALCIUM 80 MG: 40 TABLET, FILM COATED ORAL at 20:18

## 2023-05-18 RX ADMIN — MEMANTINE 5 MG: 5 TABLET ORAL at 09:11

## 2023-05-18 RX ADMIN — AMLODIPINE BESYLATE 5 MG: 5 TABLET ORAL at 09:10

## 2023-05-18 RX ADMIN — CLOPIDOGREL BISULFATE 75 MG: 75 TABLET ORAL at 09:11

## 2023-05-18 RX ADMIN — BUSPIRONE HYDROCHLORIDE 10 MG: 10 TABLET ORAL at 20:18

## 2023-05-18 RX ADMIN — SERTRALINE 50 MG: 50 TABLET, FILM COATED ORAL at 09:11

## 2023-05-18 RX ADMIN — POLYETHYLENE GLYCOL 3350 17 G: 17 POWDER, FOR SOLUTION ORAL at 09:11

## 2023-05-18 RX ADMIN — HEPARIN SODIUM 5000 UNITS: 5000 INJECTION, SOLUTION INTRAVENOUS; SUBCUTANEOUS at 21:28

## 2023-05-18 RX ADMIN — PANTOPRAZOLE SODIUM 40 MG: 40 TABLET, DELAYED RELEASE ORAL at 09:11

## 2023-05-18 RX ADMIN — BUSPIRONE HYDROCHLORIDE 10 MG: 10 TABLET ORAL at 09:11

## 2023-05-18 RX ADMIN — ALLOPURINOL 300 MG: 300 TABLET ORAL at 09:11

## 2023-05-18 RX ADMIN — TERAZOSIN HYDROCHLORIDE 2 MG: 2 CAPSULE ORAL at 20:18

## 2023-05-18 RX ADMIN — LEVOTHYROXINE SODIUM 150 MCG: 150 TABLET ORAL at 09:11

## 2023-05-18 RX ADMIN — Medication 5 MG: at 20:18

## 2023-05-18 NOTE — NURSING NOTE
VSS.  Patient rested after PRN medications.  Pleasantly confused.   Voiding.   Moves self in bed.

## 2023-05-18 NOTE — PLAN OF CARE
Goal Outcome Evaluation:         VSS. Patient admitted yesterday for confusion and agitation. No complaints of pain and was able to rest. Started seroquel nightly and PRN atarax. Discharge plan still pending. Will continue to monitor.

## 2023-05-18 NOTE — NURSING NOTE
Paged hospitalist group due to patient's pleasant restlessness and need for additional PRN meds. Medications suggested by RN versus sitter.    Order received for EKG to check QT interval.  6B notified for assistance with EKG at this time.

## 2023-05-18 NOTE — PLAN OF CARE
Goal Outcome Evaluation:                        Admit from ER this afternoon. Alert to self. IVF infusing. Stand by ambulation and to bathroom twice. Pt states that his wife is downstairs in hospital and that he needs to go see her, per family report that his wife is . Family at bedside when pt emptied pockets, nephew took items home. Awaiting further orders.

## 2023-05-18 NOTE — PROGRESS NOTES
[facility] Medicine Services  PROGRESS NOTE    Patient Name: Domitila Bosch  : 1940  MRN: [id number]    Date of Admission: 2023  Primary Care Physician: Marcin Ferguson MD    Subjective   Subjective     CC:  F/U confusion    HPI:  He is doing OK today.  Worried about his daughter.  He tells me she is upstairs.    ROS:  Gen- No fevers  GI- No N/V/D      Objective   Objective     Vital Signs:   Temp:  [98.1 °F (36.7 °C)-98.8 °F (37.1 °C)] 98.1 °F (36.7 °C)  Heart Rate:  [68-82] 77  Resp:  [16-20] 16  BP: (120-168)/(75-99) 120/75     Physical Exam:  Constitutional: No acute distress, awake, alert, sitting up in chair  HENT: NCAT, mucous membranes moist  Respiratory: Respiratory effort normal   Musculoskeletal: No bilateral ankle edema  Psychiatric: Appropriate affect, cooperative  Neurologic: Speech clear and fluent  Skin: No rashes on exposed surfaces    Results Reviewed:  LAB RESULTS:      Lab 23  1133   WBC 7.17   HEMOGLOBIN 15.5   HEMATOCRIT 46.8   PLATELETS 252   NEUTROS ABS 4.74   IMMATURE GRANS (ABS) 0.03   LYMPHS ABS 1.59   MONOS ABS 0.58   EOS ABS 0.15   MCV 90.0         Lab 23  0647 23  1133   SODIUM 140 139   POTASSIUM 4.0 4.0   CHLORIDE 109* 104   CO2 23.0 24.0   ANION GAP 8.0 11.0   BUN 10 10   CREATININE 1.40* 1.43*   EGFR 49.9* 48.6*   GLUCOSE 95 91   CALCIUM 8.4* 9.6   TSH  --  60.670*         Lab 23  1133   TOTAL PROTEIN 6.2   ALBUMIN 3.9   GLOBULIN 2.3   ALT (SGPT) 8   AST (SGOT) 14   BILIRUBIN 0.4   ALK PHOS 126*   LIPASE 31                 Lab 23  1133   FOLATE 4.92   VITAMIN B 12 593         Brief Urine Lab Results  (Last result in the past 365 days)      Color   Clarity   Blood   Leuk Est   Nitrite   Protein   CREAT   Urine HCG        23 1232 Yellow   Cloudy   Trace   Large (3+)   Negative   Negative                 Microbiology Results Abnormal     None          CT Head Without Contrast    Result Date: 2023  CT  HEAD WO CONTRAST Date of Exam: 5/17/2023 11:45 AM EDT Indication: acute on chronic alteration in mental status. Comparison: MRI brain from September 9, 2021 and CT head from July 3, 2021 Technique: Axial CT images were obtained of the head without contrast administration.  Reconstructed coronal and sagittal images were also obtained. Automated exposure control and iterative construction methods were used. Findings: No acute intracranial hemorrhage or extra-axial collection is identified. The ventricles appear normal in caliber, with no evidence of mass effect or midline shift. The basal cisterns appear patent. The gray-white differentiation appears preserved. The calvarium appear intact. The paranasal sinuses are clear. The mastoid air cells are well-aerated. There is mild generalized parenchymal atrophy. Patchy areas of periventricular and subcortical white matter hypodensities are nonspecific, but likely the sequela of moderate chronic small vessel ischemic disease.     Impression: Impression: 1.No acute intracranial process or calvarial fracture identified. 2.Findings suggestive of moderate chronic small vessel ischemic disease. Electronically Signed: Shravan Yarbrough  5/17/2023 12:03 PM EDT  Workstation ID: [id number]      Results for orders placed during the hospital encounter of 07/28/20    Adult Transesophageal Echo (SUMMER) W/ Cont if Necessary Per Protocol    Interpretation Summary  · Estimated EF = 65%.  · Left ventricular systolic function is normal.  · Left atrial cavity size is mild-to-moderately dilated.  · There is mild calcification of the aortic valve mainly affecting the non and right coronary cusp(s).  · [place] test results are negative.  · The aortic valve exhibits moderate sclerosis.  · There is no evidence of pericardial effusion.  · There is moderate (grade 2) protruding plaque in the descending aorta present.  · No valvular vegetations demonstrated.      Current medications:  Scheduled  Meds:allopurinol, 300 mg, Oral, Daily  amLODIPine, 5 mg, Oral, Daily  aspirin, 81 mg, Oral, Daily  atorvastatin, 80 mg, Oral, Nightly  busPIRone, 10 mg, Oral, Q12H  clopidogrel, 75 mg, Oral, Daily  finasteride, 5 mg, Oral, Daily  heparin (porcine), 5,000 Units, Subcutaneous, Q8H  levoFLOXacin, 750 mg, Intravenous, Q48H  levothyroxine, 150 mcg, Oral, Q AM  memantine, 5 mg, Oral, Daily  pantoprazole, 40 mg, Oral, Daily  polyethylene glycol, 17 g, Oral, Daily  sertraline, 50 mg, Oral, Daily  sodium chloride, 10 mL, Intravenous, Q12H  terazosin, 2 mg, Oral, Nightly      Continuous Infusions:Pharmacy Consult - Pharmacy to dose,   sodium chloride, 100 mL/hr, Last Rate: 100 mL/hr (05/18/23 0545)      PRN Meds:.•  acetaminophen **OR** acetaminophen **OR** acetaminophen  •  senna-docusate sodium **AND** polyethylene glycol **AND** bisacodyl **AND** bisacodyl  •  Calcium Replacement - Follow Nurse / BPA Driven Protocol  •  Magnesium Standard Dose Replacement - Follow Nurse / BPA Driven Protocol  •  melatonin  •  ondansetron **OR** ondansetron  •  Pharmacy Consult - Pharmacy to dose  •  Phosphorus Replacement - Follow Nurse / BPA Driven Protocol  •  Potassium Replacement - Follow Nurse / BPA Driven Protocol  •  QUEtiapine  •  sodium chloride  •  sodium chloride    Assessment & Plan   Assessment & Plan     Active Hospital Problems    Diagnosis  POA   • **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        Brief Hospital Course to date:  Domitila Bosch is a 82 y.o. male with dementia who lives with his daughter.  EMS was called due to increased confusion/agitation at home.  While EMS was at the house apparently his daughter had a seizure and they were both seen in the ED.  He was found to have a UTI and creatinine  elevation beyond baseline and admission was requested for further management.     This patient's problems and plans were partially entered by my partner and updated as appropriate by me 05/18/23.    Dementia with worsening confusion  -Continue namenda  -PRN seroquel, add scheduled nightly dose  -TSH elevated as below.  B12, folate WNL  -Case management following     CKD III with elevated creatinine  -S/P gentle IVF  -Stable-this is probably his baseline     UTI  -Urine culture pending  -Due to numerous allergies and prior culture with enterococcus faecalis, started on levaquin  -QT normal on EKG.  May want to get another in a couple of days.     Hypothyroidism  -Continue levothyroxine  -TSH is 60.67  -Have been unable to verify with his daughter if he is taking appropriately-will go ahead and increase dose for now     Anxiety disorder  -Continue home meds based on last discharge summary    His daughter is also admitted to the hospital and I have been unable to reach her.  I was not able to reach the alternate emergency contact today by phone either.    Expected Discharge Location and Transportation: TBD  Expected Discharge TBD  Expected Discharge Date: 5/22/2023; Expected Discharge Time:      DVT prophylaxis:  Medical DVT prophylaxis orders are present.     AM-PAC 6 Clicks Score (PT): 20 (05/17/23 1400)    CODE STATUS:   Code Status and Medical Interventions:   Ordered at: 05/17/23 7083     Code Status (Patient has no pulse and is not breathing):    CPR (Attempt to Resuscitate)     Medical Interventions (Patient has pulse or is breathing):    Full Support     Comments:    Unable to reach daughter to discuss-will try again later       Susan Rodriguez MD  05/18/23

## 2023-05-18 NOTE — CASE MANAGEMENT/SOCIAL WORK
Discharge Planning Assessment  [facility]     Patient Name: Domitila Bosch  MRN: [id number]  Today's Date: 5/18/2023    Admit Date: 5/17/2023    Plan: Ongoing   Discharge Needs Assessment     Row Name 05/18/23 1835       Living Environment    People in Home child(diana), adult    Name(s) of People in Home Lives with adult daughter, Yoly Bosch, in [place]    Current Living Arrangements home    Primary Care Provided by child(diana)    Provides Primary Care For no one, unable/limited ability to care for self    Caregiving Concerns Daughter assists with ADL's    Family Caregiver if Needed child(diana), adult    Family Caregiver Names DaughterYoly @ [phone]    Quality of Family Relationships stressful    Living Arrangement Comments Currently resides in private residence, adult daughter has been living with patient and taking care of him due to dementia.  Daughter also admitted on 5/17/23 s/p seizure at home.  Per CM conversation and medical record, daughter under stress in caregiver role.       Resource/Environmental Concerns    Resource/Environmental Concerns financial;other (see comments)-daughter reported to home health SW unable to afford private caregiver assistance;  provided daughter with community referrals in 2021 with no f/u by daughter.    Financial Concerns other (see comments)-caregiver assistance    Transportation Concerns other (see comments)-unknown, arrived in ED via EMS       Food Insecurity    Within the past 12 months, you worried that your food would run out before you got the money to buy more. Never true-per past notes daughter arranged [employer]    Within the past 12 months, the food you bought just didn't last and you didn't have money to get more. Never true       Transition Planning    Patient/Family Anticipates Transition to other (see comments)-TBD    Patient/Family Anticipated Services at Transition ;other (see comments)-possible APS    Transportation Anticipated  other (see comments)-TBD       Discharge Needs Assessment    Readmission Within the Last 30 Days no previous admission in last 30 days    Equipment Currently Used at Home walker, rolling    Concerns to be Addressed discharge planning;home safety    Concerns Comments Resides in private residence with daughter who is primary caregiver.  Per medical record: multiple falls at home; daughter under stress in role of caregiver; financially unable to afford caregiver assistance; discharged from [facility] on 8/31/21 due to unsafe home environment secondary to multiple falls.  Noted in [facility] d/c note that daughter had not put anything in place that had been recommended by MSW and therapist despite extensive training, education and referrals.    Anticipated Changes Related to Illness inability to care for self    Outpatient/Agency/Support Group Needs other (see comments)-TBD    Discharge Facility/Level of Care Needs other (see comments)-TBD    Patient's Choice of Community Agency(s) TBD    Current Discharge Risk cognitively impaired;dependent with mobility/activities of daily living;financial support inadequate    Discharge Coordination/Progress Ongoing-daughter/caregiver currently hospitalized  Patient unable to care for himself, increased confusion and agitation at home.               Discharge Plan     Row Name 05/18/23 5738       Plan    Plan Ongoing    Plan Comments Patient resides in his own home, adult daughter has been living in home and is primary caregiver.  Both patient and daughter admitted through ED on 5/17/23.  Per medical record, daughter under stress in caregiver role, both daughter and patient s/p multiple falls at home.  Of note [facility] discharged from care on 8/31/21 due to unsafe home environment secondary to multiple falls.  PT/OT currently recommending SNF-patient with Medicare and in observation status, nursing facility placement would most likely be intermediate care, private pay or  pending.  Left  message with patient's sister-in-law Gogo to discuss d/c options/plan.    Final Discharge Disposition Code 30 - still a patient              Continued Care and Services - Admitted Since 5/17/2023    Coordination has not been started for this encounter.       Expected Discharge Date and Time     Expected Discharge Date Expected Discharge Time    May 22, 2023          Demographic Summary     Row Name 05/18/23 8232       General Information    Arrived From emergency department;home    Referral Source admission list;interdisciplinary rounds    Reason for Consult discharge planning;facility placement    Preferred Language English               Functional Status    No documentation.                Psychosocial    No documentation.                Abuse/Neglect    No documentation.                Legal    No documentation.                Substance Abuse    No documentation.                Patient Forms    No documentation.                   JACK Johansen

## 2023-05-18 NOTE — PLAN OF CARE
Goal Outcome Evaluation:  Plan of Care Reviewed With: patient        Progress: no change  Outcome Evaluation: OT evaluation completed with patient demonstrating impaired balance, cognition and endurance impacting BADL task performance and related transfers.  Pt. needed cues throughout session to initiate tasks, sequence tasks and for safety with tasks.  Pt. fatigued quickly with standing activity.  Short SNF stay may be beneficial. prior to home, pending dtr. ability to assist pt. or get pt. assist in home.

## 2023-05-18 NOTE — THERAPY EVALUATION
Patient Name: Domitila Bosch  : 1940    MRN: [id number]                              Today's Date: 2023       Admit Date: 2023    Visit Dx:     ICD-10-CM ICD-9-CM   1. Rapidly progressive dementia  F03.90 294.20   2. Agitation  R45.1 307.9   3. Acute on chronic alteration in mental status  R41.82 780.09   4. Elevated blood pressure reading with diagnosis of hypertension  I10 401.9   5. DANIS (acute kidney injury)  N17.9 584.9     Patient Active Problem List   Diagnosis   • Diverticulosis of large intestine with hemorrhage   • Umbilical hernia   • S/P hernia repair   • Hypertension   • Dyslipidemia   • Carotid stenosis   • Gout   • GERD (gastroesophageal reflux disease)   • Hypothyroidism   • Neuropathy   • Malignant melanoma   • Asthma   • Anxiety disorder   • Diverticulosis   • Muscle pain   • Lumbar radiculopathy   • Kidney stone   • Deviated nasal septum   • Chronic laryngitis   • Acute CVA (cerebrovascular accident)   • Essential hypertension   • Allergy to Betadine   • DANIS (acute kidney injury)   • Severe hypothyroidism   • History of ischemic stroke   • Rapidly progressive dementia   • CKD (chronic kidney disease), stage III     Past Medical History:   Diagnosis Date   • Anxiety disorder 2017   • Asthma 2017   • Basal cell carcinoma in situ of skin 2019    right leg    • Carotid stenosis 2017   • Dementia    • Diaphoresis    • Diastolic dysfunction    • Diverticulitis    • Dyslipidemia 2017   • GERD (gastroesophageal reflux disease) 2017   • Gout 2017   • Herpes zoster     Herpes zoster, [id number], right lower extremity.    • Hyperlipidemia    • Hypertension 2017   • Hypothyroidism 2017   • LVH (left ventricular hypertrophy)    • Malignant melanoma 2017   • Melanoma    • Mitral regurgitation    • Nephrolithiasis    • Post herpetic neuralgia 2017   • TIA (transient ischemic attack)     TIA, 2012, with nonobstructive carotid  stenosis, dyslipidemia and hypertension     Past Surgical History:   Procedure Laterality Date   • ABDOMINAL SURGERY     • APPENDECTOMY     • COLON RESECTION LEFT  2016   • COLON RESECTION LEFT  2016   • CYST REMOVAL      left side of neck.    • HEEL SPUR SURGERY  1999   • HERNIA REPAIR      hiatal hernia    • NISSEN FUNDOPLICATION  1984   • OTHER SURGICAL HISTORY  2010    Malignant melanoma, status post resection       General Information     Row Name 05/18/23 0952          OT Time and Intention    Document Type evaluation  -ROSE     Mode of Treatment individual therapy;occupational therapy  -     Row Name 05/18/23 0952          General Information    Patient Profile Reviewed yes  -ROSE     Prior Level of Function independent:;all household mobility;feeding;grooming;dressing;bathing;dependent:;home management  pt. a limited historian so question accuracy  -     Existing Precautions/Restrictions fall  -     Barriers to Rehab cognitive status  -     Row Name 05/18/23 0952          Occupational Profile    Environmental Supports and Barriers (Occupational Profile) pt. thinks it is a tub shower at home, pt. unsure if uses or has any AD  -     Row Name 05/18/23 0952          Living Environment    People in Home child(diana), adult  dtr. -dtr. with recent seizure and unsure of ability to currently assist pt., per CM note dtr. reports inability to fully care for pt.  -     Row Name 05/18/23 0952          Stairs Within Home, Primary    Stairs, Within Home, Primary pt. unable to state if has steps in home  -     Row Name 05/18/23 0952          Cognition    Orientation Status (Cognition) oriented to;person;verbal cues/prompts needed for orientation;place;disoriented to;situation;time  pt. was able to state type of place, cues for name of place, pt. asking when he came in or if came yesterday over and over, pt. with no orientation to date even with cueing  -     Row Name 05/18/23 0952          Safety Issues,  Functional Mobility    Safety Issues Affecting Function (Mobility) awareness of need for assistance;insight into deficits/self-awareness;safety precaution awareness;problem-solving;judgment  -ROSE     Impairments Affecting Function (Mobility) balance;cognition;endurance/activity tolerance;postural/trunk control  -ROSE     Cognitive Impairments, Mobility Safety/Performance insight into deficits/self-awareness;safety precaution awareness;safety precaution follow-through;judgment;problem-solving/reasoning;awareness, need for assistance  -ROSE     Comment, Safety Issues/Impairments (Mobility) pt. with flexed posture  -ROSE           User Key  (r) = Recorded By, (t) = Taken By, (c) = Cosigned By    Initials Name Provider Type    Kaila Toro, OT Occupational Therapist                 Mobility/ADL's     Row Name 05/18/23 0957          Bed Mobility    Bed Mobility supine-sit;sit-supine;scooting/bridging  -ROSE     Scooting/Bridging [place] (Bed Mobility) standby assist;verbal cues  -ROSE     Supine-Sit [place] (Bed Mobility) standby assist;verbal cues  -ROSE     Sit-Supine [place] (Bed Mobility) standby assist;verbal cues  -ROSE     Bed Mobility, Safety Issues cognitive deficits limit understanding  -ROSE     Assistive Device (Bed Mobility) bed rails;head of bed elevated  -ROSE     Comment, (Bed Mobility) cues to initiate tasks, or to move fully to EOB, cues to scoot to HOB  -ROSE     Row Name 05/18/23 0957          Transfers    Transfers sit-stand transfer;stand-sit transfer  -ROSE     Row Name 05/18/23 0957          Sit-Stand Transfer    Sit-Stand [place] (Transfers) contact guard  -ROSE     Row Name 05/18/23 0957          Stand-Sit Transfer    Stand-Sit [place] (Transfers) contact guard  -ROSE     Row Name 05/18/23 0957          Functional Mobility    Functional Mobility- Ind. Level contact guard assist  -ROSE     Functional Mobility-Distance (Feet) --  10 + 10  -ROSE     Functional Mobility- Safety Issues step length  decreased;balance decreased during turns  -ROSE     Functional Mobility- Comment pt. with no LOB, but unsteady and tending to want to furniture walk, pt. declined walker use, but appears he would benefit from use, but will likely need reminders to use and question safety with use  -     Row Name 05/18/23 0957          Activities of Daily Living    BADL Assessment/Intervention lower body dressing;grooming;upper body dressing;feeding  -     Row Name 05/18/23 0957          Lower Body Dressing Assessment/Training    [place] Level (Lower Body Dressing) don;socks;standby assist;verbal cues  -ROSE     Position (Lower Body Dressing) edge of bed sitting  -ROSE     Comment, (Lower Body Dressing) cues to katheryn gripper socks over his socks  -ROSE     Row Name 05/18/23 0957          Grooming Assessment/Training    [place] Level (Grooming) hair care, combing/brushing;oral care regimen;wash face, hands;minimum assist (75% patient effort);verbal cues;nonverbal cues (demo/gesture)  -ROSE     Position (Grooming) sink side;unsupported standing  -ROSE     Comment, (Grooming) pt. needed cues to sequence tasks, perform thoroughly  -     Row Name 05/18/23 0957          Upper Body Dressing Assessment/Training    [place] Level (Upper Body Dressing) doff;don;pajama/robe;maximum assist (25% patient effort)  -ROSE     Position (Upper Body Dressing) edge of bed sitting  -ROSE     Comment, (Upper Body Dressing) limited by confusion and lines  -ROSE     Row Name 05/18/23 0957          Self-Feeding Assessment/Training    [place] Level (Feeding) prepare tray/open items;moderate assist (50% patient effort);liquids to mouth;scoop food and bring to mouth;independent  -ROSE     Position (Self-Feeding) sitting up in bed  -ROSE           User Key  (r) = Recorded By, (t) = Taken By, (c) = Cosigned By    Initials Name Provider Type    Kaila Toro, OT Occupational Therapist               Obj/Interventions     Row Name 05/18/23 1003           Sensory Assessment (Somatosensory)    Sensory Assessment (Somatosensory) UE sensation intact  -     Sensory Assessment based on observation of tasks, no droppage or gross coordination deficit noted  -[facility] Name 05/18/23 1001          Vision Assessment/Intervention    Visual Impairment/Limitations corrective lenses full-time  did not see glasses in room  -[facility] Name 05/18/23 1001          Range of Motion Comprehensive    General Range of Motion bilateral upper extremity ROM WFL  -[facility] Name 05/18/23 1001          Strength Comprehensive (MMT)    General Manual Muscle Testing (MMT) Assessment no strength deficits identified  -     Comment, General Manual Muscle Testing (MMT) Assessment BUE  -[facility] Name 05/18/23 1001          Motor Skills    Motor Skills functional endurance  -     Functional Endurance pt. fatigued quickly with sinkside grooming tasks and had to return to bed  -[facility] Name 05/18/23 1001          Balance    Balance Assessment sitting static balance;sitting dynamic balance;standing static balance;standing dynamic balance  -     Static Sitting Balance supervision  -     Dynamic Sitting Balance supervision  -     Position, Sitting Balance unsupported;sitting edge of bed  -     Static Standing Balance standby assist  -     Dynamic Standing Balance standby assist  grooming sinkside  -     Position/Device Used, Standing Balance supported;unsupported  -     Balance Interventions sit to stand;occupation based/functional task  -     Comment, Balance LBD, grooming  -           User Key  (r) = Recorded By, (t) = Taken By, (c) = Cosigned By    Initials Name Provider Type    Kaila Toro OT Occupational Therapist               Goals/Plan     [facility] Name 05/18/23 1008          Transfer Goal 1 (OT)    Activity/Assistive Device (Transfer Goal 1, OT) toilet;commode  grab bar, wx prn  -ROSE     [place] Level/Cues Needed (Transfer Goal 1, OT) standby assist;verbal cues  required  -ROSE     Time Frame (Transfer Goal 1, OT) long term goal (LTG);10 days  -ROSE     Progress/Outcome (Transfer Goal 1, OT) new goal  -ROSE     Row Name 05/18/23 1008          Dressing Goal 1 (OT)    Activity/Device (Dressing Goal 1, OT) upper body dressing;lower body dressing  -ROSE     [place]/Cues Needed (Dressing Goal 1, OT) standby assist;set-up required;verbal cues required  -ROSE     Time Frame (Dressing Goal 1, OT) long term goal (LTG);10 days  -ROSE     Strategies/Barriers (Dressing Goal 1, OT) pants, robe vs shirt pending availability  -ROSE     Progress/Outcome (Dressing Goal 1, OT) new goal  -ROSE     Row Name 05/18/23 1008          Toileting Goal 1 (OT)    Activity/Device (Toileting Goal 1, OT) toileting skills, all;commode;grab bar/safety frame  -ROSE     [place] Level/Cues Needed (Toileting Goal 1, OT) minimum assist (75% or more patient effort);verbal cues required;tactile cues required  -ROSE     Time Frame (Toileting Goal 1, OT) long term goal (LTG);10 days  -ROSE     Progress/Outcome (Toileting Goal 1, OT) new goal  -ROSE     Row Name 05/18/23 1008          Grooming Goal 1 (OT)    Activity/Device (Grooming Goal 1, OT) hair care;oral care;wash face, hands  -ROSE     [place] (Grooming Goal 1, OT) standby assist;verbal cues required  -ROSE     Time Frame (Grooming Goal 1, OT) long term goal (LTG);10 days  -ROSE     Strategies/Barriers (Grooming Goal 1, OT) sinkside  -ROSE     Progress/Outcome (Grooming Goal 1, OT) new goal  -ROSE     Row Name 05/18/23 1008          Therapy Assessment/Plan (OT)    Planned Therapy Interventions (OT) activity tolerance training;BADL retraining;cognitive/visual perception retraining;functional balance retraining;occupation/activity based interventions;ROM/therapeutic exercise;patient/caregiver education/training;transfer/mobility retraining;strengthening exercise  -ROSE           User Key  (r) = Recorded By, (t) = Taken By, (c) = Cosigned By    Initials Name Provider Type    ROSE Kat  Kaila Rothman, OT Occupational Therapist               Clinical Impression     Row Name 05/18/23 1003          Pain Assessment    Pretreatment Pain Rating 0/10 - no pain  -ROSE     Posttreatment Pain Rating 0/10 - no pain  -ROSE     Row Name 05/18/23 1003          Plan of Care Review    Plan of Care Reviewed With patient  -ROSE     Progress no change  -ROSE     Outcome Evaluation OT evaluation completed with patient demonstrating impaired balance, cognition and endurance impacting BADL task performance and related transfers.  Pt. needed cues throughout session to initiate tasks, sequence tasks and for safety with tasks.  Pt. fatigued quickly with standing activity.  Short SNF stay may be beneficial. prior to home, pending dtr. ability to assist pt. or get pt. assist in home.  -ROSE     Row Name 05/18/23 1003          Therapy Assessment/Plan (OT)    Patient/Family Therapy Goal Statement (OT) return to prior BADL independence level  -ROSE     Rehab Potential (OT) good, to achieve stated therapy goals  -ROSE     Criteria for Skilled Therapeutic Interventions Met (OT) yes;meets criteria;skilled treatment is necessary  -ROSE     Therapy Frequency (OT) daily  -ROSE     Row Name 05/18/23 1003          Therapy Plan Review/Discharge Plan (OT)    Equipment Needs Upon Discharge (OT) --  TBD post family input on home setup and AD pt. already has or does not have  -ROSE     Anticipated Discharge Disposition (OT) skilled nursing facility  -ROSE     Row Name 05/18/23 1003          Vital Signs    Pre Systolic BP Rehab --  nurse cleared for therapy treatment session  -ROSE     O2 Delivery Pre Treatment room air  -ROSE     O2 Delivery Intra Treatment room air  -ROSE     O2 Delivery Post Treatment room air  -ROSE     Pre Patient Position Supine  -ROSE     Intra Patient Position Standing  -ROSE     Post Patient Position Supine  -ROSE     Row Name 05/18/23 1003          Positioning and Restraints    Pre-Treatment Position in bed  -ROSE     Post Treatment Position bed  nurse  requested pt. be return to bed  -ROSE     In Bed notified nsg;sitting;call light within reach;encouraged to call for assist;exit alarm on;side rails up x2  spoke with nurse aid about pt. performance, nurse aid sitting outside of room  -           User Key  (r) = Recorded By, (t) = Taken By, (c) = Cosigned By    Initials Name Provider Type    Kaila Toro OT Occupational Therapist               Outcome Measures     Row Name 05/18/23 1010          How much help from another is currently needed...    Putting on and taking off regular lower body clothing? 3  -ROSE     Bathing (including washing, rinsing, and drying) 2  -ROSE     Toileting (which includes using toilet bed pan or urinal) 2  -ROSE     Putting on and taking off regular upper body clothing 3  -ROSE     Taking care of personal grooming (such as brushing teeth) 3  -ROSE     Eating meals 3  -ROSE     AM-PAC 6 Clicks Score (OT) 16  -ROSE     Row Name 05/18/23 1010          Functional Assessment    Outcome Measure Options AM-PAC 6 Clicks Daily Activity (OT)  -           User Key  (r) = Recorded By, (t) = Taken By, (c) = Cosigned By    Initials Name Provider Type    Kaila Toro OT Occupational Therapist                Occupational Therapy Education     Title: PT OT SLP Therapies (In Progress)     Topic: Occupational Therapy (In Progress)     Point: ADL training (In Progress)     Description:   Instruct learner(s) on proper safety adaptation and remediation techniques during self care or transfers.   Instruct in proper use of assistive devices.              Learning Progress Summary           Patient Acceptance, E, NR by ROSE at 5/18/2023 1012    Comment: re-orientation, reason for consult, ADL sequencing, reason need gait belt and gripper socks                   Point: Home exercise program (Not Started)     Description:   Instruct learner(s) on appropriate technique for monitoring, assisting and/or progressing therapeutic exercises/activities.               Learner Progress:  Not documented in this visit.          Point: Precautions (In Progress)     Description:   Instruct learner(s) on prescribed precautions during self-care and functional transfers.              Learning Progress Summary           Patient Acceptance, E, NR by ROSE at 5/18/2023 1012    Comment: re-orientation, reason for consult, ADL sequencing, reason need gait belt and gripper socks                   Point: Body mechanics (Not Started)     Description:   Instruct learner(s) on proper positioning and spine alignment during self-care, functional mobility activities and/or exercises.              Learner Progress:  Not documented in this visit.                      User Key     Initials Effective Dates Name Provider Type Discipline    ROSE 02/03/23 -  Kaila Kat, OT Occupational Therapist OT              OT Recommendation and Plan  Planned Therapy Interventions (OT): activity tolerance training, BADL retraining, cognitive/visual perception retraining, functional balance retraining, occupation/activity based interventions, ROM/therapeutic exercise, patient/caregiver education/training, transfer/mobility retraining, strengthening exercise  Therapy Frequency (OT): daily  Plan of Care Review  Plan of Care Reviewed With: patient  Progress: no change  Outcome Evaluation: OT evaluation completed with patient demonstrating impaired balance, cognition and endurance impacting BADL task performance and related transfers.  Pt. needed cues throughout session to initiate tasks, sequence tasks and for safety with tasks.  Pt. fatigued quickly with standing activity.  Short SNF stay may be beneficial. prior to home, pending dtr. ability to assist pt. or get pt. assist in home.     Time Calculation:    Time Calculation- OT     Row Name 05/18/23 1014             Time Calculation- OT    OT Start Time 0924  -ROSE      OT Received On 05/18/23  -ROSE      OT Goal Re-Cert Due Date 05/28/23  -ROSE         Timed Charges    63180 -  OT Therapeutic Activity Minutes 4  -ROSE      70810 - OT Self Care/Mgmt Minutes 10  -ROSE         Untimed Charges    OT Eval/Re-eval Minutes 39  -ROSE         Total Minutes    Timed Charges Total Minutes 14  -ROSE      Untimed Charges Total Minutes 39  -ROSE       Total Minutes 53  -ROSE            User Key  (r) = Recorded By, (t) = Taken By, (c) = Cosigned By    Initials Name Provider Type    Kaila Toro, ZACK Occupational Therapist              Therapy Charges for Today     Code Description Service Date Service Provider Modifiers Qty    47931569752 HC OT SELF CARE/MGMT/TRAIN EA 15 MIN 5/18/2023 Kaila Kat, OT GO 1    [id number]  OT EVAL LOW COMPLEXITY 3 5/18/2023 Kaila Kat OT GO 1               Kaila Kat OT  5/18/2023

## 2023-05-18 NOTE — PLAN OF CARE
Goal Outcome Evaluation:  Plan of Care Reviewed With: patient        Progress: improving  Outcome Evaluation: PT eval completed. Presents w/ rapidly prog. Dem./impaired cognit., diffic focusing on task, anx., CKD, impulsive behavior, decr. bal./LE strength, & impaired funct mobil. Transf. STS w/ R wx (CGA for line mgmt), amb 180 ft w/ R wx & min A to guide (drifts R) w/ 3 stand.rests, + ther exer Sup. & UIC, but limited by diffic focusing on task, persev. throughout session, flexed posture, & elev BP. Recommend SNF at d/c if dtr unable to assist pt adequately (per pt,dtr had recent sz).

## 2023-05-18 NOTE — THERAPY EVALUATION
Patient Name: Domitila Bosch  : 1940    MRN: [id number]                              Today's Date: 2023       Admit Date: 2023    Visit Dx:     ICD-10-CM ICD-9-CM   1. Rapidly progressive dementia  F03.90 294.20   2. Agitation  R45.1 307.9   3. Acute on chronic alteration in mental status  R41.82 780.09   4. Elevated blood pressure reading with diagnosis of hypertension  I10 401.9   5. DANIS (acute kidney injury)  N17.9 584.9     Patient Active Problem List   Diagnosis   • Diverticulosis of large intestine with hemorrhage   • Umbilical hernia   • S/P hernia repair   • Hypertension   • Dyslipidemia   • Carotid stenosis   • Gout   • GERD (gastroesophageal reflux disease)   • Hypothyroidism   • Neuropathy   • Malignant melanoma   • Asthma   • Anxiety disorder   • Diverticulosis   • Muscle pain   • Lumbar radiculopathy   • Kidney stone   • Deviated nasal septum   • Chronic laryngitis   • Acute CVA (cerebrovascular accident)   • Essential hypertension   • Allergy to Betadine   • DANIS (acute kidney injury)   • Severe hypothyroidism   • History of ischemic stroke   • Rapidly progressive dementia   • CKD (chronic kidney disease), stage III     Past Medical History:   Diagnosis Date   • Anxiety disorder 2017   • Asthma 2017   • Basal cell carcinoma in situ of skin 2019    right leg    • Carotid stenosis 2017   • Dementia    • Diaphoresis    • Diastolic dysfunction    • Diverticulitis    • Dyslipidemia 2017   • GERD (gastroesophageal reflux disease) 2017   • Gout 2017   • Herpes zoster     Herpes zoster, [id number], right lower extremity.    • Hyperlipidemia    • Hypertension 2017   • Hypothyroidism 2017   • LVH (left ventricular hypertrophy)    • Malignant melanoma 2017   • Melanoma    • Mitral regurgitation    • Nephrolithiasis    • Post herpetic neuralgia 2017   • TIA (transient ischemic attack)     TIA, 2012, with nonobstructive carotid  "stenosis, dyslipidemia and hypertension     Past Surgical History:   Procedure Laterality Date   • ABDOMINAL SURGERY     • APPENDECTOMY     • COLON RESECTION LEFT  2016   • COLON RESECTION LEFT  2016   • CYST REMOVAL      left side of neck.    • HEEL SPUR SURGERY  1999   • HERNIA REPAIR      hiatal hernia    • NISSEN FUNDOPLICATION  1984   • OTHER SURGICAL HISTORY  2010    Malignant melanoma, status post resection       General Information     Row Name 05/18/23 1459          Physical Therapy Time and Intention    Document Type evaluation  -DM     Mode of Treatment physical therapy  -DM     Row Name 05/18/23 1459          General Information    Patient Profile Reviewed yes  -DM     Prior Level of Function independent:;all household mobility;gait;transfer;bed mobility;feeding;grooming;dressing;bathing;dependent:;home management;driving  indep gt w/ R wx per pt (however,pt limited historian d/t Dem.)  -DM     Existing Precautions/Restrictions fall;other (see comments)  rapidly prog.Dem; asthmaTIA 6/'12;CKD;incont.(use Depends);exit al (sitter ordered 5-18)  -DM     Barriers to Rehab cognitive status;ineffective coping  anx.  -DM     Row Name 05/18/23 1453          Living Environment    People in Home child(diana), adult  lives w/ dtr (states she just had recent sz, & unsure she can care for him;however, pt is gray.hist.;keeps asking for wife & dtr, but pt is )  -DM     Row Name 05/18/23 1452          Stairs Within Home, Primary    Stairs, Within Home, Primary unable to recall  -DM     Row Name 05/18/23 1451          Cognition    Orientation Status (Cognition) oriented to;person;place;disoriented to;situation;time  states he is at [facility], lives in [facility];recalls \"June\" for mo., then \"is it July,Aug,or Sept?\" when asked the year; persev. w/ \"did my wife & dtr come today?;are they in the restroom?; did I have any visitors?\" continually during session  -DM     Row Name 05/18/23 4307          Safety " "Issues, Functional Mobility    Safety Issues Affecting Function (Mobility) at risk behavior observed;awareness of need for assistance;impulsivity;insight into deficits/self-awareness;positioning of assistive device;safety precaution awareness;safety precautions follow-through/compliance;sequencing abilities  -DM     Impairments Affecting Function (Mobility) balance;cognition;endurance/activity tolerance;postural/trunk control;strength  flexed trunk;drifts  -DM     Cognitive Impairments, Mobility Safety/Performance attention;awareness, need for assistance;insight into deficits/self-awareness;impulsivity;safety precaution awareness;safety precaution follow-through;sequencing abilities;judgment  perseverates;impulsive( sitter ordered)  -DM           User Key  (r) = Recorded By, (t) = Taken By, (c) = Cosigned By    Initials Name Provider Type    DM Geeta Perez, PT Physical Therapist               Mobility     Row Name 05/18/23 1414          Bed Mobility    Bed Mobility rolling right;scooting/bridging;rolling left;supine-sit  -DM     Rolling Left [place] (Bed Mobility) verbal cues;modified independence  rolled for Depends  -DM     Rolling Right [place] (Bed Mobility) verbal cues;modified independence  -DM     Scooting/Bridging [place] (Bed Mobility) verbal cues;supervision  bridged while pt pulling Depends up securely  -DM     Supine-Sit [place] (Bed Mobility) verbal cues;contact guard  CGA for line mgmt  -DM     Assistive Device (Bed Mobility) bed rails;head of bed elevated  -DM     Comment, (Bed Mobility) nsg req. try [facility] w/ ch.al. in place,& will watch from [facility] (Sitter has been ordered);issued ch. al, loaner R wx per OT recommendation,as pt cruised furniture this AM; once EOB, did chaitanya WANG, PLB;persev. w/ continual inquiries re: \"dtr & wife\"  -DM     Row Name 05/18/23 1414          Transfers    Comment, (Transfers) impulsively pulled up w/ R wx vs. R bedrail & L EOB as cued; sat back & " repeated correctly  -DM     Row Name 05/18/23 1414          Sit-Stand Transfer    Sit-Stand [place] (Transfers) verbal cues;contact guard  -DM     Row Name 05/18/23 1414          Gait/Stairs (Locomotion)    [place] Level (Gait) verbal cues;nonverbal cues (demo/gesture);minimum assist (75% patient effort)  has IV;min A to guide R wx (drifts R); 3 stand.rests; easily distracted;Depends cinched  -DM     Assistive Device (Gait) walker, front-wheeled  -DM     Distance in Feet (Gait) 180  -DM     Deviations/Abnormal Patterns (Gait) bilateral deviations;base of support, narrow;rody decreased;stride length decreased;weight shifting decreased  Decr step length;(F)drifting  -DM     Bilateral Gait Deviations forward flexed posture;heel strike decreased  gazes @ floor;incr flexed trunk as pt fatigued  -DM     Comment, (Gait/Stairs) cues for incr step length into AD, maintaining midline, focusing on task, trunk ext/focusing ahead, PLB  -DM           User Key  (r) = Recorded By, (t) = Taken By, (c) = Cosigned By    Initials Name Provider Type    DM Geeta Perez, PT Physical Therapist               Obj/Interventions     Row Name 05/18/23 1414          Range of Motion Comprehensive    General Range of Motion bilateral lower extremity ROM WFL  -DM     Row Name 05/18/23 1414          Strength Comprehensive (MMT)    General Manual Muscle Testing (MMT) Assessment lower extremity strength deficits identified  -DM     Comment, General Manual Muscle Testing (MMT) Assessment jolie Hips grossly 4- to 4+/5  -DM     Row Name 05/18/23 1414          Motor Skills    Therapeutic Exercise shoulder;hip;knee;ankle  performed UE exer as well, d/t OT not incl. during eval this AM;Cues for PLB throughout, & counted reps aloud w/ PT to focus on task  -DM     Row Name 05/18/23 1414          Shoulder (Therapeutic Exercise)    Shoulder (Therapeutic Exercise) AROM (active range of motion)  -DM     Shoulder AROM (Therapeutic Exercise)  bilateral;flexion;extension;aBduction;aDduction;horizontal aBduction/aDduction;sitting;10 repetitions;other (see comments)  biceps curls  -DM     Row Name 05/18/23 1414          Hip (Therapeutic Exercise)    Hip (Therapeutic Exercise) AROM (active range of motion);isometric exercises  -DM     Hip AROM (Therapeutic Exercise) bilateral;flexion;extension;aBduction;aDduction;external rotation;internal rotation;sitting;supine;10 repetitions;other (see comments)  bridging x 1; B BKFO;sitting marches  -DM     Hip Isometrics (Therapeutic Exercise) bilateral;gluteal sets;sitting;10 repetitions;other (see comments)  Abdom sets  -DM     Row Name 05/18/23 1414          Knee (Therapeutic Exercise)    Knee (Therapeutic Exercise) AROM (active range of motion);isometric exercises  -DM     Knee AROM (Therapeutic Exercise) bilateral;flexion;extension;SAQ (short arc quad);SLR (straight leg raise);LAQ (long arc quad);heel slides;sitting;supine;10 repetitions  -DM     Knee Isometrics (Therapeutic Exercise) bilateral;quad sets;sitting;10 repetitions  -DM     Row Name 05/18/23 1414          Ankle (Therapeutic Exercise)    Ankle (Therapeutic Exercise) AROM (active range of motion);AAROM (active assistive range of motion)  -DM     Ankle AROM (Therapeutic Exercise) bilateral;dorsiflexion;plantarflexion;sitting;10 repetitions  -DM     Ankle AAROM (Therapeutic Exercise) bilateral;sitting;10 repetitions;other (see comments)  AC  -DM     Row Name 05/18/23 1414          Balance    Balance Assessment sitting static balance;sitting dynamic balance;standing static balance;standing dynamic balance  -DM     Static Sitting Balance independent  -DM     Dynamic Sitting Balance supervision;verbal cues  recip scooting  -DM     Position, Sitting Balance unsupported;sitting in chair;sitting edge of bed  -DM     Static Standing Balance standby assist;verbal cues  -DM     Dynamic Standing Balance verbal cues;contact guard  -DM     Position/Device Used,  Standing Balance supported;walker, rolling  -DM     Balance Interventions sitting;standing;static;dynamic;weight shifting activity  -DM           User Key  (r) = Recorded By, (t) = Taken By, (c) = Cosigned By    Initials Name Provider Type    DM Geeta Perez, PT Physical Therapist               Goals/Plan     Row Name 05/18/23 1414          Bed Mobility Goal 1 (PT)    Activity/Assistive Device (Bed Mobility Goal 1, PT) bed mobility activities, all  -DM     [place] Level/Cues Needed (Bed Mobility Goal 1, PT) independent  -DM     Time Frame (Bed Mobility Goal 1, PT) long term goal (LTG);5 days  -DM     Row Name 05/18/23 1414          Transfer Goal 1 (PT)    Activity/Assistive Device (Transfer Goal 1, PT) sit-to-stand/stand-to-sit;bed-to-chair/chair-to-bed;walker, rolling  -DM     [place] Level/Cues Needed (Transfer Goal 1, PT) supervision required  -DM     Time Frame (Transfer Goal 1, PT) long term goal (LTG);5 days  -DM     Row Name 05/18/23 1414          Gait Training Goal 1 (PT)    Activity/Assistive Device (Gait Training Goal 1, PT) gait (walking locomotion);walker, rolling  stable VS; no drifting  -DM     [place] Level (Gait Training Goal 1, PT) contact guard required  -DM     Distance (Gait Training Goal 1, PT) 300  -DM     Time Frame (Gait Training Goal 1, PT) long term goal (LTG);5 days  -DM     Row Name 05/18/23 1414          Patient Education Goal (PT)    Activity (Patient Education Goal, PT) HEP exer  -DM     [place]/Cues/Accuracy (Memory Goal 2, PT) demonstrates adequately  demonstrates w/ family member's assist  -DM     Time Frame (Patient Education Goal, PT) long term goal (LTG);5 days  -DM     Row Name 05/18/23 1414          Therapy Assessment/Plan (PT)    Planned Therapy Interventions (PT) balance training;bed mobility training;gait training;patient/family education;strengthening;transfer training  -DM           User Key  (r) = Recorded By, (t) = Taken By, (c) = Cosigned By     Initials Name Provider Type    DM Geeta Perez, PT Physical Therapist               Clinical Impression     Row Name 05/18/23 1414          Pain    Pretreatment Pain Rating 0/10 - no pain  -DM     Posttreatment Pain Rating 0/10 - no pain  -DM     Additional Documentation Pain Scale: Numbers Pre/Post-Treatment (Group)  -DM     Row Name 05/18/23 1414          Plan of Care Review    Plan of Care Reviewed With patient  -DM     Progress improving  -DM     Outcome Evaluation PT eval completed. Presents w/ rapidly prog. Dem./impaired cognit., diffic focusing on task, anx., CKD, impulsive behavior, decr. bal./LE strength, & impaired funct mobil. Transf. STS w/ R wx (CGA for line mgmt), amb 180 ft w/ R wx & min A to guide (drifts R) w/ 3 stand.rests, + ther exer Sup. & UIC, but limited by diffic focusing on task, persev. throughout session, flexed posture, & elev BP. Recommend SNF at d/c if dtr unable to assist pt adequately (per pt,dtr had recent sz).  -DM     Row Name 05/18/23 1414          Therapy Assessment/Plan (PT)    Patient/Family Therapy Goals Statement (PT) improved funct mobil  -DM     Rehab Potential (PT) good, to achieve stated therapy goals  -DM     Criteria for Skilled Interventions Met (PT) yes;meets criteria;skilled treatment is necessary  -DM     Therapy Frequency (PT) daily  -DM     Row Name 05/18/23 1414          Vital Signs    Pre Systolic BP Rehab 168  -DM     Pre Treatment Diastolic BP 92  -DM     Pretreatment Heart Rate (beats/min) 68  -DM     Posttreatment Heart Rate (beats/min) 82  -DM     Pre SpO2 (%) 97  -DM     O2 Delivery Pre Treatment room air  -DM     O2 Delivery Intra Treatment room air  -DM     O2 Delivery Post Treatment room air  -DM     Pre Patient Position Supine  -DM     Intra Patient Position Standing  -DM     Post Patient Position Sitting  -DM     Rest Breaks  3  -DM     Row Name 05/18/23 1414          Positioning and Restraints    Pre-Treatment Position in bed  -DM     Post  Treatment Position chair  -DM     In Chair notified nsg;reclined;call light within reach;encouraged to call for assist;exit alarm on;legs elevated  -DM           User Key  (r) = Recorded By, (t) = Taken By, (c) = Cosigned By    Initials Name Provider Type    Geeta Webster, PT Physical Therapist               Outcome Measures     Row Name 05/18/23 1414 05/18/23 0800       How much help from another person do you currently need...    Turning from your back to your side while in flat bed without using bedrails? 4  -DM 3  -DD    Moving from lying on back to sitting on the side of a flat bed without bedrails? 3  -DM 3  -DD    Moving to and from a bed to a chair (including a wheelchair)? 3  -DM 3  -DD    Standing up from a chair using your arms (e.g., wheelchair, bedside chair)? 3  -DM 3  -DD    Climbing 3-5 steps with a railing? 3  -DM 3  -DD    To walk in hospital room? 3  -DM 3  -DD    AM-PAC 6 Clicks Score (PT) 19  -DM 18  -DD    Highest level of mobility 6 --> Walked 10 steps or more  -DM 6 --> Walked 10 steps or more  -DD    Row Name 05/18/23 1414 05/18/23 1010       Functional Assessment    Outcome Measure Options AM-PAC 6 Clicks Basic Mobility (PT)  -DM AM-PAC 6 Clicks Daily Activity (OT)  -ROSE          User Key  (r) = Recorded By, (t) = Taken By, (c) = Cosigned By    Initials Name Provider Type    Kaila Toro, OT Occupational Therapist    Geeta Webster, PT Physical Therapist    Efrain Conway, RN Registered Nurse                             Physical Therapy Education     Title: PT OT SLP Therapies (In Progress)     Topic: Physical Therapy (In Progress)     Point: Mobility training (In Progress)     Learning Progress Summary           Patient Eager, E,D, NR by DM at 5/18/2023 1528                   Point: Home exercise program (In Progress)     Learning Progress Summary           Patient Eager, E,D, NR by DM at 5/18/2023 1528                   Point: Body mechanics (In Progress)     Learning  Progress Summary           Patient Eager, E,D, NR by DM at 5/18/2023 1528                   Point: Precautions (In Progress)     Learning Progress Summary           Patient Eager, E,D, NR by DM at 5/18/2023 1528                               User Key     Initials Effective Dates Name Provider Type Discipline    DM 02/03/23 -  Geeta Perez, PT Physical Therapist PT              PT Recommendation and Plan  Planned Therapy Interventions (PT): balance training, bed mobility training, gait training, patient/family education, strengthening, transfer training  Plan of Care Reviewed With: patient  Progress: improving  Outcome Evaluation: PT eval completed. Presents w/ rapidly prog. Dem./impaired cognit., diffic focusing on task, anx., CKD, impulsive behavior, decr. bal./LE strength, & impaired funct mobil. Transf. STS w/ R wx (CGA for line mgmt), amb 180 ft w/ R wx & min A to guide (drifts R) w/ 3 stand.rests, + ther exer Sup. & UIC, but limited by diffic focusing on task, persev. throughout session, flexed posture, & elev BP. Recommend SNF at d/c if dtr unable to assist pt adequately (per pt,dtr had recent sz).     Time Calculation:    PT Charges     Row Name 05/18/23 1529             Time Calculation    Start Time 1414  -DM      PT Received On 05/18/23  -DM      PT Goal Re-Cert Due Date 05/28/23  -DM         Time Calculation- PT    Total Timed Code Minutes- PT 76 minute(s)  -DM         Timed Charges    98058 - PT Therapeutic Exercise Minutes 16  -DM      82104 - Gait Training Minutes  15  -DM         Untimed Charges    PT Eval/Re-eval Minutes 45  -DM         Total Minutes    Timed Charges Total Minutes 31  -DM      Untimed Charges Total Minutes 45  -DM       Total Minutes 76  -DM            User Key  (r) = Recorded By, (t) = Taken By, (c) = Cosigned By    Initials Name Provider Type    Geeta Webster, PT Physical Therapist              Therapy Charges for Today     Code Description Service Date Service Provider  Modifiers Qty    30199238568 HC PT THER PROC EA 15 MIN 5/18/2023 Geeta Perez, PT GP 1    54117045948 HC GAIT TRAINING EA 15 MIN 5/18/2023 Geeta Perez, PT GP 1    96947929896 HC PT EVAL MOD COMPLEXITY 3 5/18/2023 Geeta Perez, PT GP 1          PT G-Codes  Outcome Measure Options: AM-PAC 6 Clicks Basic Mobility (PT)  AM-PAC 6 Clicks Score (PT): 19  AM-PAC 6 Clicks Score (OT): 16  PT Discharge Summary  Anticipated Discharge Disposition (PT): skilled nursing facility, other (see comments) (short SNF stay if dtr unable to assist pt adequately (per pt, dtr had recent sz))    Geeta Perez, PT  5/18/2023

## 2023-05-19 LAB — T4 FREE SERPL-MCNC: 0.42 NG/DL (ref 0.93–1.7)

## 2023-05-19 PROCEDURE — 99232 SBSQ HOSP IP/OBS MODERATE 35: CPT | Performed by: NURSE PRACTITIONER

## 2023-05-19 PROCEDURE — 96372 THER/PROPH/DIAG INJ SC/IM: CPT

## 2023-05-19 PROCEDURE — 84439 ASSAY OF FREE THYROXINE: CPT | Performed by: NURSE PRACTITIONER

## 2023-05-19 PROCEDURE — G0378 HOSPITAL OBSERVATION PER HR: HCPCS

## 2023-05-19 PROCEDURE — 25010000002 HEPARIN (PORCINE) PER 1000 UNITS: Performed by: INTERNAL MEDICINE

## 2023-05-19 RX ORDER — ALLOPURINOL 100 MG/1
50 TABLET ORAL DAILY
Status: DISCONTINUED | OUTPATIENT
Start: 2023-05-19 | End: 2023-06-04 | Stop reason: HOSPADM

## 2023-05-19 RX ORDER — SERTRALINE HYDROCHLORIDE 100 MG/1
100 TABLET, FILM COATED ORAL DAILY
Status: DISCONTINUED | OUTPATIENT
Start: 2023-05-20 | End: 2023-06-04 | Stop reason: HOSPADM

## 2023-05-19 RX ORDER — BUSPIRONE HYDROCHLORIDE 10 MG/1
15 TABLET ORAL EVERY 12 HOURS SCHEDULED
Status: DISCONTINUED | OUTPATIENT
Start: 2023-05-19 | End: 2023-06-04 | Stop reason: HOSPADM

## 2023-05-19 RX ORDER — DONEPEZIL HYDROCHLORIDE 5 MG/1
5 TABLET, FILM COATED ORAL NIGHTLY
Status: DISCONTINUED | OUTPATIENT
Start: 2023-05-19 | End: 2023-06-04 | Stop reason: HOSPADM

## 2023-05-19 RX ADMIN — ASPIRIN 81 MG 81 MG: 81 TABLET ORAL at 11:05

## 2023-05-19 RX ADMIN — CLOPIDOGREL BISULFATE 75 MG: 75 TABLET ORAL at 11:05

## 2023-05-19 RX ADMIN — QUETIAPINE FUMARATE 25 MG: 25 TABLET ORAL at 18:20

## 2023-05-19 RX ADMIN — BUSPIRONE HYDROCHLORIDE 10 MG: 10 TABLET ORAL at 11:04

## 2023-05-19 RX ADMIN — Medication 10 ML: at 20:40

## 2023-05-19 RX ADMIN — ALLOPURINOL 50 MG: 100 TABLET ORAL at 11:05

## 2023-05-19 RX ADMIN — HEPARIN SODIUM 5000 UNITS: 5000 INJECTION, SOLUTION INTRAVENOUS; SUBCUTANEOUS at 16:00

## 2023-05-19 RX ADMIN — TERAZOSIN HYDROCHLORIDE 2 MG: 2 CAPSULE ORAL at 20:38

## 2023-05-19 RX ADMIN — ATORVASTATIN CALCIUM 80 MG: 40 TABLET, FILM COATED ORAL at 20:38

## 2023-05-19 RX ADMIN — POLYETHYLENE GLYCOL 3350 17 G: 17 POWDER, FOR SOLUTION ORAL at 11:05

## 2023-05-19 RX ADMIN — DONEPEZIL HYDROCHLORIDE 5 MG: 5 TABLET, FILM COATED ORAL at 20:38

## 2023-05-19 RX ADMIN — AMLODIPINE BESYLATE 5 MG: 5 TABLET ORAL at 11:05

## 2023-05-19 RX ADMIN — MEMANTINE 5 MG: 5 TABLET ORAL at 11:05

## 2023-05-19 RX ADMIN — SERTRALINE 50 MG: 50 TABLET, FILM COATED ORAL at 11:05

## 2023-05-19 RX ADMIN — HEPARIN SODIUM 5000 UNITS: 5000 INJECTION, SOLUTION INTRAVENOUS; SUBCUTANEOUS at 21:19

## 2023-05-19 RX ADMIN — Medication 10 ML: at 11:12

## 2023-05-19 RX ADMIN — FINASTERIDE 5 MG: 5 TABLET, FILM COATED ORAL at 11:05

## 2023-05-19 RX ADMIN — HEPARIN SODIUM 5000 UNITS: 5000 INJECTION, SOLUTION INTRAVENOUS; SUBCUTANEOUS at 05:50

## 2023-05-19 RX ADMIN — BUSPIRONE HYDROCHLORIDE 15 MG: 10 TABLET ORAL at 20:38

## 2023-05-19 RX ADMIN — Medication 5 MG: at 20:38

## 2023-05-19 RX ADMIN — PANTOPRAZOLE SODIUM 40 MG: 40 TABLET, DELAYED RELEASE ORAL at 11:04

## 2023-05-19 RX ADMIN — LEVOTHYROXINE SODIUM 175 MCG: 175 TABLET ORAL at 11:17

## 2023-05-19 NOTE — CASE MANAGEMENT/SOCIAL WORK
"Continued Stay Note  [facility]     Patient Name: Domitila Bosch  MRN: [id number]  Today's Date: 5/19/2023    Admit Date: 5/17/2023    Plan: LTC placement   Discharge Plan     Row Name 05/19/23 1543       Plan    Plan LTC placement    Plan Comments SW spoke with pt's daughter, Yoly Bosch today.  She said that she also remains in the hospital as a patient at this time.  SW provided update on pt's status today as well as results of PT/OT assessments.  Ms. Bosch said she is unable to continue to care for pt at home and would like long term care for him.  Ms. Bosch cried when talking about long term care placement of pt.  Ms. Bosch prefers placement in [place] if possible.  KATHRYN talked with her about bed availability and that a bed offer in [facility] cannot be guaranteed.  KATHRYN also explained the Medicaid pending process and that an asset check would be conducted by anyone interested in offering a bed.  Ms. Bosch will work to get POA document to the hospital when she herself is no longer in the hospital.  She stated that pt receives a pension as well as residential (which \"bills come out of\") which is approximately $2200/mo.  KATHRYN will begin making refferals for LTC placement.    Final Discharge Disposition Code 04 - intermediate care facility               Discharge Codes    No documentation.               Expected Discharge Date and Time     Expected Discharge Date Expected Discharge Time    May 25, 2023             JACK Fisher    "

## 2023-05-19 NOTE — PLAN OF CARE
Goal Outcome Evaluation:  Plan of Care Reviewed With: patient        Progress: no change          Pleasantly confused. Stand by ambulation. Meds whole. Patent IV. Called daughter on phone for pt. Family visited. Some agitation noted this evening, PRN given. Awaiting further orders.

## 2023-05-19 NOTE — DISCHARGE PLACEMENT REQUEST
"Domitila Andujar (82 y.o. Male)     Please consider for LTC placement.  Pt's daughter (listed below) is his primary contact and was his caregiver in the home.    [facility] contact: Keiko Clark, JACK @ [phone]        Date of Birth   1940    Social Security Number       Address   [address] [place] [id number]    Home Phone   [phone]    MRN   [id number]       Yazdanism   Orthodoxy    Marital Status                               Admission Date   5/17/23    Admission Type   Emergency    Admitting Provider   Susan Rodriguez MD    Attending Provider   Susan Rodriguez MD    Department, Room/Bed   [facility] 5B, N536/1       Discharge Date       Discharge Disposition       Discharge Destination                               Attending Provider: Susan Rodriguez MD    Allergies: Cephalexin, Lisinopril, Sulfamethoxazole-trimethoprim, Betadine [Povidone Iodine], Iodine, Flomax [Tamsulosin Hcl], Penicillins, Pseudoephedrine    Isolation: None   Infection: None   Code Status: CPR    Ht: 182.9 cm (72\")   Wt: 72.8 kg (160 lb 9.6 oz)    Admission Cmt: None   Principal Problem: 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]                 Active Insurance as of 5/17/2023     Primary Coverage     Payor Plan Insurance Group Employer/Plan Group    MEDICARE MEDICARE A & B      Payor Plan Address Payor Plan Phone Number Payor Plan Fax Number Effective Dates    [address] [phone]  8/1/2004 - None Entered    [facility] [id number]       Subscriber Name Subscriber Birth Date Member ID       DOMITILA ANDUJAR 1940 [id number]           Secondary Coverage     Payor Plan Insurance Group Employer/Plan Group    [employer] [place] [employer] [place]      Payor Plan Address Payor Plan Phone Number Payor Plan Fax Number Effective Dates    [address] [place] [address] [phone]  1/1/2011 - None Entered    [place] [address]       Subscriber Name Subscriber Birth Date Member ID       DOMITILA ANDUJAR 1940 " [id number]                 Emergency Contacts      (Rel.) Home Phone Work Phone Mobile Phone    MARIA DEL ROSARIO ANDUJAR (Daughter) -- -- [phone]    JamesGogo azar (Relative) [phone] -- --               History & Physical      Susan Rodriguez MD at [phone]              [facility]  HISTORY AND PHYSICAL    Patient Name: Domitila Andujar  : 1940  MRN: [id number]  Primary Care Physician: Marcin Ferguson MD  Date of admission: 2023      Subjective   Subjective     Chief Complaint: confusion      HPI:  Domitila Andujar is a 82 y.o. male with dementia who lives with his daughter.  EMS was called due to increased confusion/agitation at home.  While EMS was at the house apparently his daughter had a seizure and they were both seen in the ED.  He was found to have a UTI and creatinine elevation beyond baseline and admission was requested for further management.  History is limited due to patient's dementia.  Nephew at bedside provides some history but unsure of all details.      Review of Systems   Gen- No fevers, chills  CV- No chest pain, palpitations  Resp- No cough, dyspnea  GI- No N/V/D, abd pain    ROS not reliable due to dementia      Personal History     Past Medical History:   Diagnosis Date   • Anxiety disorder 2017   • Asthma 2017   • Basal cell carcinoma in situ of skin 2019    right leg    • Carotid stenosis 2017   • Dementia    • Diaphoresis    • Diastolic dysfunction    • Diverticulitis    • Dyslipidemia 2017   • GERD (gastroesophageal reflux disease) 2017   • Gout 2017   • Herpes zoster     Herpes zoster, [id number], right lower extremity.    • Hyperlipidemia    • Hypertension 2017   • Hypothyroidism 2017   • LVH (left ventricular hypertrophy)    • Malignant melanoma 2017   • Melanoma    • Mitral regurgitation    • Nephrolithiasis    • Post herpetic neuralgia 2017   • TIA  (transient ischemic attack)     TIA, June 2012, with nonobstructive carotid stenosis, dyslipidemia and hypertension         Oncology Problem List:  Malignant melanoma (02/16/2017; Status: Active)       Past Surgical History:   Procedure Laterality Date   • ABDOMINAL SURGERY     • APPENDECTOMY     • COLON RESECTION LEFT  2016   • COLON RESECTION LEFT  2016   • CYST REMOVAL      left side of neck.    • HEEL SPUR SURGERY  1999   • HERNIA REPAIR      hiatal hernia    • NISSEN FUNDOPLICATION  1984   • OTHER SURGICAL HISTORY  2010    Malignant melanoma, status post resection        Family History: family history includes Heart attack in his mother; Heart disease in his mother; Hyperlipidemia in his mother; Stroke in his mother.     Social History:  reports that he quit smoking about 25 years ago. His smoking use included cigars and pipe. He has never used smokeless tobacco. He reports that he does not drink alcohol and does not use drugs.  Social History     Social History Narrative    Retired         Medications:  Available home medication information reviewed.  Medications Prior to Admission   Medication Sig Dispense Refill Last Dose   • allopurinol (ZYLOPRIM) 300 MG tablet Take 300 mg by mouth daily.      • amLODIPine (Norvasc) 5 MG tablet Take 5 mg by mouth Daily.      • ASPIRIN 81 PO Take 81 mg by mouth Daily.      • atorvastatin (LIPITOR) 80 MG tablet Take 1 tablet by mouth Every Night. 90 tablet 3    • busPIRone (BUSPAR) 10 MG tablet Take 10 mg by mouth 2 (Two) Times a Day.      • Cholecalciferol 125 MCG (5000 UT) tablet Take 5,000 Units by mouth Daily.      • clopidogrel (PLAVIX) 75 MG tablet Take 1 tablet by mouth Daily. 30 tablet 0    • doxazosin (CARDURA) 4 MG tablet Take 4 mg by mouth daily.      • finasteride (PROSCAR) 5 MG tablet Take 5 mg by mouth Daily.      • levothyroxine (Euthyrox) 150 MCG tablet Take 150 mcg by mouth Daily.      • levothyroxine (SYNTHROID, LEVOTHROID) 150 MCG tablet Take 1 tablet  by mouth Daily. 30 tablet 0    • loratadine (Claritin) 10 MG tablet Take 1 tablet by mouth Daily As Needed (Allergies).      • memantine (NAMENDA) 5 MG tablet Take 5 mg by mouth Daily.      • omeprazole (PriLOSEC) 40 MG capsule Take 40 mg by mouth Daily.      • polyethylene glycol (MIRALAX) 17 g packet Take 17 g by mouth Daily. 30 packet 1    • Psyllium (METAMUCIL PO) Take 1 tablet by mouth Daily.      • sertraline (ZOLOFT) 50 MG tablet Take 50 mg by mouth Daily.      • vitamin B-12 (CYANOCOBALAMIN) 2500 MCG sublingual tablet tablet Place 2,500 mcg under the tongue Daily.          Allergies   Allergen Reactions   • Cephalexin Rash   • Lisinopril Angioedema   • Sulfamethoxazole-Trimethoprim Rash   • Betadine [Povidone Iodine] Other (See Comments)     Skin Burning    • Iodine Other (See Comments)     Skin Burning    • Flomax [Tamsulosin Hcl] Rash   • Penicillins Rash   • Pseudoephedrine Rash       Objective   Objective     Vital Signs:   Temp:  [98.1 °F (36.7 °C)-98.7 °F (37.1 °C)] 98.7 °F (37.1 °C)  Heart Rate:  [68-74] 74  Resp:  [18-20] 18  BP: (164-168)/(90-99) 164/90       Physical Exam   Constitutional: No acute distress, awake, alert, laying in bed  HENT: NCAT, mucous membranes moist  Respiratory: Respiratory effort normal   Cardiovascular: RRR, no murmurs, rubs, or gallops  Gastrointestinal: Positive bowel sounds, soft, nontender, nondistended  Musculoskeletal: No bilateral ankle edema  Psychiatric: Appropriate affect, cooperative  Neurologic: Speech clear and fluent, oriented x 1  Skin: No rashes on exposed surfaces    Result Review:  I have personally reviewed the results from the time of this admission to 5/17/2023 19:16 EDT and agree with these findings:  [x]  Laboratory list / accordion  []  Microbiology  []  Radiology  []  EKG/Telemetry   []  Cardiology/Vascular   []  Pathology  [x]  Old records  []  Other:  Most notable findings include:       LAB RESULTS:      Lab 05/17/23  1133   WBC 7.17   HEMOGLOBIN  15.5   HEMATOCRIT 46.8   PLATELETS 252   NEUTROS ABS 4.74   IMMATURE GRANS (ABS) 0.03   LYMPHS ABS 1.59   MONOS ABS 0.58   EOS ABS 0.15   MCV 90.0         Lab 05/17/23  1133   SODIUM 139   POTASSIUM 4.0   CHLORIDE 104   CO2 24.0   ANION GAP 11.0   BUN 10   CREATININE 1.43*   EGFR 48.6*   GLUCOSE 91   CALCIUM 9.6   TSH 60.670*         Lab 05/17/23  1133   TOTAL PROTEIN 6.2   ALBUMIN 3.9   GLOBULIN 2.3   ALT (SGPT) 8   AST (SGOT) 14   BILIRUBIN 0.4   ALK PHOS 126*   LIPASE 31                     UA        5/17/2023    12:32   Urinalysis   Squamous Epithelial Cells, UA 0-2     Specific Gravity, UA 1.013     Ketones, UA Negative     Blood, UA Trace     Leukocytes, UA Large (3+)     Nitrite, UA Negative     RBC, UA 3-6     WBC, UA Too Numerous to Count     Bacteria, UA None Seen         Microbiology Results (last 10 days)     ** No results found for the last 240 hours. **          CT Head Without Contrast    Result Date: 5/17/2023  CT HEAD WO CONTRAST Date of Exam: 5/17/2023 11:45 AM EDT Indication: acute on chronic alteration in mental status. Comparison: MRI brain from September 9, 2021 and CT head from July 3, 2021 Technique: Axial CT images were obtained of the head without contrast administration.  Reconstructed coronal and sagittal images were also obtained. Automated exposure control and iterative construction methods were used. Findings: No acute intracranial hemorrhage or extra-axial collection is identified. The ventricles appear normal in caliber, with no evidence of mass effect or midline shift. The basal cisterns appear patent. The gray-white differentiation appears preserved. The calvarium appear intact. The paranasal sinuses are clear. The mastoid air cells are well-aerated. There is mild generalized parenchymal atrophy. Patchy areas of periventricular and subcortical white matter hypodensities are nonspecific, but likely the sequela of moderate chronic small vessel ischemic disease.     Impression:  Impression: 1.No acute intracranial process or calvarial fracture identified. 2.Findings suggestive of moderate chronic small vessel ischemic disease. Electronically Signed: Shravan Yarbrough  5/17/2023 12:03 PM EDT  Workstation ID: [id number]      Results for orders placed during the hospital encounter of 07/28/20    Adult Transesophageal Echo (SUMMER) W/ Cont if Necessary Per Protocol    Interpretation Summary  · Estimated EF = 65%.  · Left ventricular systolic function is normal.  · Left atrial cavity size is mild-to-moderately dilated.  · There is mild calcification of the aortic valve mainly affecting the non and right coronary cusp(s).  · Saline test results are negative.  · The aortic valve exhibits moderate sclerosis.  · There is no evidence of pericardial effusion.  · There is moderate (grade 2) protruding plaque in the descending aorta present.  · No valvular vegetations demonstrated.      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  B12, folate pending.  -Case management following    CKD III with elevated creatinine  -Gentle IVF overnight  -BMP in am    UTI  -Add urine culture  -Due to numerous allergies and prior culture with enterococcus faecalis, will start with levofloxacin  -EKG to monitor QT    Hypothyroidism  -Continue levothyroxine  -TSH is 60.67  -Will need to verify with his daughter if he is taking appropriately or if he needs dose adjusted    Anxiety disorder  -Continue home meds based on last discharge summary    Need pharmacy's help to verify home med list and adjust as needed    I tried to call his daughter a couple of times but as she has her own health issues  going on today, was unable to reach her to verify code status and home medications.  Will need to try again to call her.    DVT prophylaxis:  SQ heparin    Total time spent: 75 minutes  Time spent includes time reviewing chart, face-to-face time, counseling patient/family/caregiver, ordering medications/tests/procedures, communicating with other health care professionals, documenting clinical information in the electronic health record, and coordination of care.    CODE STATUS:    Code Status and Medical Interventions:   Ordered at: 05/17/23 4996     Code Status (Patient has no pulse and is not breathing):    CPR (Attempt to Resuscitate)     Medical Interventions (Patient has pulse or is breathing):    Full Support     Comments:    Unable to reach daughter to discuss-will try again later       Expected Discharge   Expected Discharge Date: 5/22/2023; Expected Discharge Time:      Susan Rodriguez MD  05/17/23    Electronically signed by Susan Rodriguez MD at 05/17/23 1916         Current Facility-Administered Medications   Medication Dose Route Frequency Provider Last Rate Last Admin   • acetaminophen (TYLENOL) tablet 650 mg  650 mg Oral Q4H PRN Susan Rodriguez MD        Or   • acetaminophen (TYLENOL) 160 MG/5ML solution 650 mg  650 mg Oral Q4H PRN Susan Rodriguez MD        Or   • acetaminophen (TYLENOL) suppository 650 mg  650 mg Rectal Q4H PRN Susan Rodriguez MD       • allopurinol (ZYLOPRIM) tablet 50 mg  50 mg Oral Daily Nevaeh Raymond APRN   50 mg at 05/19/23 1105   • amLODIPine (NORVASC) tablet 5 mg  5 mg Oral Daily Susan Rodriguez MD   5 mg at 05/19/23 1105   • aspirin chewable tablet 81 mg  81 mg Oral Daily Susan Rodriguez MD   81 mg at 05/19/23 1105   • atorvastatin (LIPITOR) tablet 80 mg  80 mg Oral Nightly Susan Rodriguez MD   80 mg at 05/18/23 2018   • sennosides-docusate (PERICOLACE) 8.6-50 MG per tablet 2 tablet  2 tablet Oral BID PRN Susan Rodriguez MD        And   • polyethylene glycol (MIRALAX) packet 17  g  17 g Oral Daily PRN Susan Rodriguez MD        And   • bisacodyl (DULCOLAX) EC tablet 5 mg  5 mg Oral Daily PRN Susan Rodriguez MD        And   • bisacodyl (DULCOLAX) suppository 10 mg  10 mg Rectal Daily PRN Susan Rodriguez MD       • busPIRone (BUSPAR) tablet 15 mg  15 mg Oral Q12H Nevaeh Raymond, APRN       • Calcium Replacement - Follow Nurse / BPA Driven Protocol   Does not apply PRN Susan Rodriguez MD       • clopidogrel (PLAVIX) tablet 75 mg  75 mg Oral Daily Susan Rodriguez MD   75 mg at 05/19/23 1105   • donepezil (ARICEPT) tablet 5 mg  5 mg Oral Nightly Nevaeh Raymond, APRN       • finasteride (PROSCAR) tablet 5 mg  5 mg Oral Daily Susan Rodriguez MD   5 mg at 05/19/23 1105   • heparin (porcine) 5000 UNIT/ML injection 5,000 Units  5,000 Units Subcutaneous Q8H Susan Rodriguez MD   5,000 Units at 05/19/23 0550   • hydrOXYzine (ATARAX) tablet 10 mg  10 mg Oral TID PRN Nevaeh Raymond, APRN   10 mg at 05/18/23 2018   • levothyroxine (SYNTHROID, LEVOTHROID) tablet 175 mcg  175 mcg Oral Q AM Susan Rodriguez MD   175 mcg at 05/19/23 1117   • Magnesium Standard Dose Replacement - Follow Nurse / BPA Driven Protocol   Does not apply PRN Susan Rodriguez MD       • melatonin tablet 5 mg  5 mg Oral Nightly Susan Rodriguez MD   5 mg at 05/18/23 2018   • ondansetron (ZOFRAN) tablet 4 mg  4 mg Oral Q6H PRN uSsan Rodriguez MD        Or   • ondansetron (ZOFRAN) injection 4 mg  4 mg Intravenous Q6H PRN Susan Rodriguez MD       • pantoprazole (PROTONIX) EC tablet 40 mg  40 mg Oral Daily Susan Rodriguez MD   40 mg at 05/19/23 1104   • Phosphorus Replacement - Follow Nurse / BPA Driven Protocol   Does not apply PRN Susan Rodriguez MD       • polyethylene glycol (MIRALAX) packet 17 g  17 g Oral Daily Susan Rodriguez MD   17 g at 05/19/23 1105   • Potassium Replacement - Follow Nurse / BPA Driven Protocol   Does not apply PRN Susan Rodriguez MD       • QUEtiapine (SEROquel) tablet 25 mg  25 mg Oral BID PRN Susan Rodriguez MD   25  mg at 23 1939   • [START ON 2023] sertraline (ZOLOFT) tablet 100 mg  100 mg Oral Daily Nevaeh Raymond APRN       • sodium chloride 0.9 % flush 10 mL  10 mL Intravenous Q12H Susan Rodriguez MD   10 mL at 23 1112   • sodium chloride 0.9 % flush 10 mL  10 mL Intravenous PRN Susan Rodriguez MD       • sodium chloride 0.9 % infusion 40 mL  40 mL Intravenous PRN Susan Rodriguez MD       • terazosin (HYTRIN) capsule 2 mg  2 mg Oral Nightly Susan Rodriguez MD   2 mg at 23 2018        Physician Progress Notes (last 24 hours)      Nevaeh Raymond APRN at 23 0759              [facility]  PROGRESS NOTE    Patient Name: Domitila Bosch  : 1940  MRN: [id number]    Date of Admission: 2023  Primary Care Physician: Marcin Ferguson MD    Subjective   Subjective     CC:  F/u confusion    HPI:  Patient resting in bed. Able to follow commands. Alert to self and location. Keeps asking if anybody has visited him today. Asks me to call his sister and asks about daughter. He did not remember she was in the hospital. NAD.     ROS:  Unable to assess reliably d/t patient's mental status. Denies concerns, CP, SOA, abdominal pain.    Objective   Objective     Vital Signs:   Temp:  [97.8 °F (36.6 °C)-98.1 °F (36.7 °C)] 98.1 °F (36.7 °C)  Heart Rate:  [56-77] 57  Resp:  [16-17] 16  BP: (120-160)/(74-83) 160/77     Physical Exam:  Constitutional: No acute distress, awake, alert  HENT: NCAT, mucous membranes moist  Respiratory: Clear to auscultation bilaterally, respiratory effort normal, room air   Cardiovascular: RRR, no murmurs, rubs, or gallops  Gastrointestinal: Positive bowel sounds, soft, nontender, nondistended  Musculoskeletal: No bilateral ankle edema  Psychiatric: Appropriate affect, cooperative  Neurologic: Oriented x 2, strength symmetric in all extremities, Cranial Nerves grossly intact to confrontation, speech clear  Skin: No  candido      Results Reviewed:  LAB RESULTS:      Lab 05/17/23  1133   WBC 7.17   HEMOGLOBIN 15.5   HEMATOCRIT 46.8   PLATELETS 252   NEUTROS ABS 4.74   IMMATURE GRANS (ABS) 0.03   LYMPHS ABS 1.59   MONOS ABS 0.58   EOS ABS 0.15   MCV 90.0         Lab 05/18/23  0647 05/17/23  1133   SODIUM 140 139   POTASSIUM 4.0 4.0   CHLORIDE 109* 104   CO2 23.0 24.0   ANION GAP 8.0 11.0   BUN 10 10   CREATININE 1.40* 1.43*   EGFR 49.9* 48.6*   GLUCOSE 95 91   CALCIUM 8.4* 9.6   TSH  --  60.670*         Lab 05/17/23  1133   TOTAL PROTEIN 6.2   ALBUMIN 3.9   GLOBULIN 2.3   ALT (SGPT) 8   AST (SGOT) 14   BILIRUBIN 0.4   ALK PHOS 126*   LIPASE 31                 Lab 05/17/23  1133   FOLATE 4.92   VITAMIN B 12 593         Brief Urine Lab Results  (Last result in the past 365 days)      Color   Clarity   Blood   Leuk Est   Nitrite   Protein   CREAT   Urine HCG        05/17/23 1232 Yellow   Cloudy   Trace   Large (3+)   Negative   Negative                 Microbiology Results Abnormal     Procedure Component Value - Date/Time    Urine Culture - Urine, Urine, Clean Catch [[id number]]  (Normal) Collected: 05/17/23 1232    Lab Status: Final result Specimen: Urine, Clean Catch Updated: 05/18/23 2221     Urine Culture No growth          CT Head Without Contrast    Result Date: 5/17/2023  CT HEAD WO CONTRAST Date of Exam: 5/17/2023 11:45 AM EDT Indication: acute on chronic alteration in mental status. Comparison: MRI brain from September 9, 2021 and CT head from July 3, 2021 Technique: Axial CT images were obtained of the head without contrast administration.  Reconstructed coronal and sagittal images were also obtained. Automated exposure control and iterative construction methods were used. Findings: No acute intracranial hemorrhage or extra-axial collection is identified. The ventricles appear normal in caliber, with no evidence of mass effect or midline shift. The basal cisterns appear patent. The gray-white differentiation appears  preserved. The calvarium appear intact. The paranasal sinuses are clear. The mastoid air cells are well-aerated. There is mild generalized parenchymal atrophy. Patchy areas of periventricular and subcortical white matter hypodensities are nonspecific, but likely the sequela of moderate chronic small vessel ischemic disease.     Impression: Impression: 1.No acute intracranial process or calvarial fracture identified. 2.Findings suggestive of moderate chronic small vessel ischemic disease. Electronically Signed: Shravan Yarbrough  5/17/2023 12:03 PM EDT  Workstation ID: [id number]      Results for orders placed during the hospital encounter of 07/28/20    Adult Transesophageal Echo (SUMMER) W/ Cont if Necessary Per Protocol    Interpretation Summary  · Estimated EF = 65%.  · Left ventricular systolic function is normal.  · Left atrial cavity size is mild-to-moderately dilated.  · There is mild calcification of the aortic valve mainly affecting the non and right coronary cusp(s).  · Saline test results are negative.  · The aortic valve exhibits moderate sclerosis.  · There is no evidence of pericardial effusion.  · There is moderate (grade 2) protruding plaque in the descending aorta present.  · No valvular vegetations demonstrated.      Current medications:  Scheduled Meds:allopurinol, 300 mg, Oral, Daily  amLODIPine, 5 mg, Oral, Daily  aspirin, 81 mg, Oral, Daily  atorvastatin, 80 mg, Oral, Nightly  busPIRone, 10 mg, Oral, Q12H  clopidogrel, 75 mg, Oral, Daily  finasteride, 5 mg, Oral, Daily  heparin (porcine), 5,000 Units, Subcutaneous, Q8H  levoFLOXacin, 750 mg, Intravenous, Q48H  levothyroxine, 175 mcg, Oral, Q AM  melatonin, 5 mg, Oral, Nightly  memantine, 5 mg, Oral, Daily  pantoprazole, 40 mg, Oral, Daily  polyethylene glycol, 17 g, Oral, Daily  QUEtiapine, 25 mg, Oral, Nightly  sertraline, 50 mg, Oral, Daily  sodium chloride, 10 mL, Intravenous, Q12H  terazosin, 2 mg, Oral, Nightly      Continuous Infusions:Pharmacy  Consult - Pharmacy to dose,       PRN Meds:.•  acetaminophen **OR** acetaminophen **OR** acetaminophen  •  senna-docusate sodium **AND** polyethylene glycol **AND** bisacodyl **AND** bisacodyl  •  Calcium Replacement - Follow Nurse / BPA Driven Protocol  •  hydrOXYzine  •  Magnesium Standard Dose Replacement - Follow Nurse / BPA Driven Protocol  •  ondansetron **OR** ondansetron  •  Pharmacy Consult - Pharmacy to dose  •  Phosphorus Replacement - Follow Nurse / BPA Driven Protocol  •  Potassium Replacement - Follow Nurse / BPA Driven Protocol  •  QUEtiapine  •  sodium chloride  •  sodium chloride    Assessment & Plan   Assessment & Plan     Active Hospital Problems    Diagnosis  POA   • **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        Brief Hospital Course to date:  Domitila Bosch is a 82 y.o. male with dementia who lives with his daughter.  EMS was called due to increased confusion/agitation at home.  While EMS was at the house, apparently his daughter had a seizure and they were both seen in the ED.  He was found to have a UTI and creatinine elevation beyond baseline and admission was requested for further management.      This patient's problems and plans were partially entered by my partner and updated as appropriate by me 05/19/23.      Dementia with worsening confusion  Anxiety disorder  -Continue namenda  -PRN seroquel, add scheduled nightly dose  -TSH elevated as below.  B12, folate WNL  -CT head without acute intracranial process  -Case management following  -Follows with Dr. Hernadez at , last appointment 1/6/23, will adjust med rec based on most recent meds     CKD III with elevated creatinine  -S/P gentle IVF  -Stable, baseline probably ~ 1.3-1.4 per chart review  -renally dose  allopurinol  -AM BMP     UTI  -Urine culture with no growth  -Due to numerous allergies and prior culture with enterococcus faecalis, started on levaquin  -Will stop abx as urine cx negative, no nitrites on UA   -QT normal on EKG  -continue finasteride     Hypothyroidism  -Continue levothyroxine  -TSH is 60.67, free T4 0.42  -Unable to verify with his daughter if he is taking appropriately as she is admitted to hospital, levothyroxine dose increased for now  -recheck TSH in 6-8 weeks at PCP follow-up     HTN  HLD  H/o TIA, CVA  -continue home amlodipine, finasteride, terazosin  -per recent med rec from , patient on triamterene-hctz 37.5mg-25mg, not on terazosin  -monitor BP  -continue statin, ASA    Expected Discharge Location and Transportation: SNF rehab, EMS  Expected Discharge pending bed offer  Expected Discharge Date: 2023; Expected Discharge Time:      DVT prophylaxis:  Medical DVT prophylaxis orders are present.     AM-PAC 6 Clicks Score (PT): 19 (23 1874)    CODE STATUS:   Code Status and Medical Interventions:   Ordered at: 23 1658     Code Status (Patient has no pulse and is not breathing):    CPR (Attempt to Resuscitate)     Medical Interventions (Patient has pulse or is breathing):    Full Support     Comments:    Unable to reach daughter to discuss-will try again later       YVAN Bird  23        Electronically signed by Nevaeh Raymond APRN at 23 1419          Physical Therapy Notes (most recent note)      Geeta Perez, PT at 23 1531  Version 1 of 1         Patient Name: Domitila Bosch  : 1940    MRN: [id number]                              Today's Date: 2023       Admit Date: 2023    Visit Dx:     ICD-10-CM ICD-9-CM   1. Rapidly progressive dementia  F03.90 294.20   2. Agitation  R45.1 307.9   3. Acute on chronic alteration in mental status  R41.82 780.09   4. Elevated blood pressure reading with diagnosis of hypertension   I10 401.9   5. DANIS (acute kidney injury)  N17.9 584.9     Patient Active Problem List   Diagnosis   • Diverticulosis of large intestine with hemorrhage   • Umbilical hernia   • S/P hernia repair   • Hypertension   • Dyslipidemia   • Carotid stenosis   • Gout   • GERD (gastroesophageal reflux disease)   • Hypothyroidism   • Neuropathy   • Malignant melanoma   • Asthma   • Anxiety disorder   • Diverticulosis   • Muscle pain   • Lumbar radiculopathy   • Kidney stone   • Deviated nasal septum   • Chronic laryngitis   • Acute CVA (cerebrovascular accident)   • Essential hypertension   • Allergy to Betadine   • DANIS (acute kidney injury)   • Severe hypothyroidism   • History of ischemic stroke   • Rapidly progressive dementia   • CKD (chronic kidney disease), stage III     Past Medical History:   Diagnosis Date   • Anxiety disorder 02/16/2017   • Asthma 02/16/2017   • Basal cell carcinoma in situ of skin 2019    right leg    • Carotid stenosis 02/16/2017   • Dementia    • Diaphoresis    • Diastolic dysfunction 2014   • Diverticulitis    • Dyslipidemia 02/16/2017   • GERD (gastroesophageal reflux disease) 02/16/2017   • Gout 02/16/2017   • Herpes zoster     Herpes zoster, [id number], right lower extremity.    • Hyperlipidemia    • Hypertension 02/16/2017   • Hypothyroidism 02/16/2017   • LVH (left ventricular hypertrophy)    • Malignant melanoma 02/16/2017   • Melanoma    • Mitral regurgitation 2014   • Nephrolithiasis    • Post herpetic neuralgia 02/16/2017   • TIA (transient ischemic attack)     TIA, June 2012, with nonobstructive carotid stenosis, dyslipidemia and hypertension     Past Surgical History:   Procedure Laterality Date   • ABDOMINAL SURGERY     • APPENDECTOMY     • COLON RESECTION LEFT  2016   • COLON RESECTION LEFT  2016   • CYST REMOVAL      left side of neck.    • HEEL SPUR SURGERY  1999   • HERNIA REPAIR      hiatal hernia    • NISSEN FUNDOPLICATION  1984   • OTHER SURGICAL HISTORY  2010    Malignant  "melanoma, status post resection       General Information     Row Name 05/18/23 1453          Physical Therapy Time and Intention    Document Type evaluation  -DM     Mode of Treatment physical therapy  -DM     Row Name 05/18/23 1453          General Information    Patient Profile Reviewed yes  -DM     Prior Level of Function independent:;all household mobility;gait;transfer;bed mobility;feeding;grooming;dressing;bathing;dependent:;home management;driving  indep gt w/ R wx per pt (however,pt limited historian d/t Dem.)  -DM     Existing Precautions/Restrictions fall;other (see comments)  rapidly prog.Dem; asthmaTIA 6/'12;CKD;incont.(use Depends);exit al (sitter ordered 5-18)  -DM     Barriers to Rehab cognitive status;ineffective coping  anx.  -DM     Row Name 05/18/23 1453          Living Environment    People in Home child(diana), adult  lives w/ dtr (states she just had recent sz, & unsure she can care for him;however, pt is gray.hist.;keeps asking for wife & dtr, but pt is )  -DM     Row Name 05/18/23 1453          Stairs Within Home, Primary    Stairs, Within Home, Primary unable to recall  -DM     Row Name 05/18/23 1452          Cognition    Orientation Status (Cognition) oriented to;person;place;disoriented to;situation;time  states he is at [facility], lives in [facility];recalls \"June\" for mo., then \"is it July,Aug,or Sept?\" when asked the year; cesilia. w/ \"did my wife & dtr come today?;are they in the restroom?; did I have any visitors?\" continually during session  -DM     Row Name 05/18/23 1455          Safety Issues, Functional Mobility    Safety Issues Affecting Function (Mobility) at risk behavior observed;awareness of need for assistance;impulsivity;insight into deficits/self-awareness;positioning of assistive device;safety precaution awareness;safety precautions follow-through/compliance;sequencing abilities  -DM     Impairments Affecting Function (Mobility) " "balance;cognition;endurance/activity tolerance;postural/trunk control;strength  flexed trunk;drifts  -DM     Cognitive Impairments, Mobility Safety/Performance attention;awareness, need for assistance;insight into deficits/self-awareness;impulsivity;safety precaution awareness;safety precaution follow-through;sequencing abilities;judgment  perseverates;impulsive( sitter ordered)  -DM           User Key  (r) = Recorded By, (t) = Taken By, (c) = Cosigned By    Initials Name Provider Type    DM Geeta Perez, PT Physical Therapist               Mobility     Row Name 05/18/23 1414          Bed Mobility    Bed Mobility rolling right;scooting/bridging;rolling left;supine-sit  -DM     Rolling Left [place] (Bed Mobility) verbal cues;modified independence  rolled for Depends  -DM     Rolling Right [place] (Bed Mobility) verbal cues;modified independence  -DM     Scooting/Bridging [place] (Bed Mobility) verbal cues;supervision  bridged while pt pulling Depends up securely  -DM     Supine-Sit [place] (Bed Mobility) verbal cues;contact guard  CGA for line mgmt  -DM     Assistive Device (Bed Mobility) bed rails;head of bed elevated  -DM     Comment, (Bed Mobility) nsg req. try [facility] w/ ch.al. in place,& will watch from [facility] (Sitter has been ordered);issued ch. al, loaner R wx per OT recommendation,as pt cruised furniture this AM; once EOB, did chaitanya WANG, PLB;persev. w/ continual inquiries re: \"dtr & wife\"  -DM     Row Name 05/18/23 1414          Transfers    Comment, (Transfers) impulsively pulled up w/ R wx vs. R bedrail & L EOB as cued; sat back & repeated correctly  -DM     Row Name 05/18/23 1414          Sit-Stand Transfer    Sit-Stand [place] (Transfers) verbal cues;contact guard  -DM     Row Name 05/18/23 1414          Gait/Stairs (Locomotion)    [place] Level (Gait) verbal cues;nonverbal cues (demo/gesture);minimum assist (75% patient effort)  has IV;min A to guide R wx (drifts R); 3 " stand.rests; easily distracted;Depends cinched  -DM     Assistive Device (Gait) walker, front-wheeled  -DM     Distance in Feet (Gait) 180  -DM     Deviations/Abnormal Patterns (Gait) bilateral deviations;base of support, narrow;rody decreased;stride length decreased;weight shifting decreased  Decr step length;(F)drifting  -DM     Bilateral Gait Deviations forward flexed posture;heel strike decreased  gazes @ floor;incr flexed trunk as pt fatigued  -DM     Comment, (Gait/Stairs) cues for incr step length into AD, maintaining midline, focusing on task, trunk ext/focusing ahead, PLB  -DM           User Key  (r) = Recorded By, (t) = Taken By, (c) = Cosigned By    Initials Name Provider Type    DM Geeta Perez, PT Physical Therapist               Obj/Interventions     Row Name 05/18/23 1414          Range of Motion Comprehensive    General Range of Motion bilateral lower extremity ROM WFL  -DM     Row Name 05/18/23 1414          Strength Comprehensive (MMT)    General Manual Muscle Testing (MMT) Assessment lower extremity strength deficits identified  -DM     Comment, General Manual Muscle Testing (MMT) Assessment jolie Hips grossly 4- to 4+/5  -DM     Row Name 05/18/23 1414          Motor Skills    Therapeutic Exercise shoulder;hip;knee;ankle  performed UE exer as well, d/t OT not incl. during eval this AM;Cues for PLB throughout, & counted reps aloud w/ PT to focus on task  -DM     Row Name 05/18/23 1414          Shoulder (Therapeutic Exercise)    Shoulder (Therapeutic Exercise) AROM (active range of motion)  -DM     Shoulder AROM (Therapeutic Exercise) bilateral;flexion;extension;aBduction;aDduction;horizontal aBduction/aDduction;sitting;10 repetitions;other (see comments)  biceps curls  -DM     Row Name 05/18/23 1414          Hip (Therapeutic Exercise)    Hip (Therapeutic Exercise) AROM (active range of motion);isometric exercises  -DM     Hip AROM (Therapeutic Exercise)  bilateral;flexion;extension;aBduction;aDduction;external rotation;internal rotation;sitting;supine;10 repetitions;other (see comments)  bridging x 1; B BKFO;sitting marches  -DM     Hip Isometrics (Therapeutic Exercise) bilateral;gluteal sets;sitting;10 repetitions;other (see comments)  Abdom sets  -DM     Row Name 05/18/23 1414          Knee (Therapeutic Exercise)    Knee (Therapeutic Exercise) AROM (active range of motion);isometric exercises  -DM     Knee AROM (Therapeutic Exercise) bilateral;flexion;extension;SAQ (short arc quad);SLR (straight leg raise);LAQ (long arc quad);heel slides;sitting;supine;10 repetitions  -DM     Knee Isometrics (Therapeutic Exercise) bilateral;quad sets;sitting;10 repetitions  -DM     Row Name 05/18/23 1414          Ankle (Therapeutic Exercise)    Ankle (Therapeutic Exercise) AROM (active range of motion);AAROM (active assistive range of motion)  -DM     Ankle AROM (Therapeutic Exercise) bilateral;dorsiflexion;plantarflexion;sitting;10 repetitions  -DM     Ankle AAROM (Therapeutic Exercise) bilateral;sitting;10 repetitions;other (see comments)  AC  -DM     Row Name 05/18/23 1414          Balance    Balance Assessment sitting static balance;sitting dynamic balance;standing static balance;standing dynamic balance  -DM     Static Sitting Balance independent  -DM     Dynamic Sitting Balance supervision;verbal cues  recip scooting  -DM     Position, Sitting Balance unsupported;sitting in chair;sitting edge of bed  -DM     Static Standing Balance standby assist;verbal cues  -DM     Dynamic Standing Balance verbal cues;contact guard  -DM     Position/Device Used, Standing Balance supported;walker, rolling  -DM     Balance Interventions sitting;standing;static;dynamic;weight shifting activity  -DM           User Key  (r) = Recorded By, (t) = Taken By, (c) = Cosigned By    Initials Name Provider Type    DM Geeta Perez, PT Physical Therapist               Goals/Plan     Row Name 05/18/23  1414          Bed Mobility Goal 1 (PT)    Activity/Assistive Device (Bed Mobility Goal 1, PT) bed mobility activities, all  -DM     [place] Level/Cues Needed (Bed Mobility Goal 1, PT) independent  -DM     Time Frame (Bed Mobility Goal 1, PT) long term goal (LTG);5 days  -DM     Row Name 05/18/23 1414          Transfer Goal 1 (PT)    Activity/Assistive Device (Transfer Goal 1, PT) sit-to-stand/stand-to-sit;bed-to-chair/chair-to-bed;walker, rolling  -DM     [place] Level/Cues Needed (Transfer Goal 1, PT) supervision required  -DM     Time Frame (Transfer Goal 1, PT) long term goal (LTG);5 days  -DM     Row Name 05/18/23 1414          Gait Training Goal 1 (PT)    Activity/Assistive Device (Gait Training Goal 1, PT) gait (walking locomotion);walker, rolling  stable VS; no drifting  -DM     [place] Level (Gait Training Goal 1, PT) contact guard required  -DM     Distance (Gait Training Goal 1, PT) 300  -DM     Time Frame (Gait Training Goal 1, PT) long term goal (LTG);5 days  -DM     Row Name 05/18/23 1414          Patient Education Goal (PT)    Activity (Patient Education Goal, PT) HEP exer  -DM     [place]anks/Cues/Accuracy (Memory Goal 2, PT) demonstrates adequately  demonstrates w/ family member's assist  -DM     Time Frame (Patient Education Goal, PT) long term goal (LTG);5 days  -DM     Row Name 05/18/23 1414          Therapy Assessment/Plan (PT)    Planned Therapy Interventions (PT) balance training;bed mobility training;gait training;patient/family education;strengthening;transfer training  -DM           User Key  (r) = Recorded By, (t) = Taken By, (c) = Cosigned By    Initials Name Provider Type    DM Geeta Perez, PT Physical Therapist               Clinical Impression     Row Name 05/18/23 1414          Pain    Pretreatment Pain Rating 0/10 - no pain  -DM     Posttreatment Pain Rating 0/10 - no pain  -DM     Additional Documentation Pain Scale: Numbers Pre/Post-Treatment (Group)  -DM      Row Name 05/18/23 1414          Plan of Care Review    Plan of Care Reviewed With patient  -DM     Progress improving  -DM     Outcome Evaluation PT eval completed. Presents w/ rapidly prog. Dem./impaired cognit., diffic focusing on task, anx., CKD, impulsive behavior, decr. bal./LE strength, & impaired funct mobil. Transf. STS w/ R wx (CGA for line mgmt), amb 180 ft w/ R wx & min A to guide (drifts R) w/ 3 stand.rests, + ther exer Sup. & UIC, but limited by diffic focusing on task, persev. throughout session, flexed posture, & elev BP. Recommend SNF at d/c if dtr unable to assist pt adequately (per pt,dtr had recent sz).  -DM     Row Name 05/18/23 1414          Therapy Assessment/Plan (PT)    Patient/Family Therapy Goals Statement (PT) improved funct mobil  -DM     Rehab Potential (PT) good, to achieve stated therapy goals  -DM     Criteria for Skilled Interventions Met (PT) yes;meets criteria;skilled treatment is necessary  -DM     Therapy Frequency (PT) daily  -DM     Row Name 05/18/23 1414          Vital Signs    Pre Systolic BP Rehab 168  -DM     Pre Treatment Diastolic BP 92  -DM     Pretreatment Heart Rate (beats/min) 68  -DM     Posttreatment Heart Rate (beats/min) 82  -DM     Pre SpO2 (%) 97  -DM     O2 Delivery Pre Treatment room air  -DM     O2 Delivery Intra Treatment room air  -DM     O2 Delivery Post Treatment room air  -DM     Pre Patient Position Supine  -DM     Intra Patient Position Standing  -DM     Post Patient Position Sitting  -DM     Rest Breaks  3  -DM     Row Name 05/18/23 1414          Positioning and Restraints    Pre-Treatment Position in bed  -DM     Post Treatment Position chair  -DM     In Chair notified nsg;reclined;call light within reach;encouraged to call for assist;exit alarm on;legs elevated  -DM           User Key  (r) = Recorded By, (t) = Taken By, (c) = Cosigned By    Initials Name Provider Type    Geeta Webster, PT Physical Therapist               Outcome Measures      Row Name 05/18/23 1414 05/18/23 0800       How much help from another person do you currently need...    Turning from your back to your side while in flat bed without using bedrails? 4  -DM 3  -DD    Moving from lying on back to sitting on the side of a flat bed without bedrails? 3  -DM 3  -DD    Moving to and from a bed to a chair (including a wheelchair)? 3  -DM 3  -DD    Standing up from a chair using your arms (e.g., wheelchair, bedside chair)? 3  -DM 3  -DD    Climbing 3-5 steps with a railing? 3  -DM 3  -DD    To walk in hospital room? 3  -DM 3  -DD    AM-PAC 6 Clicks Score (PT) 19  -DM 18  -DD    Highest level of mobility 6 --> Walked 10 steps or more  -DM 6 --> Walked 10 steps or more  -DD    Row Name 05/18/23 1414 05/18/23 1010       Functional Assessment    Outcome Measure Options AM-PAC 6 Clicks Basic Mobility (PT)  -DM AM-PAC 6 Clicks Daily Activity (OT)  -ROSE          User Key  (r) = Recorded By, (t) = Taken By, (c) = Cosigned By    Initials Name Provider Type    Kaila Toro, OT Occupational Therapist    Geeta Webster, PT Physical Therapist    Efrain Conway, RN Registered Nurse                             Physical Therapy Education     Title: PT OT SLP Therapies (In Progress)     Topic: Physical Therapy (In Progress)     Point: Mobility training (In Progress)     Learning Progress Summary           Patient Eager, E,D, NR by DM at 5/18/2023 1528                   Point: Home exercise program (In Progress)     Learning Progress Summary           Patient Eager, E,D, NR by DM at 5/18/2023 1528                   Point: Body mechanics (In Progress)     Learning Progress Summary           Patient Eager, E,D, NR by DM at 5/18/2023 1528                   Point: Precautions (In Progress)     Learning Progress Summary           Patient Eager, E,D, NR by DM at 5/18/2023 1528                               User Key     Initials Effective Dates Name Provider Type Discipline    DM 02/03/23 -  Ana  Geeta ESCOBAR, PT Physical Therapist PT              PT Recommendation and Plan  Planned Therapy Interventions (PT): balance training, bed mobility training, gait training, patient/family education, strengthening, transfer training  Plan of Care Reviewed With: patient  Progress: improving  Outcome Evaluation: PT eval completed. Presents w/ rapidly prog. Dem./impaired cognit., diffic focusing on task, anx., CKD, impulsive behavior, decr. bal./LE strength, & impaired funct mobil. Transf. STS w/ R wx (CGA for line mgmt), amb 180 ft w/ R wx & min A to guide (drifts R) w/ 3 stand.rests, + ther exer Sup. & UIC, but limited by diffic focusing on task, persev. throughout session, flexed posture, & elev BP. Recommend SNF at d/c if dtr unable to assist pt adequately (per pt,dtr had recent sz).     Time Calculation:    PT Charges     Row Name 05/18/23 1529             Time Calculation    Start Time 1414  -DM      PT Received On 05/18/23  -DM      PT Goal Re-Cert Due Date 05/28/23  -DM         Time Calculation- PT    Total Timed Code Minutes- PT 76 minute(s)  -DM         Timed Charges    60487 - PT Therapeutic Exercise Minutes 16  -DM      81653 - Gait Training Minutes  15  -DM         Untimed Charges    PT Eval/Re-eval Minutes 45  -DM         Total Minutes    Timed Charges Total Minutes 31  -DM      Untimed Charges Total Minutes 45  -DM       Total Minutes 76  -DM            User Key  (r) = Recorded By, (t) = Taken By, (c) = Cosigned By    Initials Name Provider Type    DM Geeta Perez, PT Physical Therapist              Therapy Charges for Today     Code Description Service Date Service Provider Modifiers Qty    32279454901 HC PT THER PROC EA 15 MIN 5/18/2023 Geeta Perez, PT GP 1    55224323040 HC GAIT TRAINING EA 15 MIN 5/18/2023 Geeta Perez, PT GP 1    05871518191 HC PT EVAL MOD COMPLEXITY 3 5/18/2023 Geeta Perez, PT GP 1          PT G-Codes  Outcome Measure Options: AM-PAC 6 Clicks Basic Mobility (PT)  AM-PAC  6 Clicks Score (PT): 19  AM-PAC 6 Clicks Score (OT): 16  PT Discharge Summary  Anticipated Discharge Disposition (PT): skilled nursing facility, other (see comments) (short SNF stay if dtr unable to assist pt adequately (per pt, dtr had recent sz))    Geeta Perez, PT  2023      Electronically signed by Geeta Perez, PT at 23 1531          Occupational Therapy Notes (most recent note)      Kaila Kat, OT at 23 0944          Patient Name: Domitila Bosch  : 1940    MRN: [id number]                              Today's Date: 2023       Admit Date: 2023    Visit Dx:     ICD-10-CM ICD-9-CM   1. Rapidly progressive dementia  F03.90 294.20   2. Agitation  R45.1 307.9   3. Acute on chronic alteration in mental status  R41.82 780.09   4. Elevated blood pressure reading with diagnosis of hypertension  I10 401.9   5. DANIS (acute kidney injury)  N17.9 584.9     Patient Active Problem List   Diagnosis   • Diverticulosis of large intestine with hemorrhage   • Umbilical hernia   • S/P hernia repair   • Hypertension   • Dyslipidemia   • Carotid stenosis   • Gout   • GERD (gastroesophageal reflux disease)   • Hypothyroidism   • Neuropathy   • Malignant melanoma   • Asthma   • Anxiety disorder   • Diverticulosis   • Muscle pain   • Lumbar radiculopathy   • Kidney stone   • Deviated nasal septum   • Chronic laryngitis   • Acute CVA (cerebrovascular accident)   • Essential hypertension   • Allergy to Betadine   • DANIS (acute kidney injury)   • Severe hypothyroidism   • History of ischemic stroke   • Rapidly progressive dementia   • CKD (chronic kidney disease), stage III     Past Medical History:   Diagnosis Date   • Anxiety disorder 2017   • Asthma 2017   • Basal cell carcinoma in situ of skin 2019    right leg    • Carotid stenosis 2017   • Dementia    • Diaphoresis    • Diastolic dysfunction    • Diverticulitis    • Dyslipidemia 2017   • GERD (gastroesophageal  reflux disease) 02/16/2017   • Gout 02/16/2017   • Herpes zoster     Herpes zoster, [id number], right lower extremity.    • Hyperlipidemia    • Hypertension 02/16/2017   • Hypothyroidism 02/16/2017   • LVH (left ventricular hypertrophy)    • Malignant melanoma 02/16/2017   • Melanoma    • Mitral regurgitation 2014   • Nephrolithiasis    • Post herpetic neuralgia 02/16/2017   • TIA (transient ischemic attack)     TIA, June 2012, with nonobstructive carotid stenosis, dyslipidemia and hypertension     Past Surgical History:   Procedure Laterality Date   • ABDOMINAL SURGERY     • APPENDECTOMY     • COLON RESECTION LEFT  2016   • COLON RESECTION LEFT  2016   • CYST REMOVAL      left side of neck.    • HEEL SPUR SURGERY  1999   • HERNIA REPAIR      hiatal hernia    • NISSEN FUNDOPLICATION  1984   • OTHER SURGICAL HISTORY  2010    Malignant melanoma, status post resection       General Information     Row Name 05/18/23 0952          OT Time and Intention    Document Type evaluation  -ROSE     Mode of Treatment individual therapy;occupational therapy  -     Row Name 05/18/23 0952          General Information    Patient Profile Reviewed yes  -ROSE     Prior Level of Function independent:;all household mobility;feeding;grooming;dressing;bathing;dependent:;home management  pt. a limited historian so question accuracy  -ROSE     Existing Precautions/Restrictions fall  -ROSE     Barriers to Rehab cognitive status  -     Row Name 05/18/23 0952          Occupational Profile    Environmental Supports and Barriers (Occupational Profile) pt. thinks it is a tub shower at home, pt. unsure if uses or has any AD  -     Row Name 05/18/23 0952          Living Environment    People in Home child(diana), adult  dtr. -dtr. with recent seizure and unsure of ability to currently assist pt., per CM note dtr. reports inability to fully care for pt.  -     Row Name 05/18/23 0952          Stairs Within Home, Primary    Stairs, Within Home, Primary pt.  unable to state if has steps in home  -     Row Name 05/18/23 0952          Cognition    Orientation Status (Cognition) oriented to;person;verbal cues/prompts needed for orientation;place;disoriented to;situation;time  pt. was able to state type of place, cues for name of place, pt. asking when he came in or if came yesterday over and over, pt. with no orientation to date even with cueing  -     Row Name 05/18/23 0952          Safety Issues, Functional Mobility    Safety Issues Affecting Function (Mobility) awareness of need for assistance;insight into deficits/self-awareness;safety precaution awareness;problem-solving;judgment  -ROSE     Impairments Affecting Function (Mobility) balance;cognition;endurance/activity tolerance;postural/trunk control  -ROSE     Cognitive Impairments, Mobility Safety/Performance insight into deficits/self-awareness;safety precaution awareness;safety precaution follow-through;judgment;problem-solving/reasoning;awareness, need for assistance  -ROSE     Comment, Safety Issues/Impairments (Mobility) pt. with flexed posture  -           User Key  (r) = Recorded By, (t) = Taken By, (c) = Cosigned By    Initials Name Provider Type    Kaila Toro, OT Occupational Therapist                 Mobility/ADL's     Row Name 05/18/23 0957          Bed Mobility    Bed Mobility supine-sit;sit-supine;scooting/bridging  -ROSE     Scooting/Bridging [place] (Bed Mobility) standby assist;verbal cues  -ROSE     Supine-Sit [place] (Bed Mobility) standby assist;verbal cues  -ROSE     Sit-Supine [place] (Bed Mobility) standby assist;verbal cues  -ROSE     Bed Mobility, Safety Issues cognitive deficits limit understanding  -     Assistive Device (Bed Mobility) bed rails;head of bed elevated  -     Comment, (Bed Mobility) cues to initiate tasks, or to move fully to EOB, cues to scoot to HOB  -     Row Name 05/18/23 0957          Transfers    Transfers sit-stand transfer;stand-sit transfer  -ROSE      Row Name 05/18/23 0957          Sit-Stand Transfer    Sit-Stand Rose (Transfers) contact guard  -ROSE     Row Name 05/18/23 0957          Stand-Sit Transfer    Stand-Sit Rose (Transfers) contact guard  -[facility] Name 05/18/23 0957          Functional Mobility    Functional Mobility- Ind. Level contact guard assist  -ROSE     Functional Mobility-Distance (Feet) --  10 + 10  -ROSE     Functional Mobility- Safety Issues step length decreased;balance decreased during turns  -ROSE     Functional Mobility- Comment pt. with no LOB, but unsteady and tending to want to furniture walk, pt. declined walker use, but appears he would benefit from use, but will likely need reminders to use and question safety with use  -[facility] Name 05/18/23 0957          Activities of Daily Living    BADL Assessment/Intervention lower body dressing;grooming;upper body dressing;feeding  -ROSE     Row Name 05/18/23 0957          Lower Body Dressing Assessment/Training    Rose Level (Lower Body Dressing) don;socks;standby assist;verbal cues  -ROSE     Position (Lower Body Dressing) edge of bed sitting  -ROSE     Comment, (Lower Body Dressing) cues to katheryn gripper socks over his socks  -[facility] Name 05/18/23 0957          Grooming Assessment/Training    Rose Level (Grooming) hair care, combing/brushing;oral care regimen;wash face, hands;minimum assist (75% patient effort);verbal cues;nonverbal cues (demo/gesture)  -ROSE     Position (Grooming) sink side;unsupported standing  -ROSE     Comment, (Grooming) pt. needed cues to sequence tasks, perform thoroughly  -[facility] Name 05/18/23 0957          Upper Body Dressing Assessment/Training    Rose Level (Upper Body Dressing) doff;don;pajama/robe;maximum assist (25% patient effort)  -ROSE     Position (Upper Body Dressing) edge of bed sitting  -ROSE     Comment, (Upper Body Dressing) limited by confusion and lines  -[facility] Name 05/18/23 0957          Self-Feeding  Assessment/Training    [place] Level (Feeding) prepare tray/open items;moderate assist (50% patient effort);liquids to mouth;scoop food and bring to mouth;independent  -     Position (Self-Feeding) sitting up in bed  -           User Key  (r) = Recorded By, (t) = Taken By, (c) = Cosigned By    Initials Name Provider Type    ROSE Kaila Kat, OT Occupational Therapist               Obj/Interventions     Row Name 05/18/23 1001          Sensory Assessment (Somatosensory)    Sensory Assessment (Somatosensory) UE sensation intact  -     Sensory Assessment based on observation of tasks, no droppage or gross coordination deficit noted  -     Row Name 05/18/23 1001          Vision Assessment/Intervention    Visual Impairment/Limitations corrective lenses full-time  did not see glasses in room  -     Row Name 05/18/23 1001          Range of Motion Comprehensive    General Range of Motion bilateral upper extremity ROM WFL  -     Row Name 05/18/23 1001          Strength Comprehensive (MMT)    General Manual Muscle Testing (MMT) Assessment no strength deficits identified  -     Comment, General Manual Muscle Testing (MMT) Assessment BUE  -     Row Name 05/18/23 1001          Motor Skills    Motor Skills functional endurance  -     Functional Endurance pt. fatigued quickly with sinkside grooming tasks and had to return to bed  -     Row Name 05/18/23 1001          Balance    Balance Assessment sitting static balance;sitting dynamic balance;standing static balance;standing dynamic balance  -     Static Sitting Balance supervision  -     Dynamic Sitting Balance supervision  -ROSE     Position, Sitting Balance unsupported;sitting edge of bed  -     Static Standing Balance standby assist  -     Dynamic Standing Balance standby assist  grooming sinkside  -     Position/Device Used, Standing Balance supported;unsupported  -     Balance Interventions sit to stand;occupation based/functional task   -ROSE     Comment, Balance LBD, grooming  -ROSE           User Key  (r) = Recorded By, (t) = Taken By, (c) = Cosigned By    Initials Name Provider Type    Kaila Toro, OT Occupational Therapist               Goals/Plan     Row Name 05/18/23 1008          Transfer Goal 1 (OT)    Activity/Assistive Device (Transfer Goal 1, OT) toilet;commode  grab bar, wx prn  -ROSE     [place] Level/Cues Needed (Transfer Goal 1, OT) standby assist;verbal cues required  -ROSE     Time Frame (Transfer Goal 1, OT) long term goal (LTG);10 days  -ROSE     Progress/Outcome (Transfer Goal 1, OT) new goal  -ROSE     Row Name 05/18/23 1008          Dressing Goal 1 (OT)    Activity/Device (Dressing Goal 1, OT) upper body dressing;lower body dressing  -ROSE     [place]/Cues Needed (Dressing Goal 1, OT) standby assist;set-up required;verbal cues required  -ROSE     Time Frame (Dressing Goal 1, OT) long term goal (LTG);10 days  -ROSE     Strategies/Barriers (Dressing Goal 1, OT) pants, robe vs shirt pending availability  -ROSE     Progress/Outcome (Dressing Goal 1, OT) new goal  -ROSE     Row Name 05/18/23 1008          Toileting Goal 1 (OT)    Activity/Device (Toileting Goal 1, OT) toileting skills, all;commode;grab bar/safety frame  -ROSE     [place] Level/Cues Needed (Toileting Goal 1, OT) minimum assist (75% or more patient effort);verbal cues required;tactile cues required  -ROSE     Time Frame (Toileting Goal 1, OT) long term goal (LTG);10 days  -ROSE     Progress/Outcome (Toileting Goal 1, OT) new goal  -ROSE     Row Name 05/18/23 1008          Grooming Goal 1 (OT)    Activity/Device (Grooming Goal 1, OT) hair care;oral care;wash face, hands  -ROSE     [place] (Grooming Goal 1, OT) standby assist;verbal cues required  -ROSE     Time Frame (Grooming Goal 1, OT) long term goal (LTG);10 days  -ROSE     Strategies/Barriers (Grooming Goal 1, OT) sinkside  -ROSE     Progress/Outcome (Grooming Goal 1, OT) new goal  -ROSE     Row Name 05/18/23 1008           Therapy Assessment/Plan (OT)    Planned Therapy Interventions (OT) activity tolerance training;BADL retraining;cognitive/visual perception retraining;functional balance retraining;occupation/activity based interventions;ROM/therapeutic exercise;patient/caregiver education/training;transfer/mobility retraining;strengthening exercise  -           User Key  (r) = Recorded By, (t) = Taken By, (c) = Cosigned By    Initials Name Provider Type    Kaila Toro, OT Occupational Therapist               Clinical Impression     Row Name 05/18/23 1003          Pain Assessment    Pretreatment Pain Rating 0/10 - no pain  -ROSE     Posttreatment Pain Rating 0/10 - no pain  -     Row Name 05/18/23 1003          Plan of Care Review    Plan of Care Reviewed With patient  -ROSE     Progress no change  -     Outcome Evaluation OT evaluation completed with patient demonstrating impaired balance, cognition and endurance impacting BADL task performance and related transfers.  Pt. needed cues throughout session to initiate tasks, sequence tasks and for safety with tasks.  Pt. fatigued quickly with standing activity.  Short SNF stay may be beneficial. prior to home, pending dtr. ability to assist pt. or get pt. assist in home.  -     Row Name 05/18/23 1003          Therapy Assessment/Plan (OT)    Patient/Family Therapy Goal Statement (OT) return to prior BADL independence level  -     Rehab Potential (OT) good, to achieve stated therapy goals  -     Criteria for Skilled Therapeutic Interventions Met (OT) yes;meets criteria;skilled treatment is necessary  -     Therapy Frequency (OT) daily  -     Row Name 05/18/23 1003          Therapy Plan Review/Discharge Plan (OT)    Equipment Needs Upon Discharge (OT) --  TBD post family input on home setup and AD pt. already has or does not have  -     Anticipated Discharge Disposition (OT) skilled nursing facility  -     Row Name 05/18/23 1003          Vital Signs    Pre Systolic  BP Rehab --  nurse cleared for therapy treatment session  -ROSE     O2 Delivery Pre Treatment room air  -ROSE     O2 Delivery Intra Treatment room air  -ROSE     O2 Delivery Post Treatment room air  -ROSE     Pre Patient Position Supine  -ROSE     Intra Patient Position Standing  -ROSE     Post Patient Position Supine  -ROSE     Row Name 05/18/23 1003          Positioning and Restraints    Pre-Treatment Position in bed  -ROSE     Post Treatment Position bed  nurse requested pt. be return to bed  -ROSE     In Bed notified nsg;sitting;call light within reach;encouraged to call for assist;exit alarm on;side rails up x2  spoke with nurse aid about pt. performance, nurse aid sitting outside of room  -ROSE           User Key  (r) = Recorded By, (t) = Taken By, (c) = Cosigned By    Initials Name Provider Type    Kaila Toro OT Occupational Therapist               Outcome Measures     Row Name 05/18/23 1010          How much help from another is currently needed...    Putting on and taking off regular lower body clothing? 3  -ROSE     Bathing (including washing, rinsing, and drying) 2  -ROSE     Toileting (which includes using toilet bed pan or urinal) 2  -ROSE     Putting on and taking off regular upper body clothing 3  -ROSE     Taking care of personal grooming (such as brushing teeth) 3  -ROSE     Eating meals 3  -ROSE     AM-PAC 6 Clicks Score (OT) 16  -ROSE     Row Name 05/18/23 1010          Functional Assessment    Outcome Measure Options AM-PAC 6 Clicks Daily Activity (OT)  -ROSE           User Key  (r) = Recorded By, (t) = Taken By, (c) = Cosigned By    Initials Name Provider Type    Kaila Toro OT Occupational Therapist                Occupational Therapy Education     Title: PT OT SLP Therapies (In Progress)     Topic: Occupational Therapy (In Progress)     Point: ADL training (In Progress)     Description:   Instruct learner(s) on proper safety adaptation and remediation techniques during self care or transfers.   Instruct in  proper use of assistive devices.              Learning Progress Summary           Patient Acceptance, E, NR by ROSE at 5/18/2023 1012    Comment: re-orientation, reason for consult, ADL sequencing, reason need gait belt and gripper socks                   Point: Home exercise program (Not Started)     Description:   Instruct learner(s) on appropriate technique for monitoring, assisting and/or progressing therapeutic exercises/activities.              Learner Progress:  Not documented in this visit.          Point: Precautions (In Progress)     Description:   Instruct learner(s) on prescribed precautions during self-care and functional transfers.              Learning Progress Summary           Patient Acceptance, E, NR by ROSE at 5/18/2023 1012    Comment: re-orientation, reason for consult, ADL sequencing, reason need gait belt and gripper socks                   Point: Body mechanics (Not Started)     Description:   Instruct learner(s) on proper positioning and spine alignment during self-care, functional mobility activities and/or exercises.              Learner Progress:  Not documented in this visit.                      User Key     Initials Effective Dates Name Provider Type Discipline    ROSE 02/03/23 -  Kaila Kat, OT Occupational Therapist OT              OT Recommendation and Plan  Planned Therapy Interventions (OT): activity tolerance training, BADL retraining, cognitive/visual perception retraining, functional balance retraining, occupation/activity based interventions, ROM/therapeutic exercise, patient/caregiver education/training, transfer/mobility retraining, strengthening exercise  Therapy Frequency (OT): daily  Plan of Care Review  Plan of Care Reviewed With: patient  Progress: no change  Outcome Evaluation: OT evaluation completed with patient demonstrating impaired balance, cognition and endurance impacting BADL task performance and related transfers.  Pt. needed cues throughout session to  initiate tasks, sequence tasks and for safety with tasks.  Pt. fatigued quickly with standing activity.  Short SNF stay may be beneficial. prior to home, pending dtr. ability to assist pt. or get pt. assist in home.     Time Calculation:    Time Calculation- OT     Row Name 05/18/23 1014             Time Calculation- OT    OT Start Time 0924  -ROSE      OT Received On 05/18/23  -ROSE      OT Goal Re-Cert Due Date 05/28/23  -ROSE         Timed Charges    27289 - OT Therapeutic Activity Minutes 4  -ROSE      08543 - OT Self Care/Mgmt Minutes 10  -ROSE         Untimed Charges    OT Eval/Re-eval Minutes 39  -ROSE         Total Minutes    Timed Charges Total Minutes 14  -ROSE      Untimed Charges Total Minutes 39  -ROSE       Total Minutes 53  -ROSE            User Key  (r) = Recorded By, (t) = Taken By, (c) = Cosigned By    Initials Name Provider Type    Kaila Toro OT Occupational Therapist              Therapy Charges for Today     Code Description Service Date Service Provider Modifiers Qty    [id number]  OT SELF CARE/MGMT/TRAIN EA 15 MIN 5/18/2023 Kaila Kat OT GO 1    [id number]  OT EVAL LOW COMPLEXITY 3 5/18/2023 Kaila Kat OT GO 1               Kaila Kat OT  5/18/2023    Electronically signed by Kaila Kat OT at 05/18/23 1016

## 2023-05-19 NOTE — PROGRESS NOTES
[facility] Medicine Services  PROGRESS NOTE    Patient Name: Domitila Bosch  : 1940  MRN: [id number]    Date of Admission: 2023  Primary Care Physician: Marcin Ferguson MD    Subjective   Subjective     CC:  F/u confusion    HPI:  Patient resting in bed. Able to follow commands. Alert to self and location. Keeps asking if anybody has visited him today. Asks me to call his sister and asks about daughter. He did not remember she was in the hospital. NAD.     ROS:  Unable to assess reliably d/t patient's mental status. Denies concerns, CP, SOA, abdominal pain.    Objective   Objective     Vital Signs:   Temp:  [97.8 °F (36.6 °C)-98.1 °F (36.7 °C)] 98.1 °F (36.7 °C)  Heart Rate:  [56-77] 57  Resp:  [16-17] 16  BP: (120-160)/(74-83) 160/77     Physical Exam:  Constitutional: No acute distress, awake, alert  HENT: NCAT, mucous membranes moist  Respiratory: Clear to auscultation bilaterally, respiratory effort normal, room air   Cardiovascular: RRR, no murmurs, rubs, or gallops  Gastrointestinal: Positive bowel sounds, soft, nontender, nondistended  Musculoskeletal: No bilateral ankle edema  Psychiatric: Appropriate affect, cooperative  Neurologic: Oriented x 2, strength symmetric in all extremities, Cranial Nerves grossly intact to confrontation, speech clear  Skin: No rashes      Results Reviewed:  LAB RESULTS:      Lab 23  1133   WBC 7.17   HEMOGLOBIN 15.5   HEMATOCRIT 46.8   PLATELETS 252   NEUTROS ABS 4.74   IMMATURE GRANS (ABS) 0.03   LYMPHS ABS 1.59   MONOS ABS 0.58   EOS ABS 0.15   MCV 90.0         Lab 23  0647 23  1133   SODIUM 140 139   POTASSIUM 4.0 4.0   CHLORIDE 109* 104   CO2 23.0 24.0   ANION GAP 8.0 11.0   BUN 10 10   CREATININE 1.40* 1.43*   EGFR 49.9* 48.6*   GLUCOSE 95 91   CALCIUM 8.4* 9.6   TSH  --  60.670*         Lab 23  1133   TOTAL PROTEIN 6.2   ALBUMIN 3.9   GLOBULIN 2.3   ALT (SGPT) 8   AST (SGOT) 14   BILIRUBIN 0.4   ALK PHOS  126*   LIPASE 31                 Lab 05/17/23  1133   FOLATE 4.92   VITAMIN B 12 593         Brief Urine Lab Results  (Last result in the past 365 days)      Color   Clarity   Blood   Leuk Est   Nitrite   Protein   CREAT   Urine HCG        05/17/23 1232 Yellow   Cloudy   Trace   Large (3+)   Negative   Negative                 Microbiology Results Abnormal     Procedure Component Value - Date/Time    Urine Culture - Urine, Urine, Clean Catch [[id number]]  (Normal) Collected: 05/17/23 1232    Lab Status: Final result Specimen: Urine, Clean Catch Updated: 05/18/23 2221     Urine Culture No growth          CT Head Without Contrast    Result Date: 5/17/2023  CT HEAD WO CONTRAST Date of Exam: 5/17/2023 11:45 AM EDT Indication: acute on chronic alteration in mental status. Comparison: MRI brain from September 9, 2021 and CT head from July 3, 2021 Technique: Axial CT images were obtained of the head without contrast administration.  Reconstructed coronal and sagittal images were also obtained. Automated exposure control and iterative construction methods were used. Findings: No acute intracranial hemorrhage or extra-axial collection is identified. The ventricles appear normal in caliber, with no evidence of mass effect or midline shift. The basal cisterns appear patent. The gray-white differentiation appears preserved. The calvarium appear intact. The paranasal sinuses are clear. The mastoid air cells are well-aerated. There is mild generalized parenchymal atrophy. Patchy areas of periventricular and subcortical white matter hypodensities are nonspecific, but likely the sequela of moderate chronic small vessel ischemic disease.     Impression: Impression: 1.No acute intracranial process or calvarial fracture identified. 2.Findings suggestive of moderate chronic small vessel ischemic disease. Electronically Signed: Shravan Yarbrough  5/17/2023 12:03 PM EDT  Workstation ID: [id number]      Results for orders placed during the  hospital encounter of 07/28/20    Adult Transesophageal Echo (SUMMER) W/ Cont if Necessary Per Protocol    Interpretation Summary  · Estimated EF = 65%.  · Left ventricular systolic function is normal.  · Left atrial cavity size is mild-to-moderately dilated.  · There is mild calcification of the aortic valve mainly affecting the non and right coronary cusp(s).  · Saline test results are negative.  · The aortic valve exhibits moderate sclerosis.  · There is no evidence of pericardial effusion.  · There is moderate (grade 2) protruding plaque in the descending aorta present.  · No valvular vegetations demonstrated.      Current medications:  Scheduled Meds:allopurinol, 300 mg, Oral, Daily  amLODIPine, 5 mg, Oral, Daily  aspirin, 81 mg, Oral, Daily  atorvastatin, 80 mg, Oral, Nightly  busPIRone, 10 mg, Oral, Q12H  clopidogrel, 75 mg, Oral, Daily  finasteride, 5 mg, Oral, Daily  heparin (porcine), 5,000 Units, Subcutaneous, Q8H  levoFLOXacin, 750 mg, Intravenous, Q48H  levothyroxine, 175 mcg, Oral, Q AM  melatonin, 5 mg, Oral, Nightly  memantine, 5 mg, Oral, Daily  pantoprazole, 40 mg, Oral, Daily  polyethylene glycol, 17 g, Oral, Daily  QUEtiapine, 25 mg, Oral, Nightly  sertraline, 50 mg, Oral, Daily  sodium chloride, 10 mL, Intravenous, Q12H  terazosin, 2 mg, Oral, Nightly      Continuous Infusions:Pharmacy Consult - Pharmacy to dose,       PRN Meds:.•  acetaminophen **OR** acetaminophen **OR** acetaminophen  •  senna-docusate sodium **AND** polyethylene glycol **AND** bisacodyl **AND** bisacodyl  •  Calcium Replacement - Follow Nurse / BPA Driven Protocol  •  hydrOXYzine  •  Magnesium Standard Dose Replacement - Follow Nurse / BPA Driven Protocol  •  ondansetron **OR** ondansetron  •  Pharmacy Consult - Pharmacy to dose  •  Phosphorus Replacement - Follow Nurse / BPA Driven Protocol  •  Potassium Replacement - Follow Nurse / BPA Driven Protocol  •  QUEtiapine  •  sodium chloride  •  sodium chloride    Assessment & Plan    Assessment & Plan     Active Hospital Problems    Diagnosis  POA   • **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        Brief Hospital Course to date:  Domitila Bosch is a 82 y.o. male with dementia who lives with his daughter.  EMS was called due to increased confusion/agitation at home.  While EMS was at the house, apparently his daughter had a seizure and they were both seen in the ED.  He was found to have a UTI and creatinine elevation beyond baseline and admission was requested for further management.      This patient's problems and plans were partially entered by my partner and updated as appropriate by me 05/19/23.      Dementia with worsening confusion  Anxiety disorder  -Continue namenda  -PRN seroquel, add scheduled nightly dose  -TSH elevated as below.  B12, folate WNL  -CT head without acute intracranial process  -Case management following  -Follows with Dr. Hernadez at , last appointment 1/6/23, will adjust med rec based on most recent meds     CKD III with elevated creatinine  -S/P gentle IVF  -Stable, baseline probably ~ 1.3-1.4 per chart review  -renally dose allopurinol  -AM BMP     UTI  -Urine culture with no growth  -Due to numerous allergies and prior culture with enterococcus faecalis, started on levaquin  -Will stop abx as urine cx negative, no nitrites on UA   -QT normal on EKG  -continue finasteride     Hypothyroidism  -Continue levothyroxine  -TSH is 60.67, free T4 0.42  -Unable to verify with his daughter if he is taking appropriately as she is admitted to hospital, levothyroxine dose increased for now  -recheck TSH in 6-8 weeks at PCP follow-up     HTN  HLD  H/o TIA, CVA  -continue home amlodipine, finasteride, terazosin  -per recent med rec from , patient on  triamterene-hctz 37.5mg-25mg, not on terazosin  -monitor BP  -continue statin, ASA    Expected Discharge Location and Transportation: SNF rehab, EMS  Expected Discharge pending bed offer  Expected Discharge Date: 5/22/2023; Expected Discharge Time:      DVT prophylaxis:  Medical DVT prophylaxis orders are present.     AM-PAC 6 Clicks Score (PT): 19 (05/18/23 2834)    CODE STATUS:   Code Status and Medical Interventions:   Ordered at: 05/17/23 3233     Code Status (Patient has no pulse and is not breathing):    CPR (Attempt to Resuscitate)     Medical Interventions (Patient has pulse or is breathing):    Full Support     Comments:    Unable to reach daughter to discuss-will try again later       Nevaeh Raymond, APRN  05/19/23

## 2023-05-20 LAB
ANION GAP SERPL CALCULATED.3IONS-SCNC: 9 MMOL/L (ref 5–15)
BUN SERPL-MCNC: 10 MG/DL (ref 8–23)
BUN/CREAT SERPL: 7.8 (ref 7–25)
CALCIUM SPEC-SCNC: 9 MG/DL (ref 8.6–10.5)
CHLORIDE SERPL-SCNC: 107 MMOL/L (ref 98–107)
CO2 SERPL-SCNC: 24 MMOL/L (ref 22–29)
CREAT SERPL-MCNC: 1.28 MG/DL (ref 0.76–1.27)
EGFRCR SERPLBLD CKD-EPI 2021: 55.5 ML/MIN/1.73
GLUCOSE SERPL-MCNC: 82 MG/DL (ref 65–99)
POTASSIUM SERPL-SCNC: 3.7 MMOL/L (ref 3.5–5.2)
SODIUM SERPL-SCNC: 140 MMOL/L (ref 136–145)

## 2023-05-20 PROCEDURE — 25010000002 HEPARIN (PORCINE) PER 1000 UNITS: Performed by: INTERNAL MEDICINE

## 2023-05-20 PROCEDURE — 99232 SBSQ HOSP IP/OBS MODERATE 35: CPT | Performed by: NURSE PRACTITIONER

## 2023-05-20 PROCEDURE — G0378 HOSPITAL OBSERVATION PER HR: HCPCS

## 2023-05-20 PROCEDURE — 96372 THER/PROPH/DIAG INJ SC/IM: CPT

## 2023-05-20 PROCEDURE — 80048 BASIC METABOLIC PNL TOTAL CA: CPT | Performed by: NURSE PRACTITIONER

## 2023-05-20 RX ADMIN — BUSPIRONE HYDROCHLORIDE 15 MG: 10 TABLET ORAL at 20:12

## 2023-05-20 RX ADMIN — DONEPEZIL HYDROCHLORIDE 5 MG: 5 TABLET, FILM COATED ORAL at 20:13

## 2023-05-20 RX ADMIN — SERTRALINE HYDROCHLORIDE 100 MG: 100 TABLET ORAL at 09:34

## 2023-05-20 RX ADMIN — HYDROXYZINE HYDROCHLORIDE 10 MG: 10 TABLET ORAL at 20:13

## 2023-05-20 RX ADMIN — FINASTERIDE 5 MG: 5 TABLET, FILM COATED ORAL at 09:34

## 2023-05-20 RX ADMIN — PANTOPRAZOLE SODIUM 40 MG: 40 TABLET, DELAYED RELEASE ORAL at 09:34

## 2023-05-20 RX ADMIN — HEPARIN SODIUM 5000 UNITS: 5000 INJECTION, SOLUTION INTRAVENOUS; SUBCUTANEOUS at 05:49

## 2023-05-20 RX ADMIN — BUSPIRONE HYDROCHLORIDE 15 MG: 10 TABLET ORAL at 09:34

## 2023-05-20 RX ADMIN — ASPIRIN 81 MG 81 MG: 81 TABLET ORAL at 09:34

## 2023-05-20 RX ADMIN — AMLODIPINE BESYLATE 5 MG: 5 TABLET ORAL at 09:34

## 2023-05-20 RX ADMIN — Medication 10 ML: at 09:39

## 2023-05-20 RX ADMIN — QUETIAPINE FUMARATE 25 MG: 25 TABLET ORAL at 18:48

## 2023-05-20 RX ADMIN — Medication 10 ML: at 20:17

## 2023-05-20 RX ADMIN — HEPARIN SODIUM 5000 UNITS: 5000 INJECTION, SOLUTION INTRAVENOUS; SUBCUTANEOUS at 20:14

## 2023-05-20 RX ADMIN — TERAZOSIN HYDROCHLORIDE 2 MG: 2 CAPSULE ORAL at 20:13

## 2023-05-20 RX ADMIN — ALLOPURINOL 50 MG: 100 TABLET ORAL at 09:37

## 2023-05-20 RX ADMIN — Medication 5 MG: at 20:12

## 2023-05-20 RX ADMIN — HEPARIN SODIUM 5000 UNITS: 5000 INJECTION, SOLUTION INTRAVENOUS; SUBCUTANEOUS at 16:30

## 2023-05-20 RX ADMIN — ATORVASTATIN CALCIUM 80 MG: 40 TABLET, FILM COATED ORAL at 20:13

## 2023-05-20 RX ADMIN — CLOPIDOGREL BISULFATE 75 MG: 75 TABLET ORAL at 09:34

## 2023-05-20 RX ADMIN — POLYETHYLENE GLYCOL 3350 17 G: 17 POWDER, FOR SOLUTION ORAL at 09:33

## 2023-05-20 RX ADMIN — LEVOTHYROXINE SODIUM 175 MCG: 175 TABLET ORAL at 05:49

## 2023-05-20 NOTE — PLAN OF CARE
Goal Outcome Evaluation:  Plan of Care Reviewed With: patient        Progress: no change  Outcome Evaluation: VSS.  82 year old white male admitted on 5/17/2023, he is on day 3 of this hospitalization.  Patient has IV x 2 in place and patent to saline lock.  He has voided an adequate amount of UOP. Patient is pleasantly confused asking same question repetitively about family and how long he will be here.  He has rested comfortably throughout the shift.  Will continue to monitor patient throughout the rest of this shift

## 2023-05-20 NOTE — PLAN OF CARE
Goal Outcome Evaluation:  Plan of Care Reviewed With: patient        Progress: no change          PRN given for agitation. Tolerating diet. Stand by assist. Repetitive and continually confused. Visited by family today. Awaiting further orders.

## 2023-05-20 NOTE — PROGRESS NOTES
[facility] Medicine Services  PROGRESS NOTE    Patient Name: Domitila Bosch  : 1940  MRN: [id number]    Date of Admission: 2023  Primary Care Physician: Marcin Ferguson MD    Subjective   Subjective     CC:  F/u confusion    HPI:   Patient resting in bed.  Sister-in-law is at the bedside.  Asks when he will be able to see his daughter and when he will be able to get out of the hospital.  Pleasant and redirectable. SALOME says she is not sure if patient was getting his medications properly at home, says he is more alert and interactive here.    ROS:   Unable to assess reliably d/t patient's mental status. Denies concerns, CP, SOA, abdominal pain.    Objective   Objective     Vital Signs:   Temp:  [97.3 °F (36.3 °C)-98.6 °F (37 °C)] 98.6 °F (37 °C)  Heart Rate:  [51-58] 58  Resp:  [18] 18  BP: (153-162)/(73-78) 153/78     Physical Exam:   Constitutional: No acute distress, awake, alert  HENT: NCAT, mucous membranes moist  Respiratory: Clear to auscultation bilaterally, respiratory effort normal, room air   Cardiovascular: RRR, no murmurs, rubs, or gallops  Gastrointestinal: Positive bowel sounds, soft, nontender, nondistended  Musculoskeletal: No bilateral ankle edema  Psychiatric: Appropriate affect, cooperative  Neurologic: Oriented x 2, strength symmetric in all extremities, Cranial Nerves grossly intact to confrontation, speech clear  Skin: No rashes      Results Reviewed:  LAB RESULTS:      Lab 23  1133   WBC 7.17   HEMOGLOBIN 15.5   HEMATOCRIT 46.8   PLATELETS 252   NEUTROS ABS 4.74   IMMATURE GRANS (ABS) 0.03   LYMPHS ABS 1.59   MONOS ABS 0.58   EOS ABS 0.15   MCV 90.0         Lab 23  0551 23  0647 23  1133   SODIUM 140 140 139   POTASSIUM 3.7 4.0 4.0   CHLORIDE 107 109* 104   CO2 24.0 23.0 24.0   ANION GAP 9.0 8.0 11.0   BUN 10 10 10   CREATININE 1.28* 1.40* 1.43*   EGFR 55.5* 49.9* 48.6*   GLUCOSE 82 95 91   CALCIUM 9.0 8.4* 9.6   TSH  --   --   60.670*         Lab 05/17/23  1133   TOTAL PROTEIN 6.2   ALBUMIN 3.9   GLOBULIN 2.3   ALT (SGPT) 8   AST (SGOT) 14   BILIRUBIN 0.4   ALK PHOS 126*   LIPASE 31                 Lab 05/17/23  1133   FOLATE 4.92   VITAMIN B 12 593         Brief Urine Lab Results  (Last result in the past 365 days)      Color   Clarity   Blood   Leuk Est   Nitrite   Protein   CREAT   Urine HCG        05/17/23 1232 Yellow   Cloudy   Trace   Large (3+)   Negative   Negative                 Microbiology Results Abnormal     Procedure Component Value - Date/Time    Urine Culture - Urine, Urine, Clean Catch [[id number]]  (Normal) Collected: 05/17/23 1232    Lab Status: Final result Specimen: Urine, Clean Catch Updated: 05/18/23 2221     Urine Culture No growth          No radiology results from the last 24 hrs    Results for orders placed during the hospital encounter of 07/28/20    Adult Transesophageal Echo (SUMMER) W/ Cont if Necessary Per Protocol    Interpretation Summary  · Estimated EF = 65%.  · Left ventricular systolic function is normal.  · Left atrial cavity size is mild-to-moderately dilated.  · There is mild calcification of the aortic valve mainly affecting the non and right coronary cusp(s).  · Saline test results are negative.  · The aortic valve exhibits moderate sclerosis.  · There is no evidence of pericardial effusion.  · There is moderate (grade 2) protruding plaque in the descending aorta present.  · No valvular vegetations demonstrated.      Current medications:  Scheduled Meds:allopurinol, 50 mg, Oral, Daily  amLODIPine, 5 mg, Oral, Daily  aspirin, 81 mg, Oral, Daily  atorvastatin, 80 mg, Oral, Nightly  busPIRone, 15 mg, Oral, Q12H  clopidogrel, 75 mg, Oral, Daily  donepezil, 5 mg, Oral, Nightly  finasteride, 5 mg, Oral, Daily  heparin (porcine), 5,000 Units, Subcutaneous, Q8H  levothyroxine, 175 mcg, Oral, Q AM  melatonin, 5 mg, Oral, Nightly  pantoprazole, 40 mg, Oral, Daily  polyethylene glycol, 17 g, Oral,  Daily  sertraline, 100 mg, Oral, Daily  sodium chloride, 10 mL, Intravenous, Q12H  terazosin, 2 mg, Oral, Nightly      Continuous Infusions:   PRN Meds:.•  acetaminophen **OR** acetaminophen **OR** acetaminophen  •  senna-docusate sodium **AND** polyethylene glycol **AND** bisacodyl **AND** bisacodyl  •  Calcium Replacement - Follow Nurse / BPA Driven Protocol  •  hydrOXYzine  •  Magnesium Standard Dose Replacement - Follow Nurse / BPA Driven Protocol  •  ondansetron **OR** ondansetron  •  Phosphorus Replacement - Follow Nurse / BPA Driven Protocol  •  Potassium Replacement - Follow Nurse / BPA Driven Protocol  •  QUEtiapine  •  sodium chloride  •  sodium chloride    Assessment & Plan   Assessment & Plan     Active Hospital Problems    Diagnosis  POA   • **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        Brief Hospital Course to date:  Domitila Bosch is a 82 y.o. male with dementia who lives with his daughter.  EMS was called due to increased confusion/agitation at home.  While EMS was at the house, apparently his daughter had a seizure and they were both seen in the ED.  He was found to have a UTI and creatinine elevation beyond baseline and admission was requested for further management.      This patient's problems and plans were partially entered by my partner and updated as appropriate by me 05/20/23.      Dementia with worsening confusion  Anxiety disorder  -Continue namenda  -PRN seroquel, add scheduled nightly dose  -TSH elevated as below.  B12, folate WNL  -CT head without acute intracranial process  -Case management following  -Follows with Dr. Hernadez at , last appointment 1/6/23, adjusted med rec based on most recent meds, will hold prn xanax     CKD III with elevated creatinine  -S/P  gentle IVF  -Stable, baseline probably ~ 1.3-1.4 per chart review  -renally dose allopurinol  -Cr 1.28 on 5/20, back to baseline     UTI  -Urine culture with no growth  -Due to numerous allergies and prior culture with enterococcus faecalis, started on levaquin  -Abx dc'd as urine cx negative, afebrile, wbc's wnl  -QT normal on EKG  -continue finasteride     Hypothyroidism  -Continue levothyroxine  -TSH is 60.67, free T4 0.42  -Unable to verify with his daughter if he is taking appropriately as she is admitted to hospital, levothyroxine dose increased for now  -recheck TSH in 6-8 weeks at PCP follow-up     HTN  HLD  H/o TIA, CVA  -continue home amlodipine, finasteride, terazosin  -per recent med rec from , patient on triamterene-hctz 37.5mg-25mg, not on terazosin  -monitor BP  -continue statin, ASA    Expected Discharge Location and Transportation: SNF rehab, EMS  Expected Discharge pending bed offer  Expected Discharge Date: 5/25/2023; Expected Discharge Time:      DVT prophylaxis:  Medical DVT prophylaxis orders are present.     AM-PAC 6 Clicks Score (PT): 20 (05/19/23 1000)    CODE STATUS:   Code Status and Medical Interventions:   Ordered at: 05/17/23 1219     Code Status (Patient has no pulse and is not breathing):    CPR (Attempt to Resuscitate)     Medical Interventions (Patient has pulse or is breathing):    Full Support     Comments:    Unable to reach daughter to discuss-will try again later       Nevaeh Raymond, APRN  05/20/23

## 2023-05-21 PROCEDURE — 96372 THER/PROPH/DIAG INJ SC/IM: CPT

## 2023-05-21 PROCEDURE — 97535 SELF CARE MNGMENT TRAINING: CPT

## 2023-05-21 PROCEDURE — 25010000002 HEPARIN (PORCINE) PER 1000 UNITS: Performed by: INTERNAL MEDICINE

## 2023-05-21 PROCEDURE — 97530 THERAPEUTIC ACTIVITIES: CPT

## 2023-05-21 PROCEDURE — G0378 HOSPITAL OBSERVATION PER HR: HCPCS

## 2023-05-21 PROCEDURE — 99232 SBSQ HOSP IP/OBS MODERATE 35: CPT | Performed by: INTERNAL MEDICINE

## 2023-05-21 RX ADMIN — ASPIRIN 81 MG 81 MG: 81 TABLET ORAL at 08:24

## 2023-05-21 RX ADMIN — BUSPIRONE HYDROCHLORIDE 15 MG: 10 TABLET ORAL at 21:29

## 2023-05-21 RX ADMIN — Medication 10 ML: at 09:00

## 2023-05-21 RX ADMIN — QUETIAPINE FUMARATE 25 MG: 25 TABLET ORAL at 21:30

## 2023-05-21 RX ADMIN — PANTOPRAZOLE SODIUM 40 MG: 40 TABLET, DELAYED RELEASE ORAL at 08:25

## 2023-05-21 RX ADMIN — Medication 10 ML: at 21:32

## 2023-05-21 RX ADMIN — FINASTERIDE 5 MG: 5 TABLET, FILM COATED ORAL at 08:24

## 2023-05-21 RX ADMIN — ALLOPURINOL 50 MG: 100 TABLET ORAL at 08:24

## 2023-05-21 RX ADMIN — HEPARIN SODIUM 5000 UNITS: 5000 INJECTION, SOLUTION INTRAVENOUS; SUBCUTANEOUS at 15:00

## 2023-05-21 RX ADMIN — Medication 5 MG: at 21:30

## 2023-05-21 RX ADMIN — HEPARIN SODIUM 5000 UNITS: 5000 INJECTION, SOLUTION INTRAVENOUS; SUBCUTANEOUS at 21:29

## 2023-05-21 RX ADMIN — HEPARIN SODIUM 5000 UNITS: 5000 INJECTION, SOLUTION INTRAVENOUS; SUBCUTANEOUS at 05:26

## 2023-05-21 RX ADMIN — DONEPEZIL HYDROCHLORIDE 5 MG: 5 TABLET, FILM COATED ORAL at 21:30

## 2023-05-21 RX ADMIN — POLYETHYLENE GLYCOL 3350 17 G: 17 POWDER, FOR SOLUTION ORAL at 08:25

## 2023-05-21 RX ADMIN — LEVOTHYROXINE SODIUM 175 MCG: 175 TABLET ORAL at 05:26

## 2023-05-21 RX ADMIN — SERTRALINE HYDROCHLORIDE 100 MG: 100 TABLET ORAL at 08:25

## 2023-05-21 RX ADMIN — CLOPIDOGREL BISULFATE 75 MG: 75 TABLET ORAL at 08:24

## 2023-05-21 RX ADMIN — BUSPIRONE HYDROCHLORIDE 15 MG: 10 TABLET ORAL at 08:24

## 2023-05-21 RX ADMIN — AMLODIPINE BESYLATE 5 MG: 5 TABLET ORAL at 08:25

## 2023-05-21 RX ADMIN — ATORVASTATIN CALCIUM 80 MG: 40 TABLET, FILM COATED ORAL at 21:29

## 2023-05-21 NOTE — THERAPY TREATMENT NOTE
Patient Name: Domitila Bosch  : 1940    MRN: [id number]                              Today's Date: 2023       Admit Date: 2023    Visit Dx:     ICD-10-CM ICD-9-CM   1. Rapidly progressive dementia  F03.90 294.20   2. Agitation  R45.1 307.9   3. Acute on chronic alteration in mental status  R41.82 780.09   4. Elevated blood pressure reading with diagnosis of hypertension  I10 401.9   5. DANIS (acute kidney injury)  N17.9 584.9     Patient Active Problem List   Diagnosis   • Diverticulosis of large intestine with hemorrhage   • Umbilical hernia   • S/P hernia repair   • Hypertension   • Dyslipidemia   • Carotid stenosis   • Gout   • GERD (gastroesophageal reflux disease)   • Hypothyroidism   • Neuropathy   • Malignant melanoma   • Asthma   • Anxiety disorder   • Diverticulosis   • Muscle pain   • Lumbar radiculopathy   • Kidney stone   • Deviated nasal septum   • Chronic laryngitis   • Acute CVA (cerebrovascular accident)   • Essential hypertension   • Allergy to Betadine   • DANIS (acute kidney injury)   • Severe hypothyroidism   • History of ischemic stroke   • Rapidly progressive dementia   • CKD (chronic kidney disease), stage III     Past Medical History:   Diagnosis Date   • Anxiety disorder 2017   • Asthma 2017   • Basal cell carcinoma in situ of skin 2019    right leg    • Carotid stenosis 2017   • Dementia    • Diaphoresis    • Diastolic dysfunction    • Diverticulitis    • Dyslipidemia 2017   • GERD (gastroesophageal reflux disease) 2017   • Gout 2017   • Herpes zoster     Herpes zoster, [id number], right lower extremity.    • Hyperlipidemia    • Hypertension 2017   • Hypothyroidism 2017   • LVH (left ventricular hypertrophy)    • Malignant melanoma 2017   • Melanoma    • Mitral regurgitation    • Nephrolithiasis    • Post herpetic neuralgia 2017   • TIA (transient ischemic attack)     TIA, 2012, with nonobstructive carotid  stenosis, dyslipidemia and hypertension     Past Surgical History:   Procedure Laterality Date   • ABDOMINAL SURGERY     • APPENDECTOMY     • COLON RESECTION LEFT  2016   • COLON RESECTION LEFT  2016   • CYST REMOVAL      left side of neck.    • HEEL SPUR SURGERY  1999   • HERNIA REPAIR      hiatal hernia    • NISSEN FUNDOPLICATION  1984   • OTHER SURGICAL HISTORY  2010    Malignant melanoma, status post resection       General Information     Row Name 05/21/23 1329          OT Time and Intention    Document Type therapy note (daily note)  -     Mode of Treatment occupational therapy  -     Row Name 05/21/23 1324          General Information    Patient Profile Reviewed yes  -JR     Existing Precautions/Restrictions fall  dementia, incontinent  -JR     Barriers to Rehab medically complex;previous functional deficit;cognitive status  -     Row Name 05/21/23 1329          Cognition    Orientation Status (Cognition) oriented to;person  Pt oriented to [place]  -     Row Name 05/21/23 1327          Safety Issues, Functional Mobility    Safety Issues Affecting Function (Mobility) awareness of need for assistance;insight into deficits/self-awareness;safety precaution awareness;safety precautions follow-through/compliance  -JR     Impairments Affecting Function (Mobility) balance;endurance/activity tolerance;strength;cognition;postural/trunk control  -     Cognitive Impairments, Mobility Safety/Performance awareness, need for assistance;insight into deficits/self-awareness;problem-solving/reasoning;safety precaution awareness  -           User Key  (r) = Recorded By, (t) = Taken By, (c) = Cosigned By    Initials Name Provider Type     Susan Pierson OT Occupational Therapist                 Mobility/ADL's     Row Name 05/21/23 3311          Bed Mobility    Bed Mobility supine-sit;sit-supine  -JR     Supine-Sit [place] (Bed Mobility) supervision  -JR     Sit-Supine [place] (Bed Mobility)  supervision  -     Bed Mobility, Safety Issues cognitive deficits limit understanding  -     Assistive Device (Bed Mobility) head of bed elevated;bed rails  -     Row Name 05/21/23 1330          Transfers    Transfers sit-stand transfer  -     Row Name 05/21/23 1330          Sit-Stand Transfer    Sit-Stand [place] (Transfers) minimum assist (75% patient effort);verbal cues  -     Assistive Device (Sit-Stand Transfers) other (see comments)  UE support  -[facility] Name 05/21/23 1330          Functional Mobility    Functional Mobility- Ind. Level minimum assist (75% patient effort);verbal cues required  -     Functional Mobility- Device other (see comments)  UE support  -     Functional Mobility-Distance (Feet) --  sidesteps to HOB.  -     Functional Mobility- Safety Issues step length decreased;weight-shifting ability decreased  -     Functional Mobility- Comment Pt with decreased command following with sidesteps to HOB.  -[facility] Name 05/21/23 1330          Activities of Daily Living    BADL Assessment/Intervention grooming  -[facility] Name 05/21/23 1330          Grooming Assessment/Training    [place] Level (Grooming) wash face, hands;hair care, combing/brushing;set up  -     Position (Grooming) edge of bed sitting  -           User Key  (r) = Recorded By, (t) = Taken By, (c) = Cosigned By    Initials Name Provider Type     Susan Pierson, OT Occupational Therapist               Obj/Interventions     [facility] Name 05/21/23 1331          Shoulder (Therapeutic Exercise)    Shoulder (Therapeutic Exercise) AROM (active range of motion)  -     Shoulder AROM (Therapeutic Exercise) bilateral;flexion;extension;aBduction;aDduction;15 repititions;2 sets  -[facility] Name 05/21/23 1331          Elbow/Forearm (Therapeutic Exercise)    Elbow/Forearm (Therapeutic Exercise) AROM (active range of motion)  -     Elbow/Forearm AROM (Therapeutic Exercise)  bilateral;flexion;extension;supination;pronation;15 repititions;2 sets  -     Row Name 05/21/23 1331          Motor Skills    Therapeutic Exercise shoulder;elbow/forearm  -     Row Name 05/21/23 1331          Balance    Balance Assessment sitting static balance;standing dynamic balance  -     Static Sitting Balance independent  -     Dynamic Sitting Balance minimal assist  -     Row Name 05/21/23 1331          Hip (Therapeutic Exercise)    Hip (Therapeutic Exercise) AROM (active range of motion)  -     Hip AROM (Therapeutic Exercise) bilateral;flexion;extension;15 repititions  -     Row Name 05/21/23 1331          Knee (Therapeutic Exercise)    Knee (Therapeutic Exercise) AROM (active range of motion)  -     Knee AROM (Therapeutic Exercise) bilateral;flexion;extension;15 repititions;2 sets  -     Row Name 05/21/23 1331          Ankle (Therapeutic Exercise)    Ankle (Therapeutic Exercise) AROM (active range of motion)  -     Ankle AROM (Therapeutic Exercise) bilateral;dorsiflexion;plantarflexion;15 repititions;2 sets  -           User Key  (r) = Recorded By, (t) = Taken By, (c) = Cosigned By    Initials Name Provider Type     Susan Pierson, OT Occupational Therapist               Goals/Plan    No documentation.                Clinical Impression     Row Name 05/21/23 1333          Pain Assessment    Pretreatment Pain Rating 0/10 - no pain  -     Posttreatment Pain Rating 0/10 - no pain  -     Additional Documentation Pain Scale: Word Pre/Post-Treatment (Group)  -     Row Name 05/21/23 3443          Plan of Care Review    Plan of Care Reviewed With patient  -     Outcome Evaluation Pt pleasant and cooperative. Improving with ADL and mobility tasks, however decreased STM noted this date. Recommend continued skilled OT services as pt remains below ADL and mobility baseline and transfer to SNF at d/c to ensure safety.  -     Row Name 05/21/23 3614          Therapy Assessment/Plan  (OT)    Patient/Family Therapy Goal Statement (OT) go home  -JR     Row Name 05/21/23 1333          Therapy Plan Review/Discharge Plan (OT)    Anticipated Discharge Disposition (OT) skilled nursing facility  -     Row Name 05/21/23 1333          Positioning and Restraints    Pre-Treatment Position in bed  -JR     Post Treatment Position bed  -JR     In Bed notified nsg;supine;call light within reach;encouraged to call for assist;exit alarm on  -JR           User Key  (r) = Recorded By, (t) = Taken By, (c) = Cosigned By    Initials Name Provider Type    JR Susan Pierson, OT Occupational Therapist               Outcome Measures     Row Name 05/21/23 1335          How much help from another is currently needed...    Putting on and taking off regular lower body clothing? 2  -JR     Bathing (including washing, rinsing, and drying) 2  -JR     Toileting (which includes using toilet bed pan or urinal) 3  -JR     Putting on and taking off regular upper body clothing 3  -JR     Taking care of personal grooming (such as brushing teeth) 3  -JR     Eating meals 3  -JR     AM-PAC 6 Clicks Score (OT) 16  -JR     Row Name 05/21/23 0800          How much help from another person do you currently need...    Turning from your back to your side while in flat bed without using bedrails? 4  -KB     Moving from lying on back to sitting on the side of a flat bed without bedrails? 4  -KB     Moving to and from a bed to a chair (including a wheelchair)? 4  -KB     Standing up from a chair using your arms (e.g., wheelchair, bedside chair)? 4  -KB     Climbing 3-5 steps with a railing? 3  -KB     To walk in hospital room? 3  -KB     AM-PAC 6 Clicks Score (PT) 22  -KB     Highest level of mobility 7 --> Walked 25 feet or more  -KB     Row Name 05/21/23 1335          Functional Assessment    Outcome Measure Options AM-PAC 6 Clicks Daily Activity (OT)  -JR           User Key  (r) = Recorded By, (t) = Taken By, (c) = Cosigned By    Initials  Name Provider Type    Susan Carter OT Occupational Therapist    Rosa Lo RN Registered Nurse                Occupational Therapy Education     Title: PT OT SLP Therapies (In Progress)     Topic: Occupational Therapy (In Progress)     Point: ADL training (In Progress)     Description:   Instruct learner(s) on proper safety adaptation and remediation techniques during self care or transfers.   Instruct in proper use of assistive devices.              Learning Progress Summary           Patient Acceptance, E, NR by  at 5/21/2023 1142    Acceptance, E,TB, VU,NR by  at 5/20/2023 0020    Acceptance, E,TB, VU,NR by  at 5/19/2023 0019    Acceptance, E, NR by  at 5/18/2023 1012    Comment: re-orientation, reason for consult, ADL sequencing, reason need gait belt and gripper socks                   Point: Home exercise program (In Progress)     Description:   Instruct learner(s) on appropriate technique for monitoring, assisting and/or progressing therapeutic exercises/activities.              Learning Progress Summary           Patient Acceptance, E, NR by  at 5/21/2023 1142    Acceptance, E,TB, VU,NR by  at 5/20/2023 0020    Acceptance, E,TB, VU,NR by  at 5/19/2023 0019                   Point: Precautions (Done)     Description:   Instruct learner(s) on prescribed precautions during self-care and functional transfers.              Learning Progress Summary           Patient Acceptance, E,TB, VU,NR by  at 5/20/2023 0020    Acceptance, E,TB, VU,NR by  at 5/19/2023 0019    Acceptance, E, NR by  at 5/18/2023 1012    Comment: re-orientation, reason for consult, ADL sequencing, reason need gait belt and gripper socks                   Point: Body mechanics (Done)     Description:   Instruct learner(s) on proper positioning and spine alignment during self-care, functional mobility activities and/or exercises.              Learning Progress Summary           Patient Acceptance, E,TB,  VU,NR by  at 5/20/2023 0020    Acceptance, E,TB, VU,NR by  at 5/19/2023 0019                               User Key     Initials Effective Dates Name Provider Type [facility] 02/03/23 -  Kaila Kat OT Occupational Therapist OT     02/03/23 -  Susan Pierson OT Occupational Therapist OT     08/12/20 -  Kavya Matamoros, RN Registered Nurse Nurse              OT Recommendation and Plan     Plan of Care Review  Plan of Care Reviewed With: patient  Outcome Evaluation: Pt pleasant and cooperative. Improving with ADL and mobility tasks, however decreased STM noted this date. Recommend continued skilled OT services as pt remains below ADL and mobility baseline and transfer to SNF at d/c to ensure safety.     Time Calculation:    Time Calculation- OT     Row Name 05/21/23 1336             Time Calculation- OT    OT Start Time 1142  -JR      OT Received On 05/21/23  -         Timed Charges    23795 - OT Therapeutic Activity Minutes 15  -JR      50479 - OT Self Care/Mgmt Minutes 8  -JR         Total Minutes    Timed Charges Total Minutes 23  -JR       Total Minutes 23  -JR            User Key  (r) = Recorded By, (t) = Taken By, (c) = Cosigned By    Initials Name Provider Type     Susan Pierson OT Occupational Therapist              Therapy Charges for Today     Code Description Service Date Service Provider Modifiers Qty    81859048078 HC OT THERAPEUTIC ACT EA 15 MIN 5/21/2023 Susan Pierson OT GO 1    15002940527 HC OT SELF CARE/MGMT/TRAIN EA 15 MIN 5/21/2023 Susan Pierson OT GO 1               Susan Pierson OT  5/21/2023

## 2023-05-21 NOTE — PLAN OF CARE
Goal Outcome Evaluation:  Plan of Care Reviewed With: patient           Outcome Evaluation: PT pleasantly confused and cooperative. Patent IV in place saline locked. Good oral intake with adequate UOP. Pt has ambulated well to bathroom with standby. Repetitive requests and confusion prevelant. No issues to report at this time. Continue POC and report as needed.

## 2023-05-21 NOTE — PLAN OF CARE
Goal Outcome Evaluation:  Plan of Care Reviewed With: patient           Outcome Evaluation: Pt pleasant and cooperative. Improving with ADL and mobility tasks, however decreased STM noted this date. Recommend continued skilled OT services as pt remains below ADL and mobility baseline and transfer to SNF at d/c to ensure safety.

## 2023-05-21 NOTE — PLAN OF CARE
Goal Outcome Evaluation:  Plan of Care Reviewed With: patient        Progress: no change  Outcome Evaluation: VSS.  82 year old white male admitted on 5/17/2023, he is on day 4 of this hospitalization.  Patient has IV x 2 in place and patent to saline lock.  He has voided an adequate amount of UOP. Patient is pleasantly confused asking same question repetitively about family and how long he will be here. Spoke on phone frequently with daughter that seemed to comfort patient.   He has rested comfortably throughout the shift.  Will continue to monitor patient throughout the rest of this shift

## 2023-05-21 NOTE — PROGRESS NOTES
[facility] Medicine Services  PROGRESS NOTE    Patient Name: Domitila Bosch  : 1940  MRN: [id number]    Date of Admission: 2023  Primary Care Physician: Marcin Ferguson MD    Subjective   Subjective     CC:  F/u confusion    HPI:   No complaints today. Denies cough or fever.      ROS:   Gen: no cough  Pulm: no fever  GI: no nausea    Objective   Objective     Vital Signs:   Temp:  [98.3 °F (36.8 °C)-98.8 °F (37.1 °C)] 98.3 °F (36.8 °C)  Heart Rate:  [62-66] 62  Resp:  [16-18] 16  BP: (127-137)/(73-78) 137/73     Physical Exam:   Constitutional - no acute distress, nontoxic, in bed  HEENT-NCAT, mucous membranes moist  CV-RRR  Resp-CTAB  Abd-soft, nontender, nondistended, normoactive bowel sounds  Ext-No lower extremity cyanosis, clubbing or edema bilaterally  Neuro-alert, speech clear, moves all extremities   Psych-normal affect   Skin- No rash on exposed UE or LE bilaterally        Results Reviewed:  LAB RESULTS:      Lab 23  1133   WBC 7.17   HEMOGLOBIN 15.5   HEMATOCRIT 46.8   PLATELETS 252   NEUTROS ABS 4.74   IMMATURE GRANS (ABS) 0.03   LYMPHS ABS 1.59   MONOS ABS 0.58   EOS ABS 0.15   MCV 90.0         Lab 23  0551 23  0647 23  1133   SODIUM 140 140 139   POTASSIUM 3.7 4.0 4.0   CHLORIDE 107 109* 104   CO2 24.0 23.0 24.0   ANION GAP 9.0 8.0 11.0   BUN 10 10 10   CREATININE 1.28* 1.40* 1.43*   EGFR 55.5* 49.9* 48.6*   GLUCOSE 82 95 91   CALCIUM 9.0 8.4* 9.6   TSH  --   --  60.670*         Lab 23  1133   TOTAL PROTEIN 6.2   ALBUMIN 3.9   GLOBULIN 2.3   ALT (SGPT) 8   AST (SGOT) 14   BILIRUBIN 0.4   ALK PHOS 126*   LIPASE 31                 Lab 23  1133   FOLATE 4.92   VITAMIN B 12 593         Brief Urine Lab Results  (Last result in the past 365 days)      Color   Clarity   Blood   Leuk Est   Nitrite   Protein   CREAT   Urine HCG        23 1232 Yellow   Cloudy   Trace   Large (3+)   Negative   Negative                 Microbiology  Results Abnormal     Procedure Component Value - Date/Time    Urine Culture - Urine, Urine, Clean Catch [[id number]]  (Normal) Collected: 05/17/23 1232    Lab Status: Final result Specimen: Urine, Clean Catch Updated: 05/18/23 2221     Urine Culture No growth          No radiology results from the last 24 hrs    Results for orders placed during the hospital encounter of 07/28/20    Adult Transesophageal Echo (SUMMER) W/ Cont if Necessary Per Protocol    Interpretation Summary  · Estimated EF = 65%.  · Left ventricular systolic function is normal.  · Left atrial cavity size is mild-to-moderately dilated.  · There is mild calcification of the aortic valve mainly affecting the non and right coronary cusp(s).  · Saline test results are negative.  · The aortic valve exhibits moderate sclerosis.  · There is no evidence of pericardial effusion.  · There is moderate (grade 2) protruding plaque in the descending aorta present.  · No valvular vegetations demonstrated.      Current medications:  Scheduled Meds:allopurinol, 50 mg, Oral, Daily  amLODIPine, 5 mg, Oral, Daily  aspirin, 81 mg, Oral, Daily  atorvastatin, 80 mg, Oral, Nightly  busPIRone, 15 mg, Oral, Q12H  clopidogrel, 75 mg, Oral, Daily  donepezil, 5 mg, Oral, Nightly  finasteride, 5 mg, Oral, Daily  heparin (porcine), 5,000 Units, Subcutaneous, Q8H  levothyroxine, 175 mcg, Oral, Q AM  melatonin, 5 mg, Oral, Nightly  pantoprazole, 40 mg, Oral, Daily  polyethylene glycol, 17 g, Oral, Daily  sertraline, 100 mg, Oral, Daily  sodium chloride, 10 mL, Intravenous, Q12H  terazosin, 2 mg, Oral, Nightly      Continuous Infusions:   PRN Meds:.•  acetaminophen **OR** acetaminophen **OR** acetaminophen  •  senna-docusate sodium **AND** polyethylene glycol **AND** bisacodyl **AND** bisacodyl  •  Calcium Replacement - Follow Nurse / BPA Driven Protocol  •  hydrOXYzine  •  Magnesium Standard Dose Replacement - Follow Nurse / BPA Driven Protocol  •  ondansetron **OR** ondansetron  •   Phosphorus Replacement - Follow Nurse / BPA Driven Protocol  •  Potassium Replacement - Follow Nurse / BPA Driven Protocol  •  QUEtiapine  •  sodium chloride  •  sodium chloride    Assessment & Plan   Assessment & Plan     Active Hospital Problems    Diagnosis  POA   • **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        Brief Hospital Course to date:  Domitila Bosch is a 82 y.o. male with dementia who lives with his daughter.  EMS was called due to increased confusion/agitation at home.  While EMS was at the house, apparently his daughter had a seizure and they were both seen in the ED.  He was found to have a UTI and creatinine elevation beyond baseline and admission was requested for further management.      This patient's problems and plans were partially entered by my partner and updated as appropriate by me 05/21/23.      Dementia with worsening confusion  Anxiety disorder  -Continue namenda  -PRN seroquel, add scheduled nightly dose  -TSH elevated as below.  B12, folate WNL  -CT head without acute intracranial process  -Case management following  -Follows with Dr. Hernadez at , last appointment 1/6/23, adjusted med rec based on most recent meds, will hold prn xanax     CKD III with elevated creatinine  -S/P gentle IVF  -Stable, baseline probably ~ 1.3-1.4 per chart review  -renally dose allopurinol  -Cr 1.28 on 5/20, back to baseline     UTI  -Urine culture with no growth  -Due to numerous allergies and prior culture with enterococcus faecalis, started on levaquin  -Abx dc'd as urine cx negative, afebrile, wbc's wnl  -QT normal on EKG  -continue finasteride     Hypothyroidism  -Continue levothyroxine  -TSH is 60.67, free T4 0.42  -Unable to verify with his daughter if he is taking appropriately as  she is admitted to hospital, levothyroxine dose increased for now  -recheck TSH in 6-8 weeks at PCP follow-up     HTN  HLD  H/o TIA, CVA  -continue home amlodipine, finasteride, terazosin  -per recent med rec from , patient on triamterene-hctz 37.5mg-25mg, not on terazosin  -monitor BP  -continue statin, ASA    Expected Discharge Location and Transportation: SNF rehab, EMS  Expected Discharge pending bed offer  Expected Discharge Date: 5/25/2023; Expected Discharge Time:      DVT prophylaxis:  Medical DVT prophylaxis orders are present.     AM-PAC 6 Clicks Score (PT): 22 (05/21/23 0800)    CODE STATUS:   Code Status and Medical Interventions:   Ordered at: 05/17/23 9682     Code Status (Patient has no pulse and is not breathing):    CPR (Attempt to Resuscitate)     Medical Interventions (Patient has pulse or is breathing):    Full Support     Comments:    Unable to reach daughter to discuss-will try again later       Filemon Elena MD  05/21/23

## 2023-05-22 PROCEDURE — 96372 THER/PROPH/DIAG INJ SC/IM: CPT

## 2023-05-22 PROCEDURE — G0378 HOSPITAL OBSERVATION PER HR: HCPCS

## 2023-05-22 PROCEDURE — 97530 THERAPEUTIC ACTIVITIES: CPT

## 2023-05-22 PROCEDURE — 97110 THERAPEUTIC EXERCISES: CPT

## 2023-05-22 PROCEDURE — 99232 SBSQ HOSP IP/OBS MODERATE 35: CPT | Performed by: STUDENT IN AN ORGANIZED HEALTH CARE EDUCATION/TRAINING PROGRAM

## 2023-05-22 PROCEDURE — 25010000002 HEPARIN (PORCINE) PER 1000 UNITS: Performed by: INTERNAL MEDICINE

## 2023-05-22 RX ADMIN — ATORVASTATIN CALCIUM 80 MG: 40 TABLET, FILM COATED ORAL at 20:02

## 2023-05-22 RX ADMIN — ASPIRIN 81 MG 81 MG: 81 TABLET ORAL at 10:39

## 2023-05-22 RX ADMIN — Medication 10 ML: at 10:44

## 2023-05-22 RX ADMIN — CLOPIDOGREL BISULFATE 75 MG: 75 TABLET ORAL at 10:39

## 2023-05-22 RX ADMIN — DONEPEZIL HYDROCHLORIDE 5 MG: 5 TABLET, FILM COATED ORAL at 20:01

## 2023-05-22 RX ADMIN — QUETIAPINE FUMARATE 25 MG: 25 TABLET ORAL at 10:39

## 2023-05-22 RX ADMIN — Medication 10 ML: at 20:07

## 2023-05-22 RX ADMIN — BUSPIRONE HYDROCHLORIDE 15 MG: 10 TABLET ORAL at 20:02

## 2023-05-22 RX ADMIN — LEVOTHYROXINE SODIUM 175 MCG: 175 TABLET ORAL at 06:17

## 2023-05-22 RX ADMIN — QUETIAPINE FUMARATE 25 MG: 25 TABLET ORAL at 23:42

## 2023-05-22 RX ADMIN — TERAZOSIN HYDROCHLORIDE 2 MG: 2 CAPSULE ORAL at 20:02

## 2023-05-22 RX ADMIN — HYDROXYZINE HYDROCHLORIDE 10 MG: 10 TABLET ORAL at 03:56

## 2023-05-22 RX ADMIN — HYDROXYZINE HYDROCHLORIDE 10 MG: 10 TABLET ORAL at 20:07

## 2023-05-22 RX ADMIN — POLYETHYLENE GLYCOL 3350 17 G: 17 POWDER, FOR SOLUTION ORAL at 10:39

## 2023-05-22 RX ADMIN — PANTOPRAZOLE SODIUM 40 MG: 40 TABLET, DELAYED RELEASE ORAL at 10:39

## 2023-05-22 RX ADMIN — ALLOPURINOL 50 MG: 100 TABLET ORAL at 10:39

## 2023-05-22 RX ADMIN — SERTRALINE HYDROCHLORIDE 100 MG: 100 TABLET ORAL at 10:39

## 2023-05-22 RX ADMIN — HEPARIN SODIUM 5000 UNITS: 5000 INJECTION, SOLUTION INTRAVENOUS; SUBCUTANEOUS at 22:00

## 2023-05-22 RX ADMIN — BUSPIRONE HYDROCHLORIDE 15 MG: 10 TABLET ORAL at 10:39

## 2023-05-22 RX ADMIN — FINASTERIDE 5 MG: 5 TABLET, FILM COATED ORAL at 10:39

## 2023-05-22 RX ADMIN — AMLODIPINE BESYLATE 5 MG: 5 TABLET ORAL at 10:39

## 2023-05-22 RX ADMIN — Medication 5 MG: at 20:02

## 2023-05-22 RX ADMIN — HEPARIN SODIUM 5000 UNITS: 5000 INJECTION, SOLUTION INTRAVENOUS; SUBCUTANEOUS at 06:17

## 2023-05-22 RX ADMIN — HEPARIN SODIUM 5000 UNITS: 5000 INJECTION, SOLUTION INTRAVENOUS; SUBCUTANEOUS at 14:59

## 2023-05-22 NOTE — PLAN OF CARE
Goal Outcome Evaluation:      Pt confused throughout shift but easily redirected. VSS. Will continue to monitor.

## 2023-05-22 NOTE — CASE MANAGEMENT/SOCIAL WORK
Continued Stay Note  [facility]     Patient Name: Domitila Bosch  MRN: [id number]  Today's Date: 5/22/2023    Admit Date: 5/17/2023    Plan: LTC Placement   Discharge Plan     Row Name 05/22/23 1632       Plan    Plan LTC Placement    Plan Comments KATHRYN spoke with pt's daughter again today, call lasted 35 minutes.  KATHRYN provided update that there are not yet any bed offers ([facility] and [facility] declined; [facility] is still considering for placement). KATHRYN again explained associated costs of facility placement, Medicaid pending, home with caregiver assistance, and home health with caregiver assistance.  Pt's daughter said they don't have money to pay for these things, but asked KATHRYN to email her a list of facilities in [facility] and surrounding counties.  She also asked for information in Medicaid and Medicaid waiver.  KATHRYN will send her this information per her request.  Trevor remains a patient herself at this time.  She appears very stressed by the situation and cried throughout the conversation.  Once she's discharged from the hospital she indicated interest in meeting with her  to see if he's able to assist in any way with placement and finances.  KATHRYN will continue to search for a LTC bed for pt.    Final Discharge Disposition Code 04 - intermediate care facility               Discharge Codes    No documentation.               Expected Discharge Date and Time     Expected Discharge Date Expected Discharge Time    May 25, 2023             JACK Fisher

## 2023-05-22 NOTE — PLAN OF CARE
Goal Outcome Evaluation:  Plan of Care Reviewed With: patient        Progress: no change  Outcome Evaluation: Physical therapy treatment complete. The patient requires CGA to close supervision for mobility due to decreased safety awareness. Patient completed dynamic standing therex, tolerated well. The patient continues to present below baseline for mobility. Continue current PT POC.

## 2023-05-22 NOTE — THERAPY TREATMENT NOTE
Patient Name: Domitila Bosch  : 1940    MRN: [id number]                              Today's Date: 2023       Admit Date: 2023    Visit Dx:     ICD-10-CM ICD-9-CM   1. Rapidly progressive dementia  F03.90 294.20   2. Agitation  R45.1 307.9   3. Acute on chronic alteration in mental status  R41.82 780.09   4. Elevated blood pressure reading with diagnosis of hypertension  I10 401.9   5. DANIS (acute kidney injury)  N17.9 584.9     Patient Active Problem List   Diagnosis   • Diverticulosis of large intestine with hemorrhage   • Umbilical hernia   • S/P hernia repair   • Hypertension   • Dyslipidemia   • Carotid stenosis   • Gout   • GERD (gastroesophageal reflux disease)   • Hypothyroidism   • Neuropathy   • Malignant melanoma   • Asthma   • Anxiety disorder   • Diverticulosis   • Muscle pain   • Lumbar radiculopathy   • Kidney stone   • Deviated nasal septum   • Chronic laryngitis   • Acute CVA (cerebrovascular accident)   • Essential hypertension   • Allergy to Betadine   • DANIS (acute kidney injury)   • Severe hypothyroidism   • History of ischemic stroke   • Rapidly progressive dementia   • CKD (chronic kidney disease), stage III     Past Medical History:   Diagnosis Date   • Anxiety disorder 2017   • Asthma 2017   • Basal cell carcinoma in situ of skin 2019    right leg    • Carotid stenosis 2017   • Dementia    • Diaphoresis    • Diastolic dysfunction    • Diverticulitis    • Dyslipidemia 2017   • GERD (gastroesophageal reflux disease) 2017   • Gout 2017   • Herpes zoster     Herpes zoster, [id number], right lower extremity.    • Hyperlipidemia    • Hypertension 2017   • Hypothyroidism 2017   • LVH (left ventricular hypertrophy)    • Malignant melanoma 2017   • Melanoma    • Mitral regurgitation    • Nephrolithiasis    • Post herpetic neuralgia 2017   • TIA (transient ischemic attack)     TIA, 2012, with nonobstructive carotid  stenosis, dyslipidemia and hypertension     Past Surgical History:   Procedure Laterality Date   • ABDOMINAL SURGERY     • APPENDECTOMY     • COLON RESECTION LEFT  2016   • COLON RESECTION LEFT  2016   • CYST REMOVAL      left side of neck.    • HEEL SPUR SURGERY  1999   • HERNIA REPAIR      hiatal hernia    • NISSEN FUNDOPLICATION  1984   • OTHER SURGICAL HISTORY  2010    Malignant melanoma, status post resection       General Information     Row Name 05/22/23 1419          Physical Therapy Time and Intention    Document Type therapy note (daily note)  -     Mode of Treatment physical therapy  -     Row Name 05/22/23 1419          General Information    Patient Profile Reviewed yes  -ML     Existing Precautions/Restrictions fall  dementia, incontinent  -ML     Barriers to Rehab medically complex;previous functional deficit;cognitive status  -     Row Name 05/22/23 1419          Cognition    Orientation Status (Cognition) oriented to;person;place  -     Row Name 05/22/23 1419          Safety Issues, Functional Mobility    Safety Issues Affecting Function (Mobility) awareness of need for assistance;insight into deficits/self-awareness;safety precaution awareness;safety precautions follow-through/compliance;judgment  -     Impairments Affecting Function (Mobility) balance;endurance/activity tolerance;postural/trunk control;strength;cognition  -     Cognitive Impairments, Mobility Safety/Performance awareness, need for assistance;insight into deficits/self-awareness;judgment;problem-solving/reasoning;safety precaution awareness;safety precaution follow-through;sequencing abilities  -           User Key  (r) = Recorded By, (t) = Taken By, (c) = Cosigned By    Initials Name Provider Type     Kala Youngblood Physical Therapist               Mobility     Row Name 05/22/23 1420          Bed Mobility    Bed Mobility supine-sit  -ML     Supine-Sit [place] (Bed Mobility) supervision;verbal cues  -      Assistive Device (Bed Mobility) bed rails;head of bed elevated  -ML     Row Name 05/22/23 1420          Sit-Stand Transfer    Sit-Stand [place] (Transfers) supervision  -ML     Assistive Device (Sit-Stand Transfers) other (see comments)  no AD  -ML     Comment, (Sit-Stand Transfer) Patient requires BUE support to complete sit to stand transfer.  -ML     Row Name 05/22/23 1420          Gait/Stairs (Locomotion)    [place] Level (Gait) contact guard;verbal cues  -ML     Assistive Device (Gait) other (see comments)  no AD  -ML     Distance in Feet (Gait) 60  -ML     Deviations/Abnormal Patterns (Gait) bilateral deviations;base of support, narrow;rody decreased;stride length decreased;weight shifting decreased  -ML     Bilateral Gait Deviations forward flexed posture;heel strike decreased  -ML           User Key  (r) = Recorded By, (t) = Taken By, (c) = Cosigned By    Initials Name Provider Type     Kala Youngblood Physical Therapist               Obj/Interventions     Row Name 05/22/23 1422          Motor Skills    Therapeutic Exercise shoulder;ankle;hip  -ML     Row Name 05/22/23 1422          Shoulder (Therapeutic Exercise)    Shoulder (Therapeutic Exercise) AROM (active range of motion)  -     Shoulder AROM (Therapeutic Exercise) bilateral;scapular retraction;standing;10 repetitions  -ML     Row Name 05/22/23 1422          Hip (Therapeutic Exercise)    Hip (Therapeutic Exercise) strengthening exercise  -     Hip Strengthening (Therapeutic Exercise) bilateral;standing;extension;aBduction;marching while standing;mini squats;10 repetitions  -ML     Row Name 05/22/23 1422          Ankle (Therapeutic Exercise)    Ankle (Therapeutic Exercise) strengthening exercise  -     Ankle Strengthening (Therapeutic Exercise) bilateral;standing;10 repetitions  -ML     Row Name 05/22/23 1422          Balance    Balance Assessment sitting static balance;sitting dynamic balance;sit to stand dynamic balance;standing  static balance;standing dynamic balance  -ML     Static Sitting Balance standby assist  -ML     Dynamic Sitting Balance supervision  -ML     Position, Sitting Balance unsupported;sitting edge of bed  -ML     Sit to Stand Dynamic Balance supervision  -ML     Static Standing Balance supervision  -ML     Dynamic Standing Balance contact guard;verbal cues  -ML     Position/Device Used, Standing Balance supported;unsupported  -ML     Balance Interventions sitting;standing;sit to stand;occupation based/functional task;dynamic reaching;other (see comments)  toe taps  -ML           User Key  (r) = Recorded By, (t) = Taken By, (c) = Cosigned By    Initials Name Provider Type    ML Kala Youngblood Physical Therapist               Goals/Plan    No documentation.                Clinical Impression     [facility] Name 05/22/23 1426          Pain    Pretreatment Pain Rating 0/10 - no pain  -ML     Posttreatment Pain Rating 0/10 - no pain  -ML     [facility] Name 05/22/23 1426          Plan of Care Review    Plan of Care Reviewed With patient  -ML     Progress no change  -ML     Outcome Evaluation Physical therapy treatment complete. The patient requires CGA to close supervision for mobility due to decreased safety awareness. Patient completed dynamic standing therex, tolerated well. The patient continues to present below baseline for mobility. Continue current PT POC.  -ML     [facility] Name 05/22/23 1426          Therapy Assessment/Plan (PT)    Rehab Potential (PT) fair, will monitor progress closely  -ML     [facility] Name 05/22/23 1426          Vital Signs    Pre Patient Position Supine  -ML     Intra Patient Position Standing  -ML     Post Patient Position Sitting  -ML     [facility] Name 05/22/23 1426          Positioning and Restraints    Pre-Treatment Position in bed  -ML     Post Treatment Position chair  -ML     In Chair notified nsg;reclined;call light within reach;encouraged to call for assist;exit alarm on;legs elevated  patient within staff view  -ML            User Key  (r) = Recorded By, (t) = Taken By, (c) = Cosigned By    Initials Name Provider Type    Kala Hurst Physical Therapist               Outcome Measures     Row Name 05/22/23 1428 05/22/23 1000       How much help from another person do you currently need...    Turning from your back to your side while in flat bed without using bedrails? 4  -ML 4  -CJ    Moving from lying on back to sitting on the side of a flat bed without bedrails? 4  -ML 4  -CJ    Moving to and from a bed to a chair (including a wheelchair)? 4  -ML 4  -CJ    Standing up from a chair using your arms (e.g., wheelchair, bedside chair)? 4  -ML 4  -CJ    Climbing 3-5 steps with a railing? 3  -ML 3  -CJ    To walk in hospital room? 3  -ML 3  -CJ    AM-PAC 6 Clicks Score (PT) 22  -ML 22  -CJ    Highest level of mobility 7 --> Walked 25 feet or more  -ML 7 --> Walked 25 feet or more  -CJ    Row Name 05/22/23 1428          Functional Assessment    Outcome Measure Options AM-PAC 6 Clicks Basic Mobility (PT)  -ML           User Key  (r) = Recorded By, (t) = Taken By, (c) = Cosigned By    Initials Name Provider Type    Kala Hurst Physical Therapist    Darryl Dodd, RN Registered Nurse                             Physical Therapy Education     Title: PT OT SLP Therapies (In Progress)     Topic: Physical Therapy (Done)     Point: Mobility training (Done)     Learning Progress Summary           Patient Acceptance, E, VU,DU,NR by  at 5/22/2023 1428    Acceptance, E,TB, VU,NR by  at 5/20/2023 0020    Acceptance, E,TB, VU,NR by  at 5/19/2023 0019    Eager, E,D, NR by  at 5/18/2023 1528                   Point: Home exercise program (Done)     Learning Progress Summary           Patient Acceptance, E, VU,DU,NR by  at 5/22/2023 1428    Acceptance, E,TB, VU,NR by  at 5/20/2023 0020    Acceptance, E,TB, VU,NR by  at 5/19/2023 0019    Eager, E,D, NR by DM at 5/18/2023 1528                   Point: Body mechanics (Done)      Learning Progress Summary           Patient Acceptance, E,TB, VU,NR by  at 5/20/2023 0020    Acceptance, E,TB, VU,NR by  at 5/19/2023 0019    Eager, E,D, NR by  at 5/18/2023 1528                   Point: Precautions (Done)     Learning Progress Summary           Patient Acceptance, E, VU,DU,NR by  at 5/22/2023 1428    Acceptance, E,TB, VU,NR by  at 5/20/2023 0020    Acceptance, E,TB, VU,NR by  at 5/19/2023 0019    Eager, E,D, NR by  at 5/18/2023 1528                               User Key     Initials Effective Dates Name Provider Type Discipline     02/03/23 -  Geeta Perez, PT Physical Therapist PT     08/12/20 -  Kavya Matamoros, RN Registered Nurse Nurse     04/22/21 -  Kala Youngblood Physical Therapist PT              PT Recommendation and Plan     Plan of Care Reviewed With: patient  Progress: no change  Outcome Evaluation: Physical therapy treatment complete. The patient requires CGA to close supervision for mobility due to decreased safety awareness. Patient completed dynamic standing therex, tolerated well. The patient continues to present below baseline for mobility. Continue current PT POC.     Time Calculation:    PT Charges     Row Name 05/22/23 1429             Time Calculation    Start Time 1345  -ML      PT Received On 05/22/23  -ML         Timed Charges    80233 - PT Therapeutic Exercise Minutes 15  -ML      61246 - PT Therapeutic Activity Minutes 10  -ML         Total Minutes    Timed Charges Total Minutes 25  -ML       Total Minutes 25  -ML            User Key  (r) = Recorded By, (t) = Taken By, (c) = Cosigned By    Initials Name Provider Type     Kala Youngblood Physical Therapist              Therapy Charges for Today     Code Description Service Date Service Provider Modifiers Qty    88820549337 HC PT THER PROC EA 15 MIN 5/22/2023 Kala Youngblood GP 1    57708743808 HC PT THERAPEUTIC ACT EA 15 MIN 5/22/2023 Kala Youngblood GP 1          PT G-Codes  Outcome Measure Options:  AM-PAC 6 Clicks Basic Mobility (PT)  AM-PAC 6 Clicks Score (PT): 22  AM-PAC 6 Clicks Score (OT): 16       Kala Youngblood  5/22/2023

## 2023-05-22 NOTE — PROGRESS NOTES
[facility] Medicine Services  PROGRESS NOTE    Patient Name: Domitila Bosch  : 1940  MRN: [id number]    Date of Admission: 2023  Primary Care Physician: Marcin Ferguson MD    Subjective   Subjective     CC:  F/u confusion    HPI:   No acute overnight events, keeps asking about daughter    ROS:   Gen: no cough  Pulm: no fever  GI: no nausea    Objective   Objective     Vital Signs:   Temp:  [97.9 °F (36.6 °C)-98.2 °F (36.8 °C)] 98.2 °F (36.8 °C)  Heart Rate:  [53-67] 53  Resp:  [18-20] 18  BP: (108-150)/(67-76) 150/76     Physical Exam:   Constitutional - no acute distress, nontoxic, in bed  HEENT-NCAT, mucous membranes moist  CV-RRR  Resp-CTAB  Abd-soft, nontender, nondistended, normoactive bowel sounds  Ext-No lower extremity cyanosis, clubbing or edema bilaterally  Neuro-alert, speech clear, moves all extremities   Psych-normal affect   Skin- No rash on exposed UE or LE bilaterally        Results Reviewed:  LAB RESULTS:      Lab 23  1133   WBC 7.17   HEMOGLOBIN 15.5   HEMATOCRIT 46.8   PLATELETS 252   NEUTROS ABS 4.74   IMMATURE GRANS (ABS) 0.03   LYMPHS ABS 1.59   MONOS ABS 0.58   EOS ABS 0.15   MCV 90.0         Lab 23  0551 23  0647 23  1133   SODIUM 140 140 139   POTASSIUM 3.7 4.0 4.0   CHLORIDE 107 109* 104   CO2 24.0 23.0 24.0   ANION GAP 9.0 8.0 11.0   BUN 10 10 10   CREATININE 1.28* 1.40* 1.43*   EGFR 55.5* 49.9* 48.6*   GLUCOSE 82 95 91   CALCIUM 9.0 8.4* 9.6   TSH  --   --  60.670*         Lab 23  1133   TOTAL PROTEIN 6.2   ALBUMIN 3.9   GLOBULIN 2.3   ALT (SGPT) 8   AST (SGOT) 14   BILIRUBIN 0.4   ALK PHOS 126*   LIPASE 31                 Lab 23  1133   FOLATE 4.92   VITAMIN B 12 593         Brief Urine Lab Results  (Last result in the past 365 days)      Color   Clarity   Blood   Leuk Est   Nitrite   Protein   CREAT   Urine HCG        23 1232 Yellow   Cloudy   Trace   Large (3+)   Negative   Negative                  Microbiology Results Abnormal     Procedure Component Value - Date/Time    Urine Culture - Urine, Urine, Clean Catch [[id number]]  (Normal) Collected: 05/17/23 1232    Lab Status: Final result Specimen: Urine, Clean Catch Updated: 05/18/23 2221     Urine Culture No growth          No radiology results from the last 24 hrs    Results for orders placed during the hospital encounter of 07/28/20    Adult Transesophageal Echo (SUMMER) W/ Cont if Necessary Per Protocol    Interpretation Summary  · Estimated EF = 65%.  · Left ventricular systolic function is normal.  · Left atrial cavity size is mild-to-moderately dilated.  · There is mild calcification of the aortic valve mainly affecting the non and right coronary cusp(s).  · Saline test results are negative.  · The aortic valve exhibits moderate sclerosis.  · There is no evidence of pericardial effusion.  · There is moderate (grade 2) protruding plaque in the descending aorta present.  · No valvular vegetations demonstrated.      Current medications:  Scheduled Meds:allopurinol, 50 mg, Oral, Daily  amLODIPine, 5 mg, Oral, Daily  aspirin, 81 mg, Oral, Daily  atorvastatin, 80 mg, Oral, Nightly  busPIRone, 15 mg, Oral, Q12H  clopidogrel, 75 mg, Oral, Daily  donepezil, 5 mg, Oral, Nightly  finasteride, 5 mg, Oral, Daily  heparin (porcine), 5,000 Units, Subcutaneous, Q8H  levothyroxine, 175 mcg, Oral, Q AM  melatonin, 5 mg, Oral, Nightly  pantoprazole, 40 mg, Oral, Daily  polyethylene glycol, 17 g, Oral, Daily  sertraline, 100 mg, Oral, Daily  sodium chloride, 10 mL, Intravenous, Q12H  terazosin, 2 mg, Oral, Nightly      Continuous Infusions:   PRN Meds:.•  acetaminophen **OR** acetaminophen **OR** acetaminophen  •  senna-docusate sodium **AND** polyethylene glycol **AND** bisacodyl **AND** bisacodyl  •  Calcium Replacement - Follow Nurse / BPA Driven Protocol  •  hydrOXYzine  •  Magnesium Standard Dose Replacement - Follow Nurse / BPA Driven Protocol  •  ondansetron  **OR** ondansetron  •  Phosphorus Replacement - Follow Nurse / BPA Driven Protocol  •  Potassium Replacement - Follow Nurse / BPA Driven Protocol  •  QUEtiapine  •  sodium chloride  •  sodium chloride    Assessment & Plan   Assessment & Plan     Active Hospital Problems    Diagnosis  POA   • **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        Brief Hospital Course to date:  Domitila Bosch is a 82 y.o. male with dementia who lives with his daughter.  EMS was called due to increased confusion/agitation at home.  While EMS was at the house, apparently his daughter had a seizure and they were both seen in the ED.  He was found to have a UTI and creatinine elevation beyond baseline and admission was requested for further management.      This patient's problems and plans were partially entered by my partner and updated as appropriate by me 05/22/23.      Dementia with worsening confusion  Anxiety disorder  -Continue namenda  -PRN seroquel, add scheduled nightly dose  -TSH elevated as below.  B12, folate WNL  -CT head without acute intracranial process  -Case management following  -Follows with Dr. Hernadez at , last appointment 1/6/23, adjusted med rec based on most recent meds, holding prn xanax     CKD III with elevated creatinine  -S/P gentle IVF  -Stable, baseline probably ~ 1.3-1.4 per chart review  -renally dose allopurinol  -Cr 1.28 on 5/20, back to baseline. Will check bmp in am     UTI  -Urine culture with no growth  -Due to numerous allergies and prior culture with enterococcus faecalis, started on levaquin  -Abx dc'd as urine cx negative, afebrile, wbc's wnl  -QT normal on EKG  -continue finasteride     Hypothyroidism  -Continue levothyroxine  -TSH is 60.67, free T4 0.42  -Unable to verify with  his daughter if he is taking appropriately as she is admitted to hospital, levothyroxine dose increased for now  -recheck TSH in 6-8 weeks at PCP follow-up     HTN  HLD  H/o TIA, CVA  -continue home amlodipine, finasteride, terazosin  -per recent med rec from , patient on triamterene-hctz 37.5mg-25mg, not on terazosin  -monitor BP  -continue statin, ASA    Expected Discharge Location and Transportation: SNF rehab, EMS  Expected Discharge pending bed offer  Expected Discharge Date: 5/25/2023; Expected Discharge Time:      DVT prophylaxis:  Medical DVT prophylaxis orders are present.     AM-PAC 6 Clicks Score (PT): 22 (05/22/23 1000)    CODE STATUS:   Code Status and Medical Interventions:   Ordered at: 05/17/23 7034     Code Status (Patient has no pulse and is not breathing):    CPR (Attempt to Resuscitate)     Medical Interventions (Patient has pulse or is breathing):    Full Support     Comments:    Unable to reach daughter to discuss-will try again later       Chantal Mistry MD  05/22/23

## 2023-05-22 NOTE — PLAN OF CARE
Problem: Adult Inpatient Plan of Care  Goal: Plan of Care Review  Outcome: Ongoing, Progressing  Flowsheets  Taken 5/22/2023 0244 by Alka Choi, RN Extern  Outcome Evaluation: Pt still pleasantly confused and cooperative w/ care. Pt rested well- PRN med given. Pt reoriented as needed. (Pended)  Taken 5/21/2023 1933 by Rosa Madden, RN  Plan of Care Reviewed With: patient  Taken 5/21/2023 0236 by Kavya Matamoros RN  Progress: no change   Goal Outcome Evaluation:              Outcome Evaluation: (P) Pt still pleasantly confused and cooperative w/ care. Pt rested well- PRN med given. Pt reoriented as needed.

## 2023-05-22 NOTE — CASE MANAGEMENT/SOCIAL WORK
Continued Stay Note   [place]     Patient Name: Domitila Bosch  MRN: [id number]  Today's Date: 5/22/2023    Admit Date: 5/17/2023    Plan: LTC placement   Discharge Plan     Row Name 05/22/23 0952       Plan    Plan LTC placement    Plan Comments SW is following up with referrals today.  Placement is somewhat complicated due to the fact that his daughter remains hospitalized, pt is in Observation status as a Medicare patient (not eligible for skilled rehab), which may require pt being placed in a facility as Medicaid Pending.  SW continues to follow for discharge planning needs and placement.    Final Discharge Disposition Code 04 - intermediate care facility               Discharge Codes    No documentation.               Expected Discharge Date and Time     Expected Discharge Date Expected Discharge Time    May 25, 2023             JACK Fisher

## 2023-05-23 LAB
ANION GAP SERPL CALCULATED.3IONS-SCNC: 9 MMOL/L (ref 5–15)
BUN SERPL-MCNC: 14 MG/DL (ref 8–23)
BUN/CREAT SERPL: 10.7 (ref 7–25)
CALCIUM SPEC-SCNC: 8.7 MG/DL (ref 8.6–10.5)
CHLORIDE SERPL-SCNC: 105 MMOL/L (ref 98–107)
CO2 SERPL-SCNC: 24 MMOL/L (ref 22–29)
CREAT SERPL-MCNC: 1.31 MG/DL (ref 0.76–1.27)
EGFRCR SERPLBLD CKD-EPI 2021: 54 ML/MIN/1.73
GLUCOSE SERPL-MCNC: 96 MG/DL (ref 65–99)
POTASSIUM SERPL-SCNC: 3.7 MMOL/L (ref 3.5–5.2)
SODIUM SERPL-SCNC: 138 MMOL/L (ref 136–145)

## 2023-05-23 PROCEDURE — G0378 HOSPITAL OBSERVATION PER HR: HCPCS

## 2023-05-23 PROCEDURE — 99232 SBSQ HOSP IP/OBS MODERATE 35: CPT | Performed by: STUDENT IN AN ORGANIZED HEALTH CARE EDUCATION/TRAINING PROGRAM

## 2023-05-23 PROCEDURE — 25010000002 HEPARIN (PORCINE) PER 1000 UNITS: Performed by: INTERNAL MEDICINE

## 2023-05-23 PROCEDURE — 96372 THER/PROPH/DIAG INJ SC/IM: CPT

## 2023-05-23 PROCEDURE — 80048 BASIC METABOLIC PNL TOTAL CA: CPT | Performed by: STUDENT IN AN ORGANIZED HEALTH CARE EDUCATION/TRAINING PROGRAM

## 2023-05-23 RX ADMIN — FINASTERIDE 5 MG: 5 TABLET, FILM COATED ORAL at 09:28

## 2023-05-23 RX ADMIN — LEVOTHYROXINE SODIUM 175 MCG: 175 TABLET ORAL at 05:08

## 2023-05-23 RX ADMIN — QUETIAPINE FUMARATE 25 MG: 25 TABLET ORAL at 16:21

## 2023-05-23 RX ADMIN — ATORVASTATIN CALCIUM 80 MG: 40 TABLET, FILM COATED ORAL at 21:08

## 2023-05-23 RX ADMIN — HEPARIN SODIUM 5000 UNITS: 5000 INJECTION, SOLUTION INTRAVENOUS; SUBCUTANEOUS at 05:08

## 2023-05-23 RX ADMIN — ALLOPURINOL 50 MG: 100 TABLET ORAL at 10:41

## 2023-05-23 RX ADMIN — TERAZOSIN HYDROCHLORIDE 2 MG: 2 CAPSULE ORAL at 21:08

## 2023-05-23 RX ADMIN — ASPIRIN 81 MG 81 MG: 81 TABLET ORAL at 09:28

## 2023-05-23 RX ADMIN — Medication 5 MG: at 21:08

## 2023-05-23 RX ADMIN — BUSPIRONE HYDROCHLORIDE 15 MG: 10 TABLET ORAL at 09:27

## 2023-05-23 RX ADMIN — BUSPIRONE HYDROCHLORIDE 15 MG: 10 TABLET ORAL at 21:08

## 2023-05-23 RX ADMIN — DONEPEZIL HYDROCHLORIDE 5 MG: 5 TABLET, FILM COATED ORAL at 21:08

## 2023-05-23 RX ADMIN — HEPARIN SODIUM 5000 UNITS: 5000 INJECTION, SOLUTION INTRAVENOUS; SUBCUTANEOUS at 15:15

## 2023-05-23 RX ADMIN — PANTOPRAZOLE SODIUM 40 MG: 40 TABLET, DELAYED RELEASE ORAL at 09:28

## 2023-05-23 RX ADMIN — CLOPIDOGREL BISULFATE 75 MG: 75 TABLET ORAL at 09:28

## 2023-05-23 RX ADMIN — SERTRALINE HYDROCHLORIDE 100 MG: 100 TABLET ORAL at 09:28

## 2023-05-23 RX ADMIN — HEPARIN SODIUM 5000 UNITS: 5000 INJECTION, SOLUTION INTRAVENOUS; SUBCUTANEOUS at 21:08

## 2023-05-23 RX ADMIN — POLYETHYLENE GLYCOL 3350 17 G: 17 POWDER, FOR SOLUTION ORAL at 09:28

## 2023-05-23 RX ADMIN — AMLODIPINE BESYLATE 5 MG: 5 TABLET ORAL at 09:28

## 2023-05-23 NOTE — PLAN OF CARE
Problem: Adult Inpatient Plan of Care  Goal: Plan of Care Review  Outcome: Ongoing, Progressing  Flowsheets  Taken 5/23/2023 0241 by Alka Choi RN Extern  Outcome Evaluation:   Pt remains pleasantly confused and cooperative   frequently reoriented. PRNs needed to help pt rest comfortably   pt awakening frequently prior. Placement pending. (Pended)  Taken 5/22/2023 1426 by Kala Youngblood  Progress: no change  Plan of Care Reviewed With: patient   Goal Outcome Evaluation:              Outcome Evaluation: (P) Pt remains pleasantly confused and cooperative; frequently reoriented. PRNs needed to help pt rest comfortably; pt awakening frequently prior. Placement pending.

## 2023-05-23 NOTE — PROGRESS NOTES
[facility] Medicine Services  PROGRESS NOTE    Patient Name: Domitila Bosch  : 1940  MRN: [id number]    Date of Admission: 2023  Primary Care Physician: Marcin Ferguson MD    Subjective   Subjective     CC:  F/u confusion    HPI:   No acute  Overnight events,eating lunch. D/w nursing- he has been calm today    ROS:   Gen: no cough  Pulm: no fever  GI: no nausea    Objective   Objective     Vital Signs:   Temp:  [97.5 °F (36.4 °C)-98.8 °F (37.1 °C)] 97.5 °F (36.4 °C)  Heart Rate:  [55-66] 66  Resp:  [14-20] 14  BP: (122-143)/(69-80) 128/71     Physical Exam:   Constitutional - no acute distress, nontoxic, in bed  HEENT-NCAT, mucous membranes moist  CV-RRR  Resp-CTAB  Abd-soft, nontender, nondistended, normoactive bowel sounds  Ext-No lower extremity cyanosis, clubbing or edema bilaterally  Neuro-alert, speech clear, moves all extremities   Psych-normal affect   Skin- No rash on exposed UE or LE bilaterally        Results Reviewed:  LAB RESULTS:      Lab 23  1133   WBC 7.17   HEMOGLOBIN 15.5   HEMATOCRIT 46.8   PLATELETS 252   NEUTROS ABS 4.74   IMMATURE GRANS (ABS) 0.03   LYMPHS ABS 1.59   MONOS ABS 0.58   EOS ABS 0.15   MCV 90.0         Lab 23  0431 23  0551 23  0647 23  1133   SODIUM 138 140 140 139   POTASSIUM 3.7 3.7 4.0 4.0   CHLORIDE 105 107 109* 104   CO2 24.0 24.0 23.0 24.0   ANION GAP 9.0 9.0 8.0 11.0   BUN 14 10 10 10   CREATININE 1.31* 1.28* 1.40* 1.43*   EGFR 54.0* 55.5* 49.9* 48.6*   GLUCOSE 96 82 95 91   CALCIUM 8.7 9.0 8.4* 9.6   TSH  --   --   --  60.670*         Lab 23  1133   TOTAL PROTEIN 6.2   ALBUMIN 3.9   GLOBULIN 2.3   ALT (SGPT) 8   AST (SGOT) 14   BILIRUBIN 0.4   ALK PHOS 126*   LIPASE 31                 Lab 23  1133   FOLATE 4.92   VITAMIN B 12 593         Brief Urine Lab Results  (Last result in the past 365 days)      Color   Clarity   Blood   Leuk Est   Nitrite   Protein   CREAT   Urine HCG         05/17/23 1232 Yellow   Cloudy   Trace   Large (3+)   Negative   Negative                 Microbiology Results Abnormal     Procedure Component Value - Date/Time    Urine Culture - Urine, Urine, Clean Catch [[id number]]  (Normal) Collected: 05/17/23 1232    Lab Status: Final result Specimen: Urine, Clean Catch Updated: 05/18/23 2221     Urine Culture No growth          No radiology results from the last 24 hrs    Results for orders placed during the hospital encounter of 07/28/20    Adult Transesophageal Echo (SUMMER) W/ Cont if Necessary Per Protocol    Interpretation Summary  · Estimated EF = 65%.  · Left ventricular systolic function is normal.  · Left atrial cavity size is mild-to-moderately dilated.  · There is mild calcification of the aortic valve mainly affecting the non and right coronary cusp(s).  · Saline test results are negative.  · The aortic valve exhibits moderate sclerosis.  · There is no evidence of pericardial effusion.  · There is moderate (grade 2) protruding plaque in the descending aorta present.  · No valvular vegetations demonstrated.      Current medications:  Scheduled Meds:allopurinol, 50 mg, Oral, Daily  amLODIPine, 5 mg, Oral, Daily  aspirin, 81 mg, Oral, Daily  atorvastatin, 80 mg, Oral, Nightly  busPIRone, 15 mg, Oral, Q12H  clopidogrel, 75 mg, Oral, Daily  donepezil, 5 mg, Oral, Nightly  finasteride, 5 mg, Oral, Daily  heparin (porcine), 5,000 Units, Subcutaneous, Q8H  levothyroxine, 175 mcg, Oral, Q AM  melatonin, 5 mg, Oral, Nightly  pantoprazole, 40 mg, Oral, Daily  polyethylene glycol, 17 g, Oral, Daily  sertraline, 100 mg, Oral, Daily  sodium chloride, 10 mL, Intravenous, Q12H  terazosin, 2 mg, Oral, Nightly      Continuous Infusions:   PRN Meds:.•  acetaminophen **OR** acetaminophen **OR** acetaminophen  •  senna-docusate sodium **AND** polyethylene glycol **AND** bisacodyl **AND** bisacodyl  •  Calcium Replacement - Follow Nurse / BPA Driven Protocol  •  hydrOXYzine  •  Magnesium  Standard Dose Replacement - Follow Nurse / BPA Driven Protocol  •  ondansetron **OR** ondansetron  •  Phosphorus Replacement - Follow Nurse / BPA Driven Protocol  •  Potassium Replacement - Follow Nurse / BPA Driven Protocol  •  QUEtiapine  •  sodium chloride  •  sodium chloride    Assessment & Plan   Assessment & Plan     Active Hospital Problems    Diagnosis  POA   • **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        Brief Hospital Course to date:  Domitila Bosch is a 82 y.o. male with dementia who lives with his daughter.  EMS was called due to increased confusion/agitation at home.  While EMS was at the house, apparently his daughter had a seizure and they were both seen in the ED.  He was found to have a UTI and creatinine elevation beyond baseline and admission was requested for further management.      This patient's problems and plans were partially entered by my partner and updated as appropriate by me 05/23/23.      Dementia with worsening confusion  Anxiety disorder  -Continue namenda  -PRN seroquel, add scheduled nightly dose  -TSH elevated as below.  B12, folate WNL  -CT head without acute intracranial process  -Case management following  -Follows with Dr. Hernadez at , last appointment 1/6/23, adjusted med rec based on most recent meds, holding prn xanax     CKD III with elevated creatinine  -S/P gentle IVF  -Stable, baseline probably ~ 1.3-1.4 per chart review  -renally dose allopurinol  -Cr 1.28 on 5/20, back to baseline. Will check bmp in am (stable)     UTI  -Urine culture with no growth  -Due to numerous allergies and prior culture with enterococcus faecalis, started on levaquin  -Abx dc'd as urine cx negative, afebrile, wbc's wnl  -QT normal on EKG  -continue  finasteride     Hypothyroidism  -Continue levothyroxine  -TSH is 60.67, free T4 0.42  -levothyroxine dose increased for now  -recheck TSH in 6-8 weeks at PCP follow-up     HTN  HLD  H/o TIA, CVA  -continue home amlodipine, finasteride, terazosin  -per recent med rec from , patient on triamterene-hctz 37.5mg-25mg, not on terazosin  -monitor BP  -continue statin, ASA    Expected Discharge Location and Transportation: SNF rehab, EMS  Expected Discharge pending bed offer  Expected Discharge Date: 5/26/2023; Expected Discharge Time:      DVT prophylaxis:  Medical DVT prophylaxis orders are present.     AM-PAC 6 Clicks Score (PT): 22 (05/23/23 4140)    CODE STATUS:   Code Status and Medical Interventions:   Ordered at: 05/17/23 7082     Code Status (Patient has no pulse and is not breathing):    CPR (Attempt to Resuscitate)     Medical Interventions (Patient has pulse or is breathing):    Full Support     Comments:    Unable to reach daughter to discuss-will try again later       Chantal Mistry MD  05/23/23

## 2023-05-23 NOTE — PLAN OF CARE
"Goal Outcome Evaluation:  Plan of Care Reviewed With: daughter        Progress: no change  Outcome Evaluation: Awaiting placement. Pleasantly confused. Agitation noted at cffspg10:00 and medicated with Seroquel. Needs very frequent reorientation (even within minutes). Pt seems responsive to being told he is in the hospital for possible \"urine infection\". When asked if his dtr, Yoly, is here, I have told pt that Yoly was the one who brought him to the hospital. He intermittently believes that Yoly is still in the hospital, but dtr has told me that she is home but does not want pt to know. She wants to minimize agitation and further confusion.         "

## 2023-05-24 PROCEDURE — 25010000002 HEPARIN (PORCINE) PER 1000 UNITS: Performed by: INTERNAL MEDICINE

## 2023-05-24 PROCEDURE — 97110 THERAPEUTIC EXERCISES: CPT

## 2023-05-24 PROCEDURE — 99232 SBSQ HOSP IP/OBS MODERATE 35: CPT | Performed by: STUDENT IN AN ORGANIZED HEALTH CARE EDUCATION/TRAINING PROGRAM

## 2023-05-24 PROCEDURE — 97535 SELF CARE MNGMENT TRAINING: CPT

## 2023-05-24 PROCEDURE — G0378 HOSPITAL OBSERVATION PER HR: HCPCS

## 2023-05-24 PROCEDURE — 97116 GAIT TRAINING THERAPY: CPT

## 2023-05-24 PROCEDURE — 96372 THER/PROPH/DIAG INJ SC/IM: CPT

## 2023-05-24 PROCEDURE — 97530 THERAPEUTIC ACTIVITIES: CPT

## 2023-05-24 RX ORDER — QUETIAPINE FUMARATE 25 MG/1
25 TABLET, FILM COATED ORAL 2 TIMES DAILY
Status: DISCONTINUED | OUTPATIENT
Start: 2023-05-24 | End: 2023-06-04 | Stop reason: HOSPADM

## 2023-05-24 RX ADMIN — PANTOPRAZOLE SODIUM 40 MG: 40 TABLET, DELAYED RELEASE ORAL at 09:12

## 2023-05-24 RX ADMIN — Medication 5 MG: at 20:59

## 2023-05-24 RX ADMIN — DONEPEZIL HYDROCHLORIDE 5 MG: 5 TABLET, FILM COATED ORAL at 20:59

## 2023-05-24 RX ADMIN — AMLODIPINE BESYLATE 5 MG: 5 TABLET ORAL at 09:12

## 2023-05-24 RX ADMIN — BUSPIRONE HYDROCHLORIDE 15 MG: 10 TABLET ORAL at 09:12

## 2023-05-24 RX ADMIN — FINASTERIDE 5 MG: 5 TABLET, FILM COATED ORAL at 09:12

## 2023-05-24 RX ADMIN — LEVOTHYROXINE SODIUM 175 MCG: 175 TABLET ORAL at 05:29

## 2023-05-24 RX ADMIN — QUETIAPINE FUMARATE 25 MG: 25 TABLET ORAL at 10:26

## 2023-05-24 RX ADMIN — CLOPIDOGREL BISULFATE 75 MG: 75 TABLET ORAL at 09:12

## 2023-05-24 RX ADMIN — HEPARIN SODIUM 5000 UNITS: 5000 INJECTION, SOLUTION INTRAVENOUS; SUBCUTANEOUS at 05:29

## 2023-05-24 RX ADMIN — QUETIAPINE FUMARATE 25 MG: 25 TABLET ORAL at 17:35

## 2023-05-24 RX ADMIN — ALLOPURINOL 50 MG: 100 TABLET ORAL at 09:12

## 2023-05-24 RX ADMIN — ATORVASTATIN CALCIUM 80 MG: 40 TABLET, FILM COATED ORAL at 20:58

## 2023-05-24 RX ADMIN — ASPIRIN 81 MG 81 MG: 81 TABLET ORAL at 09:12

## 2023-05-24 RX ADMIN — HEPARIN SODIUM 5000 UNITS: 5000 INJECTION, SOLUTION INTRAVENOUS; SUBCUTANEOUS at 14:00

## 2023-05-24 RX ADMIN — HEPARIN SODIUM 5000 UNITS: 5000 INJECTION, SOLUTION INTRAVENOUS; SUBCUTANEOUS at 21:00

## 2023-05-24 RX ADMIN — BUSPIRONE HYDROCHLORIDE 15 MG: 10 TABLET ORAL at 20:58

## 2023-05-24 RX ADMIN — POLYETHYLENE GLYCOL 3350 17 G: 17 POWDER, FOR SOLUTION ORAL at 09:12

## 2023-05-24 RX ADMIN — SERTRALINE HYDROCHLORIDE 100 MG: 100 TABLET ORAL at 09:12

## 2023-05-24 NOTE — PLAN OF CARE
Goal Outcome Evaluation:  Plan of Care Reviewed With: patient        Progress: no change  Outcome Evaluation: Pt presents w/ increased confusion, decreased safety awareness, generalized weakness, and mild balance deficits limiting his ADL independence. Pt following all commands throughout session, verbal cues for frequent redirection and reorientation required. Will continue to progress pt as tolerated per OT POC. Rec SNF at d/c.

## 2023-05-24 NOTE — PLAN OF CARE
Goal Outcome Evaluation:  Plan of Care Reviewed With: patient, daughter        Progress: improving  Outcome Evaluation: Pt has been less impulsive today and has been even more pleasant. Less agitation noted when pt is given simple explanations that make sense. For example, when pt asks where his daughter is I let him know that Yoly brought him to the hospital for a urinary infection. Informed Yoly of this plan as well. Pt requires standby assist for transfers and ambulation. Remains on room air. Has been wearing briefs, but has not had an incontinent episode today.

## 2023-05-24 NOTE — PLAN OF CARE
Goal Outcome Evaluation:  Plan of Care Reviewed With: patient        Progress: improving  Outcome Evaluation: Patient pleasantly confused and continues to be limited by weakness, impaired balance, unsteady gait, and remains below his functional baseline. He ambulated in hallway w/ RW and CGA with consistent cueing for safety. PT continues to recommend SNF rehab at D/C.

## 2023-05-24 NOTE — PLAN OF CARE
Problem: Adult Inpatient Plan of Care  Goal: Plan of Care Review  Outcome: Ongoing, Progressing  Flowsheets  Taken 5/24/2023 0232 by Alka Choi, RN Extern  Outcome Evaluation: Still awaiting placement. Pt is pleasantly confused and requires frequent reorientation. Increasing agitation noted prior to sleeping and PRN given. Pt talked w/ daugher over the phone this evening. Currently resting well throughout the shift. (Pended)  Taken 5/23/2023 1640 by Colleen Lin RN  Progress: no change  Plan of Care Reviewed With: daughter   Goal Outcome Evaluation:              Outcome Evaluation: (P) Still awaiting placement. Pt is pleasantly confused and requires frequent reorientation. Increasing agitation noted prior to sleeping and PRN given. Pt talked w/ daugher over the phone this evening. Currently resting well throughout the shift.

## 2023-05-24 NOTE — CASE MANAGEMENT/SOCIAL WORK
Continued Stay Note  [facility]     Patient Name: Domitila Bosch  MRN: [id number]  Today's Date: 5/24/2023    Admit Date: 5/17/2023    Plan: Long Term Care (LTC)   Discharge Plan     Row Name 05/24/23 1746       Plan    Plan Long Term Care (LTC)    Patient/Family in Agreement with Plan yes  DaughterYoly    Plan Comments I spoke with daughter Yoly by telephone this afternoon, continues to agree with long term care placement for her father.  Reports she has cared for her father at home x 2 years and feels she is no longer able to safely manage him.  Discussed long term care/Medicaid pending process.  Will try to find bed in [place] if possible, then look at outlying [facility], [place], [place], etc.  Daughter voices understanding and agreement.  Tearful about process, support provided.  Updated daughter MD changed Seroquel to scheduled vs. PRN-hopefully will help with agitation.  Daughter may decide to visit later this week-concerned her presence may be upsetting to him and he may think she has come to take him home.  Will f/u with Yoly tomorrow with update.  Discussed in unit multidisciplinary rounds this morning.  Susan Hardwick, Ext. [phone]    Final Discharge Disposition Code 04 - intermediate care facility               Discharge Codes    No documentation.               Expected Discharge Date and Time     Expected Discharge Date Expected Discharge Time    May 26, 2023             JACK Johansen

## 2023-05-24 NOTE — PROGRESS NOTES
Nutrition Services    Patient Name:  Domitila Bosch  YOB: 1940  MRN: [id number]  Admit Date:  5/17/2023    Pt identified 2/2 LOS. No nutrition risk noted at this time. PO intakes=73% X 13 meals documented. Please consult for intake avg <50% or significant weight change.      Electronically signed by:  Delores Leal RD  05/24/23 08:51 EDT

## 2023-05-24 NOTE — THERAPY TREATMENT NOTE
Patient Name: Domitila Bosch  : 1940    MRN: [id number]                              Today's Date: 2023       Admit Date: 2023    Visit Dx:     ICD-10-CM ICD-9-CM   1. Rapidly progressive dementia  F03.90 294.20   2. Agitation  R45.1 307.9   3. Acute on chronic alteration in mental status  R41.82 780.09   4. Elevated blood pressure reading with diagnosis of hypertension  I10 401.9   5. DANIS (acute kidney injury)  N17.9 584.9     Patient Active Problem List   Diagnosis   • Diverticulosis of large intestine with hemorrhage   • Umbilical hernia   • S/P hernia repair   • Hypertension   • Dyslipidemia   • Carotid stenosis   • Gout   • GERD (gastroesophageal reflux disease)   • Hypothyroidism   • Neuropathy   • Malignant melanoma   • Asthma   • Anxiety disorder   • Diverticulosis   • Muscle pain   • Lumbar radiculopathy   • Kidney stone   • Deviated nasal septum   • Chronic laryngitis   • Acute CVA (cerebrovascular accident)   • Essential hypertension   • Allergy to Betadine   • DANIS (acute kidney injury)   • Severe hypothyroidism   • History of ischemic stroke   • Rapidly progressive dementia   • CKD (chronic kidney disease), stage III     Past Medical History:   Diagnosis Date   • Anxiety disorder 2017   • Asthma 2017   • Basal cell carcinoma in situ of skin 2019    right leg    • Carotid stenosis 2017   • Dementia    • Diaphoresis    • Diastolic dysfunction    • Diverticulitis    • Dyslipidemia 2017   • GERD (gastroesophageal reflux disease) 2017   • Gout 2017   • Herpes zoster     Herpes zoster, [id number], right lower extremity.    • Hyperlipidemia    • Hypertension 2017   • Hypothyroidism 2017   • LVH (left ventricular hypertrophy)    • Malignant melanoma 2017   • Melanoma    • Mitral regurgitation    • Nephrolithiasis    • Post herpetic neuralgia 2017   • TIA (transient ischemic attack)     TIA, 2012, with nonobstructive carotid  stenosis, dyslipidemia and hypertension     Past Surgical History:   Procedure Laterality Date   • ABDOMINAL SURGERY     • APPENDECTOMY     • COLON RESECTION LEFT  2016   • COLON RESECTION LEFT  2016   • CYST REMOVAL      left side of neck.    • HEEL SPUR SURGERY  1999   • HERNIA REPAIR      hiatal hernia    • NISSEN FUNDOPLICATION  1984   • OTHER SURGICAL HISTORY  2010    Malignant melanoma, status post resection       General Information     Row Name 05/24/23 0829          OT Time and Intention    Document Type therapy note (daily note)  -     Mode of Treatment occupational therapy;individual therapy  -     Row Name 05/24/23 0829          General Information    Patient Profile Reviewed yes  -     Existing Precautions/Restrictions fall;other (see comments)  confusion, incontinent  -     Barriers to Rehab medically complex;previous functional deficit;cognitive status  -     Row Name 05/24/23 0829          Cognition    Orientation Status (Cognition) oriented to;person;disoriented to;place;situation;time;verbal cues/prompts needed for orientation  Pt repeatedly asking where he was, what day it was, and why he was here throughout session.  -     Row Name 05/24/23 0829          Safety Issues, Functional Mobility    Safety Issues Affecting Function (Mobility) awareness of need for assistance;insight into deficits/self-awareness;safety precaution awareness;safety precautions follow-through/compliance;sequencing abilities;positioning of assistive device  -     Impairments Affecting Function (Mobility) balance;endurance/activity tolerance;postural/trunk control;strength;cognition  -     Cognitive Impairments, Mobility Safety/Performance attention;awareness, need for assistance;insight into deficits/self-awareness;safety precaution awareness;safety precaution follow-through;sequencing abilities  -           User Key  (r) = Recorded By, (t) = Taken By, (c) = Cosigned By    Initials Name Provider Type      Alivia Tony, ZACK Occupational Therapist                 Mobility/ADL's     Row Name 05/24/23 0831          Bed Mobility    Bed Mobility supine-sit  -     Supine-Sit Cottle (Bed Mobility) standby assist  -     Assistive Device (Bed Mobility) bed rails;head of bed elevated  -     Row Name 05/24/23 0831          Transfers    Transfers sit-stand transfer;stand-sit transfer  -     Row Name 05/24/23 0831          Sit-Stand Transfer    Sit-Stand Cottle (Transfers) standby assist  -     Assistive Device (Sit-Stand Transfers) walker, front-wheeled  -     Row Name 05/24/23 0831          Stand-Sit Transfer    Stand-Sit Cottle (Transfers) standby assist  -     Assistive Device (Stand-Sit Transfers) walker, front-wheeled  -[facility] Name 05/24/23 0831          Functional Mobility    Functional Mobility- Ind. Level contact guard assist;verbal cues required  -     Functional Mobility- Device walker, front-wheeled  -     Functional Mobility-Distance (Feet) 50  -     Functional Mobility- Safety Issues balance decreased during turns;sequencing ability decreased;step length decreased  -[facility] Name 05/24/23 0831          Activities of Daily Living    BADL Assessment/Intervention lower body dressing;upper body dressing;grooming  -[facility] Name 05/24/23 0831          Lower Body Dressing Assessment/Training    Cottle Level (Lower Body Dressing) don;socks;standby assist;verbal cues  -     Position (Lower Body Dressing) edge of bed sitting  -[facility] Name 05/24/23 0831          Grooming Assessment/Training    Cottle Level (Grooming) wash face, hands;set up  -     Position (Grooming) supported sitting  -[facility] Name 05/24/23 0831          Upper Body Dressing Assessment/Training    [place]ottle Level (Upper Body Dressing) don;pajama/robe;minimum assist (75% patient effort);verbal cues  -     Position (Upper Body Dressing) edge of bed sitting  -           User Key  (r)  = Recorded By, (t) = Taken By, (c) = Cosigned By    Initials Name Provider Type     Alivia Tony OT Occupational Therapist               Obj/Interventions     [facility] Name 05/24/23 0832          Balance    Balance Assessment sitting static balance;sitting dynamic balance;sit to stand dynamic balance;standing static balance;standing dynamic balance  -     Static Sitting Balance standby assist  -     Dynamic Sitting Balance standby assist  -     Position, Sitting Balance unsupported;sitting edge of bed;sitting in chair  -     Sit to Stand Dynamic Balance standby assist;verbal cues  -     Static Standing Balance standby assist  -     Dynamic Standing Balance contact guard  -     Position/Device Used, Standing Balance supported;walker, front-wheeled  -     Balance Interventions sitting;sit to stand;occupation based/functional task  -           User Key  (r) = Recorded By, (t) = Taken By, (c) = Cosigned By    Initials Name Provider Type     Alivia Tony OT Occupational Therapist               Goals/Plan    No documentation.                Clinical Impression     Row Name 05/24/23 0833          Pain Assessment    Pretreatment Pain Rating 0/10 - no pain  -     Posttreatment Pain Rating 0/10 - no pain  -MC     Row Name 05/24/23 0833          Plan of Care Review    Plan of Care Reviewed With patient  -     Progress no change  -     Outcome Evaluation Pt presents w/ increased confusion, decreased safety awareness, generalized weakness, and mild balance deficits limiting his ADL independence. Pt following all commands throughout session, verbal cues for frequent redirection and reorientation required. Will continue to progress pt as tolerated per OT POC. Rec SNF at d/c.  -     Row Name 05/24/23 0833          Therapy Assessment/Plan (OT)    Rehab Potential (OT) good, to achieve stated therapy goals  -     Criteria for Skilled Therapeutic Interventions Met (OT) yes;meets criteria;skilled  treatment is necessary  -     Therapy Frequency (OT) daily  -     Row Name 05/24/23 0833          Therapy Plan Review/Discharge Plan (OT)    Anticipated Discharge Disposition (OT) skilled nursing facility  -     Row Name 05/24/23 0833          Vital Signs    Pre Systolic BP Rehab --  No cuca, RN cleared OT  -     O2 Delivery Pre Treatment room air  -     O2 Delivery Intra Treatment room air  -     O2 Delivery Post Treatment room air  -     Pre Patient Position Supine  -     Intra Patient Position Standing  -     Post Patient Position Sitting  -     Row Name 05/24/23 0833          Positioning and Restraints    Pre-Treatment Position in bed  -     Post Treatment Position chair  -     In Chair notified nsg;reclined;call light within reach;encouraged to call for assist;exit alarm on;waffle cushion;legs elevated  -           User Key  (r) = Recorded By, (t) = Taken By, (c) = Cosigned By    Initials Name Provider Type    Alivia Menard OT Occupational Therapist               Outcome Measures     Row Name 05/24/23 0835          How much help from another is currently needed...    Putting on and taking off regular lower body clothing? 2  -MC     Bathing (including washing, rinsing, and drying) 2  -MC     Toileting (which includes using toilet bed pan or urinal) 3  -MC     Putting on and taking off regular upper body clothing 3  -MC     Taking care of personal grooming (such as brushing teeth) 3  -MC     Eating meals 3  -MC     AM-PAC 6 Clicks Score (OT) 16  -     Row Name 05/24/23 0835          Functional Assessment    Outcome Measure Options AM-PAC 6 Clicks Daily Activity (OT)  -           User Key  (r) = Recorded By, (t) = Taken By, (c) = Cosigned By    Initials Name Provider Type    Alivia Menard OT Occupational Therapist                Occupational Therapy Education     Title: PT OT SLP Therapies (In Progress)     Topic: Occupational Therapy (In Progress)     Point: ADL  training (In Progress)     Description:   Instruct learner(s) on proper safety adaptation and remediation techniques during self care or transfers.   Instruct in proper use of assistive devices.              Learning Progress Summary           Patient Acceptance, E, NR by  at 5/24/2023 0835    Acceptance, E, NR by  at 5/21/2023 1142    Acceptance, E,TB, VU,NR by  at 5/20/2023 0020    Acceptance, E,TB, VU,NR by  at 5/19/2023 0019    Acceptance, E, NR by ROSE at 5/18/2023 1012    Comment: re-orientation, reason for consult, ADL sequencing, reason need gait belt and gripper socks                   Point: Home exercise program (In Progress)     Description:   Instruct learner(s) on appropriate technique for monitoring, assisting and/or progressing therapeutic exercises/activities.              Learning Progress Summary           Patient Acceptance, E, NR by  at 5/21/2023 1142    Acceptance, E,TB, VU,NR by  at 5/20/2023 0020    Acceptance, E,TB, VU,NR by  at 5/19/2023 0019                   Point: Precautions (In Progress)     Description:   Instruct learner(s) on prescribed precautions during self-care and functional transfers.              Learning Progress Summary           Patient Acceptance, E, NR by  at 5/24/2023 0835    Acceptance, E,TB, VU,NR by  at 5/20/2023 0020    Acceptance, E,TB, VU,NR by  at 5/19/2023 0019    Acceptance, E, NR by ROSE at 5/18/2023 1012    Comment: re-orientation, reason for consult, ADL sequencing, reason need gait belt and gripper socks                   Point: Body mechanics (In Progress)     Description:   Instruct learner(s) on proper positioning and spine alignment during self-care, functional mobility activities and/or exercises.              Learning Progress Summary           Patient Acceptance, E, NR by  at 5/24/2023 0835    Acceptance, E,TB, VU,NR by  at 5/20/2023 0020    Acceptance, E,TB, VU,NR by  at 5/19/2023 0019                               User Key      Initials Effective Dates Name Provider Type Discipline     02/03/23 -  Kaila Kat OT Occupational Therapist OT     02/03/23 -  Susan Pierson OT Occupational Therapist OT     08/12/20 -  Kavya Matamoros, RN Registered Nurse Nurse     10/14/22 -  Alivia Tony OT Occupational Therapist OT              OT Recommendation and Plan  Therapy Frequency (OT): daily  Plan of Care Review  Plan of Care Reviewed With: patient  Progress: no change  Outcome Evaluation: Pt presents w/ increased confusion, decreased safety awareness, generalized weakness, and mild balance deficits limiting his ADL independence. Pt following all commands throughout session, verbal cues for frequent redirection and reorientation required. Will continue to progress pt as tolerated per OT POC. Rec SNF at d/c.     Time Calculation:    Time Calculation- OT     Row Name 05/24/23 0835             Time Calculation- OT    OT Start Time 0752  -      OT Received On 05/24/23  -      OT Goal Re-Cert Due Date 05/28/23  -         Timed Charges    11079 - OT Therapeutic Activity Minutes 16  -      83333 - OT Self Care/Mgmt Minutes 12  -         Total Minutes    Timed Charges Total Minutes 28  -       Total Minutes 28  -MC            User Key  (r) = Recorded By, (t) = Taken By, (c) = Cosigned By    Initials Name Provider Type     Alivia Tony OT Occupational Therapist              Therapy Charges for Today     Code Description Service Date Service Provider Modifiers Qty    65203943088 HC OT THERAPEUTIC ACT EA 15 MIN 5/24/2023 Alivia Tony OT GO 1    85028461082 HC OT SELF CARE/MGMT/TRAIN EA 15 MIN 5/24/2023 Alivia Tony OT GO 1               Alivia Tony OT  5/24/2023

## 2023-05-24 NOTE — THERAPY TREATMENT NOTE
Patient Name: Domitila Bosch  : 1940    MRN: [id number]                              Today's Date: 2023       Admit Date: 2023    Visit Dx:     ICD-10-CM ICD-9-CM   1. Rapidly progressive dementia  F03.90 294.20   2. Agitation  R45.1 307.9   3. Acute on chronic alteration in mental status  R41.82 780.09   4. Elevated blood pressure reading with diagnosis of hypertension  I10 401.9   5. DANIS (acute kidney injury)  N17.9 584.9     Patient Active Problem List   Diagnosis   • Diverticulosis of large intestine with hemorrhage   • Umbilical hernia   • S/P hernia repair   • Hypertension   • Dyslipidemia   • Carotid stenosis   • Gout   • GERD (gastroesophageal reflux disease)   • Hypothyroidism   • Neuropathy   • Malignant melanoma   • Asthma   • Anxiety disorder   • Diverticulosis   • Muscle pain   • Lumbar radiculopathy   • Kidney stone   • Deviated nasal septum   • Chronic laryngitis   • Acute CVA (cerebrovascular accident)   • Essential hypertension   • Allergy to Betadine   • DANIS (acute kidney injury)   • Severe hypothyroidism   • History of ischemic stroke   • Rapidly progressive dementia   • CKD (chronic kidney disease), stage III     Past Medical History:   Diagnosis Date   • Anxiety disorder 2017   • Asthma 2017   • Basal cell carcinoma in situ of skin 2019    right leg    • Carotid stenosis 2017   • Dementia    • Diaphoresis    • Diastolic dysfunction    • Diverticulitis    • Dyslipidemia 2017   • GERD (gastroesophageal reflux disease) 2017   • Gout 2017   • Herpes zoster     Herpes zoster, [id number], right lower extremity.    • Hyperlipidemia    • Hypertension 2017   • Hypothyroidism 2017   • LVH (left ventricular hypertrophy)    • Malignant melanoma 2017   • Melanoma    • Mitral regurgitation    • Nephrolithiasis    • Post herpetic neuralgia 2017   • TIA (transient ischemic attack)     TIA, 2012, with nonobstructive carotid  stenosis, dyslipidemia and hypertension     Past Surgical History:   Procedure Laterality Date   • ABDOMINAL SURGERY     • APPENDECTOMY     • COLON RESECTION LEFT  2016   • COLON RESECTION LEFT  2016   • CYST REMOVAL      left side of neck.    • HEEL SPUR SURGERY  1999   • HERNIA REPAIR      hiatal hernia    • NISSEN FUNDOPLICATION  1984   • OTHER SURGICAL HISTORY  2010    Malignant melanoma, status post resection       General Information     Row Name 05/24/23 1053          Physical Therapy Time and Intention    Document Type therapy note (daily note)  -MB     Mode of Treatment physical therapy  -MB     Row Name 05/24/23 1053          General Information    Patient Profile Reviewed yes  -MB     Existing Precautions/Restrictions fall;other (see comments)  confusion, incontinent  -MB     Barriers to Rehab medically complex;previous functional deficit;cognitive status  dementia  -MB     Row Name 05/24/23 1053          Cognition    Orientation Status (Cognition) oriented to;person;disoriented to;place;situation;time  -MB     Row Name 05/24/23 1053          Safety Issues, Functional Mobility    Safety Issues Affecting Function (Mobility) awareness of need for assistance;safety precaution awareness;safety precautions follow-through/compliance;insight into deficits/self-awareness;sequencing abilities;positioning of assistive device  -MB     Impairments Affecting Function (Mobility) balance;endurance/activity tolerance;postural/trunk control;strength;cognition  -MB           User Key  (r) = Recorded By, (t) = Taken By, (c) = Cosigned By    Initials Name Provider Type    Angella Dozier, PT Physical Therapist               Mobility     Row Name 05/24/23 1502          Bed Mobility    Supine-Sit Hertel (Bed Mobility) standby assist  -MB     Sit-Supine Hertel (Bed Mobility) not tested  -MB     Assistive Device (Bed Mobility) bed rails;head of bed elevated  -MB     Comment, (Bed Mobility) Pt. completed bed  mobility w/ brief assist and increased time to complete.  -MB     Row Name 05/24/23 1502          Transfers    Comment, (Transfers) Pt. required consistent VCs/tactile cues/demo for safe hand placement and upright posture in standing.  -MB     Row Name 05/24/23 1502          Sit-Stand Transfer    Sit-Stand Ransom (Transfers) contact guard;verbal cues;nonverbal cues (demo/gesture)  -MB     Assistive Device (Sit-Stand Transfers) walker, front-wheeled  -MB     Row Name 05/24/23 1502          Gait/Stairs (Locomotion)    Ransom Level (Gait) contact guard;verbal cues  -MB     Assistive Device (Gait) walker, front-wheeled  -MB     Distance in Feet (Gait) 100  -MB     Deviations/Abnormal Patterns (Gait) base of support, narrow;rody decreased;gait speed decreased;stride length decreased  -MB     Bilateral Gait Deviations forward flexed posture;heel strike decreased  -MB     Comment, (Gait/Stairs) Pt. ambulated w/ step through gait mechanics w/ slow pace, increased forward flexion, and dec B step length. He required consistent cueing for trunk extension/forward gaze, increased B step length/heelstrike, and safe use of RW (to keep closer to CLEO).  -MB           User Key  (r) = Recorded By, (t) = Taken By, (c) = Cosigned By    Initials Name Provider Type    Angella Dozier, PT Physical Therapist               Obj/Interventions     Row Name 05/24/23 1506          Motor Skills    Therapeutic Exercise hip;knee;ankle  -MB     Row Name 05/24/23 1506          Hip (Therapeutic Exercise)    Hip (Therapeutic Exercise) strengthening exercise  -MB     Hip Strengthening (Therapeutic Exercise) bilateral;marching while seated;aBduction;aDduction;10 repetitions  -MB     Row Name 05/24/23 1506          Knee (Therapeutic Exercise)    Knee Strengthening (Therapeutic Exercise) bilateral;LAQ (long arc quad);10 repetitions  -MB     Row Name 05/24/23 1506          Ankle (Therapeutic Exercise)    Ankle AROM (Therapeutic  Exercise) bilateral;dorsiflexion;plantarflexion;10 repetitions  -MB     Row Name 05/24/23 1506          Balance    Static Standing Balance standby assist  -MB     Dynamic Standing Balance contact guard  -MB     Position/Device Used, Standing Balance supported;walker, rolling  -MB     Balance Interventions standing;sit to stand;weight shifting activity;dynamic reaching  -MB           User Key  (r) = Recorded By, (t) = Taken By, (c) = Cosigned By    Initials Name Provider Type    Angella Dozier, PT Physical Therapist               Goals/Plan    No documentation.                Clinical Impression     Row Name 05/24/23 1507          Pain    Pretreatment Pain Rating 0/10 - no pain  -MB     Posttreatment Pain Rating 0/10 - no pain  -MB     Row Name 05/24/23 1507          Plan of Care Review    Plan of Care Reviewed With patient  -MB     Progress improving  -MB     Outcome Evaluation Patient pleasantly confused and continues to be limited by weakness, impaired balance, unsteady gait, and remains below his functional baseline. He ambulated in hallway w/ RW and CGA with consistent cueing for safety. PT continues to recommend SNF rehab at D/C.  -MB     Row Name 05/24/23 1507          Vital Signs    Pre Systolic BP Rehab --  VSS. RN cleared for PT.  -MB     Pre Patient Position Supine  -MB     Intra Patient Position Standing  -MB     Post Patient Position Sitting  -MB     Row Name 05/24/23 1507          Positioning and Restraints    Pre-Treatment Position in bed  -MB     Post Treatment Position chair  -MB     In Chair notified nsg;reclined;call light within reach;encouraged to call for assist;exit alarm on;legs elevated;heels elevated  -MB           User Key  (r) = Recorded By, (t) = Taken By, (c) = Cosigned By    Initials Name Provider Type    Angella Dozier, PT Physical Therapist               Outcome Measures     Row Name 05/24/23 1509 05/24/23 0800       How much help from another person do you currently  need...    Turning from your back to your side while in flat bed without using bedrails? 4  -MB 4  -AH    Moving from lying on back to sitting on the side of a flat bed without bedrails? 3  -MB 4  -AH    Moving to and from a bed to a chair (including a wheelchair)? 3  -MB 4  -AH    Standing up from a chair using your arms (e.g., wheelchair, bedside chair)? 3  -MB 4  -AH    Climbing 3-5 steps with a railing? 3  -MB 3  -AH    To walk in hospital room? 3  -MB 4  -AH    AM-PAC 6 Clicks Score (PT) 19  -MB 23  -    Highest level of mobility 6 --> Walked 10 steps or more  -MB 7 --> Walked 25 feet or more  -    Row Name 05/24/23 1509 05/24/23 0835       Functional Assessment    Outcome Measure Options AM-PAC 6 Clicks Basic Mobility (PT)  -MB AM-PAC 6 Clicks Daily Activity (OT)  -          User Key  (r) = Recorded By, (t) = Taken By, (c) = Cosigned By    Initials Name Provider Type    Angella Dozier, PT Physical Therapist    Colleen Amos, RN Registered Nurse    Alivia Menard, OT Occupational Therapist                             Physical Therapy Education     Title: PT OT SLP Therapies (In Progress)     Topic: Physical Therapy (Done)     Point: Mobility training (Done)     Learning Progress Summary           Patient Acceptance, E, VU,DU,NR by  at 5/22/2023 1428    Acceptance, E,TB, VU,NR by  at 5/20/2023 0020    Acceptance, E,TB, VU,NR by  at 5/19/2023 0019    Eager, E,D, NR by  at 5/18/2023 1528                   Point: Home exercise program (Done)     Learning Progress Summary           Patient Acceptance, E, VU,DU,NR by  at 5/22/2023 1428    Acceptance, E,TB, VU,NR by  at 5/20/2023 0020    Acceptance, E,TB, VU,NR by  at 5/19/2023 0019    Eager, E,D, NR by DM at 5/18/2023 1528                   Point: Body mechanics (Done)     Learning Progress Summary           Patient Acceptance, E,TB, VU,NR by  at 5/20/2023 0020    Acceptance, E,TB, VU,NR by DH at 5/19/2023 0019    COY Adame D, NR  by  at 5/18/2023 1528                   Point: Precautions (Done)     Learning Progress Summary           Patient Acceptance, E, VU,DU,NR by  at 5/22/2023 1428    Acceptance, E,TB, VU,NR by  at 5/20/2023 0020    Acceptance, E,TB, VU,NR by  at 5/19/2023 0019    Eager, E,D, NR by  at 5/18/2023 1528                               User Key     Initials Effective Dates Name Provider Type Discipline     02/03/23 -  Geeta Perez, PT Physical Therapist PT     08/12/20 -  Kavya Matamoros, RN Registered Nurse Nurse     04/22/21 -  Kala Youngblood Physical Therapist PT              PT Recommendation and Plan     Plan of Care Reviewed With: patient  Progress: improving  Outcome Evaluation: Patient pleasantly confused and continues to be limited by weakness, impaired balance, unsteady gait, and remains below his functional baseline. He ambulated in hallway w/ RW and CGA with consistent cueing for safety. PT continues to recommend SNF rehab at D/C.     Time Calculation:    PT Charges     Row Name 05/24/23 1510             Time Calculation    Start Time 1053  -MB      PT Received On 05/24/23  -MB      PT Goal Re-Cert Due Date 05/28/23  -MB         Time Calculation- PT    Total Timed Code Minutes- PT 36 minute(s)  -MB         Timed Charges    95449 - PT Therapeutic Exercise Minutes 16  -MB      99839 - Gait Training Minutes  20  -MB         Total Minutes    Timed Charges Total Minutes 36  -MB       Total Minutes 36  -MB            User Key  (r) = Recorded By, (t) = Taken By, (c) = Cosigned By    Initials Name Provider Type    Angella Dozier, PT Physical Therapist              Therapy Charges for Today     Code Description Service Date Service Provider Modifiers Qty    74015584760 HC PT THER PROC EA 15 MIN 5/24/2023 Angella Carlos, PT GP 1    90670194646 HC GAIT TRAINING EA 15 MIN 5/24/2023 Angella Carlos, PT GP 1          PT G-Codes  Outcome Measure Options: AM-PAC 6 Clicks Basic Mobility  (PT)  AM-PAC 6 Clicks Score (PT): 19  AM-PAC 6 Clicks Score (OT): 16  PT Discharge Summary  Anticipated Discharge Disposition (PT): skilled nursing facility    Angella Carlos, PT  5/24/2023

## 2023-05-24 NOTE — PROGRESS NOTES
[facility] Medicine Services  PROGRESS NOTE    Patient Name: Domitila Bosch  : 1940  MRN: [id number]    Date of Admission: 2023  Primary Care Physician: Marcin Ferguson MD    Subjective   Subjective     CC:  F/u confusion    HPI:   No new issues, resting in chair    ROS:   Gen: no cough  Pulm: no fever  GI: no nausea    Objective   Objective     Vital Signs:   Temp:  [97.5 °F (36.4 °C)-98.5 °F (36.9 °C)] 98.5 °F (36.9 °C)  Heart Rate:  [58-66] 58  Resp:  [14-18] 18  BP: (128-166)/(54-79) 166/54     Physical Exam:   Constitutional - no acute distress, nontoxic, in chair  HEENT-NCAT, mucous membranes moist  CV-RRR  Resp-CTAB  Abd-soft, nontender, nondistended, normoactive bowel sounds  Ext-No lower extremity cyanosis, clubbing or edema bilaterally  Neuro-alert, speech clear, moves all extremities   Psych-normal affect   Skin- No rash on exposed UE or LE bilaterally        Results Reviewed:  LAB RESULTS:          Lab 23  0431 23  0551 23  0647   SODIUM 138 140 140   POTASSIUM 3.7 3.7 4.0   CHLORIDE 105 107 109*   CO2 24.0 24.0 23.0   ANION GAP 9.0 9.0 8.0   BUN 14 10 10   CREATININE 1.31* 1.28* 1.40*   EGFR 54.0* 55.5* 49.9*   GLUCOSE 96 82 95   CALCIUM 8.7 9.0 8.4*                         Brief Urine Lab Results  (Last result in the past 365 days)      Color   Clarity   Blood   Leuk Est   Nitrite   Protein   CREAT   Urine HCG        23 1232 Yellow   Cloudy   Trace   Large (3+)   Negative   Negative                 Microbiology Results Abnormal     Procedure Component Value - Date/Time    Urine Culture - Urine, Urine, Clean Catch [[id number]]  (Normal) Collected: 23 1232    Lab Status: Final result Specimen: Urine, Clean Catch Updated: 23     Urine Culture No growth          No radiology results from the last 24 hrs    Results for orders placed during the hospital encounter of 20    Adult Transesophageal Echo (SUMMER) W/ Cont if  Necessary Per Protocol    Interpretation Summary  · Estimated EF = 65%.  · Left ventricular systolic function is normal.  · Left atrial cavity size is mild-to-moderately dilated.  · There is mild calcification of the aortic valve mainly affecting the non and right coronary cusp(s).  · Saline test results are negative.  · The aortic valve exhibits moderate sclerosis.  · There is no evidence of pericardial effusion.  · There is moderate (grade 2) protruding plaque in the descending aorta present.  · No valvular vegetations demonstrated.      Current medications:  Scheduled Meds:allopurinol, 50 mg, Oral, Daily  amLODIPine, 5 mg, Oral, Daily  aspirin, 81 mg, Oral, Daily  atorvastatin, 80 mg, Oral, Nightly  busPIRone, 15 mg, Oral, Q12H  clopidogrel, 75 mg, Oral, Daily  donepezil, 5 mg, Oral, Nightly  finasteride, 5 mg, Oral, Daily  heparin (porcine), 5,000 Units, Subcutaneous, Q8H  levothyroxine, 175 mcg, Oral, Q AM  melatonin, 5 mg, Oral, Nightly  pantoprazole, 40 mg, Oral, Daily  polyethylene glycol, 17 g, Oral, Daily  QUEtiapine, 25 mg, Oral, BID  sertraline, 100 mg, Oral, Daily  sodium chloride, 10 mL, Intravenous, Q12H  terazosin, 2 mg, Oral, Nightly      Continuous Infusions:   PRN Meds:.•  acetaminophen **OR** acetaminophen **OR** acetaminophen  •  senna-docusate sodium **AND** polyethylene glycol **AND** bisacodyl **AND** bisacodyl  •  Calcium Replacement - Follow Nurse / BPA Driven Protocol  •  hydrOXYzine  •  Magnesium Standard Dose Replacement - Follow Nurse / BPA Driven Protocol  •  ondansetron **OR** ondansetron  •  Phosphorus Replacement - Follow Nurse / BPA Driven Protocol  •  Potassium Replacement - Follow Nurse / BPA Driven Protocol  •  sodium chloride  •  sodium chloride    Assessment & Plan   Assessment & Plan     Active Hospital Problems    Diagnosis  POA   • **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]  Yes   • CKD (chronic kidney disease), stage III [N18.30]  Yes   • Neuropathy [G62.9]  Yes   •  Hypothyroidism [E03.9]  Yes   • Anxiety disorder [F41.9]  Yes      Resolved Hospital Problems   No resolved problems to display.        Brief Hospital Course to date:  Domitila Bosch is a 82 y.o. male with dementia who lives with his daughter.  EMS was called due to increased confusion/agitation at home.  While EMS was at the house, apparently his daughter had a seizure and they were both seen in the ED.  He was found to have a UTI and creatinine elevation beyond baseline and admission was requested for further management.      This patient's problems and plans were partially entered by my partner and updated as appropriate by me 05/24/23.      Dementia with worsening confusion  Anxiety disorder  -Continue namenda  -PRN seroquel, as well as scheduled nightly dose  -TSH elevated as below.  B12, folate WNL  -CT head without acute intracranial process  -Case management following  -Follows with Dr. Hernadez at , last appointment 1/6/23, adjusted med rec based on most recent meds, holding prn xanax     CKD III with elevated creatinine  -S/P gentle IVF  -Stable, baseline probably ~ 1.3-1.4 per chart review  -renally dose allopurinol  -Cr 1.28 on 5/20, back to baseline. Will check bmp in am (stable)     UTI  -Urine culture with no growth  -Due to numerous allergies and prior culture with enterococcus faecalis, started on levaquin  -Abx dc'd as urine cx negative, afebrile, wbc's wnl  -QT normal on EKG  -continue finasteride     Hypothyroidism  -Continue levothyroxine  -TSH is 60.67, free T4 0.42  -levothyroxine dose increased for now  -recheck TSH in 6-8 weeks at PCP follow-up     HTN  HLD  H/o TIA, CVA  -continue home amlodipine, finasteride, terazosin  -per recent med rec from , patient on triamterene-hctz 37.5mg-25mg, not on terazosin  -monitor BP  -continue statin, ASA    Expected Discharge Location and Transportation: SNF rehab, EMS  Expected Discharge pending bed offer  Expected Discharge Date: 5/26/2023; Expected  Discharge Time:      DVT prophylaxis:  Medical DVT prophylaxis orders are present.     AM-PAC 6 Clicks Score (PT): 23 (05/24/23 0800)    CODE STATUS:   Code Status and Medical Interventions:   Ordered at: 05/17/23 9561     Code Status (Patient has no pulse and is not breathing):    CPR (Attempt to Resuscitate)     Medical Interventions (Patient has pulse or is breathing):    Full Support     Comments:    Unable to reach daughter to discuss-will try again later       Chantal Mistry MD  05/24/23

## 2023-05-25 PROCEDURE — 99232 SBSQ HOSP IP/OBS MODERATE 35: CPT | Performed by: STUDENT IN AN ORGANIZED HEALTH CARE EDUCATION/TRAINING PROGRAM

## 2023-05-25 PROCEDURE — 96372 THER/PROPH/DIAG INJ SC/IM: CPT

## 2023-05-25 PROCEDURE — G0378 HOSPITAL OBSERVATION PER HR: HCPCS

## 2023-05-25 PROCEDURE — 25010000002 HEPARIN (PORCINE) PER 1000 UNITS: Performed by: INTERNAL MEDICINE

## 2023-05-25 RX ADMIN — HEPARIN SODIUM 5000 UNITS: 5000 INJECTION, SOLUTION INTRAVENOUS; SUBCUTANEOUS at 05:50

## 2023-05-25 RX ADMIN — BUSPIRONE HYDROCHLORIDE 15 MG: 10 TABLET ORAL at 21:51

## 2023-05-25 RX ADMIN — HYDROXYZINE HYDROCHLORIDE 10 MG: 10 TABLET ORAL at 21:51

## 2023-05-25 RX ADMIN — DONEPEZIL HYDROCHLORIDE 5 MG: 5 TABLET, FILM COATED ORAL at 21:50

## 2023-05-25 RX ADMIN — FINASTERIDE 5 MG: 5 TABLET, FILM COATED ORAL at 09:10

## 2023-05-25 RX ADMIN — HEPARIN SODIUM 5000 UNITS: 5000 INJECTION, SOLUTION INTRAVENOUS; SUBCUTANEOUS at 21:50

## 2023-05-25 RX ADMIN — ALLOPURINOL 50 MG: 100 TABLET ORAL at 09:10

## 2023-05-25 RX ADMIN — Medication 5 MG: at 21:50

## 2023-05-25 RX ADMIN — AMLODIPINE BESYLATE 5 MG: 5 TABLET ORAL at 09:11

## 2023-05-25 RX ADMIN — ASPIRIN 81 MG 81 MG: 81 TABLET ORAL at 09:10

## 2023-05-25 RX ADMIN — PANTOPRAZOLE SODIUM 40 MG: 40 TABLET, DELAYED RELEASE ORAL at 09:00

## 2023-05-25 RX ADMIN — CLOPIDOGREL BISULFATE 75 MG: 75 TABLET ORAL at 09:10

## 2023-05-25 RX ADMIN — LEVOTHYROXINE SODIUM 175 MCG: 175 TABLET ORAL at 05:49

## 2023-05-25 RX ADMIN — BUSPIRONE HYDROCHLORIDE 15 MG: 10 TABLET ORAL at 09:10

## 2023-05-25 RX ADMIN — QUETIAPINE FUMARATE 25 MG: 25 TABLET ORAL at 17:17

## 2023-05-25 RX ADMIN — QUETIAPINE FUMARATE 25 MG: 25 TABLET ORAL at 09:11

## 2023-05-25 RX ADMIN — HEPARIN SODIUM 5000 UNITS: 5000 INJECTION, SOLUTION INTRAVENOUS; SUBCUTANEOUS at 16:30

## 2023-05-25 RX ADMIN — ATORVASTATIN CALCIUM 80 MG: 40 TABLET, FILM COATED ORAL at 21:51

## 2023-05-25 RX ADMIN — SERTRALINE HYDROCHLORIDE 100 MG: 100 TABLET ORAL at 09:11

## 2023-05-25 NOTE — PLAN OF CARE
Goal Outcome Evaluation:  Plan of Care Reviewed With: patient        Progress: no change           Stand-by assist. Ambulation to bathroom multiple times today. Daughter spoke to pt via phone. Daughter had called this RN but there was no answer when this RN tried to call daughter. Awaiting further orders.

## 2023-05-25 NOTE — PLAN OF CARE
Goal Outcome Evaluation:  Plan of Care Reviewed With: patient        Progress: improving              Patient pleasant throughout night; confused at times repeating himself often. VSS on room air. Standby assistance to restroom-- brief applied for occasional dribbling but no incontinence episodes noted. Awaiting d/c plans.

## 2023-05-25 NOTE — PROGRESS NOTES
[facility] Medicine Services  PROGRESS NOTE    Patient Name: Domitila Bosch  : 1940  MRN: [id number]    Date of Admission: 2023  Primary Care Physician: Marcin Ferguson MD    Subjective   Subjective     CC:  F/u confusion    HPI:   No acute overnight events, waiting on lunch    ROS:   Gen: no cough  Pulm: no fever  GI: no nausea    Objective   Objective     Vital Signs:   Temp:  [97.8 °F (36.6 °C)-98.2 °F (36.8 °C)] 98.2 °F (36.8 °C)  Heart Rate:  [55-59] 57  Resp:  [14-18] 14  BP: (109-135)/(60-71) 118/66     Physical Exam:   Constitutional - no acute distress, nontoxic, in chair  HEENT-NCAT, mucous membranes moist  CV-RRR  Resp-CTAB  Abd-soft, nontender, nondistended, normoactive bowel sounds  Ext-No lower extremity cyanosis, clubbing or edema bilaterally  Neuro-alert, speech clear, moves all extremities   Psych-normal affect   Skin- No rash on exposed UE or LE bilaterally        Results Reviewed:  LAB RESULTS:          Lab 23  0431 23  0551   SODIUM 138 140   POTASSIUM 3.7 3.7   CHLORIDE 105 107   CO2 24.0 24.0   ANION GAP 9.0 9.0   BUN 14 10   CREATININE 1.31* 1.28*   EGFR 54.0* 55.5*   GLUCOSE 96 82   CALCIUM 8.7 9.0                         Brief Urine Lab Results  (Last result in the past 365 days)      Color   Clarity   Blood   Leuk Est   Nitrite   Protein   CREAT   Urine HCG        23 1232 Yellow   Cloudy   Trace   Large (3+)   Negative   Negative                 Microbiology Results Abnormal     Procedure Component Value - Date/Time    Urine Culture - Urine, Urine, Clean Catch [[id number]]  (Normal) Collected: 23 1232    Lab Status: Final result Specimen: Urine, Clean Catch Updated: 23     Urine Culture No growth          No radiology results from the last 24 hrs    Results for orders placed during the hospital encounter of 20    Adult Transesophageal Echo (SUMMER) W/ Cont if Necessary Per Protocol    Interpretation Summary  ·  Estimated EF = 65%.  · Left ventricular systolic function is normal.  · Left atrial cavity size is mild-to-moderately dilated.  · There is mild calcification of the aortic valve mainly affecting the non and right coronary cusp(s).  · Saline test results are negative.  · The aortic valve exhibits moderate sclerosis.  · There is no evidence of pericardial effusion.  · There is moderate (grade 2) protruding plaque in the descending aorta present.  · No valvular vegetations demonstrated.      Current medications:  Scheduled Meds:allopurinol, 50 mg, Oral, Daily  amLODIPine, 5 mg, Oral, Daily  aspirin, 81 mg, Oral, Daily  atorvastatin, 80 mg, Oral, Nightly  busPIRone, 15 mg, Oral, Q12H  clopidogrel, 75 mg, Oral, Daily  donepezil, 5 mg, Oral, Nightly  finasteride, 5 mg, Oral, Daily  heparin (porcine), 5,000 Units, Subcutaneous, Q8H  levothyroxine, 175 mcg, Oral, Q AM  melatonin, 5 mg, Oral, Nightly  pantoprazole, 40 mg, Oral, Daily  polyethylene glycol, 17 g, Oral, Daily  QUEtiapine, 25 mg, Oral, BID  sertraline, 100 mg, Oral, Daily  sodium chloride, 10 mL, Intravenous, Q12H  terazosin, 2 mg, Oral, Nightly      Continuous Infusions:   PRN Meds:.•  acetaminophen **OR** acetaminophen **OR** acetaminophen  •  artificial tears  •  senna-docusate sodium **AND** polyethylene glycol **AND** bisacodyl **AND** bisacodyl  •  Calcium Replacement - Follow Nurse / BPA Driven Protocol  •  hydrOXYzine  •  Magnesium Standard Dose Replacement - Follow Nurse / BPA Driven Protocol  •  ondansetron **OR** ondansetron  •  Phosphorus Replacement - Follow Nurse / BPA Driven Protocol  •  Potassium Replacement - Follow Nurse / BPA Driven Protocol  •  sodium chloride  •  sodium chloride    Assessment & Plan   Assessment & Plan     Active Hospital Problems    Diagnosis  POA   • **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        Brief Hospital Course to date:  Domitila Bosch is a 82 y.o. male with dementia who lives with his daughter.  EMS was called due to increased confusion/agitation at home.  While EMS was at the house, apparently his daughter had a seizure and they were both seen in the ED.  He was found to have a UTI and creatinine elevation beyond baseline and admission was requested for further management.      This patient's problems and plans were partially entered by my partner and updated as appropriate by me 05/25/23.      Dementia with worsening confusion  Anxiety disorder  -Continue namenda  -PRN seroquel, as well as scheduled nightly dose  -TSH elevated as below.  B12, folate WNL  -CT head without acute intracranial process  -Case management following  -Follows with Dr. Hernadez at , last appointment 1/6/23, adjusted med rec based on most recent meds, holding prn xanax     CKD III with elevated creatinine  -S/P gentle IVF  -Stable, baseline probably ~ 1.3-1.4 per chart review  -renally dose allopurinol  -cr back to baseline, will check in am again     UTI  -Urine culture with no growth  -Due to numerous allergies and prior culture with enterococcus faecalis, started on levaquin  -Abx dc'd as urine cx negative, afebrile, wbc's wnl  -QT normal on EKG  -continue finasteride     Hypothyroidism  -Continue levothyroxine  -TSH is 60.67, free T4 0.42  -levothyroxine dose increased for now  -recheck TSH in 6-8 weeks at PCP follow-up     HTN  HLD  H/o TIA, CVA  -continue home amlodipine, finasteride, terazosin  -per recent med rec from , patient on triamterene-hctz 37.5mg-25mg, not on terazosin  -monitor BP  -continue statin, ASA    Expected Discharge Location and Transportation: SNF rehab, EMS  Expected Discharge pending bed offer  Expected Discharge Date: 5/26/2023; Expected Discharge Time:      DVT prophylaxis:  Medical DVT prophylaxis  orders are present.     AM-PAC 6 Clicks Score (PT): 21 (05/24/23 2000)    CODE STATUS:   Code Status and Medical Interventions:   Ordered at: 05/17/23 8956     Code Status (Patient has no pulse and is not breathing):    CPR (Attempt to Resuscitate)     Medical Interventions (Patient has pulse or is breathing):    Full Support     Comments:    Unable to reach daughter to discuss-will try again later       Chantal Mistry MD  05/25/23

## 2023-05-25 NOTE — CASE MANAGEMENT/SOCIAL WORK
Continued Stay Note  [facility]     Patient Name: Domitila Bosch  MRN: [id number]  Today's Date: 5/25/2023    Admit Date: 5/17/2023    Plan: Long Term Care (LTC)   Discharge Plan     Row Name 05/25/23 2895       Plan    Plan Comments Case Management continuing to look for LTC/ bed.  [facility] agitation improved since changing Seroquel from PRN to scheduled.  Update RN and discussed in unit multidisciplinary rounds this morning.  Susan Hardwick, Ext. [phone]    Final Discharge Disposition Code 04 - intermediate care facility               Discharge Codes    No documentation.               Expected Discharge Date and Time     Expected Discharge Date Expected Discharge Time    May 30, 2023             JACK Johansen

## 2023-05-26 LAB
ANION GAP SERPL CALCULATED.3IONS-SCNC: 10 MMOL/L (ref 5–15)
BUN SERPL-MCNC: 15 MG/DL (ref 8–23)
BUN/CREAT SERPL: 12.9 (ref 7–25)
CALCIUM SPEC-SCNC: 9.3 MG/DL (ref 8.6–10.5)
CHLORIDE SERPL-SCNC: 104 MMOL/L (ref 98–107)
CO2 SERPL-SCNC: 25 MMOL/L (ref 22–29)
CREAT SERPL-MCNC: 1.16 MG/DL (ref 0.76–1.27)
EGFRCR SERPLBLD CKD-EPI 2021: 62.5 ML/MIN/1.73
GLUCOSE SERPL-MCNC: 118 MG/DL (ref 65–99)
POTASSIUM SERPL-SCNC: 4 MMOL/L (ref 3.5–5.2)
SODIUM SERPL-SCNC: 139 MMOL/L (ref 136–145)

## 2023-05-26 PROCEDURE — G0378 HOSPITAL OBSERVATION PER HR: HCPCS

## 2023-05-26 PROCEDURE — 97116 GAIT TRAINING THERAPY: CPT

## 2023-05-26 PROCEDURE — 99232 SBSQ HOSP IP/OBS MODERATE 35: CPT | Performed by: STUDENT IN AN ORGANIZED HEALTH CARE EDUCATION/TRAINING PROGRAM

## 2023-05-26 PROCEDURE — 97110 THERAPEUTIC EXERCISES: CPT

## 2023-05-26 PROCEDURE — 80048 BASIC METABOLIC PNL TOTAL CA: CPT | Performed by: STUDENT IN AN ORGANIZED HEALTH CARE EDUCATION/TRAINING PROGRAM

## 2023-05-26 PROCEDURE — 96372 THER/PROPH/DIAG INJ SC/IM: CPT

## 2023-05-26 PROCEDURE — 25010000002 HEPARIN (PORCINE) PER 1000 UNITS: Performed by: INTERNAL MEDICINE

## 2023-05-26 RX ADMIN — QUETIAPINE FUMARATE 25 MG: 25 TABLET ORAL at 17:03

## 2023-05-26 RX ADMIN — QUETIAPINE FUMARATE 25 MG: 25 TABLET ORAL at 09:37

## 2023-05-26 RX ADMIN — HEPARIN SODIUM 5000 UNITS: 5000 INJECTION, SOLUTION INTRAVENOUS; SUBCUTANEOUS at 22:29

## 2023-05-26 RX ADMIN — TERAZOSIN HYDROCHLORIDE 2 MG: 2 CAPSULE ORAL at 20:01

## 2023-05-26 RX ADMIN — POLYETHYLENE GLYCOL 3350 17 G: 17 POWDER, FOR SOLUTION ORAL at 09:37

## 2023-05-26 RX ADMIN — Medication 5 MG: at 20:01

## 2023-05-26 RX ADMIN — BUSPIRONE HYDROCHLORIDE 15 MG: 10 TABLET ORAL at 20:01

## 2023-05-26 RX ADMIN — SERTRALINE HYDROCHLORIDE 100 MG: 100 TABLET ORAL at 09:37

## 2023-05-26 RX ADMIN — ASPIRIN 81 MG 81 MG: 81 TABLET ORAL at 09:37

## 2023-05-26 RX ADMIN — HEPARIN SODIUM 5000 UNITS: 5000 INJECTION, SOLUTION INTRAVENOUS; SUBCUTANEOUS at 16:10

## 2023-05-26 RX ADMIN — AMLODIPINE BESYLATE 5 MG: 5 TABLET ORAL at 09:37

## 2023-05-26 RX ADMIN — HYDROXYZINE HYDROCHLORIDE 10 MG: 10 TABLET ORAL at 19:53

## 2023-05-26 RX ADMIN — HEPARIN SODIUM 5000 UNITS: 5000 INJECTION, SOLUTION INTRAVENOUS; SUBCUTANEOUS at 06:21

## 2023-05-26 RX ADMIN — LEVOTHYROXINE SODIUM 175 MCG: 175 TABLET ORAL at 06:21

## 2023-05-26 RX ADMIN — FINASTERIDE 5 MG: 5 TABLET, FILM COATED ORAL at 09:37

## 2023-05-26 RX ADMIN — BUSPIRONE HYDROCHLORIDE 15 MG: 10 TABLET ORAL at 09:37

## 2023-05-26 RX ADMIN — ALLOPURINOL 50 MG: 100 TABLET ORAL at 09:37

## 2023-05-26 RX ADMIN — DONEPEZIL HYDROCHLORIDE 5 MG: 5 TABLET, FILM COATED ORAL at 20:00

## 2023-05-26 RX ADMIN — ATORVASTATIN CALCIUM 80 MG: 40 TABLET, FILM COATED ORAL at 20:00

## 2023-05-26 RX ADMIN — PANTOPRAZOLE SODIUM 40 MG: 40 TABLET, DELAYED RELEASE ORAL at 09:37

## 2023-05-26 RX ADMIN — CLOPIDOGREL BISULFATE 75 MG: 75 TABLET ORAL at 09:37

## 2023-05-26 NOTE — PLAN OF CARE
Goal Outcome Evaluation:  Plan of Care Reviewed With: patient        Progress: declining  Outcome Evaluation: pt. was very confused today, unable to redirect, repeatedly asks to go home, pleasant, frequently gets out of bed, bed alarm on, awaiting placement

## 2023-05-26 NOTE — CASE MANAGEMENT/SOCIAL WORK
Continued Stay Note  [facility]     Patient Name: Domitila Bosch  MRN: [id number]  Today's Date: 5/26/2023    Admit Date: 5/17/2023    Plan: Long Term Care (LTC)   Discharge Plan     Row Name 05/26/23 1320       Plan    Plan Long Term Care (LTC)    Plan Comments SW continues to seek bed offer for LT Medicaid bed.  No bed offers at this time.  Pt's Seroquel has been adjusted to scheduled.  SW has faxed referrals to [facility] and [facility] in effort to find long term care.    Final Discharge Disposition Code 04 - intermediate care facility               Discharge Codes    No documentation.               Expected Discharge Date and Time     Expected Discharge Date Expected Discharge Time    May 30, 2023             JACK Fisher

## 2023-05-26 NOTE — PROGRESS NOTES
[facility] Medicine Services  PROGRESS NOTE    Patient Name: Domitila Bosch  : 1940  MRN: [id number]    Date of Admission: 2023  Primary Care Physician: Marcin Ferguson MD    Subjective   Subjective     CC:  F/u confusion    HPI:   Eating ok, no new issues. pleasant    ROS:   Gen: no cough  Pulm: no fever  GI: no nausea    Objective   Objective     Vital Signs:   Temp:  [97.2 °F (36.2 °C)] 97.2 °F (36.2 °C)  Heart Rate:  [56-64] 57  Resp:  [18] 18  BP: (123-132)/(67-78) 123/70     Physical Exam:   Constitutional - no acute distress, nontoxic, in chair  HEENT-NCAT, mucous membranes moist  CV-RRR  Resp-CTAB  Abd-soft, nontender, nondistended, normoactive bowel sounds  Ext-No lower extremity cyanosis, clubbing or edema bilaterally  Neuro-alert, speech clear, moves all extremities   Psych-normal affect   Skin- No rash on exposed UE or LE bilaterally        Results Reviewed:  LAB RESULTS:          Lab 23  0912 23  0431 23  0551   SODIUM 139 138 140   POTASSIUM 4.0 3.7 3.7   CHLORIDE 104 105 107   CO2 25.0 24.0 24.0   ANION GAP 10.0 9.0 9.0   BUN 15 14 10   CREATININE 1.16 1.31* 1.28*   EGFR 62.5 54.0* 55.5*   GLUCOSE 118* 96 82   CALCIUM 9.3 8.7 9.0                         Brief Urine Lab Results  (Last result in the past 365 days)      Color   Clarity   Blood   Leuk Est   Nitrite   Protein   CREAT   Urine HCG        23 1232 Yellow   Cloudy   Trace   Large (3+)   Negative   Negative                 Microbiology Results Abnormal     Procedure Component Value - Date/Time    Urine Culture - Urine, Urine, Clean Catch [[id number]]  (Normal) Collected: 232    Lab Status: Final result Specimen: Urine, Clean Catch Updated: 23     Urine Culture No growth          No radiology results from the last 24 hrs    Results for orders placed during the hospital encounter of 20    Adult Transesophageal Echo (SUMMER) W/ Cont if Necessary Per  Protocol    Interpretation Summary  · Estimated EF = 65%.  · Left ventricular systolic function is normal.  · Left atrial cavity size is mild-to-moderately dilated.  · There is mild calcification of the aortic valve mainly affecting the non and right coronary cusp(s).  · Saline test results are negative.  · The aortic valve exhibits moderate sclerosis.  · There is no evidence of pericardial effusion.  · There is moderate (grade 2) protruding plaque in the descending aorta present.  · No valvular vegetations demonstrated.      Current medications:  Scheduled Meds:allopurinol, 50 mg, Oral, Daily  amLODIPine, 5 mg, Oral, Daily  aspirin, 81 mg, Oral, Daily  atorvastatin, 80 mg, Oral, Nightly  busPIRone, 15 mg, Oral, Q12H  clopidogrel, 75 mg, Oral, Daily  donepezil, 5 mg, Oral, Nightly  finasteride, 5 mg, Oral, Daily  heparin (porcine), 5,000 Units, Subcutaneous, Q8H  levothyroxine, 175 mcg, Oral, Q AM  melatonin, 5 mg, Oral, Nightly  pantoprazole, 40 mg, Oral, Daily  polyethylene glycol, 17 g, Oral, Daily  QUEtiapine, 25 mg, Oral, BID  sertraline, 100 mg, Oral, Daily  terazosin, 2 mg, Oral, Nightly      Continuous Infusions:   PRN Meds:.•  acetaminophen **OR** acetaminophen **OR** acetaminophen  •  artificial tears  •  senna-docusate sodium **AND** polyethylene glycol **AND** bisacodyl **AND** bisacodyl  •  Calcium Replacement - Follow Nurse / BPA Driven Protocol  •  hydrOXYzine  •  Magnesium Standard Dose Replacement - Follow Nurse / BPA Driven Protocol  •  ondansetron **OR** ondansetron  •  Phosphorus Replacement - Follow Nurse / BPA Driven Protocol  •  Potassium Replacement - Follow Nurse / BPA Driven Protocol    Assessment & Plan   Assessment & Plan     Active Hospital Problems    Diagnosis  POA   • **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        Brief Hospital Course to date:  Domitila Bosch is a 82 y.o. male with dementia who lives with his daughter.  EMS was called due to increased confusion/agitation at home.  While EMS was at the house, apparently his daughter had a seizure and they were both seen in the ED.  He was found to have a UTI and creatinine elevation beyond baseline and admission was requested for further management.      This patient's problems and plans were partially entered by my partner and updated as appropriate by me 05/26/23.      Dementia with worsening confusion  Anxiety disorder  -Continue namenda  -PRN seroquel, as well as scheduled nightly dose  -TSH elevated as below.  B12, folate WNL  -CT head without acute intracranial process  -Case management following  -Follows with Dr. Hernadez at , last appointment 1/6/23, adjusted med rec based on most recent meds, holding prn xanax     CKD III with elevated creatinine  -S/P gentle IVF  -Stable, baseline probably ~ 1.3-1.4 per chart review  -renally dose allopurinol  -cr back to baseline, continue to monitor periodically     UTI  -Urine culture with no growth  -Due to numerous allergies and prior culture with enterococcus faecalis, started on levaquin  -Abx dc'd as urine cx negative, afebrile, wbc's wnl  -QT normal on EKG  -continue finasteride     Hypothyroidism  -Continue levothyroxine  -TSH is 60.67, free T4 0.42  -levothyroxine dose increased for now  -recheck TSH in 6-8 weeks at PCP follow-up     HTN  HLD  H/o TIA, CVA  -continue home amlodipine, finasteride, terazosin  -per recent med rec from , patient on triamterene-hctz 37.5mg-25mg, not on terazosin  -monitor BP  -continue statin, ASA    Expected Discharge Location and Transportation: SNF rehab, EMS  Expected Discharge pending bed offer  Expected Discharge Date: 5/30/2023; Expected Discharge Time:      DVT prophylaxis:  Medical DVT prophylaxis orders are present.     AM-PAC 6  Clicks Score (PT): 22 (05/26/23 0800)    CODE STATUS:   Code Status and Medical Interventions:   Ordered at: 05/17/23 1824     Code Status (Patient has no pulse and is not breathing):    CPR (Attempt to Resuscitate)     Medical Interventions (Patient has pulse or is breathing):    Full Support     Comments:    Unable to reach daughter to discuss-will try again later       Chantal Mistry MD  05/26/23

## 2023-05-26 NOTE — THERAPY TREATMENT NOTE
Patient Name: Domitila Bosch  : 1940    MRN: [id number]                              Today's Date: 2023       Admit Date: 2023    Visit Dx:     ICD-10-CM ICD-9-CM   1. Rapidly progressive dementia  F03.90 294.20   2. Agitation  R45.1 307.9   3. Acute on chronic alteration in mental status  R41.82 780.09   4. Elevated blood pressure reading with diagnosis of hypertension  I10 401.9   5. DANIS (acute kidney injury)  N17.9 584.9     Patient Active Problem List   Diagnosis   • Diverticulosis of large intestine with hemorrhage   • Umbilical hernia   • S/P hernia repair   • Hypertension   • Dyslipidemia   • Carotid stenosis   • Gout   • GERD (gastroesophageal reflux disease)   • Hypothyroidism   • Neuropathy   • Malignant melanoma   • Asthma   • Anxiety disorder   • Diverticulosis   • Muscle pain   • Lumbar radiculopathy   • Kidney stone   • Deviated nasal septum   • Chronic laryngitis   • Acute CVA (cerebrovascular accident)   • Essential hypertension   • Allergy to Betadine   • DANIS (acute kidney injury)   • Severe hypothyroidism   • History of ischemic stroke   • Rapidly progressive dementia   • CKD (chronic kidney disease), stage III     Past Medical History:   Diagnosis Date   • Anxiety disorder 2017   • Asthma 2017   • Basal cell carcinoma in situ of skin 2019    right leg    • Carotid stenosis 2017   • Dementia    • Diaphoresis    • Diastolic dysfunction    • Diverticulitis    • Dyslipidemia 2017   • GERD (gastroesophageal reflux disease) 2017   • Gout 2017   • Herpes zoster     Herpes zoster, [id number], right lower extremity.    • Hyperlipidemia    • Hypertension 2017   • Hypothyroidism 2017   • LVH (left ventricular hypertrophy)    • Malignant melanoma 2017   • Melanoma    • Mitral regurgitation    • Nephrolithiasis    • Post herpetic neuralgia 2017   • TIA (transient ischemic attack)     TIA, 2012, with nonobstructive carotid  stenosis, dyslipidemia and hypertension     Past Surgical History:   Procedure Laterality Date   • ABDOMINAL SURGERY     • APPENDECTOMY     • COLON RESECTION LEFT  2016   • COLON RESECTION LEFT  2016   • CYST REMOVAL      left side of neck.    • HEEL SPUR SURGERY  1999   • HERNIA REPAIR      hiatal hernia    • NISSEN FUNDOPLICATION  1984   • OTHER SURGICAL HISTORY  2010    Malignant melanoma, status post resection       General Information     Row Name 05/26/23 1510          Physical Therapy Time and Intention    Document Type therapy note (daily note)  -NS     Mode of Treatment individual therapy;physical therapy  -NS     Row Name 05/26/23 1510          General Information    Patient Profile Reviewed yes  -NS     Existing Precautions/Restrictions fall;other (see comments)  confusion; brief on with OOB activity  -NS     Row Name 05/26/23 1510          Cognition    Orientation Status (Cognition) oriented to;person;disoriented to;place;situation;time  -NS     Row Name 05/26/23 1510          Safety Issues, Functional Mobility    Safety Issues Affecting Function (Mobility) insight into deficits/self-awareness;judgment;positioning of assistive device;problem-solving;safety precaution awareness;safety precautions follow-through/compliance;sequencing abilities;awareness of need for assistance  -NS     Impairments Affecting Function (Mobility) balance;cognition;coordination;endurance/activity tolerance;strength;postural/trunk control  -NS     Cognitive Impairments, Mobility Safety/Performance problem-solving/reasoning;safety precaution awareness;safety precaution follow-through;sequencing abilities;judgment  -NS           User Key  (r) = Recorded By, (t) = Taken By, (c) = Cosigned By    Initials Name Provider Type    NS Elizabeth Perez PT Physical Therapist               Mobility     Row Name 05/26/23 1510          Bed Mobility    Bed Mobility supine-sit  -NS     Supine-Sit [place] (Bed Mobility) standby assist  -NS      Assistive Device (Bed Mobility) bed rails;head of bed elevated  -NS     [facility] Name 05/26/23 1510          Transfers    Comment, (Transfers) VCs for hand placement, lining up to chair prior to sitting, and slow descent. Pt completed multiple STS from commode and EOB for donning of brief.  -NS     Row Name 05/26/23 1510          Sit-Stand Transfer    Sit-Stand [place] (Transfers) contact guard;verbal cues  -NS     Assistive Device (Sit-Stand Transfers) walker, front-wheeled  -[facility] Name 05/26/23 1510          Gait/Stairs (Locomotion)    [place] Level (Gait) minimum assist (75% patient effort);verbal cues  -NS     Assistive Device (Gait) walker, front-wheeled  -NS     Distance in Feet (Gait) 8+8+75  -NS     Deviations/Abnormal Patterns (Gait) base of support, narrow;rody decreased;gait speed decreased;stride length decreased  -NS     Bilateral Gait Deviations forward flexed posture;heel strike decreased  -NS     Comment, (Gait/Stairs) Patient ambulated to the restroom then into hallway, demonstrating flexed posture and requiring frequent cues for staying close to RW. Pt able to correct briefly but unable to maintain. Pt progressed from Min A to CGA.  -NS           User Key  (r) = Recorded By, (t) = Taken By, (c) = Cosigned By    Initials Name Provider Type    Elizabeth Encarnacion, PT Physical Therapist               Obj/Interventions     [facility] Name 05/26/23 1510          Motor Skills    Therapeutic Exercise hip;knee;ankle  -NS     Row Name 05/26/23 1510          Hip (Therapeutic Exercise)    Hip (Therapeutic Exercise) strengthening exercise  -NS     Hip Strengthening (Therapeutic Exercise) bilateral;marching while seated;marching while standing;flexion;standing;10 repetitions  -[facility] Name 05/26/23 1510          Knee (Therapeutic Exercise)    Knee (Therapeutic Exercise) strengthening exercise  -NS     Knee Strengthening (Therapeutic Exercise) bilateral;LAQ (long arc quad);10 repetitions  -[facility] Name  05/26/23 1510          Ankle (Therapeutic Exercise)    Ankle Strengthening (Therapeutic Exercise) bilateral;plantarflexion;standing;5 repetitions  -NS     Row Name 05/26/23 1510          Balance    Balance Assessment sitting static balance;sitting dynamic balance;standing static balance;standing dynamic balance  -NS     Static Sitting Balance standby assist  -NS     Dynamic Sitting Balance standby assist  -NS     Position, Sitting Balance unsupported;sitting edge of bed  -NS     Static Standing Balance contact guard  -NS     Dynamic Standing Balance minimal assist  -NS     Position/Device Used, Standing Balance supported;walker, front-wheeled  -NS     Comment, Balance standing ther ex to faclitate strengthening and challenge balance. Min A needed for standing ther ex.  -NS           User Key  (r) = Recorded By, (t) = Taken By, (c) = Cosigned By    Initials Name Provider Type    Elizabeth Encarnacion, PT Physical Therapist               Goals/Plan    No documentation.                Clinical Impression     Row Name 05/26/23 1510          Pain    Pretreatment Pain Rating 0/10 - no pain  -NS     Posttreatment Pain Rating 0/10 - no pain  -NS     Row Name 05/26/23 1510          Plan of Care Review    Plan of Care Reviewed With patient  -NS     Progress no change  -NS     Outcome Evaluation Patient continues to require cues for staying close to RW for safety during ambulation. He gave good effort towards standing and seated ther ex, needing Min A to maintain balance for standing exercises. Will continue per PT POC. Recommend SNF at d/c.  -NS     Row Name 05/26/23 1510          Vital Signs    Pre Systolic BP Rehab --  no tele- RN cleared for PT  -NS     Pre Patient Position Supine  -NS     Intra Patient Position Standing  -NS     Post Patient Position Sitting  -NS     Row Name 05/26/23 1510          Positioning and Restraints    Pre-Treatment Position in bed  -NS     Post Treatment Position chair  -NS     In Chair notified  nsg;reclined;call light within reach;encouraged to call for assist;exit alarm on;legs elevated  -NS           User Key  (r) = Recorded By, (t) = Taken By, (c) = Cosigned By    Initials Name Provider Type    Elizabeth Encarnacion, PT Physical Therapist               Outcome Measures     Row Name 05/26/23 1510 05/26/23 0800       How much help from another person do you currently need...    Turning from your back to your side while in flat bed without using bedrails? 4  -NS 4  -KF    Moving from lying on back to sitting on the side of a flat bed without bedrails? 3  -NS 4  -KF    Moving to and from a bed to a chair (including a wheelchair)? 3  -NS 4  -KF    Standing up from a chair using your arms (e.g., wheelchair, bedside chair)? 3  -NS 4  -KF    Climbing 3-5 steps with a railing? 3  -NS 3  -KF    To walk in hospital room? 3  -NS 3  -KF    AM-PAC 6 Clicks Score (PT) 19  -NS 22  -KF    Highest level of mobility 6 --> Walked 10 steps or more  -NS 7 --> Walked 25 feet or more  -KF    Row Name 05/26/23 1510          Functional Assessment    Outcome Measure Options AM-PAC 6 Clicks Basic Mobility (PT)  -NS           User Key  (r) = Recorded By, (t) = Taken By, (c) = Cosigned By    Initials Name Provider Type    Elizabeth Encarnacion, JANNETH Physical Therapist    Radha Morris RN Registered Nurse                             Physical Therapy Education     Title: PT OT SLP Therapies (In Progress)     Topic: Physical Therapy (Done)     Point: Mobility training (Done)     Learning Progress Summary           Patient Acceptance, E, VU,NR by NS at 5/26/2023 1619    Acceptance, E, VU,DU,NR by  at 5/22/2023 1428    Acceptance, E,TB, VU,NR by  at 5/20/2023 0020    Acceptance, E,TB, VU,NR by  at 5/19/2023 0019    Eager, E,D, NR by  at 5/18/2023 1528                   Point: Home exercise program (Done)     Learning Progress Summary           Patient Acceptance, E, VU,NR by NS at 5/26/2023 1619    Acceptance, E, VU,DU,NR by  at  5/22/2023 1428    Acceptance, E,TB, VU,NR by  at 5/20/2023 0020    Acceptance, E,TB, VU,NR by  at 5/19/2023 0019    Eager, E,D, NR by  at 5/18/2023 1528                   Point: Body mechanics (Done)     Learning Progress Summary           Patient Acceptance, E, VU,NR by NS at 5/26/2023 1619    Acceptance, E,TB, VU,NR by  at 5/20/2023 0020    Acceptance, E,TB, VU,NR by  at 5/19/2023 0019    Eager, E,D, NR by DM at 5/18/2023 1528                   Point: Precautions (Done)     Learning Progress Summary           Patient Acceptance, E, VU,NR by NS at 5/26/2023 1619    Acceptance, E, VU,DU,NR by  at 5/22/2023 1428    Acceptance, E,TB, VU,NR by  at 5/20/2023 0020    Acceptance, E,TB, VU,NR by  at 5/19/2023 0019    Eager, E,D, NR by DM at 5/18/2023 1528                               User Key     Initials Effective Dates Name Provider Type Discipline     02/03/23 -  Geeta Perez, PT Physical Therapist PT    NS 06/16/21 -  Elizabeth Perez, PT Physical Therapist PT     08/12/20 -  Kavya Matamoros, RN Registered Nurse Nurse     04/22/21 -  Kala Youngblood Physical Therapist PT              PT Recommendation and Plan     Plan of Care Reviewed With: patient  Progress: no change  Outcome Evaluation: Patient continues to require cues for staying close to RW for safety during ambulation. He gave good effort towards standing and seated ther ex, needing Min A to maintain balance for standing exercises. Will continue per PT POC. Recommend SNF at d/c.     Time Calculation:    PT Charges     Row Name 05/26/23 1510             Time Calculation    Start Time 1510  -NS      PT Received On 05/26/23  -NS      PT Goal Re-Cert Due Date 05/28/23  -NS         Timed Charges    70119 - PT Therapeutic Exercise Minutes 11  -NS      35375 - Gait Training Minutes  9  -NS      50153 - PT Therapeutic Activity Minutes 5  -NS         Total Minutes    Timed Charges Total Minutes 25  -NS       Total Minutes 25  -NS            User  Key  (r) = Recorded By, (t) = Taken By, (c) = Cosigned By    Initials Name Provider Type    Elizabeth Encarnacion, PT Physical Therapist              Therapy Charges for Today     Code Description Service Date Service Provider Modifiers Qty    48826859738 HC GAIT TRAINING EA 15 MIN 5/26/2023 Elizabeth Perez, PT GP 1    [id number] HC PT THER PROC EA 15 MIN 5/26/2023 Elizabeth Perez, PT GP 1          PT G-Codes  Outcome Measure Options: AM-PAC 6 Clicks Basic Mobility (PT)  AM-PAC 6 Clicks Score (PT): 19  AM-PAC 6 Clicks Score (OT): 16  PT Discharge Summary  Anticipated Discharge Disposition (PT): skilled nursing facility    Elizabeth Perez PT  5/26/2023

## 2023-05-26 NOTE — DISCHARGE PLACEMENT REQUEST
"Domitila Andujar (82 y.o. Male)     Please consider for long term care- Medicaid pending.    [facility] contact is Keiko CESPEDES @ [phone] or Susan TO at [phone]      Date of Birth   1940    Social Security Number       Address   [address] [facility] [address]    Home Phone   [phone]    MRN   [id number]       Jehovah's witness   Scientology    Marital Status                               Admission Date   5/17/23    Admission Type   Emergency    Admitting Provider   Chantal Mistry MD    Attending Provider   Chantal Mistry MD    Department, Room/Bed   [facility] 5B, N536/1       Discharge Date       Discharge Disposition       Discharge Destination                               Attending Provider: Chantal Mistry MD    Allergies: Cephalexin, Lisinopril, Sulfamethoxazole-trimethoprim, Betadine [Povidone Iodine], Iodine, Flomax [Tamsulosin Hcl], Penicillins, Pseudoephedrine    Isolation: None   Infection: None   Code Status: CPR    Ht: 182.9 cm (72\")   Wt: 72.8 kg (160 lb 9.6 oz)    Admission Cmt: None   Principal Problem: 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]                 Active Insurance as of 5/17/2023     Primary Coverage     Payor Plan Insurance Group Employer/Plan Group    MEDICARE MEDICARE A & B      Payor Plan Address Payor Plan Phone Number Payor Plan Fax Number Effective Dates    [address] [phone]  8/1/2004 - None Entered    [facility] [address]       Subscriber Name Subscriber Birth Date Member ID       DOMITILA ANDUJAR 1940 [id number]           Secondary Coverage     Payor Plan Insurance Group Employer/Plan Group    [employer] [place] [employer] [place]      Payor Plan Address Payor Plan Phone Number Payor Plan Fax Number Effective Dates    [address] [place] [address] [phone]  1/1/2011 - None Entered    [place] [address]       Subscriber Name Subscriber Birth Date Member ID       DOMITILA ANDUJAR 1940 [id number]                 Emergency Contacts     Contact " Person (Rel.) Home Phone Work Phone Mobile Phone    MARIA DEL ROSARIO ANDUJAR (Daughter) -- -- [phone]    Gogo James (Relative) [phone] -- --               History & Physical      Susan Rodriguez MD at [address] [facility]              [facility] Medicine Services  HISTORY AND PHYSICAL    Patient Name: Domitila Andujar  : 1940  MRN: [id number]  Primary Care Physician: Marcin Ferguson MD  Date of admission: 2023      Subjective   Subjective     Chief Complaint: confusion      HPI:  Domitlia Andujar is a 82 y.o. male with dementia who lives with his daughter.  EMS was called due to increased confusion/agitation at home.  While EMS was at the house apparently his daughter had a seizure and they were both seen in the ED.  He was found to have a UTI and creatinine elevation beyond baseline and admission was requested for further management.  History is limited due to patient's dementia.  Nephew at bedside provides some history but unsure of all details.      Review of Systems   Gen- No fevers, chills  CV- No chest pain, palpitations  Resp- No cough, dyspnea  GI- No N/V/D, abd pain    ROS not reliable due to dementia      Personal History     Past Medical History:   Diagnosis Date   • Anxiety disorder 2017   • Asthma 2017   • Basal cell carcinoma in situ of skin 2019    right leg    • Carotid stenosis 2017   • Dementia    • Diaphoresis    • Diastolic dysfunction    • Diverticulitis    • Dyslipidemia 2017   • GERD (gastroesophageal reflux disease) 2017   • Gout 2017   • Herpes zoster     Herpes zoster, [id number], right lower extremity.    • Hyperlipidemia    • Hypertension 2017   • Hypothyroidism 2017   • LVH (left ventricular hypertrophy)    • Malignant melanoma 2017   • Melanoma    • Mitral regurgitation    • Nephrolithiasis    • Post herpetic neuralgia 2017   • TIA (transient ischemic attack)     TIA, 2012, with  nonobstructive carotid stenosis, dyslipidemia and hypertension         Oncology Problem List:  Malignant melanoma (02/16/2017; Status: Active)       Past Surgical History:   Procedure Laterality Date   • ABDOMINAL SURGERY     • APPENDECTOMY     • COLON RESECTION LEFT  2016   • COLON RESECTION LEFT  2016   • CYST REMOVAL      left side of neck.    • HEEL SPUR SURGERY  1999   • HERNIA REPAIR      hiatal hernia    • NISSEN FUNDOPLICATION  1984   • OTHER SURGICAL HISTORY  2010    Malignant melanoma, status post resection        Family History: family history includes Heart attack in his mother; Heart disease in his mother; Hyperlipidemia in his mother; Stroke in his mother.     Social History:  reports that he quit smoking about 25 years ago. His smoking use included cigars and pipe. He has never used smokeless tobacco. He reports that he does not drink alcohol and does not use drugs.  Social History     Social History Narrative    Retired         Medications:  Available home medication information reviewed.  Medications Prior to Admission   Medication Sig Dispense Refill Last Dose   • allopurinol (ZYLOPRIM) 300 MG tablet Take 300 mg by mouth daily.      • amLODIPine (Norvasc) 5 MG tablet Take 5 mg by mouth Daily.      • ASPIRIN 81 PO Take 81 mg by mouth Daily.      • atorvastatin (LIPITOR) 80 MG tablet Take 1 tablet by mouth Every Night. 90 tablet 3    • busPIRone (BUSPAR) 10 MG tablet Take 10 mg by mouth 2 (Two) Times a Day.      • Cholecalciferol 125 MCG (5000 UT) tablet Take 5,000 Units by mouth Daily.      • clopidogrel (PLAVIX) 75 MG tablet Take 1 tablet by mouth Daily. 30 tablet 0    • doxazosin (CARDURA) 4 MG tablet Take 4 mg by mouth daily.      • finasteride (PROSCAR) 5 MG tablet Take 5 mg by mouth Daily.      • levothyroxine (Euthyrox) 150 MCG tablet Take 150 mcg by mouth Daily.      • levothyroxine (SYNTHROID, LEVOTHROID) 150 MCG tablet Take 1 tablet by mouth Daily. 30 tablet 0    • loratadine  (Claritin) 10 MG tablet Take 1 tablet by mouth Daily As Needed (Allergies).      • memantine (NAMENDA) 5 MG tablet Take 5 mg by mouth Daily.      • omeprazole (PriLOSEC) 40 MG capsule Take 40 mg by mouth Daily.      • polyethylene glycol (MIRALAX) 17 g packet Take 17 g by mouth Daily. 30 packet 1    • Psyllium (METAMUCIL PO) Take 1 tablet by mouth Daily.      • sertraline (ZOLOFT) 50 MG tablet Take 50 mg by mouth Daily.      • vitamin B-12 (CYANOCOBALAMIN) 2500 MCG sublingual tablet tablet Place 2,500 mcg under the tongue Daily.          Allergies   Allergen Reactions   • Cephalexin Rash   • Lisinopril Angioedema   • Sulfamethoxazole-Trimethoprim Rash   • Betadine [Povidone Iodine] Other (See Comments)     Skin Burning    • Iodine Other (See Comments)     Skin Burning    • Flomax [Tamsulosin Hcl] Rash   • Penicillins Rash   • Pseudoephedrine Rash       Objective   Objective     Vital Signs:   Temp:  [98.1 °F (36.7 °C)-98.7 °F (37.1 °C)] 98.7 °F (37.1 °C)  Heart Rate:  [68-74] 74  Resp:  [18-20] 18  BP: (164-168)/(90-99) 164/90       Physical Exam   Constitutional: No acute distress, awake, alert, laying in bed  HENT: NCAT, mucous membranes moist  Respiratory: Respiratory effort normal   Cardiovascular: RRR, no murmurs, rubs, or gallops  Gastrointestinal: Positive bowel sounds, soft, nontender, nondistended  Musculoskeletal: No bilateral ankle edema  Psychiatric: Appropriate affect, cooperative  Neurologic: Speech clear and fluent, oriented x 1  Skin: No rashes on exposed surfaces    Result Review:  I have personally reviewed the results from the time of this admission to 5/17/2023 19:16 EDT and agree with these findings:  [x]  Laboratory list / accordion  []  Microbiology  []  Radiology  []  EKG/Telemetry   []  Cardiology/Vascular   []  Pathology  [x]  Old records  []  Other:  Most notable findings include:       LAB RESULTS:      Lab 05/17/23  1133   WBC 7.17   HEMOGLOBIN 15.5   HEMATOCRIT 46.8   PLATELETS 252    NEUTROS ABS 4.74   IMMATURE GRANS (ABS) 0.03   LYMPHS ABS 1.59   MONOS ABS 0.58   EOS ABS 0.15   MCV 90.0         Lab 05/17/23  1133   SODIUM 139   POTASSIUM 4.0   CHLORIDE 104   CO2 24.0   ANION GAP 11.0   BUN 10   CREATININE 1.43*   EGFR 48.6*   GLUCOSE 91   CALCIUM 9.6   TSH 60.670*         Lab 05/17/23  1133   TOTAL PROTEIN 6.2   ALBUMIN 3.9   GLOBULIN 2.3   ALT (SGPT) 8   AST (SGOT) 14   BILIRUBIN 0.4   ALK PHOS 126*   LIPASE 31                     UA        5/17/2023    12:32   Urinalysis   Squamous Epithelial Cells, UA 0-2     Specific Gravity, UA 1.013     Ketones, UA Negative     Blood, UA Trace     Leukocytes, UA Large (3+)     Nitrite, UA Negative     RBC, UA 3-6     WBC, UA Too Numerous to Count     Bacteria, UA None Seen         Microbiology Results (last 10 days)     ** No results found for the last 240 hours. **          CT Head Without Contrast    Result Date: 5/17/2023  CT HEAD WO CONTRAST Date of Exam: 5/17/2023 11:45 AM EDT Indication: acute on chronic alteration in mental status. Comparison: MRI brain from September 9, 2021 and CT head from July 3, 2021 Technique: Axial CT images were obtained of the head without contrast administration.  Reconstructed coronal and sagittal images were also obtained. Automated exposure control and iterative construction methods were used. Findings: No acute intracranial hemorrhage or extra-axial collection is identified. The ventricles appear normal in caliber, with no evidence of mass effect or midline shift. The basal cisterns appear patent. The gray-white differentiation appears preserved. The calvarium appear intact. The paranasal sinuses are clear. The mastoid air cells are well-aerated. There is mild generalized parenchymal atrophy. Patchy areas of periventricular and subcortical white matter hypodensities are nonspecific, but likely the sequela of moderate chronic small vessel ischemic disease.     Impression: Impression: 1.No acute intracranial process  or calvarial fracture identified. 2.Findings suggestive of moderate chronic small vessel ischemic disease. Electronically Signed: Shravan Yarbrough  5/17/2023 12:03 PM EDT  Workstation ID: [id number]      Results for orders placed during the hospital encounter of 07/28/20    Adult Transesophageal Echo (SUMMER) W/ Cont if Necessary Per Protocol    Interpretation Summary  · Estimated EF = 65%.  · Left ventricular systolic function is normal.  · Left atrial cavity size is mild-to-moderately dilated.  · There is mild calcification of the aortic valve mainly affecting the non and right coronary cusp(s).  · Saline test results are negative.  · The aortic valve exhibits moderate sclerosis.  · There is no evidence of pericardial effusion.  · There is moderate (grade 2) protruding plaque in the descending aorta present.  · No valvular vegetations demonstrated.      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  B12, folate pending.  -Case management following    CKD III with elevated creatinine  -Gentle IVF overnight  -BMP in am    UTI  -Add urine culture  -Due to numerous allergies and prior culture with enterococcus faecalis, will start with levofloxacin  -EKG to monitor QT    Hypothyroidism  -Continue levothyroxine  -TSH is 60.67  -Will need to verify with his daughter if he is taking appropriately or if he needs dose adjusted    Anxiety disorder  -Continue home meds based on last discharge summary    Need pharmacy's help to verify home med list and adjust as needed    I tried to call his daughter a couple of times but as she has her own health issues going on today, was unable to reach her to  verify code status and home medications.  Will need to try again to call her.    DVT prophylaxis:  SQ heparin    Total time spent: 75 minutes  Time spent includes time reviewing chart, face-to-face time, counseling patient/family/caregiver, ordering medications/tests/procedures, communicating with other health care professionals, documenting clinical information in the electronic health record, and coordination of care.    CODE STATUS:    Code Status and Medical Interventions:   Ordered at: 05/17/23 1504     Code Status (Patient has no pulse and is not breathing):    CPR (Attempt to Resuscitate)     Medical Interventions (Patient has pulse or is breathing):    Full Support     Comments:    Unable to reach daughter to discuss-will try again later       Expected Discharge   Expected Discharge Date: 5/22/2023; Expected Discharge Time:      Susan Rodriguez MD  05/17/23    Electronically signed by Susan Rodriguez MD at 05/17/23 1916         Current Facility-Administered Medications   Medication Dose Route Frequency Provider Last Rate Last Admin   • acetaminophen (TYLENOL) tablet 650 mg  650 mg Oral Q4H PRN Susan Rodriguez MD        Or   • acetaminophen (TYLENOL) 160 MG/5ML solution 650 mg  650 mg Oral Q4H PRN Susan Rodriguez MD        Or   • acetaminophen (TYLENOL) suppository 650 mg  650 mg Rectal Q4H PRN Susan Rodriguez MD       • allopurinol (ZYLOPRIM) tablet 50 mg  50 mg Oral Daily Nevaeh Raymond APRN   50 mg at 05/26/23 0937   • amLODIPine (NORVASC) tablet 5 mg  5 mg Oral Daily Susan Rodriguez MD   5 mg at 05/26/23 0937   • artificial tears ophthalmic ointment   Both Eyes Q1H PRN Chantal Mistry MD       • aspirin chewable tablet 81 mg  81 mg Oral Daily Susan Rodriguez MD   81 mg at 05/26/23 0937   • atorvastatin (LIPITOR) tablet 80 mg  80 mg Oral Nightly Susan Rodriguez MD   80 mg at 05/25/23 2151   • sennosides-docusate (PERICOLACE) 8.6-50 MG per tablet 2 tablet  2 tablet Oral BID PRN Susan Rodriguez MD        And   •  polyethylene glycol (MIRALAX) packet 17 g  17 g Oral Daily PRN Susan Rodriguez MD        And   • bisacodyl (DULCOLAX) EC tablet 5 mg  5 mg Oral Daily PRN Susan Rodriguez MD        And   • bisacodyl (DULCOLAX) suppository 10 mg  10 mg Rectal Daily PRN Susan Rodriguez MD       • busPIRone (BUSPAR) tablet 15 mg  15 mg Oral Q12H Nevaeh Raymond, APRN   15 mg at 05/26/23 0937   • Calcium Replacement - Follow Nurse / BPA Driven Protocol   Does not apply PRN Susan Rodriguez MD       • clopidogrel (PLAVIX) tablet 75 mg  75 mg Oral Daily Susan Rodriguez MD   75 mg at 05/26/23 0937   • donepezil (ARICEPT) tablet 5 mg  5 mg Oral Nightly Nevaeh Raymond, APRN   5 mg at 05/25/23 2150   • finasteride (PROSCAR) tablet 5 mg  5 mg Oral Daily Susan Rodriguez MD   5 mg at 05/26/23 0937   • heparin (porcine) 5000 UNIT/ML injection 5,000 Units  5,000 Units Subcutaneous Q8H Susan Rodriguez MD   5,000 Units at 05/26/23 0621   • hydrOXYzine (ATARAX) tablet 10 mg  10 mg Oral TID PRN Nevaeh Raymond, APRN   10 mg at 05/25/23 2151   • levothyroxine (SYNTHROID, LEVOTHROID) tablet 175 mcg  175 mcg Oral Q AM Susan Rodriguez MD   175 mcg at 05/26/23 0621   • Magnesium Standard Dose Replacement - Follow Nurse / BPA Driven Protocol   Does not apply PRN Susan Rodriguez MD       • melatonin tablet 5 mg  5 mg Oral Nightly Susan Rodriguez MD   5 mg at 05/25/23 2150   • ondansetron (ZOFRAN) tablet 4 mg  4 mg Oral Q6H PRN Susan Rodriguez MD        Or   • ondansetron (ZOFRAN) injection 4 mg  4 mg Intravenous Q6H PRN Susan Rodriguez MD       • pantoprazole (PROTONIX) EC tablet 40 mg  40 mg Oral Daily Susan Rodriguez MD   40 mg at 05/26/23 0937   • Phosphorus Replacement - Follow Nurse / BPA Driven Protocol   Does not apply Susan Monge MD       • polyethylene glycol (MIRALAX) packet 17 g  17 g Oral Daily Susan Rodriguez MD   17 g at 05/26/23 0937   • Potassium Replacement - Follow Nurse / BPA Driven Protocol   Does not apply Susan Monge MD        • QUEtiapine (SEROquel) tablet 25 mg  25 mg Oral BID Nevaeh Raymond APRN   25 mg at 23 0937   • sertraline (ZOLOFT) tablet 100 mg  100 mg Oral Daily Nevaeh Raymond APRN   100 mg at 23 0937   • terazosin (HYTRIN) capsule 2 mg  2 mg Oral Nightly Susan Rodriguez MD   2 mg at 23        Physician Progress Notes (most recent note)      Chantal Mistry MD at 23 1217              [facility] Medicine Services  PROGRESS NOTE    Patient Name: Domitila Bosch  : 1940  MRN: [id number]    Date of Admission: 2023  Primary Care Physician: Marcin Fergusno MD    Subjective   Subjective     CC:  F/u confusion    HPI:   No acute overnight events, waiting on lunch    ROS:   Gen: no cough  Pulm: no fever  GI: no nausea    Objective   Objective     Vital Signs:   Temp:  [97.8 °F (36.6 °C)-98.2 °F (36.8 °C)] 98.2 °F (36.8 °C)  Heart Rate:  [55-59] 57  Resp:  [14-18] 14  BP: (109-135)/(60-71) 118/66     Physical Exam:   Constitutional - no acute distress, nontoxic, in chair  HEENT-NCAT, mucous membranes moist  CV-RRR  Resp-CTAB  Abd-soft, nontender, nondistended, normoactive bowel sounds  Ext-No lower extremity cyanosis, clubbing or edema bilaterally  Neuro-alert, speech clear, moves all extremities   Psych-normal affect   Skin- No rash on exposed UE or LE bilaterally        Results Reviewed:  LAB RESULTS:          Lab 23  0431 23  0551   SODIUM 138 140   POTASSIUM 3.7 3.7   CHLORIDE 105 107   CO2 24.0 24.0   ANION GAP 9.0 9.0   BUN 14 10   CREATININE 1.31* 1.28*   EGFR 54.0* 55.5*   GLUCOSE 96 82   CALCIUM 8.7 9.0                         Brief Urine Lab Results  (Last result in the past 365 days)      Color   Clarity   Blood   Leuk Est   Nitrite   Protein   CREAT   Urine HCG        23 1232 Yellow   Cloudy   Trace   Large (3+)   Negative   Negative                 Microbiology Results Abnormal     Procedure Component Value - Date/Time    Urine  Culture - Urine, Urine, Clean Catch [[id number]]  (Normal) Collected: 05/17/23 1232    Lab Status: Final result Specimen: Urine, Clean Catch Updated: 05/18/23 2221     Urine Culture No growth          No radiology results from the last 24 hrs    Results for orders placed during the hospital encounter of 07/28/20    Adult Transesophageal Echo (SUMMER) W/ Cont if Necessary Per Protocol    Interpretation Summary  · Estimated EF = 65%.  · Left ventricular systolic function is normal.  · Left atrial cavity size is mild-to-moderately dilated.  · There is mild calcification of the aortic valve mainly affecting the non and right coronary cusp(s).  · Saline test results are negative.  · The aortic valve exhibits moderate sclerosis.  · There is no evidence of pericardial effusion.  · There is moderate (grade 2) protruding plaque in the descending aorta present.  · No valvular vegetations demonstrated.      Current medications:  Scheduled Meds:allopurinol, 50 mg, Oral, Daily  amLODIPine, 5 mg, Oral, Daily  aspirin, 81 mg, Oral, Daily  atorvastatin, 80 mg, Oral, Nightly  busPIRone, 15 mg, Oral, Q12H  clopidogrel, 75 mg, Oral, Daily  donepezil, 5 mg, Oral, Nightly  finasteride, 5 mg, Oral, Daily  heparin (porcine), 5,000 Units, Subcutaneous, Q8H  levothyroxine, 175 mcg, Oral, Q AM  melatonin, 5 mg, Oral, Nightly  pantoprazole, 40 mg, Oral, Daily  polyethylene glycol, 17 g, Oral, Daily  QUEtiapine, 25 mg, Oral, BID  sertraline, 100 mg, Oral, Daily  sodium chloride, 10 mL, Intravenous, Q12H  terazosin, 2 mg, Oral, Nightly      Continuous Infusions:   PRN Meds:.•  acetaminophen **OR** acetaminophen **OR** acetaminophen  •  artificial tears  •  senna-docusate sodium **AND** polyethylene glycol **AND** bisacodyl **AND** bisacodyl  •  Calcium Replacement - Follow Nurse / BPA Driven Protocol  •  hydrOXYzine  •  Magnesium Standard Dose Replacement - Follow Nurse / BPA Driven Protocol  •  ondansetron **OR** ondansetron  •  Phosphorus  Replacement - Follow Nurse / BPA Driven Protocol  •  Potassium Replacement - Follow Nurse / BPA Driven Protocol  •  sodium chloride  •  sodium chloride    Assessment & Plan   Assessment & Plan     Active Hospital Problems    Diagnosis  POA   • **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        Brief Hospital Course to date:  Domitila Bosch is a 82 y.o. male with dementia who lives with his daughter.  EMS was called due to increased confusion/agitation at home.  While EMS was at the house, apparently his daughter had a seizure and they were both seen in the ED.  He was found to have a UTI and creatinine elevation beyond baseline and admission was requested for further management.      This patient's problems and plans were partially entered by my partner and updated as appropriate by me 05/25/23.      Dementia with worsening confusion  Anxiety disorder  -Continue namenda  -PRN seroquel, as well as scheduled nightly dose  -TSH elevated as below.  B12, folate WNL  -CT head without acute intracranial process  -Case management following  -Follows with Dr. Hernadez at , last appointment 1/6/23, adjusted med rec based on most recent meds, holding prn xanax     CKD III with elevated creatinine  -S/P gentle IVF  -Stable, baseline probably ~ 1.3-1.4 per chart review  -renally dose allopurinol  -cr back to baseline, will check in am again     UTI  -Urine culture with no growth  -Due to numerous allergies and prior culture with enterococcus faecalis, started on levaquin  -Abx dc'd as urine cx negative, afebrile, wbc's wnl  -QT normal on EKG  -continue finasteride     Hypothyroidism  -Continue levothyroxine  -TSH is 60.67, free T4 0.42  -levothyroxine dose increased for now  -recheck TSH in 6-8 weeks at PCP  follow-up     HTN  HLD  H/o TIA, CVA  -continue home amlodipine, finasteride, terazosin  -per recent med rec from , patient on triamterene-hctz 37.5mg-25mg, not on terazosin  -monitor BP  -continue statin, ASA    Expected Discharge Location and Transportation: SNF rehab, EMS  Expected Discharge pending bed offer  Expected Discharge Date: 2023; Expected Discharge Time:      DVT prophylaxis:  Medical DVT prophylaxis orders are present.     AM-PAC 6 Clicks Score (PT): 21 (23)    CODE STATUS:   Code Status and Medical Interventions:   Ordered at: 23 8002     Code Status (Patient has no pulse and is not breathing):    CPR (Attempt to Resuscitate)     Medical Interventions (Patient has pulse or is breathing):    Full Support     Comments:    Unable to reach daughter to discuss-will try again later       Chantal Mistry MD  23        Electronically signed by Chantal Mistry MD at 23 1218          Physical Therapy Notes (most recent note)      Angella Carlos PT at 23 1053  Version 1 of 1       Goal Outcome Evaluation:  Plan of Care Reviewed With: patient        Progress: improving  Outcome Evaluation: Patient pleasantly confused and continues to be limited by weakness, impaired balance, unsteady gait, and remains below his functional baseline. He ambulated in hallway w/ RW and CGA with consistent cueing for safety. PT continues to recommend SNF rehab at D/C.           Electronically signed by Angella Carlos PT at 23 1511          Occupational Therapy Notes (most recent note)      Alivia Tony OT at 23 6711          Patient Name: Domitila Bosch  : 1940    MRN: [id number]                              Today's Date: 2023       Admit Date: 2023    Visit Dx:     ICD-10-CM ICD-9-CM   1. Rapidly progressive dementia  F03.90 294.20   2. Agitation  R45.1 307.9   3. Acute on chronic alteration in mental status  R41.82 780.09   4. Elevated blood pressure  reading with diagnosis of hypertension  I10 401.9   5. DANIS (acute kidney injury)  N17.9 584.9     Patient Active Problem List   Diagnosis   • Diverticulosis of large intestine with hemorrhage   • Umbilical hernia   • S/P hernia repair   • Hypertension   • Dyslipidemia   • Carotid stenosis   • Gout   • GERD (gastroesophageal reflux disease)   • Hypothyroidism   • Neuropathy   • Malignant melanoma   • Asthma   • Anxiety disorder   • Diverticulosis   • Muscle pain   • Lumbar radiculopathy   • Kidney stone   • Deviated nasal septum   • Chronic laryngitis   • Acute CVA (cerebrovascular accident)   • Essential hypertension   • Allergy to Betadine   • DANIS (acute kidney injury)   • Severe hypothyroidism   • History of ischemic stroke   • Rapidly progressive dementia   • CKD (chronic kidney disease), stage III     Past Medical History:   Diagnosis Date   • Anxiety disorder 02/16/2017   • Asthma 02/16/2017   • Basal cell carcinoma in situ of skin 2019    right leg    • Carotid stenosis 02/16/2017   • Dementia    • Diaphoresis    • Diastolic dysfunction 2014   • Diverticulitis    • Dyslipidemia 02/16/2017   • GERD (gastroesophageal reflux disease) 02/16/2017   • Gout 02/16/2017   • Herpes zoster     Herpes zoster, [id number], right lower extremity.    • Hyperlipidemia    • Hypertension 02/16/2017   • Hypothyroidism 02/16/2017   • LVH (left ventricular hypertrophy)    • Malignant melanoma 02/16/2017   • Melanoma    • Mitral regurgitation 2014   • Nephrolithiasis    • Post herpetic neuralgia 02/16/2017   • TIA (transient ischemic attack)     TIA, June 2012, with nonobstructive carotid stenosis, dyslipidemia and hypertension     Past Surgical History:   Procedure Laterality Date   • ABDOMINAL SURGERY     • APPENDECTOMY     • COLON RESECTION LEFT  2016   • COLON RESECTION LEFT  2016   • CYST REMOVAL      left side of neck.    • HEEL SPUR SURGERY  1999   • HERNIA REPAIR      hiatal hernia    • NISSEN FUNDOPLICATION  1984   • OTHER  SURGICAL HISTORY  2010    Malignant melanoma, status post resection       General Information     Row Name 05/24/23 0829          OT Time and Intention    Document Type therapy note (daily note)  -     Mode of Treatment occupational therapy;individual therapy  -     Row Name 05/24/23 0829          General Information    Patient Profile Reviewed yes  -     Existing Precautions/Restrictions fall;other (see comments)  confusion, incontinent  -     Barriers to Rehab medically complex;previous functional deficit;cognitive status  -     Row Name 05/24/23 0829          Cognition    Orientation Status (Cognition) oriented to;person;disoriented to;place;situation;time;verbal cues/prompts needed for orientation  Pt repeatedly asking where he was, what day it was, and why he was here throughout session.  -     Row Name 05/24/23 0829          Safety Issues, Functional Mobility    Safety Issues Affecting Function (Mobility) awareness of need for assistance;insight into deficits/self-awareness;safety precaution awareness;safety precautions follow-through/compliance;sequencing abilities;positioning of assistive device  -     Impairments Affecting Function (Mobility) balance;endurance/activity tolerance;postural/trunk control;strength;cognition  -     Cognitive Impairments, Mobility Safety/Performance attention;awareness, need for assistance;insight into deficits/self-awareness;safety precaution awareness;safety precaution follow-through;sequencing abilities  -           User Key  (r) = Recorded By, (t) = Taken By, (c) = Cosigned By    Initials Name Provider Type     Alivia Tony OT Occupational Therapist                 Mobility/ADL's     Row Name 05/24/23 0831          Bed Mobility    Bed Mobility supine-sit  -     Supine-Sit [place] (Bed Mobility) standby assist  -     Assistive Device (Bed Mobility) bed rails;head of bed elevated  -     Row Name 05/24/23 0831          Transfers    Transfers  sit-stand transfer;stand-sit transfer  -[facility] Name 05/24/23 0831          Sit-Stand Transfer    Sit-Stand [place] (Transfers) standby assist  -     Assistive Device (Sit-Stand Transfers) walker, front-wheeled  -     Row Name 05/24/23 0831          Stand-Sit Transfer    Stand-Sit [place] (Transfers) standby assist  -     Assistive Device (Stand-Sit Transfers) walker, front-wheeled  -[facility] 05/24/23 0831          Functional Mobility    Functional Mobility- Ind. Level contact guard assist;verbal cues required  -     Functional Mobility- Device walker, front-wheeled  -     Functional Mobility-Distance (Feet) 50  -     Functional Mobility- Safety Issues balance decreased during turns;sequencing ability decreased;step length decreased  -[facility] Name 05/24/23 0831          Activities of Daily Living    BADL Assessment/Intervention lower body dressing;upper body dressing;grooming  -MC     Row Name 05/24/23 0831          Lower Body Dressing Assessment/Training    [place] Level (Lower Body Dressing) don;socks;standby assist;verbal cues  -     Position (Lower Body Dressing) edge of bed sitting  -[facility] 05/24/23 0831          Grooming Assessment/Training    [place] Level (Grooming) wash face, hands;set up  -     Position (Grooming) supported sitting  -MC     Row Name 05/24/23 0831          Upper Body Dressing Assessment/Training    [place] Level (Upper Body Dressing) don;pajama/robe;minimum assist (75% patient effort);verbal cues  -     Position (Upper Body Dressing) edge of bed sitting  -           User Key  (r) = Recorded By, (t) = Taken By, (c) = Cosigned By    Initials Name Provider Type    Alivia Menard OT Occupational Therapist               Obj/Interventions     Row Name 05/24/23 0832          Balance    Balance Assessment sitting static balance;sitting dynamic balance;sit to stand dynamic balance;standing static balance;standing dynamic  balance  -     Static Sitting Balance standby assist  -     Dynamic Sitting Balance standby assist  -     Position, Sitting Balance unsupported;sitting edge of bed;sitting in chair  -     Sit to Stand Dynamic Balance standby assist;verbal cues  -     Static Standing Balance standby assist  -     Dynamic Standing Balance contact guard  -     Position/Device Used, Standing Balance supported;walker, front-wheeled  -     Balance Interventions sitting;sit to stand;occupation based/functional task  -           User Key  (r) = Recorded By, (t) = Taken By, (c) = Cosigned By    Initials Name Provider Type     Alivia Tony, ZACK Occupational Therapist               Goals/Plan    No documentation.                Clinical Impression     [facility] Name 05/24/23 0833          Pain Assessment    Pretreatment Pain Rating 0/10 - no pain  -     Posttreatment Pain Rating 0/10 - no pain  -[facility] Name 05/24/23 0833          Plan of Care Review    Plan of Care Reviewed With patient  -     Progress no change  -     Outcome Evaluation Pt presents w/ increased confusion, decreased safety awareness, generalized weakness, and mild balance deficits limiting his ADL independence. Pt following all commands throughout session, verbal cues for frequent redirection and reorientation required. Will continue to progress pt as tolerated per OT POC. Rec SNF at d/c.  -     Row Name 05/24/23 0833          Therapy Assessment/Plan (OT)    Rehab Potential (OT) good, to achieve stated therapy goals  -     Criteria for Skilled Therapeutic Interventions Met (OT) yes;meets criteria;skilled treatment is necessary  -     Therapy Frequency (OT) daily  -     Row Name 05/24/23 0833          Therapy Plan Review/Discharge Plan (OT)    Anticipated Discharge Disposition (OT) skilled nursing facility  -     Row Name 05/24/23 08          Vital Signs    Pre Systolic BP Rehab --  No tele, RN cleared OT  -     O2 Delivery Pre  Treatment room air  -     O2 Delivery Intra Treatment room air  -     O2 Delivery Post Treatment room air  -     Pre Patient Position Supine  -     Intra Patient Position Standing  -     Post Patient Position Sitting  -     Row Name 05/24/23 0833          Positioning and Restraints    Pre-Treatment Position in bed  -     Post Treatment Position chair  -     In Chair notified nsg;reclined;call light within reach;encouraged to call for assist;exit alarm on;waffle cushion;legs elevated  -           User Key  (r) = Recorded By, (t) = Taken By, (c) = Cosigned By    Initials Name Provider Type    Alivia Menard OT Occupational Therapist               Outcome Measures     Row Name 05/24/23 0835          How much help from another is currently needed...    Putting on and taking off regular lower body clothing? 2  -MC     Bathing (including washing, rinsing, and drying) 2  -MC     Toileting (which includes using toilet bed pan or urinal) 3  -MC     Putting on and taking off regular upper body clothing 3  -MC     Taking care of personal grooming (such as brushing teeth) 3  -MC     Eating meals 3  -     AM-PAC 6 Clicks Score (OT) 16  -     Row Name 05/24/23 0835          Functional Assessment    Outcome Measure Options AM-PAC 6 Clicks Daily Activity (OT)  -           User Key  (r) = Recorded By, (t) = Taken By, (c) = Cosigned By    Initials Name Provider Type    Alivia Menard OT Occupational Therapist                Occupational Therapy Education     Title: PT OT SLP Therapies (In Progress)     Topic: Occupational Therapy (In Progress)     Point: ADL training (In Progress)     Description:   Instruct learner(s) on proper safety adaptation and remediation techniques during self care or transfers.   Instruct in proper use of assistive devices.              Learning Progress Summary           Patient Acceptance, E, NR by LEANNA at 5/24/2023 0835    Acceptance, E, NR by  at 5/21/2023 1142     Acceptance, E,TB, VU,NR by  at 5/20/2023 0020    Acceptance, E,TB, VU,NR by  at 5/19/2023 0019    Acceptance, E, NR by  at 5/18/2023 1012    Comment: re-orientation, reason for consult, ADL sequencing, reason need gait belt and gripper socks                   Point: Home exercise program (In Progress)     Description:   Instruct learner(s) on appropriate technique for monitoring, assisting and/or progressing therapeutic exercises/activities.              Learning Progress Summary           Patient Acceptance, E, NR by  at 5/21/2023 1142    Acceptance, E,TB, VU,NR by  at 5/20/2023 0020    Acceptance, E,TB, VU,NR by  at 5/19/2023 0019                   Point: Precautions (In Progress)     Description:   Instruct learner(s) on prescribed precautions during self-care and functional transfers.              Learning Progress Summary           Patient Acceptance, E, NR by  at 5/24/2023 0835    Acceptance, E,TB, VU,NR by  at 5/20/2023 0020    Acceptance, E,TB, VU,NR by  at 5/19/2023 0019    Acceptance, E, NR by  at 5/18/2023 1012    Comment: re-orientation, reason for consult, ADL sequencing, reason need gait belt and gripper socks                   Point: Body mechanics (In Progress)     Description:   Instruct learner(s) on proper positioning and spine alignment during self-care, functional mobility activities and/or exercises.              Learning Progress Summary           Patient Acceptance, E, NR by  at 5/24/2023 0835    Acceptance, E,TB, VU,NR by  at 5/20/2023 0020    Acceptance, E,TB, VU,NR by  at 5/19/2023 0019                               User Key     Initials Effective Dates Name Provider Type Discipline     02/03/23 -  Kaila Kat, OT Occupational Therapist OT     02/03/23 -  Susan Pierson, OT Occupational Therapist OT     08/12/20 -  Kavya Matamoros, RN Registered Nurse Nurse     10/14/22 -  Alivia Tony, OT Occupational Therapist OT                Recommendation and Plan  Therapy Frequency (OT): daily  Plan of Care Review  Plan of Care Reviewed With: patient  Progress: no change  Outcome Evaluation: Pt presents w/ increased confusion, decreased safety awareness, generalized weakness, and mild balance deficits limiting his ADL independence. Pt following all commands throughout session, verbal cues for frequent redirection and reorientation required. Will continue to progress pt as tolerated per OT POC. Rec SNF at d/c.     Time Calculation:    Time Calculation- OT     Row Name 05/24/23 0835             Time Calculation- OT    OT Start Time 0752  -      OT Received On 05/24/23  -      OT Goal Re-Cert Due Date 05/28/23  -         Timed Charges    87157 - OT Therapeutic Activity Minutes 16  -MC      30149 - OT Self Care/Mgmt Minutes 12  -         Total Minutes    Timed Charges Total Minutes 28  -       Total Minutes 28  -MC            User Key  (r) = Recorded By, (t) = Taken By, (c) = Cosigned By    Initials Name Provider Type     Alivia Tony OT Occupational Therapist              Therapy Charges for Today     Code Description Service Date Service Provider Modifiers Qty    85731512615  OT THERAPEUTIC ACT EA 15 MIN 5/24/2023 Alivia Tony OT GO 1    [id number]  OT SELF CARE/MGMT/TRAIN EA 15 MIN 5/24/2023 Alivia Tony OT GO 1               Alivia Tony OT  5/24/2023    Electronically signed by Alivia Tony OT at 05/24/23 0900

## 2023-05-26 NOTE — PLAN OF CARE
Goal Outcome Evaluation:              Outcome Evaluation: VSS on RA, mildly bradycardic. No c/o pain. Patient pleasantly confused, very forgetful. Prn atarax x1 for anxiety, family aware. Respirations unlabored. Tolerating regular diet. Adequate UOP, moderate BM this shift. Ambulates w/stand-by. Slept well throughout night. Calm, pleasant. Will continue to monitor.

## 2023-05-26 NOTE — PLAN OF CARE
Goal Outcome Evaluation:  Plan of Care Reviewed With: patient        Progress: no change  Outcome Evaluation: Patient continues to require cues for staying close to RW for safety during ambulation. He gave good effort towards standing and seated ther ex, needing Min A to maintain balance for standing exercises. Will continue per PT POC. Recommend SNF at d/c.

## 2023-05-27 LAB
GEN 5 2HR TROPONIN T REFLEX: 15 NG/L
TROPONIN T DELTA: -4 NG/L
TROPONIN T SERPL HS-MCNC: 19 NG/L

## 2023-05-27 PROCEDURE — 99232 SBSQ HOSP IP/OBS MODERATE 35: CPT | Performed by: HOSPITALIST

## 2023-05-27 PROCEDURE — G0378 HOSPITAL OBSERVATION PER HR: HCPCS

## 2023-05-27 PROCEDURE — 63710000001 ONDANSETRON PER 8 MG: Performed by: INTERNAL MEDICINE

## 2023-05-27 PROCEDURE — 84484 ASSAY OF TROPONIN QUANT: CPT | Performed by: HOSPITALIST

## 2023-05-27 PROCEDURE — 93005 ELECTROCARDIOGRAM TRACING: CPT | Performed by: HOSPITALIST

## 2023-05-27 PROCEDURE — 25010000002 HEPARIN (PORCINE) PER 1000 UNITS: Performed by: INTERNAL MEDICINE

## 2023-05-27 PROCEDURE — 93010 ELECTROCARDIOGRAM REPORT: CPT | Performed by: STUDENT IN AN ORGANIZED HEALTH CARE EDUCATION/TRAINING PROGRAM

## 2023-05-27 PROCEDURE — 96372 THER/PROPH/DIAG INJ SC/IM: CPT

## 2023-05-27 RX ORDER — FAMOTIDINE 20 MG/1
20 TABLET, FILM COATED ORAL
Status: DISCONTINUED | OUTPATIENT
Start: 2023-05-27 | End: 2023-05-28

## 2023-05-27 RX ORDER — HYDRALAZINE HYDROCHLORIDE 25 MG/1
25 TABLET, FILM COATED ORAL ONCE
Status: COMPLETED | OUTPATIENT
Start: 2023-05-27 | End: 2023-05-27

## 2023-05-27 RX ORDER — LIDOCAINE HYDROCHLORIDE 20 MG/ML
15 SOLUTION OROPHARYNGEAL ONCE
Status: COMPLETED | OUTPATIENT
Start: 2023-05-27 | End: 2023-05-27

## 2023-05-27 RX ORDER — ALUMINA, MAGNESIA, AND SIMETHICONE 2400; 2400; 240 MG/30ML; MG/30ML; MG/30ML
30 SUSPENSION ORAL ONCE
Status: COMPLETED | OUTPATIENT
Start: 2023-05-27 | End: 2023-05-27

## 2023-05-27 RX ADMIN — FAMOTIDINE 20 MG: 20 TABLET, FILM COATED ORAL at 17:06

## 2023-05-27 RX ADMIN — POLYETHYLENE GLYCOL 3350 17 G: 17 POWDER, FOR SOLUTION ORAL at 10:06

## 2023-05-27 RX ADMIN — ASPIRIN 81 MG 81 MG: 81 TABLET ORAL at 10:07

## 2023-05-27 RX ADMIN — BUSPIRONE HYDROCHLORIDE 15 MG: 10 TABLET ORAL at 21:33

## 2023-05-27 RX ADMIN — LEVOTHYROXINE SODIUM 175 MCG: 175 TABLET ORAL at 06:41

## 2023-05-27 RX ADMIN — HEPARIN SODIUM 5000 UNITS: 5000 INJECTION, SOLUTION INTRAVENOUS; SUBCUTANEOUS at 18:11

## 2023-05-27 RX ADMIN — Medication 5 MG: at 21:33

## 2023-05-27 RX ADMIN — ALLOPURINOL 50 MG: 100 TABLET ORAL at 10:07

## 2023-05-27 RX ADMIN — SERTRALINE HYDROCHLORIDE 100 MG: 100 TABLET ORAL at 10:07

## 2023-05-27 RX ADMIN — AMLODIPINE BESYLATE 5 MG: 5 TABLET ORAL at 10:07

## 2023-05-27 RX ADMIN — HEPARIN SODIUM 5000 UNITS: 5000 INJECTION, SOLUTION INTRAVENOUS; SUBCUTANEOUS at 06:41

## 2023-05-27 RX ADMIN — CLOPIDOGREL BISULFATE 75 MG: 75 TABLET ORAL at 10:07

## 2023-05-27 RX ADMIN — FINASTERIDE 5 MG: 5 TABLET, FILM COATED ORAL at 10:07

## 2023-05-27 RX ADMIN — ONDANSETRON HYDROCHLORIDE 4 MG: 4 TABLET, FILM COATED ORAL at 17:06

## 2023-05-27 RX ADMIN — DONEPEZIL HYDROCHLORIDE 5 MG: 5 TABLET, FILM COATED ORAL at 21:33

## 2023-05-27 RX ADMIN — ALUMINUM HYDROXIDE, MAGNESIUM HYDROXIDE, AND DIMETHICONE 30 ML: 400; 400; 40 SUSPENSION ORAL at 17:07

## 2023-05-27 RX ADMIN — HEPARIN SODIUM 5000 UNITS: 5000 INJECTION, SOLUTION INTRAVENOUS; SUBCUTANEOUS at 21:33

## 2023-05-27 RX ADMIN — QUETIAPINE FUMARATE 25 MG: 25 TABLET ORAL at 10:07

## 2023-05-27 RX ADMIN — LIDOCAINE HYDROCHLORIDE 15 ML: 20 SOLUTION ORAL; TOPICAL at 18:10

## 2023-05-27 RX ADMIN — TERAZOSIN HYDROCHLORIDE 2 MG: 2 CAPSULE ORAL at 21:32

## 2023-05-27 RX ADMIN — QUETIAPINE FUMARATE 25 MG: 25 TABLET ORAL at 17:06

## 2023-05-27 RX ADMIN — BUSPIRONE HYDROCHLORIDE 15 MG: 10 TABLET ORAL at 10:07

## 2023-05-27 RX ADMIN — PANTOPRAZOLE SODIUM 40 MG: 40 TABLET, DELAYED RELEASE ORAL at 10:07

## 2023-05-27 RX ADMIN — ATORVASTATIN CALCIUM 80 MG: 40 TABLET, FILM COATED ORAL at 21:33

## 2023-05-27 RX ADMIN — HYDRALAZINE HYDROCHLORIDE 25 MG: 25 TABLET, FILM COATED ORAL at 12:05

## 2023-05-27 NOTE — PROGRESS NOTES
[facility]  PROGRESS NOTE    Patient Name: Domitila Bosch  : 1940  MRN: [id number]    Date of Admission: 2023  Primary Care Physician: Marcin Ferguson MD    Subjective   Subjective     CC:  F/u confusion    HPI:   Resting in bed, no complaints. Pleasant. States he ate breakfast well.    ROS:   CV- No chest pain  Resp- No cough, dyspnea  GI- No N/V/D, abd pain    Objective   Objective     Vital Signs:   Temp:  [97.8 °F (36.6 °C)-98.8 °F (37.1 °C)] 98.8 °F (37.1 °C)  Heart Rate:  [60-89] 60  Resp:  [16-18] 18  BP: (119-138)/(65-71) 138/71     Physical Exam:   Constitutional: No acute distress, awake, alert  HENT: NCAT, mucous membranes moist  Respiratory: Clear to auscultation bilaterally, respiratory effort normal   Cardiovascular: RRR, no murmurs, rubs, or gallops  Gastrointestinal: Positive bowel sounds, soft, nontender, nondistended  Musculoskeletal: No bilateral ankle edema  Psychiatric: Appropriate affect, cooperative  Neurologic: Oriented x 1, moves all extremities spontaneously, speech clear  Skin: No rashes    Results Reviewed:  LAB RESULTS:          Lab 23  0912 23  0431   SODIUM 139 138   POTASSIUM 4.0 3.7   CHLORIDE 104 105   CO2 25.0 24.0   ANION GAP 10.0 9.0   BUN 15 14   CREATININE 1.16 1.31*   EGFR 62.5 54.0*   GLUCOSE 118* 96   CALCIUM 9.3 8.7                         Brief Urine Lab Results  (Last result in the past 365 days)      Color   Clarity   Blood   Leuk Est   Nitrite   Protein   CREAT   Urine HCG        23 1232 Yellow   Cloudy   Trace   Large (3+)   Negative   Negative                 Microbiology Results Abnormal     Procedure Component Value - Date/Time    Urine Culture - Urine, Urine, Clean Catch [[id number]]  (Normal) Collected: 23 1232    Lab Status: Final result Specimen: Urine, Clean Catch Updated: 23     Urine Culture No growth          No radiology results from the last 24 hrs    Results for  orders placed during the hospital encounter of 07/28/20    Adult Transesophageal Echo (SUMMER) W/ Cont if Necessary Per Protocol    Interpretation Summary  · Estimated EF = 65%.  · Left ventricular systolic function is normal.  · Left atrial cavity size is mild-to-moderately dilated.  · There is mild calcification of the aortic valve mainly affecting the non and right coronary cusp(s).  · Saline test results are negative.  · The aortic valve exhibits moderate sclerosis.  · There is no evidence of pericardial effusion.  · There is moderate (grade 2) protruding plaque in the descending aorta present.  · No valvular vegetations demonstrated.      Current medications:  Scheduled Meds:allopurinol, 50 mg, Oral, Daily  amLODIPine, 5 mg, Oral, Daily  aspirin, 81 mg, Oral, Daily  atorvastatin, 80 mg, Oral, Nightly  busPIRone, 15 mg, Oral, Q12H  clopidogrel, 75 mg, Oral, Daily  donepezil, 5 mg, Oral, Nightly  finasteride, 5 mg, Oral, Daily  heparin (porcine), 5,000 Units, Subcutaneous, Q8H  levothyroxine, 175 mcg, Oral, Q AM  melatonin, 5 mg, Oral, Nightly  pantoprazole, 40 mg, Oral, Daily  polyethylene glycol, 17 g, Oral, Daily  QUEtiapine, 25 mg, Oral, BID  sertraline, 100 mg, Oral, Daily  terazosin, 2 mg, Oral, Nightly      Continuous Infusions:   PRN Meds:.•  acetaminophen **OR** acetaminophen **OR** acetaminophen  •  artificial tears  •  senna-docusate sodium **AND** polyethylene glycol **AND** bisacodyl **AND** bisacodyl  •  Calcium Replacement - Follow Nurse / BPA Driven Protocol  •  hydrOXYzine  •  Magnesium Standard Dose Replacement - Follow Nurse / BPA Driven Protocol  •  ondansetron **OR** ondansetron  •  Phosphorus Replacement - Follow Nurse / BPA Driven Protocol  •  Potassium Replacement - Follow Nurse / BPA Driven Protocol    Assessment & Plan   Assessment & Plan     Active Hospital Problems    Diagnosis  POA   • **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        Brief Hospital Course to date:  Domitila Bosch is a 82 y.o. male with dementia who lives with his daughter.  EMS was called due to increased confusion/agitation at home.  While EMS was at the house, apparently his daughter had a seizure and they were both seen in the ED.  He was found to have a UTI and creatinine elevation beyond baseline and admission was requested for further management.      This patient's problems and plans were partially entered by my partner and updated as appropriate by me 05/27/23.    All problems are new to me today.    Dementia with worsening confusion  Anxiety disorder  -Continue namenda  -PRN seroquel, as well as scheduled nightly dose  -TSH elevated as below.  B12, folate WNL  -CT head without acute intracranial process  -Case management following  -Follows with Dr. Hernadez at , last appointment 1/6/23, adjusted med rec based on most recent meds, holding prn xanax     CKD III with elevated creatinine  -S/P gentle IVF  -Stable, baseline probably ~ 1.3-1.4 per chart review  -renally dose allopurinol  -cr back to baseline, continue to monitor periodically     UTI  -Urine culture with no growth  -Due to numerous allergies and prior culture with enterococcus faecalis, started on levaquin  -Abx dc'd as urine cx negative, afebrile, wbc's wnl  -QT normal on EKG  -continue finasteride     Hypothyroidism  -Continue levothyroxine  -TSH is 60.67, free T4 0.42  -levothyroxine dose increased   -recheck TSH in 6-8 weeks at PCP follow-up     HTN  HLD  H/o TIA, CVA  -continue home amlodipine, finasteride, terazosin  -per recent med rec from , patient on triamterene-hctz 37.5mg-25mg, not on terazosin  -monitor BP  -continue statin, ASA    Expected Discharge Location and Transportation: SNF rehab, EMS  Expected Discharge pending bed  offer  Expected Discharge Date: 5/30/2023; Expected Discharge Time:      DVT prophylaxis:  Medical DVT prophylaxis orders are present.     AM-PAC 6 Clicks Score (PT): 19 (05/26/23 1510)    CODE STATUS:   Code Status and Medical Interventions:   Ordered at: 05/17/23 8326     Code Status (Patient has no pulse and is not breathing):    CPR (Attempt to Resuscitate)     Medical Interventions (Patient has pulse or is breathing):    Full Support     Comments:    Unable to reach daughter to discuss-will try again later       Sharlene Allen DO  05/27/23

## 2023-05-27 NOTE — PLAN OF CARE
Goal Outcome Evaluation:           Progress: no change  Outcome Evaluation: VSS on RA. No c/o pain. Patient pleasantly confused, very forgetful. Prn atarax x1 for anxiety. Respirations unlabored. Tolerating regular diet. Adequate UOP, moderate BM this shift. Ambulates w/stand-by, bed alarms set. Slept well throughout night. Spoke with daughter on phone, appeared to calm patient. Calm, pleasant. Will continue to monitor.

## 2023-05-27 NOTE — PLAN OF CARE
Goal Outcome Evaluation:  Plan of Care Reviewed With: patient        Progress: no change  Outcome Evaluation: pleasantly confused, no c/o, resting well, up in chair, talked with family, awaiting placement

## 2023-05-28 LAB
ALBUMIN SERPL-MCNC: 3.6 G/DL (ref 3.5–5.2)
ALBUMIN/GLOB SERPL: 1.7 G/DL
ALP SERPL-CCNC: 106 U/L (ref 39–117)
ALT SERPL W P-5'-P-CCNC: 14 U/L (ref 1–41)
ANION GAP SERPL CALCULATED.3IONS-SCNC: 11 MMOL/L (ref 5–15)
AST SERPL-CCNC: 16 U/L (ref 1–40)
BILIRUB SERPL-MCNC: 0.6 MG/DL (ref 0–1.2)
BUN SERPL-MCNC: 18 MG/DL (ref 8–23)
BUN/CREAT SERPL: 14.8 (ref 7–25)
CALCIUM SPEC-SCNC: 9.1 MG/DL (ref 8.6–10.5)
CHLORIDE SERPL-SCNC: 105 MMOL/L (ref 98–107)
CO2 SERPL-SCNC: 25 MMOL/L (ref 22–29)
CREAT SERPL-MCNC: 1.22 MG/DL (ref 0.76–1.27)
DEPRECATED RDW RBC AUTO: 48.8 FL (ref 37–54)
EGFRCR SERPLBLD CKD-EPI 2021: 58.8 ML/MIN/1.73
ERYTHROCYTE [DISTWIDTH] IN BLOOD BY AUTOMATED COUNT: 14.6 % (ref 12.3–15.4)
GLOBULIN UR ELPH-MCNC: 2.1 GM/DL
GLUCOSE SERPL-MCNC: 95 MG/DL (ref 65–99)
HCT VFR BLD AUTO: 42.4 % (ref 37.5–51)
HGB BLD-MCNC: 13.9 G/DL (ref 13–17.7)
MCH RBC QN AUTO: 30.5 PG (ref 26.6–33)
MCHC RBC AUTO-ENTMCNC: 32.8 G/DL (ref 31.5–35.7)
MCV RBC AUTO: 93 FL (ref 79–97)
PLATELET # BLD AUTO: 194 10*3/MM3 (ref 140–450)
PMV BLD AUTO: 10.3 FL (ref 6–12)
POTASSIUM SERPL-SCNC: 4.1 MMOL/L (ref 3.5–5.2)
PROT SERPL-MCNC: 5.7 G/DL (ref 6–8.5)
QT INTERVAL: 432 MS
QTC INTERVAL: 449 MS
RBC # BLD AUTO: 4.56 10*6/MM3 (ref 4.14–5.8)
SODIUM SERPL-SCNC: 141 MMOL/L (ref 136–145)
WBC NRBC COR # BLD: 7.19 10*3/MM3 (ref 3.4–10.8)

## 2023-05-28 PROCEDURE — 99231 SBSQ HOSP IP/OBS SF/LOW 25: CPT | Performed by: HOSPITALIST

## 2023-05-28 PROCEDURE — 80053 COMPREHEN METABOLIC PANEL: CPT | Performed by: HOSPITALIST

## 2023-05-28 PROCEDURE — 25010000002 HEPARIN (PORCINE) PER 1000 UNITS: Performed by: INTERNAL MEDICINE

## 2023-05-28 PROCEDURE — 85027 COMPLETE CBC AUTOMATED: CPT | Performed by: HOSPITALIST

## 2023-05-28 PROCEDURE — 96372 THER/PROPH/DIAG INJ SC/IM: CPT

## 2023-05-28 PROCEDURE — G0378 HOSPITAL OBSERVATION PER HR: HCPCS

## 2023-05-28 RX ORDER — FAMOTIDINE 20 MG/1
20 TABLET, FILM COATED ORAL DAILY
Status: DISCONTINUED | OUTPATIENT
Start: 2023-05-29 | End: 2023-06-04 | Stop reason: HOSPADM

## 2023-05-28 RX ADMIN — ASPIRIN 81 MG 81 MG: 81 TABLET ORAL at 08:35

## 2023-05-28 RX ADMIN — LEVOTHYROXINE SODIUM 175 MCG: 175 TABLET ORAL at 06:28

## 2023-05-28 RX ADMIN — AMLODIPINE BESYLATE 5 MG: 5 TABLET ORAL at 08:35

## 2023-05-28 RX ADMIN — ALLOPURINOL 50 MG: 100 TABLET ORAL at 08:34

## 2023-05-28 RX ADMIN — PANTOPRAZOLE SODIUM 40 MG: 40 TABLET, DELAYED RELEASE ORAL at 08:35

## 2023-05-28 RX ADMIN — QUETIAPINE FUMARATE 25 MG: 25 TABLET ORAL at 08:35

## 2023-05-28 RX ADMIN — DONEPEZIL HYDROCHLORIDE 5 MG: 5 TABLET, FILM COATED ORAL at 20:04

## 2023-05-28 RX ADMIN — FAMOTIDINE 20 MG: 20 TABLET, FILM COATED ORAL at 08:34

## 2023-05-28 RX ADMIN — HYDROXYZINE HYDROCHLORIDE 10 MG: 10 TABLET ORAL at 20:04

## 2023-05-28 RX ADMIN — SERTRALINE HYDROCHLORIDE 100 MG: 100 TABLET ORAL at 08:34

## 2023-05-28 RX ADMIN — QUETIAPINE FUMARATE 25 MG: 25 TABLET ORAL at 17:28

## 2023-05-28 RX ADMIN — HEPARIN SODIUM 5000 UNITS: 5000 INJECTION, SOLUTION INTRAVENOUS; SUBCUTANEOUS at 06:28

## 2023-05-28 RX ADMIN — ACETAMINOPHEN 650 MG: 325 TABLET ORAL at 20:04

## 2023-05-28 RX ADMIN — CLOPIDOGREL BISULFATE 75 MG: 75 TABLET ORAL at 08:34

## 2023-05-28 RX ADMIN — HEPARIN SODIUM 5000 UNITS: 5000 INJECTION, SOLUTION INTRAVENOUS; SUBCUTANEOUS at 17:28

## 2023-05-28 RX ADMIN — ATORVASTATIN CALCIUM 80 MG: 40 TABLET, FILM COATED ORAL at 20:04

## 2023-05-28 RX ADMIN — POLYETHYLENE GLYCOL 3350 17 G: 17 POWDER, FOR SOLUTION ORAL at 08:34

## 2023-05-28 RX ADMIN — BUSPIRONE HYDROCHLORIDE 15 MG: 10 TABLET ORAL at 08:34

## 2023-05-28 RX ADMIN — HEPARIN SODIUM 5000 UNITS: 5000 INJECTION, SOLUTION INTRAVENOUS; SUBCUTANEOUS at 21:31

## 2023-05-28 RX ADMIN — FINASTERIDE 5 MG: 5 TABLET, FILM COATED ORAL at 08:34

## 2023-05-28 RX ADMIN — BUSPIRONE HYDROCHLORIDE 15 MG: 10 TABLET ORAL at 20:04

## 2023-05-28 RX ADMIN — TERAZOSIN HYDROCHLORIDE 2 MG: 2 CAPSULE ORAL at 20:04

## 2023-05-28 RX ADMIN — Medication 5 MG: at 20:04

## 2023-05-28 NOTE — PROGRESS NOTES
[facility] Medicine Services  PROGRESS NOTE    Patient Name: Domitila Bosch  : 1940  MRN: [id number]    Date of Admission: 2023  Primary Care Physician: Marcin Ferguson MD    Subjective   Subjective     CC:  F/u confusion    HPI:   Resting in bed, no complaints. Pleasant. Asked me if his wife is named Lorena and if she is coming to see him today.    ROS:   CV- No chest pain  Resp- No cough, dyspnea  GI- No N/V/D, abd pain    Objective   Objective     Vital Signs:   Temp:  [98 °F (36.7 °C)-98.9 °F (37.2 °C)] 98.9 °F (37.2 °C)  Heart Rate:  [53-64] 53  Resp:  [12-18] 12  BP: (126-191)/(66-87) 126/73     Physical Exam:   Constitutional: No acute distress, awake, alert  HENT: NCAT, mucous membranes moist  Respiratory: Clear to auscultation bilaterally, respiratory effort normal   Cardiovascular: RRR, no murmurs, rubs, or gallops  Gastrointestinal: Positive bowel sounds, soft, nontender, nondistended  Musculoskeletal: No bilateral ankle edema  Psychiatric: Appropriate affect, cooperative  Neurologic: Oriented x 1, moves all extremities spontaneously, speech clear  Skin: No rashes    Results Reviewed:  LAB RESULTS:      Lab 23  0538   WBC 7.19   HEMOGLOBIN 13.9   HEMATOCRIT 42.4   PLATELETS 194   MCV 93.0         Lab 23  0538 23  0912 23  0431   SODIUM 141 139 138   POTASSIUM 4.1 4.0 3.7   CHLORIDE 105 104 105   CO2 25.0 25.0 24.0   ANION GAP 11.0 10.0 9.0   BUN 18 15 14   CREATININE 1.22 1.16 1.31*   EGFR 58.8* 62.5 54.0*   GLUCOSE 95 118* 96   CALCIUM 9.1 9.3 8.7         Lab 23  0538   TOTAL PROTEIN 5.7*   ALBUMIN 3.6   GLOBULIN 2.1   ALT (SGPT) 14   AST (SGOT) 16   BILIRUBIN 0.6   ALK PHOS 106         Lab 23  1353 23  1152   HSTROP T 15* 19*                 Brief Urine Lab Results  (Last result in the past 365 days)      Color   Clarity   Blood   Leuk Est   Nitrite   Protein   CREAT   Urine HCG        23 1232 Yellow   Cloudy    Trace   Large (3+)   Negative   Negative                 Microbiology Results Abnormal     Procedure Component Value - Date/Time    Urine Culture - Urine, Urine, Clean Catch [[id number]]  (Normal) Collected: 05/17/23 1232    Lab Status: Final result Specimen: Urine, Clean Catch Updated: 05/18/23 2221     Urine Culture No growth          No radiology results from the last 24 hrs    Results for orders placed during the hospital encounter of 07/28/20    Adult Transesophageal Echo (SUMMER) W/ Cont if Necessary Per Protocol    Interpretation Summary  · Estimated EF = 65%.  · Left ventricular systolic function is normal.  · Left atrial cavity size is mild-to-moderately dilated.  · There is mild calcification of the aortic valve mainly affecting the non and right coronary cusp(s).  · Saline test results are negative.  · The aortic valve exhibits moderate sclerosis.  · There is no evidence of pericardial effusion.  · There is moderate (grade 2) protruding plaque in the descending aorta present.  · No valvular vegetations demonstrated.      Current medications:  Scheduled Meds:allopurinol, 50 mg, Oral, Daily  amLODIPine, 5 mg, Oral, Daily  aspirin, 81 mg, Oral, Daily  atorvastatin, 80 mg, Oral, Nightly  busPIRone, 15 mg, Oral, Q12H  clopidogrel, 75 mg, Oral, Daily  donepezil, 5 mg, Oral, Nightly  [START ON 5/29/2023] famotidine, 20 mg, Oral, Daily  finasteride, 5 mg, Oral, Daily  heparin (porcine), 5,000 Units, Subcutaneous, Q8H  levothyroxine, 175 mcg, Oral, Q AM  melatonin, 5 mg, Oral, Nightly  pantoprazole, 40 mg, Oral, Daily  polyethylene glycol, 17 g, Oral, Daily  QUEtiapine, 25 mg, Oral, BID  sertraline, 100 mg, Oral, Daily  terazosin, 2 mg, Oral, Nightly      Continuous Infusions:   PRN Meds:.•  acetaminophen **OR** acetaminophen **OR** acetaminophen  •  artificial tears  •  senna-docusate sodium **AND** polyethylene glycol **AND** bisacodyl **AND** bisacodyl  •  Calcium Replacement - Follow Nurse / BPA Driven Protocol  •   hydrOXYzine  •  Magnesium Standard Dose Replacement - Follow Nurse / BPA Driven Protocol  •  ondansetron **OR** ondansetron  •  Phosphorus Replacement - Follow Nurse / BPA Driven Protocol  •  Potassium Replacement - Follow Nurse / BPA Driven Protocol    Assessment & Plan   Assessment & Plan     Active Hospital Problems    Diagnosis  POA   • **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        Brief Hospital Course to date:  Domitila Bosch is a 82 y.o. male with dementia who lives with his daughter.  EMS was called due to increased confusion/agitation at home.  While EMS was at the house, apparently his daughter had a seizure and they were both seen in the ED.  He was found to have a UTI and creatinine elevation beyond baseline and admission was requested for further management.      This patient's problems and plans were partially entered by my partner and updated as appropriate by me 05/28/23.    Dementia with worsening confusion  Anxiety disorder  -Continue namenda  -PRN seroquel, as well as scheduled nightly dose  -TSH elevated as below.  B12, folate WNL  -CT head without acute intracranial process  -Case management following  -Follows with Dr. Hernadez at , last appointment 1/6/23, adjusted med rec based on most recent meds, holding prn xanax     CKD III with elevated creatinine  -S/P gentle IVF  -Stable, baseline probably ~ 1.3-1.4 per chart review  -renally dose allopurinol  -cr back to baseline, continue to monitor periodically     UTI  -Urine culture with no growth  -Due to numerous allergies and prior culture with enterococcus faecalis, started on levaquin  -Abx dc'd as urine cx negative, afebrile, wbc's wnl  -QT normal on EKG  -continue finasteride     Hypothyroidism  -Continue  levothyroxine  -TSH is 60.67, free T4 0.42  -levothyroxine dose increased   -recheck TSH in 6-8 weeks at PCP follow-up     HTN  HLD  H/o TIA, CVA  -continue home amlodipine, finasteride, terazosin  -per recent med rec from , patient on triamterene-hctz 37.5mg-25mg, not on terazosin  -monitor BP  -continue statin, ASA    Expected Discharge Location and Transportation: SNF rehab, EMS  Expected Discharge pending bed offer  Expected Discharge Date: 5/30/2023; Expected Discharge Time:      DVT prophylaxis:  Medical DVT prophylaxis orders are present.     AM-PAC 6 Clicks Score (PT): 19 (05/27/23 2000)    CODE STATUS:   Code Status and Medical Interventions:   Ordered at: 05/17/23 1654     Code Status (Patient has no pulse and is not breathing):    CPR (Attempt to Resuscitate)     Medical Interventions (Patient has pulse or is breathing):    Full Support     Comments:    Unable to reach daughter to discuss-will try again later       Sharlene Allen DO  05/28/23

## 2023-05-28 NOTE — PLAN OF CARE
Problem: Fall Injury Risk  Goal: Absence of Fall and Fall-Related Injury  Outcome: Ongoing, Progressing  Intervention: Identify and Manage Contributors  Recent Flowsheet Documentation  Taken 5/27/2023 2000 by Kylee Tesfaye RN  Medication Review/Management: medications reviewed  Intervention: Promote Injury-Free Environment  Recent Flowsheet Documentation  Taken 5/27/2023 2200 by Kylee Tesfaye RN  Safety Promotion/Fall Prevention:   assistive device/personal items within reach   clutter free environment maintained   fall prevention program maintained   nonskid shoes/slippers when out of bed   room organization consistent   safety round/check completed  Taken 5/27/2023 2000 by Kylee Tesfaye RN  Safety Promotion/Fall Prevention:   assistive device/personal items within reach   clutter free environment maintained   fall prevention program maintained   nonskid shoes/slippers when out of bed   room organization consistent   safety round/check completed     Problem: Skin Injury Risk Increased  Goal: Skin Health and Integrity  Outcome: Ongoing, Progressing  Intervention: Optimize Skin Protection  Recent Flowsheet Documentation  Taken 5/27/2023 2200 by Kylee Tesfaye RN  Pressure Reduction Techniques: frequent weight shift encouraged  Head of Bed (HOB) Positioning: [facility] elevated  Pressure Reduction Devices: pressure-redistributing mattress utilized  Skin Protection: incontinence pads utilized  Taken 5/27/2023 2000 by Kylee Tesfaye RN  Pressure Reduction Techniques: frequent weight shift encouraged  Head of Bed (HOB) Positioning: HOB elevated  Pressure Reduction Devices: pressure-redistributing mattress utilized  Skin Protection: incontinence pads utilized     Problem: Confusion Acute  Goal: Optimal Cognitive Function  Outcome: Ongoing, Progressing     Problem: Adult Inpatient Plan of Care  Goal: Plan of Care Review  Outcome: Ongoing, Progressing  Goal: Patient-Specific Goal (Individualized)  Outcome: Ongoing, Progressing    Goal Outcome Evaluation:

## 2023-05-29 PROCEDURE — G0378 HOSPITAL OBSERVATION PER HR: HCPCS

## 2023-05-29 PROCEDURE — 97530 THERAPEUTIC ACTIVITIES: CPT

## 2023-05-29 PROCEDURE — 97116 GAIT TRAINING THERAPY: CPT

## 2023-05-29 PROCEDURE — 96372 THER/PROPH/DIAG INJ SC/IM: CPT

## 2023-05-29 PROCEDURE — 25010000002 HEPARIN (PORCINE) PER 1000 UNITS: Performed by: INTERNAL MEDICINE

## 2023-05-29 RX ADMIN — TERAZOSIN HYDROCHLORIDE 2 MG: 2 CAPSULE ORAL at 20:01

## 2023-05-29 RX ADMIN — SERTRALINE HYDROCHLORIDE 100 MG: 100 TABLET ORAL at 08:41

## 2023-05-29 RX ADMIN — HEPARIN SODIUM 5000 UNITS: 5000 INJECTION, SOLUTION INTRAVENOUS; SUBCUTANEOUS at 22:11

## 2023-05-29 RX ADMIN — ALLOPURINOL 50 MG: 100 TABLET ORAL at 08:41

## 2023-05-29 RX ADMIN — DONEPEZIL HYDROCHLORIDE 5 MG: 5 TABLET, FILM COATED ORAL at 20:01

## 2023-05-29 RX ADMIN — CLOPIDOGREL BISULFATE 75 MG: 75 TABLET ORAL at 08:41

## 2023-05-29 RX ADMIN — ASPIRIN 81 MG 81 MG: 81 TABLET ORAL at 08:41

## 2023-05-29 RX ADMIN — POLYETHYLENE GLYCOL 3350 17 G: 17 POWDER, FOR SOLUTION ORAL at 08:41

## 2023-05-29 RX ADMIN — AMLODIPINE BESYLATE 5 MG: 5 TABLET ORAL at 08:41

## 2023-05-29 RX ADMIN — HEPARIN SODIUM 5000 UNITS: 5000 INJECTION, SOLUTION INTRAVENOUS; SUBCUTANEOUS at 14:23

## 2023-05-29 RX ADMIN — FAMOTIDINE 20 MG: 20 TABLET ORAL at 08:41

## 2023-05-29 RX ADMIN — FINASTERIDE 5 MG: 5 TABLET, FILM COATED ORAL at 08:41

## 2023-05-29 RX ADMIN — BUSPIRONE HYDROCHLORIDE 15 MG: 10 TABLET ORAL at 08:41

## 2023-05-29 RX ADMIN — LEVOTHYROXINE SODIUM 175 MCG: 175 TABLET ORAL at 05:55

## 2023-05-29 RX ADMIN — QUETIAPINE FUMARATE 25 MG: 25 TABLET ORAL at 08:41

## 2023-05-29 RX ADMIN — QUETIAPINE FUMARATE 25 MG: 25 TABLET ORAL at 17:35

## 2023-05-29 RX ADMIN — HYDROXYZINE HYDROCHLORIDE 10 MG: 10 TABLET ORAL at 20:02

## 2023-05-29 RX ADMIN — HYDROXYZINE HYDROCHLORIDE 10 MG: 10 TABLET ORAL at 05:55

## 2023-05-29 RX ADMIN — BUSPIRONE HYDROCHLORIDE 15 MG: 10 TABLET ORAL at 20:02

## 2023-05-29 RX ADMIN — Medication 5 MG: at 20:01

## 2023-05-29 RX ADMIN — PANTOPRAZOLE SODIUM 40 MG: 40 TABLET, DELAYED RELEASE ORAL at 08:41

## 2023-05-29 RX ADMIN — ATORVASTATIN CALCIUM 80 MG: 40 TABLET, FILM COATED ORAL at 20:01

## 2023-05-29 RX ADMIN — HEPARIN SODIUM 5000 UNITS: 5000 INJECTION, SOLUTION INTRAVENOUS; SUBCUTANEOUS at 05:54

## 2023-05-29 NOTE — THERAPY PROGRESS REPORT/RE-CERT
Patient Name: Domitila Bosch  : 1940    MRN: [id number]                              Today's Date: 2023       Admit Date: 2023    Visit Dx:     ICD-10-CM ICD-9-CM   1. Rapidly progressive dementia  F03.90 294.20   2. Agitation  R45.1 307.9   3. Acute on chronic alteration in mental status  R41.82 780.09   4. Elevated blood pressure reading with diagnosis of hypertension  I10 401.9   5. DANIS (acute kidney injury)  N17.9 584.9     Patient Active Problem List   Diagnosis   • Diverticulosis of large intestine with hemorrhage   • Umbilical hernia   • S/P hernia repair   • Hypertension   • Dyslipidemia   • Carotid stenosis   • Gout   • GERD (gastroesophageal reflux disease)   • Hypothyroidism   • Neuropathy   • Malignant melanoma   • Asthma   • Anxiety disorder   • Diverticulosis   • Muscle pain   • Lumbar radiculopathy   • Kidney stone   • Deviated nasal septum   • Chronic laryngitis   • Acute CVA (cerebrovascular accident)   • Essential hypertension   • Allergy to Betadine   • DANIS (acute kidney injury)   • Severe hypothyroidism   • History of ischemic stroke   • Rapidly progressive dementia   • CKD (chronic kidney disease), stage III     Past Medical History:   Diagnosis Date   • Anxiety disorder 2017   • Asthma 2017   • Basal cell carcinoma in situ of skin 2019    right leg    • Carotid stenosis 2017   • Dementia    • Diaphoresis    • Diastolic dysfunction    • Diverticulitis    • Dyslipidemia 2017   • GERD (gastroesophageal reflux disease) 2017   • Gout 2017   • Herpes zoster     Herpes zoster, [id number], right lower extremity.    • Hyperlipidemia    • Hypertension 2017   • Hypothyroidism 2017   • LVH (left ventricular hypertrophy)    • Malignant melanoma 2017   • Melanoma    • Mitral regurgitation    • Nephrolithiasis    • Post herpetic neuralgia 2017   • TIA (transient ischemic attack)     TIA, 2012, with nonobstructive carotid  stenosis, dyslipidemia and hypertension     Past Surgical History:   Procedure Laterality Date   • ABDOMINAL SURGERY     • APPENDECTOMY     • COLON RESECTION LEFT  2016   • COLON RESECTION LEFT  2016   • CYST REMOVAL      left side of neck.    • HEEL SPUR SURGERY  1999   • HERNIA REPAIR      hiatal hernia    • NISSEN FUNDOPLICATION  1984   • OTHER SURGICAL HISTORY  2010    Malignant melanoma, status post resection       General Information     Row Name 05/29/23 1445          Physical Therapy Time and Intention    Document Type progress note/recertification (P)   -ER     Mode of Treatment individual therapy;physical therapy (P)   -ER     Row Name 05/29/23 1445          General Information    Patient Profile Reviewed yes (P)   -ER     Existing Precautions/Restrictions fall;other (see comments) (P)   dementia, impulsive  -ER     Row Name 05/29/23 1445          Cognition    Orientation Status (Cognition) oriented to;person;place;disoriented to;time (P)   -ER     Row Name 05/29/23 1445          Safety Issues, Functional Mobility    Impairments Affecting Function (Mobility) balance;cognition;coordination;endurance/activity tolerance;strength;postural/trunk control (P)   -ER     Comment, Safety Issues/Impairments (Mobility) Patient had no falls or LOB today. He was alert and conversational. (P)   -ER           User Key  (r) = Recorded By, (t) = Taken By, (c) = Cosigned By    Initials Name Provider Type    ER Licha Martin, PT Student PT Student               Mobility     Row Name 05/29/23 1445          Bed Mobility    Bed Mobility supine-sit (P)   -ER     Rolling Right [place] (Bed Mobility) verbal cues;modified independence (P)   -ER     Scooting/Bridging [place] (Bed Mobility) verbal cues;supervision (P)   -ER     Supine-Sit [place] (Bed Mobility) standby assist (P)   -ER     Sit-Supine [place] (Bed Mobility) not tested (P)   -ER     Assistive Device (Bed Mobility) bed rails;head of bed elevated (P)    -ER     Row Name 05/29/23 1445          Bed-Chair Transfer    Bed-Chair [place] (Transfers) standby assist (P)   -ER     Assistive Device (Bed-Chair Transfers) walker, front-wheeled (P)   -ER     Row Name 05/29/23 1445          Sit-Stand Transfer    Sit-Stand [place] (Transfers) contact guard;verbal cues (P)   -ER     Assistive Device (Sit-Stand Transfers) walker, front-wheeled (P)   -ER     Row Name 05/29/23 1445          Gait/Stairs (Locomotion)    [place] Level (Gait) minimum assist (75% patient effort);verbal cues (P)   -ER     Assistive Device (Gait) walker, front-wheeled (P)   -ER     Distance in Feet (Gait) 75 feet, 70 feet (P)   -ER     Deviations/Abnormal Patterns (Gait) base of support, narrow;rody decreased;gait speed decreased;stride length decreased (P)   -ER     Bilateral Gait Deviations forward flexed posture (P)   -ER     Comment, (Gait/Stairs) Patient was eager and able to ambulate today down the hallway with FWW and verbal cues for upright posture, increased step length, and staying in close proximity to the walker. Patient responds very well to cues. He could maintain corrections for about 30 feet before needing to be reminded. (P)   -ER           User Key  (r) = Recorded By, (t) = Taken By, (c) = Cosigned By    Initials Name Provider Type    ER Licha Martin, PT Student PT Student               Obj/Interventions     Row Name 05/29/23 1445          Motor Skills    Therapeutic Exercise hip;knee;ankle;shoulder (P)   -ER     Row Name 05/29/23 1445          Shoulder (Therapeutic Exercise)    Shoulder (Therapeutic Exercise) AROM (active range of motion) (P)   -ER     Shoulder AROM (Therapeutic Exercise) bilateral;flexion;scapular protraction;scapular retraction;sitting;10 repetitions (P)   -ER     Row Name 05/29/23 1445          Hip (Therapeutic Exercise)    Hip (Therapeutic Exercise) strengthening exercise (P)   -ER     Hip Strengthening (Therapeutic Exercise) bilateral;marching  while seated;10 repetitions (P)   -ER     Row Name 05/29/23 1445          Knee (Therapeutic Exercise)    Knee (Therapeutic Exercise) strengthening exercise (P)   -ER     Knee Strengthening (Therapeutic Exercise) bilateral;LAQ (long arc quad);10 repetitions (P)   -ER     Row Name 05/29/23 1445          Ankle (Therapeutic Exercise)    Ankle (Therapeutic Exercise) AROM (active range of motion) (P)   -ER     Ankle AROM (Therapeutic Exercise) bilateral;dorsiflexion;plantarflexion;20 repititions (P)   -ER     Row Name 05/29/23 1445          Balance    Static Standing Balance supervision (P)   -ER     Position/Device Used, Standing Balance supported;walker, front-wheeled (P)   -ER           User Key  (r) = Recorded By, (t) = Taken By, (c) = Cosigned By    Initials Name Provider Type    ER Licha Martin, PT Student PT Student               Goals/Plan     Row Name 05/29/23 1445          Bed Mobility Goal 1 (PT)    Activity/Assistive Device (Bed Mobility Goal 1, PT) bed mobility activities, all (P)   -ER     [place] Level/Cues Needed (Bed Mobility Goal 1, PT) independent (P)   -ER     Time Frame (Bed Mobility Goal 1, PT) long term goal (LTG);10 days (P)   -ER     Progress/Outcomes (Bed Mobility Goal 1, PT) good progress toward goal (P)   -ER     Row Name 05/29/23 1445          Transfer Goal 1 (PT)    Activity/Assistive Device (Transfer Goal 1, PT) sit-to-stand/stand-to-sit;bed-to-chair/chair-to-bed;walker, rolling (P)   -ER     [place] Level/Cues Needed (Transfer Goal 1, PT) supervision required (P)   -ER     Time Frame (Transfer Goal 1, PT) long term goal (LTG);10 days (P)   -ER     Progress/Outcome (Transfer Goal 1, PT) good progress toward goal (P)   -ER     Row Name 05/29/23 1445          Gait Training Goal 1 (PT)    Activity/Assistive Device (Gait Training Goal 1, PT) gait (walking locomotion);walker, rolling (P)   -ER     [place] Level (Gait Training Goal 1, PT) contact guard required (P)   -ER      Distance (Gait Training Goal 1, PT) 300 (P)   -ER     Time Frame (Gait Training Goal 1, PT) long term goal (LTG);10 days (P)   -ER     Progress/Outcome (Gait Training Goal 1, PT) good progress toward goal (P)   -ER     Row Name 05/29/23 1445          Patient Education Goal (PT)    Activity (Patient Education Goal, PT) HEP exer (P)   -ER     [place]/Cues/Accuracy (Memory Goal 2, PT) demonstrates adequately (P)   -ER     Time Frame (Patient Education Goal, PT) long term goal (LTG);5 days (P)   -ER     Progress/Outcome (Patient Education Goal, PT) good progress toward goal (P)   -ER           User Key  (r) = Recorded By, (t) = Taken By, (c) = Cosigned By    Initials Name Provider Type    ER Licha Martin, PT Student PT Student               Clinical Impression     Row Name 05/29/23 1448          Pain    Pretreatment Pain Rating 0/10 - no pain (P)   -ER     Posttreatment Pain Rating 0/10 - no pain (P)   -ER     Row Name 05/29/23 1442          Plan of Care Review    Plan of Care Reviewed With patient (P)   -ER     Progress improving (P)   -ER     Outcome Evaluation Patient was able to perform all bed mobility with modified independence, he was able to transfer with CGA, and was able to ambulate with CGA. He is confused which warrants constant supervision no matter his musculoskeletal ability. Patient walked up and down hallway today with FWW. He was alert, conversational, and able to follow multistep commands. He remains below his functional mobility baseline and is still recommended for skilled PT. Skilled nursing facility remains the current recommendation. (P)   -ER     Row Name 05/29/23 1446          Therapy Assessment/Plan (PT)    Rehab Potential (PT) good, to achieve stated therapy goals (P)   -ER     Criteria for Skilled Interventions Met (PT) yes;meets criteria;skilled treatment is necessary (P)   -ER     Therapy Frequency (PT) daily (P)   -ER     Row Name 05/29/23 1444          Vital Signs    Pre Patient  Position Supine (P)   -ER     Intra Patient Position Standing (P)   -ER     Post Patient Position Sitting (P)   -ER     Rest Breaks  1 (P)   -ER     Row Name 05/29/23 1445          Positioning and Restraints    Pre-Treatment Position in bed (P)   -ER     Post Treatment Position chair (P)   -ER     In Chair notified nsg;reclined;call light within reach;encouraged to call for assist;exit alarm on;patient within staff view (P)   -ER           User Key  (r) = Recorded By, (t) = Taken By, (c) = Cosigned By    Initials Name Provider Type    ER Licha Martin, PT Student PT Student               Outcome Measures     Row Name 05/29/23 1445 05/29/23 0800       How much help from another person do you currently need...    Turning from your back to your side while in flat bed without using bedrails? 4 (P)   -ER 4  -DD    Moving from lying on back to sitting on the side of a flat bed without bedrails? 4 (P)   -ER 4  -DD    Moving to and from a bed to a chair (including a wheelchair)? 3 (P)   -ER 4  -DD    Standing up from a chair using your arms (e.g., wheelchair, bedside chair)? 4 (P)   -ER 4  -DD    Climbing 3-5 steps with a railing? 3 (P)   -ER 3  -DD    To walk in hospital room? 4 (P)   -ER 3  -DD    AM-PAC 6 Clicks Score (PT) 22 (P)   -ER 22  -DD    Highest level of mobility 7 --> Walked 25 feet or more (P)   -ER 7 --> Walked 25 feet or more  -DD    Row Name 05/29/23 1445          Functional Assessment    Outcome Measure Options AM-PAC 6 Clicks Basic Mobility (PT) (P)   -ER           User Key  (r) = Recorded By, (t) = Taken By, (c) = Cosigned By    Initials Name Provider Type    Licha Mcfarland, PT Student PT Student    Efrain Conway, RN Registered Nurse                             Physical Therapy Education     Title: PT OT SLP Therapies (In Progress)     Topic: Physical Therapy (Done)     Point: Mobility training (Done)     Learning Progress Summary           Patient Acceptance, E, VU by ER at 5/29/2023 4789     Comment: patient was educated on the benefits of therapy and exercise. he was instructed on safety during gait with AD and transfer technique    Acceptance, E, VU,NR by NS at 5/26/2023 1619    Acceptance, E, VU,DU,NR by ML at 5/22/2023 1428    Acceptance, E,TB, VU,NR by  at 5/20/2023 0020    Acceptance, E,TB, VU,NR by  at 5/19/2023 0019    Eager, E,D, NR by DM at 5/18/2023 1528                   Point: Home exercise program (Done)     Learning Progress Summary           Patient Acceptance, E, VU by ER at 5/29/2023 1533    Comment: patient was educated on the benefits of therapy and exercise. he was instructed on safety during gait with AD and transfer technique    Acceptance, E, VU,NR by NS at 5/26/2023 1619    Acceptance, E, VU,DU,NR by ML at 5/22/2023 1428    Acceptance, E,TB, VU,NR by  at 5/20/2023 0020    Acceptance, E,TB, VU,NR by  at 5/19/2023 0019    Eager, E,D, NR by DM at 5/18/2023 1528                   Point: Body mechanics (Done)     Learning Progress Summary           Patient Acceptance, E, VU by ER at 5/29/2023 1533    Comment: patient was educated on the benefits of therapy and exercise. he was instructed on safety during gait with AD and transfer technique    Acceptance, E, VU,NR by NS at 5/26/2023 1619    Acceptance, E,TB, VU,NR by  at 5/20/2023 0020    Acceptance, E,TB, VU,NR by  at 5/19/2023 0019    Eager, E,D, NR by DM at 5/18/2023 1528                   Point: Precautions (Done)     Learning Progress Summary           Patient Acceptance, E, VU by ER at 5/29/2023 1533    Comment: patient was educated on the benefits of therapy and exercise. he was instructed on safety during gait with AD and transfer technique    Acceptance, E, VU,NR by NS at 5/26/2023 1619    Acceptance, E, VU,DU,NR by ML at 5/22/2023 1428    Acceptance, E,TB, VU,NR by DH at 5/20/2023 0020    Acceptance, E,TB, VU,NR by DH at 5/19/2023 0019    COY Adame D, NR by DM at 5/18/2023 1528                               User  Key     Initials Effective Dates Name Provider Type Discipline    DM 02/03/23 -  Geeta Perez, PT Physical Therapist PT    NS 06/16/21 -  Elizabeth Perez, PT Physical Therapist PT     08/12/20 -  Kavya Matamoros, RN Registered Nurse Nurse     04/22/21 -  Kala Youngblood Physical Therapist PT    ER 04/20/23 -  Licha Martin, PT Student PT Student PT              PT Recommendation and Plan     Plan of Care Reviewed With: (P) patient  Progress: (P) improving  Outcome Evaluation: (P) Patient was able to perform all bed mobility with modified independence, he was able to transfer with CGA, and was able to ambulate with CGA. He is confused which warrants constant supervision no matter his musculoskeletal ability. Patient walked up and down hallway today with FWW. He was alert, conversational, and able to follow multistep commands. He remains below his functional mobility baseline and is still recommended for skilled PT. Skilled nursing facility remains the current recommendation.     Time Calculation:    PT Charges     Row Name 05/29/23 1445             Time Calculation    Start Time 1445 (P)   -ER      PT Received On 05/29/23 (P)   -ER      PT Goal Re-Cert Due Date 06/08/23 (P)   -ER         Timed Charges    51029 - Gait Training Minutes  18 (P)   -ER      29166 - PT Therapeutic Activity Minutes 10 (P)   -ER         Total Minutes    Timed Charges Total Minutes 28 (P)   -ER       Total Minutes 28 (P)   -ER            User Key  (r) = Recorded By, (t) = Taken By, (c) = Cosigned By    Initials Name Provider Type    ER Licha Martin, PT Student PT Student              Therapy Charges for Today     Code Description Service Date Service Provider Modifiers Qty    51487570426 HC GAIT TRAINING EA 15 MIN 5/29/2023 Licha Martin, PT Student GP 1    11511346694 HC PT THERAPEUTIC ACT EA 15 MIN 5/29/2023 Licha Martin, PT Student GP 1          PT G-Codes  Outcome Measure Options: (P) AM-PAC 6 Clicks Basic Mobility (PT)  AM-PAC  6 Clicks Score (PT): (P) 22  AM-PAC 6 Clicks Score (OT): 16  PT Discharge Summary  Anticipated Discharge Disposition (PT): (P) skilled nursing facility    Licha Martin, PT Student  5/29/2023

## 2023-05-29 NOTE — PLAN OF CARE
Goal Outcome Evaluation:  Plan of Care Reviewed With: patient        Progress: no change  Outcome Evaluation: VSS. 89 year old white male admitted on 5/17/2023, he is on day 12 of this hospitalization for infreased confusion.  He remains leasantly confused, forgetful and easily redirected and refocused.  He often calls his daughter to chat that seems to help his anxiety.  The POC is for long term lacement.  He has rested comfortably this shift.  Have administered all scheduled and prn medication as indicated.  Will continue to monitor patient throughout the rest of shift.

## 2023-05-29 NOTE — PLAN OF CARE
Goal Outcome Evaluation:  Plan of Care Reviewed With: (P) patient        Progress: (P) improving  Outcome Evaluation: (P) Patient was able to perform all bed mobility with modified independence, he was able to transfer with CGA, and was able to ambulate with CGA. He is confused which warrants constant supervision no matter his musculoskeletal ability. Patient walked up and down hallway today with FWW. He was alert, conversational, and able to follow multistep commands. He remains below his functional mobility baseline and is still recommended for skilled PT. Skilled nursing facility remains the current recommendation.

## 2023-05-29 NOTE — PROGRESS NOTES
[facility] Medicine Services  PROGRESS NOTE    Patient Name: Domitila Bosch  : 1940  MRN: [id number]    Date of Admission: 2023  Primary Care Physician: Marcin Ferguson MD    Subjective   Subjective     CC:  F/u confusion    HPI:   Per nursing, patient remains confused. Calls daughter intermittently, which helps his anxiety. No events overnight.    ROS:   UTO- confused    Objective   Objective     Vital Signs:   Temp:  [98.1 °F (36.7 °C)-98.8 °F (37.1 °C)] 98.1 °F (36.7 °C)  Heart Rate:  [49-58] 49  Resp:  [16-20] 16  BP: (117)/(62-68) 117/62     Physical Exam:   Respiratory: nonlabored respirations  Cardiovascular: RRR  Musculoskeletal: moves all extremities  Neurologic: confused, speech clear    Results Reviewed:  LAB RESULTS:      Lab 23  0538   WBC 7.19   HEMOGLOBIN 13.9   HEMATOCRIT 42.4   PLATELETS 194   MCV 93.0         Lab 23  0538 23  0912 23  0431   SODIUM 141 139 138   POTASSIUM 4.1 4.0 3.7   CHLORIDE 105 104 105   CO2 25.0 25.0 24.0   ANION GAP 11.0 10.0 9.0   BUN 18 15 14   CREATININE 1.22 1.16 1.31*   EGFR 58.8* 62.5 54.0*   GLUCOSE 95 118* 96   CALCIUM 9.1 9.3 8.7         Lab 23  0538   TOTAL PROTEIN 5.7*   ALBUMIN 3.6   GLOBULIN 2.1   ALT (SGPT) 14   AST (SGOT) 16   BILIRUBIN 0.6   ALK PHOS 106         Lab 23  1353 23  1152   HSTROP T 15* 19*                 Brief Urine Lab Results  (Last result in the past 365 days)      Color   Clarity   Blood   Leuk Est   Nitrite   Protein   CREAT   Urine HCG        23 1232 Yellow   Cloudy   Trace   Large (3+)   Negative   Negative                 Microbiology Results Abnormal     Procedure Component Value - Date/Time    Urine Culture - Urine, Urine, Clean Catch [[id number]]  (Normal) Collected: 23 1232    Lab Status: Final result Specimen: Urine, Clean Catch Updated: 23     Urine Culture No growth          No radiology results from the last 24 hrs    Results  for orders placed during the hospital encounter of 07/28/20    Adult Transesophageal Echo (SUMMER) W/ Cont if Necessary Per Protocol    Interpretation Summary  · Estimated EF = 65%.  · Left ventricular systolic function is normal.  · Left atrial cavity size is mild-to-moderately dilated.  · There is mild calcification of the aortic valve mainly affecting the non and right coronary cusp(s).  · Saline test results are negative.  · The aortic valve exhibits moderate sclerosis.  · There is no evidence of pericardial effusion.  · There is moderate (grade 2) protruding plaque in the descending aorta present.  · No valvular vegetations demonstrated.      Current medications:  Scheduled Meds:allopurinol, 50 mg, Oral, Daily  amLODIPine, 5 mg, Oral, Daily  aspirin, 81 mg, Oral, Daily  atorvastatin, 80 mg, Oral, Nightly  busPIRone, 15 mg, Oral, Q12H  clopidogrel, 75 mg, Oral, Daily  donepezil, 5 mg, Oral, Nightly  famotidine, 20 mg, Oral, Daily  finasteride, 5 mg, Oral, Daily  heparin (porcine), 5,000 Units, Subcutaneous, Q8H  levothyroxine, 175 mcg, Oral, Q AM  melatonin, 5 mg, Oral, Nightly  pantoprazole, 40 mg, Oral, Daily  polyethylene glycol, 17 g, Oral, Daily  QUEtiapine, 25 mg, Oral, BID  sertraline, 100 mg, Oral, Daily  terazosin, 2 mg, Oral, Nightly      Continuous Infusions:   PRN Meds:.•  acetaminophen **OR** acetaminophen **OR** acetaminophen  •  artificial tears  •  senna-docusate sodium **AND** polyethylene glycol **AND** bisacodyl **AND** bisacodyl  •  Calcium Replacement - Follow Nurse / BPA Driven Protocol  •  hydrOXYzine  •  Magnesium Standard Dose Replacement - Follow Nurse / BPA Driven Protocol  •  ondansetron **OR** ondansetron  •  Phosphorus Replacement - Follow Nurse / BPA Driven Protocol  •  Potassium Replacement - Follow Nurse / BPA Driven Protocol    Assessment & Plan   Assessment & Plan     Active Hospital Problems    Diagnosis  POA   • **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]  Yes   • CKD (chronic  kidney disease), stage III [N18.30]  Yes   • Neuropathy [G62.9]  Yes   • Hypothyroidism [E03.9]  Yes   • Anxiety disorder [F41.9]  Yes      Resolved Hospital Problems   No resolved problems to display.        Brief Hospital Course to date:  Domitila Bosch is a 82 y.o. male with dementia who lives with his daughter.  EMS was called due to increased confusion/agitation at home.  While EMS was at the house, apparently his daughter had a seizure and they were both seen in the ED.  He was found to have a UTI and creatinine elevation beyond baseline and admission was requested for further management.      This patient's problems and plans were partially entered by my partner and updated as appropriate by me 05/29/23.    Dementia with worsening confusion  Anxiety disorder  -Continue namenda  -PRN seroquel, as well as scheduled nightly dose  -TSH elevated as below.  B12, folate WNL  -CT head without acute intracranial process  -Case management following  -Follows with Dr. Hernadez at , last appointment 1/6/23, adjusted med rec based on most recent meds, holding prn xanax     CKD III with elevated creatinine  -S/P gentle IVF  -Stable, baseline probably ~ 1.3-1.4 per chart review  -renally dose allopurinol  -cr back to baseline, continue to monitor periodically     UTI  -Urine culture with no growth  -Due to numerous allergies and prior culture with enterococcus faecalis, started on levaquin  -Abx dc'd as urine cx negative, afebrile, wbc's wnl  -QT normal on EKG  -continue finasteride     Hypothyroidism  -Continue levothyroxine  -TSH is 60.67, free T4 0.42  -levothyroxine dose increased   -recheck TSH in 6-8 weeks at PCP follow-up     HTN  HLD  H/o TIA, CVA  -continue home amlodipine, finasteride, terazosin  -per recent med rec from , patient on triamterene-hctz 37.5mg-25mg, not on terazosin  -monitor BP  -continue statin, ASA    Expected Discharge Location and Transportation: SNF rehab, EMS  Expected Discharge pending bed  offer  Expected Discharge Date: 5/30/2023; Expected Discharge Time:      DVT prophylaxis:  Medical DVT prophylaxis orders are present.     AM-PAC 6 Clicks Score (PT): 22 (05/29/23 0800)    CODE STATUS:   Code Status and Medical Interventions:   Ordered at: 05/17/23 7340     Code Status (Patient has no pulse and is not breathing):    CPR (Attempt to Resuscitate)     Medical Interventions (Patient has pulse or is breathing):    Full Support     Comments:    Unable to reach daughter to discuss-will try again later       Sharlene Allen DO  05/29/23

## 2023-05-29 NOTE — PLAN OF CARE
Goal Outcome Evaluation:           Progress: no change  Outcome Evaluation: VSS. Patient sta in chair most of day and ambulated with PT in hallway. No complaints of pain. BM today and voids in toilet. No evidence of any skin breakdown. Discharge plan still pending. Will continue to monitor

## 2023-05-30 PROCEDURE — G0378 HOSPITAL OBSERVATION PER HR: HCPCS

## 2023-05-30 PROCEDURE — 25010000002 HEPARIN (PORCINE) PER 1000 UNITS: Performed by: INTERNAL MEDICINE

## 2023-05-30 PROCEDURE — 97110 THERAPEUTIC EXERCISES: CPT

## 2023-05-30 PROCEDURE — 99232 SBSQ HOSP IP/OBS MODERATE 35: CPT | Performed by: NURSE PRACTITIONER

## 2023-05-30 PROCEDURE — 97535 SELF CARE MNGMENT TRAINING: CPT

## 2023-05-30 PROCEDURE — 96372 THER/PROPH/DIAG INJ SC/IM: CPT

## 2023-05-30 RX ORDER — AMLODIPINE BESYLATE 2.5 MG/1
2.5 TABLET ORAL DAILY
Status: DISCONTINUED | OUTPATIENT
Start: 2023-05-31 | End: 2023-06-04 | Stop reason: HOSPADM

## 2023-05-30 RX ADMIN — FINASTERIDE 5 MG: 5 TABLET, FILM COATED ORAL at 08:39

## 2023-05-30 RX ADMIN — HEPARIN SODIUM 5000 UNITS: 5000 INJECTION, SOLUTION INTRAVENOUS; SUBCUTANEOUS at 06:12

## 2023-05-30 RX ADMIN — ATORVASTATIN CALCIUM 80 MG: 40 TABLET, FILM COATED ORAL at 21:00

## 2023-05-30 RX ADMIN — HYDROXYZINE HYDROCHLORIDE 10 MG: 10 TABLET ORAL at 06:12

## 2023-05-30 RX ADMIN — ASPIRIN 81 MG 81 MG: 81 TABLET ORAL at 08:39

## 2023-05-30 RX ADMIN — QUETIAPINE FUMARATE 25 MG: 25 TABLET ORAL at 08:39

## 2023-05-30 RX ADMIN — PANTOPRAZOLE SODIUM 40 MG: 40 TABLET, DELAYED RELEASE ORAL at 08:39

## 2023-05-30 RX ADMIN — ALLOPURINOL 50 MG: 100 TABLET ORAL at 09:38

## 2023-05-30 RX ADMIN — POLYETHYLENE GLYCOL 3350 17 G: 17 POWDER, FOR SOLUTION ORAL at 08:39

## 2023-05-30 RX ADMIN — Medication 5 MG: at 21:00

## 2023-05-30 RX ADMIN — HEPARIN SODIUM 5000 UNITS: 5000 INJECTION, SOLUTION INTRAVENOUS; SUBCUTANEOUS at 21:01

## 2023-05-30 RX ADMIN — BUSPIRONE HYDROCHLORIDE 15 MG: 10 TABLET ORAL at 08:39

## 2023-05-30 RX ADMIN — CLOPIDOGREL BISULFATE 75 MG: 75 TABLET ORAL at 08:39

## 2023-05-30 RX ADMIN — BUSPIRONE HYDROCHLORIDE 15 MG: 10 TABLET ORAL at 21:01

## 2023-05-30 RX ADMIN — SERTRALINE HYDROCHLORIDE 100 MG: 100 TABLET ORAL at 08:39

## 2023-05-30 RX ADMIN — TERAZOSIN HYDROCHLORIDE 2 MG: 2 CAPSULE ORAL at 21:00

## 2023-05-30 RX ADMIN — FAMOTIDINE 20 MG: 20 TABLET ORAL at 08:39

## 2023-05-30 RX ADMIN — AMLODIPINE BESYLATE 5 MG: 5 TABLET ORAL at 08:39

## 2023-05-30 RX ADMIN — DONEPEZIL HYDROCHLORIDE 5 MG: 5 TABLET, FILM COATED ORAL at 21:00

## 2023-05-30 RX ADMIN — QUETIAPINE FUMARATE 25 MG: 25 TABLET ORAL at 16:48

## 2023-05-30 RX ADMIN — HEPARIN SODIUM 5000 UNITS: 5000 INJECTION, SOLUTION INTRAVENOUS; SUBCUTANEOUS at 14:09

## 2023-05-30 RX ADMIN — LEVOTHYROXINE SODIUM 175 MCG: 175 TABLET ORAL at 06:12

## 2023-05-30 NOTE — PLAN OF CARE
Goal Outcome Evaluation:  Plan of Care Reviewed With: patient           Outcome Evaluation: VSS on room air. Pt has been pleasantly confused and cooperative. Ambulating to toilet, and has ambulated in halls this shift. Daughter is to come this afternoon/evening for a visit and pt has been looking forward to this. Good nutritional intake with adequate UOP. Small BM this AM. Pt has been up in chair watching TV much of this shift. Frequent calls to family members help with reorientation and keeping pt happy. PO scheduled medications taken without issues. Continue POC and report as needed as D/C planning is in play for long term facility.

## 2023-05-30 NOTE — THERAPY PROGRESS REPORT/RE-CERT
Patient Name: Domitila Bosch  : 1940    MRN: [id number]                              Today's Date: 2023       Admit Date: 2023    Visit Dx:     ICD-10-CM ICD-9-CM   1. Rapidly progressive dementia  F03.90 294.20   2. Agitation  R45.1 307.9   3. Acute on chronic alteration in mental status  R41.82 780.09   4. Elevated blood pressure reading with diagnosis of hypertension  I10 401.9   5. DANIS (acute kidney injury)  N17.9 584.9     Patient Active Problem List   Diagnosis   • Diverticulosis of large intestine with hemorrhage   • Umbilical hernia   • S/P hernia repair   • Hypertension   • Dyslipidemia   • Carotid stenosis   • Gout   • GERD (gastroesophageal reflux disease)   • Hypothyroidism   • Neuropathy   • Malignant melanoma   • Asthma   • Anxiety disorder   • Diverticulosis   • Muscle pain   • Lumbar radiculopathy   • Kidney stone   • Deviated nasal septum   • Chronic laryngitis   • Acute CVA (cerebrovascular accident)   • Essential hypertension   • Allergy to Betadine   • DANIS (acute kidney injury)   • Severe hypothyroidism   • History of ischemic stroke   • Rapidly progressive dementia   • CKD (chronic kidney disease), stage III     Past Medical History:   Diagnosis Date   • Anxiety disorder 2017   • Asthma 2017   • Basal cell carcinoma in situ of skin 2019    right leg    • Carotid stenosis 2017   • Dementia    • Diaphoresis    • Diastolic dysfunction    • Diverticulitis    • Dyslipidemia 2017   • GERD (gastroesophageal reflux disease) 2017   • Gout 2017   • Herpes zoster     Herpes zoster, [id number], right lower extremity.    • Hyperlipidemia    • Hypertension 2017   • Hypothyroidism 2017   • LVH (left ventricular hypertrophy)    • Malignant melanoma 2017   • Melanoma    • Mitral regurgitation    • Nephrolithiasis    • Post herpetic neuralgia 2017   • TIA (transient ischemic attack)     TIA, 2012, with nonobstructive carotid  stenosis, dyslipidemia and hypertension     Past Surgical History:   Procedure Laterality Date   • ABDOMINAL SURGERY     • APPENDECTOMY     • COLON RESECTION LEFT  2016   • COLON RESECTION LEFT  2016   • CYST REMOVAL      left side of neck.    • HEEL SPUR SURGERY  1999   • HERNIA REPAIR      hiatal hernia    • NISSEN FUNDOPLICATION  1984   • OTHER SURGICAL HISTORY  2010    Malignant melanoma, status post resection       General Information     Row Name 05/30/23 1552          OT Time and Intention    Document Type progress note/recertification  -AN     Mode of Treatment occupational therapy  -AN     Row Name 05/30/23 1557          General Information    Patient Profile Reviewed yes  -AN     Existing Precautions/Restrictions fall;other (see comments)  dementia  -AN     Barriers to Rehab medically complex;previous functional deficit;cognitive status  -AN     Row Name 05/30/23 1552          Cognition    Orientation Status (Cognition) oriented to;person;disoriented to;place;situation;time;verbal cues/prompts needed for orientation  -AN     Row Name 05/30/23 1552          Safety Issues, Functional Mobility    Safety Issues Affecting Function (Mobility) safety precautions follow-through/compliance;safety precaution awareness;judgment;sequencing abilities;awareness of need for assistance;insight into deficits/self-awareness;impulsivity;problem-solving  -AN     Impairments Affecting Function (Mobility) balance;cognition;coordination;endurance/activity tolerance;strength;postural/trunk control  -AN     Cognitive Impairments, Mobility Safety/Performance safety precaution awareness;safety precaution follow-through;insight into deficits/self-awareness;judgment;problem-solving/reasoning;sequencing abilities;awareness, need for assistance  -AN           User Key  (r) = Recorded By, (t) = Taken By, (c) = Cosigned By    Initials Name Provider Type    AN Suzy Renee OT Occupational Therapist                 Mobility/ADL's      Row Name 05/30/23 1554          Bed Mobility    Comment, (Bed Mobility) [facility] upon arrival  -AN     Row Name 05/30/23 1554          Transfers    Transfers sit-stand transfer;stand-sit transfer  -AN     Row Name 05/30/23 1554          Sit-Stand Transfer    Sit-Stand [facility] (Transfers) contact guard;verbal cues  -AN     Assistive Device (Sit-Stand Transfers) walker, front-wheeled  -AN     Row Name 05/30/23 1554          Stand-Sit Transfer    Stand-Sit [facility] (Transfers) contact guard  -AN     Assistive Device (Stand-Sit Transfers) walker, front-wheeled  -AN     Row Name 05/30/23 1554          Functional Mobility    Functional Mobility- Ind. Level verbal cues required;minimum assist (75% patient effort);1 person  -AN     Functional Mobility- Device walker, front-wheeled  -AN     Functional Mobility-Distance (Feet) --  >household distance  -AN     Functional Mobility- Safety Issues balance decreased during turns;sequencing ability decreased;step length decreased  -AN     Functional Mobility- Comment pt ambulated >household distance using the RW requiring Min A at times to navigate around obstacles and on uneven jake with continuous cues for RW positioning to avoid falling or tripping over legs of RW.  -AN     Row Name 05/30/23 1554          Activities of Daily Living    BADL Assessment/Intervention grooming;feeding  -AN     Row Name 05/30/23 1554          Grooming Assessment/Training    [facility] Level (Grooming) wash face, hands;set up  -AN     Position (Grooming) supported sitting  -AN     Row Name 05/30/23 1554          Self-Feeding Assessment/Training    [facility] Level (Feeding) prepare tray/open items;set up;feeding skills  -AN     Position (Self-Feeding) supported sitting  -AN           User Key  (r) = Recorded By, (t) = Taken By, (c) = Cosigned By    Initials Name Provider Type    Suzy Mac OT Occupational Therapist               Obj/Interventions     Row Name 05/30/23 1553           Motor Skills    Therapeutic Exercise other (see comments)  pt performed STS x 10 reps from lower seated surface with CGA for balance  -AN     Row Name 05/30/23 3336          Balance    Balance Assessment sitting static balance;sitting dynamic balance;sit to stand dynamic balance;standing static balance;standing dynamic balance  -AN     Static Sitting Balance standby assist  -AN     Dynamic Sitting Balance standby assist  -AN     Position, Sitting Balance sitting in chair  -AN     Sit to Stand Dynamic Balance verbal cues;contact guard  -AN     Static Standing Balance contact guard  -AN     Dynamic Standing Balance contact guard  -AN     Position/Device Used, Standing Balance supported;walker, rolling  -AN     Balance Interventions standing;supported;sit to stand;static;dynamic;minimal challenge;occupation based/functional task  -AN           User Key  (r) = Recorded By, (t) = Taken By, (c) = Cosigned By    Initials Name Provider Type    Suzy Mac OT Occupational Therapist               Goals/Plan     Row Name 05/30/23 1601          Transfer Goal 1 (OT)    Activity/Assistive Device (Transfer Goal 1, OT) toilet;sit-to-stand/stand-to-sit  -AN     [place] Level/Cues Needed (Transfer Goal 1, OT) supervision required  -AN     Time Frame (Transfer Goal 1, OT) long term goal (LTG);10 days  -AN     Progress/Outcome (Transfer Goal 1, OT) goal ongoing  -AN     Row Name 05/30/23 1601          Dressing Goal 1 (OT)    Activity/Device (Dressing Goal 1, OT) upper body dressing;lower body dressing  -AN     [place]/Cues Needed (Dressing Goal 1, OT) standby assist;set-up required;verbal cues required  -AN     Time Frame (Dressing Goal 1, OT) long term goal (LTG);10 days  -AN     Progress/Outcome (Dressing Goal 1, OT) goal ongoing  -AN     Row Name 05/30/23 1601          Toileting Goal 1 (OT)    Activity/Device (Toileting Goal 1, OT) adjust/manage clothing;perform perineal hygiene;commode  -AN     [place]  Level/Cues Needed (Toileting Goal 1, OT) verbal cues required;supervision required  -AN     Time Frame (Toileting Goal 1, OT) long term goal (LTG);10 days  -AN     Progress/Outcome (Toileting Goal 1, OT) goal revised this date  -AN     Row Name 05/30/23 1601          Grooming Goal 1 (OT)    Activity/Device (Grooming Goal 1, OT) oral care;wash face, hands;hair care  -AN     [place] (Grooming Goal 1, OT) verbal cues required;supervision required  -AN     Time Frame (Grooming Goal 1, OT) long term goal (LTG);10 days  -AN     Strategies/Barriers (Grooming Goal 1, OT) sinkside  -AN     Progress/Outcome (Grooming Goal 1, OT) goal ongoing  -AN     Row Name 05/30/23 1602          Therapy Assessment/Plan (OT)    Planned Therapy Interventions (OT) activity tolerance training;adaptive equipment training;BADL retraining;cognitive/visual perception retraining;functional balance retraining;occupation/activity based interventions;patient/caregiver education/training;transfer/mobility retraining;strengthening exercise  -AN           User Key  (r) = Recorded By, (t) = Taken By, (c) = Cosigned By    Initials Name Provider Type    AN Suzy Renee OT Occupational Therapist               Clinical Impression     Row Name 05/30/23 9558          Pain Assessment    Pretreatment Pain Rating 0/10 - no pain  -AN     Posttreatment Pain Rating 0/10 - no pain  -AN     Pre/Posttreatment Pain Comment asymptomatic  -AN     Pain Intervention(s) Repositioned;Ambulation/increased activity  -AN     Row Name 05/30/23 0565          Plan of Care Review    Plan of Care Reviewed With patient  -AN     Progress no change  -AN     Outcome Evaluation OT re-cert completed and goals updated in order to reflect patient's progress in the acute care setting. Pt continues to present with impaired cognition, balance, functional mobility, and ADLs warranting skilled OT services. Pt required assist x 1 and use of RW this session and cues for safety awareness.  Cont POC.  -AN     Row Name 05/30/23 1558          Therapy Plan Review/Discharge Plan (OT)    Anticipated Discharge Disposition (OT) skilled nursing facility  -AN     Row Name 05/30/23 1558          Vital Signs    Pre Systolic BP Rehab --  VSS  -AN     O2 Delivery Pre Treatment room air  -AN     O2 Delivery Intra Treatment room air  -AN     O2 Delivery Post Treatment room air  -AN     Pre Patient Position Sitting  -AN     Intra Patient Position Standing  -AN     Post Patient Position Sitting  -AN     Row Name 05/30/23 1558          Positioning and Restraints    Pre-Treatment Position sitting in chair/recliner  -AN     Post Treatment Position chair  -AN     In Chair notified nsg;reclined;call light within reach;encouraged to call for assist;exit alarm on;legs elevated  -AN           User Key  (r) = Recorded By, (t) = Taken By, (c) = Cosigned By    Initials Name Provider Type    Suzy Mac, ZACK Occupational Therapist               Outcome Measures     Row Name 05/30/23 1602          How much help from another is currently needed...    Putting on and taking off regular lower body clothing? 2  -AN     Bathing (including washing, rinsing, and drying) 2  -AN     Toileting (which includes using toilet bed pan or urinal) 3  -AN     Putting on and taking off regular upper body clothing 3  -AN     Taking care of personal grooming (such as brushing teeth) 3  -AN     Eating meals 3  -AN     AM-PAC 6 Clicks Score (OT) 16  -AN     Row Name 05/30/23 0800          How much help from another person do you currently need...    Turning from your back to your side while in flat bed without using bedrails? 4  -KB     Moving from lying on back to sitting on the side of a flat bed without bedrails? 4  -KB     Moving to and from a bed to a chair (including a wheelchair)? 3  -KB     Standing up from a chair using your arms (e.g., wheelchair, bedside chair)? 4  -KB     Climbing 3-5 steps with a railing? 3  -KB     To walk in  hospital room? 4  -KB     AM-PAC 6 Clicks Score (PT) 22  -KB     Highest level of mobility 7 --> Walked 25 feet or more  -KB     Row Name 05/30/23 1602          Functional Assessment    Outcome Measure Options AM-PAC 6 Clicks Daily Activity (OT)  -AN           User Key  (r) = Recorded By, (t) = Taken By, (c) = Cosigned By    Initials Name Provider Type    Rosa Lo RN Registered Nurse    Suzy Mac OT Occupational Therapist                Occupational Therapy Education     Title: PT OT SLP Therapies (Done)     Topic: Occupational Therapy (Done)     Point: ADL training (Done)     Description:   Instruct learner(s) on proper safety adaptation and remediation techniques during self care or transfers.   Instruct in proper use of assistive devices.              Learning Progress Summary           Patient Acceptance, E, VU,NR by BOB at 5/30/2023 1603    Acceptance, E, NR by  at 5/24/2023 0835    Acceptance, E, NR by  at 5/21/2023 1142    Acceptance, E,TB, VU,NR by  at 5/20/2023 0020    Acceptance, E,TB, VU,NR by  at 5/19/2023 0019    Acceptance, E, NR by  at 5/18/2023 1012    Comment: re-orientation, reason for consult, ADL sequencing, reason need gait belt and gripper socks                   Point: Home exercise program (Done)     Description:   Instruct learner(s) on appropriate technique for monitoring, assisting and/or progressing therapeutic exercises/activities.              Learning Progress Summary           Patient Acceptance, E, VU,NR by BOB at 5/30/2023 1603    Acceptance, E, NR by  at 5/21/2023 1142    Acceptance, E,TB, VU,NR by  at 5/20/2023 0020    Acceptance, E,TB, VU,NR by  at 5/19/2023 0019                   Point: Precautions (Done)     Description:   Instruct learner(s) on prescribed precautions during self-care and functional transfers.              Learning Progress Summary           Patient Acceptance, E, VU,NR by BOB at 5/30/2023 1603    Acceptance, E, NR by   at 5/24/2023 0835    Acceptance, E,TB, VU,NR by  at 5/20/2023 0020    Acceptance, E,TB, VU,NR by  at 5/19/2023 0019    Acceptance, E, NR by  at 5/18/2023 1012    Comment: re-orientation, reason for consult, ADL sequencing, reason need gait belt and gripper socks                   Point: Body mechanics (Done)     Description:   Instruct learner(s) on proper positioning and spine alignment during self-care, functional mobility activities and/or exercises.              Learning Progress Summary           Patient Acceptance, E, VU,NR by  at 5/30/2023 1603    Acceptance, E, NR by  at 5/24/2023 0835    Acceptance, E,TB, VU,NR by  at 5/20/2023 0020    Acceptance, E,TB, VU,NR by  at 5/19/2023 0019                               User Key     Initials Effective Dates Name Provider Type Discipline     02/03/23 -  Kaila Kat, OT Occupational Therapist OT     02/03/23 -  Susan Pierson, OT Occupational Therapist OT     08/12/20 -  Kavya Matamoros, RN Registered Nurse Nurse     10/14/22 -  Alivia Tony, OT Occupational Therapist OT     09/21/21 -  Suzy Renee, OT Occupational Therapist OT              OT Recommendation and Plan  Planned Therapy Interventions (OT): activity tolerance training, adaptive equipment training, BADL retraining, cognitive/visual perception retraining, functional balance retraining, occupation/activity based interventions, patient/caregiver education/training, transfer/mobility retraining, strengthening exercise  Plan of Care Review  Plan of Care Reviewed With: patient  Progress: no change  Outcome Evaluation: OT re-cert completed and goals updated in order to reflect patient's progress in the acute care setting. Pt continues to present with impaired cognition, balance, functional mobility, and ADLs warranting skilled OT services. Pt required assist x 1 and use of RW this session and cues for safety awareness. Cont POC.     Time Calculation:    Time  Calculation- OT     Row Name 05/30/23 1603             Time Calculation- OT    OT Start Time 1430  -AN      OT Received On 05/30/23  -AN      OT Goal Re-Cert Due Date 06/09/23  -AN         Timed Charges    17155 - OT Therapeutic Exercise Minutes 8  -AN      68371 - OT Self Care/Mgmt Minutes 20  -AN         Total Minutes    Timed Charges Total Minutes 28  -AN       Total Minutes 28  -AN            User Key  (r) = Recorded By, (t) = Taken By, (c) = Cosigned By    Initials Name Provider Type    AN Suzy Renee OT Occupational Therapist              Therapy Charges for Today     Code Description Service Date Service Provider Modifiers Qty    33410322975 HC OT THER PROC EA 15 MIN 5/30/2023 Suzy Renee OT GO 1    30740018406 HC OT SELF CARE/MGMT/TRAIN EA 15 MIN 5/30/2023 Suzy Renee OT GO 1               Suzy Renee OT  5/30/2023

## 2023-05-30 NOTE — CASE MANAGEMENT/SOCIAL WORK
Continued Stay Note  [facility]     Patient Name: Domitila Bosch  MRN: [id number]  Today's Date: 5/30/2023    Admit Date: 5/17/2023    Plan: Long Term Care Placement   Discharge Plan     Row Name 05/30/23 1853       Plan    Plan Long Term Care Placement    Plan Comments Continuing search for long term care bed for patient.  Referral made to [facility], left message with Admissons Coordinator to call me to discuss.  Updated daughter.  Discussed in unit multidisciplinary rounds this morning.  Susan Hardwick, Ext. [phone]    Final Discharge Disposition Code 04 - intermediate care facility               Discharge Codes    No documentation.               Expected Discharge Date and Time     Expected Discharge Date Expected Discharge Time    Jun 2, 2023             JACK Johansen

## 2023-05-30 NOTE — PLAN OF CARE
Goal Outcome Evaluation:  Plan of Care Reviewed With: patient        Progress: no change    Outcome Evaluation: VSS. 82 year old white male admitted on 5/17/2023, he is on day 13 of this hospitalization for infreased confusion.  He remains pleasantly confused, forgetful and easily redirected and refocused.  He often calls his daughter to chat that seems to help decrease his anxiety.  The POC is for long term placement.  He has rested comfortably this shift.  Have administered all scheduled and prn medication as indicated.  Will continue to monitor patient throughout the rest of shift.

## 2023-05-30 NOTE — PLAN OF CARE
Goal Outcome Evaluation:  Plan of Care Reviewed With: patient        Progress: no change  Outcome Evaluation: OT re-cert completed and goals updated in order to reflect patient's progress in the acute care setting. Pt continues to present with impaired cognition, balance, functional mobility, and ADLs warranting skilled OT services. Pt required assist x 1 and use of RW this session and cues for safety awareness. Cont POC.

## 2023-05-31 PROCEDURE — G0378 HOSPITAL OBSERVATION PER HR: HCPCS

## 2023-05-31 PROCEDURE — 25010000002 HEPARIN (PORCINE) PER 1000 UNITS: Performed by: INTERNAL MEDICINE

## 2023-05-31 PROCEDURE — 96372 THER/PROPH/DIAG INJ SC/IM: CPT

## 2023-05-31 PROCEDURE — 97116 GAIT TRAINING THERAPY: CPT

## 2023-05-31 PROCEDURE — 97530 THERAPEUTIC ACTIVITIES: CPT

## 2023-05-31 PROCEDURE — 99232 SBSQ HOSP IP/OBS MODERATE 35: CPT | Performed by: NURSE PRACTITIONER

## 2023-05-31 RX ADMIN — HYDROXYZINE HYDROCHLORIDE 10 MG: 10 TABLET ORAL at 00:28

## 2023-05-31 RX ADMIN — DONEPEZIL HYDROCHLORIDE 5 MG: 5 TABLET, FILM COATED ORAL at 21:24

## 2023-05-31 RX ADMIN — AMLODIPINE BESYLATE 2.5 MG: 2.5 TABLET ORAL at 10:30

## 2023-05-31 RX ADMIN — ASPIRIN 81 MG 81 MG: 81 TABLET ORAL at 10:30

## 2023-05-31 RX ADMIN — HEPARIN SODIUM 5000 UNITS: 5000 INJECTION, SOLUTION INTRAVENOUS; SUBCUTANEOUS at 17:05

## 2023-05-31 RX ADMIN — HEPARIN SODIUM 5000 UNITS: 5000 INJECTION, SOLUTION INTRAVENOUS; SUBCUTANEOUS at 06:13

## 2023-05-31 RX ADMIN — ATORVASTATIN CALCIUM 80 MG: 40 TABLET, FILM COATED ORAL at 21:25

## 2023-05-31 RX ADMIN — QUETIAPINE FUMARATE 25 MG: 25 TABLET ORAL at 17:04

## 2023-05-31 RX ADMIN — BUSPIRONE HYDROCHLORIDE 15 MG: 10 TABLET ORAL at 10:30

## 2023-05-31 RX ADMIN — PANTOPRAZOLE SODIUM 40 MG: 40 TABLET, DELAYED RELEASE ORAL at 10:30

## 2023-05-31 RX ADMIN — POLYETHYLENE GLYCOL 3350 17 G: 17 POWDER, FOR SOLUTION ORAL at 10:30

## 2023-05-31 RX ADMIN — TERAZOSIN HYDROCHLORIDE 2 MG: 2 CAPSULE ORAL at 21:24

## 2023-05-31 RX ADMIN — BUSPIRONE HYDROCHLORIDE 15 MG: 10 TABLET ORAL at 21:24

## 2023-05-31 RX ADMIN — SERTRALINE HYDROCHLORIDE 100 MG: 100 TABLET ORAL at 10:30

## 2023-05-31 RX ADMIN — Medication 5 MG: at 21:24

## 2023-05-31 RX ADMIN — CLOPIDOGREL BISULFATE 75 MG: 75 TABLET ORAL at 10:30

## 2023-05-31 RX ADMIN — ALLOPURINOL 50 MG: 100 TABLET ORAL at 10:30

## 2023-05-31 RX ADMIN — LEVOTHYROXINE SODIUM 175 MCG: 175 TABLET ORAL at 06:12

## 2023-05-31 RX ADMIN — FINASTERIDE 5 MG: 5 TABLET, FILM COATED ORAL at 10:30

## 2023-05-31 RX ADMIN — HEPARIN SODIUM 5000 UNITS: 5000 INJECTION, SOLUTION INTRAVENOUS; SUBCUTANEOUS at 21:30

## 2023-05-31 RX ADMIN — QUETIAPINE FUMARATE 25 MG: 25 TABLET ORAL at 10:30

## 2023-05-31 RX ADMIN — HYDROXYZINE HYDROCHLORIDE 10 MG: 10 TABLET ORAL at 21:24

## 2023-05-31 RX ADMIN — FAMOTIDINE 20 MG: 20 TABLET ORAL at 17:04

## 2023-05-31 NOTE — THERAPY TREATMENT NOTE
Patient Name: Domitila Bosch  : 1940    MRN: [id number]                              Today's Date: 2023       Admit Date: 2023    Visit Dx:     ICD-10-CM ICD-9-CM   1. Rapidly progressive dementia  F03.90 294.20   2. Agitation  R45.1 307.9   3. Acute on chronic alteration in mental status  R41.82 780.09   4. Elevated blood pressure reading with diagnosis of hypertension  I10 401.9   5. DANIS (acute kidney injury)  N17.9 584.9     Patient Active Problem List   Diagnosis   • Diverticulosis of large intestine with hemorrhage   • Umbilical hernia   • S/P hernia repair   • Hypertension   • Dyslipidemia   • Carotid stenosis   • Gout   • GERD (gastroesophageal reflux disease)   • Hypothyroidism   • Neuropathy   • Malignant melanoma   • Asthma   • Anxiety disorder   • Diverticulosis   • Muscle pain   • Lumbar radiculopathy   • Kidney stone   • Deviated nasal septum   • Chronic laryngitis   • Acute CVA (cerebrovascular accident)   • Essential hypertension   • Allergy to Betadine   • DANIS (acute kidney injury)   • Severe hypothyroidism   • History of ischemic stroke   • Rapidly progressive dementia   • CKD (chronic kidney disease), stage III     Past Medical History:   Diagnosis Date   • Anxiety disorder 2017   • Asthma 2017   • Basal cell carcinoma in situ of skin 2019    right leg    • Carotid stenosis 2017   • Dementia    • Diaphoresis    • Diastolic dysfunction    • Diverticulitis    • Dyslipidemia 2017   • GERD (gastroesophageal reflux disease) 2017   • Gout 2017   • Herpes zoster     Herpes zoster, [id number], right lower extremity.    • Hyperlipidemia    • Hypertension 2017   • Hypothyroidism 2017   • LVH (left ventricular hypertrophy)    • Malignant melanoma 2017   • Melanoma    • Mitral regurgitation    • Nephrolithiasis    • Post herpetic neuralgia 2017   • TIA (transient ischemic attack)     TIA, 2012, with nonobstructive carotid  stenosis, dyslipidemia and hypertension     Past Surgical History:   Procedure Laterality Date   • ABDOMINAL SURGERY     • APPENDECTOMY     • COLON RESECTION LEFT  2016   • COLON RESECTION LEFT  2016   • CYST REMOVAL      left side of neck.    • HEEL SPUR SURGERY  1999   • HERNIA REPAIR      hiatal hernia    • NISSEN FUNDOPLICATION  1984   • OTHER SURGICAL HISTORY  2010    Malignant melanoma, status post resection       General Information     Row Name 05/31/23 1225          Physical Therapy Time and Intention    Document Type therapy note (daily note)  -ES     Mode of Treatment physical therapy  -ES     Row Name 05/31/23 1225          General Information    Patient Profile Reviewed yes  -ES     Existing Precautions/Restrictions fall;other (see comments)  dementia  -ES     Barriers to Rehab medically complex;previous functional deficit;cognitive status  -ES     Row Name 05/31/23 1225          Cognition    Orientation Status (Cognition) oriented to;person;disoriented to;place;situation;time;verbal cues/prompts needed for orientation  -ES     Row Name 05/31/23 1225          Safety Issues, Functional Mobility    Safety Issues Affecting Function (Mobility) safety precaution awareness;safety precautions follow-through/compliance;awareness of need for assistance;insight into deficits/self-awareness;judgment;sequencing abilities  -ES     Impairments Affecting Function (Mobility) balance;cognition;coordination;endurance/activity tolerance;strength;postural/trunk control  -ES     Cognitive Impairments, Mobility Safety/Performance awareness, need for assistance;insight into deficits/self-awareness;problem-solving/reasoning;safety precaution awareness;safety precaution follow-through;sequencing abilities  -ES     Comment, Safety Issues/Impairments (Mobility) up x1 assist with FWW. Required intermittent cueing for sequencing.  -ES           User Key  (r) = Recorded By, (t) = Taken By, (c) = Cosigned By    Initials Name Provider  Type    ES Erika Davenport PT Physical Therapist               Mobility     Row Name 05/31/23 1229          Bed Mobility    Bed Mobility supine-sit  -ES     Supine-Sit Harvey (Bed Mobility) standby assist  -ES     Assistive Device (Bed Mobility) bed rails;head of bed elevated  -ES     Row Name 05/31/23 1229          Sit-Stand Transfer    Sit-Stand Harvey (Transfers) minimum assist (75% patient effort)  progressed to CGA  -ES     Assistive Device (Sit-Stand Transfers) walker, front-wheeled  -ES     Comment, (Sit-Stand Transfer) STS x2: from EOB, from commode. Pt able to maintain static standing ~2mins for dependent pericare and donning of brief without LOB.  -ES     Row Name 05/31/23 1229          Gait/Stairs (Locomotion)    Harvey Level (Gait) minimum assist (75% patient effort);verbal cues  -ES     Assistive Device (Gait) walker, front-wheeled  -ES     Distance in Feet (Gait) 20+20  -ES     Deviations/Abnormal Patterns (Gait) base of support, narrow;rody decreased;gait speed decreased;stride length decreased  -ES     Bilateral Gait Deviations forward flexed posture  -ES     Comment, (Gait/Stairs) Pt amb 20+20' with Jose De Jesus-CGA and FWW. Demo'd forward flexed posture wtih decreased stride length and narrow CLEO. Required intermittent cueing for sequencing/positioning of FWW. Pt declined further ambulation.  -ES           User Key  (r) = Recorded By, (t) = Taken By, (c) = Cosigned By    Initials Name Provider Type    ES Erika Davenport PT Physical Therapist               Obj/Interventions     Row Name 05/31/23 1231          Balance    Balance Assessment sitting static balance;sitting dynamic balance;sit to stand dynamic balance;standing static balance;standing dynamic balance  -ES     Static Sitting Balance standby assist  -ES     Dynamic Sitting Balance standby assist  -ES     Position, Sitting Balance unsupported;sitting edge of bed;other (see comments)  commode  -ES     Sit to Stand Dynamic  Balance minimal assist;verbal cues  -ES     Static Standing Balance contact guard  -ES     Dynamic Standing Balance minimal assist;verbal cues;other (see comments)  progressed to CGA  -ES     Position/Device Used, Standing Balance supported;walker, front-wheeled  -ES     Balance Interventions sitting;standing;sit to stand;supported;static;dynamic;occupation based/functional task  -ES     Comment, Balance No LOB. Required intermittent cueing for sequencing/safety awareness  -ES           User Key  (r) = Recorded By, (t) = Taken By, (c) = Cosigned By    Initials Name Provider Type    ES Erika Davenport, PT Physical Therapist               Goals/Plan    No documentation.                Clinical Impression     Row Name 05/31/23 1233          Pain    Pretreatment Pain Rating 0/10 - no pain  -ES     Posttreatment Pain Rating 0/10 - no pain  -ES     Pre/Posttreatment Pain Comment asymptomatic  -ES     Pain Intervention(s) Repositioned;Ambulation/increased activity  -ES     Row Name 05/31/23 1233          Plan of Care Review    Plan of Care Reviewed With patient  -ES     Progress improving  -ES     Outcome Evaluation Pt continues to participate well with PT but was limited by fatigue this date. Pt continues to demonstrate functional deficits including balance impairments, decreased safety awareness, and generalized weakness warranting skilled therapy services. PT rec SNF at d/c.  -ES     Row Name 05/31/23 1233          Therapy Assessment/Plan (PT)    Rehab Potential (PT) good, to achieve stated therapy goals  -ES     Criteria for Skilled Interventions Met (PT) yes;meets criteria;skilled treatment is necessary  -ES     Therapy Frequency (PT) daily  -ES     Row Name 05/31/23 1233          Vital Signs    Pre Systolic BP Rehab --  VSS cleared by RN.  -ES     O2 Delivery Pre Treatment room air  -ES     O2 Delivery Intra Treatment room air  -ES     O2 Delivery Post Treatment room air  -ES     Pre Patient Position Supine  -ES      Intra Patient Position Standing  -ES     Post Patient Position Sitting  -ES     Row Name 05/31/23 1233          Positioning and Restraints    Pre-Treatment Position in bed  -ES     Post Treatment Position chair  -ES     In Chair notified nsg;reclined;sitting;call light within reach;encouraged to call for assist;exit alarm on;legs elevated;patient within staff view  -ES           User Key  (r) = Recorded By, (t) = Taken By, (c) = Cosigned By    Initials Name Provider Type    ES Erika Davenport PT Physical Therapist               Outcome Measures     Row Name 05/31/23 1236          How much help from another person do you currently need...    Turning from your back to your side while in flat bed without using bedrails? 4  -ES     Moving from lying on back to sitting on the side of a flat bed without bedrails? 4  -ES     Moving to and from a bed to a chair (including a wheelchair)? 3  -ES     Standing up from a chair using your arms (e.g., wheelchair, bedside chair)? 3  -ES     Climbing 3-5 steps with a railing? 3  -ES     To walk in hospital room? 3  -ES     AM-PAC 6 Clicks Score (PT) 20  -ES     Highest level of mobility 6 --> Walked 10 steps or more  -ES     Row Name 05/31/23 1236          Functional Assessment    Outcome Measure Options AM-PAC 6 Clicks Basic Mobility (PT)  -ES           User Key  (r) = Recorded By, (t) = Taken By, (c) = Cosigned By    Initials Name Provider Type    Erika Fierro PT Physical Therapist                             Physical Therapy Education     Title: PT OT SLP Therapies (Done)     Topic: Physical Therapy (Done)     Point: Mobility training (Done)     Learning Progress Summary           Patient Acceptance, E, VU by ER at 5/29/2023 1533    Comment: patient was educated on the benefits of therapy and exercise. he was instructed on safety during gait with AD and transfer technique    Acceptance, E, VU,NR by NS at 5/26/2023 1619    Acceptance, E, VU,DU,NR by ML at 5/22/2023 6836     Acceptance, E,TB, VU,NR by DH at 5/20/2023 0020    Acceptance, E,TB, VU,NR by DH at 5/19/2023 0019    Eager, E,D, NR by DM at 5/18/2023 1528                   Point: Home exercise program (Done)     Learning Progress Summary           Patient Acceptance, E, VU by ER at 5/29/2023 1533    Comment: patient was educated on the benefits of therapy and exercise. he was instructed on safety during gait with AD and transfer technique    Acceptance, E, VU,NR by NS at 5/26/2023 1619    Acceptance, E, VU,DU,NR by  at 5/22/2023 1428    Acceptance, E,TB, VU,NR by DH at 5/20/2023 0020    Acceptance, E,TB, VU,NR by  at 5/19/2023 0019    Eager, E,D, NR by DM at 5/18/2023 1528                   Point: Body mechanics (Done)     Learning Progress Summary           Patient Acceptance, E, VU by ER at 5/29/2023 1533    Comment: patient was educated on the benefits of therapy and exercise. he was instructed on safety during gait with AD and transfer technique    Acceptance, E, VU,NR by NS at 5/26/2023 1619    Acceptance, E,TB, VU,NR by  at 5/20/2023 0020    Acceptance, E,TB, VU,NR by  at 5/19/2023 0019    Eager, E,D, NR by DM at 5/18/2023 1528                   Point: Precautions (Done)     Learning Progress Summary           Patient Acceptance, E, VU by ER at 5/29/2023 1533    Comment: patient was educated on the benefits of therapy and exercise. he was instructed on safety during gait with AD and transfer technique    Acceptance, E, VU,NR by NS at 5/26/2023 1619    Acceptance, E, VU,DU,NR by  at 5/22/2023 1428    Acceptance, E,TB, VU,NR by  at 5/20/2023 0020    Acceptance, E,TB, VU,NR by  at 5/19/2023 0019    Eager, E,D, NR by DM at 5/18/2023 1528                               User Key     Initials Effective Dates Name Provider Type Discipline    DM 02/03/23 -  Ana, Geeta M, PT Physical Therapist PT    NS 06/16/21 -  Elizabeth Perez, PT Physical Therapist PT     08/12/20 -  Kavya Matamoros, RN Registered Nurse  Nurse    ML 04/22/21 -  Kala Youngblood Physical Therapist PT    ER 04/20/23 -  Licha Martin PT Student PT Student PT              PT Recommendation and Plan     Plan of Care Reviewed With: patient  Progress: improving  Outcome Evaluation: Pt continues to participate well with PT but was limited by fatigue this date. Pt continues to demonstrate functional deficits including balance impairments, decreased safety awareness, and generalized weakness warranting skilled therapy services. PT rec SNF at d/c.     Time Calculation:    PT Charges     Row Name 05/31/23 1237             Time Calculation    Start Time 1035  -ES      PT Received On 05/31/23  -ES      PT Goal Re-Cert Due Date 06/08/23  -ES         Time Calculation- PT    Total Timed Code Minutes- PT 23 minute(s)  -ES         Timed Charges    54775 - Gait Training Minutes  10  -ES      90463 - PT Therapeutic Activity Minutes 13  -ES         Total Minutes    Timed Charges Total Minutes 23  -ES       Total Minutes 23  -ES            User Key  (r) = Recorded By, (t) = Taken By, (c) = Cosigned By    Initials Name Provider Type    ES Erika Davenport, JANNETH Physical Therapist              Therapy Charges for Today     Code Description Service Date Service Provider Modifiers Qty    70134501970 HC GAIT TRAINING EA 15 MIN 5/31/2023 Erika Davenport, PT GP 1    05905769515 HC PT THERAPEUTIC ACT EA 15 MIN 5/31/2023 Erika Davenport, PT GP 1          PT G-Codes  Outcome Measure Options: AM-PAC 6 Clicks Basic Mobility (PT)  AM-PAC 6 Clicks Score (PT): 20  AM-PAC 6 Clicks Score (OT): 16  PT Discharge Summary  Anticipated Discharge Disposition (PT): skilled nursing facility    Erika Davenport PT  5/31/2023

## 2023-05-31 NOTE — PROGRESS NOTES
[facility] Medicine Services  PROGRESS NOTE    Patient Name: Domitila Bosch  : 1940  MRN: [id number]    Date of Admission: 2023  Primary Care Physician: Marcin Ferguson MD    Subjective   Subjective     CC:  F/u confusion    HPI:   Patient sitting up in chair reading magazine asking when he is leaving. No complaints otherwise    ROS:   No complaints but limited by dementia    Objective   Objective     Vital Signs:   Temp:  [97.7 °F (36.5 °C)-98.1 °F (36.7 °C)] 98.1 °F (36.7 °C)  Heart Rate:  [51-64] 51  Resp:  [16-18] 18  BP: (132-145)/(61-71) 145/71     Physical Exam:   Constitutional: No acute distress, awake, alert, sitting up in chair  HENT: NCAT, mucous membranes moist  Respiratory: Clear to auscultation bilaterally, respiratory effort normal   Cardiovascular: RRR, no murmurs, rubs, or gallops  Gastrointestinal: Positive bowel sounds, soft, nontender, nondistended  Musculoskeletal: No bilateral ankle edema  Psychiatric: pleasant, cooperative  Neurologic: confused, repetitive conversation. Answers ROS. Speech clear, PRADO  Skin: No rashes  No change in exam from       Results Reviewed:  LAB RESULTS:      Lab 23  0538   WBC 7.19   HEMOGLOBIN 13.9   HEMATOCRIT 42.4   PLATELETS 194   MCV 93.0         Lab 23  0538 23  0912   SODIUM 141 139   POTASSIUM 4.1 4.0   CHLORIDE 105 104   CO2 25.0 25.0   ANION GAP 11.0 10.0   BUN 18 15   CREATININE 1.22 1.16   EGFR 58.8* 62.5   GLUCOSE 95 118*   CALCIUM 9.1 9.3         Lab 23  0538   TOTAL PROTEIN 5.7*   ALBUMIN 3.6   GLOBULIN 2.1   ALT (SGPT) 14   AST (SGOT) 16   BILIRUBIN 0.6   ALK PHOS 106         Lab 23  1353 23  1152   HSTROP T 15* 19*                 Brief Urine Lab Results  (Last result in the past 365 days)      Color   Clarity   Blood   Leuk Est   Nitrite   Protein   CREAT   Urine HCG        23 1232 Yellow   Cloudy   Trace   Large (3+)   Negative   Negative                  Microbiology Results Abnormal     Procedure Component Value - Date/Time    Urine Culture - Urine, Urine, Clean Catch [[id number]]  (Normal) Collected: 05/17/23 1232    Lab Status: Final result Specimen: Urine, Clean Catch Updated: 05/18/23 2221     Urine Culture No growth          No radiology results from the last 24 hrs    Results for orders placed during the hospital encounter of 07/28/20    Adult Transesophageal Echo (SUMMER) W/ Cont if Necessary Per Protocol    Interpretation Summary  · Estimated EF = 65%.  · Left ventricular systolic function is normal.  · Left atrial cavity size is mild-to-moderately dilated.  · There is mild calcification of the aortic valve mainly affecting the non and right coronary cusp(s).  · Saline test results are negative.  · The aortic valve exhibits moderate sclerosis.  · There is no evidence of pericardial effusion.  · There is moderate (grade 2) protruding plaque in the descending aorta present.  · No valvular vegetations demonstrated.      Current medications:  Scheduled Meds:allopurinol, 50 mg, Oral, Daily  amLODIPine, 2.5 mg, Oral, Daily  aspirin, 81 mg, Oral, Daily  atorvastatin, 80 mg, Oral, Nightly  busPIRone, 15 mg, Oral, Q12H  clopidogrel, 75 mg, Oral, Daily  donepezil, 5 mg, Oral, Nightly  famotidine, 20 mg, Oral, Daily  finasteride, 5 mg, Oral, Daily  heparin (porcine), 5,000 Units, Subcutaneous, Q8H  levothyroxine, 175 mcg, Oral, Q AM  melatonin, 5 mg, Oral, Nightly  pantoprazole, 40 mg, Oral, Daily  polyethylene glycol, 17 g, Oral, Daily  QUEtiapine, 25 mg, Oral, BID  sertraline, 100 mg, Oral, Daily  terazosin, 2 mg, Oral, Nightly      Continuous Infusions:   PRN Meds:.•  acetaminophen **OR** acetaminophen **OR** acetaminophen  •  artificial tears  •  senna-docusate sodium **AND** polyethylene glycol **AND** bisacodyl **AND** bisacodyl  •  Calcium Replacement - Follow Nurse / BPA Driven Protocol  •  hydrOXYzine  •  Magnesium Standard Dose Replacement - Follow Nurse /  BPA Driven Protocol  •  ondansetron **OR** ondansetron  •  Phosphorus Replacement - Follow Nurse / BPA Driven Protocol  •  Potassium Replacement - Follow Nurse / BPA Driven Protocol    Assessment & Plan   Assessment & Plan     Active Hospital Problems    Diagnosis  POA   • **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        Brief Hospital Course to date:  Domitila Bosch is a 82 y.o. male with dementia who lives with his daughter.  EMS was called due to increased confusion/agitation at home.  While EMS was at the house, apparently his daughter had a seizure and they were both seen in the ED.  He was found to have a UTI and creatinine elevation beyond baseline and admission was requested for further management.      This patient's problems and plans were partially entered by my partner and updated as appropriate by me 05/31/23.    Dementia with worsening confusion  Anxiety disorder  -Continue namenda  -continue bid seroquel and buspar  -TSH elevated as below.  B12, folate WNL  -CT head without acute intracranial process  -Case management following  -Follows with Dr. Hernadez at , last appointment 1/6/23, adjusted med rec based on most recent meds, holding prn xanax     CKD III with elevated creatinine  -S/P gentle IVF  -Stable, baseline probably ~ 1.2 per chart review  -renally dose allopurinol to 50mg  -cr back to baseline     UTI  -Due to numerous allergies and prior culture with enterococcus faecalis, started on levaquin- discontinued as Ucx no growth  -QT normal on EKG  -continue finasteride     Hypothyroidism  -TSH is 60.67, free T4 0.42  -levothyroxine dose increased   -recheck TSH in 6-8 weeks at PCP follow-up     HTN  HLD  H/o TIA, CVA  -continue home, finasteride, terazosin- decrease amlodipine  to 2.5mg daily  -per recent med rec from , patient on triamterene-hctz 37.5mg-25mg, not on terazosin  -monitor BP  -continue statin, ASA    Expected Discharge Location and Transportation: LTC  Expected Discharge pending bed offer  Expected Discharge Date: 2023; Expected Discharge Time:      DVT prophylaxis:  Medical DVT prophylaxis orders are present.     AM-PAC 6 Clicks Score (PT): 20 (23 1236)    CODE STATUS:   Code Status and Medical Interventions:   Ordered at: 23 7837     Code Status (Patient has no pulse and is not breathing):    CPR (Attempt to Resuscitate)     Medical Interventions (Patient has pulse or is breathing):    Full Support     Comments:    Unable to reach daughter to discuss-will try again later       Tesha Jarvis, APRN  23        [facility] Medicine Services  PROGRESS NOTE    Patient Name: Domitila Bosch  : 1940  MRN: [id number]    Date of Admission: 2023  Primary Care Physician: Marcin Ferguson MD    Subjective   Subjective     CC:  F/u confusion    HPI:   Pleasantly confused. Asking if he will have any visitors today. RN at bedside states daughter coming this afternoon. No complaints and states he feels well    ROS:   No complaints but limited by dementia    Objective   Objective     Vital Signs:   Temp:  [97.7 °F (36.5 °C)-98.1 °F (36.7 °C)] 98.1 °F (36.7 °C)  Heart Rate:  [51-64] 51  Resp:  [16-18] 18  BP: (132-145)/(61-71) 145/71     Physical Exam:   Constitutional: No acute distress, awake, alert, sitting up in chair  HENT: NCAT, mucous membranes moist  Respiratory: Clear to auscultation bilaterally, respiratory effort normal   Cardiovascular: RRR, no murmurs, rubs, or gallops  Gastrointestinal: Positive bowel sounds, soft, nontender, nondistended  Musculoskeletal: No bilateral ankle edema  Psychiatric: pleasant, cooperative  Neurologic: confused, repetitive conversation. Answers ROS. Speech clear, PRADO  Skin: No  candido      Results Reviewed:  LAB RESULTS:      Lab 05/28/23  0538   WBC 7.19   HEMOGLOBIN 13.9   HEMATOCRIT 42.4   PLATELETS 194   MCV 93.0         Lab 05/28/23  0538 05/26/23  0912   SODIUM 141 139   POTASSIUM 4.1 4.0   CHLORIDE 105 104   CO2 25.0 25.0   ANION GAP 11.0 10.0   BUN 18 15   CREATININE 1.22 1.16   EGFR 58.8* 62.5   GLUCOSE 95 118*   CALCIUM 9.1 9.3         Lab 05/28/23  0538   TOTAL PROTEIN 5.7*   ALBUMIN 3.6   GLOBULIN 2.1   ALT (SGPT) 14   AST (SGOT) 16   BILIRUBIN 0.6   ALK PHOS 106         Lab 05/27/23  1353 05/27/23  1152   HSTROP T 15* 19*                 Brief Urine Lab Results  (Last result in the past 365 days)      Color   Clarity   Blood   Leuk Est   Nitrite   Protein   CREAT   Urine HCG        05/17/23 1232 Yellow   Cloudy   Trace   Large (3+)   Negative   Negative                 Microbiology Results Abnormal     Procedure Component Value - Date/Time    Urine Culture - Urine, Urine, Clean Catch [[id number]]  (Normal) Collected: 05/17/23 1232    Lab Status: Final result Specimen: Urine, Clean Catch Updated: 05/18/23 2221     Urine Culture No growth          No radiology results from the last 24 hrs    Results for orders placed during the hospital encounter of 07/28/20    Adult Transesophageal Echo (SUMMER) W/ Cont if Necessary Per Protocol    Interpretation Summary  · Estimated EF = 65%.  · Left ventricular systolic function is normal.  · Left atrial cavity size is mild-to-moderately dilated.  · There is mild calcification of the aortic valve mainly affecting the non and right coronary cusp(s).  · Saline test results are negative.  · The aortic valve exhibits moderate sclerosis.  · There is no evidence of pericardial effusion.  · There is moderate (grade 2) protruding plaque in the descending aorta present.  · No valvular vegetations demonstrated.      Current medications:  Scheduled Meds:allopurinol, 50 mg, Oral, Daily  amLODIPine, 2.5 mg, Oral, Daily  aspirin, 81 mg, Oral,  Daily  atorvastatin, 80 mg, Oral, Nightly  busPIRone, 15 mg, Oral, Q12H  clopidogrel, 75 mg, Oral, Daily  donepezil, 5 mg, Oral, Nightly  famotidine, 20 mg, Oral, Daily  finasteride, 5 mg, Oral, Daily  heparin (porcine), 5,000 Units, Subcutaneous, Q8H  levothyroxine, 175 mcg, Oral, Q AM  melatonin, 5 mg, Oral, Nightly  pantoprazole, 40 mg, Oral, Daily  polyethylene glycol, 17 g, Oral, Daily  QUEtiapine, 25 mg, Oral, BID  sertraline, 100 mg, Oral, Daily  terazosin, 2 mg, Oral, Nightly      Continuous Infusions:   PRN Meds:.•  acetaminophen **OR** acetaminophen **OR** acetaminophen  •  artificial tears  •  senna-docusate sodium **AND** polyethylene glycol **AND** bisacodyl **AND** bisacodyl  •  Calcium Replacement - Follow Nurse / BPA Driven Protocol  •  hydrOXYzine  •  Magnesium Standard Dose Replacement - Follow Nurse / BPA Driven Protocol  •  ondansetron **OR** ondansetron  •  Phosphorus Replacement - Follow Nurse / BPA Driven Protocol  •  Potassium Replacement - Follow Nurse / BPA Driven Protocol    Assessment & Plan   Assessment & Plan     Active Hospital Problems    Diagnosis  POA   • **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        Brief Hospital Course to date:  Domitila Bosch is a 82 y.o. male with dementia who lives with his daughter.  EMS was called due to increased confusion/agitation at home.  While EMS was at the house, apparently his daughter had a seizure and they were both seen in the ED.  He was found to have a UTI and creatinine elevation beyond baseline and admission was requested for further management.      This patient's problems and plans were partially entered by my partner and updated as appropriate by me 05/31/23.    Dementia with worsening confusion  Anxiety  disorder  -Continue namenda  -continue bid seroquel and buspar  -TSH elevated as below.  B12, folate WNL  -CT head without acute intracranial process  -Case management following  -Follows with Dr. Hernadez at , last appointment 1/6/23, adjusted med rec based on most recent meds, holding prn xanax     CKD III with elevated creatinine  -S/P gentle IVF  -Stable, baseline probably ~ 1.2 per chart review  -renally dose allopurinol to 50mg  -cr back to baseline     UTI  -Due to numerous allergies and prior culture with enterococcus faecalis, started on levaquin- discontinued as Ucx no growth  -QT normal on EKG  -continue finasteride     Hypothyroidism  -TSH is 60.67, free T4 0.42  -levothyroxine dose increased   -recheck TSH in 6-8 weeks at PCP follow-up     HTN  HLD  H/o TIA, CVA  -continue home, finasteride, terazosin- decrease amlodipine to 2.5mg daily  -per recent med rec from , patient on triamterene-hctz 37.5mg-25mg, not on terazosin  -monitor BP  -continue statin, ASA    Expected Discharge Location and Transportation: LTC  Expected Discharge pending bed offer  Expected Discharge Date: 6/2/2023; Expected Discharge Time:      DVT prophylaxis:  Medical DVT prophylaxis orders are present.     AM-PAC 6 Clicks Score (PT): 20 (05/31/23 6826)    CODE STATUS:   Code Status and Medical Interventions:   Ordered at: 05/17/23 3638     Code Status (Patient has no pulse and is not breathing):    CPR (Attempt to Resuscitate)     Medical Interventions (Patient has pulse or is breathing):    Full Support     Comments:    Unable to reach daughter to discuss-will try again later       Tesha Jarvis, APRN  05/31/23

## 2023-05-31 NOTE — CASE MANAGEMENT/SOCIAL WORK
Continued Stay Note  [facility]     Patient Name: Domitila Bosch  MRN: [id number]  Today's Date: 5/31/2023    Admit Date: 5/17/2023    Plan: Long Term Care   Discharge Plan     Row Name 05/31/23 1859       Plan    Plan Long Term Care    Patient/Family in Agreement with Plan yes  Daughter    Plan Comments Continuing search for long term care/IC bed.  I spoke with admissions at [facility]-requesting medical record be faxed for review, faxed this afternoon.  Will f/u with [facility] and [place] facilities in am.  Discussed in unit multidisciplinary rounds this morning.  Susan Hardwick, Ext. [phone]    Final Discharge Disposition Code 04 - intermediate care facility               Discharge Codes    No documentation.               Expected Discharge Date and Time     Expected Discharge Date Expected Discharge Time    Jun 2, 2023             JACK Johansen

## 2023-05-31 NOTE — PLAN OF CARE
Goal Outcome Evaluation:  Plan of Care Reviewed With: patient           Outcome Evaluation: VSS. Pt rested well during the night. Pt denies any complaints. Pt ambulated to restroom with standby assist. Waiting placement at this time. POC continued.

## 2023-05-31 NOTE — PLAN OF CARE
Goal Outcome Evaluation:  Plan of Care Reviewed With: patient        Progress: improving  Outcome Evaluation: resting well, daughter at bedside most of the day, no c/o, up in chair, ambulating in room and to bathroom with standby assist, awaiting placement

## 2023-05-31 NOTE — PLAN OF CARE
Goal Outcome Evaluation:  Plan of Care Reviewed With: patient        Progress: improving  Outcome Evaluation: Pt continues to participate well with PT but was limited by fatigue this date. Pt continues to demonstrate functional deficits including balance impairments, decreased safety awareness, and generalized weakness warranting skilled therapy services. PT rec SNF at d/c.

## 2023-06-01 PROCEDURE — 25010000002 HEPARIN (PORCINE) PER 1000 UNITS: Performed by: INTERNAL MEDICINE

## 2023-06-01 PROCEDURE — 96372 THER/PROPH/DIAG INJ SC/IM: CPT

## 2023-06-01 PROCEDURE — G0378 HOSPITAL OBSERVATION PER HR: HCPCS

## 2023-06-01 PROCEDURE — 99231 SBSQ HOSP IP/OBS SF/LOW 25: CPT | Performed by: HOSPITALIST

## 2023-06-01 RX ADMIN — SERTRALINE HYDROCHLORIDE 100 MG: 100 TABLET ORAL at 09:53

## 2023-06-01 RX ADMIN — ASPIRIN 81 MG 81 MG: 81 TABLET ORAL at 09:53

## 2023-06-01 RX ADMIN — FAMOTIDINE 20 MG: 20 TABLET ORAL at 17:51

## 2023-06-01 RX ADMIN — Medication 5 MG: at 20:53

## 2023-06-01 RX ADMIN — HYDROXYZINE HYDROCHLORIDE 10 MG: 10 TABLET ORAL at 20:53

## 2023-06-01 RX ADMIN — LEVOTHYROXINE SODIUM 175 MCG: 175 TABLET ORAL at 06:00

## 2023-06-01 RX ADMIN — FINASTERIDE 5 MG: 5 TABLET, FILM COATED ORAL at 09:53

## 2023-06-01 RX ADMIN — AMLODIPINE BESYLATE 2.5 MG: 2.5 TABLET ORAL at 09:53

## 2023-06-01 RX ADMIN — ATORVASTATIN CALCIUM 80 MG: 40 TABLET, FILM COATED ORAL at 20:54

## 2023-06-01 RX ADMIN — QUETIAPINE FUMARATE 25 MG: 25 TABLET ORAL at 17:51

## 2023-06-01 RX ADMIN — HEPARIN SODIUM 5000 UNITS: 5000 INJECTION, SOLUTION INTRAVENOUS; SUBCUTANEOUS at 20:53

## 2023-06-01 RX ADMIN — CLOPIDOGREL BISULFATE 75 MG: 75 TABLET ORAL at 09:53

## 2023-06-01 RX ADMIN — QUETIAPINE FUMARATE 25 MG: 25 TABLET ORAL at 09:53

## 2023-06-01 RX ADMIN — BUSPIRONE HYDROCHLORIDE 15 MG: 10 TABLET ORAL at 09:53

## 2023-06-01 RX ADMIN — ALLOPURINOL 50 MG: 100 TABLET ORAL at 09:53

## 2023-06-01 RX ADMIN — HEPARIN SODIUM 5000 UNITS: 5000 INJECTION, SOLUTION INTRAVENOUS; SUBCUTANEOUS at 06:00

## 2023-06-01 RX ADMIN — POLYETHYLENE GLYCOL 3350 17 G: 17 POWDER, FOR SOLUTION ORAL at 09:53

## 2023-06-01 RX ADMIN — TERAZOSIN HYDROCHLORIDE 2 MG: 2 CAPSULE ORAL at 20:53

## 2023-06-01 RX ADMIN — HEPARIN SODIUM 5000 UNITS: 5000 INJECTION, SOLUTION INTRAVENOUS; SUBCUTANEOUS at 15:05

## 2023-06-01 RX ADMIN — DONEPEZIL HYDROCHLORIDE 5 MG: 5 TABLET, FILM COATED ORAL at 20:53

## 2023-06-01 RX ADMIN — BUSPIRONE HYDROCHLORIDE 15 MG: 10 TABLET ORAL at 20:53

## 2023-06-01 RX ADMIN — PANTOPRAZOLE SODIUM 40 MG: 40 TABLET, DELAYED RELEASE ORAL at 09:53

## 2023-06-01 NOTE — PROGRESS NOTES
[facility] Medicine Services  PROGRESS NOTE    Patient Name: Domitila Bosch  : 1940  MRN: [id number]    Date of Admission: 2023  Primary Care Physician: Marcin Ferguson MD    Subjective   Subjective     CC:  F/U confusion    HPI:  Patient seen this morning, slept well overnight, eating breakfast. Enjoying the muffin in particular. No other complaints. Asking if his wife will be coming by today (wife is ).    ROS:  Denies pain, SOA, nausea      Objective   Objective     Vital Signs:   Temp:  [97.7 °F (36.5 °C)] 97.7 °F (36.5 °C)  Heart Rate:  [52-61] 52  Resp:  [18] 18  BP: (118-126)/(71-73) 126/71     Physical Exam:  Gen-no acute distress  HENT-NCAT, mucous membranes moist  CV-RRR, S1 S2 normal, no m/r/g  Resp-CTAB, no wheezes or rales  Neuro-awake and alert but confused, repeats himself, no focal deficits  Skin-no rashes  Psych-appropriate mood      Results Reviewed:  LAB RESULTS:      Lab 23  0538   WBC 7.19   HEMOGLOBIN 13.9   HEMATOCRIT 42.4   PLATELETS 194   MCV 93.0         Lab 23  0538 23  0912   SODIUM 141 139   POTASSIUM 4.1 4.0   CHLORIDE 105 104   CO2 25.0 25.0   ANION GAP 11.0 10.0   BUN 18 15   CREATININE 1.22 1.16   EGFR 58.8* 62.5   GLUCOSE 95 118*   CALCIUM 9.1 9.3         Lab 23  0538   TOTAL PROTEIN 5.7*   ALBUMIN 3.6   GLOBULIN 2.1   ALT (SGPT) 14   AST (SGOT) 16   BILIRUBIN 0.6   ALK PHOS 106         Lab 23  1353 23  1152   HSTROP T 15* 19*                 Brief Urine Lab Results  (Last result in the past 365 days)      Color   Clarity   Blood   Leuk Est   Nitrite   Protein   CREAT   Urine HCG        23 1232 Yellow   Cloudy   Trace   Large (3+)   Negative   Negative                 Microbiology Results Abnormal     Procedure Component Value - Date/Time    Urine Culture - Urine, Urine, Clean Catch [[id number]]  (Normal) Collected: 23 1232    Lab Status: Final result Specimen: Urine, Clean Catch  Updated: 05/18/23 2221     Urine Culture No growth          No radiology results from the last 24 hrs    Results for orders placed during the hospital encounter of 07/28/20    Adult Transesophageal Echo (SUMMER) W/ Cont if Necessary Per Protocol    Interpretation Summary  · Estimated EF = 65%.  · Left ventricular systolic function is normal.  · Left atrial cavity size is mild-to-moderately dilated.  · There is mild calcification of the aortic valve mainly affecting the non and right coronary cusp(s).  · Saline test results are negative.  · The aortic valve exhibits moderate sclerosis.  · There is no evidence of pericardial effusion.  · There is moderate (grade 2) protruding plaque in the descending aorta present.  · No valvular vegetations demonstrated.      Current medications:  Scheduled Meds:allopurinol, 50 mg, Oral, Daily  amLODIPine, 2.5 mg, Oral, Daily  aspirin, 81 mg, Oral, Daily  atorvastatin, 80 mg, Oral, Nightly  busPIRone, 15 mg, Oral, Q12H  clopidogrel, 75 mg, Oral, Daily  donepezil, 5 mg, Oral, Nightly  famotidine, 20 mg, Oral, Daily  finasteride, 5 mg, Oral, Daily  heparin (porcine), 5,000 Units, Subcutaneous, Q8H  levothyroxine, 175 mcg, Oral, Q AM  melatonin, 5 mg, Oral, Nightly  pantoprazole, 40 mg, Oral, Daily  polyethylene glycol, 17 g, Oral, Daily  QUEtiapine, 25 mg, Oral, BID  sertraline, 100 mg, Oral, Daily  terazosin, 2 mg, Oral, Nightly      Continuous Infusions:   PRN Meds:.•  acetaminophen **OR** acetaminophen **OR** acetaminophen  •  artificial tears  •  senna-docusate sodium **AND** polyethylene glycol **AND** bisacodyl **AND** bisacodyl  •  Calcium Replacement - Follow Nurse / BPA Driven Protocol  •  hydrOXYzine  •  Magnesium Standard Dose Replacement - Follow Nurse / BPA Driven Protocol  •  ondansetron **OR** ondansetron  •  Phosphorus Replacement - Follow Nurse / BPA Driven Protocol  •  Potassium Replacement - Follow Nurse / BPA Driven Protocol    Assessment & Plan   Assessment & Plan      Active Hospital Problems    Diagnosis  POA   • **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        Brief Hospital Course to date:  Domitila Bosch is a 82 y.o. male with hx of dementia, HLD, HLD, CKD 3, hypothyroidism, TIA/CVA, anxiety, and dementia who lives with his daughter. EMS was called due to increased confusion/agitation at home. While EMS was at the house, apparently his daughter had a seizure and they were both seen in the ED. She felt like she could no longer care for him at that point. He was found to have a UTI and creatinine elevation beyond baseline and admission was requested for further management.      This patient's problems and plans were partially entered by my partner and updated as appropriate by me 06/01/23. Copied text in this note has been reviewed and is accurate as of today's date.     Dementia with worsening confusion  Anxiety disorder  -TSH elevated as below  -B12, folate WNL  -CT head without acute intracranial process  -Case management following  -Follows with Dr. Hernadez at , last appointment 1/6/23, adjusted med rec based on most recent meds, holding PRN Xanax and using PRN Hydroxyzine instead  -Continue Namenda  -Continue Seroquel 25 mg BID and Buspar 15 mg BID     CKD III with elevated creatinine  -s/p gentle IVF  -Now stable, baseline Cr probably ~1.2 per chart review  -Renally dosed Allopurinol to 50 mg daily     UTI  BPH  -Due to numerous allergies and prior culture with Enterococcus faecalis, started on Levaquin - then discontinued as urine culture was no growth  -QT normal on EKG  -Continue Finasteride     Hypothyroidism  -TSH is elevated at 60.67, free T4 low at 0.42  -Levothyroxine dose increased from 150 to 175 mcg daily  -Recheck TSH in 6-8 weeks at PCP  follow-up     HTN  HLD  Hx of TIA, CVA  -Continue home Finasteride, Terazosin; decreased Amlodipine to 2.5 mg daily  -Per recent med rec from , patient on Triamterene-HCTZ 37.5 mg-25 mg, not on Terazosin? He is doing well on current regimen so will not adjust at this time  -Continue ASA, statin    Expected Discharge Location and Transportation: LTC  Expected Discharge   Expected Discharge Date: 6/2/2023; Expected Discharge Time:      DVT prophylaxis:  Medical DVT prophylaxis orders are present.     AM-PAC 6 Clicks Score (PT): 20 (05/31/23 4111)    CODE STATUS:   Code Status and Medical Interventions:   Ordered at: 05/17/23 8368     Code Status (Patient has no pulse and is not breathing):    CPR (Attempt to Resuscitate)     Medical Interventions (Patient has pulse or is breathing):    Full Support     Comments:    Unable to reach daughter to discuss-will try again later       Alejandra Chaudhari MD  06/01/23

## 2023-06-01 NOTE — PLAN OF CARE
Goal Outcome Evaluation:  Plan of Care Reviewed With: patient        Progress: no change  Outcome Evaluation: resting well, no c/o, pleasantly confused, family/friend visited today, up with standby, awaiting placment

## 2023-06-01 NOTE — PLAN OF CARE
Goal Outcome Evaluation:  Plan of Care Reviewed With: patient        Progress: no change  Outcome Evaluation: VSS. Pt pleasantly confused but easily redirected. Pt ambulates to restroom with standby assist. Continuing to wait placement. Care continued.

## 2023-06-02 PROCEDURE — G0378 HOSPITAL OBSERVATION PER HR: HCPCS

## 2023-06-02 PROCEDURE — 97535 SELF CARE MNGMENT TRAINING: CPT

## 2023-06-02 PROCEDURE — 97110 THERAPEUTIC EXERCISES: CPT

## 2023-06-02 PROCEDURE — 96372 THER/PROPH/DIAG INJ SC/IM: CPT

## 2023-06-02 PROCEDURE — 25010000002 HEPARIN (PORCINE) PER 1000 UNITS: Performed by: INTERNAL MEDICINE

## 2023-06-02 RX ADMIN — FAMOTIDINE 20 MG: 20 TABLET ORAL at 16:23

## 2023-06-02 RX ADMIN — HEPARIN SODIUM 5000 UNITS: 5000 INJECTION, SOLUTION INTRAVENOUS; SUBCUTANEOUS at 13:55

## 2023-06-02 RX ADMIN — Medication 5 MG: at 20:22

## 2023-06-02 RX ADMIN — ALLOPURINOL 50 MG: 100 TABLET ORAL at 09:57

## 2023-06-02 RX ADMIN — SERTRALINE HYDROCHLORIDE 100 MG: 100 TABLET ORAL at 09:57

## 2023-06-02 RX ADMIN — HEPARIN SODIUM 5000 UNITS: 5000 INJECTION, SOLUTION INTRAVENOUS; SUBCUTANEOUS at 05:47

## 2023-06-02 RX ADMIN — CLOPIDOGREL BISULFATE 75 MG: 75 TABLET ORAL at 09:57

## 2023-06-02 RX ADMIN — QUETIAPINE FUMARATE 25 MG: 25 TABLET ORAL at 16:24

## 2023-06-02 RX ADMIN — PANTOPRAZOLE SODIUM 40 MG: 40 TABLET, DELAYED RELEASE ORAL at 09:57

## 2023-06-02 RX ADMIN — HYDROXYZINE HYDROCHLORIDE 10 MG: 10 TABLET ORAL at 12:26

## 2023-06-02 RX ADMIN — BUSPIRONE HYDROCHLORIDE 15 MG: 10 TABLET ORAL at 09:59

## 2023-06-02 RX ADMIN — ATORVASTATIN CALCIUM 80 MG: 40 TABLET, FILM COATED ORAL at 20:22

## 2023-06-02 RX ADMIN — LEVOTHYROXINE SODIUM 175 MCG: 175 TABLET ORAL at 05:47

## 2023-06-02 RX ADMIN — DONEPEZIL HYDROCHLORIDE 5 MG: 5 TABLET, FILM COATED ORAL at 20:22

## 2023-06-02 RX ADMIN — POLYETHYLENE GLYCOL 3350 17 G: 17 POWDER, FOR SOLUTION ORAL at 09:57

## 2023-06-02 RX ADMIN — FINASTERIDE 5 MG: 5 TABLET, FILM COATED ORAL at 09:57

## 2023-06-02 RX ADMIN — QUETIAPINE FUMARATE 25 MG: 25 TABLET ORAL at 09:57

## 2023-06-02 RX ADMIN — BUSPIRONE HYDROCHLORIDE 15 MG: 10 TABLET ORAL at 20:22

## 2023-06-02 RX ADMIN — AMLODIPINE BESYLATE 2.5 MG: 2.5 TABLET ORAL at 09:57

## 2023-06-02 RX ADMIN — TERAZOSIN HYDROCHLORIDE 2 MG: 2 CAPSULE ORAL at 20:22

## 2023-06-02 RX ADMIN — HYDROXYZINE HYDROCHLORIDE 10 MG: 10 TABLET ORAL at 20:22

## 2023-06-02 RX ADMIN — ASPIRIN 81 MG 81 MG: 81 TABLET ORAL at 09:57

## 2023-06-02 NOTE — PLAN OF CARE
Goal Outcome Evaluation:Domitila remains pleasantly confused.  He was agitated and confused @ noon. He quickly forgets the plan of care, sets off movement alarums & is looking for things which he does not have in his room.  Plan in place for possible discharge tomorrow, daughter transporting.Continue to reorient him, keep him safe & control agitation.

## 2023-06-02 NOTE — PROGRESS NOTES
[facility] Medicine Services  PROGRESS NOTE    Patient Name: Domitila Bosch  : 1940  MRN: [id number]    Date of Admission: 2023  Primary Care Physician: Marcin Ferguson MD    Subjective   Subjective     CC:  F/U confusion    HPI:  Patient seen this morning, sleeping.     ROS:  Patient sleeping, I did not wake him up      Objective   Objective     Vital Signs:   Temp:  [98.2 °F (36.8 °C)-99 °F (37.2 °C)] 98.2 °F (36.8 °C)  Heart Rate:  [66-71] 66  Resp:  [17-18] 17  BP: (128-138)/(67-83) 138/83     Physical Exam:  Gen-no acute distress  Resp-nonlabored on room air  Neuro-sleeping      Results Reviewed:  LAB RESULTS:      Lab 23  0538   WBC 7.19   HEMOGLOBIN 13.9   HEMATOCRIT 42.4   PLATELETS 194   MCV 93.0         Lab 23  0538   SODIUM 141   POTASSIUM 4.1   CHLORIDE 105   CO2 25.0   ANION GAP 11.0   BUN 18   CREATININE 1.22   EGFR 58.8*   GLUCOSE 95   CALCIUM 9.1         Lab 23  0538   TOTAL PROTEIN 5.7*   ALBUMIN 3.6   GLOBULIN 2.1   ALT (SGPT) 14   AST (SGOT) 16   BILIRUBIN 0.6   ALK PHOS 106         Lab 23  1353 23  1152   HSTROP T 15* 19*                 Brief Urine Lab Results  (Last result in the past 365 days)      Color   Clarity   Blood   Leuk Est   Nitrite   Protein   CREAT   Urine HCG        23 1232 Yellow   Cloudy   Trace   Large (3+)   Negative   Negative                 Microbiology Results Abnormal     Procedure Component Value - Date/Time    Urine Culture - Urine, Urine, Clean Catch [[id number]]  (Normal) Collected: 23 1232    Lab Status: Final result Specimen: Urine, Clean Catch Updated: 231     Urine Culture No growth          No radiology results from the last 24 hrs    Results for orders placed during the hospital encounter of 20    Adult Transesophageal Echo (SUMMER) W/ Cont if Necessary Per Protocol    Interpretation Summary  · Estimated EF = 65%.  · Left ventricular systolic function is normal.  ·  Left atrial cavity size is mild-to-moderately dilated.  · There is mild calcification of the aortic valve mainly affecting the non and right coronary cusp(s).  · Saline test results are negative.  · The aortic valve exhibits moderate sclerosis.  · There is no evidence of pericardial effusion.  · There is moderate (grade 2) protruding plaque in the descending aorta present.  · No valvular vegetations demonstrated.      Current medications:  Scheduled Meds:allopurinol, 50 mg, Oral, Daily  amLODIPine, 2.5 mg, Oral, Daily  aspirin, 81 mg, Oral, Daily  atorvastatin, 80 mg, Oral, Nightly  busPIRone, 15 mg, Oral, Q12H  clopidogrel, 75 mg, Oral, Daily  donepezil, 5 mg, Oral, Nightly  famotidine, 20 mg, Oral, Daily  finasteride, 5 mg, Oral, Daily  heparin (porcine), 5,000 Units, Subcutaneous, Q8H  levothyroxine, 175 mcg, Oral, Q AM  melatonin, 5 mg, Oral, Nightly  pantoprazole, 40 mg, Oral, Daily  polyethylene glycol, 17 g, Oral, Daily  QUEtiapine, 25 mg, Oral, BID  sertraline, 100 mg, Oral, Daily  terazosin, 2 mg, Oral, Nightly      Continuous Infusions:   PRN Meds:.•  acetaminophen **OR** acetaminophen **OR** acetaminophen  •  artificial tears  •  senna-docusate sodium **AND** polyethylene glycol **AND** bisacodyl **AND** bisacodyl  •  Calcium Replacement - Follow Nurse / BPA Driven Protocol  •  hydrOXYzine  •  Magnesium Standard Dose Replacement - Follow Nurse / BPA Driven Protocol  •  ondansetron **OR** ondansetron  •  Phosphorus Replacement - Follow Nurse / BPA Driven Protocol  •  Potassium Replacement - Follow Nurse / BPA Driven Protocol    Assessment & Plan   Assessment & Plan     Active Hospital Problems    Diagnosis  POA   • **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        Brief Hospital Course to  date:  Domitila Bosch is a 82 y.o. male with hx of dementia, HLD, HLD, CKD 3, hypothyroidism, TIA/CVA, anxiety, and dementia who lives with his daughter. EMS was called due to increased confusion/agitation at home. While EMS was at the house, apparently his daughter had a seizure and they were both seen in the ED. She felt like she could no longer care for him at that point. He was found to have a UTI and creatinine elevation beyond baseline and admission was requested for further management.      This patient's problems and plans were partially entered by my partner and updated as appropriate by me 06/02/23. Copied text in this note has been reviewed and is accurate as of today's date.     Dementia with worsening confusion  Anxiety disorder  -TSH elevated as below  -B12, folate WNL  -CT head without acute intracranial process  -Case management following  -Follows with Dr. Hernadez at , last appointment 1/6/23, adjusted med rec based on most recent meds, holding PRN Xanax and using PRN Hydroxyzine instead  -Continue Namenda  -Continue Seroquel 25 mg BID and Buspar 15 mg BID     CKD III with elevated creatinine  -s/p gentle IVF  -Now stable, baseline Cr probably ~1.2 per chart review  -Renally dosed Allopurinol to 50 mg daily     UTI  BPH  -Due to numerous allergies and prior culture with Enterococcus faecalis, started on Levaquin - then discontinued as urine culture was no growth  -QT normal on EKG  -Continue Finasteride     Hypothyroidism  -TSH is elevated at 60.67, free T4 low at 0.42  -Levothyroxine dose increased from 150 to 175 mcg daily  -Recheck TSH in 6-8 weeks at PCP follow-up     HTN  HLD  Hx of TIA, CVA  -Continue home Finasteride, Terazosin; decreased Amlodipine to 2.5 mg daily  -Per recent med rec from , patient on Triamterene-HCTZ 37.5 mg-25 mg, not on Terazosin? He is doing well on current regimen so will not adjust at this time  -Continue ASA, statin    Expected Discharge Location and Transportation:  LTC  Expected Discharge [facility] may possibly be offering a bed soon per CM notes  Expected Discharge Date: 6/5/2023; Expected Discharge Time:      DVT prophylaxis:  Medical DVT prophylaxis orders are present.     AM-PAC 6 Clicks Score (PT): 20 (06/02/23 0900)    CODE STATUS:   Code Status and Medical Interventions:   Ordered at: 05/17/23 8453     Code Status (Patient has no pulse and is not breathing):    CPR (Attempt to Resuscitate)     Medical Interventions (Patient has pulse or is breathing):    Full Support     Comments:    Unable to reach daughter to discuss-will try again later       Alejandra Chaudhari MD  06/02/23

## 2023-06-02 NOTE — CASE MANAGEMENT/SOCIAL WORK
Received call from daughter Yoly. Explained that patient does not qualify for Medicaid at this time. Gave her the number to call to get requirements and initiate the Medicaid process. Explained that she will have to take her father home as he does not qualify any post acute services at this time.

## 2023-06-02 NOTE — PLAN OF CARE
Goal Outcome Evaluation:  Plan of Care Reviewed With: patient        Progress: no change  Outcome Evaluation: Pt continues to be pleasantly confused and motivated to participate in therapy sessions. Pt required frequent re-orientation to situation. Pt enjoyed participating in dynamic standing balance and ther-ex on unit balcony, as well as engaged in meaningful conversation of past times. Cont POC.

## 2023-06-02 NOTE — CASE MANAGEMENT/SOCIAL WORK
Placed call to Yoly Bosch at [phone]. Left VM stating that patient does not qualify for Medicaid and therefore can not be placed in LTC at this time. She will need to take her father home as there are no other discharge options.

## 2023-06-02 NOTE — THERAPY TREATMENT NOTE
Patient Name: Domitila Bosch  : 1940    MRN: [id number]                              Today's Date: 2023       Admit Date: 2023    Visit Dx:     ICD-10-CM ICD-9-CM   1. Rapidly progressive dementia  F03.90 294.20   2. Agitation  R45.1 307.9   3. Acute on chronic alteration in mental status  R41.82 780.09   4. Elevated blood pressure reading with diagnosis of hypertension  I10 401.9   5. DANIS (acute kidney injury)  N17.9 584.9     Patient Active Problem List   Diagnosis   • Diverticulosis of large intestine with hemorrhage   • Umbilical hernia   • S/P hernia repair   • Hypertension   • Dyslipidemia   • Carotid stenosis   • Gout   • GERD (gastroesophageal reflux disease)   • Hypothyroidism   • Neuropathy   • Malignant melanoma   • Asthma   • Anxiety disorder   • Diverticulosis   • Muscle pain   • Lumbar radiculopathy   • Kidney stone   • Deviated nasal septum   • Chronic laryngitis   • Acute CVA (cerebrovascular accident)   • Essential hypertension   • Allergy to Betadine   • DANIS (acute kidney injury)   • Severe hypothyroidism   • History of ischemic stroke   • Rapidly progressive dementia   • CKD (chronic kidney disease), stage III     Past Medical History:   Diagnosis Date   • Anxiety disorder 2017   • Asthma 2017   • Basal cell carcinoma in situ of skin 2019    right leg    • Carotid stenosis 2017   • Dementia    • Diaphoresis    • Diastolic dysfunction    • Diverticulitis    • Dyslipidemia 2017   • GERD (gastroesophageal reflux disease) 2017   • Gout 2017   • Herpes zoster     Herpes zoster, [id number], right lower extremity.    • Hyperlipidemia    • Hypertension 2017   • Hypothyroidism 2017   • LVH (left ventricular hypertrophy)    • Malignant melanoma 2017   • Melanoma    • Mitral regurgitation    • Nephrolithiasis    • Post herpetic neuralgia 2017   • TIA (transient ischemic attack)     TIA, 2012, with nonobstructive carotid  stenosis, dyslipidemia and hypertension     Past Surgical History:   Procedure Laterality Date   • ABDOMINAL SURGERY     • APPENDECTOMY     • COLON RESECTION LEFT  2016   • COLON RESECTION LEFT  2016   • CYST REMOVAL      left side of neck.    • HEEL SPUR SURGERY  1999   • HERNIA REPAIR      hiatal hernia    • NISSEN FUNDOPLICATION  1984   • OTHER SURGICAL HISTORY  2010    Malignant melanoma, status post resection       General Information     Row Name 06/02/23 1108          OT Time and Intention    Document Type therapy note (daily note)  -AN     Mode of Treatment occupational therapy  -AN     Row Name 06/02/23 1108          General Information    Patient Profile Reviewed yes  -AN     Existing Precautions/Restrictions fall;other (see comments)  dementia  -AN     Barriers to Rehab previous functional deficit;cognitive status  -AN     Row Name 06/02/23 1108          Cognition    Orientation Status (Cognition) oriented to;person;disoriented to;place;situation;time;verbal cues/prompts needed for orientation  -AN     Row Name 06/02/23 1108          Safety Issues, Functional Mobility    Safety Issues Affecting Function (Mobility) safety precautions follow-through/compliance;safety precaution awareness;positioning of assistive device;judgment;insight into deficits/self-awareness;problem-solving;awareness of need for assistance  -AN     Impairments Affecting Function (Mobility) balance;cognition;coordination;endurance/activity tolerance;strength;postural/trunk control  -AN     Cognitive Impairments, Mobility Safety/Performance awareness, need for assistance;safety precaution awareness;safety precaution follow-through;insight into deficits/self-awareness;sequencing abilities;judgment;problem-solving/reasoning  -AN     Comment, Safety Issues/Impairments (Mobility) cues for RW positioning and safety  -AN           User Key  (r) = Recorded By, (t) = Taken By, (c) = Cosigned By    Initials Name Provider Type    BOB Renee  ZACK Almonte Occupational Therapist                 Mobility/ADL's     Row Name 06/02/23 1109          Bed Mobility    Bed Mobility supine-sit  -AN     Supine-Sit [place] (Bed Mobility) supervision  -AN     Assistive Device (Bed Mobility) head of bed elevated  -AN     Row Name 06/02/23 1109          Transfers    Transfers sit-stand transfer;stand-sit transfer  -AN     Comment, (Transfers) cues for hand placement and transfer technique using RW  -AN     Row Name 06/02/23 1109          Sit-Stand Transfer    Sit-Stand [place] (Transfers) contact guard  -AN     Assistive Device (Sit-Stand Transfers) walker, front-wheeled  -AN     Row Name 06/02/23 1109          Stand-Sit Transfer    Stand-Sit [place] (Transfers) contact guard  -AN     Assistive Device (Stand-Sit Transfers) walker, front-wheeled  -AN     Row Name 06/02/23 1109          Functional Mobility    Functional Mobility- Ind. Level contact guard assist  -AN     Functional Mobility- Device walker, front-wheeled  -AN     Functional Mobility-Distance (Feet) --  >household distance  -AN     Functional Mobility- Safety Issues sequencing ability decreased;balance decreased during turns;step length decreased  -AN     Functional Mobility- Comment pt ambulated >household distance using the RW with CGA for balance and safety. pt required cues throughout for RW navigation and when turning for safety  -AN           User Key  (r) = Recorded By, (t) = Taken By, (c) = Cosigned By    Initials Name Provider Type    Suzy Mac OT Occupational Therapist               Obj/Interventions     Row Name 06/02/23 1119          Motor Skills    Therapeutic Exercise other (see comments)  pt participated in standing mini squats 15 reps x 1 set with cues for technique, CGA for balance  -AN     Row Name 06/02/23 1119          Balance    Balance Assessment sitting static balance;sitting dynamic balance;sit to stand dynamic balance;standing static balance;standing dynamic  balance  -AN     Static Sitting Balance supervision  -AN     Dynamic Sitting Balance supervision  -AN     Position, Sitting Balance sitting in chair;sitting edge of bed  -AN     Sit to Stand Dynamic Balance verbal cues;contact guard  -AN     Static Standing Balance standby assist  -AN     Dynamic Standing Balance contact guard  -AN     Position/Device Used, Standing Balance supported;walker, rolling  -AN     Balance Interventions standing;sit to stand;supported;static;dynamic;minimal challenge;occupation based/functional task;dynamic reaching  -AN     Comment, Balance pt participated in dynamic reaching in standing with stand by assist with no LOB  -AN           User Key  (r) = Recorded By, (t) = Taken By, (c) = Cosigned By    Initials Name Provider Type    AN Suzy Renee OT Occupational Therapist               Goals/Plan    No documentation.                Clinical Impression     Row Name 06/02/23 1134          Pain Assessment    Pretreatment Pain Rating 0/10 - no pain  -AN     Posttreatment Pain Rating 0/10 - no pain  -AN     Pre/Posttreatment Pain Comment asymptomatic  -AN     Pain Intervention(s) Repositioned;Ambulation/increased activity  -AN     Row Name 06/02/23 1134          Plan of Care Review    Plan of Care Reviewed With patient  -AN     Progress no change  -AN     Outcome Evaluation Pt continues to be pleasantly confused and motivated to participate in therapy sessions. Pt required frequent re-orientation to situation. Pt enjoyed participating in dynamic standing balance and ther-ex on unit balcony, as well as engaged in meaningful conversation of past times. Cont POC.  -AN     Row Name 06/02/23 1139          Therapy Plan Review/Discharge Plan (OT)    Anticipated Discharge Disposition (OT) skilled nursing facility  -AN     Row Name 06/02/23 1134          Vital Signs    Pre Systolic BP Rehab --  VSS  -AN     O2 Delivery Pre Treatment room air  -AN     O2 Delivery Intra Treatment room air  -AN      O2 Delivery Post Treatment room air  -AN     Pre Patient Position Supine  -AN     Intra Patient Position Standing  -AN     Post Patient Position Sitting  -AN     Row Name 06/02/23 1134          Positioning and Restraints    Pre-Treatment Position in bed  -AN     Post Treatment Position chair  -AN     In Chair notified nsg;reclined;call light within reach;encouraged to call for assist;exit alarm on;legs elevated  -AN           User Key  (r) = Recorded By, (t) = Taken By, (c) = Cosigned By    Initials Name Provider Type    Suzy Mac, ZACK Occupational Therapist               Outcome Measures     Row Name 06/02/23 1136          How much help from another is currently needed...    Putting on and taking off regular lower body clothing? 2  -AN     Bathing (including washing, rinsing, and drying) 2  -AN     Toileting (which includes using toilet bed pan or urinal) 3  -AN     Putting on and taking off regular upper body clothing 3  -AN     Taking care of personal grooming (such as brushing teeth) 3  -AN     Eating meals 3  -AN     AM-PAC 6 Clicks Score (OT) 16  -AN     Row Name 06/02/23 0900          How much help from another person do you currently need...    Turning from your back to your side while in flat bed without using bedrails? 4  -TK     Moving from lying on back to sitting on the side of a flat bed without bedrails? 4  -TK     Moving to and from a bed to a chair (including a wheelchair)? 3  -TK     Standing up from a chair using your arms (e.g., wheelchair, bedside chair)? 3  -TK     Climbing 3-5 steps with a railing? 3  -TK     To walk in hospital room? 3  -TK     AM-PAC 6 Clicks Score (PT) 20  -TK     Highest level of mobility 6 --> Walked 10 steps or more  -TK     Row Name 06/02/23 1136          Functional Assessment    Outcome Measure Options AM-PAC 6 Clicks Daily Activity (OT)  -AN           User Key  (r) = Recorded By, (t) = Taken By, (c) = Cosigned By    Initials Name Provider Type    CINTHIA Mccarthy  Rogers BARRIOS, RN Registered Nurse    Suzy Mac OT Occupational Therapist                Occupational Therapy Education     Title: PT OT SLP Therapies (Done)     Topic: Occupational Therapy (Done)     Point: ADL training (Done)     Description:   Instruct learner(s) on proper safety adaptation and remediation techniques during self care or transfers.   Instruct in proper use of assistive devices.              Learning Progress Summary           Patient Acceptance, E, VU by AN at 6/2/2023 1137    Acceptance, E, VU,NR by  at 5/30/2023 1603    Acceptance, E, NR by  at 5/24/2023 0835    Acceptance, E, NR by  at 5/21/2023 1142    Acceptance, E,TB, VU,NR by  at 5/20/2023 0020    Acceptance, E,TB, VU,NR by  at 5/19/2023 0019    Acceptance, E, NR by  at 5/18/2023 1012    Comment: re-orientation, reason for consult, ADL sequencing, reason need gait belt and gripper socks                   Point: Home exercise program (Done)     Description:   Instruct learner(s) on appropriate technique for monitoring, assisting and/or progressing therapeutic exercises/activities.              Learning Progress Summary           Patient Acceptance, E, VU by AN at 6/2/2023 1137    Acceptance, E, VU,NR by  at 5/30/2023 1603    Acceptance, E, NR by  at 5/21/2023 1142    Acceptance, E,TB, VU,NR by  at 5/20/2023 0020    Acceptance, E,TB, VU,NR by  at 5/19/2023 0019                   Point: Precautions (Done)     Description:   Instruct learner(s) on prescribed precautions during self-care and functional transfers.              Learning Progress Summary           Patient Acceptance, E, VU by AN at 6/2/2023 1137    Acceptance, E, VU,NR by  at 5/30/2023 1603    Acceptance, E, NR by  at 5/24/2023 0835    Acceptance, E,TB, VU,NR by  at 5/20/2023 0020    Acceptance, E,TB, VU,NR by  at 5/19/2023 0019    Acceptance, E, NR by  at 5/18/2023 1012    Comment: re-orientation, reason for consult, ADL sequencing, reason need  gait belt and gripper socks                   Point: Body mechanics (Done)     Description:   Instruct learner(s) on proper positioning and spine alignment during self-care, functional mobility activities and/or exercises.              Learning Progress Summary           Patient Acceptance, E, VU by  at 6/2/2023 1137    Acceptance, E, VU,NR by  at 5/30/2023 1603    Acceptance, E, NR by  at 5/24/2023 0835    Acceptance, E,TB, VU,NR by  at 5/20/2023 0020    Acceptance, E,TB, VU,NR by  at 5/19/2023 0019                               User Key     Initials Effective Dates Name Provider Type Discipline     02/03/23 - 05/30/23 Kaila Kat, OT Occupational Therapist OT     02/03/23 -  Susan Pierson, OT Occupational Therapist OT     08/12/20 -  Kavya Matamoros, RN Registered Nurse Nurse     10/14/22 -  Alivia Tony, OT Occupational Therapist OT     09/21/21 -  Suzy Renee, OT Occupational Therapist OT              OT Recommendation and Plan  Planned Therapy Interventions (OT): activity tolerance training, adaptive equipment training, BADL retraining, cognitive/visual perception retraining, functional balance retraining, occupation/activity based interventions, patient/caregiver education/training, transfer/mobility retraining, strengthening exercise  Plan of Care Review  Plan of Care Reviewed With: patient  Progress: no change  Outcome Evaluation: Pt continues to be pleasantly confused and motivated to participate in therapy sessions. Pt required frequent re-orientation to situation. Pt enjoyed participating in dynamic standing balance and ther-ex on unit [facility], as well as engaged in meaningful conversation of past times. Cont POC.     Time Calculation:    Time Calculation- OT     Row Name 06/02/23 1137             Time Calculation- OT    OT Start Time 1100  -AN      OT Received On 06/02/23  -AN         Timed Charges    98620 - OT Therapeutic Exercise Minutes 10  -AN      80828 -  OT Self Care/Mgmt Minutes 18  -AN         Total Minutes    Timed Charges Total Minutes 28  -AN       Total Minutes 28  -AN            User Key  (r) = Recorded By, (t) = Taken By, (c) = Cosigned By    Initials Name Provider Type    Suzy Mac OT Occupational Therapist              Therapy Charges for Today     Code Description Service Date Service Provider Modifiers Qty    [id number]  OT THER PROC EA 15 MIN 6/2/2023 Suzy Renee OT GO 1    [id number]  OT SELF CARE/MGMT/TRAIN EA 15 MIN 6/2/2023 Suzy Renee OT GO 1               Suzy Renee OT  6/2/2023

## 2023-06-02 NOTE — CASE MANAGEMENT/SOCIAL WORK
Continued Stay Note  [facility]     Patient Name: Domitila Bosch  MRN: [id number]  Today's Date: 6/1/2023    Admit Date: 5/17/2023    Plan: Long Term Care   Discharge Plan     Row Name 06/01/23 2021       Plan    Plan Comments I spoke with admissions at [facility], will review bed availability and reach out to daughter-will f/u with us in 24 hours.  Reached out to [place] and awaiting call back.  CM to follow up with facilities in am and update daughter.  Susan Hardwick, Ext. [phone]    Final Discharge Disposition Code 04 - intermediate care facility               Discharge Codes    No documentation.               Expected Discharge Date and Time     Expected Discharge Date Expected Discharge Time    Jun 5, 2023             JACK Johansen

## 2023-06-02 NOTE — CASE MANAGEMENT/SOCIAL WORK
Received a call form [facility]. [facility] states that they will not be able to take patient. He will not qualify for Medicaid due to financial information. Patient and daughter share a bank account. Patient will need his own account and finances  in order to be considered.

## 2023-06-02 NOTE — PLAN OF CARE
Problem: Fall Injury Risk  Goal: Absence of Fall and Fall-Related Injury  Outcome: Ongoing, Progressing  Intervention: Identify and Manage Contributors  Recent Flowsheet Documentation  Taken 6/1/2023 2000 by Kylee Tesfaye RN  Medication Review/Management: medications reviewed  Intervention: Promote Injury-Free Environment  Recent Flowsheet Documentation  Taken 6/2/2023 0000 by Kylee Tesfaye RN  Safety Promotion/Fall Prevention:   assistive device/personal items within reach   clutter free environment maintained   fall prevention program maintained   nonskid shoes/slippers when out of bed   room organization consistent   safety round/check completed  Taken 6/1/2023 2200 by Kylee Tesfaye RN  Safety Promotion/Fall Prevention:   assistive device/personal items within reach   clutter free environment maintained   fall prevention program maintained   nonskid shoes/slippers when out of bed   room organization consistent   safety round/check completed  Taken 6/1/2023 2000 by Kylee Tesfaye RN  Safety Promotion/Fall Prevention:   activity supervised   assistive device/personal items within reach   clutter free environment maintained   fall prevention program maintained   nonskid shoes/slippers when out of bed   room organization consistent   safety round/check completed     Problem: Skin Injury Risk Increased  Goal: Skin Health and Integrity  Outcome: Ongoing, Progressing  Intervention: Optimize Skin Protection  Recent Flowsheet Documentation  Taken 6/2/2023 0000 by Kylee Tesfaye RN  Pressure Reduction Techniques: frequent weight shift encouraged  Head of Bed (HOB) Positioning: HOB elevated  Pressure Reduction Devices: pressure-redistributing mattress utilized  Skin Protection: incontinence pads utilized  Taken 6/1/2023 2200 by Kylee Tesfaye RN  Pressure Reduction Techniques: frequent weight shift encouraged  Head of Bed (HOB) Positioning: HOB elevated  Pressure Reduction Devices: pressure-redistributing mattress  utilized  Skin Protection: incontinence pads utilized  Taken 6/1/2023 2000 by Kylee Tesfaye, RN  Pressure Reduction Techniques: frequent weight shift encouraged  Head of Bed (HOB) Positioning: HOB elevated  Pressure Reduction Devices: pressure-redistributing mattress utilized  Skin Protection: incontinence pads utilized     Problem: Confusion Acute  Goal: Optimal Cognitive Function  Outcome: Ongoing, Progressing     Problem: Adult Inpatient Plan of Care  Goal: Plan of Care Review  Outcome: Ongoing, Progressing  Goal: Patient-Specific Goal (Individualized)  Outcome: Ongoing, Progressing   Goal Outcome Evaluation:

## 2023-06-02 NOTE — CASE MANAGEMENT/SOCIAL WORK
Continued Stay Note  [facility]     Patient Name: Domitila Bosch  MRN: [id number]  Today's Date: 6/2/2023    Admit Date: 5/17/2023    Plan: Long Term Care   Discharge Plan     Row Name 06/02/23 1004       Plan    Plan Long Term Care    Patient/Family in Agreement with Plan yes    Plan Comments [facility] have expressed interested in offering a bed to pt for LTC.  Deja, liaison with the facilities will call pt's daughter to discuss.  KATHRYN called pt's daughter and provided update.  She's agreeable to speak with Deja about placement opportunity.  KATHRYN will provide further update as soon as one is available.    Final Discharge Disposition Code 04 - intermediate care facility               Discharge Codes    No documentation.               Expected Discharge Date and Time     Expected Discharge Date Expected Discharge Time    Jun 7, 2023             JACK Fisher

## 2023-06-03 PROCEDURE — G0378 HOSPITAL OBSERVATION PER HR: HCPCS

## 2023-06-03 PROCEDURE — 25010000002 HEPARIN (PORCINE) PER 1000 UNITS: Performed by: INTERNAL MEDICINE

## 2023-06-03 PROCEDURE — 96372 THER/PROPH/DIAG INJ SC/IM: CPT

## 2023-06-03 RX ADMIN — HEPARIN SODIUM 5000 UNITS: 5000 INJECTION, SOLUTION INTRAVENOUS; SUBCUTANEOUS at 00:23

## 2023-06-03 RX ADMIN — HYDROXYZINE HYDROCHLORIDE 10 MG: 10 TABLET ORAL at 14:51

## 2023-06-03 RX ADMIN — ALLOPURINOL 50 MG: 100 TABLET ORAL at 09:04

## 2023-06-03 RX ADMIN — QUETIAPINE FUMARATE 25 MG: 25 TABLET ORAL at 17:42

## 2023-06-03 RX ADMIN — ASPIRIN 81 MG 81 MG: 81 TABLET ORAL at 09:04

## 2023-06-03 RX ADMIN — PANTOPRAZOLE SODIUM 40 MG: 40 TABLET, DELAYED RELEASE ORAL at 09:04

## 2023-06-03 RX ADMIN — DONEPEZIL HYDROCHLORIDE 5 MG: 5 TABLET, FILM COATED ORAL at 21:48

## 2023-06-03 RX ADMIN — ATORVASTATIN CALCIUM 80 MG: 40 TABLET, FILM COATED ORAL at 21:48

## 2023-06-03 RX ADMIN — BUSPIRONE HYDROCHLORIDE 15 MG: 10 TABLET ORAL at 09:04

## 2023-06-03 RX ADMIN — QUETIAPINE FUMARATE 25 MG: 25 TABLET ORAL at 09:04

## 2023-06-03 RX ADMIN — POLYETHYLENE GLYCOL 3350 17 G: 17 POWDER, FOR SOLUTION ORAL at 09:04

## 2023-06-03 RX ADMIN — SERTRALINE HYDROCHLORIDE 100 MG: 100 TABLET ORAL at 09:04

## 2023-06-03 RX ADMIN — HEPARIN SODIUM 5000 UNITS: 5000 INJECTION, SOLUTION INTRAVENOUS; SUBCUTANEOUS at 06:49

## 2023-06-03 RX ADMIN — Medication 5 MG: at 21:48

## 2023-06-03 RX ADMIN — HEPARIN SODIUM 5000 UNITS: 5000 INJECTION, SOLUTION INTRAVENOUS; SUBCUTANEOUS at 14:09

## 2023-06-03 RX ADMIN — CLOPIDOGREL BISULFATE 75 MG: 75 TABLET ORAL at 09:05

## 2023-06-03 RX ADMIN — HEPARIN SODIUM 5000 UNITS: 5000 INJECTION, SOLUTION INTRAVENOUS; SUBCUTANEOUS at 21:48

## 2023-06-03 RX ADMIN — BUSPIRONE HYDROCHLORIDE 15 MG: 10 TABLET ORAL at 21:48

## 2023-06-03 RX ADMIN — TERAZOSIN HYDROCHLORIDE 2 MG: 2 CAPSULE ORAL at 21:48

## 2023-06-03 RX ADMIN — AMLODIPINE BESYLATE 2.5 MG: 2.5 TABLET ORAL at 09:04

## 2023-06-03 RX ADMIN — FINASTERIDE 5 MG: 5 TABLET, FILM COATED ORAL at 09:04

## 2023-06-03 RX ADMIN — FAMOTIDINE 20 MG: 20 TABLET ORAL at 17:42

## 2023-06-03 RX ADMIN — LEVOTHYROXINE SODIUM 175 MCG: 175 TABLET ORAL at 06:49

## 2023-06-03 NOTE — CASE MANAGEMENT/SOCIAL WORK
Continued Stay Note   [facility]     Patient Name: Domitila Bosch  MRN: [id number]  Today's Date: 6/3/2023    Admit Date: 5/17/2023    Plan: home vs home with home health   Discharge Plan       Row Name 06/03/23 1335       Plan    Plan home vs home with home health    Patient/Family in Agreement with Plan yes    Plan Comments I spoke with the patient's daughter by phone today. I reinforced the information provided by Hodan Villegas, director of Case Management, in her note to the patient's daughter. I explained to her that the patient is in observation status and he cannot access his Medicare skilled benefits in observation status. She will need to take her father home at discharge while working through the Medicaid process for long term care.                   Discharge Codes    No documentation.                 Expected Discharge Date and Time       Expected Discharge Date Expected Discharge Time    Jun 5, 2023               Kaya Lucero RN

## 2023-06-03 NOTE — PLAN OF CARE
Goal Outcome Evaluation:           Progress: no change  Outcome Evaluation: Pt on RA, denies pain, denies nausea. Requesting snacks, tolerating regular diet. Pleasantly confused & requiring frequent re-orientation. Ambulates w/stand-by assist. Bed alarms activated & audible. Adequate UOP, several small BMs this shift. Prn atarax x1 for anxiety, restlessness. Awaiting discharge. Calm, pleasant. Will continue to monitor.

## 2023-06-04 ENCOUNTER — READMISSION MANAGEMENT (OUTPATIENT)
Dept: CALL CENTER | Facility: HOSPITAL | Age: 83
End: 2023-06-04

## 2023-06-04 VITALS
OXYGEN SATURATION: 94 % | DIASTOLIC BLOOD PRESSURE: 65 MMHG | WEIGHT: 160.6 LBS | SYSTOLIC BLOOD PRESSURE: 131 MMHG | HEIGHT: 72 IN | HEART RATE: 57 BPM | BODY MASS INDEX: 21.75 KG/M2 | RESPIRATION RATE: 18 BRPM | TEMPERATURE: 97.8 F

## 2023-06-04 PROCEDURE — 25010000002 HEPARIN (PORCINE) PER 1000 UNITS: Performed by: INTERNAL MEDICINE

## 2023-06-04 PROCEDURE — G0378 HOSPITAL OBSERVATION PER HR: HCPCS

## 2023-06-04 PROCEDURE — 96372 THER/PROPH/DIAG INJ SC/IM: CPT

## 2023-06-04 RX ORDER — CHOLECALCIFEROL (VITAMIN D3) 125 MCG
5 CAPSULE ORAL NIGHTLY
Start: 2023-06-04

## 2023-06-04 RX ORDER — QUETIAPINE FUMARATE 25 MG/1
25 TABLET, FILM COATED ORAL 2 TIMES DAILY
Qty: 60 TABLET | Refills: 0 | Status: SHIPPED | OUTPATIENT
Start: 2023-06-04

## 2023-06-04 RX ORDER — SERTRALINE HYDROCHLORIDE 100 MG/1
100 TABLET, FILM COATED ORAL DAILY
Qty: 30 TABLET | Refills: 0 | Status: SHIPPED | OUTPATIENT
Start: 2023-06-05

## 2023-06-04 RX ORDER — ALLOPURINOL 100 MG/1
50 TABLET ORAL DAILY
Qty: 15 TABLET | Refills: 0 | Status: SHIPPED | OUTPATIENT
Start: 2023-06-05

## 2023-06-04 RX ORDER — FAMOTIDINE 20 MG/1
20 TABLET, FILM COATED ORAL DAILY
Qty: 30 TABLET | Refills: 0 | Status: SHIPPED | OUTPATIENT
Start: 2023-06-04

## 2023-06-04 RX ORDER — HYDROXYZINE HYDROCHLORIDE 10 MG/1
10 TABLET, FILM COATED ORAL 3 TIMES DAILY PRN
Qty: 30 TABLET | Refills: 0 | Status: SHIPPED | OUTPATIENT
Start: 2023-06-04

## 2023-06-04 RX ORDER — AMLODIPINE BESYLATE 2.5 MG/1
2.5 TABLET ORAL DAILY
Qty: 30 TABLET | Refills: 0 | Status: SHIPPED | OUTPATIENT
Start: 2023-06-05

## 2023-06-04 RX ORDER — PANTOPRAZOLE SODIUM 40 MG/1
40 TABLET, DELAYED RELEASE ORAL DAILY
Qty: 30 TABLET | Refills: 0 | Status: SHIPPED | OUTPATIENT
Start: 2023-06-05

## 2023-06-04 RX ORDER — DONEPEZIL HYDROCHLORIDE 5 MG/1
5 TABLET, FILM COATED ORAL NIGHTLY
Qty: 30 TABLET | Refills: 0 | Status: SHIPPED | OUTPATIENT
Start: 2023-06-04

## 2023-06-04 RX ORDER — ACETAMINOPHEN 325 MG/1
650 TABLET ORAL EVERY 6 HOURS PRN
Start: 2023-06-04

## 2023-06-04 RX ORDER — LEVOTHYROXINE SODIUM 175 UG/1
175 TABLET ORAL
Qty: 30 TABLET | Refills: 0 | Status: SHIPPED | OUTPATIENT
Start: 2023-06-05

## 2023-06-04 RX ORDER — BUSPIRONE HYDROCHLORIDE 15 MG/1
15 TABLET ORAL EVERY 12 HOURS SCHEDULED
Qty: 60 TABLET | Refills: 0 | Status: SHIPPED | OUTPATIENT
Start: 2023-06-04

## 2023-06-04 RX ADMIN — QUETIAPINE FUMARATE 25 MG: 25 TABLET ORAL at 08:35

## 2023-06-04 RX ADMIN — SERTRALINE HYDROCHLORIDE 100 MG: 100 TABLET ORAL at 08:36

## 2023-06-04 RX ADMIN — BUSPIRONE HYDROCHLORIDE 15 MG: 10 TABLET ORAL at 08:34

## 2023-06-04 RX ADMIN — ALLOPURINOL 50 MG: 100 TABLET ORAL at 08:34

## 2023-06-04 RX ADMIN — HYDROXYZINE HYDROCHLORIDE 10 MG: 10 TABLET ORAL at 02:21

## 2023-06-04 RX ADMIN — FINASTERIDE 5 MG: 5 TABLET, FILM COATED ORAL at 08:36

## 2023-06-04 RX ADMIN — AMLODIPINE BESYLATE 2.5 MG: 2.5 TABLET ORAL at 08:35

## 2023-06-04 RX ADMIN — POLYETHYLENE GLYCOL 3350 17 G: 17 POWDER, FOR SOLUTION ORAL at 08:34

## 2023-06-04 RX ADMIN — CLOPIDOGREL BISULFATE 75 MG: 75 TABLET ORAL at 08:36

## 2023-06-04 RX ADMIN — PANTOPRAZOLE SODIUM 40 MG: 40 TABLET, DELAYED RELEASE ORAL at 08:36

## 2023-06-04 RX ADMIN — HEPARIN SODIUM 5000 UNITS: 5000 INJECTION, SOLUTION INTRAVENOUS; SUBCUTANEOUS at 06:40

## 2023-06-04 RX ADMIN — LEVOTHYROXINE SODIUM 175 MCG: 175 TABLET ORAL at 06:40

## 2023-06-04 RX ADMIN — ASPIRIN 81 MG 81 MG: 81 TABLET ORAL at 08:36

## 2023-06-04 NOTE — CASE MANAGEMENT/SOCIAL WORK
Case Management Discharge Note      Final Note: I spoke with the patient's daughter, Yoly, on the phone today. She agreed to let me set up home health services. She did not have any preferences except [facility]. Unfortunately, they are full and not able to accept Mr. Bosch. I called Domitila with [facility]. She is able to accept the patient for SN and PT. They will also evaluate him for SLP, OT, and KATHRYN. Yoly to transport the patient home today.     No other needs identified or voiced by daughter.     Selected Continued Care - Admitted Since 5/17/2023       Destination    No services have been selected for the patient.                Durable Medical Equipment    No services have been selected for the patient.                Dialysis/Infusion    No services have been selected for the patient.                Home Medical Care       Service Provider Selected Services Address Phone Fax Patient Preferred    [facility] ,  Home Nursing ,  Home Rehabilitation ,  Home Medical  [address] [place] [address] [place], [place] [id number] [phone] --              Therapy    No services have been selected for the patient.                Community Resources    No services have been selected for the patient.                Community & DME    No services have been selected for the patient.                    Transportation Services  Private: Car    Final Discharge Disposition Code: 06 - home with home health care

## 2023-06-04 NOTE — DISCHARGE SUMMARY
[facility] Medicine Services  DISCHARGE SUMMARY    Patient Name: Domitila Bosch  : 1940  MRN: [id number]    Date of Admission: 2023  Date of Discharge:  2023  Primary Care Physician: Marcin Ferguson MD    Consults       No orders found from 2023 to 2023.            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      Hospital Course:  Domitila Bosch is a 82 y.o. male PMH dementia, HLD, HTN, CKD 3, hypothyroidism, TIA/CVA, anxiety, dementia who lives with his daughter.  EMS called due to increased confusion/agitation.  While EMS was at the house, apparently his daughter had a seizure and they are both seen in the ED. Patient found with possible UTI and creatinine elevation.  Patient was started on Levaquin as previous urine culture was positive for Enterococcus faecalis.  Urine culture showed no growth so antibiotic was discontinued.  Creatinine improved with fluid resuscitation.  TSH found elevated at 16.67 with a free T40.42.  Levothyroxine was increased from 150 mcg to 175 mcg daily.  Plan repeat TSH in 6 to 8 weeks.  CT scan head ordered due to worsening confusion with no acute findings.  Patient follows with Dr. Hernadez at  for dementia.     Discharge Follow Up Recommendations for labs/diagnostics:  Follow-up with PCP in 1 week repeat TSH in 6 weeks     Day of Discharge     HPI:   Patient sitting up in bed, NAD, denies any issues today.    Review of Systems  Gen- No fevers, chills  CV- No chest pain, palpitations  Resp- No cough, dyspnea  GI- No N/V/D, abd pain  Otherwise ROS is negative except as mentioned in the  HPI.    Vital Signs:   Temp:  [97.8 °F (36.6 °C)] 97.8 °F (36.6 °C)  Heart Rate:  [53-57] 57  Resp:  [18] 18  BP: (131-133)/(62-65) 131/65     Physical Exam:  Constitutional: Awake, alert, NAD  HENT: NCAT, mucous membranes moist  Respiratory: Clear to auscultation bilaterally, nonlabored respirations   Cardiovascular: Cyril, no murmurs, rubs, or gallops  Gastrointestinal: Positive bowel sounds, soft, nontender, nondistended  Musculoskeletal: No bilateral ankle edema  Psychiatric: Appropriate affect, cooperative  Neurologic: Oriented to person, knows he is in [place], follows simple commands, speech clear  Skin: No rashes    Plan:  Dementia with worsening confusion  Anxiety disorder  - TSH elevated requiring adjustment of thyroid medication.  B12 folate within normal limits.  CT head showed no acute intracranial process.  Patient follows with Dr. Hernadez at  with last appointment 1/6/2023.    CKD 3 with creatinine  - Patient received gentle fluid resuscitation with improvement of creatinine.  Allopurinol was renally adjusted    Questionable UTI on admission later found with culture showing no growth BPH  - Initially started on Levaquin due to prior culture with Enterococcus faecalis.  Levaquin was discontinued when urine culture showed no growth.  Finasteride was continued.    Hypothyroidism  - TSH elevated at 60.67 with free T4 low at 0.42.  Levothyroxine was increased from 150 mcg to 175 mcg daily.  Recheck TSH in 6 to 8 weeks at PCP office.    HTN/HLD  History of TIA/CVA  - Finasteride, terazosin continue.  Amlodipine was decreased to 2.5 mg daily.  Patient continued on aspirin, statin.    Pertinent  and/or Most Recent Results               Invalid input(s): PROT, LABALBU        Invalid input(s): TG, LDLCALC, LDLREALC        Brief Urine Lab Results  (Last result in the past 365 days)        Color   Clarity   Blood   Leuk Est   Nitrite   Protein   CREAT   Urine HCG        05/17/23 1232 Yellow   Cloudy   Trace    Large (3+)   Negative   Negative                   Microbiology Results Abnormal       Procedure Component Value - Date/Time    Urine Culture - Urine, Urine, Clean Catch [[id number]]  (Normal) Collected: 05/17/23 1232    Lab Status: Final result Specimen: Urine, Clean Catch Updated: 05/18/23 2221     Urine Culture No growth            Imaging Results (All)       Procedure Component Value Units Date/Time    CT Head Without Contrast [[id number]] Collected: 05/17/23 1155     Updated: 05/17/23 1206    Narrative:      CT HEAD WO CONTRAST    Date of Exam: 5/17/2023 11:45 AM EDT    Indication: acute on chronic alteration in mental status.    Comparison: MRI brain from September 9, 2021 and CT head from July 3, 2021    Technique: Axial CT images were obtained of the head without contrast administration.  Reconstructed coronal and sagittal images were also obtained. Automated exposure control and iterative construction methods were used.      Findings:  No acute intracranial hemorrhage or extra-axial collection is identified. The ventricles appear normal in caliber, with no evidence of mass effect or midline shift. The basal cisterns appear patent. The gray-white differentiation appears preserved.    The calvarium appear intact. The paranasal sinuses are clear. The mastoid air cells are well-aerated. There is mild generalized parenchymal atrophy. Patchy areas of periventricular and subcortical white matter hypodensities are nonspecific, but likely   the sequela of moderate chronic small vessel ischemic disease.      Impression:      Impression:  1.No acute intracranial process or calvarial fracture identified.  2.Findings suggestive of moderate chronic small vessel ischemic disease.        Electronically Signed: Shravan Yarbrough    5/17/2023 12:03 PM EDT    Workstation ID: [id number]            Results for orders placed during the hospital encounter of 07/03/21    Duplex Carotid Ultrasound CAR    Interpretation Summary  ·  Proximal right internal carotid artery plaque without significant stenosis.  · Right internal carotid artery stenosis of 0-49%.  · Left internal carotid artery stenosis of 50-69%.      Results for orders placed during the hospital encounter of 07/03/21    Duplex Carotid Ultrasound CAR    Interpretation Summary  · Proximal right internal carotid artery plaque without significant stenosis.  · Right internal carotid artery stenosis of 0-49%.  · Left internal carotid artery stenosis of 50-69%.      Results for orders placed during the hospital encounter of 07/28/20    Adult Transesophageal Echo (SUMMER) W/ Cont if Necessary Per Protocol    Interpretation Summary  · Estimated EF = 65%.  · Left ventricular systolic function is normal.  · Left atrial cavity size is mild-to-moderately dilated.  · There is mild calcification of the aortic valve mainly affecting the non and right coronary cusp(s).  · Saline test results are negative.  · The aortic valve exhibits moderate sclerosis.  · There is no evidence of pericardial effusion.  · There is moderate (grade 2) protruding plaque in the descending aorta present.  · No valvular vegetations demonstrated.        Discharge Details        Discharge Medications        New Medications        Instructions Start Date   acetaminophen 325 MG tablet  Commonly known as: TYLENOL   650 mg, Oral, Every 6 Hours PRN      donepezil 5 MG tablet  Commonly known as: ARICEPT   5 mg, Oral, Nightly      famotidine 20 MG tablet  Commonly known as: PEPCID   20 mg, Oral, Daily      hydrOXYzine 10 MG tablet  Commonly known as: ATARAX   10 mg, Oral, 3 Times Daily PRN      melatonin 5 MG tablet tablet   5 mg, Oral, Nightly      pantoprazole 40 MG EC tablet  Commonly known as: PROTONIX  Replaces: omeprazole 40 MG capsule   40 mg, Oral, Daily   Start Date: June 5, 2023     QUEtiapine 25 MG tablet  Commonly known as: SEROquel   25 mg, Oral, 2 Times Daily             Changes to Medications        Instructions Start  Date   allopurinol 100 MG tablet  Commonly known as: ZYLOPRIM  What changed:   medication strength  how much to take   50 mg, Oral, Daily   Start Date: June 5, 2023     amLODIPine 2.5 MG tablet  Commonly known as: NORVASC  What changed:   medication strength  how much to take   2.5 mg, Oral, Daily   Start Date: June 5, 2023     busPIRone 15 MG tablet  Commonly known as: BUSPAR  What changed:   medication strength  how much to take  when to take this   15 mg, Oral, Every 12 Hours Scheduled      levothyroxine 175 MCG tablet  Commonly known as: SYNTHROID, LEVOTHROID  What changed:   medication strength  how much to take  when to take this  Another medication with the same name was removed. Continue taking this medication, and follow the directions you see here.   175 mcg, Oral, Every Early Morning   Start Date: June 5, 2023     sertraline 100 MG tablet  Commonly known as: ZOLOFT  What changed:   medication strength  how much to take   100 mg, Oral, Daily   Start Date: June 5, 2023            Continue These Medications        Instructions Start Date   ASPIRIN 81 PO   81 mg, Oral, Daily      atorvastatin 80 MG tablet  Commonly known as: LIPITOR   80 mg, Oral, Nightly      Cholecalciferol 125 MCG (5000 UT) tablet   5,000 Units, Oral, Daily      Claritin 10 MG tablet  Generic drug: loratadine   1 tablet, Oral, Daily PRN      clopidogrel 75 MG tablet  Commonly known as: PLAVIX   75 mg, Oral, Daily      doxazosin 4 MG tablet  Commonly known as: CARDURA   4 mg, Oral, Daily      finasteride 5 MG tablet  Commonly known as: PROSCAR   5 mg, Oral, Daily      METAMUCIL PO   1 tablet, Oral, Daily      polyethylene glycol 17 g packet  Commonly known as: MIRALAX   17 g, Oral, Daily      vitamin B-12 2500 MCG sublingual tablet tablet  Commonly known as: CYANOCOBALAMIN   2,500 mcg, Sublingual, Daily             Stop These Medications      memantine 5 MG tablet  Commonly known as: NAMENDA     omeprazole 40 MG capsule  Commonly known as:  priLOSEC  Replaced by: pantoprazole 40 MG EC tablet              Allergies   Allergen Reactions    Cephalexin Rash    Lisinopril Angioedema    Sulfamethoxazole-Trimethoprim Rash    Betadine [Povidone Iodine] Other (See Comments)     Skin Burning     Iodine Other (See Comments)     Skin Burning     Flomax [Tamsulosin Hcl] Rash    Penicillins Rash    Pseudoephedrine Rash         Discharge Disposition:  Home-Health Care Svc    Discharge Diet:  Diet Order   Procedures    Diet: Regular/House Diet; Texture: Regular Texture (IDDSI 7); Fluid Consistency: Thin (IDDSI 0)         Discharge Activity:   Activity Instructions       Activity as Tolerated      Up WIth Assist                CODE STATUS:    Code Status and Medical Interventions:   Ordered at: 05/17/23 4547     Code Status (Patient has no pulse and is not breathing):    CPR (Attempt to Resuscitate)     Medical Interventions (Patient has pulse or is breathing):    Full Support     Comments:    Unable to reach daughter to discuss-will try again later         No future appointments.    Additional Instructions for the Follow-ups that You Need to Schedule       Call MD With Problems / Concerns   As directed      Instructions: Call provider for temperature greater than 100.4, nausea, vomiting, diarrhea, chest pain, heart palpitations, shortness of breath    Order Comments: Instructions: Call provider for temperature greater than 100.4, nausea, vomiting, diarrhea, chest pain, heart palpitations, shortness of breath          Discharge Follow-up with PCP   As directed       Currently Documented PCP:    Marcin Ferguson MD    PCP Phone Number:    [phone]     Follow Up Details: follow up in 1 week                 Time Spent on Discharge:  45 minutes    Electronically signed by YVAN Pike, 06/04/23, 9:24 AM EDT.

## 2023-06-04 NOTE — PLAN OF CARE
Goal Outcome Evaluation:           Progress: no change  Outcome Evaluation: Pt on RA, refused VS. Denies pain, denies nausea. Very restlessness, irritable, frustrated beginning of shift & early evening, fixated on idea of staff witholding information regarding his discharge. Prn atarax x1 for anxiety, restlessness. Patient finally calmed & was able to rest throughout the night. Adequate UOP, moderate BM this shift. Ambulates w/stand-by assist, attempts to wander when restless. Awaiting discharge. Will continue to monitor.

## 2023-06-05 NOTE — OUTREACH NOTE
Prep Survey      Flowsheet Row Responses   [facility] patient discharged from? [facility]   Is LACE score < 7 ? No   Eligibility Readm Mgmt   Discharge diagnosis dementia   Does the patient have one of the following disease processes/diagnoses(primary or secondary)? Other   Does the patient have Home health ordered? Yes   What is the Home health agency?  [facility]   Is there a DME ordered? No   Prep survey completed? Yes            Nessa DILL - Registered Nurse

## 2023-06-08 ENCOUNTER — READMISSION MANAGEMENT (OUTPATIENT)
Dept: CALL CENTER | Facility: HOSPITAL | Age: 83
End: 2023-06-08
Payer: MEDICARE

## 2023-06-08 NOTE — OUTREACH NOTE
Medical Week 1 Survey      Flowsheet Row Responses   [facility] patient discharged from? [place]   Does the patient have one of the following disease processes/diagnoses(primary or secondary)? Other   Week 1 attempt successful? No   Unsuccessful attempts Attempt 1            Shashi OLIVA - Registered Nurse

## 2023-06-14 ENCOUNTER — READMISSION MANAGEMENT (OUTPATIENT)
Dept: CALL CENTER | Facility: HOSPITAL | Age: 83
End: 2023-06-14
Payer: MEDICARE

## 2023-06-14 LAB
QT INTERVAL: 442 MS
QTC INTERVAL: 467 MS

## 2023-06-14 NOTE — OUTREACH NOTE
Medical Week 1 Survey      Flowsheet Row Responses   [facility] patient discharged from? [place]   Does the patient have one of the following disease processes/diagnoses(primary or secondary)? Other   Week 1 attempt successful? Yes   Call start time 0952   Call end time 0956   Discharge diagnosis dementia   Person spoke with today (if not patient) and relationship daughter   Meds reviewed with patient/caregiver? Yes   Is the patient having any side effects they believe may be caused by any medication additions or changes? No   Does the patient have all medications ordered at discharge? Yes   Does the patient have a primary care provider?  Yes   Does the patient have an appointment with their PCP within 7 days of discharge? Yes   Has the patient kept scheduled appointments due by today? Yes   What is the Home health agency?  [facility]   Has home health visited the patient within 72 hours of discharge? Yes   Psychosocial issues? No   Did the patient receive a copy of their discharge instructions? Yes   Nursing interventions Reviewed instructions with patient   What is the patient's perception of their health status since discharge? Improving   Is the patient/caregiver able to teach back signs and symptoms related to disease process for when to call PCP? Yes   Is the patient/caregiver able to teach back signs and symptoms related to disease process for when to call 911? Yes   Is the patient/caregiver able to teach back the hierarchy of who to call/visit for symptoms/problems? PCP, Specialist, Home health nurse, Urgent Care, ED, 911 Yes   If the patient is a current smoker, are they able to teach back resources for cessation? Not a smoker   Week 1 call completed? Yes   Graduated Yes   Did the patient feel the follow up calls were helpful during their recovery period? Yes   Was the number of calls appropriate? Yes   Graduated/Revoked comments No needs or concerns.            Shashi OLIVA - Registered Nurse

## 2024-03-14 NOTE — PROGRESS NOTES
"    [facility] Medicine Services  PROGRESS NOTE    Patient Name: Domitila Bosch  : 1940  MRN: [id number]    Date of Admission: 2023  Primary Care Physician: Marcin Ferguson MD    Subjective   Subjective     CC:  F/U confusion    HPI:  Patient seen resting up in bed in no acute distress.  Awake and alert.  Pleasant and interactive.  Oriented to person and that he is in a hospital in [place].  Disoriented to year.  States he feels \"okay\".  Feels a little weak.  Denies pain nausea or vomiting.  No issues per staff.    ROS:  Gen- No fevers, chills  CV- No chest pain, palpitations  Resp- No cough, dyspnea  GI- No N/V/D, abd pain      Objective   Objective     Vital Signs:   Temp:  [98.1 °F (36.7 °C)-98.3 °F (36.8 °C)] 98.1 °F (36.7 °C)  Heart Rate:  [51-60] 51  Resp:  [16-18] 16  BP: (131-151)/(61-74) 131/61     Physical Exam:  Constitutional: No acute distress, awake, alert.  Resting up in bed.  No visitors at bedside.  HENT: NCAT, mucous membranes moist  Respiratory: Clear to auscultation bilaterally, respiratory effort normal on room air.  Cardiovascular: Mild sinus bradycardia, no murmurs, rubs, or gallops  Gastrointestinal: Positive bowel sounds, soft, nontender, nondistended  Musculoskeletal: No bilateral ankle edema.  M AE spontaneously.  Psychiatric: Appropriate affect, cooperative  Neurologic: Oriented person, that he is in a hospital in [place].  Confused to the year.  Pleasant and calm, strength symmetric in all extremities, Cranial Nerves grossly intact to confrontation, speech clear.  Follows simple commands.  Skin: No rashes        Results Reviewed:  LAB RESULTS:      Lab 23  0538   WBC 7.19   HEMOGLOBIN 13.9   HEMATOCRIT 42.4   PLATELETS 194   MCV 93.0         Lab 23  0538   SODIUM 141   POTASSIUM 4.1   CHLORIDE 105   CO2 25.0   ANION GAP 11.0   BUN 18   CREATININE 1.22   EGFR 58.8*   GLUCOSE 95   CALCIUM 9.1         Lab 23  0538   TOTAL PROTEIN " Last CPX: 02/16/2024  BP Readings from Last 2 Encounters:   02/16/24 122/70   08/15/23 122/72     Follow up appointment: 8/16/2024     Name from pharmacy: FLUoxetine HCl 20 MG Oral Capsule          Will file in chart as: FLUoxetine (PROzac) 20 MG capsule    Sig: TAKE 1 CAPSULE BY MOUTH  DAILY    Original sig: TAKE 1 CAPSULE BY MOUTH DAILY    Disp: 90 capsule    Refills: 3    Start: 3/13/2024    Class: Eprescribe    For: Depressive disorder, not elsewhere classified    To pharmacy: Please send a replace/new response with 100-Day Supply if appropriate to maximize member benefit. Requesting 1 year supply.    Last ordered: 11 months ago (4/14/2023) by Kenneth Mcdonald MD    Last refill: 1/18/2024    Rx #: [id number]    SSRI (Selective Serotonin Reuptake Inhibitors) Adult Refill Protocol - 12 Month Protocol [id number]/13/2024 09:43 PM   Protocol Details Seen by prescribing provider or same department within the last 12 months or has a future appt in 3 months - IF FAILED PLEASE LOOK AT CHART REVIEW FOR LAST VISIT AND PROCEED ACCORDINGLY    Medication (including dose and sig) on current meds list       Name from pharmacy: Enalapril Maleate 5 MG Oral Tablet         Will file in chart as: enalapril (VASOTEC) 5 MG tablet    Sig: TAKE 1 TABLET BY MOUTH  DAILY    Original sig: TAKE 1 TABLET BY MOUTH DAILY    Disp: 90 tablet    Refills: 3    Start: 3/13/2024    Class: Eprescribe    To pharmacy: Please send a replace/new response with 100-Day Supply if appropriate to maximize member benefit. Requesting 1 year supply.    Last ordered: 11 months ago (4/14/2023) by Kenneth Mcdonald MD    Last refill: 1/18/2024    Rx #: [id number]    ACE Inhibitor Refill Protocol - 12 Month Protocol [id number]/13/2024 09:43 PM   Protocol Details Normal eGFR within last 12 months looking at last value    eGFR resulted within last 12 months -- IF CRITERIA FAILED REFER TO PROTOCOL DETAILS    Seen by prescribing provider or same department within the last 12  5.7*   ALBUMIN 3.6   GLOBULIN 2.1   ALT (SGPT) 14   AST (SGOT) 16   BILIRUBIN 0.6   ALK PHOS 106                       Brief Urine Lab Results  (Last result in the past 365 days)        Color   Clarity   Blood   Leuk Est   Nitrite   Protein   CREAT   Urine HCG        05/17/23 1232 Yellow   Cloudy   Trace   Large (3+)   Negative   Negative                   Microbiology Results Abnormal       Procedure Component Value - Date/Time    Urine Culture - Urine, Urine, Clean Catch [[id number]]  (Normal) Collected: 05/17/23 1232    Lab Status: Final result Specimen: Urine, Clean Catch Updated: 05/18/23 2221     Urine Culture No growth            No radiology results from the last 24 hrs    Results for orders placed during the hospital encounter of 07/28/20    Adult Transesophageal Echo (SUMMER) W/ Cont if Necessary Per Protocol    Interpretation Summary  · Estimated EF = 65%.  · Left ventricular systolic function is normal.  · Left atrial cavity size is mild-to-moderately dilated.  · There is mild calcification of the aortic valve mainly affecting the non and right coronary cusp(s).  · Saline test results are negative.  · The aortic valve exhibits moderate sclerosis.  · There is no evidence of pericardial effusion.  · There is moderate (grade 2) protruding plaque in the descending aorta present.  · No valvular vegetations demonstrated.      Current medications:  Scheduled Meds:allopurinol, 50 mg, Oral, Daily  amLODIPine, 2.5 mg, Oral, Daily  aspirin, 81 mg, Oral, Daily  atorvastatin, 80 mg, Oral, Nightly  busPIRone, 15 mg, Oral, Q12H  clopidogrel, 75 mg, Oral, Daily  donepezil, 5 mg, Oral, Nightly  famotidine, 20 mg, Oral, Daily  finasteride, 5 mg, Oral, Daily  heparin (porcine), 5,000 Units, Subcutaneous, Q8H  levothyroxine, 175 mcg, Oral, Q AM  melatonin, 5 mg, Oral, Nightly  pantoprazole, 40 mg, Oral, Daily  polyethylene glycol, 17 g, Oral, Daily  QUEtiapine, 25 mg, Oral, BID  sertraline, 100 mg, Oral, Daily  terazosin, 2 mg,  months or has a future appt in 3 months - IF FAILED PLEASE LOOK AT CHART REVIEW FOR LAST VISIT AND PROCEED ACCORDINGLY    Last BP was under 140/90 or if patient has diabetes, CAD, or PVD, BP under 130/80 in past year -- IF CRITERIA FAILED REFER TO PROTOCOL DETAILS    Normal Potassium within last 12 months looking at last value -- IF CRITERIA FAILED REFER TO PROTOCOL DETAILS    Medication (including dose and sig) on current meds list       Name from pharmacy: Pravastatin Sodium 40 MG Oral Tablet         Will file in chart as: pravastatin (PRAVACHOL) 40 MG tablet    Sig: TAKE 1 TABLET BY MOUTH AT  NIGHT    Original sig: TAKE 1 TABLET BY MOUTH AT NIGHT    Disp: 90 tablet    Refills: 3    Start: 3/13/2024    Class: Eprescribe    For: Hyperlipidemia, unspecified hyperlipidemia type    To pharmacy: Please send a replace/new response with 100-Day Supply if appropriate to maximize member benefit. Requesting 1 year supply.    Last ordered: 11 months ago (4/14/2023) by Kenneth Mcdonald MD    Last refill: 1/18/2024    Rx #: [id number]    Hmg CoA Reductase Inhibitors (Statin) Refill Protocol - 12 Month Protocol [id number]/13/2024 09:43 PM   Protocol Details Lipid Panel or Direct LDL resulted within last 15 months -- IF CRITERIA FAILED REFER TO PROTOCOL DETAILS    Patient is NOT on Gemfibrozil    Seen by prescribing provider or same department within the last 12 months or has a future appt in 3 months - IF FAILED PLEASE LOOK AT CHART REVIEW FOR LAST VISIT AND PROCEED ACCORDINGLY    Request is NOT for Simvastatin 80 mg or Vytorin 10-80 mg    Medication (including dose and sig) on current meds list      To be filled at: "[facility] [employer] - [address]      Oral, Nightly      Continuous Infusions:   PRN Meds:.  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        Brief Hospital Course to date:  Domitila Bosch is a 82 y.o. male with hx of dementia, HLD, HLD, CKD 3, hypothyroidism, TIA/CVA, anxiety, and dementia who lives with his daughter. EMS was called due to increased confusion/agitation at home. While EMS was at the house, apparently his daughter had a seizure and they were both seen in the ED. She felt like she could no longer care for him at that point. He was found to have a UTI and creatinine elevation beyond baseline and admission was requested for further management.      This patient's problems and plans were partially entered by my partner and updated as appropriate by me 06/03/23.     Assessment/plan:  Patient is new to me today    Dementia with worsening confusion  Anxiety disorder  -TSH elevated as below  -B12, folate WNL  -CT head without acute intracranial process  -Follows with Dr. Hernadez at , last appointment 1/6/23, adjusted med rec based on most recent meds, holding PRN Xanax and using PRN Hydroxyzine instead  -Continue Namenda  -Continue Seroquel 25 mg BID and Buspar 15 mg BID  --CM  "following.  Discussed case with .  Per staff and chart review patient's daughter is fearful of taking him home as she cannot provide enough care for him.  She is wanting long-term care but there are financial issues related to Medicaid long-term care bed with his assets and joint accounts between her and her father.  His manager, Chucho, stated we could pursue short-term rehab to buy time for her to work on finances and then she would have to discuss further disposition issues with facility at that time.  Had a Long discussion with patient's daughter via phone.  She is happy to keep him in the hospital while awaiting placement.    Addendum: Received call back from case management that patient actually is in observation status does not qualify for acute rehab at this time.  She discussed with patient's daughter now.    -- Case management called back stating she apologized for the incorrect information and that she did discuss this with the patient's daughter.  Daughter is asking if she can keep him in-house overnight while she gets her \"home ready\".   also to set up home health services.  This is appropriate to allow time for arrangements to be made.  Plan for home tomorrow with home health.     CKD III with elevated creatinine  -s/p gentle IVF  -Now stable, baseline Cr probably ~1.2 per chart review  -Renally dosed Allopurinol to 50 mg daily     UTI  BPH  -Due to numerous allergies and prior culture with Enterococcus faecalis, started on Levaquin - then discontinued as urine culture was no growth  -QT normal on EKG  -Continue Finasteride     Hypothyroidism  -TSH is elevated at 60.67, free T4 low at 0.42  -Levothyroxine dose increased from 150 to 175 mcg daily  -Recheck TSH in 6-8 weeks at PCP follow-up     HTN  HLD  Hx of TIA, CVA  -Continue home Finasteride, Terazosin; decreased Amlodipine to 2.5 mg daily  -Per recent med rec from [facility], patient on Triamterene-HCTZ 37.5 mg-25 mg, not on " "Terazosin? He is doing well on current regimen so will not adjust at this time  -Continue ASA, statin    Expected Discharge Location and Transportation: Now likely to discharge home with daughter and home health services.  Expected Discharge will not qualify for acute rehab due to \"hobs status\".  Expected discharge home tomorrow with daughter with home health services.    Expected Discharge Date: 6/4/2023; Expected Discharge Time:      DVT prophylaxis:  Medical DVT prophylaxis orders are present.     AM-PAC 6 Clicks Score (PT): 18 (06/03/23 0800)    CODE STATUS:   Code Status and Medical Interventions:   Ordered at: 05/17/23 9622     Code Status (Patient has no pulse and is not breathing):    CPR (Attempt to Resuscitate)     Medical Interventions (Patient has pulse or is breathing):    Full Support     Comments:    Unable to reach daughter to discuss-will try again later       Jayda Lucas, APRN  06/03/23      "

## 2025-02-18 NOTE — PLAN OF CARE
Ft - 02/24/26    cpe   Goal Outcome Evaluation:  Plan of Care Reviewed With: patient        Progress: improving  Outcome Evaluation: VSS.  82 year old white male admitted on 5/17/2023, he is on day 2 of this hospitalization.  Patient has IV x 2 in place and patent to saline lock.  He has voided an adequate amount of UOP. Patient is pleasantly confused asking same question repetitively about family and how long he will be here.  He has rested comfortably throughout the shift.  Will continue to monitor patient throughout the rest of this shift.